# Patient Record
Sex: MALE | Race: WHITE | NOT HISPANIC OR LATINO | Employment: FULL TIME | URBAN - METROPOLITAN AREA
[De-identification: names, ages, dates, MRNs, and addresses within clinical notes are randomized per-mention and may not be internally consistent; named-entity substitution may affect disease eponyms.]

---

## 2024-06-12 ENCOUNTER — APPOINTMENT (OUTPATIENT)
Dept: MRI IMAGING | Facility: HOSPITAL | Age: 59
End: 2024-06-12
Payer: MEDICAID

## 2024-06-12 ENCOUNTER — APPOINTMENT (OUTPATIENT)
Dept: CT IMAGING | Facility: HOSPITAL | Age: 59
End: 2024-06-12
Payer: MEDICAID

## 2024-06-12 ENCOUNTER — APPOINTMENT (OUTPATIENT)
Dept: GENERAL RADIOLOGY | Facility: HOSPITAL | Age: 59
End: 2024-06-12
Payer: MEDICAID

## 2024-06-12 ENCOUNTER — HOSPITAL ENCOUNTER (INPATIENT)
Facility: HOSPITAL | Age: 59
LOS: 24 days | Discharge: NURSING, REHAB OR LONG-TERM CARE | End: 2024-07-06
Attending: INTERNAL MEDICINE | Admitting: INTERNAL MEDICINE
Payer: MEDICAID

## 2024-06-12 ENCOUNTER — HOSPITAL ENCOUNTER (EMERGENCY)
Facility: HOSPITAL | Age: 59
Discharge: OTHER INSTITUTION | End: 2024-06-12
Attending: EMERGENCY MEDICINE
Payer: MEDICAID

## 2024-06-12 VITALS
WEIGHT: 150 LBS | DIASTOLIC BLOOD PRESSURE: 62 MMHG | HEART RATE: 90 BPM | OXYGEN SATURATION: 98 % | SYSTOLIC BLOOD PRESSURE: 133 MMHG | HEIGHT: 68 IN | RESPIRATION RATE: 18 BRPM | TEMPERATURE: 98.3 F | BODY MASS INDEX: 22.73 KG/M2

## 2024-06-12 DIAGNOSIS — Z89.512 S/P BKA (BELOW KNEE AMPUTATION), LEFT: ICD-10-CM

## 2024-06-12 DIAGNOSIS — R79.89 ELEVATED LFTS: ICD-10-CM

## 2024-06-12 DIAGNOSIS — T84.59XA INFECTION OF PROSTHETIC TOTAL HIP JOINT, INITIAL ENCOUNTER: Primary | ICD-10-CM

## 2024-06-12 DIAGNOSIS — S88.119A BELOW KNEE AMPUTATION: ICD-10-CM

## 2024-06-12 DIAGNOSIS — E11.65 POORLY CONTROLLED DIABETES MELLITUS: ICD-10-CM

## 2024-06-12 DIAGNOSIS — Z74.09 IMPAIRED FUNCTIONAL MOBILITY, BALANCE, GAIT, AND ENDURANCE: ICD-10-CM

## 2024-06-12 DIAGNOSIS — E11.621 DIABETIC ULCER OF LEFT MIDFOOT ASSOCIATED WITH TYPE 2 DIABETES MELLITUS, WITH NECROSIS OF MUSCLE: ICD-10-CM

## 2024-06-12 DIAGNOSIS — L97.423 DIABETIC ULCER OF LEFT MIDFOOT ASSOCIATED WITH TYPE 2 DIABETES MELLITUS, WITH NECROSIS OF MUSCLE: ICD-10-CM

## 2024-06-12 DIAGNOSIS — Z96.649 INFECTION OF PROSTHETIC TOTAL HIP JOINT, INITIAL ENCOUNTER: Primary | ICD-10-CM

## 2024-06-12 DIAGNOSIS — I96 GANGRENE OF LEFT FOOT: Primary | ICD-10-CM

## 2024-06-12 PROBLEM — L97.523: Status: ACTIVE | Noted: 2024-06-12

## 2024-06-12 LAB
ACETONE BLD QL: NEGATIVE
ALBUMIN SERPL-MCNC: 2 G/DL (ref 3.5–5.2)
ALBUMIN SERPL-MCNC: 2.7 G/DL (ref 3.5–5.2)
ALBUMIN/GLOB SERPL: 0.7 G/DL
ALBUMIN/GLOB SERPL: 0.9 G/DL
ALP SERPL-CCNC: 145 U/L (ref 39–117)
ALP SERPL-CCNC: 171 U/L (ref 39–117)
ALT SERPL W P-5'-P-CCNC: 37 U/L (ref 1–41)
ALT SERPL W P-5'-P-CCNC: 45 U/L (ref 1–41)
ANION GAP SERPL CALCULATED.3IONS-SCNC: 11 MMOL/L (ref 5–15)
ANION GAP SERPL CALCULATED.3IONS-SCNC: 20.1 MMOL/L (ref 5–15)
ANISOCYTOSIS BLD QL: ABNORMAL
AST SERPL-CCNC: 46 U/L (ref 1–40)
AST SERPL-CCNC: 65 U/L (ref 1–40)
BASOPHILS # BLD AUTO: 0.01 10*3/MM3 (ref 0–0.2)
BASOPHILS # BLD AUTO: 0.08 10*3/MM3 (ref 0–0.2)
BASOPHILS NFR BLD AUTO: 0 % (ref 0–1.5)
BASOPHILS NFR BLD AUTO: 0.4 % (ref 0–1.5)
BILIRUB SERPL-MCNC: 3.6 MG/DL (ref 0–1.2)
BILIRUB SERPL-MCNC: 4.7 MG/DL (ref 0–1.2)
BILIRUB UR QL STRIP: ABNORMAL
BUN SERPL-MCNC: 63 MG/DL (ref 8–23)
BUN SERPL-MCNC: 67 MG/DL (ref 8–23)
BUN/CREAT SERPL: 54.9 (ref 7–25)
BUN/CREAT SERPL: 63 (ref 7–25)
CALCIUM SPEC-SCNC: 7.7 MG/DL (ref 8.6–10.5)
CALCIUM SPEC-SCNC: 8.9 MG/DL (ref 8.6–10.5)
CHLORIDE SERPL-SCNC: 85 MMOL/L (ref 98–107)
CHLORIDE SERPL-SCNC: 92 MMOL/L (ref 98–107)
CLARITY UR: CLEAR
CLUMPED PLATELETS: PRESENT
CO2 SERPL-SCNC: 21.9 MMOL/L (ref 22–29)
CO2 SERPL-SCNC: 27 MMOL/L (ref 22–29)
COLOR UR: YELLOW
CREAT SERPL-MCNC: 1 MG/DL (ref 0.76–1.27)
CREAT SERPL-MCNC: 1.22 MG/DL (ref 0.76–1.27)
CRP SERPL-MCNC: 24.89 MG/DL (ref 0–0.5)
D-LACTATE SERPL-SCNC: 3.6 MMOL/L (ref 0.5–2)
D-LACTATE SERPL-SCNC: 3.9 MMOL/L (ref 0.5–2)
D-LACTATE SERPL-SCNC: 4.1 MMOL/L (ref 0.5–2)
D-LACTATE SERPL-SCNC: 6.3 MMOL/L (ref 0.5–2)
D-LACTATE SERPL-SCNC: 8.8 MMOL/L (ref 0.5–2)
DEPRECATED RDW RBC AUTO: 44.3 FL (ref 37–54)
DEPRECATED RDW RBC AUTO: 45.2 FL (ref 37–54)
EGFRCR SERPLBLD CKD-EPI 2021: 67.9 ML/MIN/1.73
EGFRCR SERPLBLD CKD-EPI 2021: 86.2 ML/MIN/1.73
EOSINOPHIL # BLD AUTO: 0 10*3/MM3 (ref 0–0.4)
EOSINOPHIL # BLD AUTO: 0.01 10*3/MM3 (ref 0–0.4)
EOSINOPHIL NFR BLD AUTO: 0 % (ref 0.3–6.2)
EOSINOPHIL NFR BLD AUTO: 0.1 % (ref 0.3–6.2)
ERYTHROCYTE [DISTWIDTH] IN BLOOD BY AUTOMATED COUNT: 12.9 % (ref 12.3–15.4)
ERYTHROCYTE [DISTWIDTH] IN BLOOD BY AUTOMATED COUNT: 13.2 % (ref 12.3–15.4)
GLOBULIN UR ELPH-MCNC: 2.9 GM/DL
GLOBULIN UR ELPH-MCNC: 3 GM/DL
GLUCOSE BLDC GLUCOMTR-MCNC: 342 MG/DL (ref 70–130)
GLUCOSE BLDC GLUCOMTR-MCNC: 363 MG/DL (ref 70–130)
GLUCOSE BLDC GLUCOMTR-MCNC: 443 MG/DL (ref 70–130)
GLUCOSE BLDC GLUCOMTR-MCNC: 539 MG/DL (ref 70–130)
GLUCOSE SERPL-MCNC: 348 MG/DL (ref 65–99)
GLUCOSE SERPL-MCNC: 509 MG/DL (ref 65–99)
GLUCOSE UR STRIP-MCNC: ABNORMAL MG/DL
HCT VFR BLD AUTO: 31.5 % (ref 37.5–51)
HCT VFR BLD AUTO: 35.5 % (ref 37.5–51)
HGB BLD-MCNC: 11 G/DL (ref 13–17.7)
HGB BLD-MCNC: 12.3 G/DL (ref 13–17.7)
HGB UR QL STRIP.AUTO: NEGATIVE
IMM GRANULOCYTES # BLD AUTO: 0.34 10*3/MM3 (ref 0–0.05)
IMM GRANULOCYTES # BLD AUTO: 0.42 10*3/MM3 (ref 0–0.05)
IMM GRANULOCYTES NFR BLD AUTO: 1.6 % (ref 0–0.5)
IMM GRANULOCYTES NFR BLD AUTO: 1.8 % (ref 0–0.5)
KETONES UR QL STRIP: ABNORMAL
LEUKOCYTE ESTERASE UR QL STRIP.AUTO: NEGATIVE
LYMPHOCYTES # BLD AUTO: 0.67 10*3/MM3 (ref 0.7–3.1)
LYMPHOCYTES # BLD AUTO: 0.83 10*3/MM3 (ref 0.7–3.1)
LYMPHOCYTES # BLD MANUAL: 1.07 10*3/MM3 (ref 0.7–3.1)
LYMPHOCYTES NFR BLD AUTO: 2.5 % (ref 19.6–45.3)
LYMPHOCYTES NFR BLD AUTO: 4.4 % (ref 19.6–45.3)
LYMPHOCYTES NFR BLD MANUAL: 6 % (ref 5–12)
MCH RBC QN AUTO: 32 PG (ref 26.6–33)
MCH RBC QN AUTO: 32.3 PG (ref 26.6–33)
MCHC RBC AUTO-ENTMCNC: 34.6 G/DL (ref 31.5–35.7)
MCHC RBC AUTO-ENTMCNC: 34.9 G/DL (ref 31.5–35.7)
MCV RBC AUTO: 91.6 FL (ref 79–97)
MCV RBC AUTO: 93.2 FL (ref 79–97)
METAMYELOCYTES NFR BLD MANUAL: 1 % (ref 0–0)
MONOCYTES # BLD AUTO: 1.01 10*3/MM3 (ref 0.1–0.9)
MONOCYTES # BLD AUTO: 1.47 10*3/MM3 (ref 0.1–0.9)
MONOCYTES # BLD: 1.61 10*3/MM3 (ref 0.1–0.9)
MONOCYTES NFR BLD AUTO: 5.4 % (ref 5–12)
MONOCYTES NFR BLD AUTO: 5.5 % (ref 5–12)
NEUTROPHILS # BLD AUTO: 23.83 10*3/MM3 (ref 1.7–7)
NEUTROPHILS NFR BLD AUTO: 16.57 10*3/MM3 (ref 1.7–7)
NEUTROPHILS NFR BLD AUTO: 24.21 10*3/MM3 (ref 1.7–7)
NEUTROPHILS NFR BLD AUTO: 87.9 % (ref 42.7–76)
NEUTROPHILS NFR BLD AUTO: 90.4 % (ref 42.7–76)
NEUTROPHILS NFR BLD MANUAL: 70 % (ref 42.7–76)
NEUTS BAND NFR BLD MANUAL: 19 % (ref 0–5)
NEUTS VAC BLD QL SMEAR: NORMAL
NITRITE UR QL STRIP: NEGATIVE
NRBC BLD AUTO-RTO: 0 /100 WBC (ref 0–0.2)
PH UR STRIP.AUTO: 5.5 [PH] (ref 4.5–8)
PLATELET # BLD AUTO: 142 10*3/MM3 (ref 140–450)
PLATELET # BLD AUTO: 191 10*3/MM3 (ref 140–450)
PMV BLD AUTO: 10.2 FL (ref 6–12)
PMV BLD AUTO: 10.3 FL (ref 6–12)
POTASSIUM SERPL-SCNC: 3.6 MMOL/L (ref 3.5–5.2)
POTASSIUM SERPL-SCNC: 3.7 MMOL/L (ref 3.5–5.2)
PROT SERPL-MCNC: 5 G/DL (ref 6–8.5)
PROT SERPL-MCNC: 5.6 G/DL (ref 6–8.5)
PROT UR QL STRIP: NEGATIVE
RBC # BLD AUTO: 3.44 10*6/MM3 (ref 4.14–5.8)
RBC # BLD AUTO: 3.81 10*6/MM3 (ref 4.14–5.8)
RBC MORPH BLD: NORMAL
SMALL PLATELETS BLD QL SMEAR: ADEQUATE
SODIUM SERPL-SCNC: 127 MMOL/L (ref 136–145)
SODIUM SERPL-SCNC: 130 MMOL/L (ref 136–145)
SP GR UR STRIP: 1.01 (ref 1–1.03)
UROBILINOGEN UR QL STRIP: ABNORMAL
VARIANT LYMPHS NFR BLD MANUAL: 4 % (ref 19.6–45.3)
WBC MORPH BLD: NORMAL
WBC NRBC COR # BLD AUTO: 18.84 10*3/MM3 (ref 3.4–10.8)
WBC NRBC COR # BLD AUTO: 26.78 10*3/MM3 (ref 3.4–10.8)

## 2024-06-12 PROCEDURE — 82948 REAGENT STRIP/BLOOD GLUCOSE: CPT

## 2024-06-12 PROCEDURE — 73630 X-RAY EXAM OF FOOT: CPT

## 2024-06-12 PROCEDURE — 25510000001 IOPAMIDOL PER 1 ML: Performed by: EMERGENCY MEDICINE

## 2024-06-12 PROCEDURE — 80053 COMPREHEN METABOLIC PANEL: CPT | Performed by: EMERGENCY MEDICINE

## 2024-06-12 PROCEDURE — 82009 KETONE BODYS QUAL: CPT | Performed by: EMERGENCY MEDICINE

## 2024-06-12 PROCEDURE — 96367 TX/PROPH/DG ADDL SEQ IV INF: CPT

## 2024-06-12 PROCEDURE — 75635 CT ANGIO ABDOMINAL ARTERIES: CPT

## 2024-06-12 PROCEDURE — 87154 CUL TYP ID BLD PTHGN 6+ TRGT: CPT | Performed by: EMERGENCY MEDICINE

## 2024-06-12 PROCEDURE — 99285 EMERGENCY DEPT VISIT HI MDM: CPT

## 2024-06-12 PROCEDURE — 83605 ASSAY OF LACTIC ACID: CPT | Performed by: PHYSICIAN ASSISTANT

## 2024-06-12 PROCEDURE — 63710000001 INSULIN REGULAR HUMAN PER 5 UNITS: Performed by: EMERGENCY MEDICINE

## 2024-06-12 PROCEDURE — 25010000002 CLINDAMYCIN 900 MG/50ML SOLUTION: Performed by: INTERNAL MEDICINE

## 2024-06-12 PROCEDURE — 80053 COMPREHEN METABOLIC PANEL: CPT | Performed by: INTERNAL MEDICINE

## 2024-06-12 PROCEDURE — 87205 SMEAR GRAM STAIN: CPT | Performed by: EMERGENCY MEDICINE

## 2024-06-12 PROCEDURE — 96368 THER/DIAG CONCURRENT INF: CPT

## 2024-06-12 PROCEDURE — 25810000003 SODIUM CHLORIDE 0.9 % SOLUTION: Performed by: EMERGENCY MEDICINE

## 2024-06-12 PROCEDURE — 87070 CULTURE OTHR SPECIMN AEROBIC: CPT | Performed by: EMERGENCY MEDICINE

## 2024-06-12 PROCEDURE — 96361 HYDRATE IV INFUSION ADD-ON: CPT

## 2024-06-12 PROCEDURE — 87147 CULTURE TYPE IMMUNOLOGIC: CPT | Performed by: EMERGENCY MEDICINE

## 2024-06-12 PROCEDURE — 86140 C-REACTIVE PROTEIN: CPT | Performed by: EMERGENCY MEDICINE

## 2024-06-12 PROCEDURE — 87186 SC STD MICRODIL/AGAR DIL: CPT | Performed by: EMERGENCY MEDICINE

## 2024-06-12 PROCEDURE — 25810000003 LACTATED RINGERS PER 1000 ML: Performed by: INTERNAL MEDICINE

## 2024-06-12 PROCEDURE — 25010000002 PIPERACILLIN SOD-TAZOBACTAM PER 1 G: Performed by: EMERGENCY MEDICINE

## 2024-06-12 PROCEDURE — 25810000003 SODIUM CHLORIDE 0.9 % SOLUTION 250 ML FLEX CONT: Performed by: EMERGENCY MEDICINE

## 2024-06-12 PROCEDURE — 83605 ASSAY OF LACTIC ACID: CPT | Performed by: EMERGENCY MEDICINE

## 2024-06-12 PROCEDURE — 63710000001 INSULIN REGULAR HUMAN PER 5 UNITS: Performed by: INTERNAL MEDICINE

## 2024-06-12 PROCEDURE — 85007 BL SMEAR W/DIFF WBC COUNT: CPT | Performed by: EMERGENCY MEDICINE

## 2024-06-12 PROCEDURE — 85025 COMPLETE CBC W/AUTO DIFF WBC: CPT | Performed by: EMERGENCY MEDICINE

## 2024-06-12 PROCEDURE — 25010000002 CLINDAMYCIN 600 MG/50ML SOLUTION: Performed by: EMERGENCY MEDICINE

## 2024-06-12 PROCEDURE — 36415 COLL VENOUS BLD VENIPUNCTURE: CPT

## 2024-06-12 PROCEDURE — 85007 BL SMEAR W/DIFF WBC COUNT: CPT | Performed by: INTERNAL MEDICINE

## 2024-06-12 PROCEDURE — 81003 URINALYSIS AUTO W/O SCOPE: CPT | Performed by: EMERGENCY MEDICINE

## 2024-06-12 PROCEDURE — 87040 BLOOD CULTURE FOR BACTERIA: CPT | Performed by: EMERGENCY MEDICINE

## 2024-06-12 PROCEDURE — 85025 COMPLETE CBC W/AUTO DIFF WBC: CPT | Performed by: INTERNAL MEDICINE

## 2024-06-12 PROCEDURE — 96365 THER/PROPH/DIAG IV INF INIT: CPT

## 2024-06-12 PROCEDURE — 99223 1ST HOSP IP/OBS HIGH 75: CPT | Performed by: INTERNAL MEDICINE

## 2024-06-12 PROCEDURE — 87040 BLOOD CULTURE FOR BACTERIA: CPT | Performed by: INTERNAL MEDICINE

## 2024-06-12 PROCEDURE — 96366 THER/PROPH/DIAG IV INF ADDON: CPT

## 2024-06-12 PROCEDURE — 25010000002 VANCOMYCIN HCL 1.25 G RECONSTITUTED SOLUTION 1 EACH VIAL: Performed by: EMERGENCY MEDICINE

## 2024-06-12 PROCEDURE — 83605 ASSAY OF LACTIC ACID: CPT | Performed by: INTERNAL MEDICINE

## 2024-06-12 RX ORDER — NITROGLYCERIN 0.4 MG/1
0.4 TABLET SUBLINGUAL
Status: DISCONTINUED | OUTPATIENT
Start: 2024-06-12 | End: 2024-07-06 | Stop reason: HOSPADM

## 2024-06-12 RX ORDER — BISACODYL 5 MG/1
5 TABLET, DELAYED RELEASE ORAL DAILY PRN
Status: DISCONTINUED | OUTPATIENT
Start: 2024-06-12 | End: 2024-07-06 | Stop reason: HOSPADM

## 2024-06-12 RX ORDER — HYDROCODONE BITARTRATE AND ACETAMINOPHEN 5; 325 MG/1; MG/1
1 TABLET ORAL EVERY 4 HOURS PRN
Status: DISPENSED | OUTPATIENT
Start: 2024-06-12 | End: 2024-06-17

## 2024-06-12 RX ORDER — L.ACID,PARA/B.BIFIDUM/S.THERM 8B CELL
1 CAPSULE ORAL DAILY
Status: DISCONTINUED | OUTPATIENT
Start: 2024-06-12 | End: 2024-07-06 | Stop reason: HOSPADM

## 2024-06-12 RX ORDER — NICOTINE POLACRILEX 4 MG
15 LOZENGE BUCCAL
Status: DISCONTINUED | OUTPATIENT
Start: 2024-06-12 | End: 2024-06-15

## 2024-06-12 RX ORDER — SODIUM CHLORIDE 0.9 % (FLUSH) 0.9 %
10 SYRINGE (ML) INJECTION AS NEEDED
Status: DISCONTINUED | OUTPATIENT
Start: 2024-06-12 | End: 2024-07-06 | Stop reason: HOSPADM

## 2024-06-12 RX ORDER — POLYETHYLENE GLYCOL 3350 17 G/17G
17 POWDER, FOR SOLUTION ORAL DAILY PRN
Status: DISCONTINUED | OUTPATIENT
Start: 2024-06-12 | End: 2024-07-06 | Stop reason: HOSPADM

## 2024-06-12 RX ORDER — DEXTROSE MONOHYDRATE 25 G/50ML
25 INJECTION, SOLUTION INTRAVENOUS
Status: DISCONTINUED | OUTPATIENT
Start: 2024-06-12 | End: 2024-06-15

## 2024-06-12 RX ORDER — CLINDAMYCIN PHOSPHATE 900 MG/50ML
900 INJECTION, SOLUTION INTRAVENOUS EVERY 8 HOURS
Qty: 750 ML | Refills: 0 | Status: DISCONTINUED | OUTPATIENT
Start: 2024-06-12 | End: 2024-06-17

## 2024-06-12 RX ORDER — CLINDAMYCIN PHOSPHATE 600 MG/50ML
600 INJECTION, SOLUTION INTRAVENOUS ONCE
Status: COMPLETED | OUTPATIENT
Start: 2024-06-12 | End: 2024-06-12

## 2024-06-12 RX ORDER — AMOXICILLIN 250 MG
2 CAPSULE ORAL 2 TIMES DAILY
Status: DISCONTINUED | OUTPATIENT
Start: 2024-06-12 | End: 2024-07-06 | Stop reason: HOSPADM

## 2024-06-12 RX ORDER — SODIUM CHLORIDE, SODIUM LACTATE, POTASSIUM CHLORIDE, CALCIUM CHLORIDE 600; 310; 30; 20 MG/100ML; MG/100ML; MG/100ML; MG/100ML
100 INJECTION, SOLUTION INTRAVENOUS CONTINUOUS
Status: DISCONTINUED | OUTPATIENT
Start: 2024-06-12 | End: 2024-06-14

## 2024-06-12 RX ORDER — IBUPROFEN 600 MG/1
1 TABLET ORAL
Status: DISCONTINUED | OUTPATIENT
Start: 2024-06-12 | End: 2024-06-15

## 2024-06-12 RX ORDER — SODIUM CHLORIDE 9 MG/ML
40 INJECTION, SOLUTION INTRAVENOUS AS NEEDED
Status: DISCONTINUED | OUTPATIENT
Start: 2024-06-12 | End: 2024-07-06 | Stop reason: HOSPADM

## 2024-06-12 RX ORDER — SODIUM CHLORIDE 9 MG/ML
250 INJECTION, SOLUTION INTRAVENOUS CONTINUOUS
Status: DISCONTINUED | OUTPATIENT
Start: 2024-06-12 | End: 2024-06-12 | Stop reason: HOSPADM

## 2024-06-12 RX ORDER — HYDROMORPHONE HYDROCHLORIDE 1 MG/ML
0.5 INJECTION, SOLUTION INTRAMUSCULAR; INTRAVENOUS; SUBCUTANEOUS EVERY 4 HOURS PRN
Status: DISPENSED | OUTPATIENT
Start: 2024-06-12 | End: 2024-06-17

## 2024-06-12 RX ORDER — BISACODYL 10 MG
10 SUPPOSITORY, RECTAL RECTAL DAILY PRN
Status: DISCONTINUED | OUTPATIENT
Start: 2024-06-12 | End: 2024-07-06 | Stop reason: HOSPADM

## 2024-06-12 RX ORDER — SODIUM CHLORIDE 0.9 % (FLUSH) 0.9 %
10 SYRINGE (ML) INJECTION EVERY 12 HOURS SCHEDULED
Status: DISCONTINUED | OUTPATIENT
Start: 2024-06-12 | End: 2024-07-06 | Stop reason: HOSPADM

## 2024-06-12 RX ADMIN — VANCOMYCIN HYDROCHLORIDE 1250 MG: 1.25 INJECTION, POWDER, LYOPHILIZED, FOR SOLUTION INTRAVENOUS at 08:18

## 2024-06-12 RX ADMIN — INSULIN HUMAN 10 UNITS: 100 INJECTION, SOLUTION PARENTERAL at 08:14

## 2024-06-12 RX ADMIN — Medication 1 CAPSULE: at 18:28

## 2024-06-12 RX ADMIN — Medication 10 ML: at 20:04

## 2024-06-12 RX ADMIN — INSULIN HUMAN 6 UNITS: 100 INJECTION, SOLUTION PARENTERAL at 18:28

## 2024-06-12 RX ADMIN — SODIUM CHLORIDE, POTASSIUM CHLORIDE, SODIUM LACTATE AND CALCIUM CHLORIDE 100 ML/HR: 600; 310; 30; 20 INJECTION, SOLUTION INTRAVENOUS at 20:03

## 2024-06-12 RX ADMIN — SODIUM CHLORIDE 250 ML/HR: 9 INJECTION, SOLUTION INTRAVENOUS at 13:35

## 2024-06-12 RX ADMIN — PIPERACILLIN SODIUM AND TAZOBACTAM SODIUM 3.38 G: 3; .375 INJECTION, POWDER, LYOPHILIZED, FOR SOLUTION INTRAVENOUS at 08:19

## 2024-06-12 RX ADMIN — SODIUM CHLORIDE 1000 ML: 9 INJECTION, SOLUTION INTRAVENOUS at 08:09

## 2024-06-12 RX ADMIN — IOPAMIDOL 100 ML: 755 INJECTION, SOLUTION INTRAVENOUS at 09:45

## 2024-06-12 RX ADMIN — SODIUM CHLORIDE 2040 ML: 9 INJECTION, SOLUTION INTRAVENOUS at 09:49

## 2024-06-12 RX ADMIN — CLINDAMYCIN IN 5 PERCENT DEXTROSE 900 MG: 18 INJECTION, SOLUTION INTRAVENOUS at 22:18

## 2024-06-12 RX ADMIN — CLINDAMYCIN PHOSPHATE 600 MG: 600 INJECTION, SOLUTION INTRAVENOUS at 13:36

## 2024-06-12 NOTE — H&P
[facility] Medicine Services  HISTORY AND PHYSICAL    Patient Name: Easton Alvarez  : 1965  MRN: [id number]  Primary Care Physician: Provider, No Known  Date of admission: 2024      Subjective   Subjective     Chief Complaint:  Left foot gangrene     HPI:  Easton Alvarez is a 58 y.o. male who doesn't usually see doctors; history of DM (takes no meds), diabetic neuropathy, and R hip fracture/ORIF in 2024 ([facility]) and R foot TMA in 2019 (, done for infection).  He [place] the [place] in [place].      Two weeks ago he fell outside his house and injured his left foot.  He did not think it was seriously injured and it seemed stable to him over the past two weeks, and he has not had fever/chills.  About two days ago he lost his appetite.  This morning the foot looked worrisome enough that he went to the ED at [facility].  A CTA aortic runoff was done and showed three-vessel perfusion.  Foot appeared necrotic and CT showed gas tracking up from wound.  He was arranged for emergent transfer to Psychiatric; Dr Em has agreed to consult.     He has diminished sensation in the feet but is feeling some pain.    ROS  - two weeks of post prandial diarrhea and decreased intake in consequence  - frequent hiccups x weeks   - history of HTN, on no meds    Denies tobacco, alcohol, heart disease, allergies.  Does not have a PCP currently.          Personal History     History reviewed. No pertinent past medical history.        History reviewed. No pertinent surgical history.  R hip repair 2024    Family History: family history is not on file.     Social History:  reports that he has never smoked. He has been exposed to tobacco smoke. He has never used smokeless tobacco. He reports that he does not currently use alcohol. He reports current drug use. Drug: Marijuana.  Social History     Social History Narrative    Not on file       Medications:  Available home medication information  reviewed.  Pt takes no home meds        No Known Allergies    Objective   Objective     Vital Signs:   Temp:  [98 °F (36.7 °C)-98.8 °F (37.1 °C)] 98 °F (36.7 °C)  Heart Rate:  [] 95  Resp:  [18-22] 22  BP: (116-151)/(61-74) 144/69       Physical Exam   Gen:  thin man with mild bitemp wasting.  NAD and conversant.    Neuro: alert and oriented, clear speech, follows commands, grossly nonfocal  HEENT:  NC/AT  Neck:  Supple, no LAD  Heart RRR no murmur,   Lungs sl tachypneic, no wheeze, on RA.   Abd:  Soft, nontender, no rebound or guarding, pos BS  Extrem:  RLE:  healing incision R hip.  TMA R foot well healed  LLE:  partially dressed w Kerlix..  Necrotic area and bulla medially; diffuse swelling and erythema of entire foot, swelling/redness and discolored areas of distal leg without tenderness.        Result Review:  I have personally reviewed the results from the time of this admission to 6/12/2024 18:33 EDT and agree with these findings:  [x]  Laboratory list / accordion  [x]  Microbiology  [x]  Radiology  []  EKG/Telemetry   []  Cardiology/Vascular   []  Pathology  [x]  Old records  []  Other:  Most notable findings include: WBC 26, lactate 3.5, CTA with aortic runoff notes gas gangrene       LAB RESULTS:      Lab 06/12/24  1451 06/12/24  1149 06/12/24  0807 06/12/24  0802   WBC  --   --   --  26.78*   HEMOGLOBIN  --   --   --  12.3*   HEMATOCRIT  --   --   --  35.5*   PLATELETS  --   --   --  191   NEUTROS ABS  --   --   --  24.21*  23.83*   IMMATURE GRANS (ABS)  --   --   --  0.42*   LYMPHS ABS  --   --   --  0.67*   MONOS ABS  --   --   --  1.47*   EOS ABS  --   --   --  0.00   MCV  --   --   --  93.2   CRP  --   --   --  24.89*   LACTATE 3.9* 6.3* 8.8*  --          Lab 06/12/24  0802   SODIUM 127*   POTASSIUM 3.6   CHLORIDE 85*   CO2 21.9*   ANION GAP 20.1*   BUN 67*   CREATININE 1.22   EGFR 67.9   GLUCOSE 509*   CALCIUM 8.9         Lab 06/12/24  0802   TOTAL PROTEIN 5.6*   ALBUMIN 2.7*   GLOBULIN 2.9    ALT (SGPT) 45*   AST (SGOT) 65*   BILIRUBIN 4.7*   ALK PHOS 171*                     UA          6/12/2024    09:51   Urinalysis   Specific [place], UA 1.010    Ketones, UA Trace    Blood, UA Negative    Leukocytes, UA Negative    Nitrite, UA Negative        Microbiology Results (last 10 days)       Procedure Component Value - Date/Time    Wound Culture - Swab, Foot, Left [[id number]] Collected: 06/12/24 0803    Lab Status: Preliminary result Specimen: Swab from Foot, Left Updated: 06/12/24 1412     Gram Stain Rare (1+) WBCs seen      No organisms seen            CT Angio Abdominal Aorta Bilateral Iliofem Runoff    Result Date: 6/12/2024  CT ANGIO ABDOMINAL AORTA BILAT ILIOFEM RUNOFF Date of Exam: 6/12/2024 9:30 AM EDT Indication: Gangrenous left foot. Comparison: 6/12/2024 radiographs Technique: CTA of the abdomen, pelvis and both lower extremities was performed before and after the uneventful intravenous administration of iodinated contrast. Reconstructed coronal and sagittal images were also obtained. In addition, a 3-D volume rendered image was created for interpretation. Automated exposure control and iterative reconstruction methods were used. Findings: Abdomen/pelvis: There are scattered atherosclerotic calcifications of the aorta and branch vessels. No evidence of significant nonatheromatous plaque. The celiac, SMA, and BUBBA are patent. The bilateral renal arteries are patent. No significant stenosis appreciated. The bilateral common iliac as well as the external and internal iliac arteries are patent without evidence of significant stenosis. There is no suspicious renal lesion. There is mild hepatic contour nodularity Cholelithiasis. No evidence of cholecystitis. No significant biliary ductal dilation. The spleen is borderline enlarged. The pancreas and bilateral adrenal glands are within normal limits. There is no hydronephrosis or nephrolithiasis. No suspicious renal lesion. No evidence of bowel  obstruction. Moderate colonic stool burden. There are prominent left inguinal lymph lymph nodes. For reference, there is a left inguinal lymph node measuring 1.7 cm in short axis on axial image 236. There is small volume abdominal pelvic ascites. Grossly unremarkable appearance of of the urinary bladder, partially obscured. There are surgical changes of right hip arthroplasty. There is a soft tissue gas fluid collection surrounding the anterior aspect of the hip arthroplasty as seen on axial image 217. This is poorly defined on this exam. No evidence of fracture or suspicious osseous lesion. Bilateral lower extremity runoff: There are scattered atherosclerotic calcifications of the bilateral common femoral arteries. The bilateral deep femoral arteries are patent. The bilateral superficial femoral arteries are patent. There is focal narrowing of the left distal superficial femoral artery secondary to near circumferential atherosclerotic calcification. Stenosis measures approximately 50% as seen on axial image 373. There is calcified and noncalcified atheromatous plaque regarding the right superficial femoral artery at this  level without significant stenosis. There our bilateral popliteal artery calcifications. There is approximately 50% stenosis on the right at the level of the knee joint as seen on axial image 408. No evidence of occlusion. Patent bilateral three-vessel runoff. There is asymmetric early venous enhancement within the left lower extremity, likely secondary to inflammatory changes. There is diffuse subcutaneous edema and extensive emphysema about the left foot with extension along the left lateral lower leg. No definite evidence of well-defined or drainable fluid collection. No evidence of acute fracture. Severe left first MTP joint arthritis. Sequela of right forefoot amputation at the metatarsal shafts. No suspicious osseous lesion. Calcaneal enthesopathy.     Impression: Impression: Scattered  predominantly calcified atheromatous plaque extending from the abdominal aorta distally to the bilateral popliteal arteries. No evidence of occlusion. Approximately 50% stenosis of the distal left superficial femoral artery, as well as approximately 50% stenosis of the right popliteal artery at the level of the knee joint. Otherwise no evidence of significant arterial stenosis elsewhere. Patent bilateral three-vessel runoff. Inflammatory changes about the left foot including extensive subcutaneous edema and emphysema likely representing necrotizing infection. There is asymmetric early venous enhancement within the left lower extremity, presumably secondary to inflammatory response. No evidence of well-defined or drainable fluid collection within the left lower extremity to suggest abscess. Surgical changes of right hip arthroplasty. Soft tissue gas fluid collection surrounding the anterior aspect of the hip arthroplasty, which is poorly defined on this exam. If this has not been a recent surgical procedure, findings are concerning for periprosthetic infection/abscess. Additional ancillary findings as above. Electronically Signed: Wilfrido Cagle MD  6/12/2024 12:52 PM EDT  Workstation ID: [id number]    XR Foot 3+ View Left    Result Date: 6/12/2024  XR FOOT 3+ VW LEFT Date of Exam: 6/12/2024 8:35 AM EDT Indication: Evaluate for osteomyelitis Comparison: None available. Findings: Neuropathic appearance of the forefoot with deformities of the third through fifth proximal phalanges and great toe distal phalanx. There is suggestion of dorsal subluxation/dislocations at the third through fifth MTP joints. Osteopenia. Moderate first MTP joint arthritis. Calcaneal enthesopathy. Normal midfoot and hindfoot joint alignment. There is diffuse soft tissue edema about the foot with subcutaneous emphysema along the medial and dorsal forefoot. No definite evidence of cortical irregularity to  suggest osteomyelitis.     Impression:  Impression: Diffuse soft tissue edema about the foot with subcutaneous emphysema along the medial and dorsal forefoot, concerning for cellulitis. No radiographic evidence of osteomyelitis. Consider MRI if there is continued concern. Chronic appearing deformities of the forefoot as detailed above, likely sequela of neuropathic arthropathy. Electronically Signed: Wilfrido Cagle MD  6/12/2024 8:58 AM EDT  Workstation ID: [id number]         Assessment & Plan   Assessment & Plan       Diabetic ulcer of left foot with necrosis of muscle    58 yr old transferred from OSH ED with uncntrolled DM and severe left foot infection    Sepsis (WBC, lactate, tachypnea, source)    Left foot cellulitis/gangrene   - lactate 8.8 in OSH ED; CT scan of leg shows gas gangrene  - MRI left foot  - Dr Em aware; plans for amputation in AM   - abx:  vanc Zosyn clindamycin  - blood cultures sent  - ID consult in AM      Gap acidosis  - likely from lactate.  Serum acetone negative  - hydrate   - follow lytes     R hip fx repaired 2/2024  - note gas near hardware per CT scan :  r/o hardware-associated infection vs nl postop finding    DM, uncontrolled  - SSI with POC glucose q3h     Renal insuff, mild  - creat 1.2 . Give fluids overnight.     HTN history;  not currently on meds.  Monitor.       VTE Prophylaxis:  No VTE prophylaxis order currently exists.          CODE STATUS:    There are no questions and answers to display.       Expected Discharge   Expected discharge date/ time has not been documented.     Shraddha Scott MD  06/12/24

## 2024-06-12 NOTE — LETTER
EMS Transport Request  For use at [facility], [place], [place], [place], and [place] only   Patient Name: Easton Alvarez : 1965   Weight:81.9 kg (180 lb 8.9 oz) Pick-up Location: [facility] BLS/ALS: blos   Insurance: [facility] [place]AN OF [place] Auth End Date:    Pre-Cert #: D/C Summary complete:    Destination: [place]   Contact Precautions:    Equipment (O2, Fluids, etc.):    Arrive By Date/Time:  Stretcher/WC: stretcher   CM Requesting: Anat Zarate RN Ext: [phone]   Notes/Medical Necessity: amputation; hip infection; unable to sit for time needed for transport     ______________________________________________________________________    *Only 2 patient bags OR 1 carry-on size bag are permitted.  Wheelchairs and walkers CANNOT transported with the patient. Acknowledge: yes

## 2024-06-12 NOTE — PROGRESS NOTES
"Pharmacy Consult-Vancomycin Dosing  Easton Alvarez is a  58 y.o. male receiving vancomycin therapy.     Indication: sepsis  Consulting Provider: hospitalist  ID Consult: Yes    Goal AUC: 400 - 600 mg/L*hr    Current Antimicrobial Therapy  Anti-Infectives (From admission, onward)      Ordered     Dose/Rate Route Frequency Start Stop    06/12/24 1746  piperacillin-tazobactam (ZOSYN) 4.5 g IVPB in 100 mL NS MBP (CD)        Ordering Provider: Shraddha Scott MD    4.5 g  over 4 Hours Intravenous Every 8 Hours 06/13/24 0100 06/18/24 0059    06/12/24 1746  clindamycin (CLEOCIN) 900 mg in dextrose 5% 50 mL IVPB (premix)        Ordering Provider: Shraddha Scott MD    900 mg  100 mL/hr over 30 Minutes Intravenous Every 8 Hours 06/12/24 2200 06/17/24 2159    06/12/24 1746  piperacillin-tazobactam (ZOSYN) 4.5 g IVPB in 100 mL NS MBP (CD)        Ordering Provider: Shraddha Scott MD    4.5 g  over 30 Minutes Intravenous Once 06/12/24 1900      06/12/24 1747  Pharmacy to dose vancomycin        Ordering Provider: Shraddha Scott MD     Does not apply Continuous PRN 06/12/24 1747 06/17/24 1746            Allergies  Allergies as of 06/12/2024    (No Known Allergies)       Labs    Results from last 7 days   Lab Units 06/12/24  0802   BUN mg/dL 67*   CREATININE mg/dL 1.22       Results from last 7 days   Lab Units 06/12/24  0802   WBC 10*3/mm3 26.78*       Evaluation of Dosing     Last Dose Received in the ED/Outside Facility: Yes  Is Patient on Dialysis or Renal Replacement: No    Ht - 172.7 cm (68\")  Wt - 68 kg (150 lb)    Estimated Creatinine Clearance: 61.9 mL/min (by C-G formula based on SCr of 1.22 mg/dL).    No intake/output data recorded.    Microbiology and Radiology  Microbiology Results (last 10 days)       Procedure Component Value - Date/Time    Wound Culture - Swab, Foot, Left [[id number]] Collected: 06/12/24 0803    Lab Status: Preliminary result Specimen: Swab from Foot, Left Updated: 06/12/24 1412     Gram Stain Rare " (1+) WBCs seen      No organisms seen            Reported Vancomycin Levels                         Insight[employer] AUC Calculation:    Current AUC:mg/L*hr    Predicted Steady State AUC on Current Dose: mg/L*hr  _________________________________    Predicted Steady State AUC on New Dose:   461 mg/L*hr    Assessment/Plan:   Pharmacy consulted to dose vancomycin for SSTI, goal -600 mg/L*hr.  Patient loaded with vancomycin 1250 mg ( ~ 18 mg/kg)  Wound swab-no organisms seen, BC x 4 in process  Patient remains afebrile, serum creatinine is 1.22, BUN is 67, and WBC is 26.78.  Left foot x-ray: no evidence of osteomyelitis  Based on evaluation, initiate a maintenance regimen of vancomycin 1250 mg ( ~ 18 mg/kg) every 24 hours.  A vancomycin trough will be assessed on 6/14 @ 0600 (prior to the 3rd dose).  Will continue to follow and adjust vancomycin dose as needed based on renal function, cultures, and patient clinical status.    Thank you,    Merly Contreras [facility] ED  6/12/2024  18:20 EDT

## 2024-06-12 NOTE — ED NOTES
Called to follow up on bed assignment with [facility]. Waiting on imaging results and a vascular surgeon to view before releasing bed assignment.

## 2024-06-12 NOTE — ED NOTES
Called [facility] ([phone]) transfer center. Spoke with Carolyn; waiting on return call from hospitalist.

## 2024-06-12 NOTE — SIGNIFICANT NOTE
Called for transfer as [facility] hospitalist on call- reviewed labs, imaging, vitals and d/w ED provider. Patient stable with vitals stable throughout all morning in ED despite labs, lactate responding to IVF.  D/w Dr Em here and will accept in transfer w understanding of likely requirement for BKA. D/w ED provider making this clear to patient before transfer as a more complex limb salvage attempt may require transfer to higher level of care as current evaluation suggests need for prompt amputation    Requested IVF, clindamycin given while awaiting transfer. We have active bed. MRI foot stat on admission, NPO @ 12, put on Dr Em's list early. Will also communicate w my crosscover partner

## 2024-06-12 NOTE — ED NOTES
"PT POOR HISTORIAN. STATES HE DOES NOT HAVE ANY PAST MEDICAL HX, DOES NOT TAKE MEDICATIONS DAILY. WHEN ASKED WHY HE DOES NOT TAKE MEDS FOR HIS DM UPON ELEVATED BG LEVEL, PT STATES \"I DONT TRUST IT\". PT ABLE TO REPORT \"RIGHT HIP\" SURGERY IN FEB BUT DOES NOT KNOW WHERE HE HAD IT, OR WHAT THE PROCEDURE WAS. BROTHER AT BEDSIDE REPORTS THE SAME AS ABOVE.   "

## 2024-06-12 NOTE — ED PROVIDER NOTES
"Subjective   History of Present Illness    Chief complaint: Fall and ulcer left foot    Location: Home    Quality/Severity: Right hip injury.    Timing/Onset/Duration: Patient states that the ulcer started sometime ago.  He is not sure exactly which day fill he think it may have been last Tuesday    Modifying Factors: Nothing makes it better    Associated Symptoms: No headache.  No fever chills or cough.  No sore throat earache or nasal congestion.  No chest pain or shortness of breath.  No abdominal pain.  No diarrhea or burning when he urinates.  No nausea or vomiting.    Narrative: This 58-year-old fell when getting groceries out of his car on Monday.  Complains of right leg pain.  Patient states that there is an unknown liquid oozing from his left foot.  He states that it is because he scraped it trying to get up.  The patient is noncompliant diabetic.  He states he has not been taking his metformin because he \"does not trust it\"    PCP:    Review of Systems   Constitutional:  Negative for chills and fever.   HENT:  Positive for ear pain. Negative for congestion and sore throat.    Respiratory:  Negative for shortness of breath.    Cardiovascular:  Negative for chest pain.   Gastrointestinal:  Negative for abdominal pain, nausea and vomiting.   Genitourinary:  Negative for dysuria.   Musculoskeletal:  Negative for back pain.        Right hip pain, ulcer left foot   Skin:  Positive for wound (Left leg).   Neurological:  Negative for headaches.         No past medical history on file.    Not on File    No past surgical history on file.    No family history on file.    Social History     Socioeconomic History    Marital status: Single           Objective   Physical Exam  Vitals (Blood pressure is 150/70, temperature is 98.8 °F, pulse 170, respirations 18, room air pulse ox 95%.) and nursing note reviewed.   Constitutional:       Comments: Disheveled, patient looks like he does not feel well   HENT:      Head: " Normocephalic and atraumatic.      Right Ear: Tympanic membrane normal.      Left Ear: Tympanic membrane normal.      Nose: Nose normal.      Mouth/Throat:      Mouth: Mucous membranes are dry.   Neck:      Comments: There is no tenderness, deformity, or bony step-offs upon palpation of cervical, thoracic, lumbar sacrococcygeal spine.  Cardiovascular:      Rate and Rhythm: Normal rate and regular rhythm.      Pulses: Normal pulses.      Heart sounds: Normal heart sounds. No murmur heard.     No friction rub. No gallop.   Pulmonary:      Effort: Pulmonary effort is normal.      Breath sounds: Normal breath sounds.   Abdominal:      General: Abdomen is flat. Bowel sounds are normal. There is no distension.      Palpations: Abdomen is soft. There is no mass.      Tenderness: There is no abdominal tenderness. There is no right CVA tenderness, left CVA tenderness, guarding or rebound.      Hernia: No hernia is present.   Musculoskeletal:      Cervical back: Normal range of motion and neck supple.      Comments: The left foot is gangrenous.    There is tenderness upon palpation of the right hip.  There is a 2+ dorsalis pedis pulse.  Patient has had his toes amputated on the right leg.  Extremities are otherwise without tenderness or deformity and neurovascular intact.   Skin:     General: Skin is warm and dry.   Neurological:      General: No focal deficit present.      Mental Status: He is alert and oriented to person, place, and time.      Cranial Nerves: No cranial nerve deficit.      Sensory: No sensory deficit.      Motor: Weakness (Generalized) present.         Procedures           ED Course  ED Course as of 06/12/24 0907   Wed Jun 12, 2024   0852 The acetone is negative. [RC]   0852 The white blood cell count is 26, hemoglobin 12, hematocrit 35, neutrophil percent 90%, absolute neutrophil count 24.  CBC is otherwise unremarkable. [RC]   0857 Lactic acid is 8.8 and elevated. [RC]   0901 The glucose is 509, BUN 67,  creatinine 1.22, sodium 127, chloride 85, CO2 21, total protein 5.6, albumin 2.7, ALT is 45, AST is 65, alk phos is 171, GFR 67, CMP is otherwise unremarkable [RC]      ED Course User Index  [RC] Perico Fierro MD      09:07 EDT, 06/12/24:  The patient was reassessed.  He has no new complaints.  His vital signs are stable.    09:07 EDT, 06/12/24:  The patient wishes to be transferred to [facility].  He has been informed that he has infection of the left foot and poorly controlled diabetes with sepsis.  He has been informed needs to be admitted for antibiotics and consultation with podiatry and potentially vascular surgery.  The hospitalist at [facility] has been paged out.                             09:37 EDT, 06/12/24:  I spoke with Dr. Trinity Echavarria at [facility], she is the hospitalist on-call.  She is excepted the patient.  She would like to consult vascular surgery, Dr. Rhoades.  He has been paged out.    12:34 EDT, 06/12/24:  The blood pressure is 134/66, temperature 98 °F, pulse 84, respirations 18, room air pulse ox 98%.  The patient is resting comfortably.  We are awaiting bed assignment in [place].  The CT angiogram of the left lower extremity is pending.  The repeat lactic has been drawn and we are awaiting the results.    13:25 EDT, 06/12/24:  I spoke with Dr. Echavarria at [facility].  She has consulted with the vascular surgeon, Dr. Rhoades, and vascular surgery feels that the patient will need a left BKA.  This was discussed with the patient.  He understands the treatment options, including rare chance that limb may be salvageable at  but not likely per vascular surgery and patient agrees to be transferred to [facility].          Medical Decision Making      Final diagnoses:   Gangrene of left foot   Poorly controlled diabetes mellitus       ED Disposition  ED Disposition       None            No follow-up provider specified.       Medication  List      No changes were made to your prescriptions during this visit.            Perico Fierro MD  06/12/24 8626

## 2024-06-12 NOTE — LETTER
EMS Transport Request  For use at [facility], [place], [place], [place], and [place] only   Patient Name: Easton Alvarez : 1965   Weight:67.6 kg (149 lb) Pick-up Location: [facility] BLS/ALS: bls   Insurance: [facility] [place]AN OF [place] Auth End Date:    Pre-Cert #: D/C Summary complete:    Destination:[facility]   Contact Precautions:    Equipment (O2, Fluids, etc.):    Arrive By Date/Time:  Stretcher/WC: stretcher   CM Requesting: Anat Zarate RN Ext: [phone]   Notes/Medical Necessity: hip infection and amputation, cannot stand or pivot, cannot transfer to wheelchair     ______________________________________________________________________    *Only 2 patient bags OR 1 carry-on size bag are permitted.  Wheelchairs and walkers CANNOT transported with the patient. Acknowledge: yes

## 2024-06-13 ENCOUNTER — APPOINTMENT (OUTPATIENT)
Dept: CARDIOLOGY | Facility: HOSPITAL | Age: 59
End: 2024-06-13
Payer: MEDICAID

## 2024-06-13 ENCOUNTER — ANESTHESIA EVENT CONVERTED (OUTPATIENT)
Dept: ANESTHESIOLOGY | Facility: HOSPITAL | Age: 59
End: 2024-06-13
Payer: MEDICAID

## 2024-06-13 ENCOUNTER — ANESTHESIA EVENT (OUTPATIENT)
Dept: PERIOP | Facility: HOSPITAL | Age: 59
End: 2024-06-13
Payer: MEDICAID

## 2024-06-13 ENCOUNTER — ANESTHESIA (OUTPATIENT)
Dept: PERIOP | Facility: HOSPITAL | Age: 59
End: 2024-06-13
Payer: MEDICAID

## 2024-06-13 ENCOUNTER — APPOINTMENT (OUTPATIENT)
Dept: MRI IMAGING | Facility: HOSPITAL | Age: 59
End: 2024-06-13
Payer: MEDICAID

## 2024-06-13 PROBLEM — E43 SEVERE MALNUTRITION: Status: ACTIVE | Noted: 2024-06-13

## 2024-06-13 LAB
ANION GAP SERPL CALCULATED.3IONS-SCNC: 9 MMOL/L (ref 5–15)
ANION GAP SERPL CALCULATED.3IONS-SCNC: 9 MMOL/L (ref 5–15)
BACTERIA BLD CULT: ABNORMAL
BOTTLE TYPE: ABNORMAL
BUN SERPL-MCNC: 56 MG/DL (ref 8–23)
BUN SERPL-MCNC: 64 MG/DL (ref 8–23)
BUN/CREAT SERPL: 70.3 (ref 7–25)
BUN/CREAT SERPL: 83.6 (ref 7–25)
CALCIUM SPEC-SCNC: 7.3 MG/DL (ref 8.6–10.5)
CALCIUM SPEC-SCNC: 7.5 MG/DL (ref 8.6–10.5)
CHLORIDE SERPL-SCNC: 96 MMOL/L (ref 98–107)
CHLORIDE SERPL-SCNC: 97 MMOL/L (ref 98–107)
CK SERPL-CCNC: 107 U/L (ref 20–200)
CO2 SERPL-SCNC: 23 MMOL/L (ref 22–29)
CO2 SERPL-SCNC: 26 MMOL/L (ref 22–29)
CREAT SERPL-MCNC: 0.67 MG/DL (ref 0.76–1.27)
CREAT SERPL-MCNC: 0.91 MG/DL (ref 0.76–1.27)
DEPRECATED RDW RBC AUTO: 44.3 FL (ref 37–54)
EGFRCR SERPLBLD CKD-EPI 2021: 106.9 ML/MIN/1.73
EGFRCR SERPLBLD CKD-EPI 2021: 96.5 ML/MIN/1.73
ERYTHROCYTE [DISTWIDTH] IN BLOOD BY AUTOMATED COUNT: 13.1 % (ref 12.3–15.4)
GLUCOSE BLDC GLUCOMTR-MCNC: 238 MG/DL (ref 70–130)
GLUCOSE BLDC GLUCOMTR-MCNC: 263 MG/DL (ref 70–130)
GLUCOSE BLDC GLUCOMTR-MCNC: 275 MG/DL (ref 70–130)
GLUCOSE BLDC GLUCOMTR-MCNC: 296 MG/DL (ref 70–130)
GLUCOSE BLDC GLUCOMTR-MCNC: 300 MG/DL (ref 70–130)
GLUCOSE BLDC GLUCOMTR-MCNC: 348 MG/DL (ref 70–130)
GLUCOSE BLDC GLUCOMTR-MCNC: 374 MG/DL (ref 70–130)
GLUCOSE BLDC GLUCOMTR-MCNC: 511 MG/DL (ref 70–130)
GLUCOSE SERPL-MCNC: 260 MG/DL (ref 65–99)
GLUCOSE SERPL-MCNC: 268 MG/DL (ref 65–99)
HBA1C MFR BLD: 9.5 % (ref 4.8–5.6)
HCT VFR BLD AUTO: 30.5 % (ref 37.5–51)
HGB BLD-MCNC: 10.5 G/DL (ref 13–17.7)
MAGNESIUM SERPL-MCNC: 2.4 MG/DL (ref 1.6–2.4)
MCH RBC QN AUTO: 31.8 PG (ref 26.6–33)
MCHC RBC AUTO-ENTMCNC: 34.4 G/DL (ref 31.5–35.7)
MCV RBC AUTO: 92.4 FL (ref 79–97)
PHOSPHATE SERPL-MCNC: 3.5 MG/DL (ref 2.5–4.5)
PLATELET # BLD AUTO: 122 10*3/MM3 (ref 140–450)
PMV BLD AUTO: 10.4 FL (ref 6–12)
POTASSIUM SERPL-SCNC: 3.3 MMOL/L (ref 3.5–5.2)
POTASSIUM SERPL-SCNC: 3.8 MMOL/L (ref 3.5–5.2)
POTASSIUM SERPL-SCNC: 4.3 MMOL/L (ref 3.5–5.2)
QT INTERVAL: 416 MS
QTC INTERVAL: 486 MS
RBC # BLD AUTO: 3.3 10*6/MM3 (ref 4.14–5.8)
SODIUM SERPL-SCNC: 129 MMOL/L (ref 136–145)
SODIUM SERPL-SCNC: 131 MMOL/L (ref 136–145)
WBC NRBC COR # BLD AUTO: 18.48 10*3/MM3 (ref 3.4–10.8)

## 2024-06-13 PROCEDURE — 25010000002 PHENYLEPHRINE 10 MG/ML SOLUTION 1 ML VIAL

## 2024-06-13 PROCEDURE — 25010000002 HYDROMORPHONE PER 4 MG: Performed by: INTERNAL MEDICINE

## 2024-06-13 PROCEDURE — 25010000002 PHENYLEPHRINE 10 MG/ML SOLUTION

## 2024-06-13 PROCEDURE — 25810000003 SODIUM CHLORIDE 0.9 % SOLUTION 250 ML FLEX CONT

## 2024-06-13 PROCEDURE — 25010000002 PIPERACILLIN SOD-TAZOBACTAM PER 1 G: Performed by: INTERNAL MEDICINE

## 2024-06-13 PROCEDURE — 80048 BASIC METABOLIC PNL TOTAL CA: CPT | Performed by: ORTHOPAEDIC SURGERY

## 2024-06-13 PROCEDURE — 25810000003 LACTATED RINGERS PER 1000 ML: Performed by: INTERNAL MEDICINE

## 2024-06-13 PROCEDURE — 87070 CULTURE OTHR SPECIMN AEROBIC: CPT | Performed by: FAMILY MEDICINE

## 2024-06-13 PROCEDURE — 25810000003 SODIUM CHLORIDE 0.9 % SOLUTION: Performed by: PHYSICIAN ASSISTANT

## 2024-06-13 PROCEDURE — 25010000002 ROPIVACAINE HCL-NACL 0.2-0.9 % SOLUTION: Performed by: NURSE ANESTHETIST, CERTIFIED REGISTERED

## 2024-06-13 PROCEDURE — 99232 SBSQ HOSP IP/OBS MODERATE 35: CPT | Performed by: FAMILY MEDICINE

## 2024-06-13 PROCEDURE — 63710000001 INSULIN GLARGINE PER 5 UNITS: Performed by: FAMILY MEDICINE

## 2024-06-13 PROCEDURE — 93010 ELECTROCARDIOGRAM REPORT: CPT | Performed by: INTERNAL MEDICINE

## 2024-06-13 PROCEDURE — 25010000002 CLINDAMYCIN 900 MG/50ML SOLUTION: Performed by: ORTHOPAEDIC SURGERY

## 2024-06-13 PROCEDURE — 84132 ASSAY OF SERUM POTASSIUM: CPT | Performed by: FAMILY MEDICINE

## 2024-06-13 PROCEDURE — 82948 REAGENT STRIP/BLOOD GLUCOSE: CPT

## 2024-06-13 PROCEDURE — 93005 ELECTROCARDIOGRAM TRACING: CPT | Performed by: ANESTHESIOLOGY

## 2024-06-13 PROCEDURE — 63710000001 INSULIN REGULAR HUMAN PER 5 UNITS: Performed by: INTERNAL MEDICINE

## 2024-06-13 PROCEDURE — 87205 SMEAR GRAM STAIN: CPT | Performed by: FAMILY MEDICINE

## 2024-06-13 PROCEDURE — 73718 MRI LOWER EXTREMITY W/O DYE: CPT

## 2024-06-13 PROCEDURE — 63710000001 INSULIN REGULAR HUMAN PER 5 UNITS: Performed by: FAMILY MEDICINE

## 2024-06-13 PROCEDURE — 87116 MYCOBACTERIA CULTURE: CPT | Performed by: ORTHOPAEDIC SURGERY

## 2024-06-13 PROCEDURE — 80048 BASIC METABOLIC PNL TOTAL CA: CPT | Performed by: INTERNAL MEDICINE

## 2024-06-13 PROCEDURE — 25810000003 LACTATED RINGERS PER 1000 ML: Performed by: ORTHOPAEDIC SURGERY

## 2024-06-13 PROCEDURE — 25010000002 VANCOMYCIN HCL 1.25 G RECONSTITUTED SOLUTION 1 EACH VIAL

## 2024-06-13 PROCEDURE — 25010000002 SUGAMMADEX 200 MG/2ML SOLUTION

## 2024-06-13 PROCEDURE — 25010000002 ESMOLOL 100 MG/10ML SOLUTION

## 2024-06-13 PROCEDURE — 83735 ASSAY OF MAGNESIUM: CPT | Performed by: ORTHOPAEDIC SURGERY

## 2024-06-13 PROCEDURE — 87102 FUNGUS ISOLATION CULTURE: CPT | Performed by: ORTHOPAEDIC SURGERY

## 2024-06-13 PROCEDURE — 84100 ASSAY OF PHOSPHORUS: CPT | Performed by: ORTHOPAEDIC SURGERY

## 2024-06-13 PROCEDURE — 88307 TISSUE EXAM BY PATHOLOGIST: CPT | Performed by: ORTHOPAEDIC SURGERY

## 2024-06-13 PROCEDURE — 25010000002 DAPTOMYCIN PER 1 MG: Performed by: ORTHOPAEDIC SURGERY

## 2024-06-13 PROCEDURE — 99233 SBSQ HOSP IP/OBS HIGH 50: CPT | Performed by: FAMILY MEDICINE

## 2024-06-13 PROCEDURE — 25010000002 PIPERACILLIN SOD-TAZOBACTAM PER 1 G: Performed by: ORTHOPAEDIC SURGERY

## 2024-06-13 PROCEDURE — 25010000002 FENTANYL CITRATE (PF) 50 MCG/ML SOLUTION: Performed by: NURSE ANESTHETIST, CERTIFIED REGISTERED

## 2024-06-13 PROCEDURE — 87206 SMEAR FLUORESCENT/ACID STAI: CPT | Performed by: ORTHOPAEDIC SURGERY

## 2024-06-13 PROCEDURE — 83036 HEMOGLOBIN GLYCOSYLATED A1C: CPT | Performed by: FAMILY MEDICINE

## 2024-06-13 PROCEDURE — 25010000002 DEXAMETHASONE PER 1 MG

## 2024-06-13 PROCEDURE — 2W1MX6Z COMPRESSION OF LEFT LOWER EXTREMITY USING PRESSURE DRESSING: ICD-10-PCS | Performed by: ORTHOPAEDIC SURGERY

## 2024-06-13 PROCEDURE — 25010000002 BUPIVACAINE (PF) 0.25 % SOLUTION: Performed by: NURSE ANESTHETIST, CERTIFIED REGISTERED

## 2024-06-13 PROCEDURE — 25010000002 CLINDAMYCIN 900 MG/50ML SOLUTION: Performed by: INTERNAL MEDICINE

## 2024-06-13 PROCEDURE — 87147 CULTURE TYPE IMMUNOLOGIC: CPT | Performed by: FAMILY MEDICINE

## 2024-06-13 PROCEDURE — 25010000002 ONDANSETRON PER 1 MG

## 2024-06-13 PROCEDURE — 82550 ASSAY OF CK (CPK): CPT | Performed by: INTERNAL MEDICINE

## 2024-06-13 PROCEDURE — 85027 COMPLETE CBC AUTOMATED: CPT | Performed by: INTERNAL MEDICINE

## 2024-06-13 PROCEDURE — 87075 CULTR BACTERIA EXCEPT BLOOD: CPT | Performed by: ORTHOPAEDIC SURGERY

## 2024-06-13 PROCEDURE — 0Y6J0Z2 DETACHMENT AT LEFT LOWER LEG, MID, OPEN APPROACH: ICD-10-PCS | Performed by: ORTHOPAEDIC SURGERY

## 2024-06-13 PROCEDURE — 63710000001 INSULIN REGULAR HUMAN PER 5 UNITS: Performed by: ORTHOPAEDIC SURGERY

## 2024-06-13 PROCEDURE — 25010000002 DEXAMETHASONE SODIUM PHOSPHATE 10 MG/ML SOLUTION: Performed by: NURSE ANESTHETIST, CERTIFIED REGISTERED

## 2024-06-13 PROCEDURE — 25810000003 LACTATED RINGERS PER 1000 ML: Performed by: ANESTHESIOLOGY

## 2024-06-13 PROCEDURE — 25010000002 PROPOFOL 10 MG/ML EMULSION

## 2024-06-13 RX ORDER — DROPERIDOL 2.5 MG/ML
0.62 INJECTION, SOLUTION INTRAMUSCULAR; INTRAVENOUS
Status: DISCONTINUED | OUTPATIENT
Start: 2024-06-13 | End: 2024-06-13 | Stop reason: HOSPADM

## 2024-06-13 RX ORDER — SODIUM CHLORIDE 0.9 % (FLUSH) 0.9 %
3-10 SYRINGE (ML) INJECTION AS NEEDED
Status: DISCONTINUED | OUTPATIENT
Start: 2024-06-13 | End: 2024-06-13 | Stop reason: HOSPADM

## 2024-06-13 RX ORDER — DEXAMETHASONE SODIUM PHOSPHATE 10 MG/ML
INJECTION, SOLUTION INTRAMUSCULAR; INTRAVENOUS
Status: COMPLETED | OUTPATIENT
Start: 2024-06-13 | End: 2024-06-13

## 2024-06-13 RX ORDER — MIDAZOLAM HYDROCHLORIDE 1 MG/ML
1 INJECTION INTRAMUSCULAR; INTRAVENOUS
Status: DISCONTINUED | OUTPATIENT
Start: 2024-06-13 | End: 2024-06-13 | Stop reason: HOSPADM

## 2024-06-13 RX ORDER — EPHEDRINE SULFATE 50 MG/ML
INJECTION INTRAVENOUS AS NEEDED
Status: DISCONTINUED | OUTPATIENT
Start: 2024-06-13 | End: 2024-06-13 | Stop reason: SURG

## 2024-06-13 RX ORDER — HYDROMORPHONE HYDROCHLORIDE 1 MG/ML
0.5 INJECTION, SOLUTION INTRAMUSCULAR; INTRAVENOUS; SUBCUTANEOUS
Status: DISCONTINUED | OUTPATIENT
Start: 2024-06-13 | End: 2024-06-13 | Stop reason: HOSPADM

## 2024-06-13 RX ORDER — BUPIVACAINE HYDROCHLORIDE 2.5 MG/ML
INJECTION, SOLUTION EPIDURAL; INFILTRATION; INTRACAUDAL
Status: COMPLETED | OUTPATIENT
Start: 2024-06-13 | End: 2024-06-13

## 2024-06-13 RX ORDER — PROPOFOL 10 MG/ML
VIAL (ML) INTRAVENOUS AS NEEDED
Status: DISCONTINUED | OUTPATIENT
Start: 2024-06-13 | End: 2024-06-13 | Stop reason: SURG

## 2024-06-13 RX ORDER — OXYCODONE HYDROCHLORIDE 5 MG/1
5 TABLET ORAL EVERY 4 HOURS PRN
Qty: 42 TABLET | Refills: 0 | Status: SHIPPED | OUTPATIENT
Start: 2024-06-13 | End: 2024-07-04 | Stop reason: HOSPADM

## 2024-06-13 RX ORDER — ROPIVACAINE HYDROCHLORIDE 2 MG/ML
INJECTION, SOLUTION EPIDURAL; INFILTRATION; PERINEURAL CONTINUOUS
Status: DISCONTINUED | OUTPATIENT
Start: 2024-06-13 | End: 2024-07-06 | Stop reason: HOSPADM

## 2024-06-13 RX ORDER — PHENYLEPHRINE HYDROCHLORIDE 10 MG/ML
INJECTION INTRAVENOUS AS NEEDED
Status: DISCONTINUED | OUTPATIENT
Start: 2024-06-13 | End: 2024-06-13 | Stop reason: SURG

## 2024-06-13 RX ORDER — HYDRALAZINE HYDROCHLORIDE 20 MG/ML
5 INJECTION INTRAMUSCULAR; INTRAVENOUS
Status: DISCONTINUED | OUTPATIENT
Start: 2024-06-13 | End: 2024-06-13 | Stop reason: HOSPADM

## 2024-06-13 RX ORDER — FENTANYL CITRATE 50 UG/ML
50 INJECTION, SOLUTION INTRAMUSCULAR; INTRAVENOUS
Status: DISCONTINUED | OUTPATIENT
Start: 2024-06-13 | End: 2024-06-13 | Stop reason: HOSPADM

## 2024-06-13 RX ORDER — IPRATROPIUM BROMIDE AND ALBUTEROL SULFATE 2.5; .5 MG/3ML; MG/3ML
3 SOLUTION RESPIRATORY (INHALATION) ONCE AS NEEDED
Status: DISCONTINUED | OUTPATIENT
Start: 2024-06-13 | End: 2024-06-13 | Stop reason: HOSPADM

## 2024-06-13 RX ORDER — SODIUM CHLORIDE 9 MG/ML
40 INJECTION, SOLUTION INTRAVENOUS AS NEEDED
Status: DISCONTINUED | OUTPATIENT
Start: 2024-06-13 | End: 2024-06-13 | Stop reason: HOSPADM

## 2024-06-13 RX ORDER — FAMOTIDINE 10 MG/ML
20 INJECTION, SOLUTION INTRAVENOUS ONCE
Status: CANCELLED | OUTPATIENT
Start: 2024-06-13 | End: 2024-06-13

## 2024-06-13 RX ORDER — OXYCODONE HYDROCHLORIDE 5 MG/1
5 TABLET ORAL EVERY 4 HOURS PRN
Status: DISCONTINUED | OUTPATIENT
Start: 2024-06-13 | End: 2024-06-20

## 2024-06-13 RX ORDER — FENTANYL CITRATE 50 UG/ML
INJECTION, SOLUTION INTRAMUSCULAR; INTRAVENOUS
Status: COMPLETED | OUTPATIENT
Start: 2024-06-13 | End: 2024-06-13

## 2024-06-13 RX ORDER — MORPHINE SULFATE 2 MG/ML
2 INJECTION, SOLUTION INTRAMUSCULAR; INTRAVENOUS EVERY 4 HOURS PRN
Status: DISPENSED | OUTPATIENT
Start: 2024-06-13 | End: 2024-06-18

## 2024-06-13 RX ORDER — DROPERIDOL 2.5 MG/ML
0.62 INJECTION, SOLUTION INTRAMUSCULAR; INTRAVENOUS ONCE AS NEEDED
Status: DISCONTINUED | OUTPATIENT
Start: 2024-06-13 | End: 2024-06-13 | Stop reason: HOSPADM

## 2024-06-13 RX ORDER — LIDOCAINE HYDROCHLORIDE 10 MG/ML
INJECTION, SOLUTION EPIDURAL; INFILTRATION; INTRACAUDAL; PERINEURAL AS NEEDED
Status: DISCONTINUED | OUTPATIENT
Start: 2024-06-13 | End: 2024-06-13 | Stop reason: SURG

## 2024-06-13 RX ORDER — SODIUM CHLORIDE 0.9 % (FLUSH) 0.9 %
3 SYRINGE (ML) INJECTION EVERY 12 HOURS SCHEDULED
Status: DISCONTINUED | OUTPATIENT
Start: 2024-06-13 | End: 2024-06-13 | Stop reason: HOSPADM

## 2024-06-13 RX ORDER — ESMOLOL HYDROCHLORIDE 10 MG/ML
INJECTION INTRAVENOUS AS NEEDED
Status: DISCONTINUED | OUTPATIENT
Start: 2024-06-13 | End: 2024-06-13 | Stop reason: SURG

## 2024-06-13 RX ORDER — SODIUM CHLORIDE 0.9 % (FLUSH) 0.9 %
10 SYRINGE (ML) INJECTION EVERY 12 HOURS SCHEDULED
Status: DISCONTINUED | OUTPATIENT
Start: 2024-06-13 | End: 2024-06-13 | Stop reason: HOSPADM

## 2024-06-13 RX ORDER — OXYCODONE HYDROCHLORIDE 5 MG/1
5 TABLET ORAL EVERY 4 HOURS PRN
Status: DISCONTINUED | OUTPATIENT
Start: 2024-06-13 | End: 2024-06-13

## 2024-06-13 RX ORDER — SODIUM CHLORIDE 0.9 % (FLUSH) 0.9 %
10 SYRINGE (ML) INJECTION AS NEEDED
Status: DISCONTINUED | OUTPATIENT
Start: 2024-06-13 | End: 2024-06-13 | Stop reason: HOSPADM

## 2024-06-13 RX ORDER — FAMOTIDINE 20 MG/1
20 TABLET, FILM COATED ORAL ONCE
Status: COMPLETED | OUTPATIENT
Start: 2024-06-13 | End: 2024-06-13

## 2024-06-13 RX ORDER — LIDOCAINE HYDROCHLORIDE 10 MG/ML
0.5 INJECTION, SOLUTION EPIDURAL; INFILTRATION; INTRACAUDAL; PERINEURAL ONCE AS NEEDED
Status: DISCONTINUED | OUTPATIENT
Start: 2024-06-13 | End: 2024-06-13 | Stop reason: HOSPADM

## 2024-06-13 RX ORDER — HYDROCODONE BITARTRATE AND ACETAMINOPHEN 5; 325 MG/1; MG/1
1 TABLET ORAL ONCE AS NEEDED
Status: DISCONTINUED | OUTPATIENT
Start: 2024-06-13 | End: 2024-06-13 | Stop reason: HOSPADM

## 2024-06-13 RX ORDER — POTASSIUM CHLORIDE 20 MEQ/1
40 TABLET, EXTENDED RELEASE ORAL EVERY 4 HOURS
Qty: 4 TABLET | Refills: 0 | Status: COMPLETED | OUTPATIENT
Start: 2024-06-13 | End: 2024-06-13

## 2024-06-13 RX ORDER — ONDANSETRON 2 MG/ML
4 INJECTION INTRAMUSCULAR; INTRAVENOUS ONCE AS NEEDED
Status: DISCONTINUED | OUTPATIENT
Start: 2024-06-13 | End: 2024-06-13 | Stop reason: HOSPADM

## 2024-06-13 RX ORDER — SODIUM CHLORIDE, SODIUM LACTATE, POTASSIUM CHLORIDE, CALCIUM CHLORIDE 600; 310; 30; 20 MG/100ML; MG/100ML; MG/100ML; MG/100ML
9 INJECTION, SOLUTION INTRAVENOUS CONTINUOUS
Status: DISCONTINUED | OUTPATIENT
Start: 2024-06-13 | End: 2024-07-06 | Stop reason: HOSPADM

## 2024-06-13 RX ORDER — DEXAMETHASONE SODIUM PHOSPHATE 4 MG/ML
INJECTION, SOLUTION INTRA-ARTICULAR; INTRALESIONAL; INTRAMUSCULAR; INTRAVENOUS; SOFT TISSUE AS NEEDED
Status: DISCONTINUED | OUTPATIENT
Start: 2024-06-13 | End: 2024-06-13 | Stop reason: SURG

## 2024-06-13 RX ORDER — DEXMEDETOMIDINE HYDROCHLORIDE 100 UG/ML
INJECTION, SOLUTION INTRAVENOUS AS NEEDED
Status: DISCONTINUED | OUTPATIENT
Start: 2024-06-13 | End: 2024-06-13 | Stop reason: SURG

## 2024-06-13 RX ORDER — LABETALOL HYDROCHLORIDE 5 MG/ML
5 INJECTION, SOLUTION INTRAVENOUS
Status: DISCONTINUED | OUTPATIENT
Start: 2024-06-13 | End: 2024-06-13 | Stop reason: HOSPADM

## 2024-06-13 RX ORDER — ROCURONIUM BROMIDE 10 MG/ML
INJECTION, SOLUTION INTRAVENOUS AS NEEDED
Status: DISCONTINUED | OUTPATIENT
Start: 2024-06-13 | End: 2024-06-13 | Stop reason: SURG

## 2024-06-13 RX ORDER — ONDANSETRON 2 MG/ML
INJECTION INTRAMUSCULAR; INTRAVENOUS AS NEEDED
Status: DISCONTINUED | OUTPATIENT
Start: 2024-06-13 | End: 2024-06-13 | Stop reason: SURG

## 2024-06-13 RX ADMIN — ROCURONIUM BROMIDE 40 MG: 10 INJECTION INTRAVENOUS at 09:02

## 2024-06-13 RX ADMIN — POTASSIUM CHLORIDE 40 MEQ: 1500 TABLET, EXTENDED RELEASE ORAL at 16:27

## 2024-06-13 RX ADMIN — INSULIN HUMAN 5 UNITS: 100 INJECTION, SOLUTION PARENTERAL at 00:10

## 2024-06-13 RX ADMIN — DEXMEDETOMIDINE HYDROCHLORIDE 12 MCG: 100 INJECTION, SOLUTION INTRAVENOUS at 09:01

## 2024-06-13 RX ADMIN — Medication 10 ML: at 08:09

## 2024-06-13 RX ADMIN — PIPERACILLIN SODIUM, TAZOBACTAM SODIUM 4.5 G: 4; .5 INJECTION, POWDER, LYOPHILIZED, FOR SOLUTION INTRAVENOUS at 09:07

## 2024-06-13 RX ADMIN — VANCOMYCIN HYDROCHLORIDE 1250 MG: 1.25 INJECTION, POWDER, LYOPHILIZED, FOR SOLUTION INTRAVENOUS at 08:11

## 2024-06-13 RX ADMIN — PROPOFOL 200 MG: 10 INJECTION, EMULSION INTRAVENOUS at 09:02

## 2024-06-13 RX ADMIN — POTASSIUM CHLORIDE 40 MEQ: 1500 TABLET, EXTENDED RELEASE ORAL at 12:52

## 2024-06-13 RX ADMIN — INSULIN HUMAN 3 UNITS: 100 INJECTION, SOLUTION PARENTERAL at 12:53

## 2024-06-13 RX ADMIN — HYDROMORPHONE HYDROCHLORIDE 0.5 MG: 1 INJECTION, SOLUTION INTRAMUSCULAR; INTRAVENOUS; SUBCUTANEOUS at 01:40

## 2024-06-13 RX ADMIN — FAMOTIDINE 20 MG: 20 TABLET, FILM COATED ORAL at 08:08

## 2024-06-13 RX ADMIN — INSULIN HUMAN 5 UNITS: 100 INJECTION, SOLUTION PARENTERAL at 20:42

## 2024-06-13 RX ADMIN — SODIUM CHLORIDE, POTASSIUM CHLORIDE, SODIUM LACTATE AND CALCIUM CHLORIDE 9 ML/HR: 600; 310; 30; 20 INJECTION, SOLUTION INTRAVENOUS at 08:08

## 2024-06-13 RX ADMIN — INSULIN HUMAN 4 UNITS: 100 INJECTION, SOLUTION PARENTERAL at 06:15

## 2024-06-13 RX ADMIN — ESMOLOL HYDROCHLORIDE 10 MG: 10 INJECTION, SOLUTION INTRAVENOUS at 09:02

## 2024-06-13 RX ADMIN — Medication 1000 MG: at 11:24

## 2024-06-13 RX ADMIN — SUGAMMADEX 200 MG: 100 INJECTION, SOLUTION INTRAVENOUS at 10:04

## 2024-06-13 RX ADMIN — DAPTOMYCIN 400 MG: 500 INJECTION, POWDER, LYOPHILIZED, FOR SOLUTION INTRAVENOUS at 17:59

## 2024-06-13 RX ADMIN — Medication 10 ML: at 08:08

## 2024-06-13 RX ADMIN — DEXAMETHASONE SODIUM PHOSPHATE 2 MG: 10 INJECTION, SOLUTION INTRAMUSCULAR; INTRAVENOUS at 08:27

## 2024-06-13 RX ADMIN — ONDANSETRON 4 MG: 2 INJECTION INTRAMUSCULAR; INTRAVENOUS at 09:43

## 2024-06-13 RX ADMIN — PHENYLEPHRINE HYDROCHLORIDE 20 MCG/MIN: 10 INJECTION INTRAVENOUS at 09:10

## 2024-06-13 RX ADMIN — Medication 10 ML: at 20:14

## 2024-06-13 RX ADMIN — SODIUM CHLORIDE, POTASSIUM CHLORIDE, SODIUM LACTATE AND CALCIUM CHLORIDE 100 ML/HR: 600; 310; 30; 20 INJECTION, SOLUTION INTRAVENOUS at 06:20

## 2024-06-13 RX ADMIN — PIPERACILLIN AND TAZOBACTAM 4.5 G: 4; .5 INJECTION, POWDER, FOR SOLUTION INTRAVENOUS at 01:42

## 2024-06-13 RX ADMIN — FENTANYL CITRATE 100 MCG: 50 INJECTION, SOLUTION INTRAMUSCULAR; INTRAVENOUS at 08:23

## 2024-06-13 RX ADMIN — BUPIVACAINE HYDROCHLORIDE 30 ML: 2.5 INJECTION, SOLUTION EPIDURAL; INFILTRATION; INTRACAUDAL at 08:38

## 2024-06-13 RX ADMIN — CLINDAMYCIN IN 5 PERCENT DEXTROSE 900 MG: 18 INJECTION, SOLUTION INTRAVENOUS at 14:07

## 2024-06-13 RX ADMIN — SODIUM CHLORIDE, POTASSIUM CHLORIDE, SODIUM LACTATE AND CALCIUM CHLORIDE 100 ML/HR: 600; 310; 30; 20 INJECTION, SOLUTION INTRAVENOUS at 20:14

## 2024-06-13 RX ADMIN — LIDOCAINE HYDROCHLORIDE 50 MG: 10 INJECTION, SOLUTION EPIDURAL; INFILTRATION; INTRACAUDAL; PERINEURAL at 09:02

## 2024-06-13 RX ADMIN — SODIUM CHLORIDE 500 ML: 9 INJECTION, SOLUTION INTRAVENOUS at 00:22

## 2024-06-13 RX ADMIN — EPHEDRINE SULFATE 5 MG: 50 INJECTION INTRAVENOUS at 09:08

## 2024-06-13 RX ADMIN — INSULIN GLARGINE 10 UNITS: 100 INJECTION, SOLUTION SUBCUTANEOUS at 20:42

## 2024-06-13 RX ADMIN — PHENYLEPHRINE HYDROCHLORIDE 100 MCG: 10 INJECTION INTRAVENOUS at 09:08

## 2024-06-13 RX ADMIN — CLINDAMYCIN IN 5 PERCENT DEXTROSE 900 MG: 18 INJECTION, SOLUTION INTRAVENOUS at 22:57

## 2024-06-13 RX ADMIN — PIPERACILLIN AND TAZOBACTAM 4.5 G: 4; .5 INJECTION, POWDER, FOR SOLUTION INTRAVENOUS at 16:27

## 2024-06-13 RX ADMIN — DEXAMETHASONE SODIUM PHOSPHATE 4 MG: 4 INJECTION INTRA-ARTICULAR; INTRALESIONAL; INTRAMUSCULAR; INTRAVENOUS; SOFT TISSUE at 09:08

## 2024-06-13 RX ADMIN — BUPIVACAINE HYDROCHLORIDE 30 ML: 2.5 INJECTION, SOLUTION EPIDURAL; INFILTRATION; INTRACAUDAL; PERINEURAL at 08:27

## 2024-06-13 RX ADMIN — CLINDAMYCIN IN 5 PERCENT DEXTROSE 900 MG: 18 INJECTION, SOLUTION INTRAVENOUS at 06:16

## 2024-06-13 RX ADMIN — INSULIN HUMAN 4 UNITS: 100 INJECTION, SOLUTION PARENTERAL at 17:44

## 2024-06-13 NOTE — PROGRESS NOTES
[facility] Medicine Services  PROGRESS NOTE    Patient Name: Easton Alvarez  : 1965  MRN: [id number]    Date of Admission: 2024  Primary Care Physician: Provider, No Known    Subjective   Subjective     CC:  Left foot gangrene    HPI:  Patient is status post surgery.  Denies nausea vomiting fever chills      Objective   Objective     Vital Signs:   Temp:  [98 °F (36.7 °C)-98.3 °F (36.8 °C)] 98.3 °F (36.8 °C)  Heart Rate:  [] 83  Resp:  [18-22] 20  BP: (116-152)/(61-74) 140/65     Physical Exam:  Constitutional: No acute distress, awake, alert  HENT: NCAT, mucous membranes moist  Respiratory: Clear to auscultation bilaterally, respiratory effort normal   Cardiovascular: RRR, no murmurs, rubs, or gallops  Gastrointestinal: Positive bowel sounds, soft, nontender, nondistended  Musculoskeletal: Left below the knee amputation.  Stump covered in Ace wrap.  Patient is missing digits on the right foot.  Psychiatric: Appropriate affect, cooperative  Neurologic: Oriented x 3, speech clear  Skin: No rashes    Results Reviewed:  LAB RESULTS:      Lab 24  0403 24  2225 24  1839 24  1451 24  1149 24  0807 24  0802   WBC 18.48*  --  18.84*  --   --   --  26.78*   HEMOGLOBIN 10.5*  --  11.0*  --   --   --  12.3*   HEMATOCRIT 30.5*  --  31.5*  --   --   --  35.5*   PLATELETS 122*  --  142  --   --   --  191   NEUTROS ABS  --   --  16.57*  --   --   --  24.21*  23.83*   IMMATURE GRANS (ABS)  --   --  0.34*  --   --   --  0.42*   LYMPHS ABS  --   --  0.83  --   --   --  0.67*   MONOS ABS  --   --  1.01*  --   --   --  1.47*   EOS ABS  --   --  0.01  --   --   --  0.00   MCV 92.4  --  91.6  --   --   --  93.2   CRP  --   --   --   --   --   --  24.89*   LACTATE  --  3.6* 4.1* 3.9* 6.3* 8.8*  --          Lab 24  0403 24  1839 24  0802   SODIUM 131* 130* 127*   POTASSIUM 3.3* 3.7 3.6   CHLORIDE 96* 92* 85*   CO2 26.0 27.0 21.9*    ANION GAP 9.0 11.0 20.1*   BUN 64* 63* 67*   CREATININE 0.91 1.00 1.22   EGFR 96.5 86.2 67.9   GLUCOSE 268* 348* 509*   CALCIUM 7.3* 7.7* 8.9         Lab 06/12/24  1839 06/12/24  0802   TOTAL PROTEIN 5.0* 5.6*   ALBUMIN 2.0* 2.7*   GLOBULIN 3.0 2.9   ALT (SGPT) 37 45*   AST (SGOT) 46* 65*   BILIRUBIN 3.6* 4.7*   ALK PHOS 145* 171*                     Brief Urine Lab Results  (Last result in the past 365 days)        Color   Clarity   Blood   Leuk Est   Nitrite   Protein   CREAT   Urine HCG        06/12/24 0951 Yellow   Clear   Negative   Negative   Negative   Negative                   Cultures:  Blood Culture   Date Value Ref Range Status   06/12/2024 Abnormal Stain (C)  Preliminary       Microbiology Results Abnormal       None            CT Angio Abdominal Aorta Bilateral Iliofem Runoff    Result Date: 6/12/2024  CT ANGIO ABDOMINAL AORTA BILAT ILIOFEM RUNOFF Date of Exam: 6/12/2024 9:30 AM EDT Indication: Gangrenous left foot. Comparison: 6/12/2024 radiographs Technique: CTA of the abdomen, pelvis and both lower extremities was performed before and after the uneventful intravenous administration of iodinated contrast. Reconstructed coronal and sagittal images were also obtained. In addition, a 3-D volume rendered image was created for interpretation. Automated exposure control and iterative reconstruction methods were used. Findings: Abdomen/pelvis: There are scattered atherosclerotic calcifications of the aorta and branch vessels. No evidence of significant nonatheromatous plaque. The celiac, SMA, and BUBBA are patent. The bilateral renal arteries are patent. No significant stenosis appreciated. The bilateral common iliac as well as the external and internal iliac arteries are patent without evidence of significant stenosis. There is no suspicious renal lesion. There is mild hepatic contour nodularity Cholelithiasis. No evidence of cholecystitis. No significant biliary ductal dilation. The spleen is borderline  enlarged. The pancreas and bilateral adrenal glands are within normal limits. There is no hydronephrosis or nephrolithiasis. No suspicious renal lesion. No evidence of bowel obstruction. Moderate colonic stool burden. There are prominent left inguinal lymph lymph nodes. For reference, there is a left inguinal lymph node measuring 1.7 cm in short axis on axial image 236. There is small volume abdominal pelvic ascites. Grossly unremarkable appearance of of the urinary bladder, partially obscured. There are surgical changes of right hip arthroplasty. There is a soft tissue gas fluid collection surrounding the anterior aspect of the hip arthroplasty as seen on axial image 217. This is poorly defined on this exam. No evidence of fracture or suspicious osseous lesion. Bilateral lower extremity runoff: There are scattered atherosclerotic calcifications of the bilateral common femoral arteries. The bilateral deep femoral arteries are patent. The bilateral superficial femoral arteries are patent. There is focal narrowing of the left distal superficial femoral artery secondary to near circumferential atherosclerotic calcification. Stenosis measures approximately 50% as seen on axial image 373. There is calcified and noncalcified atheromatous plaque regarding the right superficial femoral artery at this  level without significant stenosis. There our bilateral popliteal artery calcifications. There is approximately 50% stenosis on the right at the level of the knee joint as seen on axial image 408. No evidence of occlusion. Patent bilateral three-vessel runoff. There is asymmetric early venous enhancement within the left lower extremity, likely secondary to inflammatory changes. There is diffuse subcutaneous edema and extensive emphysema about the left foot with extension along the left lateral lower leg. No definite evidence of well-defined or drainable fluid collection. No evidence of acute fracture. Severe left first MTP  joint arthritis. Sequela of right forefoot amputation at the metatarsal shafts. No suspicious osseous lesion. Calcaneal enthesopathy.     Impression: Impression: Scattered predominantly calcified atheromatous plaque extending from the abdominal aorta distally to the bilateral popliteal arteries. No evidence of occlusion. Approximately 50% stenosis of the distal left superficial femoral artery, as well as approximately 50% stenosis of the right popliteal artery at the level of the knee joint. Otherwise no evidence of significant arterial stenosis elsewhere. Patent bilateral three-vessel runoff. Inflammatory changes about the left foot including extensive subcutaneous edema and emphysema likely representing necrotizing infection. There is asymmetric early venous enhancement within the left lower extremity, presumably secondary to inflammatory response. No evidence of well-defined or drainable fluid collection within the left lower extremity to suggest abscess. Surgical changes of right hip arthroplasty. Soft tissue gas fluid collection surrounding the anterior aspect of the hip arthroplasty, which is poorly defined on this exam. If this has not been a recent surgical procedure, findings are concerning for periprosthetic infection/abscess. Additional ancillary findings as above. Electronically Signed: Wilfrido Cagle MD  6/12/2024 12:52 PM EDT  Workstation ID: [id number]    XR Foot 3+ View Left    Result Date: 6/12/2024  XR FOOT 3+ VW LEFT Date of Exam: 6/12/2024 8:35 AM EDT Indication: Evaluate for osteomyelitis Comparison: None available. Findings: Neuropathic appearance of the forefoot with deformities of the third through fifth proximal phalanges and great toe distal phalanx. There is suggestion of dorsal subluxation/dislocations at the third through fifth MTP joints. Osteopenia. Moderate first MTP joint arthritis. Calcaneal enthesopathy. Normal midfoot and hindfoot joint alignment. There is diffuse soft tissue edema  about the foot with subcutaneous emphysema along the medial and dorsal forefoot. No definite evidence of cortical irregularity to  suggest osteomyelitis.     Impression: Impression: Diffuse soft tissue edema about the foot with subcutaneous emphysema along the medial and dorsal forefoot, concerning for cellulitis. No radiographic evidence of osteomyelitis. Consider MRI if there is continued concern. Chronic appearing deformities of the forefoot as detailed above, likely sequela of neuropathic arthropathy. Electronically Signed: Wilfrido Cagle MD  6/12/2024 8:58 AM EDT  Workstation ID: [id number]         Current medications:  Scheduled Meds:clindamycin, 900 mg, Intravenous, Q8H  ethyl alcohol, 2 Swab, Nasal, Once  famotidine, 20 mg, Oral, Once  [Transfer Hold] insulin regular, 2-7 Units, Subcutaneous, Q6H  [Transfer Hold] lactobacillus acidophilus, 1 capsule, Oral, Daily  piperacillin-tazobactam, 4.5 g, Intravenous, Once  piperacillin-tazobactam, 4.5 g, Intravenous, Q8H  [Transfer Hold] senna-docusate sodium, 2 tablet, Oral, BID  [Transfer Hold] sodium chloride, 10 mL, Intravenous, Q12H  sodium chloride, 10 mL, Intravenous, Q12H  vancomycin, 20 mg/kg, Intravenous, Q24H      Continuous Infusions:lactated ringers, 100 mL/hr, Last Rate: Stopped (06/13/24 0735)  lactated ringers, 9 mL/hr  Pharmacy to dose vancomycin,       PRN Meds:.  [Transfer Hold] senna-docusate sodium **AND** [Transfer Hold] polyethylene glycol **AND** [Transfer Hold] bisacodyl **AND** [Transfer Hold] bisacodyl    [Transfer Hold] dextrose    [Transfer Hold] dextrose    [Transfer Hold] glucagon (human recombinant)    [Transfer Hold] HYDROcodone-acetaminophen    [Transfer Hold] HYDROmorphone    lidocaine PF 1%    midazolam    [Transfer Hold] nitroglycerin    Pharmacy to dose vancomycin    [Transfer Hold] sodium chloride    sodium chloride    [Transfer Hold] sodium chloride    sodium chloride    Assessment & Plan   Assessment & Plan     Active Hospital  Problems    Diagnosis  POA    **Diabetic ulcer of left foot with necrosis of muscle [E11.621, L97.523]  Yes      Resolved Hospital Problems   No resolved problems to display.        Brief Hospital Course to date:  58 yr old transferred from OSH ED with uncntrolled DM and severe left foot infection     Sepsis (WBC, lactate, tachypnea, source)    Left foot cellulitis/gangrene   - lactate 8.8 in OSH ED; CT scan of leg shows gas gangrene  - MRI left foot  -Status post left below the knee amputation 06/13  - abx:  vanc Zosyn clindamycin  - blood cultures sent  - ID consult appreciated      Gap acidosis POA  - likely from lactate.  Serum acetone negative  - resolved      R hip fx repaired 2/2024  - note gas near hardware per CT scan :  r/o hardware-associated infection vs nl postop finding     DM, uncontrolled  - Pt reports is unaware of his diabetes medications and is not on any medications currently   -last records from 2019 at , He was discharged on Lantus 12 U with 5 U with meals   -HgA1c: 9.5  -Will start with Lantus 10 U QHS      LENNY POA  -resolved with IVF      HTN history;  not currently on meds.  Monitor.     Expected Discharge Location and Transportation: rehab   Expected Discharge 06/17/2024  Expected discharge date/ time has not been documented.     VTE Prophylaxis:  No VTE prophylaxis order currently exists.         AM-PAC 6 Clicks Score (PT): 17 (06/12/24 2005)    CODE STATUS:   Code Status and Medical Interventions:   Ordered at: 06/12/24 7846     Level Of Support Discussed With:    Patient     Code Status (Patient has no pulse and is not breathing):    CPR (Attempt to Resuscitate)     Medical Interventions (Patient has pulse or is breathing):    Full Support       Armida Malloy MD  06/13/24

## 2024-06-13 NOTE — CONSULTS
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 male admitted to 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  He had a left foot injury 2 weeks ago, noted worsening swelling, skin discoloration and constitutional symptoms.  He was transferred from [facility] yesterday after CTA demonstrated gas within the foot and distal leg.     No Known Allergies     Home Medications:  No medications prior to admission.       Current Medications:  Scheduled Meds:clindamycin, 900 mg, Intravenous, Q8H  [Transfer Hold] insulin regular, 2-7 Units, Subcutaneous, Q6H  [Transfer Hold] lactobacillus acidophilus, 1 capsule, Oral, Daily  piperacillin-tazobactam, 4.5 g, Intravenous, Once  piperacillin-tazobactam, 4.5 g, Intravenous, Q8H  [Transfer Hold] senna-docusate sodium, 2 tablet, Oral, BID  [Transfer Hold] sodium chloride, 10 mL, Intravenous, Q12H  vancomycin, 20 mg/kg, Intravenous, Q24H      Continuous Infusions:lactated ringers, 100 mL/hr, Last Rate: Stopped (06/13/24 0735)  Pharmacy to dose vancomycin,       PRN Meds:.  [Transfer Hold] senna-docusate sodium **AND** [Transfer Hold] polyethylene glycol **AND** [Transfer Hold] bisacodyl **AND** [Transfer Hold] bisacodyl    [Transfer Hold] dextrose    [Transfer Hold] dextrose    [Transfer Hold] glucagon (human recombinant)    [Transfer Hold] HYDROcodone-acetaminophen    [Transfer Hold] HYDROmorphone    [Transfer Hold] nitroglycerin    Pharmacy to dose vancomycin    [Transfer Hold] sodium chloride    [Transfer Hold]  sodium chloride    History reviewed. No pertinent past medical history.   History reviewed. No pertinent surgical history.     Social History     Occupational History    Not on file   Tobacco Use    Smoking status: Never     Passive exposure: Past    Smokeless tobacco: Never   Vaping Use    Vaping status: Never Used   Substance and Sexual Activity    Alcohol use: Not Currently    Drug use: Yes     Types: Marijuana    Sexual activity: Defer      Social History     Social History Narrative    Not on file      History reviewed. No pertinent family history.      Review of Systems:   HEENT: Patient denies any headaches, vision changes, change in hearing, or tinnitus, Patient denies any rhinorrhea,epistaxis, sinus pain, mouth or dental problems, sore throat or hoarseness, or dysphagia  Pulmonary: Patient denies any cough, congestion, SOA, or wheezing  Cardiovascular: Patient denies any chest pain, dyspnea, palpitations, weakness, intolerance of exercise, varicosities, swelling of extremities, known murmur  Gastrointestinal:  Patient denies nausea, vomiting, diarrhea, constipation, loss  of appetite, change in appetite, dysphagia, gas, heartburn, melena, change in bowel habits, use of laxatives or other drugs to alter the function of the gastrointestinal tract.  Genital/Urinary: Patient denies dysuria, change in color of urine, change in frequency of urination, pain with urgency, incontinence, retention, or nocturia.  Musculoskeletal: Patient denies increased warmth; redness; or swelling of joints; limitation of function; deformity; crepitation: pain in a joint or an extremity, the neck, or the back, especially with movement.  Neurological: Patient denies dizziness, tremor, ataxia, difficulty in speaking, change in speech, paresthesia, loss of sensation, seizures, syncope, changes in memory.  Endocrine system: Patient denies tremors, palpitations, intolerance of heat or cold, polyuria, polydipsia, polyphagia, diaphoresis,  "exophthalmos, or goiter.  Psychological: Patient denies thoughts/plans or harming self or other; depression,  insomnia, night terrors, rosa, memory loss, disorientation.  Skin: Patient denies any bruising, rashes, discoloration, pruritus, wounds, ulcers, decubiti, changes in the hair or nails  Hematopoietic: Patient denies history of spontaneous or excessive bleeding, epistaxis, hematuria, melena, fatigue, enlarged or tender lymph nodes, pallor, history of anemia.    Physical Exam: 58 y.o. male  General Appearance:    Alert, cooperative, in no acute distress                   Vitals:    06/12/24 1721 06/12/24 1955 06/13/24 0334   BP: 144/69 152/63 140/65   BP Location: Left arm Left arm Left arm   Patient Position: Lying Lying Lying   Pulse: 95 90 83   Resp: 22 20 20   Temp: 98 °F (36.7 °C) 98.2 °F (36.8 °C) 98.3 °F (36.8 °C)   TempSrc: Oral Oral Oral   SpO2: 97% 99% 97%   Weight: 68 kg (150 lb)     Height: 172.7 cm (68\")          Head:    Normocephalic, without obvious abnormality, atraumatic   Eyes:            Lids and lashes normal, conjunctivae and sclerae normal, no icterus, no pallor, corneas clear, PERRLA   Ears:    Ears appear intact with no abnormalities noted   Throat:   No oral lesions, no thrush, oral mucosa moist   Back:     No C-T-L spine tenderness   Lungs:     Clear to auscultation,respirations regular, even and                unlabored   Chest Wall:    No abnormalities observed   Abdomen:     Normal bowel sounds, no masses, no organomegaly, soft      non-tender, non-distended, no guarding, no rebound              tenderness   Extremities: Left lower extremity: Dry eschar on the dorsum of the foot, medial and lateral ankle, erythema extending to approximately the malleoli.  No blistering or palpable crepitus in the leg.  Toes are warm well-perfused.  Pulses difficult to palpate secondary to eschar.   Pulses:   Pulses palpable and equal bilaterally   Skin:   No bleeding, bruising or rash   Lymph " nodes:   No palpable adenopathy   Neurologic:  Cranial nerves 2 - 12 grossly intact, sensation intact, DTR     present and equal bilaterally           Diagnostic Tests:    Admission on 06/12/2024   Component Date Value Ref Range Status    Lactate 06/12/2024 4.1 (C)  0.5 - 2.0 mmol/L Final    Falsely depressed results may occur on samples drawn from patients receiving N-Acetylcysteine (NAC) or Metamizole.    Glucose 06/12/2024 348 (H)  65 - 99 mg/dL Final    BUN 06/12/2024 63 (H)  8 - 23 mg/dL Final    Creatinine 06/12/2024 1.00  0.76 - 1.27 mg/dL Final    Sodium 06/12/2024 130 (L)  136 - 145 mmol/L Final    Potassium 06/12/2024 3.7  3.5 - 5.2 mmol/L Final    Chloride 06/12/2024 92 (L)  98 - 107 mmol/L Final    CO2 06/12/2024 27.0  22.0 - 29.0 mmol/L Final    Calcium 06/12/2024 7.7 (L)  8.6 - 10.5 mg/dL Final    Total Protein 06/12/2024 5.0 (L)  6.0 - 8.5 g/dL Final    Albumin 06/12/2024 2.0 (L)  3.5 - 5.2 g/dL Final    ALT (SGPT) 06/12/2024 37  1 - 41 U/L Final    AST (SGOT) 06/12/2024 46 (H)  1 - 40 U/L Final    Alkaline Phosphatase 06/12/2024 145 (H)  39 - 117 U/L Final    Total Bilirubin 06/12/2024 3.6 (H)  0.0 - 1.2 mg/dL Final    Globulin 06/12/2024 3.0  gm/dL Final    Calculated Result    A/G Ratio 06/12/2024 0.7  g/dL Final    BUN/Creatinine Ratio 06/12/2024 63.0 (H)  7.0 - 25.0 Final    Anion Gap 06/12/2024 11.0  5.0 - 15.0 mmol/L Final    eGFR 06/12/2024 86.2  >60.0 mL/min/1.73 Final    WBC 06/12/2024 18.84 (H)  3.40 - 10.80 10*3/mm3 Final    RBC 06/12/2024 3.44 (L)  4.14 - 5.80 10*6/mm3 Final    Hemoglobin 06/12/2024 11.0 (L)  13.0 - 17.7 g/dL Final    Hematocrit 06/12/2024 31.5 (L)  37.5 - 51.0 % Final    MCV 06/12/2024 91.6  79.0 - 97.0 fL Final    MCH 06/12/2024 32.0  26.6 - 33.0 pg Final    MCHC 06/12/2024 34.9  31.5 - 35.7 g/dL Final    RDW 06/12/2024 13.2  12.3 - 15.4 % Final    RDW-SD 06/12/2024 45.2  37.0 - 54.0 fl Final    MPV 06/12/2024 10.2  6.0 - 12.0 fL Final    Platelets 06/12/2024 142  140 -  450 10*3/mm3 Final    Neutrophil % 06/12/2024 87.9 (H)  42.7 - 76.0 % Final    Lymphocyte % 06/12/2024 4.4 (L)  19.6 - 45.3 % Final    Monocyte % 06/12/2024 5.4  5.0 - 12.0 % Final    Eosinophil % 06/12/2024 0.1 (L)  0.3 - 6.2 % Final    Basophil % 06/12/2024 0.4  0.0 - 1.5 % Final    Immature Grans % 06/12/2024 1.8 (H)  0.0 - 0.5 % Final    Neutrophils, Absolute 06/12/2024 16.57 (H)  1.70 - 7.00 10*3/mm3 Final    Lymphocytes, Absolute 06/12/2024 0.83  0.70 - 3.10 10*3/mm3 Final    Monocytes, Absolute 06/12/2024 1.01 (H)  0.10 - 0.90 10*3/mm3 Final    Eosinophils, Absolute 06/12/2024 0.01  0.00 - 0.40 10*3/mm3 Final    Basophils, Absolute 06/12/2024 0.08  0.00 - 0.20 10*3/mm3 Final    Immature Grans, Absolute 06/12/2024 0.34 (H)  0.00 - 0.05 10*3/mm3 Final    nRBC 06/12/2024 0.0  0.0 - 0.2 /100 WBC Final    RBC Morphology 06/12/2024 Normal  Normal Final    Vacuolated Neutrophils 06/12/2024 Slight/1+  None Seen Final    Platelet Estimate 06/12/2024 Adequate  Normal Final    Glucose 06/12/2024 342 (H)  70 - 130 mg/dL Final    Lactate 06/12/2024 3.6 (C)  0.5 - 2.0 mmol/L Final    Falsely depressed results may occur on samples drawn from patients receiving N-Acetylcysteine (NAC) or Metamizole.  Confirmed/called    Glucose 06/13/2024 268 (H)  65 - 99 mg/dL Final    BUN 06/13/2024 64 (H)  8 - 23 mg/dL Final    Creatinine 06/13/2024 0.91  0.76 - 1.27 mg/dL Final    Sodium 06/13/2024 131 (L)  136 - 145 mmol/L Final    Potassium 06/13/2024 3.3 (L)  3.5 - 5.2 mmol/L Final    Chloride 06/13/2024 96 (L)  98 - 107 mmol/L Final    CO2 06/13/2024 26.0  22.0 - 29.0 mmol/L Final    Calcium 06/13/2024 7.3 (L)  8.6 - 10.5 mg/dL Final    BUN/Creatinine Ratio 06/13/2024 70.3 (H)  7.0 - 25.0 Final    Anion Gap 06/13/2024 9.0  5.0 - 15.0 mmol/L Final    eGFR 06/13/2024 96.5  >60.0 mL/min/1.73 Final    WBC 06/13/2024 18.48 (H)  3.40 - 10.80 10*3/mm3 Final    RBC 06/13/2024 3.30 (L)  4.14 - 5.80 10*6/mm3 Final    Hemoglobin 06/13/2024 10.5  (L)  13.0 - 17.7 g/dL Final    Hematocrit 06/13/2024 30.5 (L)  37.5 - 51.0 % Final    MCV 06/13/2024 92.4  79.0 - 97.0 fL Final    MCH 06/13/2024 31.8  26.6 - 33.0 pg Final    MCHC 06/13/2024 34.4  31.5 - 35.7 g/dL Final    RDW 06/13/2024 13.1  12.3 - 15.4 % Final    RDW-SD 06/13/2024 44.3  37.0 - 54.0 fl Final    MPV 06/13/2024 10.4  6.0 - 12.0 fL Final    Platelets 06/13/2024 122 (L)  140 - 450 10*3/mm3 Final    Glucose 06/13/2024 348 (H)  70 - 130 mg/dL Final    Glucose 06/13/2024 296 (H)  70 - 130 mg/dL Final    Glucose 06/13/2024 263 (H)  70 - 130 mg/dL Final   Admission on 06/12/2024, Discharged on 06/12/2024   Component Date Value Ref Range Status    Glucose 06/12/2024 509 (C)  65 - 99 mg/dL Final    BUN 06/12/2024 67 (H)  8 - 23 mg/dL Final    Creatinine 06/12/2024 1.22  0.76 - 1.27 mg/dL Final    Sodium 06/12/2024 127 (L)  136 - 145 mmol/L Final    Potassium 06/12/2024 3.6  3.5 - 5.2 mmol/L Final    Chloride 06/12/2024 85 (L)  98 - 107 mmol/L Final    CO2 06/12/2024 21.9 (L)  22.0 - 29.0 mmol/L Final    Calcium 06/12/2024 8.9  8.6 - 10.5 mg/dL Final    Total Protein 06/12/2024 5.6 (L)  6.0 - 8.5 g/dL Final    Albumin 06/12/2024 2.7 (L)  3.5 - 5.2 g/dL Final    ALT (SGPT) 06/12/2024 45 (H)  1 - 41 U/L Final    AST (SGOT) 06/12/2024 65 (H)  1 - 40 U/L Final    Alkaline Phosphatase 06/12/2024 171 (H)  39 - 117 U/L Final    Total Bilirubin 06/12/2024 4.7 (H)  0.0 - 1.2 mg/dL Final    Globulin 06/12/2024 2.9  gm/dL Final    A/G Ratio 06/12/2024 0.9  g/dL Final    BUN/Creatinine Ratio 06/12/2024 54.9 (H)  7.0 - 25.0 Final    Anion Gap 06/12/2024 20.1 (H)  5.0 - 15.0 mmol/L Final    eGFR 06/12/2024 67.9  >60.0 mL/min/1.73 Final    WBC 06/12/2024 26.78 (H)  3.40 - 10.80 10*3/mm3 Final    RBC 06/12/2024 3.81 (L)  4.14 - 5.80 10*6/mm3 Final    Hemoglobin 06/12/2024 12.3 (L)  13.0 - 17.7 g/dL Final    Hematocrit 06/12/2024 35.5 (L)  37.5 - 51.0 % Final    MCV 06/12/2024 93.2  79.0 - 97.0 fL Final    MCH 06/12/2024  32.3  26.6 - 33.0 pg Final    MCHC 06/12/2024 34.6  31.5 - 35.7 g/dL Final    RDW 06/12/2024 12.9  12.3 - 15.4 % Final    RDW-SD 06/12/2024 44.3  37.0 - 54.0 fl Final    MPV 06/12/2024 10.3  6.0 - 12.0 fL Final    Platelets 06/12/2024 191  140 - 450 10*3/mm3 Final    Neutrophil % 06/12/2024 90.4 (H)  42.7 - 76.0 % Final    Lymphocyte % 06/12/2024 2.5 (L)  19.6 - 45.3 % Final    Monocyte % 06/12/2024 5.5  5.0 - 12.0 % Final    Eosinophil % 06/12/2024 0.0 (L)  0.3 - 6.2 % Final    Basophil % 06/12/2024 0.0  0.0 - 1.5 % Final    Immature Grans % 06/12/2024 1.6 (H)  0.0 - 0.5 % Final    Neutrophils, Absolute 06/12/2024 24.21 (H)  1.70 - 7.00 10*3/mm3 Final    Lymphocytes, Absolute 06/12/2024 0.67 (L)  0.70 - 3.10 10*3/mm3 Final    Monocytes, Absolute 06/12/2024 1.47 (H)  0.10 - 0.90 10*3/mm3 Final    Eosinophils, Absolute 06/12/2024 0.00  0.00 - 0.40 10*3/mm3 Final    Basophils, Absolute 06/12/2024 0.01  0.00 - 0.20 10*3/mm3 Final    Immature Grans, Absolute 06/12/2024 0.42 (H)  0.00 - 0.05 10*3/mm3 Final    Color, UA 06/12/2024 Yellow  Yellow, Straw Final    Appearance, UA 06/12/2024 Clear  Clear Final    pH, UA 06/12/2024 5.5  4.5 - 8.0 Final    Specific Gravity, UA 06/12/2024 1.010  1.003 - 1.030 Final    Glucose, UA 06/12/2024 500 mg/dL (2+) (A)  Negative Final    Ketones, UA 06/12/2024 Trace (A)  Negative Final    Bilirubin, UA 06/12/2024 Small (1+) (A)  Negative Final    Blood, UA 06/12/2024 Negative  Negative Final    Protein, UA 06/12/2024 Negative  Negative Final    Leuk Esterase, UA 06/12/2024 Negative  Negative Final    Nitrite, UA 06/12/2024 Negative  Negative Final    Urobilinogen, UA 06/12/2024 2.0 E.U./dL (A)  0.2 - 1.0 E.U./dL Final    Gram Stain 06/12/2024 Rare (1+) WBCs seen   Preliminary    Gram Stain 06/12/2024 No organisms seen   Preliminary    C-Reactive Protein 06/12/2024 24.89 (H)  0.00 - 0.50 mg/dL Final    Acetone 06/12/2024 Negative  Negative Final    Glucose 06/12/2024 539 (C)  70 - 130 mg/dL  Final    Blood Culture 06/12/2024 Abnormal Stain (C)   Preliminary    Gram Stain 06/12/2024 Anaerobic Bottle Gram positive cocci in chains (C)   Preliminary    Gram Stain 06/12/2024 Aerobic Bottle Gram positive cocci in chains (C)   Preliminary    Lactate 06/12/2024 8.8 (C)  0.5 - 2.0 mmol/L Final    Lactate 06/12/2024 6.3 (C)  0.5 - 2.0 mmol/L Final    Glucose 06/12/2024 443 (C)  70 - 130 mg/dL Final    Glucose 06/12/2024 363 (H)  70 - 130 mg/dL Final    Lactate 06/12/2024 3.9 (C)  0.5 - 2.0 mmol/L Final    Neutrophil % 06/12/2024 70.0  42.7 - 76.0 % Final    Lymphocyte % 06/12/2024 4.0 (L)  19.6 - 45.3 % Final    Monocyte % 06/12/2024 6.0  5.0 - 12.0 % Final    Bands %  06/12/2024 19.0 (H)  0.0 - 5.0 % Final    Metamyelocyte % 06/12/2024 1.0 (H)  0.0 - 0.0 % Final    Neutrophils Absolute 06/12/2024 23.83 (H)  1.70 - 7.00 10*3/mm3 Final    Lymphocytes Absolute 06/12/2024 1.07  0.70 - 3.10 10*3/mm3 Final    Monocytes Absolute 06/12/2024 1.61 (H)  0.10 - 0.90 10*3/mm3 Final    Anisocytosis 06/12/2024 Slight/1+  None Seen Final    WBC Morphology 06/12/2024 Normal  Normal Final    Clumped Platelets 06/12/2024 Present  None Seen Final    Glucose 06/12/2024 511 (C)  70 - 130 mg/dL Final       CT Angio Abdominal Aorta Bilateral Iliofem Runoff    Result Date: 6/12/2024  Narrative: CT ANGIO ABDOMINAL AORTA BILAT ILIOFEM RUNOFF Date of Exam: 6/12/2024 9:30 AM EDT Indication: Gangrenous left foot. Comparison: 6/12/2024 radiographs Technique: CTA of the abdomen, pelvis and both lower extremities was performed before and after the uneventful intravenous administration of iodinated contrast. Reconstructed coronal and sagittal images were also obtained. In addition, a 3-D volume rendered image was created for interpretation. Automated exposure control and iterative reconstruction methods were used. Findings: Abdomen/pelvis: There are scattered atherosclerotic calcifications of the aorta and branch vessels. No evidence of  significant nonatheromatous plaque. The celiac, SMA, and BUBBA are patent. The bilateral renal arteries are patent. No significant stenosis appreciated. The bilateral common iliac as well as the external and internal iliac arteries are patent without evidence of significant stenosis. There is no suspicious renal lesion. There is mild hepatic contour nodularity Cholelithiasis. No evidence of cholecystitis. No significant biliary ductal dilation. The spleen is borderline enlarged. The pancreas and bilateral adrenal glands are within normal limits. There is no hydronephrosis or nephrolithiasis. No suspicious renal lesion. No evidence of bowel obstruction. Moderate colonic stool burden. There are prominent left inguinal lymph lymph nodes. For reference, there is a left inguinal lymph node measuring 1.7 cm in short axis on axial image 236. There is small volume abdominal pelvic ascites. Grossly unremarkable appearance of of the urinary bladder, partially obscured. There are surgical changes of right hip arthroplasty. There is a soft tissue gas fluid collection surrounding the anterior aspect of the hip arthroplasty as seen on axial image 217. This is poorly defined on this exam. No evidence of fracture or suspicious osseous lesion. Bilateral lower extremity runoff: There are scattered atherosclerotic calcifications of the bilateral common femoral arteries. The bilateral deep femoral arteries are patent. The bilateral superficial femoral arteries are patent. There is focal narrowing of the left distal superficial femoral artery secondary to near circumferential atherosclerotic calcification. Stenosis measures approximately 50% as seen on axial image 373. There is calcified and noncalcified atheromatous plaque regarding the right superficial femoral artery at this  level without significant stenosis. There our bilateral popliteal artery calcifications. There is approximately 50% stenosis on the right at the level of the knee  joint as seen on axial image 408. No evidence of occlusion. Patent bilateral three-vessel runoff. There is asymmetric early venous enhancement within the left lower extremity, likely secondary to inflammatory changes. There is diffuse subcutaneous edema and extensive emphysema about the left foot with extension along the left lateral lower leg. No definite evidence of well-defined or drainable fluid collection. No evidence of acute fracture. Severe left first MTP joint arthritis. Sequela of right forefoot amputation at the metatarsal shafts. No suspicious osseous lesion. Calcaneal enthesopathy.     Impression: Impression: Scattered predominantly calcified atheromatous plaque extending from the abdominal aorta distally to the bilateral popliteal arteries. No evidence of occlusion. Approximately 50% stenosis of the distal left superficial femoral artery, as well as approximately 50% stenosis of the right popliteal artery at the level of the knee joint. Otherwise no evidence of significant arterial stenosis elsewhere. Patent bilateral three-vessel runoff. Inflammatory changes about the left foot including extensive subcutaneous edema and emphysema likely representing necrotizing infection. There is asymmetric early venous enhancement within the left lower extremity, presumably secondary to inflammatory response. No evidence of well-defined or drainable fluid collection within the left lower extremity to suggest abscess. Surgical changes of right hip arthroplasty. Soft tissue gas fluid collection surrounding the anterior aspect of the hip arthroplasty, which is poorly defined on this exam. If this has not been a recent surgical procedure, findings are concerning for periprosthetic infection/abscess. Additional ancillary findings as above. Electronically Signed: Wilfrido Cagle MD  6/12/2024 12:52 PM EDT  Workstation ID: [id number]    XR Foot 3+ View Left    Result Date: 6/12/2024  Narrative: XR FOOT 3+ VW LEFT Date of Exam:  6/12/2024 8:35 AM EDT Indication: Evaluate for osteomyelitis Comparison: None available. Findings: Neuropathic appearance of the forefoot with deformities of the third through fifth proximal phalanges and great toe distal phalanx. There is suggestion of dorsal subluxation/dislocations at the third through fifth MTP joints. Osteopenia. Moderate first MTP joint arthritis. Calcaneal enthesopathy. Normal midfoot and hindfoot joint alignment. There is diffuse soft tissue edema about the foot with subcutaneous emphysema along the medial and dorsal forefoot. No definite evidence of cortical irregularity to  suggest osteomyelitis.     Impression: Impression: Diffuse soft tissue edema about the foot with subcutaneous emphysema along the medial and dorsal forefoot, concerning for cellulitis. No radiographic evidence of osteomyelitis. Consider MRI if there is continued concern. Chronic appearing deformities of the forefoot as detailed above, likely sequela of neuropathic arthropathy. 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    He has gas on his CTA.  MRI imaging appears to demonstrate fluid along the posterior tibia tracking proximal of the field-of-view.  He has positive blood cultures.    Plan: I had a discussion with him regarding further management.  We discussed limb salvage versus below-knee amputation, single versus two-stage.  I think his prognosis for foot salvage given the extent of soft tissue necrosis is very poor.  Given the gas tracking proximally and what appears to be fluid on my interpretation of the MRI proximally posteriorly, I think staged amputation would be safer than a single-stage.  As such, after discussion of risk, benefits, alternatives, he wishes to proceed with  left below-knee amputation, wound vacuum-assisted closure.  Risks of surgery were discussed which included but were not limited to pain, bleeding, infection, damage to adjacent structures, need for further surgery, wound healing complications, loss of limb and death.  The patient expressed verbal consent and written consent was obtained for the above procedure.          Date: 6/13/2024    Brijesh Em Jr, MD

## 2024-06-13 NOTE — ANESTHESIA PREPROCEDURE EVALUATION
Anesthesia Evaluation     Patient summary reviewed and Nursing notes reviewed                Airway   Mallampati: II  TM distance: >3 FB  Neck ROM: full  No difficulty expected  Dental - normal exam     Pulmonary - negative pulmonary ROS and normal exam   Cardiovascular - normal exam    (+) hypertension      Neuro/Psych- negative ROS  GI/Hepatic/Renal/Endo    (+) diabetes mellitus poorly controlled    Musculoskeletal (-) negative ROS    Abdominal  - normal exam    Bowel sounds: normal.   Substance History   (+) drug use (THC)     OB/GYN negative ob/gyn ROS         Other        ROS/Med Hx Other: HCT 30  PLT 122K                    Anesthesia Plan    ASA 3     general with block     intravenous induction     Anesthetic plan, risks, benefits, and alternatives have been provided, discussed and informed consent has been obtained with: patient.    Plan discussed with CRNA.        CODE STATUS:    Level Of Support Discussed With: Patient  Code Status (Patient has no pulse and is not breathing): CPR (Attempt to Resuscitate)  Medical Interventions (Patient has pulse or is breathing): Full Support

## 2024-06-13 NOTE — ANESTHESIA PROCEDURE NOTES
Peripheral Block      Patient reassessed immediately prior to procedure    Patient location during procedure: pre-op  Start time: 6/13/2024 8:28 AM  Stop time: 6/13/2024 8:38 AM  Reason for block: at surgeon's request and post-op pain management  Performed by  CRNA/CAA: Walter Ramirez, CRNA  Assisted by: Sheba Leon RN  Preanesthetic Checklist  Completed: patient identified, IV checked, site marked, risks and benefits discussed, surgical consent, monitors and equipment checked, pre-op evaluation and timeout performed  Prep:  Pt Position: right lateral decubitus  Sterile barriers:cap, gloves, mask and washed/disinfected hands  Prep: ChloraPrep  Patient monitoring: blood pressure monitoring, continuous pulse oximetry and EKG  Procedure    Sedation: yes  Performed under: local infiltration  Guidance:ultrasound guided    ULTRASOUND INTERPRETATION.  Using ultrasound guidance a 20 G gauge needle was placed in close proximity to the nerve, at which point, under ultrasound guidance anesthetic was injected in the area of the nerve and spread of the anesthesia was seen on ultrasound in close proximity thereto.  There were no abnormalities seen on ultrasound; a digital image was taken; and the patient tolerated the procedure with no complications. Images:still images obtained, printed/placed on chart    Laterality:left  Block Type:popliteal  Injection Technique:catheter  Needle Type:echogenic and Tuohy  Needle Gauge:18 G  Resistance on Injection: none  Catheter Size:20 G  Cath Depth at skin: 9 cm    Medications Used: bupivacaine PF (MARCAINE) 0.25 % injection - Injection   30 mL - 6/13/2024 8:38:00 AM      Medications  Preservative Free Saline:5ml    Post Assessment  Injection Assessment: negative aspiration for heme, no paresthesia on injection and incremental injection  Patient Tolerance:comfortable throughout block  Complications:no  Additional Notes  CATHETER                               A high-frequency linear  "transducer, with sterile cover, was placed in the popliteal fossa to identify the popliteal artery and vein, Tibial nerve (TN) and Common Peroneal nerve (CP). The transducer was then moved in a cephalad fashion to observe the TN and CP nerve bifurcation to form the Sciatic Nerve. The insertion site was prepped and draped in sterile fashion. Skin and cutaneous tissue was infiltrated with 2-5 ml of 1% Lidocaine. Using ultrasound-guidance, an 18-gauge Contiplex Ultra 360 Touhy needle was advanced in plane from lateral to medial. Preservative-free normal saline was utilized for hydro-dissection of tissue, advancement of Touhy needle, and to confirm final needle placement posterior to the nerves. Local anesthetic injection spread, in incremental 3-5 ml injections, to surround both nerve structures. Aspiration every 5 ml to prevent intravascular injection. Injection was completed with negative aspiration of blood and negative intravascular injection. Injection pressures were normal with minimal resistance. A 20-gauge Contiplex Echo catheter was placed through the needle and advance out the tip of the Touhy 1-3 cm. The Touhy needle was then removed, and final catheter position verified (Below/above or Anterior/Posterior) the nerve structures. The catheter was secured in the usual fashion with skin glue, benzoin, steri-strips, CHG tegaderm and Label noting \"Nerve Block Catheter\". Jerk tape applied at yellow connector and catheter connection.    Performed by: Walter Ramirez, HIRAL            "

## 2024-06-13 NOTE — NURSING NOTE
WOC consulted for left foot wound.    Taken to OR today for left lower leg BKA.    WOC will sign off.    Prateek Ledesma RN, BSN, CCRN, CWOCN  Wound, Ostomy and Continence (WOC) Department  [facility]

## 2024-06-13 NOTE — OP NOTE
[facility]     OPERATIVE REPORT      PATIENT NAME:  Easton Alvarez                            YOB: 1965       PREOP DIAGNOSIS:   Left  Left lower extremity osteomyelitis    POSTOP DIAGNOSIS:   Same.    PROCEDURE:   Left  Left     72838: Below-knee amputation  11437: Wound vacuum assisted closure    SURGEON:     Brijesh Em MD    OPERATIVE TEAM:   Circulator: Helene Mcclure RN  Physician Assistant: Candy Burnett PA-C  Scrub Person: Adrian Brown    ANESTHETIST:  Anesthesiologist: Weston Doty MD  CRNA: Dante Savage CRNA    ANESTHESIA:   General with Block    Candy Burnett PA-C was responsible for performing the following activities: Retraction, Suction, Irrigation, Closing, and Placing Dressing and their skilled assistance was necessary for the success of this case.     ESTIMATED BLOOD LOSS:   < 30 ml    TOURNIQUET TIME:   < 30 mL    FINDINGS:     Turbid fluid proximally.  VAC with appropriate seal.    IMPLANTS:     None.    SPECIMENS:     Specimens       ID Source Type Tests Collected By Collected At Frozen?    1 Leg, Left Surgical Site ANAEROBIC CULTURE  FUNGAL CULTURE  CULTURE, CSF  AFB CULTURE  FUNGUS SMEAR   Brijesh Em Jr., MD 6/13/24 1031     Description: LEFT LEG AMPUTATION SITE    A Leg, Left Tissue TISSUE PATHOLOGY EXAM   Brijesh Em Jr., MD 6/13/24 1028 No    Description: LEFT LEG, BELOW KNEE AMPUTATION            COMPLICATIONS:    None.    DISPOSITION:    Stable to recovery.     INDICATIONS:    58 y.o. malewith Left  Left foot / leg infection with gas gangrene.  I had a discussion with the patient regarding further management.  Given history of infection we discussed limb salvage as well as single stage versus two stage below knee amputation and they preferred two stage amputation.  After discussion of risks, benefits, alternatives, they were amenable to Left  Left below-knee amputation, wound vacuum assisted closure.  Risks of surgery were  discussed which included but were not limited to pain, bleeding, infection, damage to adjacent structures, need for further surgery, wound healing complications, loss of limb and death.  The patient expressed verbal consent and written consent was obtained for the above procedure.    NARRATIVE:    Patient was identified in preoperative holding.  Operative site was marked in indelible ink.  History, physical, consent were reviewed and updated.  Patient was surrendered to the anesthesia team and transported to the operative suite where anesthesia was induced.  Hip bump was placed on the ipsilateral side and nonsterile thigh tourniquet was placed.  Nonsterile dressing was placed over the foot and Ioban was placed over this.  Operative extremity was prepped and draped in the usual sterile fashion, prepping over the Ioban.  The operative team donned sterile gowns and gloves and a timeout was called.  All in attendance agreed regarding the patient's identity, proposed operative procedure, and operative site.    I elevated the operative extremity and the tourniquet was inflated to 300 mmHg.    I marked a transverse incision 12 cm distal to the tibial tubercle, marked medial and lateral incisions for a posterior flap.  I made a full-thickness skin incision along the entire length of the incision.  Utilizing Bovie electrocautery, I incised to the anterior compartment, identified the anterior vascular bundle which was ligated with free and stick tie.  This was transected distal to the tie.  I placed an [employer] retractor deep to the tibia, transected the tibia using a sagittal saw 12 cm distal to the tibial tubercle.  I transected the fibula approximately 1 cm proximal to the tibial cut.  Using an amputation blade, I freed the foot, sent this for gross pathology.    I noted turbid fluid proximal laterally, swab specimens of which I sent for culture.    I identified the posterior vascular structures and in a similar manner,  "ligated and transected them.  Identified the tibial nerve, freed it posterior to the tibia, injected anesthetic into the nerve, cut it proximal to the tibial cut, inserted it, retracted proximal to the tibial cut.  I then took down the tourniquet, achieved hemostasis, removed muscle fibers from the deep posterior compartment, leaving the gastroc and soleus for the flap.  I performed a chamfer cut at the distal aspect of the tibia.   I sharply incised redundant distal Achilles and gastrocnemius tendon, as well as redundant skin.      I applied a wound vacuum assisted closure device with white foam over the vascular structures as well as black foam in \"shanon sandal\" fashion incorporating vessel loops and staples.    Sterile dressing applied.  Anesthesia was concluded and the patient was transported to the PACU in stable condition.  Counts were correct x2.  There were no apparent complications.  I was present and scrubbed for the entire case.    Brijesh Em Jr, MD  6/13/2024   "

## 2024-06-13 NOTE — PLAN OF CARE
Goal Outcome Evaluation:  Plan of Care Reviewed With: patient        Progress: no change  Outcome Evaluation: MRI of L foot completed. PRN pain meds effective for left foot pain. NSR on tele, remains on room air. VSS. NPO since midnight. Most recent lactate 3.6, 500ml NS bolus given per provider.

## 2024-06-13 NOTE — ANESTHESIA PROCEDURE NOTES
Peripheral Block      Patient reassessed immediately prior to procedure    Start time: 6/13/2024 8:23 AM  Stop time: 6/13/2024 8:27 AM  Reason for block: at surgeon's request and post-op pain management  Performed by  CRNA/CAA: Walter Ramirez, CRNA  Assisted by: Sheba Leon RN  Preanesthetic Checklist  Completed: patient identified, IV checked, site marked, risks and benefits discussed, surgical consent, monitors and equipment checked, pre-op evaluation and timeout performed  Prep:  Pt Position: supine  Sterile barriers:cap, gloves, mask, sterile barriers and washed/disinfected hands  Prep: ChloraPrep  Patient monitoring: blood pressure monitoring, continuous pulse oximetry and EKG  Procedure  Performed under: spinal  Guidance:ultrasound guided    ULTRASOUND INTERPRETATION.  Using ultrasound guidance a 20 G gauge needle was placed in close proximity to the nerve, at which point, under ultrasound guidance anesthetic was injected in the area of the nerve and spread of the anesthesia was seen on ultrasound in close proximity thereto.  There were no abnormalities seen on ultrasound; a digital image was taken; and the patient tolerated the procedure with no complications. Images:still images obtained, printed/placed on chart    Laterality:left  Block Type:adductor canal block  Injection Technique:single-shot  Needle Type:echogenic and short-bevel  Needle Gauge:20 G  Resistance on Injection: none  Catheter size: 20g.    Medications Used: fentaNYL citrate (PF) (SUBLIMAZE) injection - Intravenous   100 mcg - 6/13/2024 8:23:00 AM  bupivacaine PF (MARCAINE) 0.25 % injection - Injection   30 mL - 6/13/2024 8:27:00 AM  dexamethasone sodium phosphate injection - Injection   2 mg - 6/13/2024 8:27:00 AM      Medications  Preservative Free Saline:5ml    Post Assessment  Injection Assessment: negative aspiration for heme, incremental injection and no paresthesia on injection  Patient Tolerance:comfortable throughout  "block  Complications:no  Additional Notes  SINGLE shot   A high-frequency linear transducer, with sterile cover, was placed on the anterior mid-thigh (between the anterior superior iliac spine and patella). The transducer was then moved medially to identify the Sartorius muscle (Yaakov), Vastus Medialis muscle (VMM), Superficial Femoral Artery (SFA) and Vein. The transducer was then moved cephalad or caudad to position the SFA in the middle of the Yaakov. The insertion site was prepped and draped in sterile fashion. Skin and cutaneous tissue was infiltrated with 2-5 ml of 1% Lidocaine. Using ultrasound-guidance, a 20-gauge B-Mata 4\" Ultraplex 360 non-stimulating echogenic needle was advanced in plane from lateral to medial. Preservative-free normal saline was utilized for hydro-dissection of tissue, advancement of needle, and to confirm needle placement below the fascial plane of the Yaakov where the Nerve to the VMM is located. Local anesthetic (LA) 5 ml deposited here. The needle continues its path lateral to the SFA at the level of the Saphenous Nerve. The remainder of the LA was deposited at the 10-11 o'clock position of the SFA. This injection created a space between the Yaakov and the SFA. Aspiration every 5 ml to prevent intravascular injection. Injection was completed with negative aspiration of blood and negative intravascular injection. Injection pressures were normal with minimal resistance.   Performed by: Walter Ramirez, CRNA            "

## 2024-06-13 NOTE — CONSULTS
Vascular Surgery Consult Note    Chief complaint left foot gangrene    Reason for consultation: Evaluate for peripheral vascular disease  Consult requested by: Dr. Brijesh Em    HPI: This is a 58-year-old male who is a very poor historian who has a history of a right transmetatarsal amputation.  Patient is a diabetic but appears to not take any medication and takes poor care of himself.  He presents with left foot gangrene which has been going on for several weeks.  He denies any major changes over the last 24 to 48 hours.  He was seen at another [facility] facility and CTA was performed which showed significant amount of gas in the left foot subcutaneous tissues with extension more proximally.  He was also noted to have mild to moderate popliteal disease as well.  Vascular surgery service was asked to evaluate the patient.  Currently is not complaining of any pain.  He denies prior history of vascular interventions or vascular disease.    Review of Systems  General ROS: no chills, admits to fatigue, fever or malaise  Psychological ROS: no anxiety and depression  Ophthalmic ROS: no blurry vision, decreased vision or loss of vision  ENT ROS: no headaches or vertigo  Allergy and Immunology ROS: no nasal congestion  Hematological and Lymphatic ROS: no bleeding problems, fatigue, jaundice, swollen lymph nodes or weight loss  Endocrine ROS: no temperature intolerance or unexpected weight changes  Respiratory ROS: no cough, shortness of breath, or wheezing  Cardiovascular ROS: no chest pain or dyspnea on exertion  Gastrointestinal ROS: no abdominal pain, change in bowel habits, or black or bloody stools  Genito-Urinary ROS: no dysuria, trouble voiding, or hematuria  Musculoskeletal ROS: left foot wound with drainage  Neurological ROS: no TIA or stroke symptoms  Dermatological ROS: no  rash    History  Past Medical History:   Diagnosis Date    Diabetes mellitus     Diabetic foot infection 2018    left (partial amputation)  "    Past Surgical History:   Procedure Laterality Date    AMPUTATION FOOT / TOE Right     partial foot    TOTAL HIP ARTHROPLASTY Left 2024    From fall \"whole hip was shattered\"     History reviewed. No pertinent family history.  Social History     Tobacco Use    Smoking status: Former     Current packs/day: 0.00     Types: Cigarettes     Quit date: 1994     Years since quittin.0     Passive exposure: Past    Smokeless tobacco: Never   Vaping Use    Vaping status: Never Used   Substance Use Topics    Alcohol use: Not Currently    Drug use: Yes     Types: Marijuana     No medications prior to admission.     Allergies:  Patient has no known allergies.    Vital Signs  Temp:  [98 °F (36.7 °C)-98.5 °F (36.9 °C)] 98.5 °F (36.9 °C)  Heart Rate:  [] 83  Resp:  [16-22] 16  BP: (116-152)/(61-74) 145/70    Physical Exam:      General Appearance:    Alert, cooperative, in no acute distress, somewhat withdrawn   Head:    Normocephalic, without obvious abnormality, atraumatic   Eyes:               Lids and lashes normal, conjunctivae and sclerae normal, no icterus, no pallor, corneas clear, PERRL   Ears:    Ears appear intact with no abnormalities noted   Throat:   No oral lesions, no thrush,oral mucosa moist   Neck:   No adenopathy, supple, trachea midline,no carotid bruit, no JVD       Lungs:     Clear to auscultation,respirations regular, even and               unlabored    Heart:    Regular rhythm and normal rate, normal S1 and S2, no         murmur, no gallop, no rub, no click   Abdomen:     Normal bowel sounds, no masses, no organomegaly, soft     non-tender, non-distended, no guarding, no rebound                tenderness   Extremities:   RLE s/p TMA well-healed, LLE extensive gangrenous changes with drainage, erythema.    Pulses:   Palpable bilateral DP, popliteal and femoral pulses noted   Skin:   No bleeding, bruising or rash   Lymph nodes:   No palpable adenopathy   Neurologic:   Cranial nerves 2 - " 12 grossly intact, sensation intact     Results Review:    I reviewed the patient's new clinical results.    Assessment and Plan: This is a 58-year-old male with a diabetic devastating left foot infection.  On physical exam and on CT scan he has extensive amount of tissue necrosis.  This foot is nonsalvageable.  I agree with Dr. Em that the best management for this is a staged below the knee amputation.  From vascular perspective he has mild to moderate left popliteal occlusive disease but he has a bounding dorsalis pedis and popliteal pulses.  I do not think he needs correction of these hemodynamically insignificant lesions at this time as this will not have any effect on his healing.  He is obviously at a high risk for wound complications, more proximal limb loss, and mortality given the extent of his infection.  At this point in time vascular surgery does not need to perform any revascularization and I will sign off.    I discussed the patients findings and my recommendations with patient.       Carlos Lundberg MD  06/13/24  08:04 EDT

## 2024-06-13 NOTE — ANESTHESIA POSTPROCEDURE EVALUATION
Patient: Easton Alvarez    Procedure Summary       Date: 06/13/24 Room / Location:  [facility]    Anesthesia Start: 0855 Anesthesia Stop: 1024    Procedures:       AMPUTATION BELOW KNEE AND WOUND VAC ASSISTED CLOSURE (Left: Leg Lower)      PLACEMENT OF WOUND VAC (Left: Foot) Diagnosis:       Diabetic ulcer of left midfoot associated with type 2 diabetes mellitus, with necrosis of muscle      (Diabetic ulcer of left midfoot associated with type 2 diabetes mellitus, with necrosis of muscle [E11.621, L97.423])    Surgeons: Brijesh Em Jr., MD Provider: Weston Doty MD    Anesthesia Type: general with block ASA Status: 3            Anesthesia Type: general with block    Vitals  Vitals Value Taken Time   /57 06/13/24 1024   Temp 97 °F (36.1 °C) 06/13/24 1024   Pulse 77 06/13/24 1024   Resp 16 06/13/24 1024   SpO2 97 % 06/13/24 1024           Post Anesthesia Care and Evaluation    Patient location during evaluation: PACU  Patient participation: complete - patient participated  Level of consciousness: sleepy but conscious  Pain score: 0  Pain management: adequate    Airway patency: patent  Anesthetic complications: No anesthetic complications  PONV Status: none  Cardiovascular status: hemodynamically stable and acceptable  Respiratory status: nonlabored ventilation, acceptable and nasal cannula  Hydration status: acceptable    Comments: Pt arrived to PACU with no issues during transport. Pt placed directly to monitors. Vital signs are within parameters. Pt maintaining ventilation spontaneously. No changes to dental. Report given to PACU and all question and concerns were addressed as well as the plan of care.

## 2024-06-13 NOTE — CONSULTS
"Easton Alvarez  1965  [id number]    Date of Consult: 6/13/2024    Admit Date:  6/12/2024      Requesting Provider: Perico Fierro MD  Evaluating Physician: Cleve Bills MD    Chief Complaint: left foot infection    Reason for Consultation: left foot infection    History of present illness:     Patient is a 58 y.o.  Yr old male with history of diabetes with prior right TMA 2019 at , history strep septicemia 2016 ; he reports right total hip arthroplasty 2024 after fall. He reports a fall several weeks prior to his June admission with injury to the left foot, wound present with deterioration several days preceding admission with severe discoloration/redness and swelling.  CT scan showed concern for subcutaneous emphysema and necrotizing infection.  Transferred to [facility] with evaluation by Dr. Em and Dr. Lundberg and arrangements made for urgent surgery. Subsequently, blood cultures called with gram-positive cocci     He has left foot pain that is constant, sharp at times, blunted by neuropathy, worse with palpation, better with pain meds and 5 out of 10 in severity.    Aside from above, he denies any other specific blunt force or penetrating trauma.  No animal insect arthropod bite.  No fresh/brackish/salt water exposure.  Denies prior history MRSA VRE C. Difficile or ESBL/K PC/CRE organisms    No headache photophobia or neck stiffness.  No shortness of breath cough or hemoptysis.  No nausea vomiting diarrhea or abdominal pain.  No dysuria hematuria or pyuria.  No other new skin rash    Past Medical History:   Diagnosis Date    Diabetes mellitus     Diabetic foot infection 2018    left (partial amputation)       Past Surgical History:   Procedure Laterality Date    AMPUTATION FOOT / TOE Right     partial foot    TOTAL HIP ARTHROPLASTY Left 02/2024    From fall \"whole hip was shattered\"       Pediatric History   Patient Parents    Not on file     Other Topics Concern    Not " on file   Social History Narrative    Not on file       family history is not on file. High blood pressure mom/dad    No Known Allergies    Medication:  Current Facility-Administered Medications   Medication Dose Route Frequency Provider Last Rate Last Admin    [Transfer Hold] sennosides-docusate (PERICOLACE) 8.6-50 MG per tablet 2 tablet  2 tablet Oral BID Shraddha Scott MD        And    [Transfer Hold] polyethylene glycol (MIRALAX) packet 17 g  17 g Oral Daily PRN Shraddha Scott MD        And    [Transfer Hold] bisacodyl (DULCOLAX) EC tablet 5 mg  5 mg Oral Daily PRN Shraddha Scott MD        And    [Transfer Hold] bisacodyl (DULCOLAX) suppository 10 mg  10 mg Rectal Daily PRN Shraddha Scott MD        Calcium Replacement - Follow Nurse / BPA Driven Protocol   Does not apply PRN Armida Malloy MD        clindamycin (CLEOCIN) 900 mg in dextrose 5% 50 mL IVPB (premix)  900 mg Intravenous Q8H Shraddha Scott  mL/hr at 06/13/24 0616 900 mg at 06/13/24 0616    DAPTOmycin (CUBICIN) 400 mg in sodium chloride 0.9 % 50 mL IVPB  6 mg/kg Intravenous Q24H Cleve Bills MD        [Transfer Hold] dextrose (D50W) (25 g/50 mL) IV injection 25 g  25 g Intravenous Q15 Min PRN Shraddha Scott MD        [Transfer Hold] dextrose (GLUTOSE) oral gel 15 g  15 g Oral Q15 Min PRN Shraddha Scott MD        [Transfer Hold] glucagon (GLUCAGEN) injection 1 mg  1 mg Intramuscular Q15 Min PRN Shraddha Scott MD        [Transfer Hold] HYDROcodone-acetaminophen (NORCO) 5-325 MG per tablet 1 tablet  1 tablet Oral Q4H PRN Shraddha Scott MD        [Transfer Hold] HYDROmorphone (DILAUDID) injection 0.5 mg  0.5 mg Intravenous Q4H PRN Shraddha Scott MD   0.5 mg at 06/13/24 0140    [Transfer Hold] insulin regular (humuLIN R,novoLIN R) injection 2-7 Units  2-7 Units Subcutaneous Q6H Shraddha Scott MD   4 Units at 06/13/24 0615    lactated ringers infusion  100 mL/hr Intravenous Continuous Shraddha Scott MD   Held at 06/13/24 0743    lactated  ringers infusion  9 mL/hr Intravenous Continuous Weston Doty MD 9 mL/hr at 06/13/24 0808 9 mL/hr at 06/13/24 0808    [Transfer Hold] lactobacillus acidophilus (RISAQUAD) capsule 1 capsule  1 capsule Oral Daily Shraddha Scott MD   1 capsule at 06/12/24 1828    lidocaine PF 1% (XYLOCAINE) injection 0.5 mL  0.5 mL Injection Once PRN Weston Doty MD        Magnesium Standard Dose Replacement - Follow Nurse / BPA Driven Protocol   Does not apply PRN Armida Malloy MD        midazolam (VERSED) injection 1 mg  1 mg Intravenous Q10 Min PRN Weston Doty MD        [Transfer Hold] nitroglycerin (NITROSTAT) SL tablet 0.4 mg  0.4 mg Sublingual Q5 Min PRN Shraddha Scott MD        Pharmacy to dose vancomycin   Does not apply Continuous PRN Shraddha Scott MD        Phosphorus Replacement - Follow Nurse / BPA Driven Protocol   Does not apply PRN Armida Malloy MD        piperacillin-tazobactam (ZOSYN) 4.5 g IVPB in 100 mL NS MBP (CD)  4.5 g Intravenous Once Shraddha Scott MD   Stopped at 06/12/24 1942    piperacillin-tazobactam (ZOSYN) 4.5 g IVPB in 100 mL NS MBP (CD)  4.5 g Intravenous Q8H Shraddha Scott MD   4.5 g at 06/13/24 0142    Potassium Replacement - Follow Nurse / BPA Driven Protocol   Does not apply PRN Armida Malloy MD        [Transfer Hold] sodium chloride 0.9 % flush 10 mL  10 mL Intravenous Q12H Shraddha Scott MD   10 mL at 06/12/24 2004    [Transfer Hold] sodium chloride 0.9 % flush 10 mL  10 mL Intravenous PRN Shraddha Scott MD        sodium chloride 0.9 % flush 10 mL  10 mL Intravenous Q12H Weston Doty MD        sodium chloride 0.9 % flush 10 mL  10 mL Intravenous PRN Weston Doty MD   10 mL at 06/13/24 0809    [Transfer Hold] sodium chloride 0.9 % infusion 40 mL  40 mL Intravenous PRN Shraddha Scott MD        sodium chloride 0.9 % infusion 40 mL  40 mL Intravenous PRN Weston Doty MD           Antibiotics:  Anti-Infectives (From admission, onward)      Ordered      Dose/Rate Route Frequency Start Stop    06/13/24 0812  DAPTOmycin (CUBICIN) 400 mg in sodium chloride 0.9 % 50 mL IVPB        Ordering Provider: Cleve Bills MD    6 mg/kg × 68 kg  100 mL/hr over 30 Minutes Intravenous Every 24 Hours 06/13/24 0814 06/23/24 0813    06/12/24 1746  piperacillin-tazobactam (ZOSYN) 4.5 g IVPB in 100 mL NS MBP (CD)        Ordering Provider: Shraddha Scott MD    4.5 g  over 4 Hours Intravenous Every 8 Hours 06/13/24 0100 06/18/24 0059    06/12/24 1746  clindamycin (CLEOCIN) 900 mg in dextrose 5% 50 mL IVPB (premix)        Ordering Provider: Shraddha Scott MD    900 mg  100 mL/hr over 30 Minutes Intravenous Every 8 Hours 06/12/24 2200 06/17/24 2159    06/12/24 1746  piperacillin-tazobactam (ZOSYN) 4.5 g IVPB in 100 mL NS MBP (CD)        Ordering Provider: Shraddha Scott MD    4.5 g  over 30 Minutes Intravenous Once 06/12/24 1900      06/12/24 1747  Pharmacy to dose vancomycin        Ordering Provider: Shraddha Scott MD     Does not apply Continuous PRN 06/12/24 1747 06/17/24 1746              Review of Systems    Constitutional-- No Fever, chills or sweats.  Appetite diminished with fatigue.  Heent-- No new vision, hearing or throat complaints.  No epistaxis or oral sores.  Denies odynophagia or dysphagia.  No flashers, floaters or eye pain. No odynophagia or dysphagia. No headache, photophobia or neck stiffness.  CV-- No chest pain, palpitation or syncope  Resp-- No SOB/cough/Hemoptysis  GI- No nausea, vomiting, or diarrhea.  No hematochezia, melena, or hematemesis. Denies jaundice or chronic liver disease.  -- No dysuria, hematuria, or flank pain.  Denies hesitancy, urgency or flank pain.  Lymph- no swollen lymph nodes in neck/axilla or groin.   Heme- No active bruising or bleeding; no Hx of DVT or PE.  MS-- aside from above, no swelling or pain in the bones or joints of arms/legs.  No new back pain.  Neuro-- No acute focal weakness or numbness in the arms or legs.  No  "seizures.    Full 12 point review of systems reviewed and negative otherwise for acute complaints, except for above    Physical Exam:   Vital Signs   /70 (BP Location: Right arm, Patient Position: Sitting)   Pulse 83   Temp 98.5 °F (36.9 °C) (Temporal)   Resp 16   Ht 172.7 cm (68\")   Wt 68 kg (150 lb)   SpO2 99%   BMI 22.81 kg/m²     GENERAL: Awake and alert, in no acute distress.   HEENT: Normocephalic, atraumatic.  PERRL. EOMI. No conjunctival injection. No icterus. Oropharynx clear without evidence of thrush or exudate. No evidence of periodontal disease.    NECK: Supple without nuchal rigidity. No mass.  LYMPH: No cervical, axillary or inguinal lymphadenopathy.  HEART: RRR; No murmur, rubs, gallops.   LUNGS: Clear to auscultation bilaterally without wheezing, rales, rhonchi. Normal respiratory effort. Nonlabored. No dullness.  ABDOMEN: Soft, nontender, nondistended. Positive bowel sounds. No rebound or guarding. NO mass or HSM.  EXT:  see below  : Genitalia generally unremarkable.  Without Cr catheter.  MSK: FROM without joint effusions noted arms/legs.    SKIN: Warm and dry without cutaneous eruptions on Inspection/palpation.    NEURO: Oriented to PPT. He moves all 4 extremities      Left dorsal foot with gangrene/eschar at dorsal foot with vague erythema/edema extending to the ankle, no discrete fluctuance.  No crepitus    Laboratory Data    Results from last 7 days   Lab Units 06/13/24  0403 06/12/24  1839 06/12/24  0802   WBC 10*3/mm3 18.48* 18.84* 26.78*   HEMOGLOBIN g/dL 10.5* 11.0* 12.3*   HEMATOCRIT % 30.5* 31.5* 35.5*   PLATELETS 10*3/mm3 122* 142 191     Results from last 7 days   Lab Units 06/13/24  0403   SODIUM mmol/L 131*   POTASSIUM mmol/L 3.3*   CHLORIDE mmol/L 96*   CO2 mmol/L 26.0   BUN mg/dL 64*   CREATININE mg/dL 0.91   GLUCOSE mg/dL 268*   CALCIUM mg/dL 7.3*     Results from last 7 days   Lab Units 06/12/24  1839   ALK PHOS U/L 145*   BILIRUBIN mg/dL 3.6*   ALT (SGPT) U/L " 37   AST (SGOT) U/L 46*         Results from last 7 days   Lab Units 06/12/24  0802   CRP mg/dL 24.89*       Estimated Creatinine Clearance: 83 mL/min (by C-G formula based on SCr of 0.91 mg/dL).      Microbiology:      Radiology:  Imaging Results (Last 72 Hours)       Procedure Component Value Units Date/Time    MRI Foot Left Without Contrast [[id number]] Resulted: 06/13/24 0108     Updated: 06/13/24 0125              Impression:     --acute sepsis as per admission notes, severe left foot infection with gangrene/cellulitis and concern for necrotizing soft tissue infection by imaging, urgent surgical arrangements made June 13.  Empiric broad-spectrum antimicrobials ongoing.  He knows risk for persistent/recurrent or nonhealing wounds, persistence is progressive or recurrent infection and risk for further amputation/functional-limb loss and chronic pain.  Associated with bacteremia as below.    --Acute/severe left foot infection as above, urgent surgical arrangements being made    --Acute bacteremia, gram-positive organisms with PCR preliminarily with strep species, strep septicemia.  Likely source from foot.  High risk for further serious 130 and other serious sequela including dire consequences and metastatic foci of involvement/endovascular sequela etc.    --Right hip fracture with repair February 2024.  Denies any new pain at the hip; CT scan had raise concern for gas/fluid collection at the anterior aspect of the hip arthroplasty.  No redness or other suppurative change at surface.  Need further clarification from orthopedics as to significance of these radiographic findings and any further diagnostic/interventional workup at their discretion    --diabetes.  Described as uncontrolled by medicine team at admission.  Glucose control per medicine team      PLAN: Thank you for asking us to see Easton Alvarez, I recommend the following:    --IV daptomycin/Zosyn, clindamycin    --Check/review labs cultures and  scans    --Partial history per nursing staff    --Discussed with microbiology    --Urgent surgical arrangements being made    --Highly complex at of issues with high risk for further serious morbidity and other serious sequela    --If no MDR pathogens, anticipate taper IV antibiotics in the next 24-48 hours       Cleve Bills MD  6/13/2024

## 2024-06-13 NOTE — PROGRESS NOTES
Malnutrition Severity Assessment    Patient Name:  Easton Alvarez  YOB: 1965  MRN: [id number]  Admit Date:  6/12/2024    Patient meets criteria for : Severe Malnutrition    Malnutrition Severity Assessment  Malnutrition Type: Acute Disease or Injury - Related Malnutrition  Malnutrition Type (Last 8 Hours)       Malnutrition Severity Assessment       Row Name 06/13/24 1445       Malnutrition Severity Assessment    Malnutrition Type Acute Disease or Injury - Related Malnutrition      Row Name 06/13/24 1445       Insufficient Energy Intake     Insufficient Energy Intake Findings Moderate    Insufficient Energy Intake  <75% of est. energy requirement for >7d)      Row Name 06/13/24 1445       Muscle Loss    Loss of Muscle Mass Findings Severe    Buddhist Region Moderate - slight depression    Clavicle Bone Region Severe - protruding prominent bone    Acromion Bone Region Severe - squared shoulders, bones, and acromion process protrusion prominent    Dorsal Hand Region Moderate - slight depression    Patellar Region Moderate - patella more prominent, less muscle definition around patella    Anterior Thigh Region Moderate - mild depression on inner thigh    Posterior Calf Region Moderate - some roundness, slight firmness      Row Name 06/13/24 1445       Fat Loss    Subcutaneous Fat Loss Findings Moderate    Orbital Region  Moderate -  somewhat hollowness, slightly dark circles    Upper Arm Region Severe - mostly skin, very little space between folds, fingers touch    Thoracic & Lumbar Region Moderate - ribs visible with mild depressions, iliac crest somewhat prominent      Row Name 06/13/24 1445       Criteria Met (Must meet criteria for severity in at least 2 of these categories: M Wasting, Fat Loss, Fluid, Secondary Signs, Wt. Status, Intake)    Patient meets criteria for  Severe Malnutrition                    Electronically signed by:  Ibis Villagran, MS,RD,LD  06/13/24 14:49 EDT

## 2024-06-13 NOTE — CASE MANAGEMENT/SOCIAL WORK
Discharge Planning Assessment  [facility]     Patient Name: Easton Alvarez  MRN: [id number]  Today's Date: 6/13/2024    Admit Date: 6/12/2024    Plan: Home   Discharge Needs Assessment       Row Name 06/13/24 0904       Living Environment    People in Home alone    Current Living Arrangements apartment    Potentially Unsafe Housing Conditions none    Primary Care Provided by self    Provides Primary Care For no one    Family Caregiver if Needed child(amy), adult    Family Caregiver Names Elisa Akhtar (daughter) [phone]    Able to Return to Prior Arrangements yes       Resource/Environmental Concerns    Resource/Environmental Concerns none    Transportation Concerns none       Transition Planning    Patient/Family Anticipates Transition to home    Patient/Family Anticipated Services at Transition none    Transportation Anticipated family or friend will provide       Discharge Needs Assessment    Readmission Within the Last 30 Days no previous admission in last 30 days    Equipment Currently Used at Home walker, rolling    Concerns to be Addressed denies needs/concerns at this time    Anticipated Changes Related to Illness none    Equipment Needed After Discharge none                   Discharge Plan       Row Name 06/13/24 0905       Plan    Plan Home    Patient/Family in Agreement with Plan yes    Plan Comments Spoke with Elisa hurst by phone. Patient lives alone in [facility]. Contact is Elisa Akhtar (daughter) [phone]. Is independent with ADL's. No problems with medications and is Medicaid pending. Uses a rolling walker. No advanced directives. Plan is home pending PT/OT recs. CM will continue to follow.    Final Discharge Disposition Code 01 - home or self-care                  Continued Care and Services - Admitted Since 6/12/2024    No active coordination exists for this encounter.          Demographic Summary       Row Name 06/13/24 0904       General Information    Admission Type inpatient     Arrived From emergency department    Referral Source admission list    Reason for Consult discharge planning    Preferred Language English       Contact Information    Permission Granted to Share Info With     Contact Information Obtained for                    Functional Status       Row Name 06/13/24 0904       Functional Status    Usual Activity Tolerance moderate    Current Activity Tolerance moderate       Functional Status, IADL    Medications independent    Meal Preparation independent    Housekeeping independent    Laundry independent       Mental Status    General Appearance WDL WDL       Mental Status Summary    Recent Changes in Mental Status/Cognitive Functioning no changes       Employment/    Employment Status employed full-time                   Psychosocial    No documentation.                  Abuse/Neglect    No documentation.                  Legal    No documentation.                  Substance Abuse    No documentation.                  Patient Forms    No documentation.                     Nicolas Fallon RN

## 2024-06-13 NOTE — PROGRESS NOTES
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    PSH:  He  has a past surgical history that includes Total hip arthroplasty (Left, 02/2024) and Amputation foot / toe (Right).    Applicable Nutrition History:   6/13-s/p L BKA    Anthropometrics     Height: Height: 172.7 cm (68\")  Last Filed Weight: Weight: 68 kg (150 lb) (06/12/24 1721)  Method: Weight Method: Stated  BMI: BMI (Calculated): 22.8    UBW:  150-160lbs per pt report  Weight change:  unable to assess, only stated wt available    Nutrition Focused Physical Exam    Date: 6/13    Patient meets criteria for malnutrition diagnosis, see MSA note.     Subjective   Reported/Observed/Food/Nutrition Related History:     Pt reports decreased appetite for a few weeks (<1 month) 2/2 poor appetite and suspects eating ~50% of usual intake. Pt     Current Nutrition Prescription   PO: NPO Diet NPO Type: Strict NPO  Oral Nutrition Supplement:   Intake: N/A    Assessment & Plan   Nutrition Diagnosis   Date: 6/13             Updated:    Problem Malnutrition severe acute   Etiology Clinical status-uncontrolled DM, sepsis   Signs/Symptoms Severe muscle wasting and moderate subcutaneous fat loss, po intake <75% EEN x >/=7 days   Status: New    Date:   6/13  Updated:     Problem Increased nutrient needs   Etiology Skin integrity   Signs/Symptoms S/p L BKA   Status: New    Goal / Objectives:   Nutrition to support treatment and Establish PO, Advance diet as medically " feasible/appropriate      Nutrition Intervention      Follow treatment progress, Care plan reviewed, Await begin PO, Supplement provided    Pt meets criteria for acute malnutrition in the context of acute illness, see MSA for full assessment.   Encourage po intake  RD to provide Boost GC once advanced to appropriate diet    Monitoring/Evaluation:   Per protocol, PO intake, Supplement intake, Weight, Skin status    Ibis Villagran, MS,RD,LD  Time Spent:20

## 2024-06-13 NOTE — ANESTHESIA PROCEDURE NOTES
Airway  Urgency: elective    Date/Time: 6/13/2024 9:05 AM  Airway not difficult    General Information and Staff    Patient location during procedure: OR  CRNA/CAA: Dante Savage CRNA    Indications and Patient Condition  Indications for airway management: airway protection    Preoxygenated: yes  MILS not maintained throughout  Mask difficulty assessment: 1 - vent by mask    Final Airway Details  Final airway type: endotracheal airway      Successful airway: ETT  Cuffed: yes   Successful intubation technique: direct laryngoscopy  Endotracheal tube insertion site: oral  Blade: Eulalio  Blade size: 3  ETT size (mm): 7.5  Cormack-Lehane Classification: grade I - full view of glottis  Placement verified by: chest auscultation and capnometry   Cuff volume (mL): 8  Measured from: lips  ETT/EBT  to lips (cm): 21  Number of attempts at approach: 1  Assessment: lips, teeth, and gum same as pre-op and atraumatic intubation    Additional Comments  Negative epigastric sounds, Breath sound equal bilaterally with symmetric chest rise and fall

## 2024-06-14 LAB
ALBUMIN SERPL-MCNC: 2 G/DL (ref 3.5–5.2)
ALP SERPL-CCNC: 173 U/L (ref 39–117)
ALT SERPL W P-5'-P-CCNC: 30 U/L (ref 1–41)
ANION GAP SERPL CALCULATED.3IONS-SCNC: 11 MMOL/L (ref 5–15)
AST SERPL-CCNC: 39 U/L (ref 1–40)
BILIRUB CONJ SERPL-MCNC: 1.5 MG/DL (ref 0–0.3)
BILIRUB INDIRECT SERPL-MCNC: 0.6 MG/DL
BILIRUB SERPL-MCNC: 2.1 MG/DL (ref 0–1.2)
BILIRUB UR QL STRIP: NEGATIVE
BUN SERPL-MCNC: 60 MG/DL (ref 8–23)
BUN/CREAT SERPL: 69.8 (ref 7–25)
CALCIUM SPEC-SCNC: 7.6 MG/DL (ref 8.6–10.5)
CHLORIDE SERPL-SCNC: 94 MMOL/L (ref 98–107)
CLARITY UR: CLEAR
CO2 SERPL-SCNC: 24 MMOL/L (ref 22–29)
COLOR UR: YELLOW
CREAT SERPL-MCNC: 0.86 MG/DL (ref 0.76–1.27)
CYTO UR: NORMAL
EGFRCR SERPLBLD CKD-EPI 2021: 99.1 ML/MIN/1.73
GIE STN SPEC: NORMAL
GLUCOSE BLDC GLUCOMTR-MCNC: 226 MG/DL (ref 70–130)
GLUCOSE BLDC GLUCOMTR-MCNC: 328 MG/DL (ref 70–130)
GLUCOSE BLDC GLUCOMTR-MCNC: 338 MG/DL (ref 70–130)
GLUCOSE BLDC GLUCOMTR-MCNC: 371 MG/DL (ref 70–130)
GLUCOSE SERPL-MCNC: 312 MG/DL (ref 65–99)
GLUCOSE UR STRIP-MCNC: ABNORMAL MG/DL
HAV IGM SERPL QL IA: NORMAL
HBV CORE IGM SERPL QL IA: NORMAL
HBV SURFACE AG SERPL QL IA: NORMAL
HCT VFR BLD AUTO: 30 % (ref 37.5–51)
HCT VFR BLD AUTO: 31.4 % (ref 37.5–51)
HCT VFR BLD AUTO: 31.8 % (ref 37.5–51)
HCV AB SER QL: NORMAL
HEMOCCULT STL QL: POSITIVE
HGB BLD-MCNC: 10.3 G/DL (ref 13–17.7)
HGB BLD-MCNC: 10.8 G/DL (ref 13–17.7)
HGB BLD-MCNC: 11.1 G/DL (ref 13–17.7)
HGB UR QL STRIP.AUTO: NEGATIVE
INR PPP: 1.63 (ref 0.89–1.12)
KETONES UR QL STRIP: NEGATIVE
LAB AP CASE REPORT: NORMAL
LAB AP CLINICAL INFORMATION: NORMAL
LEUKOCYTE ESTERASE UR QL STRIP.AUTO: NEGATIVE
MAGNESIUM SERPL-MCNC: 2.6 MG/DL (ref 1.6–2.4)
NITRITE UR QL STRIP: NEGATIVE
PATH REPORT.FINAL DX SPEC: NORMAL
PATH REPORT.GROSS SPEC: NORMAL
PH UR STRIP.AUTO: 5.5 [PH] (ref 5–8)
PHOSPHATE SERPL-MCNC: 3.4 MG/DL (ref 2.5–4.5)
POTASSIUM SERPL-SCNC: 4.5 MMOL/L (ref 3.5–5.2)
PROT SERPL-MCNC: 5.4 G/DL (ref 6–8.5)
PROT UR QL STRIP: NEGATIVE
PROTHROMBIN TIME: 19.5 SECONDS (ref 12.2–14.5)
SODIUM SERPL-SCNC: 129 MMOL/L (ref 136–145)
SP GR UR STRIP: 1.02 (ref 1–1.03)
UROBILINOGEN UR QL STRIP: ABNORMAL

## 2024-06-14 PROCEDURE — 85018 HEMOGLOBIN: CPT | Performed by: FAMILY MEDICINE

## 2024-06-14 PROCEDURE — 85018 HEMOGLOBIN: CPT | Performed by: PHYSICIAN ASSISTANT

## 2024-06-14 PROCEDURE — 83735 ASSAY OF MAGNESIUM: CPT | Performed by: ORTHOPAEDIC SURGERY

## 2024-06-14 PROCEDURE — 25810000003 LACTATED RINGERS PER 1000 ML: Performed by: ORTHOPAEDIC SURGERY

## 2024-06-14 PROCEDURE — 63710000001 INSULIN GLARGINE PER 5 UNITS: Performed by: FAMILY MEDICINE

## 2024-06-14 PROCEDURE — 80074 ACUTE HEPATITIS PANEL: CPT | Performed by: PHYSICIAN ASSISTANT

## 2024-06-14 PROCEDURE — 25010000002 DAPTOMYCIN PER 1 MG: Performed by: ORTHOPAEDIC SURGERY

## 2024-06-14 PROCEDURE — 80048 BASIC METABOLIC PNL TOTAL CA: CPT | Performed by: ORTHOPAEDIC SURGERY

## 2024-06-14 PROCEDURE — 99222 1ST HOSP IP/OBS MODERATE 55: CPT | Performed by: PHYSICIAN ASSISTANT

## 2024-06-14 PROCEDURE — 63710000001 INSULIN REGULAR HUMAN PER 5 UNITS: Performed by: FAMILY MEDICINE

## 2024-06-14 PROCEDURE — 85014 HEMATOCRIT: CPT | Performed by: PHYSICIAN ASSISTANT

## 2024-06-14 PROCEDURE — 84100 ASSAY OF PHOSPHORUS: CPT | Performed by: ORTHOPAEDIC SURGERY

## 2024-06-14 PROCEDURE — 82948 REAGENT STRIP/BLOOD GLUCOSE: CPT

## 2024-06-14 PROCEDURE — 97162 PT EVAL MOD COMPLEX 30 MIN: CPT

## 2024-06-14 PROCEDURE — 25010000002 CLINDAMYCIN 900 MG/50ML SOLUTION: Performed by: ORTHOPAEDIC SURGERY

## 2024-06-14 PROCEDURE — 99232 SBSQ HOSP IP/OBS MODERATE 35: CPT | Performed by: FAMILY MEDICINE

## 2024-06-14 PROCEDURE — 25010000002 PIPERACILLIN SOD-TAZOBACTAM PER 1 G: Performed by: ORTHOPAEDIC SURGERY

## 2024-06-14 PROCEDURE — 63710000001 INSULIN LISPRO (HUMAN) PER 5 UNITS: Performed by: FAMILY MEDICINE

## 2024-06-14 PROCEDURE — 97605 NEG PRS WND THER DME<=50SQCM: CPT

## 2024-06-14 PROCEDURE — 85610 PROTHROMBIN TIME: CPT | Performed by: PHYSICIAN ASSISTANT

## 2024-06-14 PROCEDURE — 25010000002 HYDROMORPHONE PER 4 MG: Performed by: ORTHOPAEDIC SURGERY

## 2024-06-14 PROCEDURE — 82272 OCCULT BLD FECES 1-3 TESTS: CPT | Performed by: PHYSICIAN ASSISTANT

## 2024-06-14 PROCEDURE — 85014 HEMATOCRIT: CPT | Performed by: FAMILY MEDICINE

## 2024-06-14 PROCEDURE — 80076 HEPATIC FUNCTION PANEL: CPT | Performed by: PHYSICIAN ASSISTANT

## 2024-06-14 PROCEDURE — 81003 URINALYSIS AUTO W/O SCOPE: CPT | Performed by: ORTHOPAEDIC SURGERY

## 2024-06-14 RX ORDER — PANTOPRAZOLE SODIUM 40 MG/10ML
40 INJECTION, POWDER, LYOPHILIZED, FOR SOLUTION INTRAVENOUS
Status: DISCONTINUED | OUTPATIENT
Start: 2024-06-14 | End: 2024-07-01

## 2024-06-14 RX ORDER — INSULIN LISPRO 100 [IU]/ML
5 INJECTION, SOLUTION INTRAVENOUS; SUBCUTANEOUS
Status: DISCONTINUED | OUTPATIENT
Start: 2024-06-14 | End: 2024-06-24

## 2024-06-14 RX ADMIN — INSULIN LISPRO 5 UNITS: 100 INJECTION, SOLUTION INTRAVENOUS; SUBCUTANEOUS at 12:40

## 2024-06-14 RX ADMIN — DAPTOMYCIN 400 MG: 500 INJECTION, POWDER, LYOPHILIZED, FOR SOLUTION INTRAVENOUS at 18:38

## 2024-06-14 RX ADMIN — INSULIN HUMAN 6 UNITS: 100 INJECTION, SOLUTION PARENTERAL at 08:12

## 2024-06-14 RX ADMIN — PIPERACILLIN AND TAZOBACTAM 4.5 G: 4; .5 INJECTION, POWDER, FOR SOLUTION INTRAVENOUS at 00:30

## 2024-06-14 RX ADMIN — PANTOPRAZOLE SODIUM 40 MG: 40 INJECTION, POWDER, FOR SOLUTION INTRAVENOUS at 17:18

## 2024-06-14 RX ADMIN — INSULIN LISPRO 5 UNITS: 100 INJECTION, SOLUTION INTRAVENOUS; SUBCUTANEOUS at 17:18

## 2024-06-14 RX ADMIN — OXYCODONE HYDROCHLORIDE 5 MG: 5 TABLET ORAL at 15:11

## 2024-06-14 RX ADMIN — Medication 10 ML: at 08:12

## 2024-06-14 RX ADMIN — OXYCODONE HYDROCHLORIDE 5 MG: 5 TABLET ORAL at 08:23

## 2024-06-14 RX ADMIN — HYDROMORPHONE HYDROCHLORIDE 0.5 MG: 1 INJECTION, SOLUTION INTRAMUSCULAR; INTRAVENOUS; SUBCUTANEOUS at 18:17

## 2024-06-14 RX ADMIN — PIPERACILLIN AND TAZOBACTAM 4.5 G: 4; .5 INJECTION, POWDER, FOR SOLUTION INTRAVENOUS at 08:12

## 2024-06-14 RX ADMIN — CLINDAMYCIN IN 5 PERCENT DEXTROSE 900 MG: 18 INJECTION, SOLUTION INTRAVENOUS at 15:05

## 2024-06-14 RX ADMIN — CLINDAMYCIN IN 5 PERCENT DEXTROSE 900 MG: 18 INJECTION, SOLUTION INTRAVENOUS at 05:39

## 2024-06-14 RX ADMIN — HYDROCODONE BITARTRATE AND ACETAMINOPHEN 1 TABLET: 5; 325 TABLET ORAL at 17:18

## 2024-06-14 RX ADMIN — INSULIN GLARGINE 15 UNITS: 100 INJECTION, SOLUTION SUBCUTANEOUS at 20:48

## 2024-06-14 RX ADMIN — PIPERACILLIN AND TAZOBACTAM 4.5 G: 4; .5 INJECTION, POWDER, FOR SOLUTION INTRAVENOUS at 17:18

## 2024-06-14 RX ADMIN — Medication 1 CAPSULE: at 08:12

## 2024-06-14 RX ADMIN — PANTOPRAZOLE SODIUM 40 MG: 40 INJECTION, POWDER, FOR SOLUTION INTRAVENOUS at 08:12

## 2024-06-14 RX ADMIN — CLINDAMYCIN IN 5 PERCENT DEXTROSE 900 MG: 18 INJECTION, SOLUTION INTRAVENOUS at 21:41

## 2024-06-14 RX ADMIN — SENNOSIDES AND DOCUSATE SODIUM 2 TABLET: 8.6; 5 TABLET ORAL at 20:48

## 2024-06-14 RX ADMIN — SODIUM CHLORIDE, POTASSIUM CHLORIDE, SODIUM LACTATE AND CALCIUM CHLORIDE 100 ML/HR: 600; 310; 30; 20 INJECTION, SOLUTION INTRAVENOUS at 05:39

## 2024-06-14 NOTE — SIGNIFICANT NOTE
Significant Note    Nurse notified us that patient's stool appeared much darker than usual. Occult stool ordered and is positive. Stat H&H ordered. Start IV Protonix 40mg BID. GI consult for the am.

## 2024-06-14 NOTE — PLAN OF CARE
Patient remains free from falls/injuries overnight. Fecal occult positive, PA on call notified, h/h stable this AM. Wound vac in place, nerve block in place. A/ox4, VSS. No complaints of pain, surgical site dressing c/d/i  Problem: Adult Inpatient Plan of Care  Goal: Readiness for Transition of Care  6/14/2024 0523 by Gardenia Campbell RN  Outcome: Ongoing, Progressing  6/14/2024 0522 by Gardenia Campbell RN  Outcome: Ongoing, Not Progressing     Problem: Fall Injury Risk  Goal: Absence of Fall and Fall-Related Injury  Outcome: Ongoing, Progressing  Intervention: Identify and Manage Contributors  Recent Flowsheet Documentation  Taken 6/14/2024 0400 by Gardenia Campbell RN  Medication Review/Management: medications reviewed  Taken 6/14/2024 0200 by Gardenia Campbell RN  Medication Review/Management: medications reviewed  Taken 6/14/2024 0000 by Gardenia Campbell RN  Medication Review/Management: medications reviewed  Taken 6/13/2024 2200 by Gardenia Campbell RN  Medication Review/Management: medications reviewed  Taken 6/13/2024 2000 by Gardenia Campbell RN  Medication Review/Management: medications reviewed  Intervention: Promote Injury-Free Environment  Recent Flowsheet Documentation  Taken 6/14/2024 0400 by Gardenia Campbell RN  Safety Promotion/Fall Prevention:   activity supervised   assistive device/personal items within reach   toileting scheduled   safety round/check completed   nonskid shoes/slippers when out of bed   fall prevention program maintained   clutter free environment maintained  Taken 6/14/2024 0200 by Gardenia Campbell RN  Safety Promotion/Fall Prevention:   activity supervised   assistive device/personal items within reach   toileting scheduled   safety round/check completed   nonskid shoes/slippers when out of bed   fall prevention program maintained   clutter free environment maintained  Taken 6/14/2024 0000 by Gardenia Campbell RN  Safety Promotion/Fall Prevention:   activity supervised    assistive device/personal items within reach   safety round/check completed   toileting scheduled   nonskid shoes/slippers when out of bed   fall prevention program maintained   clutter free environment maintained  Taken 6/13/2024 2200 by Gardenia Campbell, RN  Safety Promotion/Fall Prevention:   activity supervised   assistive device/personal items within reach   toileting scheduled   safety round/check completed   nonskid shoes/slippers when out of bed   fall prevention program maintained   clutter free environment maintained  Taken 6/13/2024 2000 by Gardenia Campbell, RN  Safety Promotion/Fall Prevention:   activity supervised   assistive device/personal items within reach   toileting scheduled   safety round/check completed   nonskid shoes/slippers when out of bed   fall prevention program maintained   clutter free environment maintained   Goal Outcome Evaluation:

## 2024-06-14 NOTE — CASE MANAGEMENT/SOCIAL WORK
Continued Stay Note  [facility]     Patient Name: Easton Alvarez  MRN: [id number]  Today's Date: 6/14/2024    Admit Date: 6/12/2024    Plan: Home   Discharge Plan       Row Name 06/14/24 0850       Plan    Plan Home    Patient/Family in Agreement with Plan yes    Plan Comments Spoke to patient at bedside. Patient is refusing inpatient rehab at this time. Plan is home with Brother. CM will continue to follow.    Final Discharge Disposition Code 01 - home or self-care                   Discharge Codes    No documentation.                       Nicolas Fallon RN

## 2024-06-14 NOTE — CONSULTS
[facility]    Referring Provider: Perico Fierro MD    PCP: Provider, No Known    Reason for Consultation: Dark stool, positive fecal occult blood test     Chief complaint: Left foot ulcer     History of present illness:    Easton Alvarez is a 58 y.o. male who is admitted with left foot diabetic ulcer with extensive left foot gangrene.    He underwent a left below the knee amputation for this yesterday with Dr. Brijesh Em.    He has a wound vacuum assisted closure device present.    He had a dark bowel movement last night and a fecal occult blood test was positive.     H&H is stable.       He denies any history of previous GI bleeding.   He denies abdominal pain, nausea, vomiting nor change in bowel habits.         He denies any NSAID use.      Labs show elevated LFTs, mild thrombocytopenia.    CTA abdomen shows mild hepatic contour nodularity.    There is borderline splenomegaly.   There is small volume ascites.       Allergies:  Patient has no known allergies.    Scheduled Meds:  clindamycin, 900 mg, Intravenous, Q8H  DAPTOmycin, 6 mg/kg, Intravenous, Q24H  insulin glargine, 10 Units, Subcutaneous, Nightly  insulin regular, 2-7 Units, Subcutaneous, 4x Daily AC & at Bedtime  lactobacillus acidophilus, 1 capsule, Oral, Daily  pantoprazole, 40 mg, Intravenous, BID AC  piperacillin-tazobactam, 4.5 g, Intravenous, Q8H  senna-docusate sodium, 2 tablet, Oral, BID  sodium chloride, 10 mL, Intravenous, Q12H         Infusions:  lactated ringers, 100 mL/hr, Last Rate: 100 mL/hr (06/14/24 0539)  lactated ringers, 9 mL/hr, Last Rate: Stopped (06/13/24 1022)  ropivacaine,         PRN Meds:    senna-docusate sodium **AND** polyethylene glycol **AND** bisacodyl **AND** bisacodyl    Calcium Replacement - Follow Nurse / BPA Driven Protocol    dextrose    dextrose    glucagon (human recombinant)    HYDROcodone-acetaminophen    HYDROmorphone    Magnesium Standard Dose Replacement - Follow Nurse / BPA  "Driven Protocol    Morphine    nitroglycerin    oxyCODONE    Phosphorus Replacement - Follow Nurse / BPA Driven Protocol    Potassium Replacement - Follow Nurse / BPA Driven Protocol    sodium chloride    sodium chloride    Home Meds:  No medications prior to admission.       ROS: Review of Systems   Constitutional:  Positive for fatigue.   Respiratory: Negative.     Cardiovascular: Negative.    Gastrointestinal:  Negative for abdominal pain, nausea and vomiting.   Endocrine: Negative.    Genitourinary: Negative.    Musculoskeletal: Negative.         Left extremity pain    Allergic/Immunologic: Negative.    Hematological: Negative.    Psychiatric/Behavioral: Negative.         PAST MED HX:  Past Medical History:   Diagnosis Date    Diabetes mellitus     Diabetic foot infection 2018    left (partial amputation)       PAST SURG HX:  Past Surgical History:   Procedure Laterality Date    AMPUTATION FOOT / TOE Right     partial foot    BELOW KNEE AMPUTATION Left 2024    Procedure: AMPUTATION BELOW KNEE AND WOUND VAC ASSISTED CLOSURE LEFT;  Surgeon: Brijesh Em Jr., MD;  Location:  [facility];  Service: Orthopedics;  Laterality: Left;    PLACEMENT OF WOUND VAC Left 2024    Procedure: PLACEMENT OF WOUND VAC LEFT;  Surgeon: Brijesh Em Jr., MD;  Location:  [facility];  Service: Orthopedics;  Laterality: Left;    TOTAL HIP ARTHROPLASTY Left 2024    From fall \"whole hip was shattered\"       FAM HX:  History reviewed. No pertinent family history.    SOC HX:  Social History     Socioeconomic History    Marital status: Single   Tobacco Use    Smoking status: Former     Current packs/day: 0.00     Types: Cigarettes     Quit date: 1994     Years since quittin.0     Passive exposure: Past    Smokeless tobacco: Never   Vaping Use    Vaping status: Never Used   Substance and Sexual Activity    Alcohol use: Not Currently    Drug use: Yes     Types: Marijuana    Sexual activity: Defer     PHYSICAL EXAM  BP " "149/85 (BP Location: Left arm, Patient Position: Lying)   Pulse 81   Temp 98.1 °F (36.7 °C) (Oral)   Resp 20   Ht 172.7 cm (68\")   Wt 68 kg (150 lb)   SpO2 98%   BMI 22.81 kg/m²   Wt Readings from Last 3 Encounters:   06/12/24 68 kg (150 lb)   06/12/24 68 kg (150 lb)   ,body mass index is 22.81 kg/m².  Physical Exam  Constitutional:       Appearance: He is ill-appearing.   HENT:      Head: Normocephalic and atraumatic.      Mouth/Throat:      Mouth: Mucous membranes are moist.   Eyes:      General: No scleral icterus.  Cardiovascular:      Rate and Rhythm: Normal rate and regular rhythm.   Pulmonary:      Effort: Pulmonary effort is normal. No respiratory distress.   Abdominal:      General: Bowel sounds are normal. There is no distension.      Palpations: Abdomen is soft.      Tenderness: There is no abdominal tenderness. There is no guarding.   Musculoskeletal:      Comments: Left below the knee amputation, wound vacuum assisted closure device present    Skin:     General: Skin is warm and dry.   Neurological:      Mental Status: He is alert and oriented to person, place, and time.   Psychiatric:         Behavior: Behavior normal.       Results Review:   I reviewed the patient's new clinical results.    Lab Results   Component Value Date    WBC 18.48 (H) 06/13/2024    HGB 10.8 (L) 06/14/2024    HGB 11.1 (L) 06/14/2024    HGB 10.5 (L) 06/13/2024    HCT 31.4 (L) 06/14/2024    MCV 92.4 06/13/2024     (L) 06/13/2024     No results found for: \"INR\"    Lab Results   Component Value Date    GLUCOSE 312 (H) 06/14/2024    BUN 60 (H) 06/14/2024    CREATININE 0.86 06/14/2024    BCR 69.8 (H) 06/14/2024     (L) 06/14/2024    K 4.5 06/14/2024    CO2 24.0 06/14/2024    CALCIUM 7.6 (L) 06/14/2024    ALBUMIN 2.0 (L) 06/12/2024    ALKPHOS 145 (H) 06/12/2024    BILITOT 3.6 (H) 06/12/2024    ALT 37 06/12/2024    AST 46 (H) 06/12/2024     ASSESSMENTS/PLANS    Possible gastrointestinal bleeding with melena.   "   Acute blood loss anemia, stable  Left foot wet gangrene s/p left BKA, POD # 1  Elevated LFTs, thrombocytopenia   Uncontrolled type II DM     Patient with uncontrolled type II diabetes mellitus presenting with left foot wet gangrene and underwent a left below the knee amputation yesterday, POD # 1.   He had a dark bowel movement last night with positive fecal occult.  H&H is stable from yesterday morning.   He is noted to have elevated LFTs on arrival with concerns of possible early liver cirrhosis on CT, nodular hepatic contour, borderline splenomegaly.       >> H&H is reassuring.  There are plans for repeat OR debridement tomorrow.   Recommend medical management with IV BID PPI at this time.  If ongoing melena or worsening anemia will proceed with EGD   >> Serial H&H   >> Will obtain an INR as well as viral hepatitis panel.     >> Obtain liver ultrasound with elastography when fasting, possibly Monday.      I discussed the patient's findings and my recommendations with patient    BERNARD Low  06/14/24  11:06 EDT

## 2024-06-14 NOTE — THERAPY EVALUATION
"Acute Care - Wound/Debridement Initial Evaluation  [facility]     Patient Name: Easton Alvarez  : 1965  MRN: [id number]  Today's Date: 2024                Admit Date: 2024    Visit Dx:    ICD-10-CM ICD-9-CM   1. Diabetic ulcer of left midfoot associated with type 2 diabetes mellitus, with necrosis of muscle  E11.621 250.80    L97.423 707.14     728.89       Patient Active Problem List   Diagnosis    Diabetic ulcer of left foot with necrosis of muscle    Severe malnutrition        Past Medical History:   Diagnosis Date    Diabetes mellitus     Diabetic foot infection 2018    left (partial amputation)        Past Surgical History:   Procedure Laterality Date    AMPUTATION FOOT / TOE Right     partial foot    TOTAL HIP ARTHROPLASTY Left 2024    From fall \"whole hip was shattered\"           Wound 24 0921 Left lower leg Incision (Active)   Dressing Appearance dry;intact 24 08   Base dressing in place, unable to visualize 24 08   Drainage Amount none 24 1845   Dressing Care dressing applied 24 1010   Wound Output (mL) 50 24 0800       Wound 24 1045 Left medial coccyx Pressure Injury (Active)   Pressure Injury Stage 1 24 1045   Dressing Appearance dry;intact 24 2200   Base dressing in place, unable to visualize 24 0600   Dressing Care dressing applied;hydrocolloid;low-adherent 24 1045       NPWT (Negative Pressure Wound Therapy) 24 0800 L BKA (Active)   Therapy Setting continuous therapy 24 08   Pressure Setting 125 mmHg 24 0800         WOUND DEBRIDEMENT                     PT Assessment (Last 12 Hours)       PT Evaluation and Treatment       Row Name 24 08          Physical Therapy Time and Intention    Subjective Information complains of;weakness;fatigue  -MF     Document Type evaluation;therapy note (daily note);wound care  -MF     Mode of Treatment individual therapy;physical therapy  -       Row " Name 06/14/24 0800          General Information    Patient Profile Reviewed yes  -MF     Patient Observations cooperative;alert;agree to therapy  -     Pertinent History of Current Functional Problem L BKA in OR with wound vac placement  -     Risks Reviewed patient:;increased discomfort  -     Benefits Reviewed patient:;improve skin integrity  -     Barriers to Rehab medically complex;previous functional deficit;physical barrier  -       Row Name 06/14/24 0800          Pain    Pretreatment Pain Rating 0/10 - no pain  -     Posttreatment Pain Rating 0/10 - no pain  -       Row Name 06/14/24 0800          Cognition    Affect/Mental Status (Cognition) WNL  -       Row Name 06/14/24 0800          Health Promotion    Additional Documentation NPWT (Negative Pressure Wound Therapy) (LDA)  -       Row Name 06/14/24 0800          Wound 06/13/24 0921 Left lower leg Incision    Wound - Properties Group Placement Date: 06/13/24  -ALPHONSO Placement Time: 0921 -ALPHONSO Side: Left  -ALPHONSO Orientation: lower  -ALPHONSO Location: leg  -ALPHONSO Primary Wound Type: Incision  -ALPHONSO    Dressing Appearance dry;intact  -     Base dressing in place, unable to visualize  -     Wound Output (mL) 50  -MF     Retired Wound - Properties Group Placement Date: 06/13/24  -ALPHONSO Placement Time: 0921 -ALPHONSO Side: Left  -ALPHONSO Orientation: lower  -ALPHONSO Location: leg  -ALPHONSO Primary Wound Type: Incision  -ALPHONSO    Retired Wound - Properties Group Date first assessed: 06/13/24  -ALPHONSO Time first assessed: 0921 -ALPHONSO Side: Left  -ALPHONSO Location: leg  -ALPHONSO Primary Wound Type: Incision  -ALPHONSO      Row Name             Wound 06/13/24 1045 Left medial coccyx Pressure Injury    Wound - Properties Group Placement Date: 06/13/24  -ML Placement Time: 1045  -ML Side: Left  -ML Orientation: medial  -ML Location: coccyx  -ML Primary Wound Type: Pressure inj  -ML    Retired Wound - Properties Group Placement Date: 06/13/24  -ML Placement Time: 1045  -ML Side: Left  -ML Orientation: medial   -ML Location: coccyx  -ML Primary Wound Type: Pressure inj  -ML    Retired Wound - Properties Group Date first assessed: 06/13/24  -ML Time first assessed: 1045  -ML Side: Left  -ML Location: coccyx  -ML Primary Wound Type: Pressure inj  -ML      Row Name 06/14/24 0800          NPWT (Negative Pressure Wound Therapy) 06/14/24 0800 L BKA    NPWT (Negative Pressure Wound Therapy) - Properties Group Placement Date: 06/14/24  - Placement Time: 0800  -MF Location: L BKA  -MF    Therapy Setting continuous therapy  -     Pressure Setting 125 mmHg  -MF     Retired NPWT (Negative Pressure Wound Therapy) - Properties Group Placement Date: 06/14/24  - Placement Time: 0800  -MF Location: L BKA  -MF    Retired NPWT (Negative Pressure Wound Therapy) - Properties Group Placement Date: 06/14/24  - Placement Time: 0800  - Location: L BKA  -MF      Row Name 06/14/24 0800          Coping    Observed Emotional State calm;cooperative  -     Verbalized Emotional State acceptance  -     Trust Relationship/Rapport care explained  -       Row Name 06/14/24 0800          Plan of Care Review    Plan of Care Reviewed With patient  -     Outcome Evaluation Wound vac dressing intact with no issues noted. PT will follow and manage vac as needed to help limit issues prior to return to surgery. PT Will f/u with vac change daily until pt returns to OR.  -       Row Name 06/14/24 0800          Positioning and Restraints    Pre-Treatment Position in bed  -     Post Treatment Position bed  -     In Bed supine;call light within reach  -       Row Name 06/14/24 0800          Therapy Assessment/Plan (PT)    Patient/Family Therapy Goals Statement (PT) wound healing  -     PT Diagnosis (PT) L BKA with wound vac application  -     Rehab Potential (PT) fair, will monitor progress closely  -     Criteria for Skilled Interventions Met (PT) yes;meets criteria;skilled treatment is necessary  -       Row Name 06/14/24 0800           PT Evaluation Complexity    History, PT Evaluation Complexity 3 or more personal factors and/or comorbidities  -     Examination of Body Systems (PT Eval Complexity) total of 4 or more elements  -     Clinical Presentation (PT Evaluation Complexity) evolving  -     Clinical Decision Making (PT Evaluation Complexity) moderate complexity  -     Overall Complexity (PT Evaluation Complexity) moderate complexity  -       Row Name 06/14/24 0800          Physical Therapy Goals    Wound Care Goal Selection (PT) wound care, PT goal 1;wound care, PT goal 2  -       Row Name 06/14/24 0800          Wound Care Goal 1 (PT)    Wound Care Goal 1 (PT) Pt to have no issues / complications with wound vac  -     Time Frame (Wound Care Goal 1, PT) 5 days  -       Row Name 06/14/24 0800          Wound Care Goal 2 (PT)    Wound Care Goal 2 (PT) Pt to have no further needs for wound vac and transitioned to basic dressing changes.  -     Time Frame (Wound Care Goal 2, PT) 10 days  -               User Key  (r) = Recorded By, (t) = Taken By, (c) = Cosigned By      Initials Name Provider Type    MF Cleve Flores, PT Physical Therapist    Helene Hanson, RN Registered Nurse    Daina Alfonso RN Registered Nurse                      Recommendation and Plan  Planned Therapy Interventions (PT): wound care  Plan of Care Reviewed With: patient           Outcome Evaluation: Wound vac dressing intact with no issues noted. PT will follow and manage vac as needed to help limit issues prior to return to surgery. PT Will f/u with vac change daily until pt returns to OR.  Plan of Care Reviewed With: patient            Time Calculation   PT Charges       Row Name 06/14/24 0800             Time Calculation    Start Time 0800  -      PT Goal Re-Cert Due Date 06/24/24  -         Untimed Charges    PT Eval/Re-eval Minutes 35  -      Wound Care 61487 Neg Press (DME) wound TO 50 sqcm  -      55537-Uqt Pressure  wound to 50 sqcm 10  -MF         Total Minutes    Untimed Charges Total Minutes 45  -MF       Total Minutes 45  -MF                User Key  (r) = Recorded By, (t) = Taken By, (c) = Cosigned By      Initials Name Provider Type    Cleve Fraser, PT Physical Therapist                            PT G-Codes  AM-PAC 6 Clicks Score (PT): 17       Cleve Flores, PT  6/14/2024

## 2024-06-14 NOTE — PROGRESS NOTES
"Easton Alvarez       LOS: 2 days   Patient Care Team:  Provider, No Known as PCP - General    Chief Complaint:  left foot / leg necrotizing soft tissue infection.    Subjective     Interval History:     Pain tolerable overnight    Review of Systems:      Gen- No fevers, chills  CV- No chest pain, palpitations  Resp- No cough, dyspnea  GI- No N/V/D, abd pain    Objective     Vital Signs  Vital Signs (last 24 hours)         06/13 0700  06/14 0659 06/14 0700  06/14 0839   Most Recent      Temp (°F) 97 -  98.7      98.1     98.1 (36.7) 06/14 0724    Heart Rate 75 -  84       81 06/14 0340    Resp 13 -  20      20     20 06/14 0724    BP 97/62 -  145/70      149/85     149/85 06/14 0724    SpO2 (%) 96 -  99       98 06/14 0340    Flow (L/min) 2 -  4       2 06/13 1100              Physical Exam:     Alert, oriented.  No acute distress.  Nonlabored respirations.  Regular rate and rhythm.  Abdomen nondistended.  Left lower extremity: Operative dressing intact, wound VAC with minimal serosanguineous output.     Results Review:     I reviewed the patient's new clinical results.    Medication Review:   Hospital Medications (active)         Dose Frequency Start End    bisacodyl (DULCOLAX) EC tablet 5 mg 5 mg Daily PRN 6/12/2024 --    Admin Instructions: Use if no bowel movement after 12 hours.  Swallow whole. Do not crush, split, or chew tablet.    Route: Oral    Linked Group 1: Placed in \"And\" Linked Group        bisacodyl (DULCOLAX) suppository 10 mg 10 mg Daily PRN 6/12/2024 --    Admin Instructions: Use if no bowel movement after 12 hours.  Hold for diarrhea    Route: Rectal    Linked Group 1: Placed in \"And\" Linked Group        Calcium Replacement - Follow Nurse / BPA Driven Protocol  As Needed 6/13/2024 --    Admin Instructions: Open Order & Select \"[facility] Electrolyte Replacement Protocol Algorithm\" to View Details    Route: Does not apply    clindamycin (CLEOCIN) 900 mg in dextrose 5% 50 mL IVPB (premix) 900 mg Every 8 " Hours 6/12/2024 6/17/2024    Admin Instructions: Do Not refrigerate.    Route: Intravenous    DAPTOmycin (CUBICIN) 400 mg in sodium chloride 0.9 % 50 mL IVPB 6 mg/kg × 68 kg Every 24 Hours 6/13/2024 6/23/2024    Admin Instructions: Caution: Look alike/sound alike drug alert.  Refrigerate. Do not shake.    Route: Intravenous    dextrose (D50W) (25 g/50 mL) IV injection 25 g 25 g Every 15 Minutes PRN 6/12/2024 --    Admin Instructions: Blood sugar less than 70; patient has IV access - Unresponsive, NPO or Unable To Safely Swallow    Route: Intravenous    dextrose (GLUTOSE) oral gel 15 g 15 g Every 15 Minutes PRN 6/12/2024 --    Admin Instructions: BS<70, Patient Alert, Is not NPO, Can safely swallow.    Route: Oral    glucagon (GLUCAGEN) injection 1 mg 1 mg Every 15 Minutes PRN 6/12/2024 --    Admin Instructions: Blood Glucose Less Than 70 - Patient Without IV Access - Unresponsive, NPO or Unable To Safely Swallow  Reconstitute powder for injection by adding 1 mL of -supplied sterile diluent or sterile water for injection to a vial containing 1 mg of the drug, to provide solutions containing 1 mg/mL. Shake vial gently to dissolve.    Route: Intramuscular    HYDROcodone-acetaminophen (NORCO) 5-325 MG per tablet 1 tablet 1 tablet Every 4 Hours PRN 6/12/2024 6/17/2024    Admin Instructions: Based on patient request - if ordered for moderate or severe pain, provider allows for administration of a medication prescribed for a lower pain scale.  [ISRAEL]    Do not exceed 4 grams of acetaminophen in a 24 hr period. Max dose of 2gm for AST/ALT greater than 120 units/L        If given for pain, use the following pain scale:   Mild Pain = Pain Score of 1-3, CPOT 1-2  Moderate Pain = Pain Score of 4-6, CPOT 3-4  Severe Pain = Pain Score of 7-10, CPOT 5-8    Route: Oral    HYDROmorphone (DILAUDID) injection 0.5 mg 0.5 mg Every 4 Hours PRN 6/12/2024 6/17/2024    Admin Instructions: Based on patient request - if ordered for  "moderate or severe pain, provider allows for administration of a medication prescribed for a lower pain scale.  If given for pain, use the following pain scale:  Mild Pain = Pain Score of 1-3, CPOT 1-2  Moderate Pain = Pain Score of 4-6, CPOT 3-4  Severe Pain = Pain Score of 7-10, CPOT 5-8    Route: Intravenous    insulin glargine (LANTUS, SEMGLEE) injection 10 Units 10 Units Nightly 6/13/2024 --    Admin Instructions: Do not hold basal insulin without an order. Consider requesting a dose edit, if needed.      Route: Subcutaneous    insulin regular (humuLIN R,novoLIN R) injection 2-7 Units 2-7 Units 4 Times Daily Before Meals & Nightly 6/13/2024 --    Admin Instructions: Correction Insulin - Low Dose - Total Insulin Dose Less Than 40 units/day (Lean, Elderly or Renal Patients)    Blood Glucose 150-199 mg/dL - 2 units  Blood Glucose 200-249 mg/dL - 3 units  Blood Glucose 250-299 mg/dL - 4 units  Blood Glucose 300-349 mg/dL - 5 units  Blood Glucose 350-400 mg/dL - 6 units  Blood Glucose Greater Than 400 mg/dL - 7 units & Call Provider   Caution: Look alike/sound alike drug alert    Route: Subcutaneous    lactated ringers infusion 100 mL/hr Continuous 6/12/2024 --    Route: Intravenous    lactated ringers infusion 9 mL/hr Continuous 6/13/2024 --    Admin Instructions: May switch to NS IV at KVO if renal / if indicated    Route: Intravenous    lactobacillus acidophilus (RISAQUAD) capsule 1 capsule 1 capsule Daily 6/12/2024 --    Route: Oral    Magnesium Standard Dose Replacement - Follow Nurse / BPA Driven Protocol  As Needed 6/13/2024 --    Admin Instructions: Open Order & Select \"[facility] Electrolyte Replacement Protocol Algorithm\" to View Details    Route: Does not apply    morphine injection 2 mg 2 mg Every 4 Hours PRN 6/13/2024 6/18/2024    Admin Instructions: Based on patient request - if ordered for moderate or severe pain, provider allows for administration of a medication prescribed for a lower pain scale.  If given " "for pain, use the following pain scale:  Mild Pain = Pain Score of 1-3, CPOT 1-2  Moderate Pain = Pain Score of 4-6, CPOT 3-4  Severe Pain = Pain Score of 7-10, CPOT 5-8    Route: Intravenous    nitroglycerin (NITROSTAT) SL tablet 0.4 mg 0.4 mg Every 5 Minutes PRN 6/12/2024 --    Admin Instructions: If Pain Unrelieved After 3 Doses Notify MD  May administer up to 3 doses per episode.    Route: Sublingual    oxyCODONE (ROXICODONE) immediate release tablet 5 mg 5 mg Every 4 Hours PRN 6/13/2024 6/20/2024    Admin Instructions: Based on patient request - if ordered for moderate or severe pain, provider allows for administration of a medication prescribed for a lower pain scale.    If given for pain, use the following pain scale:  Mild Pain = Pain Score of 1-3, CPOT 1-2  Moderate Pain = Pain Score of 4-6, CPOT 3-4  Severe Pain = Pain Score of 7-10, CPOT 5-8    Route: Oral    pantoprazole (PROTONIX) injection 40 mg 40 mg 2 Times Daily Before Meals 6/14/2024 --    Admin Instructions: Dilute with 10 mL of 0.9% NaCl and give IV push over 2 minutes.    Route: Intravenous    Phosphorus Replacement - Follow Nurse / BPA Driven Protocol  As Needed 6/13/2024 --    Admin Instructions: Open Order & Select \"[facility]HS Electrolyte Replacement Protocol Algorithm\" to View Details    Route: Does not apply    piperacillin-tazobactam (ZOSYN) 4.5 g IVPB in 100 mL NS MBP (CD) 4.5 g Every 8 Hours 6/13/2024 6/18/2024    Route: Intravenous    polyethylene glycol (MIRALAX) packet 17 g 17 g Daily PRN 6/12/2024 --    Admin Instructions: Use if no bowel movement after 12 hours. Mix in 6-8 ounces of water.  Use 4-8 ounces of water, tea, or juice for each 17 gram dose.    Route: Oral    Linked Group 1: Placed in \"And\" Linked Group        Potassium Replacement - Follow Nurse / BPA Driven Protocol  As Needed 6/13/2024 --    Admin Instructions: Open Order & Select \"[facility]HS Electrolyte Replacement Protocol Algorithm\" to View Details    Route: Does not apply    " "ropivacaine (NAROPIN) 0.2 % infusion (INFUSYSTEM)  Continuous 6/13/2024 --    Admin Instructions:  When Pt. In-House Infusion complete do not order refill until discussing with APS,  If pain greater than 7 out of 10, please call [phone] or [phone].  Thank you    Route: Peripheral Nerve    sennosides-docusate (PERICOLACE) 8.6-50 MG per tablet 2 tablet 2 tablet 2 Times Daily 6/12/2024 --    Admin Instructions: HOLD MEDICATION IF PATIENT HAS HAD BOWEL MOVEMENT. Start bowel management regimen if patient has not had a bowel movement after 12 hours.    Route: Oral    Linked Group 1: Placed in \"And\" Linked Group        sodium chloride 0.9 % flush 10 mL 10 mL Every 12 Hours Scheduled 6/12/2024 --    Route: Intravenous    sodium chloride 0.9 % flush 10 mL 10 mL As Needed 6/12/2024 --    Route: Intravenous    sodium chloride 0.9 % infusion 40 mL 40 mL As Needed 6/12/2024 --    Admin Instructions: Following administration of an IV intermittent medication, flush line with 40mL NS at 100mL/hr.    Route: Intravenous              Assessment & Plan     58-year-old male with left foot wet gangrene, necrotizing gas-forming soft tissue infection status post left below-knee amputation, wound vacuum-assisted closure June 13, 2024.      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  Follow cultures.  N.p.o. at midnight.  Tentative plan to return to the OR for repeat debridement, irrigation, delayed wound closure versus wound vacuum-assisted closure change.    Brijesh Em Jr, MD  06/14/24  08:39 EDT           "

## 2024-06-14 NOTE — CONSULTS
"Diabetes Education  Assessment/Teaching    Patient Name:  Easton Alvarez  YOB: 1965  MRN: [id number]  Admit Date:  6/12/2024      Assessment Date:  6/14/2024  Flowsheet Row Most Recent Value   General Information     Referral From: Other (comment)  [teach blood glucose monitoring]   Height 172.7 cm (68\")   Height Method Stated   Weight 68 kg (150 lb)   Weight Method Stated   Are you currently involved in an activity/exercise program?  No   Patient expressed need new meter   Is patient pregnant? n/a   Pregnancy Assessment    Diabetes History    What type of diabetes do you have? Type 2   Length of Diabetes Diagnosis 6 - 10 years   Current DM knowledge fair   Have you had diabetes education/teaching in the past? yes   Do you test your blood sugar at home? no   Have you had low blood sugar? (<70mg/dl) yes   How often do you have low blood sugar? rare   Have you had high blood sugar? (>140mg/dl) yes   How often do you have high blood sugar? unknown   What makes it difficult for you to take care of your diabetes or yourself? recent mobility challenges r/t hip fx have made it \"difficult to fix meals\"   Do you have any diabetes complications? amputations   Education Preferences    What areas of diabetes would you like to learn about? avoiding high blood sugar   Nutrition Information    Do you eat mostly at home or out of the house? at home   What is the biggest challenge you have with your diet? Food preperation   Do any of the following issues affect your eating habits? meal preparation   What type of support do you currently use to help you with your health issues? family, daughter and brother   Assessment Topics    Healthy Eating - Assessment Needs education   Being Active - Assessment Needs education   Taking Medication - Assessment Needs education   Problem Solving - Assessment Needs education   Reducing Risk - Assessment Needs education   Healthy Coping - Assessment Needs education   Monitoring - " "Assessment Needs education   DM Goals    Contact Plan Other (comment)  [follow up visit]            Flowsheet Row Most Recent Value   DM Education Needs    Meter Needs meter   Meter Type One Touch   Medication --  [no meds currently, has been on insulin in the past]   Problem Solving Hypoglycemia, Hyperglycemia, Signs, Symptoms, Treatment   Reducing Risks A1C testing, Lipids, Blood pressure   Healthy Eating Reviewed meal plan   Healthy Coping Appropriate   Motivation Moderate   Teaching Method Explanation, Handouts, Discussion   Patient Response Verbalized understanding, Needs reinforcement              Other Comments:  Total time spent reviewing chart, preparing education/materials, providing education at bedside, and coordinating care approx 45 minutes.    Consult for diabetes education received to teach blood glucose monitoring. Chart reviewed. Pt was seen at bedside today. Permission given for visit.     Discussed and taught Mr. Alvarez about type 2 diabetes self-management, risk factors, and importance of blood glucose control to reduce complications. Target blood glucose readings and A1c goals per ADA were reviewed. Reviewed with patient current A1c 9.5 and discussed its significance. Signs, symptoms, and treatment of hyperglycemia and hypoglycemia were discussed.     Pt states he has had diabetes a little less than a decade. Was previously on insulin, feels comfortable giving injections. Was SMBG three times a day, but has not done so for a few years. Believes he may be d/c home on insulin and is agreeable to doing so if needed. Indicates some recent challenges due to hip surgery in February and difficulty preparing meals at home, has been eating \"once maybe twice\" per day as a result. We discussed his support system- states typically does not like asking for help but realizes now he needs to. Identifies support in his daughter and brother. 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 We " "discussed challenges of managing diabetes specific to shiftwork and potential solutions.     Reviewed how elevated blood glucose levels can lead to infection, delayed healing, among other potential long-term complications. Stressed that managing blood glucose closely is imperative during this post-operative period, as well as long-term to reduce potential for additional complications. Described how diabetes damages blood vessels in body.     Provided patient with copy of [facility]'s \"Blood Glucose Goals\" as well as [place] [facility] \"Diabetes Basics\" booklet. Encouraged him to review these written materials. Provided outpatient contact info    Thank you for this consult.     Electronically signed by:  Leeanna Landa RN  06/14/24 12:36 EDT  "

## 2024-06-14 NOTE — PROGRESS NOTES
[facility]    Acute pain service Inpatient Progress Note    Patient Name: Easton Alvarez  :  1965  MRN:  [id number]        Acute Pain  Service Inpatient Progress Note:    Analgesia:Excellent  Pain Score:0/10  LOC: alert and awake  Side Effects:None  Catheter Site:clean, dry and dressing intact  Cath type: peripheral nerve cath(InfuSystem)  Infusion rate: Ext/Pop: Basal: 1ml/hr, PIB: 5ml q 2 h, PCA: 5 ml q 30 min  Catheter Plan:Catheter to remain Insitu and Continue catheter infusion rate unchanged

## 2024-06-14 NOTE — PROGRESS NOTES
"Easton Alvarez  1965  [id number]          Chief Complaint: left foot infection    Reason for Consultation: left foot infection    History of present illness:     Patient is a 58 y.o.  Yr old male with history of diabetes with prior right TMA 2019 at , history strep septicemia 2016 ; he reports right total hip arthroplasty 2024 after fall. He reports a fall several weeks prior to his June admission with injury to the left foot, wound present with deterioration several days preceding admission with severe discoloration/redness and swelling.  CT scan showed concern for subcutaneous emphysema and necrotizing infection.  Transferred to [facility] with evaluation by Dr. Em and Dr. Lundberg and arrangements made for urgent surgery. 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       No headache photophobia or neck stiffness.  No shortness of breath cough or hemoptysis.  No nausea vomiting diarrhea or abdominal pain.  No dysuria hematuria or pyuria.  No other new skin rash    Past Medical History:   Diagnosis Date    Diabetes mellitus     Diabetic foot infection 2018    left (partial amputation)       Past Surgical History:   Procedure Laterality Date    AMPUTATION FOOT / TOE Right     partial foot    TOTAL HIP ARTHROPLASTY Left 02/2024    From fall \"whole hip was shattered\"       Pediatric History   Patient Parents    Not on file     Other Topics Concern    Not on file   Social History Narrative    Not on file       family history is not on file. High blood pressure mom/dad    No Known Allergies    Medication:  Current Facility-Administered Medications   Medication Dose Route Frequency Provider Last Rate Last Admin    sennosides-docusate (PERICOLACE) 8.6-50 MG per tablet 2 tablet  2 " tablet Oral BID Brijesh Em Jr., MD        And    polyethylene glycol (MIRALAX) packet 17 g  17 g Oral Daily PRN Brijesh Em Jr., MD        And    bisacodyl (DULCOLAX) EC tablet 5 mg  5 mg Oral Daily PRN Brijesh Em Jr., MD        And    bisacodyl (DULCOLAX) suppository 10 mg  10 mg Rectal Daily PRN Brijesh Em Jr., MD        Calcium Replacement - Follow Nurse / BPA Driven Protocol   Does not apply PRN Brijesh Em Jr., MD        clindamycin (CLEOCIN) 900 mg in dextrose 5% 50 mL IVPB (premix)  900 mg Intravenous Q8H Brijesh Em Jr.,  mL/hr at 06/14/24 0539 900 mg at 06/14/24 0539    DAPTOmycin (CUBICIN) 400 mg in sodium chloride 0.9 % 50 mL IVPB  6 mg/kg Intravenous Q24H Brijesh Em Jr.,  mL/hr at 06/13/24 1759 400 mg at 06/13/24 1759    dextrose (D50W) (25 g/50 mL) IV injection 25 g  25 g Intravenous Q15 Min PRN Brijesh Em Jr., MD        dextrose (GLUTOSE) oral gel 15 g  15 g Oral Q15 Min PRN Briejsh Em Jr., MD        glucagon (GLUCAGEN) injection 1 mg  1 mg Intramuscular Q15 Min PRN Brijesh Em Jr., MD        HYDROcodone-acetaminophen (NORCO) 5-325 MG per tablet 1 tablet  1 tablet Oral Q4H PRN Brijesh Em Jr., MD        HYDROmorphone (DILAUDID) injection 0.5 mg  0.5 mg Intravenous Q4H PRN Brijesh Em Jr., MD   0.5 mg at 06/13/24 0140    insulin glargine (LANTUS, SEMGLEE) injection 10 Units  10 Units Subcutaneous Nightly Armida Malloy MD   10 Units at 06/13/24 2042    insulin regular (humuLIN R,novoLIN R) injection 2-7 Units  2-7 Units Subcutaneous 4x Daily AC & at Bedtime Armida Malloy MD   6 Units at 06/14/24 0812    lactated ringers infusion  100 mL/hr Intravenous Continuous Brijesh Em Jr.,  mL/hr at 06/14/24 0539 100 mL/hr at 06/14/24 0539    lactated ringers infusion  9 mL/hr Intravenous Continuous Brijesh Em Jr., MD   Stopped at 06/13/24 1022    lactobacillus acidophilus (RISAQUAD) capsule 1  capsule  1 capsule Oral Daily Brijesh Em Jr., MD   1 capsule at 06/14/24 0812    Magnesium Standard Dose Replacement - Follow Nurse / BPA Driven Protocol   Does not apply PRN Brijesh Em Jr., MD        morphine injection 2 mg  2 mg Intravenous Q4H PRN Brijesh Em Jr., MD        nitroglycerin (NITROSTAT) SL tablet 0.4 mg  0.4 mg Sublingual Q5 Min PRN Brijesh Em Jr., MD        oxyCODONE (ROXICODONE) immediate release tablet 5 mg  5 mg Oral Q4H PRN Armida Malloy MD   5 mg at 06/14/24 0823    pantoprazole (PROTONIX) injection 40 mg  40 mg Intravenous BID AC Jamia Jones PA-C   40 mg at 06/14/24 0812    Phosphorus Replacement - Follow Nurse / BPA Driven Protocol   Does not apply PRN Brijesh Em Jr., MD        piperacillin-tazobactam (ZOSYN) 4.5 g IVPB in 100 mL NS MBP (CD)  4.5 g Intravenous Q8H Brijesh Em Jr., MD   4.5 g at 06/14/24 0812    Potassium Replacement - Follow Nurse / BPA Driven Protocol   Does not apply PRN Brijesh Em Jr., MD        ropivacaine (NAROPIN) 0.2 % infusion (INFUSYSTEM)   Peripheral Nerve Continuous Brijesh Em Jr., MD   1,000 mg at 06/13/24 1124    sodium chloride 0.9 % flush 10 mL  10 mL Intravenous Q12H Brijesh Em Jr., MD   10 mL at 06/14/24 0812    sodium chloride 0.9 % flush 10 mL  10 mL Intravenous PRN Brijesh Em Jr., MD        sodium chloride 0.9 % infusion 40 mL  40 mL Intravenous PRN Brijesh Em Jr., MD           Antibiotics:  Anti-Infectives (From admission, onward)      Ordered     Dose/Rate Route Frequency Start Stop    06/13/24 0812  DAPTOmycin (CUBICIN) 400 mg in sodium chloride 0.9 % 50 mL IVPB        Ordering Provider: Brijesh Em Jr., MD    6 mg/kg × 68 kg  100 mL/hr over 30 Minutes Intravenous Every 24 Hours 06/13/24 1800 06/23/24 1759    06/12/24 1746  piperacillin-tazobactam (ZOSYN) 4.5 g IVPB in 100 mL NS MBP (CD)        Ordering Provider: Brijesh Em Jr., MD    4.5 g  over 4  "Hours Intravenous Every 8 Hours 06/13/24 0100 06/18/24 0059    06/12/24 1746  clindamycin (CLEOCIN) 900 mg in dextrose 5% 50 mL IVPB (premix)        Ordering Provider: Brijesh Em Jr., MD    900 mg  100 mL/hr over 30 Minutes Intravenous Every 8 Hours 06/12/24 2200 06/17/24 2159    06/12/24 1746  piperacillin-tazobactam (ZOSYN) 4.5 g IVPB in 100 mL NS MBP (CD)        Ordering Provider: Shraddha Scott MD    4.5 g  over 30 Minutes Intravenous Once 06/12/24 1900 06/13/24 0907              Review of Systems    6/14/24     Constitutional-- No Fever, chills or sweats.  Appetite diminished with fatigue.  Heent-- No new vision, hearing or throat complaints.  No epistaxis or oral sores.  Denies odynophagia or dysphagia.  No flashers, floaters or eye pain. No odynophagia or dysphagia. No headache, photophobia or neck stiffness.  CV-- No chest pain, palpitation or syncope  Resp-- No SOB/cough/Hemoptysis  GI- No nausea, vomiting, or diarrhea.  No hematochezia, melena, or hematemesis. Denies jaundice or chronic liver disease.  -- No dysuria, hematuria, or flank pain.  Denies hesitancy, urgency or flank pain.  Lymph- no swollen lymph nodes in neck/axilla or groin.   Heme- No active bruising or bleeding; no Hx of DVT or PE.  MS-- aside from above, no swelling or pain in the bones or joints of arms/legs.  No new back pain.  Neuro-- No acute focal weakness or numbness in the arms or legs.  No seizures.    Full 12 point review of systems reviewed and negative otherwise for acute complaints, except for above    Physical Exam:   Vital Signs   /85 (BP Location: Left arm, Patient Position: Lying)   Pulse 81   Temp 98.1 °F (36.7 °C) (Oral)   Resp 20   Ht 172.7 cm (68\")   Wt 68 kg (150 lb)   SpO2 98%   BMI 22.81 kg/m²     GENERAL: Awake and alert, in no acute distress.   HEENT: Normocephalic, atraumatic.   No conjunctival injection. No icterus. Oropharynx clear without evidence of thrush or exudate. No evidence of " periodontal disease.    NECK: Supple without nuchal rigidity. No mass.  LYMPH: No cervical, axillary or inguinal lymphadenopathy.  HEART: RRR; No murmur, rubs, gallops.   LUNGS: Clear to auscultation bilaterally without wheezing, rales, rhonchi. Normal respiratory effort. Nonlabored. No dullness.  ABDOMEN: Soft, nontender, nondistended. Positive bowel sounds. No rebound or guarding. NO mass or HSM.  EXT:  see below  : Genitalia generally unremarkable.     MSK: FROM without joint effusions noted arms/legs.    SKIN: Warm and dry without cutaneous eruptions on Inspection/palpation.      Left   BKA stump covered;  no new visible redness or purulence; no discrete fluctuance.  No crepitus    Laboratory Data    Results from last 7 days   Lab Units 06/14/24  0457 06/13/24  0403 06/12/24  1839 06/12/24  0802   WBC 10*3/mm3  --  18.48* 18.84* 26.78*   HEMOGLOBIN g/dL 11.1* 10.5* 11.0* 12.3*   HEMATOCRIT % 31.8* 30.5* 31.5* 35.5*   PLATELETS 10*3/mm3  --  122* 142 191     Results from last 7 days   Lab Units 06/14/24  0356   SODIUM mmol/L 129*   POTASSIUM mmol/L 4.5   CHLORIDE mmol/L 94*   CO2 mmol/L 24.0   BUN mg/dL 60*   CREATININE mg/dL 0.86   GLUCOSE mg/dL 312*   CALCIUM mg/dL 7.6*     Results from last 7 days   Lab Units 06/12/24  1839   ALK PHOS U/L 145*   BILIRUBIN mg/dL 3.6*   ALT (SGPT) U/L 37   AST (SGOT) U/L 46*         Results from last 7 days   Lab Units 06/12/24  0802   CRP mg/dL 24.89*       Estimated Creatinine Clearance: 87.9 mL/min (by C-G formula based on SCr of 0.86 mg/dL).      Microbiology:      Radiology:  Imaging Results (Last 72 Hours)       Procedure Component Value Units Date/Time    MRI Foot Left Without Contrast [[id number]] Collected: 06/13/24 0758     Updated: 06/13/24 0823    Narrative:        MRI FOOT LEFT WO CONTRAST    Date of Exam: 6/13/2024 1:00 AM EDT    Indication: gangrene. Soft tissue edema. Soft tissue gas.     Comparison: Foot x-ray 6/12/2024    Technique:  Routine  multiplanar/multisequence sequence images of the left foot were obtained without contrast administration.      Findings:  The bone marrow signal intensity is normal without findings of osteomyelitis at this time. There is midfoot degenerative change. There is hindfoot degenerative change. There is heterogeneous signal intensity in the distal tibia and fibula of uncertain   etiology is not characteristic osteomyelitis. Could be seen with osteonecrosis. There is diffuse soft tissue edema. There is skin thickening. There is soft tissue abscess at the dorsal aspect of the forefoot medially. This measures 3.2 x 1.5 x 3.5 cm.   There is soft tissue gas within this collection. There appears to be a defect in the skin surface. There is multiloculated fluid and gas seen along the lateral aspect of the foot dorsally. One collection along the fifth metatarsal measures approximately   2.5 x 1.3 x 4.2 cm. An area along the lateral aspect of the hindfoot/ankle the level lateral malleolus measures 4.3 x 6 x 1.7 cm. There is fluid distending the extensor tendons likely due to infectious tenosynovitis. There are small areas of fluid signal   intensity within the plantar musculature of the foot likely due to myositis. There is fluid within the flexor tendon sheaths and peroneal tendon sheaths that is suspicious for infectious tenosynovitis. There is a moderate tibiotalar joint effusion. Soft   tissue gas is seen extending proximally into the lower leg as there is fluid collection that is seen tracking posteriorly into the lower leg not completely included in the field-of-view. This is seen on the large field-of-view sagittal STIR image and   measures at least 13 cm cranial caudal, reference image #8. There is thickening of plantar fascia.      Impression:      Impression:  No findings of osteomyelitis at this time.  Extensive soft tissue infection with abscesses along the foot to the ankle and into the lower leg. Soft tissue gas.  Gas-forming organism is possible. There are also intramuscular smaller abscesses in the foot.  Arthritis.  Infectious tenosynovitis.        Electronically Signed: Riri Costa MD    6/13/2024 8:19 AM EDT    Workstation ID: [id number]              Impression:     --acute sepsis as per admission notes, severe left foot infection with gangrene/cellulitis and concern for necrotizing soft tissue infection by imaging, urgent surgical arrangements made June 13.  Empiric broad-spectrum antimicrobials ongoing.  He knows risk for persistent/recurrent or nonhealing wounds, persistet / progressive or recurrent infection and risk for further amputation/functional-limb loss and chronic pain.  Associated with bacteremia as below.    --Acute/severe left foot infection as above, urgent surgical arrangements  made 6/13    --Acute bacteremia, gram-positive organisms with preliminary identification with group G strep;  Likely source from foot.  High risk for further serious morbidity and other serious sequela including dire consequences and metastatic foci of involvement/endovascular sequela etc. No new metastatic focus of involvement.    --Right hip fracture with repair February 2024.  Denies any new pain at the hip; CT scan had raise concern for gas/fluid collection at the anterior aspect of the hip arthroplasty.  No redness or other suppurative change at surface.  Need further clarification from orthopedics as to significance of these radiographic findings and any further diagnostic/interventional workup at their discretion    --diabetes.  Described as uncontrolled by medicine team at admission.  Glucose control per medicine team      PLAN:      --IV daptomycin/Zosyn, clindamycin; anticipate taper antibiotics depending on culture data/postop clinical course and surgical findings    **wound culture mixed with staph aureus, Klebsiella and strep    --Check/review labs cultures and scans    --Partial history per nursing  staff    --Discussed with microbiology    --Highly complex at of issues with high risk for further serious morbidity and other serious sequela    --If no MDR pathogens, anticipate taper IV antibiotics in the next 24-48 hours    Copied text in this note has been reviewed and is accurate as of 06/14/24.        Cleve Bills MD  6/14/2024

## 2024-06-14 NOTE — PROGRESS NOTES
[facility] Medicine Services  PROGRESS NOTE    Patient Name: Easton Alvarez  : 1965  MRN: [id number]    Date of Admission: 2024  Primary Care Physician: Provider, No Known    Subjective   Subjective     CC:  Left foot gangrene    HPI:  Patient is status post surgery.  Denies nausea vomiting fever chills.  Reports having pain in left leg however pain medication is helping.      Objective   Objective     Vital Signs:   Temp:  [97 °F (36.1 °C)-98.7 °F (37.1 °C)] 98.1 °F (36.7 °C)  Heart Rate:  [75-84] 81  Resp:  [13-20] 20  BP: ()/(57-85) 149/85  Flow (L/min):  [2-4] 2     Physical Exam:  Constitutional: No acute distress, awake, alert  HENT: NCAT, mucous membranes moist  Respiratory: Clear to auscultation bilaterally, respiratory effort normal   Cardiovascular: RRR, no murmurs, rubs, or gallops  Gastrointestinal: Positive bowel sounds, soft, nontender, nondistended  Musculoskeletal: Left below the knee amputation.  Stump covered in Ace wrap.  Patient is missing digits on the right foot.  Psychiatric: Appropriate affect, cooperative  Neurologic: Oriented x 3, speech clear  Skin: No rashes    Results Reviewed:  LAB RESULTS:      Lab 24  0457 24  0403 24  2225 24  1839 24  1451 24  1149 24  0807 24  0802   WBC  --  18.48*  --  18.84*  --   --   --  26.78*   HEMOGLOBIN 11.1* 10.5*  --  11.0*  --   --   --  12.3*   HEMATOCRIT 31.8* 30.5*  --  31.5*  --   --   --  35.5*   PLATELETS  --  122*  --  142  --   --   --  191   NEUTROS ABS  --   --   --  16.57*  --   --   --  24.21*  23.83*   IMMATURE GRANS (ABS)  --   --   --  0.34*  --   --   --  0.42*   LYMPHS ABS  --   --   --  0.83  --   --   --  0.67*   MONOS ABS  --   --   --  1.01*  --   --   --  1.47*   EOS ABS  --   --   --  0.01  --   --   --  0.00   MCV  --  92.4  --  91.6  --   --   --  93.2   CRP  --   --   --   --   --   --   --  24.89*   LACTATE  --   --  3.6* 4.1* 3.9*  6.3* 8.8*  --          Lab 06/14/24  0356 06/13/24 2032 06/13/24  1341 06/13/24  0403 06/12/24 1839 06/12/24  0802   SODIUM 129*  --  129* 131* 130* 127*   POTASSIUM 4.5 4.3 3.8 3.3* 3.7 3.6   CHLORIDE 94*  --  97* 96* 92* 85*   CO2 24.0  --  23.0 26.0 27.0 21.9*   ANION GAP 11.0  --  9.0 9.0 11.0 20.1*   BUN 60*  --  56* 64* 63* 67*   CREATININE 0.86  --  0.67* 0.91 1.00 1.22   EGFR 99.1  --  106.9 96.5 86.2 67.9   GLUCOSE 312*  --  260* 268* 348* 509*   CALCIUM 7.6*  --  7.5* 7.3* 7.7* 8.9   MAGNESIUM 2.6*  --  2.4  --   --   --    PHOSPHORUS 3.4  --  3.5  --   --   --    HEMOGLOBIN A1C  --   --   --  9.50*  --   --          Lab 06/12/24 1839 06/12/24  0802   TOTAL PROTEIN 5.0* 5.6*   ALBUMIN 2.0* 2.7*   GLOBULIN 3.0 2.9   ALT (SGPT) 37 45*   AST (SGOT) 46* 65*   BILIRUBIN 3.6* 4.7*   ALK PHOS 145* 171*                     Brief Urine Lab Results  (Last result in the past 365 days)        Color   Clarity   Blood   Leuk Est   Nitrite   Protein   CREAT   Urine HCG        06/12/24 0951 Yellow   Clear   Negative   Negative   Negative   Negative                   Cultures:  Blood Culture   Date Value Ref Range Status   06/12/2024 Abnormal Stain (C)  Preliminary       Microbiology Results Abnormal       Procedure Component Value - Date/Time    Blood Culture - Blood, Arm, Left [[id number]]  (Normal) Collected: 06/12/24 1832    Lab Status: Preliminary result Specimen: Blood from Arm, Left Updated: 06/13/24 1900     Blood Culture No growth at 24 hours    Blood Culture - Blood, Hand, Right [[id number]]  (Normal) Collected: 06/12/24 1837    Lab Status: Preliminary result Specimen: Blood from Hand, Right Updated: 06/13/24 1900     Blood Culture No growth at 24 hours            Peripheral Block    Result Date: 6/13/2024  Walter Ramirez, HIRAL     6/13/2024  8:42 AM Peripheral Block Patient reassessed immediately prior to procedure Patient location during procedure: pre-op Start time: 6/13/2024 8:28 AM Stop time: 6/13/2024  8:38 AM Reason for block: at surgeon's request and post-op pain management Performed by CRNA/CAA: Walter Ramirez, CRNA Assisted by: Sheba Leon RN Preanesthetic Checklist Completed: patient identified, IV checked, site marked, risks and benefits discussed, surgical consent, monitors and equipment checked, pre-op evaluation and timeout performed Prep: Pt Position: right lateral decubitus Sterile barriers:cap, gloves, mask and washed/disinfected hands Prep: ChloraPrep Patient monitoring: blood pressure monitoring, continuous pulse oximetry and EKG Procedure Sedation: yes Performed under: local infiltration Guidance:ultrasound guided ULTRASOUND INTERPRETATION.  Using ultrasound guidance a 20 G gauge needle was placed in close proximity to the nerve, at which point, under ultrasound guidance anesthetic was injected in the area of the nerve and spread of the anesthesia was seen on ultrasound in close proximity thereto.  There were no abnormalities seen on ultrasound; a digital image was taken; and the patient tolerated the procedure with no complications. Images:still images obtained, printed/placed on chart Laterality:left Block Type:popliteal Injection Technique:catheter Needle Type:echogenic and Tuohy Needle Gauge:18 G Resistance on Injection: none Catheter Size:20 G Cath Depth at skin: 9 cm Medications Used: bupivacaine PF (MARCAINE) 0.25 % injection - Injection  30 mL - 6/13/2024 8:38:00 AM Medications Preservative Free Saline:5ml Post Assessment Injection Assessment: negative aspiration for heme, no paresthesia on injection and incremental injection Patient Tolerance:comfortable throughout block Complications:no Additional Notes CATHETER                             A high-frequency linear transducer, with sterile cover, was placed in the popliteal fossa to identify the popliteal artery and vein, Tibial nerve (TN) and Common Peroneal nerve (CP). The transducer was then moved in a cephalad fashion to observe  "the TN and CP nerve bifurcation to form the Sciatic Nerve. The insertion site was prepped and draped in sterile fashion. Skin and cutaneous tissue was infiltrated with 2-5 ml of 1% Lidocaine. Using ultrasound-guidance, an 18-gauge Contiplex Ultra 360 Touhy needle was advanced in plane from lateral to medial. Preservative-free normal saline was utilized for hydro-dissection of tissue, advancement of Touhy needle, and to confirm final needle placement posterior to the nerves. Local anesthetic injection spread, in incremental 3-5 ml injections, to surround both nerve structures. Aspiration every 5 ml to prevent intravascular injection. Injection was completed with negative aspiration of blood and negative intravascular injection. Injection pressures were normal with minimal resistance. A 20-gauge Contiplex Echo catheter was placed through the needle and advance out the tip of the Touhy 1-3 cm. The Touhy needle was then removed, and final catheter position verified (Below/above or Anterior/Posterior) the nerve structures. The catheter was secured in the usual fashion with skin glue, benzoin, steri-strips, CHG tegaderm and Label noting \"Nerve Block Catheter\". Jerk tape applied at yellow connector and catheter connection. Performed by: Walter Ramirez CRNA     Peripheral Block    Result Date: 6/13/2024  Walter Ramirez CRNA     6/13/2024  8:42 AM Peripheral Block Patient reassessed immediately prior to procedure Start time: 6/13/2024 8:23 AM Stop time: 6/13/2024 8:27 AM Reason for block: at surgeon's request and post-op pain management Performed by CRNA/CAA: Walter Ramirez CRNA Assisted by: Sheba Leon RN Preanesthetic Checklist Completed: patient identified, IV checked, site marked, risks and benefits discussed, surgical consent, monitors and equipment checked, pre-op evaluation and timeout performed Prep: Pt Position: supine Sterile barriers:cap, gloves, mask, sterile barriers and washed/disinfected hands " "Prep: ChloraPrep Patient monitoring: blood pressure monitoring, continuous pulse oximetry and EKG Procedure Performed under: spinal Guidance:ultrasound guided ULTRASOUND INTERPRETATION.  Using ultrasound guidance a 20 G gauge needle was placed in close proximity to the nerve, at which point, under ultrasound guidance anesthetic was injected in the area of the nerve and spread of the anesthesia was seen on ultrasound in close proximity thereto.  There were no abnormalities seen on ultrasound; a digital image was taken; and the patient tolerated the procedure with no complications. Images:still images obtained, printed/placed on chart Laterality:left Block Type:adductor canal block Injection Technique:single-shot Needle Type:echogenic and short-bevel Needle Gauge:20 G Resistance on Injection: none Catheter size: 20g. Medications Used: fentaNYL citrate (PF) (SUBLIMAZE) injection - Intravenous  100 mcg - 6/13/2024 8:23:00 AM bupivacaine PF (MARCAINE) 0.25 % injection - Injection  30 mL - 6/13/2024 8:27:00 AM dexamethasone sodium phosphate injection - Injection  2 mg - 6/13/2024 8:27:00 AM Medications Preservative Free Saline:5ml Post Assessment Injection Assessment: negative aspiration for heme, incremental injection and no paresthesia on injection Patient Tolerance:comfortable throughout block Complications:no Additional Notes SINGLE shot A high-frequency linear transducer, with sterile cover, was placed on the anterior mid-thigh (between the anterior superior iliac spine and patella). The transducer was then moved medially to identify the Sartorius muscle (Yaakov), Vastus Medialis muscle (VMM), Superficial Femoral Artery (SFA) and Vein. The transducer was then moved cephalad or caudad to position the SFA in the middle of the Yaakov. The insertion site was prepped and draped in sterile fashion. Skin and cutaneous tissue was infiltrated with 2-5 ml of 1% Lidocaine. Using ultrasound-guidance, a 20-gauge B-Mata 4\" Ultraplex " 360 non-stimulating echogenic needle was advanced in plane from lateral to medial. Preservative-free normal saline was utilized for hydro-dissection of tissue, advancement of needle, and to confirm needle placement below the fascial plane of the Yaakov where the Nerve to the VMM is located. Local anesthetic (LA) 5 ml deposited here. The needle continues its path lateral to the SFA at the level of the Saphenous Nerve. The remainder of the LA was deposited at the 10-11 o'clock position of the SFA. This injection created a space between the Yaakov and the SFA. Aspiration every 5 ml to prevent intravascular injection. Injection was completed with negative aspiration of blood and negative intravascular injection. Injection pressures were normal with minimal resistance. Performed by: Walter Ramirez CRNA     MRI Foot Left Without Contrast    Result Date: 6/13/2024  MRI FOOT LEFT WO CONTRAST Date of Exam: 6/13/2024 1:00 AM EDT Indication: gangrene. Soft tissue edema. Soft tissue gas.  Comparison: Foot x-ray 6/12/2024 Technique:  Routine multiplanar/multisequence sequence images of the left foot were obtained without contrast administration. Findings: The bone marrow signal intensity is normal without findings of osteomyelitis at this time. There is midfoot degenerative change. There is hindfoot degenerative change. There is heterogeneous signal intensity in the distal tibia and fibula of uncertain etiology is not characteristic osteomyelitis. Could be seen with osteonecrosis. There is diffuse soft tissue edema. There is skin thickening. There is soft tissue abscess at the dorsal aspect of the forefoot medially. This measures 3.2 x 1.5 x 3.5 cm. There is soft tissue gas within this collection. There appears to be a defect in the skin surface. There is multiloculated fluid and gas seen along the lateral aspect of the foot dorsally. One collection along the fifth metatarsal measures approximately 2.5 x 1.3 x 4.2 cm. An area along  the lateral aspect of the hindfoot/ankle the level lateral malleolus measures 4.3 x 6 x 1.7 cm. There is fluid distending the extensor tendons likely due to infectious tenosynovitis. There are small areas of fluid signal  intensity within the plantar musculature of the foot likely due to myositis. There is fluid within the flexor tendon sheaths and peroneal tendon sheaths that is suspicious for infectious tenosynovitis. There is a moderate tibiotalar joint effusion. Soft  tissue gas is seen extending proximally into the lower leg as there is fluid collection that is seen tracking posteriorly into the lower leg not completely included in the field-of-view. This is seen on the large field-of-view sagittal STIR image and measures at least 13 cm cranial caudal, reference image #8. There is thickening of plantar fascia.     Impression: Impression: No findings of osteomyelitis at this time. Extensive soft tissue infection with abscesses along the foot to the ankle and into the lower leg. Soft tissue gas. Gas-forming organism is possible. There are also intramuscular smaller abscesses in the foot. Arthritis. Infectious tenosynovitis. Electronically Signed: Riri Costa MD  6/13/2024 8:19 AM EDT  Workstation ID: [id number]    CT Angio Abdominal Aorta Bilateral Iliofem Runoff    Result Date: 6/12/2024  CT ANGIO ABDOMINAL AORTA BILAT ILIOFEM RUNOFF Date of Exam: 6/12/2024 9:30 AM EDT Indication: Gangrenous left foot. Comparison: 6/12/2024 radiographs Technique: CTA of the abdomen, pelvis and both lower extremities was performed before and after the uneventful intravenous administration of iodinated contrast. Reconstructed coronal and sagittal images were also obtained. In addition, a 3-D volume rendered image was created for interpretation. Automated exposure control and iterative reconstruction methods were used. Findings: Abdomen/pelvis: There are scattered atherosclerotic calcifications of the aorta and branch vessels. No  evidence of significant nonatheromatous plaque. The celiac, SMA, and BUBBA are patent. The bilateral renal arteries are patent. No significant stenosis appreciated. The bilateral common iliac as well as the external and internal iliac arteries are patent without evidence of significant stenosis. There is no suspicious renal lesion. There is mild hepatic contour nodularity Cholelithiasis. No evidence of cholecystitis. No significant biliary ductal dilation. The spleen is borderline enlarged. The pancreas and bilateral adrenal glands are within normal limits. There is no hydronephrosis or nephrolithiasis. No suspicious renal lesion. No evidence of bowel obstruction. Moderate colonic stool burden. There are prominent left inguinal lymph lymph nodes. For reference, there is a left inguinal lymph node measuring 1.7 cm in short axis on axial image 236. There is small volume abdominal pelvic ascites. Grossly unremarkable appearance of of the urinary bladder, partially obscured. There are surgical changes of right hip arthroplasty. There is a soft tissue gas fluid collection surrounding the anterior aspect of the hip arthroplasty as seen on axial image 217. This is poorly defined on this exam. No evidence of fracture or suspicious osseous lesion. Bilateral lower extremity runoff: There are scattered atherosclerotic calcifications of the bilateral common femoral arteries. The bilateral deep femoral arteries are patent. The bilateral superficial femoral arteries are patent. There is focal narrowing of the left distal superficial femoral artery secondary to near circumferential atherosclerotic calcification. Stenosis measures approximately 50% as seen on axial image 373. There is calcified and noncalcified atheromatous plaque regarding the right superficial femoral artery at this  level without significant stenosis. There our bilateral popliteal artery calcifications. There is approximately 50% stenosis on the right at the level  of the knee joint as seen on axial image 408. No evidence of occlusion. Patent bilateral three-vessel runoff. There is asymmetric early venous enhancement within the left lower extremity, likely secondary to inflammatory changes. There is diffuse subcutaneous edema and extensive emphysema about the left foot with extension along the left lateral lower leg. No definite evidence of well-defined or drainable fluid collection. No evidence of acute fracture. Severe left first MTP joint arthritis. Sequela of right forefoot amputation at the metatarsal shafts. No suspicious osseous lesion. Calcaneal enthesopathy.     Impression: Impression: Scattered predominantly calcified atheromatous plaque extending from the abdominal aorta distally to the bilateral popliteal arteries. No evidence of occlusion. Approximately 50% stenosis of the distal left superficial femoral artery, as well as approximately 50% stenosis of the right popliteal artery at the level of the knee joint. Otherwise no evidence of significant arterial stenosis elsewhere. Patent bilateral three-vessel runoff. Inflammatory changes about the left foot including extensive subcutaneous edema and emphysema likely representing necrotizing infection. There is asymmetric early venous enhancement within the left lower extremity, presumably secondary to inflammatory response. No evidence of well-defined or drainable fluid collection within the left lower extremity to suggest abscess. Surgical changes of right hip arthroplasty. Soft tissue gas fluid collection surrounding the anterior aspect of the hip arthroplasty, which is poorly defined on this exam. If this has not been a recent surgical procedure, findings are concerning for periprosthetic infection/abscess. Additional ancillary findings as above. Electronically Signed: Wilfrido Cagle MD  6/12/2024 12:52 PM EDT  Workstation ID: [id number]    XR Foot 3+ View Left    Result Date: 6/12/2024  XR FOOT 3+ VW LEFT Date of Exam:  6/12/2024 8:35 AM EDT Indication: Evaluate for osteomyelitis Comparison: None available. Findings: Neuropathic appearance of the forefoot with deformities of the third through fifth proximal phalanges and great toe distal phalanx. There is suggestion of dorsal subluxation/dislocations at the third through fifth MTP joints. Osteopenia. Moderate first MTP joint arthritis. Calcaneal enthesopathy. Normal midfoot and hindfoot joint alignment. There is diffuse soft tissue edema about the foot with subcutaneous emphysema along the medial and dorsal forefoot. No definite evidence of cortical irregularity to  suggest osteomyelitis.     Impression: Impression: Diffuse soft tissue edema about the foot with subcutaneous emphysema along the medial and dorsal forefoot, concerning for cellulitis. No radiographic evidence of osteomyelitis. Consider MRI if there is continued concern. Chronic appearing deformities of the forefoot as detailed above, likely sequela of neuropathic arthropathy. Electronically Signed: Wilfrido Cagle MD  6/12/2024 8:58 AM EDT  Workstation ID: [id number]         Current medications:  Scheduled Meds:clindamycin, 900 mg, Intravenous, Q8H  DAPTOmycin, 6 mg/kg, Intravenous, Q24H  insulin glargine, 10 Units, Subcutaneous, Nightly  insulin regular, 2-7 Units, Subcutaneous, 4x Daily AC & at Bedtime  lactobacillus acidophilus, 1 capsule, Oral, Daily  pantoprazole, 40 mg, Intravenous, BID AC  piperacillin-tazobactam, 4.5 g, Intravenous, Q8H  senna-docusate sodium, 2 tablet, Oral, BID  sodium chloride, 10 mL, Intravenous, Q12H      Continuous Infusions:lactated ringers, 100 mL/hr, Last Rate: 100 mL/hr (06/14/24 0539)  lactated ringers, 9 mL/hr, Last Rate: Stopped (06/13/24 1022)  ropivacaine,       PRN Meds:.  senna-docusate sodium **AND** polyethylene glycol **AND** bisacodyl **AND** bisacodyl    Calcium Replacement - Follow Nurse / BPA Driven Protocol    dextrose    dextrose    glucagon (human recombinant)     HYDROcodone-acetaminophen    HYDROmorphone    Magnesium Standard Dose Replacement - Follow Nurse / BPA Driven Protocol    Morphine    nitroglycerin    oxyCODONE    Phosphorus Replacement - Follow Nurse / BPA Driven Protocol    Potassium Replacement - Follow Nurse / BPA Driven Protocol    sodium chloride    sodium chloride    Assessment & Plan   Assessment & Plan     Active Hospital Problems    Diagnosis  POA    **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        Brief Hospital Course to date:  58 yr old transferred from OSH ED with uncntrolled DM and severe left foot infection     Sepsis (WBC, lactate, tachypnea, source)    Left foot cellulitis/gangrene   - lactate 8.8 in OSH ED; CT scan of leg shows gas gangrene  - MRI left foot  -Status post left below the knee amputation 06/13  - abx:  vanc Zosyn clindamycin  - blood cultures sent  - ID consult appreciated      Gap acidosis POA  - likely from lactate.  Serum acetone negative  - resolved     Concern for GIB  -fecal occult positive after nursing notified nocturnal for dark stool   -GI consult ordered     R hip fx repaired 2/2024  - note gas near hardware per CT scan :  r/o hardware-associated infection vs nl postop finding     DM, uncontrolled  - Pt reports is unaware of his diabetes medications and is not on any medications currently   -last records from 2019 at , He was discharged on Lantus 12 U with 5 U with meals   -HgA1c: 9.5  -Will increase Lantus to 15 units and start with mealtime lispro 5 units     LENNY POA  -resolved with IVF      HTN history;  not currently on meds.  Monitor.       Expected Discharge Location and Transportation: rehab   Expected Discharge 06/17/2024  Expected discharge date/ time has not been documented.     VTE Prophylaxis:  No VTE prophylaxis order currently exists.  Pharmacological DVT held due to  procedure for tomorrow.    AM-PAC 6 Clicks Score (PT): 17 (06/12/24 2005)    CODE STATUS:   Code Status and Medical Interventions:   Ordered at: 06/12/24 5554     Level Of Support Discussed With:    Patient     Code Status (Patient has no pulse and is not breathing):    CPR (Attempt to Resuscitate)     Medical Interventions (Patient has pulse or is breathing):    Full Support       Armida Malloy MD  06/14/24

## 2024-06-14 NOTE — PLAN OF CARE
Goal Outcome Evaluation:  Plan of Care Reviewed With: patient           Outcome Evaluation: Wound vac dressing intact with no issues noted. PT will follow and manage vac as needed to help limit issues prior to return to surgery. PT Will f/u with vac change daily until pt returns to OR.

## 2024-06-15 ENCOUNTER — ANESTHESIA EVENT (OUTPATIENT)
Dept: PERIOP | Facility: HOSPITAL | Age: 59
End: 2024-06-15
Payer: MEDICAID

## 2024-06-15 ENCOUNTER — ANESTHESIA (OUTPATIENT)
Dept: TELEMETRY | Facility: HOSPITAL | Age: 59
End: 2024-06-15

## 2024-06-15 ENCOUNTER — ANESTHESIA EVENT (OUTPATIENT)
Dept: TELEMETRY | Facility: HOSPITAL | Age: 59
End: 2024-06-15

## 2024-06-15 ENCOUNTER — ANESTHESIA (OUTPATIENT)
Dept: PERIOP | Facility: HOSPITAL | Age: 59
End: 2024-06-15
Payer: MEDICAID

## 2024-06-15 LAB
ANION GAP SERPL CALCULATED.3IONS-SCNC: 10 MMOL/L (ref 5–15)
BACTERIA SPEC AEROBE CULT: ABNORMAL
BUN SERPL-MCNC: 43 MG/DL (ref 8–23)
BUN/CREAT SERPL: 50.6 (ref 7–25)
CALCIUM SPEC-SCNC: 7.4 MG/DL (ref 8.6–10.5)
CHLORIDE SERPL-SCNC: 94 MMOL/L (ref 98–107)
CK SERPL-CCNC: 93 U/L (ref 20–200)
CO2 SERPL-SCNC: 25 MMOL/L (ref 22–29)
CREAT SERPL-MCNC: 0.85 MG/DL (ref 0.76–1.27)
DEPRECATED RDW RBC AUTO: 46 FL (ref 37–54)
EGFRCR SERPLBLD CKD-EPI 2021: 99.5 ML/MIN/1.73
ERYTHROCYTE [DISTWIDTH] IN BLOOD BY AUTOMATED COUNT: 13.2 % (ref 12.3–15.4)
GLUCOSE BLDC GLUCOMTR-MCNC: 207 MG/DL (ref 70–130)
GLUCOSE BLDC GLUCOMTR-MCNC: 220 MG/DL (ref 70–130)
GLUCOSE BLDC GLUCOMTR-MCNC: 229 MG/DL (ref 70–130)
GLUCOSE BLDC GLUCOMTR-MCNC: 237 MG/DL (ref 70–130)
GLUCOSE SERPL-MCNC: 231 MG/DL (ref 65–99)
GRAM STN SPEC: ABNORMAL
HCT VFR BLD AUTO: 32.1 % (ref 37.5–51)
HGB BLD-MCNC: 11.2 G/DL (ref 13–17.7)
ISOLATED FROM: ABNORMAL
ISOLATED FROM: ABNORMAL
MAGNESIUM SERPL-MCNC: 2.3 MG/DL (ref 1.6–2.4)
MCH RBC QN AUTO: 32.7 PG (ref 26.6–33)
MCHC RBC AUTO-ENTMCNC: 34.9 G/DL (ref 31.5–35.7)
MCV RBC AUTO: 93.6 FL (ref 79–97)
PHOSPHATE SERPL-MCNC: 2.7 MG/DL (ref 2.5–4.5)
PLATELET # BLD AUTO: 168 10*3/MM3 (ref 140–450)
PMV BLD AUTO: 10.3 FL (ref 6–12)
POTASSIUM SERPL-SCNC: 4.1 MMOL/L (ref 3.5–5.2)
RBC # BLD AUTO: 3.43 10*6/MM3 (ref 4.14–5.8)
SODIUM SERPL-SCNC: 129 MMOL/L (ref 136–145)
WBC NRBC COR # BLD AUTO: 22.75 10*3/MM3 (ref 3.4–10.8)

## 2024-06-15 PROCEDURE — 87070 CULTURE OTHR SPECIMN AEROBIC: CPT | Performed by: ORTHOPAEDIC SURGERY

## 2024-06-15 PROCEDURE — 25010000002 CLINDAMYCIN 900 MG/50ML SOLUTION: Performed by: ORTHOPAEDIC SURGERY

## 2024-06-15 PROCEDURE — 25810000003 LACTATED RINGERS PER 1000 ML: Performed by: ANESTHESIOLOGY

## 2024-06-15 PROCEDURE — 63710000001 INSULIN LISPRO (HUMAN) PER 5 UNITS: Performed by: ORTHOPAEDIC SURGERY

## 2024-06-15 PROCEDURE — 83735 ASSAY OF MAGNESIUM: CPT | Performed by: ORTHOPAEDIC SURGERY

## 2024-06-15 PROCEDURE — 63710000001 INSULIN GLARGINE PER 5 UNITS: Performed by: ORTHOPAEDIC SURGERY

## 2024-06-15 PROCEDURE — 87206 SMEAR FLUORESCENT/ACID STAI: CPT | Performed by: ORTHOPAEDIC SURGERY

## 2024-06-15 PROCEDURE — 25010000002 PIPERACILLIN SOD-TAZOBACTAM PER 1 G: Performed by: ORTHOPAEDIC SURGERY

## 2024-06-15 PROCEDURE — 87102 FUNGUS ISOLATION CULTURE: CPT | Performed by: ORTHOPAEDIC SURGERY

## 2024-06-15 PROCEDURE — 25810000003 LACTATED RINGERS PER 1000 ML: Performed by: ORTHOPAEDIC SURGERY

## 2024-06-15 PROCEDURE — 82550 ASSAY OF CK (CPK): CPT

## 2024-06-15 PROCEDURE — 87040 BLOOD CULTURE FOR BACTERIA: CPT | Performed by: INTERNAL MEDICINE

## 2024-06-15 PROCEDURE — 87015 SPECIMEN INFECT AGNT CONCNTJ: CPT | Performed by: ORTHOPAEDIC SURGERY

## 2024-06-15 PROCEDURE — 84100 ASSAY OF PHOSPHORUS: CPT | Performed by: ORTHOPAEDIC SURGERY

## 2024-06-15 PROCEDURE — 87116 MYCOBACTERIA CULTURE: CPT | Performed by: ORTHOPAEDIC SURGERY

## 2024-06-15 PROCEDURE — 82948 REAGENT STRIP/BLOOD GLUCOSE: CPT

## 2024-06-15 PROCEDURE — 87075 CULTR BACTERIA EXCEPT BLOOD: CPT | Performed by: ORTHOPAEDIC SURGERY

## 2024-06-15 PROCEDURE — 25010000002 HYDROMORPHONE PER 4 MG: Performed by: ORTHOPAEDIC SURGERY

## 2024-06-15 PROCEDURE — 99232 SBSQ HOSP IP/OBS MODERATE 35: CPT | Performed by: NURSE PRACTITIONER

## 2024-06-15 PROCEDURE — 97605 NEG PRS WND THER DME<=50SQCM: CPT

## 2024-06-15 PROCEDURE — 0JQP0ZZ REPAIR LEFT LOWER LEG SUBCUTANEOUS TISSUE AND FASCIA, OPEN APPROACH: ICD-10-PCS | Performed by: ORTHOPAEDIC SURGERY

## 2024-06-15 PROCEDURE — 25810000003 SODIUM CHLORIDE 0.9 % SOLUTION: Performed by: ORTHOPAEDIC SURGERY

## 2024-06-15 PROCEDURE — 3E0102A INTRODUCTION OF ANTI-INFECTIVE ENVELOPE INTO SUBCUTANEOUS TISSUE, OPEN APPROACH: ICD-10-PCS | Performed by: ORTHOPAEDIC SURGERY

## 2024-06-15 PROCEDURE — 99232 SBSQ HOSP IP/OBS MODERATE 35: CPT | Performed by: PHYSICIAN ASSISTANT

## 2024-06-15 PROCEDURE — 80048 BASIC METABOLIC PNL TOTAL CA: CPT | Performed by: ORTHOPAEDIC SURGERY

## 2024-06-15 PROCEDURE — 87205 SMEAR GRAM STAIN: CPT | Performed by: ORTHOPAEDIC SURGERY

## 2024-06-15 PROCEDURE — 85027 COMPLETE CBC AUTOMATED: CPT | Performed by: FAMILY MEDICINE

## 2024-06-15 PROCEDURE — 25010000002 VANCOMYCIN 1 G RECONSTITUTED SOLUTION: Performed by: ORTHOPAEDIC SURGERY

## 2024-06-15 PROCEDURE — 25010000002 PROPOFOL 10 MG/ML EMULSION

## 2024-06-15 PROCEDURE — 25010000002 MIDAZOLAM PER 1 MG

## 2024-06-15 PROCEDURE — 25010000002 FENTANYL CITRATE (PF) 100 MCG/2ML SOLUTION

## 2024-06-15 DEVICE — DEV CONTRL TISS STRATAFIX SYMM PDS PLUS VIL CT-1 60CM: Type: IMPLANTABLE DEVICE | Site: LEG | Status: FUNCTIONAL

## 2024-06-15 RX ORDER — HYDROMORPHONE HYDROCHLORIDE 1 MG/ML
0.5 INJECTION, SOLUTION INTRAMUSCULAR; INTRAVENOUS; SUBCUTANEOUS
Status: DISCONTINUED | OUTPATIENT
Start: 2024-06-15 | End: 2024-06-15 | Stop reason: HOSPADM

## 2024-06-15 RX ORDER — SODIUM CHLORIDE 0.9 % (FLUSH) 0.9 %
10 SYRINGE (ML) INJECTION AS NEEDED
Status: DISCONTINUED | OUTPATIENT
Start: 2024-06-15 | End: 2024-06-15 | Stop reason: HOSPADM

## 2024-06-15 RX ORDER — NALOXONE HCL 0.4 MG/ML
0.4 VIAL (ML) INJECTION AS NEEDED
Status: DISCONTINUED | OUTPATIENT
Start: 2024-06-15 | End: 2024-06-15 | Stop reason: HOSPADM

## 2024-06-15 RX ORDER — UREA 10 %
5 LOTION (ML) TOPICAL NIGHTLY
Status: DISCONTINUED | OUTPATIENT
Start: 2024-06-15 | End: 2024-07-06 | Stop reason: HOSPADM

## 2024-06-15 RX ORDER — MIDAZOLAM HYDROCHLORIDE 1 MG/ML
1 INJECTION INTRAMUSCULAR; INTRAVENOUS
Status: DISCONTINUED | OUTPATIENT
Start: 2024-06-15 | End: 2024-06-15 | Stop reason: HOSPADM

## 2024-06-15 RX ORDER — SODIUM CHLORIDE 9 MG/ML
INJECTION, SOLUTION INTRAVENOUS CONTINUOUS PRN
Status: COMPLETED | OUTPATIENT
Start: 2024-06-15 | End: 2024-06-15

## 2024-06-15 RX ORDER — ONDANSETRON 2 MG/ML
4 INJECTION INTRAMUSCULAR; INTRAVENOUS ONCE AS NEEDED
Status: DISCONTINUED | OUTPATIENT
Start: 2024-06-15 | End: 2024-06-15 | Stop reason: HOSPADM

## 2024-06-15 RX ORDER — MEPERIDINE HYDROCHLORIDE 25 MG/ML
12.5 INJECTION INTRAMUSCULAR; INTRAVENOUS; SUBCUTANEOUS
Status: DISCONTINUED | OUTPATIENT
Start: 2024-06-15 | End: 2024-06-15 | Stop reason: HOSPADM

## 2024-06-15 RX ORDER — SODIUM CHLORIDE 9 MG/ML
40 INJECTION, SOLUTION INTRAVENOUS AS NEEDED
Status: DISCONTINUED | OUTPATIENT
Start: 2024-06-15 | End: 2024-06-15 | Stop reason: HOSPADM

## 2024-06-15 RX ORDER — INSULIN LISPRO 100 [IU]/ML
2-9 INJECTION, SOLUTION INTRAVENOUS; SUBCUTANEOUS
Status: DISCONTINUED | OUTPATIENT
Start: 2024-06-15 | End: 2024-07-06 | Stop reason: HOSPADM

## 2024-06-15 RX ORDER — FAMOTIDINE 20 MG/1
20 TABLET, FILM COATED ORAL ONCE
Status: CANCELLED | OUTPATIENT
Start: 2024-06-15 | End: 2024-06-15

## 2024-06-15 RX ORDER — SODIUM CHLORIDE 0.9 % (FLUSH) 0.9 %
10 SYRINGE (ML) INJECTION EVERY 12 HOURS SCHEDULED
Status: CANCELLED | OUTPATIENT
Start: 2024-06-15

## 2024-06-15 RX ORDER — FENTANYL CITRATE 50 UG/ML
INJECTION, SOLUTION INTRAMUSCULAR; INTRAVENOUS AS NEEDED
Status: DISCONTINUED | OUTPATIENT
Start: 2024-06-15 | End: 2024-06-15

## 2024-06-15 RX ORDER — PROMETHAZINE HYDROCHLORIDE 25 MG/1
25 SUPPOSITORY RECTAL ONCE AS NEEDED
Status: DISCONTINUED | OUTPATIENT
Start: 2024-06-15 | End: 2024-06-15 | Stop reason: HOSPADM

## 2024-06-15 RX ORDER — DROPERIDOL 2.5 MG/ML
0.62 INJECTION, SOLUTION INTRAMUSCULAR; INTRAVENOUS
Status: DISCONTINUED | OUTPATIENT
Start: 2024-06-15 | End: 2024-06-15 | Stop reason: HOSPADM

## 2024-06-15 RX ORDER — MIDAZOLAM HYDROCHLORIDE 1 MG/ML
INJECTION INTRAMUSCULAR; INTRAVENOUS AS NEEDED
Status: DISCONTINUED | OUTPATIENT
Start: 2024-06-15 | End: 2024-06-15 | Stop reason: SURG

## 2024-06-15 RX ORDER — VANCOMYCIN HYDROCHLORIDE 1 G/20ML
INJECTION, POWDER, LYOPHILIZED, FOR SOLUTION INTRAVENOUS AS NEEDED
Status: DISCONTINUED | OUTPATIENT
Start: 2024-06-15 | End: 2024-06-15 | Stop reason: HOSPADM

## 2024-06-15 RX ORDER — IBUPROFEN 600 MG/1
1 TABLET ORAL
Status: DISCONTINUED | OUTPATIENT
Start: 2024-06-15 | End: 2024-07-06 | Stop reason: HOSPADM

## 2024-06-15 RX ORDER — FAMOTIDINE 10 MG/ML
20 INJECTION, SOLUTION INTRAVENOUS ONCE
Status: CANCELLED | OUTPATIENT
Start: 2024-06-15 | End: 2024-06-15

## 2024-06-15 RX ORDER — FENTANYL CITRATE 50 UG/ML
50 INJECTION, SOLUTION INTRAMUSCULAR; INTRAVENOUS
Status: DISCONTINUED | OUTPATIENT
Start: 2024-06-15 | End: 2024-06-15 | Stop reason: HOSPADM

## 2024-06-15 RX ORDER — SODIUM CHLORIDE 0.9 % (FLUSH) 0.9 %
3-10 SYRINGE (ML) INJECTION AS NEEDED
Status: DISCONTINUED | OUTPATIENT
Start: 2024-06-15 | End: 2024-06-15 | Stop reason: HOSPADM

## 2024-06-15 RX ORDER — IPRATROPIUM BROMIDE AND ALBUTEROL SULFATE 2.5; .5 MG/3ML; MG/3ML
3 SOLUTION RESPIRATORY (INHALATION) ONCE AS NEEDED
Status: DISCONTINUED | OUTPATIENT
Start: 2024-06-15 | End: 2024-06-15 | Stop reason: HOSPADM

## 2024-06-15 RX ORDER — DEXTROSE MONOHYDRATE 25 G/50ML
25 INJECTION, SOLUTION INTRAVENOUS
Status: DISCONTINUED | OUTPATIENT
Start: 2024-06-15 | End: 2024-07-06 | Stop reason: HOSPADM

## 2024-06-15 RX ORDER — HYDRALAZINE HYDROCHLORIDE 20 MG/ML
5 INJECTION INTRAMUSCULAR; INTRAVENOUS
Status: DISCONTINUED | OUTPATIENT
Start: 2024-06-15 | End: 2024-06-15 | Stop reason: HOSPADM

## 2024-06-15 RX ORDER — LABETALOL HYDROCHLORIDE 5 MG/ML
5 INJECTION, SOLUTION INTRAVENOUS
Status: DISCONTINUED | OUTPATIENT
Start: 2024-06-15 | End: 2024-06-15 | Stop reason: HOSPADM

## 2024-06-15 RX ORDER — PROPOFOL 10 MG/ML
VIAL (ML) INTRAVENOUS CONTINUOUS PRN
Status: DISCONTINUED | OUTPATIENT
Start: 2024-06-15 | End: 2024-06-15 | Stop reason: SURG

## 2024-06-15 RX ORDER — NALOXONE HYDROCHLORIDE 4 MG/.1ML
SPRAY NASAL
Qty: 2 EACH | Refills: 0 | Status: SHIPPED | OUTPATIENT
Start: 2024-06-15

## 2024-06-15 RX ORDER — DROPERIDOL 2.5 MG/ML
0.62 INJECTION, SOLUTION INTRAMUSCULAR; INTRAVENOUS ONCE AS NEEDED
Status: DISCONTINUED | OUTPATIENT
Start: 2024-06-15 | End: 2024-06-15 | Stop reason: HOSPADM

## 2024-06-15 RX ORDER — OXYCODONE HYDROCHLORIDE 5 MG/1
5 TABLET ORAL EVERY 4 HOURS PRN
Qty: 42 TABLET | Refills: 0 | Status: SHIPPED | OUTPATIENT
Start: 2024-06-15

## 2024-06-15 RX ORDER — SODIUM CHLORIDE, SODIUM LACTATE, POTASSIUM CHLORIDE, CALCIUM CHLORIDE 600; 310; 30; 20 MG/100ML; MG/100ML; MG/100ML; MG/100ML
9 INJECTION, SOLUTION INTRAVENOUS CONTINUOUS
Status: DISCONTINUED | OUTPATIENT
Start: 2024-06-15 | End: 2024-06-22

## 2024-06-15 RX ORDER — SODIUM CHLORIDE 0.9 % (FLUSH) 0.9 %
3 SYRINGE (ML) INJECTION EVERY 12 HOURS SCHEDULED
Status: DISCONTINUED | OUTPATIENT
Start: 2024-06-15 | End: 2024-06-15 | Stop reason: HOSPADM

## 2024-06-15 RX ORDER — NICOTINE POLACRILEX 4 MG
15 LOZENGE BUCCAL
Status: DISCONTINUED | OUTPATIENT
Start: 2024-06-15 | End: 2024-07-06 | Stop reason: HOSPADM

## 2024-06-15 RX ORDER — LIDOCAINE HYDROCHLORIDE 10 MG/ML
0.5 INJECTION, SOLUTION EPIDURAL; INFILTRATION; INTRACAUDAL; PERINEURAL ONCE AS NEEDED
Status: DISCONTINUED | OUTPATIENT
Start: 2024-06-15 | End: 2024-06-15 | Stop reason: HOSPADM

## 2024-06-15 RX ORDER — HYDROCODONE BITARTRATE AND ACETAMINOPHEN 5; 325 MG/1; MG/1
1 TABLET ORAL ONCE AS NEEDED
Status: DISCONTINUED | OUTPATIENT
Start: 2024-06-15 | End: 2024-06-15 | Stop reason: HOSPADM

## 2024-06-15 RX ORDER — PROMETHAZINE HYDROCHLORIDE 25 MG/1
25 TABLET ORAL ONCE AS NEEDED
Status: DISCONTINUED | OUTPATIENT
Start: 2024-06-15 | End: 2024-06-15 | Stop reason: HOSPADM

## 2024-06-15 RX ORDER — LIDOCAINE HYDROCHLORIDE 10 MG/ML
INJECTION, SOLUTION EPIDURAL; INFILTRATION; INTRACAUDAL; PERINEURAL AS NEEDED
Status: DISCONTINUED | OUTPATIENT
Start: 2024-06-15 | End: 2024-06-15 | Stop reason: SURG

## 2024-06-15 RX ADMIN — INSULIN LISPRO 5 UNITS: 100 INJECTION, SOLUTION INTRAVENOUS; SUBCUTANEOUS at 17:45

## 2024-06-15 RX ADMIN — LIDOCAINE HYDROCHLORIDE 50 MG: 10 INJECTION, SOLUTION EPIDURAL; INFILTRATION; INTRACAUDAL; PERINEURAL at 13:50

## 2024-06-15 RX ADMIN — Medication 1 CAPSULE: at 08:37

## 2024-06-15 RX ADMIN — PIPERACILLIN AND TAZOBACTAM 4.5 G: 4; .5 INJECTION, POWDER, FOR SOLUTION INTRAVENOUS at 23:07

## 2024-06-15 RX ADMIN — FENTANYL CITRATE 50 MCG: 50 INJECTION, SOLUTION INTRAMUSCULAR; INTRAVENOUS at 13:47

## 2024-06-15 RX ADMIN — PROPOFOL 75 MCG/KG/MIN: 10 INJECTION, EMULSION INTRAVENOUS at 13:51

## 2024-06-15 RX ADMIN — FENTANYL CITRATE 50 MCG: 50 INJECTION, SOLUTION INTRAMUSCULAR; INTRAVENOUS at 14:16

## 2024-06-15 RX ADMIN — MIDAZOLAM HYDROCHLORIDE 2 MG: 1 INJECTION, SOLUTION INTRAMUSCULAR; INTRAVENOUS at 13:47

## 2024-06-15 RX ADMIN — SENNOSIDES AND DOCUSATE SODIUM 2 TABLET: 8.6; 5 TABLET ORAL at 21:57

## 2024-06-15 RX ADMIN — Medication 10 ML: at 08:38

## 2024-06-15 RX ADMIN — INSULIN LISPRO 4 UNITS: 100 INJECTION, SOLUTION INTRAVENOUS; SUBCUTANEOUS at 17:45

## 2024-06-15 RX ADMIN — PANTOPRAZOLE SODIUM 40 MG: 40 INJECTION, POWDER, FOR SOLUTION INTRAVENOUS at 08:37

## 2024-06-15 RX ADMIN — SODIUM CHLORIDE, POTASSIUM CHLORIDE, SODIUM LACTATE AND CALCIUM CHLORIDE 9 ML/HR: 600; 310; 30; 20 INJECTION, SOLUTION INTRAVENOUS at 13:07

## 2024-06-15 RX ADMIN — CLINDAMYCIN IN 5 PERCENT DEXTROSE 900 MG: 18 INJECTION, SOLUTION INTRAVENOUS at 06:08

## 2024-06-15 RX ADMIN — PIPERACILLIN AND TAZOBACTAM 4.5 G: 4; .5 INJECTION, POWDER, FOR SOLUTION INTRAVENOUS at 01:23

## 2024-06-15 RX ADMIN — HYDROCODONE BITARTRATE AND ACETAMINOPHEN 1 TABLET: 5; 325 TABLET ORAL at 01:22

## 2024-06-15 RX ADMIN — HYDROCODONE BITARTRATE AND ACETAMINOPHEN 1 TABLET: 5; 325 TABLET ORAL at 22:05

## 2024-06-15 RX ADMIN — HYDROMORPHONE HYDROCHLORIDE 0.5 MG: 1 INJECTION, SOLUTION INTRAMUSCULAR; INTRAVENOUS; SUBCUTANEOUS at 11:35

## 2024-06-15 RX ADMIN — CLINDAMYCIN IN 5 PERCENT DEXTROSE 900 MG: 18 INJECTION, SOLUTION INTRAVENOUS at 13:45

## 2024-06-15 RX ADMIN — INSULIN GLARGINE 20 UNITS: 100 INJECTION, SOLUTION SUBCUTANEOUS at 21:57

## 2024-06-15 RX ADMIN — INSULIN LISPRO 4 UNITS: 100 INJECTION, SOLUTION INTRAVENOUS; SUBCUTANEOUS at 21:57

## 2024-06-15 RX ADMIN — PIPERACILLIN AND TAZOBACTAM 4.5 G: 4; .5 INJECTION, POWDER, FOR SOLUTION INTRAVENOUS at 08:37

## 2024-06-15 RX ADMIN — SODIUM CHLORIDE, POTASSIUM CHLORIDE, SODIUM LACTATE AND CALCIUM CHLORIDE 9 ML/HR: 600; 310; 30; 20 INJECTION, SOLUTION INTRAVENOUS at 13:08

## 2024-06-15 RX ADMIN — CLINDAMYCIN IN 5 PERCENT DEXTROSE 900 MG: 18 INJECTION, SOLUTION INTRAVENOUS at 23:08

## 2024-06-15 RX ADMIN — Medication 5 MG: at 21:57

## 2024-06-15 NOTE — PROGRESS NOTES
[facility] Medicine Services  PROGRESS NOTE    Patient Name: Easton Alvarez  : 1965  MRN: [id number]    Date of Admission: 2024  Primary Care Physician: Provider, No Known    Subjective   Subjective     CC:  Follow up left foot gangrene     HPI:  Patient was seen resting in bed no apparent distress.  No acute events overnight per nursing.  He is aware of plan for repeat I&D in the OR with Dr. Em today.  Pain is currently well-controlled.  Patient notes that he is having difficulty sleeping at night.  Discussed plan to trial melatonin tonight.  No new complaints this time.    Objective   Objective     Vital Signs:   Temp:  [97.6 °F (36.4 °C)-98.2 °F (36.8 °C)] 98.2 °F (36.8 °C)  Heart Rate:  [80-89] 89  Resp:  [16-22] 20  BP: (135-165)/(71-87) 155/80     Physical Exam:  Constitutional: No acute distress, awake, alert  HENT: NCAT, mucous membranes moist  Respiratory: Clear to auscultation bilaterally, respiratory effort normal   Cardiovascular: RRR, no murmurs, cap refill brisk   Gastrointestinal: Positive bowel sounds, soft, nontender, nondistended  Musculoskeletal: Left BKA stump with Ace wrap in place, chronic RLE transmetatarsal amputation site CDI  Psychiatric: Appropriate affect, cooperative  Neurologic: Oriented x 3, moves all extremities, speech clear  Skin: warm, dry, no visible rash     Results Reviewed:  LAB RESULTS:      Lab 06/15/24  0443 24  2021 24  1218 24  1051 24  0457 24  0403 24  2225 24  1839 24  1451 24  1149 24  0807 24  0802   WBC 22.75*  --   --   --   --  18.48*  --  18.84*  --   --   --  26.78*   HEMOGLOBIN 11.2* 10.3*  --  10.8* 11.1* 10.5*  --  11.0*  --   --   --  12.3*   HEMATOCRIT 32.1* 30.0*  --  31.4* 31.8* 30.5*  --  31.5*  --   --   --  35.5*   PLATELETS 168  --   --   --   --  122*  --  142  --   --   --  191   NEUTROS ABS  --   --   --   --   --   --   --  16.57*  --    --   --  24.21*  23.83*   IMMATURE GRANS (ABS)  --   --   --   --   --   --   --  0.34*  --   --   --  0.42*   LYMPHS ABS  --   --   --   --   --   --   --  0.83  --   --   --  0.67*   MONOS ABS  --   --   --   --   --   --   --  1.01*  --   --   --  1.47*   EOS ABS  --   --   --   --   --   --   --  0.01  --   --   --  0.00   MCV 93.6  --   --   --   --  92.4  --  91.6  --   --   --  93.2   CRP  --   --   --   --   --   --   --   --   --   --   --  24.89*   LACTATE  --   --   --   --   --   --  3.6* 4.1* 3.9* 6.3* 8.8*  --    PROTIME  --   --  19.5*  --   --   --   --   --   --   --   --   --          Lab 06/15/24  0443 06/14/24  0356 06/13/24 2032 06/13/24  1341 06/13/24  0403 06/12/24  1839   SODIUM 129* 129*  --  129* 131* 130*   POTASSIUM 4.1 4.5 4.3 3.8 3.3* 3.7   CHLORIDE 94* 94*  --  97* 96* 92*   CO2 25.0 24.0  --  23.0 26.0 27.0   ANION GAP 10.0 11.0  --  9.0 9.0 11.0   BUN 43* 60*  --  56* 64* 63*   CREATININE 0.85 0.86  --  0.67* 0.91 1.00   EGFR 99.5 99.1  --  106.9 96.5 86.2   GLUCOSE 231* 312*  --  260* 268* 348*   CALCIUM 7.4* 7.6*  --  7.5* 7.3* 7.7*   MAGNESIUM 2.3 2.6*  --  2.4  --   --    PHOSPHORUS 2.7 3.4  --  3.5  --   --    HEMOGLOBIN A1C  --   --   --   --  9.50*  --          Lab 06/14/24  0356 06/12/24  1839 06/12/24  0802   TOTAL PROTEIN 5.4* 5.0* 5.6*   ALBUMIN 2.0* 2.0* 2.7*   GLOBULIN  --  3.0 2.9   ALT (SGPT) 30 37 45*   AST (SGOT) 39 46* 65*   BILIRUBIN 2.1* 3.6* 4.7*   INDIRECT BILIRUBIN 0.6  --   --    BILIRUBIN DIRECT 1.5*  --   --    ALK PHOS 173* 145* 171*         Lab 06/14/24  1218   PROTIME 19.5*   INR 1.63*                 Brief Urine Lab Results  (Last result in the past 365 days)        Color   Clarity   Blood   Leuk Est   Nitrite   Protein   CREAT   Urine HCG        06/14/24 1109 Yellow   Clear   Negative   Negative   Negative   Negative                   Microbiology Results Abnormal       Procedure Component Value - Date/Time    Blood Culture - Blood, Arm, Left  [[id number]]  (Normal) Collected: 06/12/24 1832    Lab Status: Preliminary result Specimen: Blood from Arm, Left Updated: 06/14/24 1901     Blood Culture No growth at 2 days    Blood Culture - Blood, Hand, Right [[id number]]  (Normal) Collected: 06/12/24 1837    Lab Status: Preliminary result Specimen: Blood from Hand, Right Updated: 06/14/24 1901     Blood Culture No growth at 2 days    Fungus Smear - Surgical Site, Leg, Left [[id number]] Collected: 06/13/24 1031    Lab Status: Final result Specimen: Surgical Site from Leg, Left Updated: 06/14/24 1406     Fungal Stain No fungal elements seen    AFB Culture - Surgical Site, Leg, Left [[id number]] Collected: 06/13/24 1031    Lab Status: Preliminary result Specimen: Surgical Site from Leg, Left Updated: 06/14/24 1111     AFB Stain No acid fast bacilli seen on concentrated smear            No radiology results from the last 24 hrs        Current medications:  Scheduled Meds:clindamycin, 900 mg, Intravenous, Q8H  insulin glargine, 15 Units, Subcutaneous, Nightly  Insulin Lispro, 5 Units, Subcutaneous, TID With Meals  lactobacillus acidophilus, 1 capsule, Oral, Daily  pantoprazole, 40 mg, Intravenous, BID AC  piperacillin-tazobactam, 4.5 g, Intravenous, Q8H  senna-docusate sodium, 2 tablet, Oral, BID  sodium chloride, 10 mL, Intravenous, Q12H      Continuous Infusions:lactated ringers, 9 mL/hr, Last Rate: Stopped (06/13/24 1022)  ropivacaine,       PRN Meds:.  senna-docusate sodium **AND** polyethylene glycol **AND** bisacodyl **AND** bisacodyl    Calcium Replacement - Follow Nurse / BPA Driven Protocol    dextrose    dextrose    glucagon (human recombinant)    HYDROcodone-acetaminophen    HYDROmorphone    Magnesium Standard Dose Replacement - Follow Nurse / BPA Driven Protocol    Morphine    nitroglycerin    oxyCODONE    Phosphorus Replacement - Follow Nurse / BPA Driven Protocol    Potassium Replacement - Follow Nurse / BPA Driven Protocol    sodium chloride    sodium  chloride    Assessment & Plan   Assessment & Plan     Active Hospital Problems    Diagnosis  POA    **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        Brief Hospital Course to date:  Easton Alvarez is a 58 y.o. male with past medical history significant for T2DM, diabetic neuropathy, right hip fracture/ORIF ([facility] 2/2024), and right transmetatarsal amputation 2019 at  who presented to the ED on 6/12/2024 due to left foot wound.  Orthopedic surgeon Dr. Em was consulted and performed a left below-knee amputation on 6/13/2024.  Infectious disease was consulted and is managing antimicrobial therapy.    This patient's problems and plans were partially entered by my partner and updated as appropriate by me 06/15/24.    Sepsis (WBC, lactate, tachypnea, source)    Acute GB strep bacteremia  Left foot cellulitis/gangrene   - lactate 8.8 in OSH ED; CT scan of leg shows gas gangrene  - MRI left foot revealed no findings of osteomyelitis with extensive soft tissue infection with abscesses along the foot to the ankle and into the lower leg with soft tissue gas.    -S/p left below the knee amputation 06/13  -ID following, continue Zosyn, clindamycin  - blood cultures and wound cultures growing Streptococcus agalactiae  -AM labs      Gap acidosis POA  - likely from lactate.  Serum acetone negative  - resolved      Concern for GIB  -fecal occult positive after nursing notified nocturnal for dark stool   -GI consulted, recommended medical management for possible melanoma with twice daily PPI x 1 month  -Recommended to have outpatient GI follow-up     R hip fx repaired 2/2024  - note gas near hardware per CT scan :  r/o hardware-associated infection vs nl postop finding     DM, uncontrolled  - Pt reports is unaware of his diabetes medications and is not on any medications currently   -last  records from 2019 at , He was discharged on Lantus 12 U with 5 U with meals   -HgA1c: 9.5  -BG remains uncontrolled   -increase Lantus to 20 units daily  -Continue SSI with mealtime lispro 5 units     LENNY POA  -resolved with IVF      HTN history;  not currently on meds.  Monitor.      Expected Discharge Location and Transportation: rehab   Expected Discharge 06/17/2024  Expected discharge date/ time has not been documented.      VTE Prophylaxis:  No VTE prophylaxis order currently exists.         AM-PAC 6 Clicks Score (PT): 17 (06/15/24 0500)    CODE STATUS:   Code Status and Medical Interventions:   Ordered at: 06/12/24 9835     Level Of Support Discussed With:    Patient     Code Status (Patient has no pulse and is not breathing):    CPR (Attempt to Resuscitate)     Medical Interventions (Patient has pulse or is breathing):    Full Support       Souleymane Marshall, GUILLAUME  06/15/24

## 2024-06-15 NOTE — PROGRESS NOTES
"Easton Alvarez       LOS: 3 days   Patient Care Team:  Provider, No Known as PCP - General    Chief Complaint:  left foot / leg necrotizing soft tissue infection.    Subjective     Interval History:     Pain tolerable overnight    Review of Systems:      Gen- No fevers, chills  CV- No chest pain, palpitations  Resp- No cough, dyspnea  GI- No N/V/D, abd pain    Objective     Vital Signs  Vital Signs (last 24 hours)         06/13 0700  06/14 0659 06/14 0700  06/14 0839   Most Recent      Temp (°F) 97 -  98.7      98.1     98.1 (36.7) 06/14 0724    Heart Rate 75 -  84       81 06/14 0340    Resp 13 -  20      20     20 06/14 0724    BP 97/62 -  145/70      149/85     149/85 06/14 0724    SpO2 (%) 96 -  99       98 06/14 0340    Flow (L/min) 2 -  4       2 06/13 1100              Physical Exam:     Alert, oriented.  No acute distress.  Nonlabored respirations.  Regular rate and rhythm.  Abdomen nondistended.  Left lower extremity: Operative dressing intact, wound VAC with minimal serosanguineous output.     Results Review:     I reviewed the patient's new clinical results.    Medication Review:   Hospital Medications (active)         Dose Frequency Start End    bisacodyl (DULCOLAX) EC tablet 5 mg 5 mg Daily PRN 6/12/2024 --    Admin Instructions: Use if no bowel movement after 12 hours.  Swallow whole. Do not crush, split, or chew tablet.    Route: Oral    Linked Group 1: Placed in \"And\" Linked Group        bisacodyl (DULCOLAX) suppository 10 mg 10 mg Daily PRN 6/12/2024 --    Admin Instructions: Use if no bowel movement after 12 hours.  Hold for diarrhea    Route: Rectal    Linked Group 1: Placed in \"And\" Linked Group        Calcium Replacement - Follow Nurse / BPA Driven Protocol  As Needed 6/13/2024 --    Admin Instructions: Open Order & Select \"[facility]HS Electrolyte Replacement Protocol Algorithm\" to View Details    Route: Does not apply    clindamycin (CLEOCIN) 900 mg in dextrose 5% 50 mL IVPB (premix) 900 mg Every 8 " Hours 6/12/2024 6/17/2024    Admin Instructions: Do Not refrigerate.    Route: Intravenous    DAPTOmycin (CUBICIN) 400 mg in sodium chloride 0.9 % 50 mL IVPB 6 mg/kg × 68 kg Every 24 Hours 6/13/2024 6/23/2024    Admin Instructions: Caution: Look alike/sound alike drug alert.  Refrigerate. Do not shake.    Route: Intravenous    dextrose (D50W) (25 g/50 mL) IV injection 25 g 25 g Every 15 Minutes PRN 6/12/2024 --    Admin Instructions: Blood sugar less than 70; patient has IV access - Unresponsive, NPO or Unable To Safely Swallow    Route: Intravenous    dextrose (GLUTOSE) oral gel 15 g 15 g Every 15 Minutes PRN 6/12/2024 --    Admin Instructions: BS<70, Patient Alert, Is not NPO, Can safely swallow.    Route: Oral    glucagon (GLUCAGEN) injection 1 mg 1 mg Every 15 Minutes PRN 6/12/2024 --    Admin Instructions: Blood Glucose Less Than 70 - Patient Without IV Access - Unresponsive, NPO or Unable To Safely Swallow  Reconstitute powder for injection by adding 1 mL of -supplied sterile diluent or sterile water for injection to a vial containing 1 mg of the drug, to provide solutions containing 1 mg/mL. Shake vial gently to dissolve.    Route: Intramuscular    HYDROcodone-acetaminophen (NORCO) 5-325 MG per tablet 1 tablet 1 tablet Every 4 Hours PRN 6/12/2024 6/17/2024    Admin Instructions: Based on patient request - if ordered for moderate or severe pain, provider allows for administration of a medication prescribed for a lower pain scale.  [ISRAEL]    Do not exceed 4 grams of acetaminophen in a 24 hr period. Max dose of 2gm for AST/ALT greater than 120 units/L        If given for pain, use the following pain scale:   Mild Pain = Pain Score of 1-3, CPOT 1-2  Moderate Pain = Pain Score of 4-6, CPOT 3-4  Severe Pain = Pain Score of 7-10, CPOT 5-8    Route: Oral    HYDROmorphone (DILAUDID) injection 0.5 mg 0.5 mg Every 4 Hours PRN 6/12/2024 6/17/2024    Admin Instructions: Based on patient request - if ordered for  "moderate or severe pain, provider allows for administration of a medication prescribed for a lower pain scale.  If given for pain, use the following pain scale:  Mild Pain = Pain Score of 1-3, CPOT 1-2  Moderate Pain = Pain Score of 4-6, CPOT 3-4  Severe Pain = Pain Score of 7-10, CPOT 5-8    Route: Intravenous    insulin glargine (LANTUS, SEMGLEE) injection 10 Units 10 Units Nightly 6/13/2024 --    Admin Instructions: Do not hold basal insulin without an order. Consider requesting a dose edit, if needed.      Route: Subcutaneous    insulin regular (humuLIN R,novoLIN R) injection 2-7 Units 2-7 Units 4 Times Daily Before Meals & Nightly 6/13/2024 --    Admin Instructions: Correction Insulin - Low Dose - Total Insulin Dose Less Than 40 units/day (Lean, Elderly or Renal Patients)    Blood Glucose 150-199 mg/dL - 2 units  Blood Glucose 200-249 mg/dL - 3 units  Blood Glucose 250-299 mg/dL - 4 units  Blood Glucose 300-349 mg/dL - 5 units  Blood Glucose 350-400 mg/dL - 6 units  Blood Glucose Greater Than 400 mg/dL - 7 units & Call Provider   Caution: Look alike/sound alike drug alert    Route: Subcutaneous    lactated ringers infusion 100 mL/hr Continuous 6/12/2024 --    Route: Intravenous    lactated ringers infusion 9 mL/hr Continuous 6/13/2024 --    Admin Instructions: May switch to NS IV at KVO if renal / if indicated    Route: Intravenous    lactobacillus acidophilus (RISAQUAD) capsule 1 capsule 1 capsule Daily 6/12/2024 --    Route: Oral    Magnesium Standard Dose Replacement - Follow Nurse / BPA Driven Protocol  As Needed 6/13/2024 --    Admin Instructions: Open Order & Select \"BHS Electrolyte Replacement Protocol Algorithm\" to View Details    Route: Does not apply    morphine injection 2 mg 2 mg Every 4 Hours PRN 6/13/2024 6/18/2024    Admin Instructions: Based on patient request - if ordered for moderate or severe pain, provider allows for administration of a medication prescribed for a lower pain scale.  If given " "for pain, use the following pain scale:  Mild Pain = Pain Score of 1-3, CPOT 1-2  Moderate Pain = Pain Score of 4-6, CPOT 3-4  Severe Pain = Pain Score of 7-10, CPOT 5-8    Route: Intravenous    nitroglycerin (NITROSTAT) SL tablet 0.4 mg 0.4 mg Every 5 Minutes PRN 6/12/2024 --    Admin Instructions: If Pain Unrelieved After 3 Doses Notify MD  May administer up to 3 doses per episode.    Route: Sublingual    oxyCODONE (ROXICODONE) immediate release tablet 5 mg 5 mg Every 4 Hours PRN 6/13/2024 6/20/2024    Admin Instructions: Based on patient request - if ordered for moderate or severe pain, provider allows for administration of a medication prescribed for a lower pain scale.    If given for pain, use the following pain scale:  Mild Pain = Pain Score of 1-3, CPOT 1-2  Moderate Pain = Pain Score of 4-6, CPOT 3-4  Severe Pain = Pain Score of 7-10, CPOT 5-8    Route: Oral    pantoprazole (PROTONIX) injection 40 mg 40 mg 2 Times Daily Before Meals 6/14/2024 --    Admin Instructions: Dilute with 10 mL of 0.9% NaCl and give IV push over 2 minutes.    Route: Intravenous    Phosphorus Replacement - Follow Nurse / BPA Driven Protocol  As Needed 6/13/2024 --    Admin Instructions: Open Order & Select \"BHS Electrolyte Replacement Protocol Algorithm\" to View Details    Route: Does not apply    piperacillin-tazobactam (ZOSYN) 4.5 g IVPB in 100 mL NS MBP (CD) 4.5 g Every 8 Hours 6/13/2024 6/18/2024    Route: Intravenous    polyethylene glycol (MIRALAX) packet 17 g 17 g Daily PRN 6/12/2024 --    Admin Instructions: Use if no bowel movement after 12 hours. Mix in 6-8 ounces of water.  Use 4-8 ounces of water, tea, or juice for each 17 gram dose.    Route: Oral    Linked Group 1: Placed in \"And\" Linked Group        Potassium Replacement - Follow Nurse / BPA Driven Protocol  As Needed 6/13/2024 --    Admin Instructions: Open Order & Select \"BHS Electrolyte Replacement Protocol Algorithm\" to View Details    Route: Does not apply    " "ropivacaine (NAROPIN) 0.2 % infusion (INFUSYSTEM)  Continuous 6/13/2024 --    Admin Instructions:  When Pt. In-House Infusion complete do not order refill until discussing with APS,  If pain greater than 7 out of 10, please call [phone] or [phone].  Thank you    Route: Peripheral Nerve    sennosides-docusate (PERICOLACE) 8.6-50 MG per tablet 2 tablet 2 tablet 2 Times Daily 6/12/2024 --    Admin Instructions: HOLD MEDICATION IF PATIENT HAS HAD BOWEL MOVEMENT. Start bowel management regimen if patient has not had a bowel movement after 12 hours.    Route: Oral    Linked Group 1: Placed in \"And\" Linked Group        sodium chloride 0.9 % flush 10 mL 10 mL Every 12 Hours Scheduled 6/12/2024 --    Route: Intravenous    sodium chloride 0.9 % flush 10 mL 10 mL As Needed 6/12/2024 --    Route: Intravenous    sodium chloride 0.9 % infusion 40 mL 40 mL As Needed 6/12/2024 --    Admin Instructions: Following administration of an IV intermittent medication, flush line with 40mL NS at 100mL/hr.    Route: Intravenous              Assessment & Plan     58-year-old male with left foot wet gangrene, necrotizing gas-forming soft tissue infection status post left below-knee amputation, wound vacuum-assisted closure June 13, 2024.      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  Risks of surgery were discussed which included but were not limited to pain, bleeding, infection, damage to adjacent structures, need for further surgery, wound healing complications, loss of limb and death.  The patient expressed verbal consent and written consent was obtained for the above procedure.    Brijesh Em Jr, MD  06/15/24  08:56 EDT           "

## 2024-06-15 NOTE — ANESTHESIA PREPROCEDURE EVALUATION
Anesthesia Evaluation     Patient summary reviewed and Nursing notes reviewed   no history of anesthetic complications:   NPO Solid Status: > 8 hours  NPO Liquid Status: > 8 hours           Airway   Mallampati: II  TM distance: >3 FB  Neck ROM: full  No difficulty expected  Dental      Pulmonary - negative pulmonary ROS and normal exam   Cardiovascular - negative cardio ROS and normal exam        Neuro/Psych- negative ROS  GI/Hepatic/Renal/Endo    (+) diabetes mellitus    Musculoskeletal     Abdominal    Substance History      OB/GYN          Other                    Anesthesia Plan    ASA 3     general     intravenous induction     Anesthetic plan, risks, benefits, and alternatives have been provided, discussed and informed consent has been obtained with: patient.    Plan discussed with CRNA.    CODE STATUS:    Level Of Support Discussed With: Patient  Code Status (Patient has no pulse and is not breathing): CPR (Attempt to Resuscitate)  Medical Interventions (Patient has pulse or is breathing): Full Support

## 2024-06-15 NOTE — ANESTHESIA POSTPROCEDURE EVALUATION
Patient: Easton Alvarez    Procedure Summary       Date: 06/15/24 Room / Location:  [facility]  /  [facility]    Anesthesia Start: 1345 Anesthesia Stop: 1454    Procedure: IRRIGATION AND DEBRIDEMENT OF BELOW KNEE AMPUTATION STUMP, (Left) Diagnosis:     Surgeons: Brijesh Em Jr., MD Provider: Irineo Cuevas MD    Anesthesia Type: general ASA Status: 3            Anesthesia Type: general    Vitals  Vitals Value Taken Time   /72 06/15/24 1457   Temp 98 °F (36.7 °C) 06/15/24 1457   Pulse 87 06/15/24 1457   Resp 12 06/15/24 1457   SpO2 97 % 06/15/24 1457   Vitals shown include unfiled device data.        Post Anesthesia Care and Evaluation    Patient location during evaluation: PACU  Patient participation: complete - patient participated  Level of consciousness: awake and alert  Pain score: 0  Pain management: adequate    Airway patency: patent  Anesthetic complications: No anesthetic complications  PONV Status: none  Cardiovascular status: hemodynamically stable and acceptable  Respiratory status: nonlabored ventilation, acceptable and nasal cannula  Hydration status: acceptable

## 2024-06-15 NOTE — PROGRESS NOTES
Easton Alvarez  1965  [id number]          Chief Complaint: left foot infection    Reason for Consultation: left foot infection    History of present illness:     Patient is a 58 y.o.  Yr old male with history of diabetes with prior right TMA 2019 at , history strep septicemia 2016 ; he reports right total hip arthroplasty 2024 after fall. He reports a fall several weeks prior to his June admission with injury to the left foot, wound present with deterioration several days preceding admission with severe discoloration/redness and swelling.  CT scan showed concern for subcutaneous emphysema and necrotizing infection.  Transferred to [facility] with evaluation by Dr. Em and Dr. Lundberg and arrangements made for urgent surgery. Subsequently, blood cultures called with gram-positive cocci     6/13 left BKA, Dr Em    6/15/24 wbc up some to 22k; blood cultures repeated; He has left stump  pain that is constant, sharp at times, blunted by neuropathy, worse with palpation, better with pain meds and 5 out of 10 in severity.       No headache photophobia or neck stiffness.  No shortness of breath cough or hemoptysis.  No nausea vomiting diarrhea or abdominal pain.  No dysuria hematuria or pyuria.  No other new skin rash    Past Medical History:   Diagnosis Date    Diabetes mellitus     Diabetic foot infection 2018    left (partial amputation)       Past Surgical History:   Procedure Laterality Date    AMPUTATION FOOT / TOE Right     partial foot    BELOW KNEE AMPUTATION Left 6/13/2024    Procedure: AMPUTATION BELOW KNEE AND WOUND VAC ASSISTED CLOSURE LEFT;  Surgeon: Brijesh Em Jr., MD;  Location: [facility];  Service: Orthopedics;  Laterality: Left;    PLACEMENT OF WOUND VAC Left 6/13/2024    Procedure: PLACEMENT OF WOUND VAC LEFT;  Surgeon: Brijesh Em Jr., MD;  Location: [facility];  Service: Orthopedics;  Laterality: Left;    TOTAL HIP ARTHROPLASTY Left 02/2024    From fall  "\"whole hip was shattered\"       Pediatric History   Patient Parents    Not on file     Other Topics Concern    Not on file   Social History Narrative    Not on file       family history is not on file. High blood pressure mom/dad    No Known Allergies    Medication:  Current Facility-Administered Medications   Medication Dose Route Frequency Provider Last Rate Last Admin    sennosides-docusate (PERICOLACE) 8.6-50 MG per tablet 2 tablet  2 tablet Oral BID Brijesh Em Jr., MD   2 tablet at 06/14/24 2048    And    polyethylene glycol (MIRALAX) packet 17 g  17 g Oral Daily PRN Brijesh Em Jr., MD        And    bisacodyl (DULCOLAX) EC tablet 5 mg  5 mg Oral Daily PRN Briejsh Em Jr., MD        And    bisacodyl (DULCOLAX) suppository 10 mg  10 mg Rectal Daily PRN Brijesh Em Jr., MD        Calcium Replacement - Follow Nurse / BPA Driven Protocol   Does not apply PRN Brijesh Em Jr., MD        clindamycin (CLEOCIN) 900 mg in dextrose 5% 50 mL IVPB (premix)  900 mg Intravenous Q8H Brijesh Em Jr.,  mL/hr at 06/15/24 0608 900 mg at 06/15/24 0608    DAPTOmycin (CUBICIN) 400 mg in sodium chloride 0.9 % 50 mL IVPB  6 mg/kg Intravenous Q24H Brijesh Em Jr.,  mL/hr at 06/14/24 1838 400 mg at 06/14/24 1838    dextrose (D50W) (25 g/50 mL) IV injection 25 g  25 g Intravenous Q15 Min PRN Brijesh Em Jr., MD        dextrose (GLUTOSE) oral gel 15 g  15 g Oral Q15 Min PRN Brijesh Em Jr., MD        glucagon (GLUCAGEN) injection 1 mg  1 mg Intramuscular Q15 Min PRN Brijesh Em Jr., MD        HYDROcodone-acetaminophen (NORCO) 5-325 MG per tablet 1 tablet  1 tablet Oral Q4H PRN Brijesh Em Jr., MD   1 tablet at 06/15/24 0122    HYDROmorphone (DILAUDID) injection 0.5 mg  0.5 mg Intravenous Q4H PRN Brijesh Em Jr., MD   0.5 mg at 06/14/24 1817    insulin glargine (LANTUS, SEMGLEE) injection 15 Units  15 Units Subcutaneous Nightly Armida Malloy MD   15 " Units at 06/14/24 2048    Insulin Lispro (humaLOG) injection 5 Units  5 Units Subcutaneous TID With Meals Armida Malloy MD   5 Units at 06/14/24 1718    lactated ringers infusion  9 mL/hr Intravenous Continuous Brijesh Em Jr., MD   Stopped at 06/13/24 1022    lactobacillus acidophilus (RISAQUAD) capsule 1 capsule  1 capsule Oral Daily Brijesh Em Jr., MD   1 capsule at 06/14/24 0812    Magnesium Standard Dose Replacement - Follow Nurse / BPA Driven Protocol   Does not apply PRN Brijesh Em Jr., MD        morphine injection 2 mg  2 mg Intravenous Q4H PRN Brijesh Em Jr., MD        nitroglycerin (NITROSTAT) SL tablet 0.4 mg  0.4 mg Sublingual Q5 Min PRN Brijesh Em Jr., MD        oxyCODONE (ROXICODONE) immediate release tablet 5 mg  5 mg Oral Q4H PRN Armida Malloy MD   5 mg at 06/14/24 1511    pantoprazole (PROTONIX) injection 40 mg  40 mg Intravenous BID AC Jamia Jones PA-C   40 mg at 06/14/24 1718    Phosphorus Replacement - Follow Nurse / BPA Driven Protocol   Does not apply PRN Brijesh Em Jr., MD        piperacillin-tazobactam (ZOSYN) 4.5 g IVPB in 100 mL NS MBP (CD)  4.5 g Intravenous Q8H Brijesh Em Jr., MD   4.5 g at 06/15/24 0123    Potassium Replacement - Follow Nurse / BPA Driven Protocol   Does not apply PRN Brijesh Em Jr., MD        ropivacaine (NAROPIN) 0.2 % infusion (INFUSYSTEM)   Peripheral Nerve Continuous Brijesh Em Jr., MD   1,000 mg at 06/13/24 1124    sodium chloride 0.9 % flush 10 mL  10 mL Intravenous Q12H Brijesh Em Jr., MD   10 mL at 06/14/24 0812    sodium chloride 0.9 % flush 10 mL  10 mL Intravenous PRN Brijesh Em Jr., MD        sodium chloride 0.9 % infusion 40 mL  40 mL Intravenous PRN Brijesh Em Jr., MD           Antibiotics:  Anti-Infectives (From admission, onward)      Ordered     Dose/Rate Route Frequency Start Stop    06/13/24 0812  DAPTOmycin (CUBICIN) 400 mg in sodium chloride 0.9 %  50 mL IVPB        Ordering Provider: Brijesh Em Jr., MD    6 mg/kg × 68 kg  100 mL/hr over 30 Minutes Intravenous Every 24 Hours 06/13/24 1800 06/23/24 1759    06/12/24 1746  piperacillin-tazobactam (ZOSYN) 4.5 g IVPB in 100 mL NS MBP (CD)        Ordering Provider: Brijesh Em Jr., MD    4.5 g  over 4 Hours Intravenous Every 8 Hours 06/13/24 0100 06/18/24 0059    06/12/24 1746  clindamycin (CLEOCIN) 900 mg in dextrose 5% 50 mL IVPB (premix)        Ordering Provider: Brijesh Em Jr., MD    900 mg  100 mL/hr over 30 Minutes Intravenous Every 8 Hours 06/12/24 2200 06/17/24 2159    06/12/24 1746  piperacillin-tazobactam (ZOSYN) 4.5 g IVPB in 100 mL NS MBP (CD)        Ordering Provider: Shraddha Scott MD    4.5 g  over 30 Minutes Intravenous Once 06/12/24 1900 06/13/24 0907              Review of Systems    6/15/24     Constitutional-- No Fever, chills or sweats.  Appetite diminished with fatigue.  Heent-- No new vision, hearing or throat complaints.  No epistaxis or oral sores.  Denies odynophagia or dysphagia.  No flashers, floaters or eye pain. No odynophagia or dysphagia. No headache, photophobia or neck stiffness.  CV-- No chest pain, palpitation or syncope  Resp-- No SOB/cough/Hemoptysis  GI- No nausea, vomiting, or diarrhea.  No hematochezia, melena, or hematemesis. Denies jaundice or chronic liver disease.  -- No dysuria, hematuria, or flank pain.  Denies hesitancy, urgency or flank pain.  Lymph- no swollen lymph nodes in neck/axilla or groin.   Heme- No active bruising or bleeding; no Hx of DVT or PE.  MS-- aside from above, no swelling or pain in the bones or joints of arms/legs.  No new back pain.  Neuro-- No acute focal weakness or numbness in the arms or legs.  No seizures.    Full 12 point review of systems reviewed and negative otherwise for acute complaints, except for above    Physical Exam:   Vital Signs   /87 (BP Location: Left arm, Patient Position: Lying)   Pulse 89    "Temp 98.2 °F (36.8 °C) (Oral)   Resp 22   Ht 172.7 cm (68\")   Wt 68 kg (150 lb)   SpO2 94%   BMI 22.81 kg/m²     GENERAL: Awake and alert, in no acute distress.   HEENT: Normocephalic, atraumatic.   No conjunctival injection. No icterus. Oropharynx clear without evidence of thrush or exudate. No evidence of periodontal disease.    NECK: Supple without nuchal rigidity. No mass.  LYMPH: No cervical, axillary or inguinal lymphadenopathy.  HEART: RRR; No murmur, rubs, gallops.   LUNGS: Clear to auscultation bilaterally without wheezing, rales, rhonchi. Normal respiratory effort. Nonlabored. No dullness.  ABDOMEN: Soft, nontender, nondistended. Positive bowel sounds. No rebound or guarding. NO mass or HSM.  EXT:  see below.     MSK: FROM without joint effusions noted arms/legs.    SKIN: Warm and dry without cutaneous eruptions on Inspection/palpation.      Left   BKA stump covered;  no new visible redness or purulence; no discrete fluctuance.  No crepitus    Laboratory Data    Results from last 7 days   Lab Units 06/15/24  0443 06/14/24  2021 06/14/24  1051 06/14/24  0457 06/13/24  0403 06/12/24  1839   WBC 10*3/mm3 22.75*  --   --   --  18.48* 18.84*   HEMOGLOBIN g/dL 11.2* 10.3* 10.8*   < > 10.5* 11.0*   HEMATOCRIT % 32.1* 30.0* 31.4*   < > 30.5* 31.5*   PLATELETS 10*3/mm3 168  --   --   --  122* 142    < > = values in this interval not displayed.     Results from last 7 days   Lab Units 06/15/24  0443   SODIUM mmol/L 129*   POTASSIUM mmol/L 4.1   CHLORIDE mmol/L 94*   CO2 mmol/L 25.0   BUN mg/dL 43*   CREATININE mg/dL 0.85   GLUCOSE mg/dL 231*   CALCIUM mg/dL 7.4*     Results from last 7 days   Lab Units 06/14/24  0356   ALK PHOS U/L 173*   BILIRUBIN mg/dL 2.1*   BILIRUBIN DIRECT mg/dL 1.5*   ALT (SGPT) U/L 30   AST (SGOT) U/L 39         Results from last 7 days   Lab Units 06/12/24  0802   CRP mg/dL 24.89*       Estimated Creatinine Clearance: 88.9 mL/min (by C-G formula based on SCr of 0.85 " mg/dL).      Microbiology:      Radiology:  Imaging Results (Last 72 Hours)       Procedure Component Value Units Date/Time    MRI Foot Left Without Contrast [[id number]] Collected: 06/13/24 0758     Updated: 06/13/24 0823    Narrative:        MRI FOOT LEFT WO CONTRAST    Date of Exam: 6/13/2024 1:00 AM EDT    Indication: gangrene. Soft tissue edema. Soft tissue gas.     Comparison: Foot x-ray 6/12/2024    Technique:  Routine multiplanar/multisequence sequence images of the left foot were obtained without contrast administration.      Findings:  The bone marrow signal intensity is normal without findings of osteomyelitis at this time. There is midfoot degenerative change. There is hindfoot degenerative change. There is heterogeneous signal intensity in the distal tibia and fibula of uncertain   etiology is not characteristic osteomyelitis. Could be seen with osteonecrosis. There is diffuse soft tissue edema. There is skin thickening. There is soft tissue abscess at the dorsal aspect of the forefoot medially. This measures 3.2 x 1.5 x 3.5 cm.   There is soft tissue gas within this collection. There appears to be a defect in the skin surface. There is multiloculated fluid and gas seen along the lateral aspect of the foot dorsally. One collection along the fifth metatarsal measures approximately   2.5 x 1.3 x 4.2 cm. An area along the lateral aspect of the hindfoot/ankle the level lateral malleolus measures 4.3 x 6 x 1.7 cm. There is fluid distending the extensor tendons likely due to infectious tenosynovitis. There are small areas of fluid signal   intensity within the plantar musculature of the foot likely due to myositis. There is fluid within the flexor tendon sheaths and peroneal tendon sheaths that is suspicious for infectious tenosynovitis. There is a moderate tibiotalar joint effusion. Soft   tissue gas is seen extending proximally into the lower leg as there is fluid collection that is seen tracking posteriorly  into the lower leg not completely included in the field-of-view. This is seen on the large field-of-view sagittal STIR image and   measures at least 13 cm cranial caudal, reference image #8. There is thickening of plantar fascia.      Impression:      Impression:  No findings of osteomyelitis at this time.  Extensive soft tissue infection with abscesses along the foot to the ankle and into the lower leg. Soft tissue gas. Gas-forming organism is possible. There are also intramuscular smaller abscesses in the foot.  Arthritis.  Infectious tenosynovitis.        Electronically Signed: Riri Costa MD    6/13/2024 8:19 AM EDT    Workstation ID: [id number]              Impression:     --acute sepsis as per admission notes, severe left foot infection with gangrene/cellulitis and concern for necrotizing soft tissue infection by imaging, urgent surgical arrangements made June 13.  Empiric broad-spectrum antimicrobials ongoing.  He knows risk for persistent/recurrent or nonhealing wounds, persistet / progressive or recurrent infection and risk for further amputation/functional-limb loss and chronic pain.  Associated with bacteremia as below.    --Acute/severe left foot infection as above, urgent surgical arrangements  made 6/13    --Acute gb strep bacteremia,   source from foot.  High risk for further serious morbidity and other serious sequela including dire consequences and metastatic foci of involvement/endovascular sequela etc. No new metastatic focus of involvement.    --Right hip fracture with repair February 2024.  Denies any new pain at the hip; CT scan had raise concern for gas/fluid collection at the anterior aspect of the hip arthroplasty.  No redness or other suppurative change at surface.  Need further clarification from orthopedics as to significance of these radiographic findings and any further diagnostic/interventional workup at their discretion    --diabetes.  Described as uncontrolled by medicine team at  admission.  Glucose control per medicine team      PLAN:      --IV  Zosyn, clindamycin; anticipate taper antibiotics depending on culture data/postop clinical course and surgical findings    **wound culture mixed with  MSSA Klebsiella and strep    --Check/review labs cultures and scans    --Partial history per nursing staff    --Discussed with microbiology    --Highly complex at of issues with high risk for further serious morbidity and other serious sequela    --If no MDR pathogens, anticipate taper IV antibiotics further in the next 24-48 hours    Copied text in this note has been reviewed and is accurate as of 06/15/24.        Cleve Bills MD  6/15/2024

## 2024-06-15 NOTE — PROGRESS NOTES
"GI Daily Progress Note  Subjective:    Chief Complaint:  Follow up dark stool, positive fecal occult     Denies any recurrence in melena overnight.   Denies nausea, vomiting nor abdominal pain.  Awaiting repeat debridement of his right lower below the knee amputation stump.        Objective:    /80 (BP Location: Left arm, Patient Position: Lying)   Pulse 89   Temp 98.2 °F (36.8 °C) (Oral)   Resp 20   Ht 172.7 cm (68\")   Wt 68 kg (150 lb)   SpO2 94%   BMI 22.81 kg/m²     Physical Exam  Constitutional:       Comments: Awake, alert.  Chronically ill appearing    Abdominal:      General: Bowel sounds are normal. There is no distension.      Palpations: Abdomen is soft.      Tenderness: There is no abdominal tenderness.   Neurological:      Mental Status: He is oriented to person, place, and time.       Lab  Lab Results   Component Value Date    WBC 22.75 (H) 06/15/2024    HGB 11.2 (L) 06/15/2024    HGB 10.3 (L) 06/14/2024    HGB 10.8 (L) 06/14/2024    MCV 93.6 06/15/2024     06/15/2024    INR 1.63 (H) 06/14/2024     Lab Results   Component Value Date    GLUCOSE 231 (H) 06/15/2024    BUN 43 (H) 06/15/2024    CREATININE 0.85 06/15/2024    BCR 50.6 (H) 06/15/2024     (L) 06/15/2024    K 4.1 06/15/2024    CO2 25.0 06/15/2024    CALCIUM 7.4 (L) 06/15/2024    ALBUMIN 2.0 (L) 06/14/2024    ALKPHOS 173 (H) 06/14/2024    BILITOT 2.1 (H) 06/14/2024    BILIDIR 1.5 (H) 06/14/2024    ALT 30 06/14/2024    AST 39 06/14/2024     Assessment:    Possible gastrointestinal bleeding with melena.   H&H stable.     Acute blood loss anemia, stable  Left foot wet gangrene s/p left BKA, POD # 2  Elevated LFTs, thrombocytopenia.  Suspect advanced liver fibrosis/early cirrhosis.    Denies any alcohol use.       Uncontrolled type II DM     Plan:    H&H stable at 11.2 and 32.  No recurrence in dark stool.    Viral hepatitis panel was negative.         >> Recommend medical management of possible melena with BID PPI for 1 " month.    >> Will need outpatient follow up with [facility] regarding elevated LFTs/thrombocytopenia, concerns of early cirrhosis.   Can pursue liver ultrasound with elastography after recovery of acute illness.       >> Optimize glycemic control.       Will sign off.  Please call for questions or concerns.      BERNARD Low  06/15/24  09:28 EDT

## 2024-06-15 NOTE — OP NOTE
[facility]     OPERATIVE REPORT      PATIENT NAME:  Easton Alvarez                            YOB: 1965       PREOP DIAGNOSIS:   Left below knee amputation    POSTOP DIAGNOSIS:   Same.    PROCEDURE:   Left     02185: Secondary closure below-knee amputation    SURGEON:     Brijesh Em MD    OPERATIVE TEAM:   Circulator: Tawny Ocampo RN  Scrub Person: Marsha Pereira    ANESTHETIST:  Anesthesiologist: Irineo Cuevas MD  CRNA: Bia Brasher CRNA    ANESTHESIA:   Choice    ESTIMATED BLOOD LOSS:   < 30 ml    TOURNIQUET TIME:   Not utilized.    FINDINGS:     Remaining tissue appeared healthy.  Tension-free wound closure.  No overt signs or symptoms of infection in the residual limb.    IMPLANTS:     None.    SPECIMENS:   Swab specimens sent for culture.    COMPLICATIONS:    None.    DISPOSITION:    Stable to recovery.     INDICATIONS:    58 y.o. male status post Left above amputation with wound vacuum assisted closure.  I had a discussion with the patient regarding further management.  After discussion of risks, benefits, alternatives, they were amenable to Left secondary closure of below-knee amputation versus wound vacuum assisted closure change.  Risks of surgery were discussed which included but were not limited to pain, bleeding, infection, damage to adjacent structures, need for further surgery, wound healing complications, loss of limb and death.  The patient expressed verbal consent and written consent was obtained for the above procedure.    NARRATIVE:    Patient was identified in preoperative holding.  Operative site was marked in indelible ink.  History, physical, consent were reviewed and updated.  Patient was surrendered to the anesthesia team and transported to the operative suite where anesthesia was induced.  Hip bump was placed on the ipsilateral side and nonsterile thigh tourniquet was placed.  Nonsterile dressing was placed over the foot and Ioban was placed  over this.  Operative extremity was prepped and draped in the usual sterile fashion, prepping over the Ioban.  The operative team donned sterile gowns and gloves and a timeout was called.  All in attendance agreed regarding the patient's identity, proposed operative procedure, and operative site.    Tourniquet was not utilized during this case.    I inspected the wound, noted healthy tissue throughout, no evidence of tissue necrosis or infection.    I took swab specimens from the wound which I sent for culture.    I copiously irrigated the wound with Irrisept and saline.    I placed vancomycin powder into the wound.  I placed a deep drain exiting superior laterally which was sewn in place.  I then closed in anatomic layers with the monofilament suture, skin was closed with staples.    Sterile dressing applied.  Anesthesia was concluded and the patient was transported to the PACU in stable condition.  Counts were correct x2.  There were no apparent complications.  I was present and scrubbed for the entire case.    Brijesh Em Jr, MD  6/15/2024

## 2024-06-16 LAB
ALBUMIN SERPL-MCNC: 1.8 G/DL (ref 3.5–5.2)
ALBUMIN/GLOB SERPL: 0.5 G/DL
ALP SERPL-CCNC: 232 U/L (ref 39–117)
ALT SERPL W P-5'-P-CCNC: 33 U/L (ref 1–41)
ANION GAP SERPL CALCULATED.3IONS-SCNC: 6 MMOL/L (ref 5–15)
AST SERPL-CCNC: 49 U/L (ref 1–40)
BASOPHILS # BLD AUTO: 0.13 10*3/MM3 (ref 0–0.2)
BASOPHILS NFR BLD AUTO: 0.5 % (ref 0–1.5)
BILIRUB SERPL-MCNC: 1.9 MG/DL (ref 0–1.2)
BUN SERPL-MCNC: 29 MG/DL (ref 8–23)
BUN/CREAT SERPL: 40.3 (ref 7–25)
CALCIUM SPEC-SCNC: 7.7 MG/DL (ref 8.6–10.5)
CHLORIDE SERPL-SCNC: 101 MMOL/L (ref 98–107)
CO2 SERPL-SCNC: 27 MMOL/L (ref 22–29)
CREAT SERPL-MCNC: 0.72 MG/DL (ref 0.76–1.27)
DEPRECATED RDW RBC AUTO: 47.1 FL (ref 37–54)
EGFRCR SERPLBLD CKD-EPI 2021: 104.6 ML/MIN/1.73
EOSINOPHIL # BLD AUTO: 0.1 10*3/MM3 (ref 0–0.4)
EOSINOPHIL NFR BLD AUTO: 0.4 % (ref 0.3–6.2)
ERYTHROCYTE [DISTWIDTH] IN BLOOD BY AUTOMATED COUNT: 13.8 % (ref 12.3–15.4)
GLOBULIN UR ELPH-MCNC: 4 GM/DL
GLUCOSE BLDC GLUCOMTR-MCNC: 144 MG/DL (ref 70–130)
GLUCOSE BLDC GLUCOMTR-MCNC: 152 MG/DL (ref 70–130)
GLUCOSE BLDC GLUCOMTR-MCNC: 191 MG/DL (ref 70–130)
GLUCOSE BLDC GLUCOMTR-MCNC: 229 MG/DL (ref 70–130)
GLUCOSE SERPL-MCNC: 175 MG/DL (ref 65–99)
HCT VFR BLD AUTO: 33.5 % (ref 37.5–51)
HGB BLD-MCNC: 11.5 G/DL (ref 13–17.7)
IMM GRANULOCYTES # BLD AUTO: 0.77 10*3/MM3 (ref 0–0.05)
IMM GRANULOCYTES NFR BLD AUTO: 3 % (ref 0–0.5)
LYMPHOCYTES # BLD AUTO: 1.29 10*3/MM3 (ref 0.7–3.1)
LYMPHOCYTES NFR BLD AUTO: 5.1 % (ref 19.6–45.3)
MCH RBC QN AUTO: 31.9 PG (ref 26.6–33)
MCHC RBC AUTO-ENTMCNC: 34.3 G/DL (ref 31.5–35.7)
MCV RBC AUTO: 92.8 FL (ref 79–97)
MONOCYTES # BLD AUTO: 1.07 10*3/MM3 (ref 0.1–0.9)
MONOCYTES NFR BLD AUTO: 4.2 % (ref 5–12)
NEUTROPHILS NFR BLD AUTO: 22.05 10*3/MM3 (ref 1.7–7)
NEUTROPHILS NFR BLD AUTO: 86.8 % (ref 42.7–76)
NRBC BLD AUTO-RTO: 0 /100 WBC (ref 0–0.2)
PLATELET # BLD AUTO: 174 10*3/MM3 (ref 140–450)
PMV BLD AUTO: 9.8 FL (ref 6–12)
POTASSIUM SERPL-SCNC: 4.3 MMOL/L (ref 3.5–5.2)
PROT SERPL-MCNC: 5.8 G/DL (ref 6–8.5)
RBC # BLD AUTO: 3.61 10*6/MM3 (ref 4.14–5.8)
SODIUM SERPL-SCNC: 134 MMOL/L (ref 136–145)
WBC NRBC COR # BLD AUTO: 25.41 10*3/MM3 (ref 3.4–10.8)

## 2024-06-16 PROCEDURE — 82948 REAGENT STRIP/BLOOD GLUCOSE: CPT

## 2024-06-16 PROCEDURE — 63710000001 INSULIN GLARGINE PER 5 UNITS: Performed by: ORTHOPAEDIC SURGERY

## 2024-06-16 PROCEDURE — 25010000002 PIPERACILLIN SOD-TAZOBACTAM PER 1 G: Performed by: INTERNAL MEDICINE

## 2024-06-16 PROCEDURE — 80053 COMPREHEN METABOLIC PANEL: CPT | Performed by: ORTHOPAEDIC SURGERY

## 2024-06-16 PROCEDURE — 85025 COMPLETE CBC W/AUTO DIFF WBC: CPT | Performed by: ORTHOPAEDIC SURGERY

## 2024-06-16 PROCEDURE — 63710000001 INSULIN LISPRO (HUMAN) PER 5 UNITS: Performed by: ORTHOPAEDIC SURGERY

## 2024-06-16 PROCEDURE — 25010000002 CLINDAMYCIN 900 MG/50ML SOLUTION: Performed by: ORTHOPAEDIC SURGERY

## 2024-06-16 PROCEDURE — 99232 SBSQ HOSP IP/OBS MODERATE 35: CPT | Performed by: NURSE PRACTITIONER

## 2024-06-16 RX ADMIN — Medication 10 ML: at 21:25

## 2024-06-16 RX ADMIN — INSULIN GLARGINE 20 UNITS: 100 INJECTION, SOLUTION SUBCUTANEOUS at 21:24

## 2024-06-16 RX ADMIN — PIPERACILLIN AND TAZOBACTAM 4.5 G: 4; .5 INJECTION, POWDER, FOR SOLUTION INTRAVENOUS at 09:04

## 2024-06-16 RX ADMIN — PANTOPRAZOLE SODIUM 40 MG: 40 INJECTION, POWDER, FOR SOLUTION INTRAVENOUS at 09:04

## 2024-06-16 RX ADMIN — SENNOSIDES AND DOCUSATE SODIUM 2 TABLET: 8.6; 5 TABLET ORAL at 09:01

## 2024-06-16 RX ADMIN — PANTOPRAZOLE SODIUM 40 MG: 40 INJECTION, POWDER, FOR SOLUTION INTRAVENOUS at 16:58

## 2024-06-16 RX ADMIN — Medication 5 MG: at 21:24

## 2024-06-16 RX ADMIN — SENNOSIDES AND DOCUSATE SODIUM 2 TABLET: 8.6; 5 TABLET ORAL at 21:24

## 2024-06-16 RX ADMIN — INSULIN LISPRO 5 UNITS: 100 INJECTION, SOLUTION INTRAVENOUS; SUBCUTANEOUS at 18:11

## 2024-06-16 RX ADMIN — HYDROCODONE BITARTRATE AND ACETAMINOPHEN 1 TABLET: 5; 325 TABLET ORAL at 13:44

## 2024-06-16 RX ADMIN — PIPERACILLIN AND TAZOBACTAM 4.5 G: 4; .5 INJECTION, POWDER, FOR SOLUTION INTRAVENOUS at 23:33

## 2024-06-16 RX ADMIN — HYDROCODONE BITARTRATE AND ACETAMINOPHEN 1 TABLET: 5; 325 TABLET ORAL at 09:20

## 2024-06-16 RX ADMIN — INSULIN LISPRO 5 UNITS: 100 INJECTION, SOLUTION INTRAVENOUS; SUBCUTANEOUS at 09:05

## 2024-06-16 RX ADMIN — INSULIN LISPRO 4 UNITS: 100 INJECTION, SOLUTION INTRAVENOUS; SUBCUTANEOUS at 21:24

## 2024-06-16 RX ADMIN — HYDROCODONE BITARTRATE AND ACETAMINOPHEN 1 TABLET: 5; 325 TABLET ORAL at 18:11

## 2024-06-16 RX ADMIN — CLINDAMYCIN IN 5 PERCENT DEXTROSE 900 MG: 18 INJECTION, SOLUTION INTRAVENOUS at 13:44

## 2024-06-16 RX ADMIN — CLINDAMYCIN IN 5 PERCENT DEXTROSE 900 MG: 18 INJECTION, SOLUTION INTRAVENOUS at 21:24

## 2024-06-16 RX ADMIN — PIPERACILLIN AND TAZOBACTAM 4.5 G: 4; .5 INJECTION, POWDER, FOR SOLUTION INTRAVENOUS at 16:00

## 2024-06-16 RX ADMIN — INSULIN LISPRO 2 UNITS: 100 INJECTION, SOLUTION INTRAVENOUS; SUBCUTANEOUS at 09:05

## 2024-06-16 RX ADMIN — CLINDAMYCIN IN 5 PERCENT DEXTROSE 900 MG: 18 INJECTION, SOLUTION INTRAVENOUS at 06:00

## 2024-06-16 RX ADMIN — INSULIN LISPRO 5 UNITS: 100 INJECTION, SOLUTION INTRAVENOUS; SUBCUTANEOUS at 13:44

## 2024-06-16 RX ADMIN — Medication 10 ML: at 09:04

## 2024-06-16 RX ADMIN — INSULIN LISPRO 2 UNITS: 100 INJECTION, SOLUTION INTRAVENOUS; SUBCUTANEOUS at 16:58

## 2024-06-16 RX ADMIN — Medication 1 CAPSULE: at 09:04

## 2024-06-16 NOTE — PROGRESS NOTES
"Easton Alvarez       LOS: 4 days   Patient Care Team:  Provider, No Known as PCP - General    Chief Complaint:  left foot / leg necrotizing soft tissue infection.    Subjective     Interval History:     Pain tolerable overnight    Review of Systems:      Gen- No fevers, chills  CV- No chest pain, palpitations  Resp- No cough, dyspnea  GI- No N/V/D, abd pain    Objective     Vital Signs  Vital Signs (last 24 hours)         06/13 0700  06/14 0659 06/14 0700  06/14 0839   Most Recent      Temp (°F) 97 -  98.7      98.1     98.1 (36.7) 06/14 0724    Heart Rate 75 -  84       81 06/14 0340    Resp 13 -  20      20     20 06/14 0724    BP 97/62 -  145/70      149/85     149/85 06/14 0724    SpO2 (%) 96 -  99       98 06/14 0340    Flow (L/min) 2 -  4       2 06/13 1100              Physical Exam:     Alert, oriented.  No acute distress.  Nonlabored respirations.  Regular rate and rhythm.  Abdomen nondistended.  Left lower extremity: Operative dressing intact, drain with minimal serosanguineous output.     Results Review:     I reviewed the patient's new clinical results.    Medication Review:   Hospital Medications (active)         Dose Frequency Start End    bisacodyl (DULCOLAX) EC tablet 5 mg 5 mg Daily PRN 6/12/2024 --    Admin Instructions: Use if no bowel movement after 12 hours.  Swallow whole. Do not crush, split, or chew tablet.    Route: Oral    Linked Group 1: Placed in \"And\" Linked Group        bisacodyl (DULCOLAX) suppository 10 mg 10 mg Daily PRN 6/12/2024 --    Admin Instructions: Use if no bowel movement after 12 hours.  Hold for diarrhea    Route: Rectal    Linked Group 1: Placed in \"And\" Linked Group        Calcium Replacement - Follow Nurse / BPA Driven Protocol  As Needed 6/13/2024 --    Admin Instructions: Open Order & Select \"BHS Electrolyte Replacement Protocol Algorithm\" to View Details    Route: Does not apply    clindamycin (CLEOCIN) 900 mg in dextrose 5% 50 mL IVPB (premix) 900 mg Every 8 Hours " 6/12/2024 6/17/2024    Admin Instructions: Do Not refrigerate.    Route: Intravenous    DAPTOmycin (CUBICIN) 400 mg in sodium chloride 0.9 % 50 mL IVPB 6 mg/kg × 68 kg Every 24 Hours 6/13/2024 6/23/2024    Admin Instructions: Caution: Look alike/sound alike drug alert.  Refrigerate. Do not shake.    Route: Intravenous    dextrose (D50W) (25 g/50 mL) IV injection 25 g 25 g Every 15 Minutes PRN 6/12/2024 --    Admin Instructions: Blood sugar less than 70; patient has IV access - Unresponsive, NPO or Unable To Safely Swallow    Route: Intravenous    dextrose (GLUTOSE) oral gel 15 g 15 g Every 15 Minutes PRN 6/12/2024 --    Admin Instructions: BS<70, Patient Alert, Is not NPO, Can safely swallow.    Route: Oral    glucagon (GLUCAGEN) injection 1 mg 1 mg Every 15 Minutes PRN 6/12/2024 --    Admin Instructions: Blood Glucose Less Than 70 - Patient Without IV Access - Unresponsive, NPO or Unable To Safely Swallow  Reconstitute powder for injection by adding 1 mL of -supplied sterile diluent or sterile water for injection to a vial containing 1 mg of the drug, to provide solutions containing 1 mg/mL. Shake vial gently to dissolve.    Route: Intramuscular    HYDROcodone-acetaminophen (NORCO) 5-325 MG per tablet 1 tablet 1 tablet Every 4 Hours PRN 6/12/2024 6/17/2024    Admin Instructions: Based on patient request - if ordered for moderate or severe pain, provider allows for administration of a medication prescribed for a lower pain scale.  [ISRAEL]    Do not exceed 4 grams of acetaminophen in a 24 hr period. Max dose of 2gm for AST/ALT greater than 120 units/L        If given for pain, use the following pain scale:   Mild Pain = Pain Score of 1-3, CPOT 1-2  Moderate Pain = Pain Score of 4-6, CPOT 3-4  Severe Pain = Pain Score of 7-10, CPOT 5-8    Route: Oral    HYDROmorphone (DILAUDID) injection 0.5 mg 0.5 mg Every 4 Hours PRN 6/12/2024 6/17/2024    Admin Instructions: Based on patient request - if ordered for  "moderate or severe pain, provider allows for administration of a medication prescribed for a lower pain scale.  If given for pain, use the following pain scale:  Mild Pain = Pain Score of 1-3, CPOT 1-2  Moderate Pain = Pain Score of 4-6, CPOT 3-4  Severe Pain = Pain Score of 7-10, CPOT 5-8    Route: Intravenous    insulin glargine (LANTUS, SEMGLEE) injection 10 Units 10 Units Nightly 6/13/2024 --    Admin Instructions: Do not hold basal insulin without an order. Consider requesting a dose edit, if needed.      Route: Subcutaneous    insulin regular (humuLIN R,novoLIN R) injection 2-7 Units 2-7 Units 4 Times Daily Before Meals & Nightly 6/13/2024 --    Admin Instructions: Correction Insulin - Low Dose - Total Insulin Dose Less Than 40 units/day (Lean, Elderly or Renal Patients)    Blood Glucose 150-199 mg/dL - 2 units  Blood Glucose 200-249 mg/dL - 3 units  Blood Glucose 250-299 mg/dL - 4 units  Blood Glucose 300-349 mg/dL - 5 units  Blood Glucose 350-400 mg/dL - 6 units  Blood Glucose Greater Than 400 mg/dL - 7 units & Call Provider   Caution: Look alike/sound alike drug alert    Route: Subcutaneous    lactated ringers infusion 100 mL/hr Continuous 6/12/2024 --    Route: Intravenous    lactated ringers infusion 9 mL/hr Continuous 6/13/2024 --    Admin Instructions: May switch to NS IV at KVO if renal / if indicated    Route: Intravenous    lactobacillus acidophilus (RISAQUAD) capsule 1 capsule 1 capsule Daily 6/12/2024 --    Route: Oral    Magnesium Standard Dose Replacement - Follow Nurse / BPA Driven Protocol  As Needed 6/13/2024 --    Admin Instructions: Open Order & Select \"BHS Electrolyte Replacement Protocol Algorithm\" to View Details    Route: Does not apply    morphine injection 2 mg 2 mg Every 4 Hours PRN 6/13/2024 6/18/2024    Admin Instructions: Based on patient request - if ordered for moderate or severe pain, provider allows for administration of a medication prescribed for a lower pain scale.  If given " "for pain, use the following pain scale:  Mild Pain = Pain Score of 1-3, CPOT 1-2  Moderate Pain = Pain Score of 4-6, CPOT 3-4  Severe Pain = Pain Score of 7-10, CPOT 5-8    Route: Intravenous    nitroglycerin (NITROSTAT) SL tablet 0.4 mg 0.4 mg Every 5 Minutes PRN 6/12/2024 --    Admin Instructions: If Pain Unrelieved After 3 Doses Notify MD  May administer up to 3 doses per episode.    Route: Sublingual    oxyCODONE (ROXICODONE) immediate release tablet 5 mg 5 mg Every 4 Hours PRN 6/13/2024 6/20/2024    Admin Instructions: Based on patient request - if ordered for moderate or severe pain, provider allows for administration of a medication prescribed for a lower pain scale.    If given for pain, use the following pain scale:  Mild Pain = Pain Score of 1-3, CPOT 1-2  Moderate Pain = Pain Score of 4-6, CPOT 3-4  Severe Pain = Pain Score of 7-10, CPOT 5-8    Route: Oral    pantoprazole (PROTONIX) injection 40 mg 40 mg 2 Times Daily Before Meals 6/14/2024 --    Admin Instructions: Dilute with 10 mL of 0.9% NaCl and give IV push over 2 minutes.    Route: Intravenous    Phosphorus Replacement - Follow Nurse / BPA Driven Protocol  As Needed 6/13/2024 --    Admin Instructions: Open Order & Select \"BHS Electrolyte Replacement Protocol Algorithm\" to View Details    Route: Does not apply    piperacillin-tazobactam (ZOSYN) 4.5 g IVPB in 100 mL NS MBP (CD) 4.5 g Every 8 Hours 6/13/2024 6/18/2024    Route: Intravenous    polyethylene glycol (MIRALAX) packet 17 g 17 g Daily PRN 6/12/2024 --    Admin Instructions: Use if no bowel movement after 12 hours. Mix in 6-8 ounces of water.  Use 4-8 ounces of water, tea, or juice for each 17 gram dose.    Route: Oral    Linked Group 1: Placed in \"And\" Linked Group        Potassium Replacement - Follow Nurse / BPA Driven Protocol  As Needed 6/13/2024 --    Admin Instructions: Open Order & Select \"BHS Electrolyte Replacement Protocol Algorithm\" to View Details    Route: Does not apply    " "ropivacaine (NAROPIN) 0.2 % infusion (INFUSYSTEM)  Continuous 6/13/2024 --    Admin Instructions:  When Pt. In-House Infusion complete do not order refill until discussing with APS,  If pain greater than 7 out of 10, please call [phone] or [phone].  Thank you    Route: Peripheral Nerve    sennosides-docusate (PERICOLACE) 8.6-50 MG per tablet 2 tablet 2 tablet 2 Times Daily 6/12/2024 --    Admin Instructions: HOLD MEDICATION IF PATIENT HAS HAD BOWEL MOVEMENT. Start bowel management regimen if patient has not had a bowel movement after 12 hours.    Route: Oral    Linked Group 1: Placed in \"And\" Linked Group        sodium chloride 0.9 % flush 10 mL 10 mL Every 12 Hours Scheduled 6/12/2024 --    Route: Intravenous    sodium chloride 0.9 % flush 10 mL 10 mL As Needed 6/12/2024 --    Route: Intravenous    sodium chloride 0.9 % infusion 40 mL 40 mL As Needed 6/12/2024 --    Admin Instructions: Following administration of an IV intermittent medication, flush line with 40mL NS at 100mL/hr.    Route: Intravenous              Assessment & Plan     58-year-old male with left foot wet gangrene, necrotizing gas-forming soft tissue infection status post left below-knee amputation, wound vacuum-assisted closure June 13, 2024, delayed wound closure 6/15/24.      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  Follow cultures.  PT/OT.  Anticipate drain removal tomorrow.    To begin postoperative day three:    Daily dressing change:    Xeroform  4x4  Kerlix  Ace     Follow up in two weeks for wound check and xrays, [facility] location.    [address] [place] [address]  [address] [place] [address]    Please arrange follow up prior to discharge.    Call [phone] to schedule.     Brijesh Em Jr, MD  06/16/24  07:40 EDT           "

## 2024-06-16 NOTE — PROGRESS NOTES
[facility] Medicine Services  PROGRESS NOTE    Patient Name: Easton Alvarez  : 1965  MRN: [id number]    Date of Admission: 2024  Primary Care Physician: Provider, No Known    Subjective   Subjective     CC:  F/u left foot gangrene    HPI:  Patient resting in bed. Says his pain is controlled. Has nerve block in place. Says he has not had a BM for a couple days, so he has not seen any more melena.       Objective   Objective     Vital Signs:   Temp:  [97.1 °F (36.2 °C)-98.4 °F (36.9 °C)] 98 °F (36.7 °C)  Heart Rate:  [] 100  Resp:  [12-18] 18  BP: (135-172)/() 153/77  Flow (L/min):  [4] 4     Physical Exam:  Constitutional: No acute distress, awake, alert  HENT: NCAT, mucous membranes moist  Respiratory: Clear to auscultation bilaterally, respiratory effort jenni, room air   Cardiovascular: RRR, no murmurs, rubs, or gallops  Gastrointestinal: Positive bowel sounds, soft, nontender, distended  Musculoskeletal: 1+ right ankle edema, L BKA with JPEG drain with scant sanguinous drainage, nerve block  Psychiatric: Flat affect, cooperative  Neurologic: Oriented x 3, moves all extremities, Cranial Nerves grossly intact to confrontation, speech clear  Skin: No rashes      Results Reviewed:  LAB RESULTS:      Lab 24  0400 06/15/24  0443 24  2021 24  1218 24  1051 24  0457 24  0403 24  2225 24  1839 24  1451 24  1149 24  0807 24  0802   WBC 25.41* 22.75*  --   --   --   --  18.48*  --  18.84*  --   --   --  26.78*   HEMOGLOBIN 11.5* 11.2* 10.3*  --  10.8* 11.1* 10.5*  --  11.0*  --   --   --  12.3*   HEMATOCRIT 33.5* 32.1* 30.0*  --  31.4* 31.8* 30.5*  --  31.5*  --   --   --  35.5*   PLATELETS 174 168  --   --   --   --  122*  --  142  --   --   --  191   NEUTROS ABS 22.05*  --   --   --   --   --   --   --  16.57*  --   --   --  24.21*  23.83*   IMMATURE GRANS (ABS) 0.77*  --   --   --   --   --   --    --  0.34*  --   --   --  0.42*   LYMPHS ABS 1.29  --   --   --   --   --   --   --  0.83  --   --   --  0.67*   MONOS ABS 1.07*  --   --   --   --   --   --   --  1.01*  --   --   --  1.47*   EOS ABS 0.10  --   --   --   --   --   --   --  0.01  --   --   --  0.00   MCV 92.8 93.6  --   --   --   --  92.4  --  91.6  --   --   --  93.2   CRP  --   --   --   --   --   --   --   --   --   --   --   --  24.89*   LACTATE  --   --   --   --   --   --   --  3.6* 4.1* 3.9* 6.3* 8.8*  --    PROTIME  --   --   --  19.5*  --   --   --   --   --   --   --   --   --          Lab 06/16/24  0400 06/15/24  0443 06/14/24  0356 06/13/24  2032 06/13/24  1341 06/13/24  0403   SODIUM 134* 129* 129*  --  129* 131*   POTASSIUM 4.3 4.1 4.5 4.3 3.8 3.3*   CHLORIDE 101 94* 94*  --  97* 96*   CO2 27.0 25.0 24.0  --  23.0 26.0   ANION GAP 6.0 10.0 11.0  --  9.0 9.0   BUN 29* 43* 60*  --  56* 64*   CREATININE 0.72* 0.85 0.86  --  0.67* 0.91   EGFR 104.6 99.5 99.1  --  106.9 96.5   GLUCOSE 175* 231* 312*  --  260* 268*   CALCIUM 7.7* 7.4* 7.6*  --  7.5* 7.3*   MAGNESIUM  --  2.3 2.6*  --  2.4  --    PHOSPHORUS  --  2.7 3.4  --  3.5  --    HEMOGLOBIN A1C  --   --   --   --   --  9.50*         Lab 06/16/24  0400 06/14/24  0356 06/12/24  1839 06/12/24  0802   TOTAL PROTEIN 5.8* 5.4* 5.0* 5.6*   ALBUMIN 1.8* 2.0* 2.0* 2.7*   GLOBULIN 4.0  --  3.0 2.9   ALT (SGPT) 33 30 37 45*   AST (SGOT) 49* 39 46* 65*   BILIRUBIN 1.9* 2.1* 3.6* 4.7*   INDIRECT BILIRUBIN  --  0.6  --   --    BILIRUBIN DIRECT  --  1.5*  --   --    ALK PHOS 232* 173* 145* 171*         Lab 06/14/24  1218   PROTIME 19.5*   INR 1.63*                 Brief Urine Lab Results  (Last result in the past 365 days)        Color   Clarity   Blood   Leuk Est   Nitrite   Protein   CREAT   Urine HCG        06/14/24 1109 Yellow   Clear   Negative   Negative   Negative   Negative                   Microbiology Results Abnormal       Procedure Component Value - Date/Time    AFB Culture - Wound, Leg,  Left [[id number]] Collected: 06/15/24 1501    Lab Status: Preliminary result Specimen: Wound from Leg, Left Updated: 06/16/24 1216     AFB Stain No acid fast bacilli seen on concentrated smear    Blood Culture - Blood, Hand, Left [[id number]]  (Normal) Collected: 06/15/24 1104    Lab Status: Preliminary result Specimen: Blood from Hand, Left Updated: 06/16/24 1200     Blood Culture No growth at 24 hours    Blood Culture - Blood, Hand, Right [[id number]]  (Normal) Collected: 06/15/24 1104    Lab Status: Preliminary result Specimen: Blood from Hand, Right Updated: 06/16/24 1200     Blood Culture No growth at 24 hours    Wound Culture - Wound, Leg, Left [[id number]] Collected: 06/15/24 1501    Lab Status: Preliminary result Specimen: Wound from Leg, Left Updated: 06/16/24 0858     Gram Stain Rare (1+) WBCs seen      No organisms seen    Anaerobic Culture - Surgical Site, Leg, Left [[id number]]  (Normal) Collected: 06/13/24 1031    Lab Status: Preliminary result Specimen: Surgical Site from Leg, Left Updated: 06/16/24 0704     Anaerobic Culture No anaerobes isolated at 3 days    Blood Culture - Blood, Arm, Left [[id number]]  (Normal) Collected: 06/12/24 1832    Lab Status: Preliminary result Specimen: Blood from Arm, Left Updated: 06/15/24 1900     Blood Culture No growth at 3 days    Blood Culture - Blood, Hand, Right [[id number]]  (Normal) Collected: 06/12/24 1837    Lab Status: Preliminary result Specimen: Blood from Hand, Right Updated: 06/15/24 1900     Blood Culture No growth at 3 days    Fungus Smear - Surgical Site, Leg, Left [[id number]] Collected: 06/13/24 1031    Lab Status: Final result Specimen: Surgical Site from Leg, Left Updated: 06/14/24 1406     Fungal Stain No fungal elements seen    AFB Culture - Surgical Site, Leg, Left [[id number]] Collected: 06/13/24 1031    Lab Status: Preliminary result Specimen: Surgical Site from Leg, Left Updated: 06/14/24 1111     AFB Stain No acid fast bacilli seen on concentrated  smear            No radiology results from the last 24 hrs        Current medications:  Scheduled Meds:clindamycin, 900 mg, Intravenous, Q8H  insulin glargine, 20 Units, Subcutaneous, Nightly  insulin lispro, 2-9 Units, Subcutaneous, 4x Daily AC & at Bedtime  Insulin Lispro, 5 Units, Subcutaneous, TID With Meals  lactobacillus acidophilus, 1 capsule, Oral, Daily  melatonin, 5 mg, Oral, Nightly  pantoprazole, 40 mg, Intravenous, BID AC  piperacillin-tazobactam, 4.5 g, Intravenous, Q8H  senna-docusate sodium, 2 tablet, Oral, BID  sodium chloride, 10 mL, Intravenous, Q12H      Continuous Infusions:lactated ringers, 9 mL/hr, Last Rate: 9 mL/hr (06/15/24 1307)  lactated ringers, 9 mL/hr, Last Rate: 9 mL/hr (06/15/24 1345)  ropivacaine,       PRN Meds:.  senna-docusate sodium **AND** polyethylene glycol **AND** bisacodyl **AND** bisacodyl    Calcium Replacement - Follow Nurse / BPA Driven Protocol    dextrose    dextrose    glucagon (human recombinant)    HYDROcodone-acetaminophen    HYDROmorphone    Magnesium Standard Dose Replacement - Follow Nurse / BPA Driven Protocol    Morphine    nitroglycerin    oxyCODONE    Phosphorus Replacement - Follow Nurse / BPA Driven Protocol    Potassium Replacement - Follow Nurse / BPA Driven Protocol    sodium chloride    sodium chloride    Assessment & Plan   Assessment & Plan     Active Hospital Problems    Diagnosis  POA    **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        Brief Hospital Course to date:  Easton Alvarez is a 58 y.o. male  with past medical history significant for T2DM, diabetic neuropathy, right hip fracture/ORIF ([facility] 2/2024), and right transmetatarsal amputation 2019 at  who presented to the ED on 6/12/2024 due to left foot wound.  Orthopedic surgeon Dr. Em was consulted and performed a left below-knee amputation on  6/13/2024.  Infectious disease was consulted and is managing antimicrobial therapy.     This patient's problems and plans were partially entered by my partner and updated as appropriate by me 06/16/24.    Sepsis (WBC, lactate, tachypnea, source)    Acute GB strep bacteremia  Left foot cellulitis/gangrene   - lactate 8.8 in OSH ED; CT scan of leg shows gas gangrene  - MRI left foot revealed no findings of osteomyelitis, did show extensive soft tissue infection with abscesses along the foot to the ankle and into the lower leg with soft tissue gas.    -S/p left BKA on 6/13/24  with further debridement on 6/15/24 by Dr. Em, JP and nerve block in place  -blood cultures and wound cultures growing Streptococcus agalactiae  -ID, Dr. Flores, following, continue Zosyn, clindamycin for now with plan to taper IV abx in next 24-48 hours pending repeat cultures  - WBCs 22.75 >> 25.41, likely reactive, continue to monitor  - Add Boost glucose control to increase protein intake for wound healing, nutrition following  - PT wound care following  - PT and OT to evaluate  -AM CBC w/ diff, BMP     Gap acidosis POA, resolved   - AG 20.1, likely from lactate, serum acetone negative     Concern for GIB  -melena and fecal occult positive overnight 6/14  -GI consulted, recommended medical management for possible melena with twice daily PPI x 1 month  -Recommended to have outpatient GI follow-up for elevated LFTs, thrombocytopenia and concern for early cirrhosis, plan to purse liver US after recovery from acute illness     R hip fx repaired 2/2024  - noted gas near hardware per CT scan, r/o hardware-associated infection vs nl postop finding     DM, uncontrolled  - Pt reports is unaware of his diabetes medications and is not on any medications currently   -last records from 2019 at , he was discharged on Lantus 12 U with 5 U with meals   -HgA1c 9.5  -BG remains uncontrolled  -Lantus increased to 20 units nightly, mealtime insulin  added 5 units TID with improvement in BGs  -Continue current basal, prandial, and SS insulins, titrate     Expected Discharge Location and Transportation: Likely will need rehab, pt hoping to go home with family  Expected Discharge   Expected Discharge Date: 6/18/2024; Expected Discharge Time:      VTE Prophylaxis:  No VTE prophylaxis order currently exists.         AM-PAC 6 Clicks Score (PT): 17 (06/16/24 0905)    CODE STATUS:   Code Status and Medical Interventions:   Ordered at: 06/12/24 1833     Level Of Support Discussed With:    Patient     Code Status (Patient has no pulse and is not breathing):    CPR (Attempt to Resuscitate)     Medical Interventions (Patient has pulse or is breathing):    Full Support       Jessica Poole, GUILLAUME  06/16/24

## 2024-06-16 NOTE — PROGRESS NOTES
"Easton Alvarez  1965  [id number]          Chief Complaint: left foot infection    Reason for Consultation: left foot infection    History of present illness:     Patient is a 58 y.o.  Yr old male with history of diabetes with prior right TMA 2019 at , history strep septicemia 2016 ; he reports right total hip arthroplasty 2024 after fall. He reports a fall several weeks prior to his June admission with injury to the left foot, wound present with deterioration several days preceding admission with severe discoloration/redness and swelling.  CT scan showed concern for subcutaneous emphysema and necrotizing infection.  Transferred to [facility] with evaluation by Dr. Em and Dr. Lundberg and arrangements made for urgent surgery. Subsequently, blood cultures called with gram-positive cocci     6/13 left LE amp, Dr Em    6/15  \"PROCEDURE:            Left      50563: Secondary closure above-knee amputation\"      6/16/24 wbc up some to 25k postop from revision; blood cultures 6/15 negative so far; He has left stump  pain that is constant, sharp at times, blunted by neuropathy, worse with palpation, better with pain meds and 4-5 out of 10 in severity. No distress per nursing; no new focal symptoms otherwise       No headache photophobia or neck stiffness.  No shortness of breath cough or hemoptysis.  No nausea vomiting diarrhea or abdominal pain.  No dysuria hematuria or pyuria.  No other new skin rash    Past Medical History:   Diagnosis Date    Diabetes mellitus     Diabetic foot infection 2018    left (partial amputation)       Past Surgical History:   Procedure Laterality Date    AMPUTATION FOOT / TOE Right     partial foot    BELOW KNEE AMPUTATION Left 06/13/2024    Procedure: AMPUTATION BELOW KNEE AND WOUND VAC ASSISTED CLOSURE LEFT;  Surgeon: Brijesh Em Jr., MD;  Location: [facility];  Service: Orthopedics;  Laterality: Left;    PLACEMENT OF WOUND VAC Left 06/13/2024    " "Procedure: PLACEMENT OF WOUND VAC LEFT;  Surgeon: Brijesh Em Jr., MD;  Location: [facility];  Service: Orthopedics;  Laterality: Left;    TOTAL HIP ARTHROPLASTY Right 02/2024    From fall \"whole hip was shattered\"       Pediatric History   Patient Parents    Not on file     Other Topics Concern    Not on file   Social History Narrative    Not on file       family history is not on file. High blood pressure mom/dad    No Known Allergies    Medication:  Current Facility-Administered Medications   Medication Dose Route Frequency Provider Last Rate Last Admin    sennosides-docusate (PERICOLACE) 8.6-50 MG per tablet 2 tablet  2 tablet Oral BID Brijesh Em Jr., MD   2 tablet at 06/15/24 2157    And    polyethylene glycol (MIRALAX) packet 17 g  17 g Oral Daily PRN Brijesh Em Jr., MD        And    bisacodyl (DULCOLAX) EC tablet 5 mg  5 mg Oral Daily PRN Brijesh Em Jr., MD        And    bisacodyl (DULCOLAX) suppository 10 mg  10 mg Rectal Daily PRN Brijesh Em Jr., MD        Calcium Replacement - Follow Nurse / BPA Driven Protocol   Does not apply PRN Brijesh Em Jr., MD        clindamycin (CLEOCIN) 900 mg in dextrose 5% 50 mL IVPB (premix)  900 mg Intravenous Q8H Brijesh Em Jr.,  mL/hr at 06/15/24 2308 900 mg at 06/15/24 2308    dextrose (D50W) (25 g/50 mL) IV injection 25 g  25 g Intravenous Q15 Min PRN Brijesh Em Jr., MD        dextrose (GLUTOSE) oral gel 15 g  15 g Oral Q15 Min PRN Brijesh Em Jr., MD        glucagon (GLUCAGEN) injection 1 mg  1 mg Intramuscular Q15 Min PRN Brijesh Em Jr., MD        HYDROcodone-acetaminophen (NORCO) 5-325 MG per tablet 1 tablet  1 tablet Oral Q4H PRN Brijesh Em Jr., MD   1 tablet at 06/15/24 2205    HYDROmorphone (DILAUDID) injection 0.5 mg  0.5 mg Intravenous Q4H PRN rBijesh Em Jr., MD   0.5 mg at 06/15/24 1135    insulin glargine (LANTUS, SEMGLEE) injection 20 Units  20 Units Subcutaneous Nightly " Brijesh Em Jr., MD   20 Units at 06/15/24 2157    Insulin Lispro (humaLOG) injection 2-9 Units  2-9 Units Subcutaneous 4x Daily AC & at Bedtime Brijesh Em Jr., MD   4 Units at 06/15/24 2157    Insulin Lispro (humaLOG) injection 5 Units  5 Units Subcutaneous TID With Meals Brijesh Em Jr., MD   5 Units at 06/15/24 1745    lactated ringers infusion  9 mL/hr Intravenous Continuous Brijesh Em Jr., MD 9 mL/hr at 06/15/24 1307 9 mL/hr at 06/15/24 1307    lactated ringers infusion  9 mL/hr Intravenous Continuous Brijesh Em Jr., MD 9 mL/hr at 06/15/24 1345 Restarted at 06/15/24 1446    lactobacillus acidophilus (RISAQUAD) capsule 1 capsule  1 capsule Oral Daily Brijesh Em Jr., MD   1 capsule at 06/15/24 0837    Magnesium Standard Dose Replacement - Follow Nurse / BPA Driven Protocol   Does not apply PRN Brijesh Em Jr., MD        melatonin tablet 5 mg  5 mg Oral Nightly Brijesh Em Jr., MD   5 mg at 06/15/24 2157    morphine injection 2 mg  2 mg Intravenous Q4H PRN Brijesh Em Jr., MD        nitroglycerin (NITROSTAT) SL tablet 0.4 mg  0.4 mg Sublingual Q5 Min PRN Brijesh Em Jr., MD        oxyCODONE (ROXICODONE) immediate release tablet 5 mg  5 mg Oral Q4H PRN Brijesh Em Jr., MD   5 mg at 06/14/24 1511    pantoprazole (PROTONIX) injection 40 mg  40 mg Intravenous BID AC Brijesh Em Jr., MD   40 mg at 06/15/24 0837    Phosphorus Replacement - Follow Nurse / BPA Driven Protocol   Does not apply PRN Brijesh Em Jr., MD        piperacillin-tazobactam (ZOSYN) 4.5 g IVPB in 100 mL NS MBP (CD)  4.5 g Intravenous Q8H Brijesh Em Jr., MD   4.5 g at 06/15/24 2307    Potassium Replacement - Follow Nurse / BPA Driven Protocol   Does not apply PRN Brijesh Em Jr., MD        ropivacaine (NAROPIN) 0.2 % infusion (INFUSYSTEM)   Peripheral Nerve Continuous Brijesh Em Jr., MD   1,000 mg at 06/13/24 1124    sodium chloride 0.9 % flush 10 mL   10 mL Intravenous Q12H Brijesh Em Jr., MD   10 mL at 06/15/24 0838    sodium chloride 0.9 % flush 10 mL  10 mL Intravenous PRN Brijesh Em Jr., MD        sodium chloride 0.9 % infusion 40 mL  40 mL Intravenous PRN Brijesh Em Jr., MD           Antibiotics:  Anti-Infectives (From admission, onward)      Ordered     Dose/Rate Route Frequency Start Stop    06/15/24 2245  piperacillin-tazobactam (ZOSYN) 4.5 g IVPB in 100 mL NS MBP (CD)        Ordering Provider: Brijesh Em Jr., MD    4.5 g  over 4 Hours Intravenous Every 8 Hours 06/15/24 2345 06/17/24 2329    06/12/24 1746  clindamycin (CLEOCIN) 900 mg in dextrose 5% 50 mL IVPB (premix)        Ordering Provider: Brijesh Em Jr., MD    900 mg  100 mL/hr over 30 Minutes Intravenous Every 8 Hours 06/12/24 2200 06/17/24 2159    06/12/24 1746  piperacillin-tazobactam (ZOSYN) 4.5 g IVPB in 100 mL NS MBP (CD)        Ordering Provider: Shraddha Scott MD    4.5 g  over 30 Minutes Intravenous Once 06/12/24 1900 06/13/24 0907              Review of Systems    6/16/24     Constitutional-- No Fever, chills or sweats.  Appetite diminished with fatigue.  Heent-- No new vision, hearing or throat complaints.  No epistaxis or oral sores.  Denies odynophagia or dysphagia.  No flashers, floaters or eye pain. No odynophagia or dysphagia. No headache, photophobia or neck stiffness.  CV-- No chest pain, palpitation or syncope  Resp-- No SOB/cough/Hemoptysis  GI- No nausea, vomiting, or diarrhea.  No hematochezia, melena, or hematemesis. Denies jaundice or chronic liver disease.  -- No dysuria, hematuria, or flank pain.  Denies hesitancy, urgency or flank pain.  Lymph- no swollen lymph nodes in neck/axilla or groin.   Heme- No active bruising or bleeding; no Hx of DVT or PE.  MS-- aside from above, no swelling or pain in the bones or joints of arms/legs.  No new back pain.  Neuro-- No acute focal weakness or numbness in the arms or legs.  No seizures.    Full  "12 point review of systems reviewed and negative otherwise for acute complaints, except for above    Physical Exam:   Vital Signs   /100 (BP Location: Right arm, Patient Position: Lying)   Pulse 100   Temp 98.1 °F (36.7 °C) (Oral)   Resp 18   Ht 172.7 cm (67.99\")   Wt 68 kg (149 lb 14.6 oz)   SpO2 94%   BMI 22.80 kg/m²     GENERAL: Awake and alert, in no acute distress.   HEENT: Normocephalic, atraumatic.   No conjunctival injection. No icterus. Oropharynx clear without evidence of thrush or exudate. No evidence of periodontal disease.    NECK: Supple without nuchal rigidity. No mass.  LYMPH: No cervical, axillary or inguinal lymphadenopathy.  HEART: RRR; No murmur, rubs, gallops.   LUNGS: Clear to auscultation bilaterally without wheezing, rales, rhonchi. Normal respiratory effort. Nonlabored. No dullness.  ABDOMEN: Soft, nontender, nondistended. Positive bowel sounds. No rebound or guarding. NO mass or HSM.  EXT:  see below.     MSK: FROM without joint effusions noted arms/legs.    SKIN: Warm and dry without cutaneous eruptions on Inspection/palpation.      Left   BKA stump covered;  no new visible redness or purulence; no discrete fluctuance.  No crepitus    Laboratory Data    Results from last 7 days   Lab Units 06/16/24  0400 06/15/24  0443 06/14/24 2021 06/14/24  0457 06/13/24  0403   WBC 10*3/mm3 25.41* 22.75*  --   --  18.48*   HEMOGLOBIN g/dL 11.5* 11.2* 10.3*   < > 10.5*   HEMATOCRIT % 33.5* 32.1* 30.0*   < > 30.5*   PLATELETS 10*3/mm3 174 168  --   --  122*    < > = values in this interval not displayed.     Results from last 7 days   Lab Units 06/15/24  0443   SODIUM mmol/L 129*   POTASSIUM mmol/L 4.1   CHLORIDE mmol/L 94*   CO2 mmol/L 25.0   BUN mg/dL 43*   CREATININE mg/dL 0.85   GLUCOSE mg/dL 231*   CALCIUM mg/dL 7.4*     Results from last 7 days   Lab Units 06/14/24  0356   ALK PHOS U/L 173*   BILIRUBIN mg/dL 2.1*   BILIRUBIN DIRECT mg/dL 1.5*   ALT (SGPT) U/L 30   AST (SGOT) U/L 39 "         Results from last 7 days   Lab Units 06/12/24  0802   CRP mg/dL 24.89*       Estimated Creatinine Clearance: 88.9 mL/min (by C-G formula based on SCr of 0.85 mg/dL).      Microbiology:      Radiology:  Imaging Results (Last 72 Hours)       Procedure Component Value Units Date/Time    MRI Foot Left Without Contrast [[id number]] Collected: 06/13/24 0758     Updated: 06/13/24 0823    Narrative:        MRI FOOT LEFT WO CONTRAST    Date of Exam: 6/13/2024 1:00 AM EDT    Indication: gangrene. Soft tissue edema. Soft tissue gas.     Comparison: Foot x-ray 6/12/2024    Technique:  Routine multiplanar/multisequence sequence images of the left foot were obtained without contrast administration.      Findings:  The bone marrow signal intensity is normal without findings of osteomyelitis at this time. There is midfoot degenerative change. There is hindfoot degenerative change. There is heterogeneous signal intensity in the distal tibia and fibula of uncertain   etiology is not characteristic osteomyelitis. Could be seen with osteonecrosis. There is diffuse soft tissue edema. There is skin thickening. There is soft tissue abscess at the dorsal aspect of the forefoot medially. This measures 3.2 x 1.5 x 3.5 cm.   There is soft tissue gas within this collection. There appears to be a defect in the skin surface. There is multiloculated fluid and gas seen along the lateral aspect of the foot dorsally. One collection along the fifth metatarsal measures approximately   2.5 x 1.3 x 4.2 cm. An area along the lateral aspect of the hindfoot/ankle the level lateral malleolus measures 4.3 x 6 x 1.7 cm. There is fluid distending the extensor tendons likely due to infectious tenosynovitis. There are small areas of fluid signal   intensity within the plantar musculature of the foot likely due to myositis. There is fluid within the flexor tendon sheaths and peroneal tendon sheaths that is suspicious for infectious tenosynovitis. There is  a moderate tibiotalar joint effusion. Soft   tissue gas is seen extending proximally into the lower leg as there is fluid collection that is seen tracking posteriorly into the lower leg not completely included in the field-of-view. This is seen on the large field-of-view sagittal STIR image and   measures at least 13 cm cranial caudal, reference image #8. There is thickening of plantar fascia.      Impression:      Impression:  No findings of osteomyelitis at this time.  Extensive soft tissue infection with abscesses along the foot to the ankle and into the lower leg. Soft tissue gas. Gas-forming organism is possible. There are also intramuscular smaller abscesses in the foot.  Arthritis.  Infectious tenosynovitis.        Electronically Signed: Riri Costa MD    6/13/2024 8:19 AM EDT    Workstation ID: [id number]              Impression:     --acute sepsis as per admission notes, severe left foot infection with gangrene/cellulitis and concern for necrotizing soft tissue infection by imaging, urgent surgical arrangements made June 13; repeat surg 6/15  Empiric broad-spectrum antimicrobials ongoing.  He knows risk for persistent/recurrent or nonhealing wounds, persistet / progressive or recurrent infection and risk for further amputation/functional-limb loss and chronic pain.  Associated with bacteremia as below.    -- Postop with leukocytosis, some increase on June 16 to 25K.  Aside from left stump symptoms he denies any other new complaints.  Chest exam nonfocal with no cough and no distress.  No abdominal pain or diarrhea and abdominal exam benign.  No urinary tract complaints.  No rash.  IV sites with no new suppurative change.  No new stigmata of endovascular focus.  Monitor for new foci; possible stress response postop    --Acute/severe left foot infection as above, urgent surgical arrangements  made 6/13    --Acute gb strep bacteremia,   source from foot.  High risk for further serious morbidity and other serious  sequela including dire consequences and metastatic foci of involvement/endovascular sequela etc. No new metastatic focus of involvement.    --Right hip fracture with repair February 2024.  Denies any new pain at the hip; CT scan had raise concern for gas/fluid collection at the anterior aspect of the hip arthroplasty.  No redness or other suppurative change at surface.  Need further clarification from orthopedics as to significance of these radiographic findings and any further diagnostic/interventional workup at their discretion    --diabetes.  Described as uncontrolled by medicine team at admission.  Glucose control per medicine team      PLAN:      --IV  Zosyn, clindamycin; anticipate taper antibiotics depending on culture data/postop clinical course and surgical findings    **wound culture mixed with  MSSA Klebsiella and strep    --Check/review labs cultures and scans    --Partial history per nursing staff    --Discussed with microbiology    --Highly complex at of issues with high risk for further serious morbidity and other serious sequela    --If no MDR pathogens, anticipate taper IV antibiotics further in the next 24-48 hours    Copied text in this note has been reviewed and is accurate as of 06/16/24.        Cleve Bills MD  6/16/2024

## 2024-06-17 ENCOUNTER — APPOINTMENT (OUTPATIENT)
Dept: CARDIOLOGY | Facility: HOSPITAL | Age: 59
End: 2024-06-17
Payer: MEDICAID

## 2024-06-17 ENCOUNTER — APPOINTMENT (OUTPATIENT)
Dept: GENERAL RADIOLOGY | Facility: HOSPITAL | Age: 59
End: 2024-06-17
Payer: MEDICAID

## 2024-06-17 LAB
ANION GAP SERPL CALCULATED.3IONS-SCNC: 1 MMOL/L (ref 5–15)
BACTERIA SPEC AEROBE CULT: NORMAL
BACTERIA SPEC AEROBE CULT: NORMAL
BASOPHILS # BLD AUTO: 0.06 10*3/MM3 (ref 0–0.2)
BASOPHILS NFR BLD AUTO: 0.3 % (ref 0–1.5)
BUN SERPL-MCNC: 21 MG/DL (ref 8–23)
BUN/CREAT SERPL: 41.2 (ref 7–25)
CALCIUM SPEC-SCNC: 7.7 MG/DL (ref 8.6–10.5)
CHLORIDE SERPL-SCNC: 102 MMOL/L (ref 98–107)
CO2 SERPL-SCNC: 33 MMOL/L (ref 22–29)
CREAT SERPL-MCNC: 0.51 MG/DL (ref 0.76–1.27)
DEPRECATED RDW RBC AUTO: 47.9 FL (ref 37–54)
EGFRCR SERPLBLD CKD-EPI 2021: 116.1 ML/MIN/1.73
EOSINOPHIL # BLD AUTO: 0.22 10*3/MM3 (ref 0–0.4)
EOSINOPHIL NFR BLD AUTO: 1.2 % (ref 0.3–6.2)
ERYTHROCYTE [DISTWIDTH] IN BLOOD BY AUTOMATED COUNT: 13.7 % (ref 12.3–15.4)
GLUCOSE BLDC GLUCOMTR-MCNC: 127 MG/DL (ref 70–130)
GLUCOSE BLDC GLUCOMTR-MCNC: 138 MG/DL (ref 70–130)
GLUCOSE BLDC GLUCOMTR-MCNC: 147 MG/DL (ref 70–130)
GLUCOSE BLDC GLUCOMTR-MCNC: 168 MG/DL (ref 70–130)
GLUCOSE SERPL-MCNC: 120 MG/DL (ref 65–99)
HCT VFR BLD AUTO: 34.7 % (ref 37.5–51)
HGB BLD-MCNC: 11.7 G/DL (ref 13–17.7)
IMM GRANULOCYTES # BLD AUTO: 0.69 10*3/MM3 (ref 0–0.05)
IMM GRANULOCYTES NFR BLD AUTO: 3.7 % (ref 0–0.5)
LYMPHOCYTES # BLD AUTO: 1.41 10*3/MM3 (ref 0.7–3.1)
LYMPHOCYTES NFR BLD AUTO: 7.6 % (ref 19.6–45.3)
MCH RBC QN AUTO: 32 PG (ref 26.6–33)
MCHC RBC AUTO-ENTMCNC: 33.7 G/DL (ref 31.5–35.7)
MCV RBC AUTO: 94.8 FL (ref 79–97)
MONOCYTES # BLD AUTO: 1.09 10*3/MM3 (ref 0.1–0.9)
MONOCYTES NFR BLD AUTO: 5.9 % (ref 5–12)
NEUTROPHILS NFR BLD AUTO: 15 10*3/MM3 (ref 1.7–7)
NEUTROPHILS NFR BLD AUTO: 81.3 % (ref 42.7–76)
NRBC BLD AUTO-RTO: 0 /100 WBC (ref 0–0.2)
PLATELET # BLD AUTO: 179 10*3/MM3 (ref 140–450)
PMV BLD AUTO: 9.8 FL (ref 6–12)
POTASSIUM SERPL-SCNC: 4.1 MMOL/L (ref 3.5–5.2)
RBC # BLD AUTO: 3.66 10*6/MM3 (ref 4.14–5.8)
SODIUM SERPL-SCNC: 136 MMOL/L (ref 136–145)
WBC NRBC COR # BLD AUTO: 18.47 10*3/MM3 (ref 3.4–10.8)

## 2024-06-17 PROCEDURE — 82948 REAGENT STRIP/BLOOD GLUCOSE: CPT

## 2024-06-17 PROCEDURE — 73502 X-RAY EXAM HIP UNI 2-3 VIEWS: CPT

## 2024-06-17 PROCEDURE — 85025 COMPLETE CBC W/AUTO DIFF WBC: CPT | Performed by: NURSE PRACTITIONER

## 2024-06-17 PROCEDURE — 63710000001 INSULIN LISPRO (HUMAN) PER 5 UNITS: Performed by: ORTHOPAEDIC SURGERY

## 2024-06-17 PROCEDURE — 80048 BASIC METABOLIC PNL TOTAL CA: CPT | Performed by: NURSE PRACTITIONER

## 2024-06-17 PROCEDURE — 25010000002 CLINDAMYCIN 900 MG/50ML SOLUTION: Performed by: ORTHOPAEDIC SURGERY

## 2024-06-17 PROCEDURE — 63710000001 INSULIN GLARGINE PER 5 UNITS: Performed by: ORTHOPAEDIC SURGERY

## 2024-06-17 PROCEDURE — 25010000002 CEFTRIAXONE PER 250 MG: Performed by: INTERNAL MEDICINE

## 2024-06-17 PROCEDURE — 99232 SBSQ HOSP IP/OBS MODERATE 35: CPT | Performed by: NURSE PRACTITIONER

## 2024-06-17 RX ORDER — NIFEDIPINE 10 MG/1
20 CAPSULE ORAL ONCE
Status: DISCONTINUED | OUTPATIENT
Start: 2024-06-17 | End: 2024-06-17

## 2024-06-17 RX ORDER — LISINOPRIL 5 MG/1
5 TABLET ORAL
Status: DISCONTINUED | OUTPATIENT
Start: 2024-06-17 | End: 2024-06-18

## 2024-06-17 RX ADMIN — PANTOPRAZOLE SODIUM 40 MG: 40 INJECTION, POWDER, FOR SOLUTION INTRAVENOUS at 08:42

## 2024-06-17 RX ADMIN — Medication 5 MG: at 21:15

## 2024-06-17 RX ADMIN — Medication 1 CAPSULE: at 08:41

## 2024-06-17 RX ADMIN — HYDROCODONE BITARTRATE AND ACETAMINOPHEN 1 TABLET: 5; 325 TABLET ORAL at 17:40

## 2024-06-17 RX ADMIN — Medication 10 ML: at 08:43

## 2024-06-17 RX ADMIN — HYDROCODONE BITARTRATE AND ACETAMINOPHEN 1 TABLET: 5; 325 TABLET ORAL at 00:44

## 2024-06-17 RX ADMIN — SODIUM CHLORIDE 2000 MG: 900 INJECTION INTRAVENOUS at 09:14

## 2024-06-17 RX ADMIN — OXYCODONE HYDROCHLORIDE 5 MG: 5 TABLET ORAL at 21:15

## 2024-06-17 RX ADMIN — INSULIN LISPRO 2 UNITS: 100 INJECTION, SOLUTION INTRAVENOUS; SUBCUTANEOUS at 21:15

## 2024-06-17 RX ADMIN — INSULIN LISPRO 5 UNITS: 100 INJECTION, SOLUTION INTRAVENOUS; SUBCUTANEOUS at 17:40

## 2024-06-17 RX ADMIN — LISINOPRIL 5 MG: 5 TABLET ORAL at 11:36

## 2024-06-17 RX ADMIN — CLINDAMYCIN IN 5 PERCENT DEXTROSE 900 MG: 18 INJECTION, SOLUTION INTRAVENOUS at 06:11

## 2024-06-17 RX ADMIN — Medication 10 ML: at 21:15

## 2024-06-17 RX ADMIN — INSULIN GLARGINE 20 UNITS: 100 INJECTION, SOLUTION SUBCUTANEOUS at 21:16

## 2024-06-17 RX ADMIN — HYDROCODONE BITARTRATE AND ACETAMINOPHEN 1 TABLET: 5; 325 TABLET ORAL at 08:41

## 2024-06-17 RX ADMIN — INSULIN LISPRO 5 UNITS: 100 INJECTION, SOLUTION INTRAVENOUS; SUBCUTANEOUS at 14:04

## 2024-06-17 RX ADMIN — INSULIN LISPRO 5 UNITS: 100 INJECTION, SOLUTION INTRAVENOUS; SUBCUTANEOUS at 08:42

## 2024-06-17 RX ADMIN — PANTOPRAZOLE SODIUM 40 MG: 40 INJECTION, POWDER, FOR SOLUTION INTRAVENOUS at 17:40

## 2024-06-17 NOTE — PROGRESS NOTES
[facility]    Acute pain service Inpatient Progress Note    Patient Name: Easton Alvarez  :  1965  MRN:  [id number]        Acute Pain  Service Inpatient Progress Note:    Analgesia:Excellent  Pain Score:0/10  LOC: alert and awake  Side Effects:None  Catheter Site:clean, dry and dressing intact  Cath type: peripheral nerve cath(InfuSystem)  Infusion rate: Ext/Pop: Basal: 1ml/hr, PIB: 5ml q 2 h, PCA: 5 ml q 30 min  Catheter Plan:Catheter to remain Insitu and Continue catheter infusion rate unchanged

## 2024-06-17 NOTE — PROGRESS NOTES
[facility] Medicine Services  PROGRESS NOTE    Patient Name: Easton Alvarez  : 1965  MRN: [id number]    Date of Admission: 2024  Primary Care Physician: Provider, No Known    Subjective   Subjective     CC:  F/u left foot gangrene    HPI:   Resting in bed.  Says he had a BM this morning and did not get a report of any more dark, tarry stool.  Says his pain is mostly controlled.  Says he has not been on medications in the past for blood pressure.      Objective   Objective     Vital Signs:   Temp:  [97.9 °F (36.6 °C)-98.6 °F (37 °C)] 97.9 °F (36.6 °C)  Heart Rate:  [] 89  Resp:  [16-18] 16  BP: (153-168)/(77-92) 168/90     Physical Exam:   Constitutional: No acute distress, awake, alert  HENT: NCAT, mucous membranes moist  Respiratory: Clear to auscultation bilaterally, respiratory effort jenni, room air   Cardiovascular: RRR, no murmurs, rubs, or gallops  Gastrointestinal: Positive bowel sounds, soft, nontender, distended  Musculoskeletal: 1+ right ankle edema, L BKA with JPEG drain with scant sanguinous drainage, nerve block  Psychiatric: Flat affect, cooperative  Neurologic: Oriented x 3, moves all extremities, Cranial Nerves grossly intact to confrontation, speech clear  Skin: No rashes      Results Reviewed:  LAB RESULTS:      Lab 24  0506 24  0400 06/15/24  0443 24  2021 24  1218 24  1051 24  0457 24  0403 24  2225 24  1839 24  1451 24  1149 24  0807 24  0802   WBC 18.47* 25.41* 22.75*  --   --   --   --  18.48*  --  18.84*  --   --   --  26.78*   HEMOGLOBIN 11.7* 11.5* 11.2* 10.3*  --  10.8*   < > 10.5*  --  11.0*  --   --   --  12.3*   HEMATOCRIT 34.7* 33.5* 32.1* 30.0*  --  31.4*   < > 30.5*  --  31.5*  --   --   --  35.5*   PLATELETS 179 174 168  --   --   --   --  122*  --  142  --   --   --  191   NEUTROS ABS 15.00* 22.05*  --   --   --   --   --   --   --  16.57*  --   --   --   24.21*  23.83*   IMMATURE GRANS (ABS) 0.69* 0.77*  --   --   --   --   --   --   --  0.34*  --   --   --  0.42*   LYMPHS ABS 1.41 1.29  --   --   --   --   --   --   --  0.83  --   --   --  0.67*   MONOS ABS 1.09* 1.07*  --   --   --   --   --   --   --  1.01*  --   --   --  1.47*   EOS ABS 0.22 0.10  --   --   --   --   --   --   --  0.01  --   --   --  0.00   MCV 94.8 92.8 93.6  --   --   --   --  92.4  --  91.6  --   --   --  93.2   CRP  --   --   --   --   --   --   --   --   --   --   --   --   --  24.89*   LACTATE  --   --   --   --   --   --   --   --  3.6* 4.1* 3.9* 6.3* 8.8*  --    PROTIME  --   --   --   --  19.5*  --   --   --   --   --   --   --   --   --     < > = values in this interval not displayed.         Lab 06/17/24  0506 06/16/24  0400 06/15/24  0443 06/14/24  0356 06/13/24  2032 06/13/24  1341 06/13/24  0403   SODIUM 136 134* 129* 129*  --  129* 131*   POTASSIUM 4.1 4.3 4.1 4.5 4.3 3.8 3.3*   CHLORIDE 102 101 94* 94*  --  97* 96*   CO2 33.0* 27.0 25.0 24.0  --  23.0 26.0   ANION GAP 1.0* 6.0 10.0 11.0  --  9.0 9.0   BUN 21 29* 43* 60*  --  56* 64*   CREATININE 0.51* 0.72* 0.85 0.86  --  0.67* 0.91   EGFR 116.1 104.6 99.5 99.1  --  106.9 96.5   GLUCOSE 120* 175* 231* 312*  --  260* 268*   CALCIUM 7.7* 7.7* 7.4* 7.6*  --  7.5* 7.3*   MAGNESIUM  --   --  2.3 2.6*  --  2.4  --    PHOSPHORUS  --   --  2.7 3.4  --  3.5  --    HEMOGLOBIN A1C  --   --   --   --   --   --  9.50*         Lab 06/16/24  0400 06/14/24  0356 06/12/24  1839 06/12/24  0802   TOTAL PROTEIN 5.8* 5.4* 5.0* 5.6*   ALBUMIN 1.8* 2.0* 2.0* 2.7*   GLOBULIN 4.0  --  3.0 2.9   ALT (SGPT) 33 30 37 45*   AST (SGOT) 49* 39 46* 65*   BILIRUBIN 1.9* 2.1* 3.6* 4.7*   INDIRECT BILIRUBIN  --  0.6  --   --    BILIRUBIN DIRECT  --  1.5*  --   --    ALK PHOS 232* 173* 145* 171*         Lab 06/14/24  1218   PROTIME 19.5*   INR 1.63*                 Brief Urine Lab Results  (Last result in the past 365 days)        Color   Clarity   Blood   Leuk  Est   Nitrite   Protein   CREAT   Urine HCG        06/14/24 1109 Yellow   Clear   Negative   Negative   Negative   Negative                   Microbiology Results Abnormal       Procedure Component Value - Date/Time    Wound Culture - Wound, Leg, Left [[id number]] Collected: 06/15/24 1501    Lab Status: Preliminary result Specimen: Wound from Leg, Left Updated: 06/17/24 0726     Wound Culture No growth     Gram Stain Rare (1+) WBCs seen      No organisms seen    Blood Culture - Blood, Hand, Right [[id number]]  (Normal) Collected: 06/12/24 1837    Lab Status: Preliminary result Specimen: Blood from Hand, Right Updated: 06/16/24 1900     Blood Culture No growth at 4 days    Blood Culture - Blood, Arm, Left [[id number]]  (Normal) Collected: 06/12/24 1832    Lab Status: Preliminary result Specimen: Blood from Arm, Left Updated: 06/16/24 1900     Blood Culture No growth at 4 days    AFB Culture - Wound, Leg, Left [[id number]] Collected: 06/15/24 1501    Lab Status: Preliminary result Specimen: Wound from Leg, Left Updated: 06/16/24 1216     AFB Stain No acid fast bacilli seen on concentrated smear    Blood Culture - Blood, Hand, Left [[id number]]  (Normal) Collected: 06/15/24 1104    Lab Status: Preliminary result Specimen: Blood from Hand, Left Updated: 06/16/24 1200     Blood Culture No growth at 24 hours    Blood Culture - Blood, Hand, Right [[id number]]  (Normal) Collected: 06/15/24 1104    Lab Status: Preliminary result Specimen: Blood from Hand, Right Updated: 06/16/24 1200     Blood Culture No growth at 24 hours    Anaerobic Culture - Surgical Site, Leg, Left [[id number]]  (Normal) Collected: 06/13/24 1031    Lab Status: Preliminary result Specimen: Surgical Site from Leg, Left Updated: 06/16/24 0704     Anaerobic Culture No anaerobes isolated at 3 days    Fungus Smear - Surgical Site, Leg, Left [[id number]] Collected: 06/13/24 1031    Lab Status: Final result Specimen: Surgical Site from Leg, Left Updated: 06/14/24 1406      Fungal Stain No fungal elements seen    AFB Culture - Surgical Site, Leg, Left [[id number]] Collected: 06/13/24 1031    Lab Status: Preliminary result Specimen: Surgical Site from Leg, Left Updated: 06/14/24 1111     AFB Stain No acid fast bacilli seen on concentrated smear            No radiology results from the last 24 hrs        Current medications:  Scheduled Meds:cefTRIAXone, 2,000 mg, Intravenous, Q24H  insulin glargine, 20 Units, Subcutaneous, Nightly  insulin lispro, 2-9 Units, Subcutaneous, 4x Daily AC & at Bedtime  Insulin Lispro, 5 Units, Subcutaneous, TID With Meals  lactobacillus acidophilus, 1 capsule, Oral, Daily  melatonin, 5 mg, Oral, Nightly  pantoprazole, 40 mg, Intravenous, BID AC  senna-docusate sodium, 2 tablet, Oral, BID  sodium chloride, 10 mL, Intravenous, Q12H      Continuous Infusions:lactated ringers, 9 mL/hr, Last Rate: 9 mL/hr (06/15/24 1307)  lactated ringers, 9 mL/hr, Last Rate: 9 mL/hr (06/15/24 1345)  ropivacaine,       PRN Meds:.  senna-docusate sodium **AND** polyethylene glycol **AND** bisacodyl **AND** bisacodyl    Calcium Replacement - Follow Nurse / BPA Driven Protocol    dextrose    dextrose    glucagon (human recombinant)    HYDROcodone-acetaminophen    HYDROmorphone    Magnesium Standard Dose Replacement - Follow Nurse / BPA Driven Protocol    Morphine    nitroglycerin    oxyCODONE    Phosphorus Replacement - Follow Nurse / BPA Driven Protocol    Potassium Replacement - Follow Nurse / BPA Driven Protocol    sodium chloride    sodium chloride    Assessment & Plan   Assessment & Plan     Active Hospital Problems    Diagnosis  POA    **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        Brief Hospital Course to date:  Easton Alvarez is a 58 y.o. male  with past medical history significant for T2DM, diabetic neuropathy, right hip fracture/ORIF  ([facility] 2/2024), and right transmetatarsal amputation 2019 at  who presented to the ED on 6/12/2024 due to left foot wound. Lactate at OSH was 8.8 and CT scan of left leg showed gas gangrene. Orthopedic surgeon Dr. Em was consulted and performed a left below-knee amputation on 6/13/2024 with further debridement and irrigation on 6/15.  Infectious disease is following and managing antimicrobial therapy.     This patient's problems and plans were partially entered by my partner and updated as appropriate by me 06/17/24.    Sepsis (WBC, lactate, tachypnea, source)    Acute GB strep bacteremia  Left foot cellulitis/gangrene   - MRI left foot revealed no findings of osteomyelitis, did show extensive soft tissue infection with abscesses along the foot to the ankle and into the lower leg with soft tissue gas.    - S/p left BKA on 6/13/24  with further debridement on 6/15/24 by Dr. Em, drain removed 6/17  - blood cultures indicate Strep B bacteremia  - wound cultures growing Strep B and Kebsiella, repeat cultures pending  - ID, Dr. Flores, following, continue Zosyn, clindamycin for now with plan to taper IV abx in next 24-48 hours pending repeat cultures  - WBCs trending down, 25.41>>18.47 today  - Add Boost glucose control to increase protein intake for wound healing, nutrition following  - Daily dressing change to surgical site: Xeroform, 4X4, Kerlix, ACE  - Follow up with  Orthopedics in 2 weeks for wound check, imaging at [facility], [address], [address]  - PT wound care following  - PT and OT to evaluate  -AM CBC w/ diff, BMP     Gap acidosis POA, resolved   - AG 20.1, likely from lactate, serum acetone negative     Concern for GIB  -melena and fecal occult positive overnight 6/14  -GI consulted, recommended medical management for possible melena with twice daily PPI x 1 month  -GI referral at discharge for elevated LFTs, thrombocytopenia and concern for early cirrhosis, plan to  purse liver US after recovery from acute illness     R hip fx repaired 2/2024  - noted gas near hardware per CT scan, r/o hardware-associated infection vs nl postop finding, will ask ortho to review     Elevated BP  - BP has been running high this admission, possibly secondary to pain  - start low-dose ACE given DM2, lisinopril 5 mg daily, titrate    DM, uncontrolled  - Pt reports he is unaware of his diabetes medications and is not on any medications currently   -last records from 2019 at , he was discharged on Lantus 12 U with 5 U with meals   -HgA1c 9.5  -Lantus increased to 20 units nightly, mealtime insulin added 5 units TID with improvement in BGs  -Continue current basal, prandial, and SS insulins, titrate  -BGs improving    Addendum 17:45: Dr. Rene with orthopedic surgery reviewed imaging of right hip and thinks patient may have a glenys-prosthetic hip infection. He recommends discussing with Dr. Sethi or Dr. Gonzalez. Will f/u with ortho in the am.      Expected Discharge Location and Transportation: Likely will need rehab, pt hoping to go home with family  Expected Discharge   Expected Discharge Date: 6/18/2024; Expected Discharge Time:      VTE Prophylaxis:  No VTE prophylaxis order currently exists.         AM-PAC 6 Clicks Score (PT): 17 (06/16/24 0934)    CODE STATUS:   Code Status and Medical Interventions:   Ordered at: 06/12/24 7903     Level Of Support Discussed With:    Patient     Code Status (Patient has no pulse and is not breathing):    CPR (Attempt to Resuscitate)     Medical Interventions (Patient has pulse or is breathing):    Full Support       Jessica Poole, GUILLAUME  06/17/24

## 2024-06-17 NOTE — PROGRESS NOTES
"Easton Alvarez  1965  [id number]          Chief Complaint: left foot infection    Reason for Consultation: left foot infection    History of present illness:     Patient is a 58 y.o.  Yr old male with history of diabetes with prior right TMA 2019 at , history strep septicemia 2016 ; he reports right total hip arthroplasty 2024 after fall. He reports a fall several weeks prior to his June admission with injury to the left foot, wound present with deterioration several days preceding admission with severe discoloration/redness and swelling.  CT scan showed concern for subcutaneous emphysema and necrotizing infection.  Transferred to [facility] with evaluation by Dr. Em and Dr. Lundberg and arrangements made for urgent surgery. Subsequently, blood cultures called with gram-positive cocci     6/13 left LE amp, Dr Em    6/15  \"PROCEDURE:            Left      74273: Secondary closure above-knee amputation\"      6/17/24 wbc  down some postop from revision; blood cultures 6/15 negative so far; He has left stump  pain that is constant, sharp at times, blunted by neuropathy, worse with palpation, better with pain meds and 4-5 out of 10 in severity. No distress per nursing; no new focal symptoms otherwise       No headache photophobia or neck stiffness.  No shortness of breath cough or hemoptysis.  No nausea vomiting diarrhea or abdominal pain.  No dysuria hematuria or pyuria.  No other new skin rash    Past Medical History:   Diagnosis Date    Diabetes mellitus     Diabetic foot infection 2018    left (partial amputation)       Past Surgical History:   Procedure Laterality Date    AMPUTATION FOOT / TOE Right     partial foot    BELOW KNEE AMPUTATION Left 06/13/2024    Procedure: AMPUTATION BELOW KNEE AND WOUND VAC ASSISTED CLOSURE LEFT;  Surgeon: Brijesh Em Jr., MD;  Location: [facility];  Service: Orthopedics;  Laterality: Left;    PLACEMENT OF WOUND VAC Left 06/13/2024    " "Procedure: PLACEMENT OF WOUND VAC LEFT;  Surgeon: Brijesh Em Jr., MD;  Location: [facility];  Service: Orthopedics;  Laterality: Left;    TOTAL HIP ARTHROPLASTY Right 02/2024    From fall \"whole hip was shattered\"       Pediatric History   Patient Parents    Not on file     Other Topics Concern    Not on file   Social History Narrative    Not on file       family history is not on file. High blood pressure mom/dad    No Known Allergies    Medication:  Current Facility-Administered Medications   Medication Dose Route Frequency Provider Last Rate Last Admin    sennosides-docusate (PERICOLACE) 8.6-50 MG per tablet 2 tablet  2 tablet Oral BID Brijesh Em Jr., MD   2 tablet at 06/16/24 2124    And    polyethylene glycol (MIRALAX) packet 17 g  17 g Oral Daily PRN Brijesh Em Jr., MD        And    bisacodyl (DULCOLAX) EC tablet 5 mg  5 mg Oral Daily PRN Brijesh Em Jr., MD        And    bisacodyl (DULCOLAX) suppository 10 mg  10 mg Rectal Daily PRN Brijesh Em Jr., MD        Calcium Replacement - Follow Nurse / BPA Driven Protocol   Does not apply PRN Brijesh Em Jr., MD        clindamycin (CLEOCIN) 900 mg in dextrose 5% 50 mL IVPB (premix)  900 mg Intravenous Q8H Brijesh Em Jr.,  mL/hr at 06/16/24 2124 900 mg at 06/16/24 2124    dextrose (D50W) (25 g/50 mL) IV injection 25 g  25 g Intravenous Q15 Min PRN Brijesh Em Jr., MD        dextrose (GLUTOSE) oral gel 15 g  15 g Oral Q15 Min PRN Brijesh Em Jr., MD        glucagon (GLUCAGEN) injection 1 mg  1 mg Intramuscular Q15 Min PRN Brijesh Em Jr., MD        HYDROcodone-acetaminophen (NORCO) 5-325 MG per tablet 1 tablet  1 tablet Oral Q4H PRN Brijesh Em Jr., MD   1 tablet at 06/17/24 0044    HYDROmorphone (DILAUDID) injection 0.5 mg  0.5 mg Intravenous Q4H PRN Brijesh Em Jr., MD   0.5 mg at 06/15/24 1135    insulin glargine (LANTUS, SEMGLEE) injection 20 Units  20 Units Subcutaneous Nightly " Brijesh Em Jr., MD   20 Units at 06/16/24 2124    Insulin Lispro (humaLOG) injection 2-9 Units  2-9 Units Subcutaneous 4x Daily AC & at Bedtime Brijesh mE Jr., MD   4 Units at 06/16/24 2124    Insulin Lispro (humaLOG) injection 5 Units  5 Units Subcutaneous TID With Meals Brijesh Em Jr., MD   5 Units at 06/16/24 1811    lactated ringers infusion  9 mL/hr Intravenous Continuous Brijesh Em Jr., MD 9 mL/hr at 06/15/24 1307 9 mL/hr at 06/15/24 1307    lactated ringers infusion  9 mL/hr Intravenous Continuous Brijesh Em Jr., MD 9 mL/hr at 06/15/24 1345 Restarted at 06/15/24 1446    lactobacillus acidophilus (RISAQUAD) capsule 1 capsule  1 capsule Oral Daily Brijesh Em Jr., MD   1 capsule at 06/16/24 0904    Magnesium Standard Dose Replacement - Follow Nurse / BPA Driven Protocol   Does not apply PRN Brijesh Em Jr., MD        melatonin tablet 5 mg  5 mg Oral Nightly Brijesh Em Jr., MD   5 mg at 06/16/24 2124    morphine injection 2 mg  2 mg Intravenous Q4H PRN Brijesh Em Jr., MD        nitroglycerin (NITROSTAT) SL tablet 0.4 mg  0.4 mg Sublingual Q5 Min PRN Brijesh Em Jr., MD        oxyCODONE (ROXICODONE) immediate release tablet 5 mg  5 mg Oral Q4H PRN Brijesh Em Jr., MD   5 mg at 06/14/24 1511    pantoprazole (PROTONIX) injection 40 mg  40 mg Intravenous BID AC Brijesh Em Jr., MD   40 mg at 06/16/24 1658    Phosphorus Replacement - Follow Nurse / BPA Driven Protocol   Does not apply PRN Brijesh Em Jr., MD        piperacillin-tazobactam (ZOSYN) 4.5 g IVPB in 100 mL NS MBP (CD)  4.5 g Intravenous Q8H Cleve Bills MD   4.5 g at 06/16/24 2333    Potassium Replacement - Follow Nurse / BPA Driven Protocol   Does not apply PRN Brijesh Em Jr., MD        ropivacaine (NAROPIN) 0.2 % infusion (INFUSYSTEM)   Peripheral Nerve Continuous Brijesh Em Jr., MD   1,000 mg at 06/13/24 1124    sodium chloride 0.9 % flush 10 mL  10  mL Intravenous Q12H Brijesh Em Jr., MD   10 mL at 06/16/24 2125    sodium chloride 0.9 % flush 10 mL  10 mL Intravenous PRN Brijesh Em Jr., MD        sodium chloride 0.9 % infusion 40 mL  40 mL Intravenous PRN Brijesh Em Jr., MD           Antibiotics:  Anti-Infectives (From admission, onward)      Ordered     Dose/Rate Route Frequency Start Stop    06/15/24 2245  piperacillin-tazobactam (ZOSYN) 4.5 g IVPB in 100 mL NS MBP (CD)        Ordering Provider: Cleve Bills MD    4.5 g  over 4 Hours Intravenous Every 8 Hours 06/15/24 2345 06/18/24 1544    06/12/24 1746  clindamycin (CLEOCIN) 900 mg in dextrose 5% 50 mL IVPB (premix)        Ordering Provider: Brijesh Em Jr., MD    900 mg  100 mL/hr over 30 Minutes Intravenous Every 8 Hours 06/12/24 2200 06/17/24 2159    06/12/24 1746  piperacillin-tazobactam (ZOSYN) 4.5 g IVPB in 100 mL NS MBP (CD)        Ordering Provider: Shraddha Scott MD    4.5 g  over 30 Minutes Intravenous Once 06/12/24 1900 06/13/24 0907              Review of Systems    6/17/24     Constitutional-- No Fever, chills or sweats.  Appetite diminished with fatigue.  Heent-- No new vision, hearing or throat complaints.  No epistaxis or oral sores.  Denies odynophagia or dysphagia.  No flashers, floaters or eye pain. No odynophagia or dysphagia. No headache, photophobia or neck stiffness.  CV-- No chest pain, palpitation or syncope  Resp-- No SOB/cough/Hemoptysis  GI- No nausea, vomiting, or diarrhea.  No hematochezia, melena, or hematemesis. Denies jaundice or chronic liver disease.  -- No dysuria, hematuria, or flank pain.  Denies hesitancy, urgency or flank pain.  Lymph- no swollen lymph nodes in neck/axilla or groin.   Heme- No active bruising or bleeding; no Hx of DVT or PE.  MS-- aside from above, no swelling or pain in the bones or joints of arms/legs.  No new back pain.  Neuro-- No acute focal weakness or numbness in the arms or legs.  No seizures.    Full 12  "point review of systems reviewed and negative otherwise for acute complaints, except for above    Physical Exam:   Vital Signs   /82 (BP Location: Right arm, Patient Position: Lying)   Pulse 88   Temp 98.2 °F (36.8 °C) (Oral)   Resp 16   Ht 172.7 cm (67.99\")   Wt 68 kg (149 lb 14.6 oz)   SpO2 95%   BMI 22.80 kg/m²     GENERAL: Awake and alert, in no acute distress.   HEENT: Normocephalic, atraumatic.   No conjunctival injection. No icterus. Oropharynx clear without evidence of thrush or exudate. No evidence of periodontal disease.    NECK: Supple without nuchal rigidity. No mass.  LYMPH: No cervical, axillary or inguinal lymphadenopathy.  HEART: RRR; No murmur, rubs, gallops.   LUNGS: Clear to auscultation bilaterally without wheezing, rales, rhonchi. Normal respiratory effort. Nonlabored. No dullness.  ABDOMEN: Soft, nontender, nondistended. Positive bowel sounds. No rebound or guarding. NO mass or HSM.  EXT:  see below.     MSK: FROM without joint effusions noted arms/legs.    SKIN: Warm and dry without cutaneous eruptions on Inspection/palpation.      Left   BKA stump covered;  no new visible redness or purulence; no discrete fluctuance.  No crepitus    Laboratory Data    Results from last 7 days   Lab Units 06/17/24  0506 06/16/24  0400 06/15/24  0443   WBC 10*3/mm3 18.47* 25.41* 22.75*   HEMOGLOBIN g/dL 11.7* 11.5* 11.2*   HEMATOCRIT % 34.7* 33.5* 32.1*   PLATELETS 10*3/mm3 179 174 168     Results from last 7 days   Lab Units 06/16/24  0400   SODIUM mmol/L 134*   POTASSIUM mmol/L 4.3   CHLORIDE mmol/L 101   CO2 mmol/L 27.0   BUN mg/dL 29*   CREATININE mg/dL 0.72*   GLUCOSE mg/dL 175*   CALCIUM mg/dL 7.7*     Results from last 7 days   Lab Units 06/16/24  0400 06/14/24  0356   ALK PHOS U/L 232* 173*   BILIRUBIN mg/dL 1.9* 2.1*   BILIRUBIN DIRECT mg/dL  --  1.5*   ALT (SGPT) U/L 33 30   AST (SGOT) U/L 49* 39         Results from last 7 days   Lab Units 06/12/24  0802   CRP mg/dL 24.89* "       Estimated Creatinine Clearance: 104.9 mL/min (A) (by C-G formula based on SCr of 0.72 mg/dL (L)).      Microbiology:      Radiology:  Imaging Results (Last 72 Hours)       ** No results found for the last 72 hours. **              Impression:     --acute sepsis as per admission notes, severe left foot infection with gangrene/cellulitis and concern for necrotizing soft tissue infection by imaging, urgent surgical arrangements made June 13; repeat surg 6/15  ;  He knows risk for persistent/recurrent or nonhealing wounds, persistet / progressive or recurrent infection and risk for further amputation/functional-limb loss and chronic pain.  Associated with bacteremia as below.    -- Postop with leukocytosis, some increase on June 16 to 25K, down some 6/17.  Aside from left stump symptoms he denies any other new complaints.  Chest exam nonfocal with no cough and no distress.  No abdominal pain or diarrhea and abdominal exam benign.  No urinary tract complaints.  No rash.  IV sites with no new suppurative change.  No new stigmata of endovascular focus.  Monitor for new foci; possible stress response postop    --Acute/severe left foot infection as above, urgent surgical arrangements  made 6/13    --Acute gb strep bacteremia,   source from foot.  High risk for further serious morbidity and other serious sequela including dire consequences and metastatic foci of involvement/endovascular sequela etc. No new metastatic focus of involvement. TTE 6/17    --Right hip fracture with repair February 2024.  Denies any new pain at the hip; CT scan had raise concern for gas/fluid collection at the anterior aspect of the hip arthroplasty.  No redness or other suppurative change at surface.  Need further clarification from orthopedics as to significance of these radiographic findings and any further diagnostic/interventional workup at their discretion    --diabetes.  Described as uncontrolled by medicine team at admission.  Glucose  control per medicine team      PLAN:      --IV  rocephin    **wound culture mixed with  MSSA Klebsiella and strep    --Check/review labs cultures and scans    --TTE pending    --Partial history per nursing staff    --Discussed with microbiology    --Highly complex at of issues with high risk for further serious morbidity and other serious sequela        Copied text in this note has been reviewed and is accurate as of 06/17/24.        Cleve Bills MD  6/17/2024

## 2024-06-17 NOTE — CASE MANAGEMENT/SOCIAL WORK
Continued Stay Note  [facility]     Patient Name: Easton Alvarez  MRN: [id number]  Today's Date: 6/17/2024    Admit Date: 6/12/2024    Plan: Home with HH services   Discharge Plan       Row Name 06/17/24 1535       Plan    Plan Home with  services    Patient/Family in Agreement with Plan yes    Plan Comments Spoke with patient at bedside. Patient is agreeable to home health services for PT, OT, and SN. Patient discharge plan is home to Brother house and private transport. CM will follow.    Final Discharge Disposition Code 06 - home with home health care                   Discharge Codes    No documentation.                 Expected Discharge Date and Time       Expected Discharge Date Expected Discharge Time    Jun 18, 2024               Risa Luz RN

## 2024-06-17 NOTE — PROGRESS NOTES
"Easton Alvarez       LOS: 5 days   Patient Care Team:  Provider, No Known as PCP - General    Chief Complaint:  left foot / leg necrotizing soft tissue infection.    Subjective     Interval History:     Pain tolerable overnight    Review of Systems:      Gen- No fevers, chills  CV- No chest pain, palpitations  Resp- No cough, dyspnea  GI- No N/V/D, abd pain    Objective     Vital Signs  Vital Signs (last 24 hours)         06/13 0700  06/14 0659 06/14 0700  06/14 0839   Most Recent      Temp (°F) 97 -  98.7      98.1     98.1 (36.7) 06/14 0724    Heart Rate 75 -  84       81 06/14 0340    Resp 13 -  20      20     20 06/14 0724    BP 97/62 -  145/70      149/85     149/85 06/14 0724    SpO2 (%) 96 -  99       98 06/14 0340    Flow (L/min) 2 -  4       2 06/13 1100              Physical Exam:     Alert, oriented.  No acute distress.  Nonlabored respirations.  Regular rate and rhythm.  Abdomen nondistended.  Left lower extremity: Operative dressing intact, drain with minimal serosanguineous output.  Incision inspected, skin edges well-approximated, staples in place.  Incision appears clean, no erythema, drainage, warmth, purulence.     Results Review:     I reviewed the patient's new clinical results.    Medication Review:   Hospital Medications (active)         Dose Frequency Start End    bisacodyl (DULCOLAX) EC tablet 5 mg 5 mg Daily PRN 6/12/2024 --    Admin Instructions: Use if no bowel movement after 12 hours.  Swallow whole. Do not crush, split, or chew tablet.    Route: Oral    Linked Group 1: Placed in \"And\" Linked Group        bisacodyl (DULCOLAX) suppository 10 mg 10 mg Daily PRN 6/12/2024 --    Admin Instructions: Use if no bowel movement after 12 hours.  Hold for diarrhea    Route: Rectal    Linked Group 1: Placed in \"And\" Linked Group        Calcium Replacement - Follow Nurse / BPA Driven Protocol  As Needed 6/13/2024 --    Admin Instructions: Open Order & Select \"BHS Electrolyte Replacement Protocol " "Algorithm\" to View Details    Route: Does not apply    clindamycin (CLEOCIN) 900 mg in dextrose 5% 50 mL IVPB (premix) 900 mg Every 8 Hours 6/12/2024 6/17/2024    Admin Instructions: Do Not refrigerate.    Route: Intravenous    DAPTOmycin (CUBICIN) 400 mg in sodium chloride 0.9 % 50 mL IVPB 6 mg/kg × 68 kg Every 24 Hours 6/13/2024 6/23/2024    Admin Instructions: Caution: Look alike/sound alike drug alert.  Refrigerate. Do not shake.    Route: Intravenous    dextrose (D50W) (25 g/50 mL) IV injection 25 g 25 g Every 15 Minutes PRN 6/12/2024 --    Admin Instructions: Blood sugar less than 70; patient has IV access - Unresponsive, NPO or Unable To Safely Swallow    Route: Intravenous    dextrose (GLUTOSE) oral gel 15 g 15 g Every 15 Minutes PRN 6/12/2024 --    Admin Instructions: BS<70, Patient Alert, Is not NPO, Can safely swallow.    Route: Oral    glucagon (GLUCAGEN) injection 1 mg 1 mg Every 15 Minutes PRN 6/12/2024 --    Admin Instructions: Blood Glucose Less Than 70 - Patient Without IV Access - Unresponsive, NPO or Unable To Safely Swallow  Reconstitute powder for injection by adding 1 mL of -supplied sterile diluent or sterile water for injection to a vial containing 1 mg of the drug, to provide solutions containing 1 mg/mL. Shake vial gently to dissolve.    Route: Intramuscular    HYDROcodone-acetaminophen (NORCO) 5-325 MG per tablet 1 tablet 1 tablet Every 4 Hours PRN 6/12/2024 6/17/2024    Admin Instructions: Based on patient request - if ordered for moderate or severe pain, provider allows for administration of a medication prescribed for a lower pain scale.  [ISRAEL]    Do not exceed 4 grams of acetaminophen in a 24 hr period. Max dose of 2gm for AST/ALT greater than 120 units/L        If given for pain, use the following pain scale:   Mild Pain = Pain Score of 1-3, CPOT 1-2  Moderate Pain = Pain Score of 4-6, CPOT 3-4  Severe Pain = Pain Score of 7-10, CPOT 5-8    Route: Oral    HYDROmorphone " "(DILAUDID) injection 0.5 mg 0.5 mg Every 4 Hours PRN 6/12/2024 6/17/2024    Admin Instructions: Based on patient request - if ordered for moderate or severe pain, provider allows for administration of a medication prescribed for a lower pain scale.  If given for pain, use the following pain scale:  Mild Pain = Pain Score of 1-3, CPOT 1-2  Moderate Pain = Pain Score of 4-6, CPOT 3-4  Severe Pain = Pain Score of 7-10, CPOT 5-8    Route: Intravenous    insulin glargine (LANTUS, SEMGLEE) injection 10 Units 10 Units Nightly 6/13/2024 --    Admin Instructions: Do not hold basal insulin without an order. Consider requesting a dose edit, if needed.      Route: Subcutaneous    insulin regular (humuLIN R,novoLIN R) injection 2-7 Units 2-7 Units 4 Times Daily Before Meals & Nightly 6/13/2024 --    Admin Instructions: Correction Insulin - Low Dose - Total Insulin Dose Less Than 40 units/day (Lean, Elderly or Renal Patients)    Blood Glucose 150-199 mg/dL - 2 units  Blood Glucose 200-249 mg/dL - 3 units  Blood Glucose 250-299 mg/dL - 4 units  Blood Glucose 300-349 mg/dL - 5 units  Blood Glucose 350-400 mg/dL - 6 units  Blood Glucose Greater Than 400 mg/dL - 7 units & Call Provider   Caution: Look alike/sound alike drug alert    Route: Subcutaneous    lactated ringers infusion 100 mL/hr Continuous 6/12/2024 --    Route: Intravenous    lactated ringers infusion 9 mL/hr Continuous 6/13/2024 --    Admin Instructions: May switch to NS IV at [facility] if renal / if indicated    Route: Intravenous    lactobacillus acidophilus (RISAQUAD) capsule 1 capsule 1 capsule Daily 6/12/2024 --    Route: Oral    Magnesium Standard Dose Replacement - Follow Nurse / BPA Driven Protocol  As Needed 6/13/2024 --    Admin Instructions: Open Order & Select \"BHS Electrolyte Replacement Protocol Algorithm\" to View Details    Route: Does not apply    morphine injection 2 mg 2 mg Every 4 Hours PRN 6/13/2024 6/18/2024    Admin Instructions: Based on patient request " "- if ordered for moderate or severe pain, provider allows for administration of a medication prescribed for a lower pain scale.  If given for pain, use the following pain scale:  Mild Pain = Pain Score of 1-3, CPOT 1-2  Moderate Pain = Pain Score of 4-6, CPOT 3-4  Severe Pain = Pain Score of 7-10, CPOT 5-8    Route: Intravenous    nitroglycerin (NITROSTAT) SL tablet 0.4 mg 0.4 mg Every 5 Minutes PRN 6/12/2024 --    Admin Instructions: If Pain Unrelieved After 3 Doses Notify MD  May administer up to 3 doses per episode.    Route: Sublingual    oxyCODONE (ROXICODONE) immediate release tablet 5 mg 5 mg Every 4 Hours PRN 6/13/2024 6/20/2024    Admin Instructions: Based on patient request - if ordered for moderate or severe pain, provider allows for administration of a medication prescribed for a lower pain scale.    If given for pain, use the following pain scale:  Mild Pain = Pain Score of 1-3, CPOT 1-2  Moderate Pain = Pain Score of 4-6, CPOT 3-4  Severe Pain = Pain Score of 7-10, CPOT 5-8    Route: Oral    pantoprazole (PROTONIX) injection 40 mg 40 mg 2 Times Daily Before Meals 6/14/2024 --    Admin Instructions: Dilute with 10 mL of 0.9% NaCl and give IV push over 2 minutes.    Route: Intravenous    Phosphorus Replacement - Follow Nurse / BPA Driven Protocol  As Needed 6/13/2024 --    Admin Instructions: Open Order & Select \"BHS Electrolyte Replacement Protocol Algorithm\" to View Details    Route: Does not apply    piperacillin-tazobactam (ZOSYN) 4.5 g IVPB in 100 mL NS MBP (CD) 4.5 g Every 8 Hours 6/13/2024 6/18/2024    Route: Intravenous    polyethylene glycol (MIRALAX) packet 17 g 17 g Daily PRN 6/12/2024 --    Admin Instructions: Use if no bowel movement after 12 hours. Mix in 6-8 ounces of water.  Use 4-8 ounces of water, tea, or juice for each 17 gram dose.    Route: Oral    Linked Group 1: Placed in \"And\" Linked Group        Potassium Replacement - Follow Nurse / BPA Driven Protocol  As Needed 6/13/2024 --    " "Admin Instructions: Open Order & Select \"BHS Electrolyte Replacement Protocol Algorithm\" to View Details    Route: Does not apply    ropivacaine (NAROPIN) 0.2 % infusion (INFUSYSTEM)  Continuous 6/13/2024 --    Admin Instructions:  When Pt. In-House Infusion complete do not order refill until discussing with APS,  If pain greater than 7 out of 10, please call [phone] or [phone].  Thank you    Route: Peripheral Nerve    sennosides-docusate (PERICOLACE) 8.6-50 MG per tablet 2 tablet 2 tablet 2 Times Daily 6/12/2024 --    Admin Instructions: HOLD MEDICATION IF PATIENT HAS HAD BOWEL MOVEMENT. Start bowel management regimen if patient has not had a bowel movement after 12 hours.    Route: Oral    Linked Group 1: Placed in \"And\" Linked Group        sodium chloride 0.9 % flush 10 mL 10 mL Every 12 Hours Scheduled 6/12/2024 --    Route: Intravenous    sodium chloride 0.9 % flush 10 mL 10 mL As Needed 6/12/2024 --    Route: Intravenous    sodium chloride 0.9 % infusion 40 mL 40 mL As Needed 6/12/2024 --    Admin Instructions: Following administration of an IV intermittent medication, flush line with 40mL NS at 100mL/hr.    Route: Intravenous              Assessment & Plan     58-year-old male with left foot wet gangrene, necrotizing gas-forming soft tissue infection status post left below-knee amputation, wound vacuum-assisted closure June 13, 2024, delayed wound closure 6/15/24.      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  Follow cultures.  PT/OT.    Dressing changed, surgical incision inspected and drain removed on rounds 6/17/2024.    To begin postoperative day three:  Daily dressing change:    Xeroform  4x4  Kerlix  Ace     Follow up in two weeks for wound check and xrays, [facility] location.    [facility], [facility] at [facility]  [address] [facility] [address] [place] [id number]  Please schedule at " [phone]    Please arrange follow up prior to discharge.    Call [phone] to schedule.     BERNARD Peñaloza  06/17/24  08:07 EDT

## 2024-06-18 ENCOUNTER — APPOINTMENT (OUTPATIENT)
Dept: MRI IMAGING | Facility: HOSPITAL | Age: 59
End: 2024-06-18
Payer: MEDICAID

## 2024-06-18 ENCOUNTER — APPOINTMENT (OUTPATIENT)
Dept: CARDIOLOGY | Facility: HOSPITAL | Age: 59
End: 2024-06-18
Payer: MEDICAID

## 2024-06-18 ENCOUNTER — APPOINTMENT (OUTPATIENT)
Dept: GENERAL RADIOLOGY | Facility: HOSPITAL | Age: 59
End: 2024-06-18
Payer: MEDICAID

## 2024-06-18 LAB
ANION GAP SERPL CALCULATED.3IONS-SCNC: 8 MMOL/L (ref 5–15)
APPEARANCE FLD: ABNORMAL
ASCENDING AORTA: 3.4 CM
BACTERIA SPEC ANAEROBE CULT: NORMAL
BASOPHILS # BLD AUTO: 0.03 10*3/MM3 (ref 0–0.2)
BASOPHILS NFR BLD AUTO: 0.2 % (ref 0–1.5)
BH CV ECHO MEAS - AO MAX PG: 9.1 MMHG
BH CV ECHO MEAS - AO MEAN PG: 4.8 MMHG
BH CV ECHO MEAS - AO ROOT DIAM: 3 CM
BH CV ECHO MEAS - AO V2 MAX: 151 CM/SEC
BH CV ECHO MEAS - AO V2 VTI: 24.1 CM
BH CV ECHO MEAS - AVA(I,D): 2.33 CM2
BH CV ECHO MEAS - EDV(CUBED): 110.6 ML
BH CV ECHO MEAS - EDV(MOD-SP2): 81.3 ML
BH CV ECHO MEAS - EDV(MOD-SP4): 66.3 ML
BH CV ECHO MEAS - EF(MOD-BP): 58.3 %
BH CV ECHO MEAS - EF(MOD-SP2): 58.5 %
BH CV ECHO MEAS - EF(MOD-SP4): 57.3 %
BH CV ECHO MEAS - ESV(CUBED): 35.9 ML
BH CV ECHO MEAS - ESV(MOD-SP2): 33.7 ML
BH CV ECHO MEAS - ESV(MOD-SP4): 28.3 ML
BH CV ECHO MEAS - FS: 31.3 %
BH CV ECHO MEAS - IVS/LVPW: 1 CM
BH CV ECHO MEAS - IVSD: 1.1 CM
BH CV ECHO MEAS - LA DIMENSION: 4.2 CM
BH CV ECHO MEAS - LAT PEAK E' VEL: 7.2 CM/SEC
BH CV ECHO MEAS - LV MASS(C)D: 194 GRAMS
BH CV ECHO MEAS - LV MAX PG: 4.7 MMHG
BH CV ECHO MEAS - LV MEAN PG: 2.5 MMHG
BH CV ECHO MEAS - LV V1 MAX: 108 CM/SEC
BH CV ECHO MEAS - LV V1 VTI: 17.9 CM
BH CV ECHO MEAS - LVIDD: 4.8 CM
BH CV ECHO MEAS - LVIDS: 3.3 CM
BH CV ECHO MEAS - LVOT AREA: 3.1 CM2
BH CV ECHO MEAS - LVOT DIAM: 2 CM
BH CV ECHO MEAS - LVPWD: 1.1 CM
BH CV ECHO MEAS - MED PEAK E' VEL: 7.8 CM/SEC
BH CV ECHO MEAS - MV A MAX VEL: 116 CM/SEC
BH CV ECHO MEAS - MV DEC SLOPE: 1050 CM/SEC2
BH CV ECHO MEAS - MV DEC TIME: 0.15 SEC
BH CV ECHO MEAS - MV E MAX VEL: 95.6 CM/SEC
BH CV ECHO MEAS - MV E/A: 0.82
BH CV ECHO MEAS - MV MAX PG: 4.8 MMHG
BH CV ECHO MEAS - MV MEAN PG: 3 MMHG
BH CV ECHO MEAS - MV P1/2T: 30.1 MSEC
BH CV ECHO MEAS - MV V2 VTI: 21.3 CM
BH CV ECHO MEAS - MVA(P1/2T): 7.3 CM2
BH CV ECHO MEAS - MVA(VTI): 2.6 CM2
BH CV ECHO MEAS - PA ACC TIME: 0.11 SEC
BH CV ECHO MEAS - PA V2 MAX: 117 CM/SEC
BH CV ECHO MEAS - RAP SYSTOLE: 3 MMHG
BH CV ECHO MEAS - RVSP: 26 MMHG
BH CV ECHO MEAS - SV(LVOT): 56.2 ML
BH CV ECHO MEAS - SV(MOD-SP2): 47.6 ML
BH CV ECHO MEAS - SV(MOD-SP4): 38 ML
BH CV ECHO MEAS - TAPSE (>1.6): 1.94 CM
BH CV ECHO MEAS - TR MAX PG: 23 MMHG
BH CV ECHO MEAS - TR MAX VEL: 239 CM/SEC
BH CV ECHO MEASUREMENTS AVERAGE E/E' RATIO: 12.75
BH CV VAS BP LEFT ARM: NORMAL MMHG
BH CV XLRA - RV BASE: 2.2 CM
BH CV XLRA - RV LENGTH: 6.2 CM
BH CV XLRA - RV MID: 1.5 CM
BH CV XLRA - TDI S': 15.4 CM/SEC
BUN SERPL-MCNC: 16 MG/DL (ref 8–23)
BUN/CREAT SERPL: 29.6 (ref 7–25)
CALCIUM SPEC-SCNC: 7.5 MG/DL (ref 8.6–10.5)
CHLORIDE SERPL-SCNC: 99 MMOL/L (ref 98–107)
CO2 SERPL-SCNC: 27 MMOL/L (ref 22–29)
COLOR FLD: ABNORMAL
CREAT SERPL-MCNC: 0.54 MG/DL (ref 0.76–1.27)
CRYSTALS FLD MICRO: NORMAL
DEPRECATED RDW RBC AUTO: 49.2 FL (ref 37–54)
EGFRCR SERPLBLD CKD-EPI 2021: 114.1 ML/MIN/1.73
EOSINOPHIL # BLD AUTO: 0.16 10*3/MM3 (ref 0–0.4)
EOSINOPHIL NFR BLD AUTO: 1 % (ref 0.3–6.2)
ERYTHROCYTE [DISTWIDTH] IN BLOOD BY AUTOMATED COUNT: 13.9 % (ref 12.3–15.4)
GLUCOSE BLDC GLUCOMTR-MCNC: 112 MG/DL (ref 70–130)
GLUCOSE BLDC GLUCOMTR-MCNC: 129 MG/DL (ref 70–130)
GLUCOSE BLDC GLUCOMTR-MCNC: 135 MG/DL (ref 70–130)
GLUCOSE BLDC GLUCOMTR-MCNC: 210 MG/DL (ref 70–130)
GLUCOSE SERPL-MCNC: 112 MG/DL (ref 65–99)
HCT VFR BLD AUTO: 33.4 % (ref 37.5–51)
HGB BLD-MCNC: 11.5 G/DL (ref 13–17.7)
IMM GRANULOCYTES # BLD AUTO: 0.3 10*3/MM3 (ref 0–0.05)
IMM GRANULOCYTES NFR BLD AUTO: 1.8 % (ref 0–0.5)
LEFT ATRIUM VOLUME INDEX: 13.3 ML/M2
LYMPHOCYTES # BLD AUTO: 1.54 10*3/MM3 (ref 0.7–3.1)
LYMPHOCYTES NFR BLD AUTO: 9.3 % (ref 19.6–45.3)
LYMPHOCYTES NFR FLD MANUAL: 5 %
MCH RBC QN AUTO: 33 PG (ref 26.6–33)
MCHC RBC AUTO-ENTMCNC: 34.4 G/DL (ref 31.5–35.7)
MCV RBC AUTO: 95.7 FL (ref 79–97)
MONOCYTES # BLD AUTO: 1.08 10*3/MM3 (ref 0.1–0.9)
MONOCYTES NFR BLD AUTO: 6.5 % (ref 5–12)
NEUTROPHILS NFR BLD AUTO: 13.49 10*3/MM3 (ref 1.7–7)
NEUTROPHILS NFR BLD AUTO: 81.2 % (ref 42.7–76)
NEUTROPHILS NFR FLD MANUAL: 95 %
NRBC BLD AUTO-RTO: 0 /100 WBC (ref 0–0.2)
PLATELET # BLD AUTO: 231 10*3/MM3 (ref 140–450)
PMV BLD AUTO: 10 FL (ref 6–12)
POTASSIUM SERPL-SCNC: 4.4 MMOL/L (ref 3.5–5.2)
RBC # BLD AUTO: 3.49 10*6/MM3 (ref 4.14–5.8)
RBC # FLD AUTO: ABNORMAL /MM3
SODIUM SERPL-SCNC: 134 MMOL/L (ref 136–145)
WBC # FLD AUTO: ABNORMAL /MM3
WBC NRBC COR # BLD AUTO: 16.6 10*3/MM3 (ref 3.4–10.8)

## 2024-06-18 PROCEDURE — 87147 CULTURE TYPE IMMUNOLOGIC: CPT | Performed by: ORTHOPAEDIC SURGERY

## 2024-06-18 PROCEDURE — 99232 SBSQ HOSP IP/OBS MODERATE 35: CPT | Performed by: NURSE PRACTITIONER

## 2024-06-18 PROCEDURE — 25010000002 CEFTRIAXONE PER 250 MG: Performed by: INTERNAL MEDICINE

## 2024-06-18 PROCEDURE — 63710000001 INSULIN LISPRO (HUMAN) PER 5 UNITS: Performed by: ORTHOPAEDIC SURGERY

## 2024-06-18 PROCEDURE — 87206 SMEAR FLUORESCENT/ACID STAI: CPT | Performed by: ORTHOPAEDIC SURGERY

## 2024-06-18 PROCEDURE — 93306 TTE W/DOPPLER COMPLETE: CPT

## 2024-06-18 PROCEDURE — 87205 SMEAR GRAM STAIN: CPT | Performed by: ORTHOPAEDIC SURGERY

## 2024-06-18 PROCEDURE — 82948 REAGENT STRIP/BLOOD GLUCOSE: CPT

## 2024-06-18 PROCEDURE — 73720 MRI LWR EXTREMITY W/O&W/DYE: CPT

## 2024-06-18 PROCEDURE — 77002 NEEDLE LOCALIZATION BY XRAY: CPT

## 2024-06-18 PROCEDURE — 25010000002 MORPHINE PER 10 MG: Performed by: INTERNAL MEDICINE

## 2024-06-18 PROCEDURE — 87186 SC STD MICRODIL/AGAR DIL: CPT | Performed by: ORTHOPAEDIC SURGERY

## 2024-06-18 PROCEDURE — 25010000002 MORPHINE PER 10 MG: Performed by: ORTHOPAEDIC SURGERY

## 2024-06-18 PROCEDURE — 0 GADOBENATE DIMEGLUMINE 529 MG/ML SOLUTION: Performed by: INTERNAL MEDICINE

## 2024-06-18 PROCEDURE — 80048 BASIC METABOLIC PNL TOTAL CA: CPT | Performed by: NURSE PRACTITIONER

## 2024-06-18 PROCEDURE — A9577 INJ MULTIHANCE: HCPCS | Performed by: INTERNAL MEDICINE

## 2024-06-18 PROCEDURE — 63710000001 INSULIN GLARGINE PER 5 UNITS: Performed by: ORTHOPAEDIC SURGERY

## 2024-06-18 PROCEDURE — 93306 TTE W/DOPPLER COMPLETE: CPT | Performed by: INTERNAL MEDICINE

## 2024-06-18 PROCEDURE — 85025 COMPLETE CBC W/AUTO DIFF WBC: CPT | Performed by: NURSE PRACTITIONER

## 2024-06-18 PROCEDURE — 87070 CULTURE OTHR SPECIMN AEROBIC: CPT | Performed by: ORTHOPAEDIC SURGERY

## 2024-06-18 PROCEDURE — 87102 FUNGUS ISOLATION CULTURE: CPT | Performed by: ORTHOPAEDIC SURGERY

## 2024-06-18 PROCEDURE — 89060 EXAM SYNOVIAL FLUID CRYSTALS: CPT | Performed by: ORTHOPAEDIC SURGERY

## 2024-06-18 PROCEDURE — 89051 BODY FLUID CELL COUNT: CPT | Performed by: ORTHOPAEDIC SURGERY

## 2024-06-18 PROCEDURE — 87116 MYCOBACTERIA CULTURE: CPT | Performed by: ORTHOPAEDIC SURGERY

## 2024-06-18 RX ORDER — LIDOCAINE HYDROCHLORIDE 10 MG/ML
10 INJECTION, SOLUTION EPIDURAL; INFILTRATION; INTRACAUDAL; PERINEURAL ONCE
Status: COMPLETED | OUTPATIENT
Start: 2024-06-18 | End: 2024-06-18

## 2024-06-18 RX ORDER — LISINOPRIL 10 MG/1
10 TABLET ORAL
Status: DISCONTINUED | OUTPATIENT
Start: 2024-06-19 | End: 2024-07-06 | Stop reason: HOSPADM

## 2024-06-18 RX ORDER — MORPHINE SULFATE 2 MG/ML
2 INJECTION, SOLUTION INTRAMUSCULAR; INTRAVENOUS EVERY 4 HOURS PRN
Status: DISCONTINUED | OUTPATIENT
Start: 2024-06-18 | End: 2024-06-20

## 2024-06-18 RX ADMIN — MORPHINE SULFATE 2 MG: 2 INJECTION, SOLUTION INTRAMUSCULAR; INTRAVENOUS at 11:26

## 2024-06-18 RX ADMIN — SODIUM CHLORIDE 2000 MG: 900 INJECTION INTRAVENOUS at 08:29

## 2024-06-18 RX ADMIN — MORPHINE SULFATE 2 MG: 2 INJECTION, SOLUTION INTRAMUSCULAR; INTRAVENOUS at 21:21

## 2024-06-18 RX ADMIN — PANTOPRAZOLE SODIUM 40 MG: 40 INJECTION, POWDER, FOR SOLUTION INTRAVENOUS at 08:29

## 2024-06-18 RX ADMIN — OXYCODONE HYDROCHLORIDE 5 MG: 5 TABLET ORAL at 01:48

## 2024-06-18 RX ADMIN — INSULIN GLARGINE 20 UNITS: 100 INJECTION, SOLUTION SUBCUTANEOUS at 21:21

## 2024-06-18 RX ADMIN — Medication 1 CAPSULE: at 08:29

## 2024-06-18 RX ADMIN — INSULIN LISPRO 5 UNITS: 100 INJECTION, SOLUTION INTRAVENOUS; SUBCUTANEOUS at 17:11

## 2024-06-18 RX ADMIN — Medication 5 MG: at 21:20

## 2024-06-18 RX ADMIN — LIDOCAINE HYDROCHLORIDE 10 ML: 10 INJECTION, SOLUTION EPIDURAL; INFILTRATION; INTRACAUDAL; PERINEURAL at 11:10

## 2024-06-18 RX ADMIN — OXYCODONE HYDROCHLORIDE 5 MG: 5 TABLET ORAL at 14:56

## 2024-06-18 RX ADMIN — INSULIN LISPRO 5 UNITS: 100 INJECTION, SOLUTION INTRAVENOUS; SUBCUTANEOUS at 14:56

## 2024-06-18 RX ADMIN — PANTOPRAZOLE SODIUM 40 MG: 40 INJECTION, POWDER, FOR SOLUTION INTRAVENOUS at 17:11

## 2024-06-18 RX ADMIN — OXYCODONE HYDROCHLORIDE 5 MG: 5 TABLET ORAL at 08:29

## 2024-06-18 RX ADMIN — Medication 10 ML: at 08:30

## 2024-06-18 RX ADMIN — INSULIN LISPRO 5 UNITS: 100 INJECTION, SOLUTION INTRAVENOUS; SUBCUTANEOUS at 08:29

## 2024-06-18 RX ADMIN — OXYCODONE HYDROCHLORIDE 5 MG: 5 TABLET ORAL at 19:05

## 2024-06-18 RX ADMIN — INSULIN LISPRO 4 UNITS: 100 INJECTION, SOLUTION INTRAVENOUS; SUBCUTANEOUS at 21:21

## 2024-06-18 RX ADMIN — GADOBENATE DIMEGLUMINE 15 ML: 529 INJECTION, SOLUTION INTRAVENOUS at 22:22

## 2024-06-18 RX ADMIN — LISINOPRIL 5 MG: 5 TABLET ORAL at 08:29

## 2024-06-18 RX ADMIN — Medication 10 ML: at 21:22

## 2024-06-18 RX ADMIN — OXYCODONE HYDROCHLORIDE 5 MG: 5 TABLET ORAL at 22:53

## 2024-06-18 NOTE — PROGRESS NOTES
[facility] Medicine Services  PROGRESS NOTE    Patient Name: Easton Alvarez  : 1965  MRN: [id number]    Date of Admission: 2024  Primary Care Physician: Provider, No Known    Subjective   Subjective     CC:  F/u left foot gangrene    HPI:   Patient resting in bed, sleeping, awakes to voice. Says he is not having much pain. Nerve block pulled today. Patient says he had right hip aspirated earlier.     Objective   Objective     Vital Signs:   Temp:  [97.8 °F (36.6 °C)-98.6 °F (37 °C)] 98.6 °F (37 °C)  Heart Rate:  [] 104  Resp:  [16] 16  BP: (131-179)/(75-90) 147/83     Physical Exam:   Constitutional: No acute distress, awake, alert  HENT: NCAT, mucous membranes moist  Respiratory: Clear to auscultation bilaterally, respiratory effort jenni, room air   Cardiovascular: RRR, no murmurs, rubs, or gallops  Gastrointestinal: Positive bowel sounds, soft, nontender, distended  Musculoskeletal: 1+ right ankle edema, L BKA  Psychiatric: Flat affect, cooperative  Neurologic: Oriented x 3, moves all extremities, Cranial Nerves grossly intact to confrontation, speech clear  Skin: No rashes      Results Reviewed:  LAB RESULTS:      Lab 24  0507 24  0506 24  0400 06/15/24  0443 24  2021 24  1218 24  0457 24  0403 24  2225 24  1839 24  1451 24  1149 24  0807 24  0802   WBC 16.60* 18.47* 25.41* 22.75*  --   --   --  18.48*  --  18.84*  --   --   --  26.78*   HEMOGLOBIN 11.5* 11.7* 11.5* 11.2* 10.3*  --    < > 10.5*  --  11.0*  --   --   --  12.3*   HEMATOCRIT 33.4* 34.7* 33.5* 32.1* 30.0*  --    < > 30.5*  --  31.5*  --   --   --  35.5*   PLATELETS 231 179 174 168  --   --   --  122*  --  142  --   --   --  191   NEUTROS ABS 13.49* 15.00* 22.05*  --   --   --   --   --   --  16.57*  --   --   --  24.21*  23.83*   IMMATURE GRANS (ABS) 0.30* 0.69* 0.77*  --   --   --   --   --   --  0.34*  --   --   --  0.42*    LYMPHS ABS 1.54 1.41 1.29  --   --   --   --   --   --  0.83  --   --   --  0.67*   MONOS ABS 1.08* 1.09* 1.07*  --   --   --   --   --   --  1.01*  --   --   --  1.47*   EOS ABS 0.16 0.22 0.10  --   --   --   --   --   --  0.01  --   --   --  0.00   MCV 95.7 94.8 92.8 93.6  --   --   --  92.4  --  91.6  --   --   --  93.2   CRP  --   --   --   --   --   --   --   --   --   --   --   --   --  24.89*   LACTATE  --   --   --   --   --   --   --   --  3.6* 4.1* 3.9* 6.3* 8.8*  --    PROTIME  --   --   --   --   --  19.5*  --   --   --   --   --   --   --   --     < > = values in this interval not displayed.         Lab 06/18/24  0507 06/17/24  0506 06/16/24  0400 06/15/24  0443 06/14/24  0356 06/13/24  2032 06/13/24  1341 06/13/24  0403   SODIUM 134* 136 134* 129* 129*  --  129* 131*   POTASSIUM 4.4 4.1 4.3 4.1 4.5   < > 3.8 3.3*   CHLORIDE 99 102 101 94* 94*  --  97* 96*   CO2 27.0 33.0* 27.0 25.0 24.0  --  23.0 26.0   ANION GAP 8.0 1.0* 6.0 10.0 11.0  --  9.0 9.0   BUN 16 21 29* 43* 60*  --  56* 64*   CREATININE 0.54* 0.51* 0.72* 0.85 0.86  --  0.67* 0.91   EGFR 114.1 116.1 104.6 99.5 99.1  --  106.9 96.5   GLUCOSE 112* 120* 175* 231* 312*  --  260* 268*   CALCIUM 7.5* 7.7* 7.7* 7.4* 7.6*  --  7.5* 7.3*   MAGNESIUM  --   --   --  2.3 2.6*  --  2.4  --    PHOSPHORUS  --   --   --  2.7 3.4  --  3.5  --    HEMOGLOBIN A1C  --   --   --   --   --   --   --  9.50*    < > = values in this interval not displayed.         Lab 06/16/24  0400 06/14/24  0356 06/12/24  1839 06/12/24  0802   TOTAL PROTEIN 5.8* 5.4* 5.0* 5.6*   ALBUMIN 1.8* 2.0* 2.0* 2.7*   GLOBULIN 4.0  --  3.0 2.9   ALT (SGPT) 33 30 37 45*   AST (SGOT) 49* 39 46* 65*   BILIRUBIN 1.9* 2.1* 3.6* 4.7*   INDIRECT BILIRUBIN  --  0.6  --   --    BILIRUBIN DIRECT  --  1.5*  --   --    ALK PHOS 232* 173* 145* 171*         Lab 06/14/24  1218   PROTIME 19.5*   INR 1.63*                 Brief Urine Lab Results  (Last result in the past 365 days)        Color   Clarity    Blood   Leuk Est   Nitrite   Protein   CREAT   Urine HCG        06/14/24 1109 Yellow   Clear   Negative   Negative   Negative   Negative                   Microbiology Results Abnormal       Procedure Component Value - Date/Time    Body Fluid Culture - Body Fluid, Hip, Right [[id number]] Collected: 06/18/24 1052    Lab Status: Preliminary result Specimen: Body Fluid from Hip, Right Updated: 06/18/24 1241     Gram Stain Many (4+) WBCs seen      No organisms seen    Fungus Culture - Surgical Site, Leg, Left [[id number]] Collected: 06/13/24 1031    Lab Status: Preliminary result Specimen: Surgical Site from Leg, Left Updated: 06/18/24 1200     Fungus Culture No fungus isolated at less than 1 week    AFB Culture - Surgical Site, Leg, Left [[id number]] Collected: 06/13/24 1031    Lab Status: Preliminary result Specimen: Surgical Site from Leg, Left Updated: 06/18/24 1200     AFB Culture No AFB isolated at less than 1 week     AFB Stain No acid fast bacilli seen on concentrated smear    Blood Culture - Blood, Hand, Left [[id number]]  (Normal) Collected: 06/15/24 1104    Lab Status: Preliminary result Specimen: Blood from Hand, Left Updated: 06/18/24 1200     Blood Culture No growth at 3 days    Blood Culture - Blood, Hand, Right [[id number]]  (Normal) Collected: 06/15/24 1104    Lab Status: Preliminary result Specimen: Blood from Hand, Right Updated: 06/18/24 1200     Blood Culture No growth at 3 days    Anaerobic Culture - Surgical Site, Leg, Left [[id number]]  (Normal) Collected: 06/13/24 1031    Lab Status: Final result Specimen: Surgical Site from Leg, Left Updated: 06/18/24 0751     Anaerobic Culture No anaerobes isolated at 5 days    Wound Culture - Wound, Leg, Left [[id number]] Collected: 06/15/24 1501    Lab Status: Preliminary result Specimen: Wound from Leg, Left Updated: 06/18/24 0719     Wound Culture No growth at 2 days     Gram Stain Rare (1+) WBCs seen      No organisms seen    Blood Culture - Blood, Arm, Left  [[id number]]  (Normal) Collected: 06/12/24 1832    Lab Status: Final result Specimen: Blood from Arm, Left Updated: 06/17/24 1900     Blood Culture No growth at 5 days    Blood Culture - Blood, Hand, Right [[id number]]  (Normal) Collected: 06/12/24 1837    Lab Status: Final result Specimen: Blood from Hand, Right Updated: 06/17/24 1900     Blood Culture No growth at 5 days    AFB Culture - Wound, Leg, Left [[id number]] Collected: 06/15/24 1501    Lab Status: Preliminary result Specimen: Wound from Leg, Left Updated: 06/16/24 1216     AFB Stain No acid fast bacilli seen on concentrated smear    Fungus Smear - Surgical Site, Leg, Left [[id number]] Collected: 06/13/24 1031    Lab Status: Final result Specimen: Surgical Site from Leg, Left Updated: 06/14/24 1406     Fungal Stain No fungal elements seen            XR Hip With or Without Pelvis 2 - 3 View Right    Result Date: 6/17/2024  XR HIP W OR WO PELVIS 2-3 VIEW RIGHT Date of Exam: 6/17/2024 5:51 PM EDT Indication: pain, concern for prosthetic joint infection Comparison: None available. Findings: There is no radiographic evidence of acute fracture or dislocation. Patient is post right total hip arthroplasty. No definite evidence of periprosthetic lucency. There is evidence of soft tissue swelling adjacent to the right hip as well as small volume subcutaneous air.     Impression: Impression: No radiographic evidence of acute fracture or dislocation. Post right total hip arthroplasty with evidence of periprosthetic soft tissue swelling and small volume subcutaneous air. Electronically Signed: Sage Burch MD  6/17/2024 6:29 PM EDT  Workstation ID: [id number]         Current medications:  Scheduled Meds:cefTRIAXone, 2,000 mg, Intravenous, Q24H  insulin glargine, 20 Units, Subcutaneous, Nightly  insulin lispro, 2-9 Units, Subcutaneous, 4x Daily AC & at Bedtime  Insulin Lispro, 5 Units, Subcutaneous, TID With Meals  lactobacillus acidophilus, 1 capsule, Oral, Daily  [START  ON 6/19/2024] lisinopril, 10 mg, Oral, Q24H  melatonin, 5 mg, Oral, Nightly  pantoprazole, 40 mg, Intravenous, BID AC  senna-docusate sodium, 2 tablet, Oral, BID  sodium chloride, 10 mL, Intravenous, Q12H      Continuous Infusions:lactated ringers, 9 mL/hr, Last Rate: 9 mL/hr (06/15/24 1307)  lactated ringers, 9 mL/hr, Last Rate: 9 mL/hr (06/15/24 1345)  ropivacaine, , Last Rate: Stopped (06/17/24 2230)      PRN Meds:.  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        Brief Hospital Course to date:  Easton Alvarez is a 58 y.o. male  with past medical history significant for T2DM, diabetic neuropathy, right hip fracture/ORIF ([facility] 2/2024), and right transmetatarsal amputation 2019 at  who presented to the ED on 6/12/2024 due to left foot wound. Lactate at OSH was 8.8 and CT scan of left leg showed gas gangrene. Orthopedic surgeon Dr. Em was consulted and performed a left below-knee amputation on 6/13/2024 with further debridement and irrigation on 6/15.  Infectious disease is following and managing antimicrobial therapy.     This patient's problems and plans were partially entered by my partner and updated as appropriate by me 06/18/24.    Sepsis (WBC, lactate, tachypnea, source)    Acute GB strep bacteremia  Left foot  cellulitis/gangrene   - MRI left foot revealed no findings of osteomyelitis, did show extensive soft tissue infection with abscesses along the foot to the ankle and into the lower leg with soft tissue gas.    - S/p left BKA on 6/13/24  with further debridement on 6/15/24 by Dr. Em, drain removed 6/17  - Daily dressing change to surgical site: Xeroform, 4X4, Kerlix, ACE  - Follow up with  Orthopedics in 2 weeks for wound check, imaging at [facility], [address]  - PT wound care following  - PT and OT to evaluate  - Add Boost glucose control to increase protein intake for wound healing, nutrition following  - blood cultures indicate Strep B bacteremia  - wound cultures growing Strep B and Kebsiella, repeat cultures pending  - ID, Dr. Flores, following, continue Zosyn, clindamycin for now with plan to taper IV abx in next 24-48 hours pending repeat cultures  - WBCs trending down, 25.41>>16.60 today  - Echo pending to evaluate for any signs of vegetation, valvular disease  - AM CBC w/ diff, BMP      R hip fx repaired 2/2024  ?Glenys-prosthetic hip infection  - noted gas near hardware per CT scan, reviewed by Dr. Rene and Dr. Em and there is concern for glenys-prosthetic hip infection, appreciate their assistance and recommendations  - IR consulted for right hip aspiration, fluid cultures pending  - MRI right hip pending    Gap acidosis POA, resolved   - AG 20.1, likely from lactate, serum acetone negative     Concern for GIB  -melena and fecal occult positive overnight 6/14  -GI consulted, recommended medical management for possible melena with twice daily PPI x 1 month  -GI referral at discharge for elevated LFTs, thrombocytopenia and concern for early cirrhosis, plan to purse liver US after recovery from acute illness    Elevated BP  - no h/o taking meds for BP per patient report  - BP has been running high this admission, possibly secondary to pain  - start low-dose ACE given  DM2, lisinopril 10 mg daily, titrate    DM, uncontrolled  - Pt reports he is unaware of his diabetes medications and is not on any medications currently   -last records from 2019 at , he was discharged on Lantus 12 U with 5 U with meals   -HgA1c 9.5  -Lantus increased to 20 units nightly, mealtime insulin added 5 units TID with improvement in BGs  -Continue current basal, prandial, and SS insulins, titrate  -BGs improving       Expected Discharge Location and Transportation: Likely will need rehab, pt hoping to go home with family  Expected Discharge   Expected Discharge Date: 6/21/2024; Expected Discharge Time:      VTE Prophylaxis:  Mechanical VTE prophylaxis orders are present.         AM-PAC 6 Clicks Score (PT): 17 (06/18/24 7239)    CODE STATUS:   Code Status and Medical Interventions:   Ordered at: 06/12/24 0256     Level Of Support Discussed With:    Patient     Code Status (Patient has no pulse and is not breathing):    CPR (Attempt to Resuscitate)     Medical Interventions (Patient has pulse or is breathing):    Full Support       Jessica Poole, GUILLAUME  06/18/24

## 2024-06-18 NOTE — PROGRESS NOTES
"Easton Alvarez       LOS: 6 days   Patient Care Team:  Provider, No Known as PCP - General    Chief Complaint:  left foot / leg necrotizing soft tissue infection.    Subjective     Interval History:     Pain tolerable overnight    Review of Systems:      Gen- No fevers, chills  CV- No chest pain, palpitations  Resp- No cough, dyspnea  GI- No N/V/D, abd pain    Objective     Vital Signs  Vital Signs (last 24 hours)         06/13 0700  06/14 0659 06/14 0700  06/14 0839   Most Recent      Temp (°F) 97 -  98.7      98.1     98.1 (36.7) 06/14 0724    Heart Rate 75 -  84       81 06/14 0340    Resp 13 -  20      20     20 06/14 0724    BP 97/62 -  145/70      149/85     149/85 06/14 0724    SpO2 (%) 96 -  99       98 06/14 0340    Flow (L/min) 2 -  4       2 06/13 1100              Physical Exam:     Alert, oriented.  No acute distress.  Nonlabored respirations.  Regular rate and rhythm.  Abdomen nondistended.  Left lower extremity: Operative dressing intact, drain with minimal serosanguineous output.  Incision inspected, skin edges well-approximated, staples in place.  Incision appears clean, no erythema, drainage, warmth, purulence.  Right lower extremity: No erythema or drainage.  Has some thigh fullness.     Results Review:     I reviewed the patient's new clinical results.    Medication Review:   Hospital Medications (active)         Dose Frequency Start End    bisacodyl (DULCOLAX) EC tablet 5 mg 5 mg Daily PRN 6/12/2024 --    Admin Instructions: Use if no bowel movement after 12 hours.  Swallow whole. Do not crush, split, or chew tablet.    Route: Oral    Linked Group 1: Placed in \"And\" Linked Group        bisacodyl (DULCOLAX) suppository 10 mg 10 mg Daily PRN 6/12/2024 --    Admin Instructions: Use if no bowel movement after 12 hours.  Hold for diarrhea    Route: Rectal    Linked Group 1: Placed in \"And\" Linked Group        Calcium Replacement - Follow Nurse / BPA Driven Protocol  As Needed 6/13/2024 --    Admin " "Instructions: Open Order & Select \"BHS Electrolyte Replacement Protocol Algorithm\" to View Details    Route: Does not apply    clindamycin (CLEOCIN) 900 mg in dextrose 5% 50 mL IVPB (premix) 900 mg Every 8 Hours 6/12/2024 6/17/2024    Admin Instructions: Do Not refrigerate.    Route: Intravenous    DAPTOmycin (CUBICIN) 400 mg in sodium chloride 0.9 % 50 mL IVPB 6 mg/kg × 68 kg Every 24 Hours 6/13/2024 6/23/2024    Admin Instructions: Caution: Look alike/sound alike drug alert.  Refrigerate. Do not shake.    Route: Intravenous    dextrose (D50W) (25 g/50 mL) IV injection 25 g 25 g Every 15 Minutes PRN 6/12/2024 --    Admin Instructions: Blood sugar less than 70; patient has IV access - Unresponsive, NPO or Unable To Safely Swallow    Route: Intravenous    dextrose (GLUTOSE) oral gel 15 g 15 g Every 15 Minutes PRN 6/12/2024 --    Admin Instructions: BS<70, Patient Alert, Is not NPO, Can safely swallow.    Route: Oral    glucagon (GLUCAGEN) injection 1 mg 1 mg Every 15 Minutes PRN 6/12/2024 --    Admin Instructions: Blood Glucose Less Than 70 - Patient Without IV Access - Unresponsive, NPO or Unable To Safely Swallow  Reconstitute powder for injection by adding 1 mL of -supplied sterile diluent or sterile water for injection to a vial containing 1 mg of the drug, to provide solutions containing 1 mg/mL. Shake vial gently to dissolve.    Route: Intramuscular    HYDROcodone-acetaminophen (NORCO) 5-325 MG per tablet 1 tablet 1 tablet Every 4 Hours PRN 6/12/2024 6/17/2024    Admin Instructions: Based on patient request - if ordered for moderate or severe pain, provider allows for administration of a medication prescribed for a lower pain scale.  [ISRAEL]    Do not exceed 4 grams of acetaminophen in a 24 hr period. Max dose of 2gm for AST/ALT greater than 120 units/L        If given for pain, use the following pain scale:   Mild Pain = Pain Score of 1-3, CPOT 1-2  Moderate Pain = Pain Score of 4-6, CPOT 3-4  Severe " "Pain = Pain Score of 7-10, CPOT 5-8    Route: Oral    HYDROmorphone (DILAUDID) injection 0.5 mg 0.5 mg Every 4 Hours PRN 6/12/2024 6/17/2024    Admin Instructions: Based on patient request - if ordered for moderate or severe pain, provider allows for administration of a medication prescribed for a lower pain scale.  If given for pain, use the following pain scale:  Mild Pain = Pain Score of 1-3, CPOT 1-2  Moderate Pain = Pain Score of 4-6, CPOT 3-4  Severe Pain = Pain Score of 7-10, CPOT 5-8    Route: Intravenous    insulin glargine (LANTUS, SEMGLEE) injection 10 Units 10 Units Nightly 6/13/2024 --    Admin Instructions: Do not hold basal insulin without an order. Consider requesting a dose edit, if needed.      Route: Subcutaneous    insulin regular (humuLIN R,novoLIN R) injection 2-7 Units 2-7 Units 4 Times Daily Before Meals & Nightly 6/13/2024 --    Admin Instructions: Correction Insulin - Low Dose - Total Insulin Dose Less Than 40 units/day (Lean, Elderly or Renal Patients)    Blood Glucose 150-199 mg/dL - 2 units  Blood Glucose 200-249 mg/dL - 3 units  Blood Glucose 250-299 mg/dL - 4 units  Blood Glucose 300-349 mg/dL - 5 units  Blood Glucose 350-400 mg/dL - 6 units  Blood Glucose Greater Than 400 mg/dL - 7 units & Call Provider   Caution: Look alike/sound alike drug alert    Route: Subcutaneous    lactated ringers infusion 100 mL/hr Continuous 6/12/2024 --    Route: Intravenous    lactated ringers infusion 9 mL/hr Continuous 6/13/2024 --    Admin Instructions: May switch to NS IV at KVO if renal / if indicated    Route: Intravenous    lactobacillus acidophilus (RISAQUAD) capsule 1 capsule 1 capsule Daily 6/12/2024 --    Route: Oral    Magnesium Standard Dose Replacement - Follow Nurse / BPA Driven Protocol  As Needed 6/13/2024 --    Admin Instructions: Open Order & Select \"BHS Electrolyte Replacement Protocol Algorithm\" to View Details    Route: Does not apply    morphine injection 2 mg 2 mg Every 4 Hours PRN " "6/13/2024 6/18/2024    Admin Instructions: Based on patient request - if ordered for moderate or severe pain, provider allows for administration of a medication prescribed for a lower pain scale.  If given for pain, use the following pain scale:  Mild Pain = Pain Score of 1-3, CPOT 1-2  Moderate Pain = Pain Score of 4-6, CPOT 3-4  Severe Pain = Pain Score of 7-10, CPOT 5-8    Route: Intravenous    nitroglycerin (NITROSTAT) SL tablet 0.4 mg 0.4 mg Every 5 Minutes PRN 6/12/2024 --    Admin Instructions: If Pain Unrelieved After 3 Doses Notify MD  May administer up to 3 doses per episode.    Route: Sublingual    oxyCODONE (ROXICODONE) immediate release tablet 5 mg 5 mg Every 4 Hours PRN 6/13/2024 6/20/2024    Admin Instructions: Based on patient request - if ordered for moderate or severe pain, provider allows for administration of a medication prescribed for a lower pain scale.    If given for pain, use the following pain scale:  Mild Pain = Pain Score of 1-3, CPOT 1-2  Moderate Pain = Pain Score of 4-6, CPOT 3-4  Severe Pain = Pain Score of 7-10, CPOT 5-8    Route: Oral    pantoprazole (PROTONIX) injection 40 mg 40 mg 2 Times Daily Before Meals 6/14/2024 --    Admin Instructions: Dilute with 10 mL of 0.9% NaCl and give IV push over 2 minutes.    Route: Intravenous    Phosphorus Replacement - Follow Nurse / BPA Driven Protocol  As Needed 6/13/2024 --    Admin Instructions: Open Order & Select \"BHS Electrolyte Replacement Protocol Algorithm\" to View Details    Route: Does not apply    piperacillin-tazobactam (ZOSYN) 4.5 g IVPB in 100 mL NS MBP (CD) 4.5 g Every 8 Hours 6/13/2024 6/18/2024    Route: Intravenous    polyethylene glycol (MIRALAX) packet 17 g 17 g Daily PRN 6/12/2024 --    Admin Instructions: Use if no bowel movement after 12 hours. Mix in 6-8 ounces of water.  Use 4-8 ounces of water, tea, or juice for each 17 gram dose.    Route: Oral    Linked Group 1: Placed in \"And\" Linked Group        Potassium " "Replacement - Follow Nurse / BPA Driven Protocol  As Needed 6/13/2024 --    Admin Instructions: Open Order & Select \"BHS Electrolyte Replacement Protocol Algorithm\" to View Details    Route: Does not apply    ropivacaine (NAROPIN) 0.2 % infusion (INFUSYSTEM)  Continuous 6/13/2024 --    Admin Instructions:  When Pt. In-House Infusion complete do not order refill until discussing with APS,  If pain greater than 7 out of 10, please call [phone] or [phone].  Thank you    Route: Peripheral Nerve    sennosides-docusate (PERICOLACE) 8.6-50 MG per tablet 2 tablet 2 tablet 2 Times Daily 6/12/2024 --    Admin Instructions: HOLD MEDICATION IF PATIENT HAS HAD BOWEL MOVEMENT. Start bowel management regimen if patient has not had a bowel movement after 12 hours.    Route: Oral    Linked Group 1: Placed in \"And\" Linked Group        sodium chloride 0.9 % flush 10 mL 10 mL Every 12 Hours Scheduled 6/12/2024 --    Route: Intravenous    sodium chloride 0.9 % flush 10 mL 10 mL As Needed 6/12/2024 --    Route: Intravenous    sodium chloride 0.9 % infusion 40 mL 40 mL As Needed 6/12/2024 --    Admin Instructions: Following administration of an IV intermittent medication, flush line with 40mL NS at 100mL/hr.    Route: Intravenous              Assessment & Plan     58-year-old male with left foot wet gangrene, necrotizing gas-forming soft tissue infection status post left below-knee amputation, wound vacuum-assisted closure June 13, 2024, delayed wound closure 6/15/24.  Concern for right prosthetic hip infection.      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  I have asked for him to be evaluated by orthopedic recon pending aspirate results.    NWBLLE.  Follow cultures.  PT/OT.    Dressing changed, surgical incision inspected and drain removed on rounds 6/17/2024.    To begin postoperative day " three:  Daily dressing change:    Xeroform  4x4  Kerlix  Ace     Follow up in two weeks for wound check and xrays, [facility] location.    [facility], [facility] at [place] [address]  Please schedule at [phone]    Please arrange follow up prior to discharge.    Call [phone] to schedule.     Brijesh Em Jr, MD  06/18/24  07:15 EDT

## 2024-06-18 NOTE — PROGRESS NOTES
"Easton Alvarez  1965  [id number]          Chief Complaint: left foot infection    Reason for Consultation: left foot infection    History of present illness:     Patient is a 58 y.o.  Yr old male with history of diabetes with prior right TMA 2019 at , history strep septicemia 2016 ; he reports right total hip arthroplasty 2024 after fall. He reports a fall several weeks prior to his June admission with injury to the left foot, wound present with deterioration several days preceding admission with severe discoloration/redness and swelling.  CT scan showed concern for subcutaneous emphysema and necrotizing infection.  Transferred to [facility] with evaluation by Dr. Em and Dr. Lundberg and arrangements made for urgent surgery. Subsequently, blood cultures called with gram-positive cocci     6/13 left LE amp, Dr Em    6/15  \"PROCEDURE:            Left      84789: Secondary closure above-knee amputation\"      6/18/24 orthopedics with plans for right hip aspiration; wbc  down some postop from revision; blood cultures 6/15 negative so far; He has left stump  pain that is constant, sharp at times, blunted by neuropathy, worse with palpation, better with pain meds and 4-5 out of 10 in severity. No distress per nursing; no new focal symptoms otherwise; he has right hip pain with range of motion and weightbearing increased and out of 10 at present with movement, nonradiating, better with pain meds.       No headache photophobia or neck stiffness.  No shortness of breath cough or hemoptysis.  No nausea vomiting diarrhea or abdominal pain.  No dysuria hematuria or pyuria.  No other new skin rash    Past Medical History:   Diagnosis Date    Diabetes mellitus     Diabetic foot infection 2018    left (partial amputation)       Past Surgical History:   Procedure Laterality Date    AMPUTATION FOOT / TOE Right     partial foot    BELOW KNEE AMPUTATION Left 06/13/2024    Procedure: AMPUTATION " "BELOW KNEE AND WOUND VAC ASSISTED CLOSURE LEFT;  Surgeon: Brijesh Em Jr., MD;  Location:  [facility];  Service: Orthopedics;  Laterality: Left;    INCISION AND DRAINAGE LEG Left 6/15/2024    Procedure: SECONDARY CLOSURE LEFT BELOW KNEE AMPUTATION;  Surgeon: Brijesh Em Jr., MD;  Location:  [facility];  Service: Orthopedics;  Laterality: Left;    PLACEMENT OF WOUND VAC Left 06/13/2024    Procedure: PLACEMENT OF WOUND VAC LEFT;  Surgeon: Brijesh Em Jr., MD;  Location:  [facility];  Service: Orthopedics;  Laterality: Left;    TOTAL HIP ARTHROPLASTY Right 02/2024    From fall \"whole hip was shattered\"       Pediatric History   Patient Parents    Not on file     Other Topics Concern    Not on file   Social History Narrative    Not on file       family history is not on file. High blood pressure mom/dad    No Known Allergies    Medication:  Current Facility-Administered Medications   Medication Dose Route Frequency Provider Last Rate Last Admin    sennosides-docusate (PERICOLACE) 8.6-50 MG per tablet 2 tablet  2 tablet Oral BID Brijesh Em Jr., MD   2 tablet at 06/16/24 2124    And    polyethylene glycol (MIRALAX) packet 17 g  17 g Oral Daily PRN Brijesh Em Jr., MD        And    bisacodyl (DULCOLAX) EC tablet 5 mg  5 mg Oral Daily PRN Brijesh Em Jr., MD        And    bisacodyl (DULCOLAX) suppository 10 mg  10 mg Rectal Daily PRN Brijesh Em Jr., MD        Calcium Replacement - Follow Nurse / BPA Driven Protocol   Does not apply PRN Brijesh Em Jr., MD        cefTRIAXone (ROCEPHIN) 2,000 mg in sodium chloride 0.9 % 100 mL MBP  2,000 mg Intravenous Q24H Cleve Bills  mL/hr at 06/17/24 0914 2,000 mg at 06/17/24 0914    dextrose (D50W) (25 g/50 mL) IV injection 25 g  25 g Intravenous Q15 Min PRN Brijesh Em Jr., MD        dextrose (GLUTOSE) oral gel 15 g  15 g Oral Q15 Min PRN Brijesh Em Jr., MD        glucagon (GLUCAGEN) injection 1 mg  1 mg " Intramuscular Q15 Min PRN Brijesh Em Jr., MD        insulin glargine (LANTUS, SEMGLEE) injection 20 Units  20 Units Subcutaneous Nightly Brijesh Em Jr., MD   20 Units at 06/17/24 2116    Insulin Lispro (humaLOG) injection 2-9 Units  2-9 Units Subcutaneous 4x Daily AC & at Bedtime Brijesh Em Jr., MD   2 Units at 06/17/24 2115    Insulin Lispro (humaLOG) injection 5 Units  5 Units Subcutaneous TID With Meals Brijesh Em Jr., MD   5 Units at 06/17/24 1740    lactated ringers infusion  9 mL/hr Intravenous Continuous Brijesh Em Jr., MD 9 mL/hr at 06/15/24 1307 9 mL/hr at 06/15/24 1307    lactated ringers infusion  9 mL/hr Intravenous Continuous Brijesh Em Jr., MD 9 mL/hr at 06/15/24 1345 Restarted at 06/15/24 1446    lactobacillus acidophilus (RISAQUAD) capsule 1 capsule  1 capsule Oral Daily Brijesh Em Jr., MD   1 capsule at 06/17/24 0841    lisinopril (PRINIVIL,ZESTRIL) tablet 5 mg  5 mg Oral Q24H Jessica Poole APRN   5 mg at 06/17/24 1136    Magnesium Standard Dose Replacement - Follow Nurse / BPA Driven Protocol   Does not apply PRN Brijesh Em Jr., MD        melatonin tablet 5 mg  5 mg Oral Nightly Briejsh Em Jr., MD   5 mg at 06/17/24 2115    morphine injection 2 mg  2 mg Intravenous Q4H PRN Brijesh Em Jr., MD        nitroglycerin (NITROSTAT) SL tablet 0.4 mg  0.4 mg Sublingual Q5 Min PRN Brijesh Em Jr., MD        oxyCODONE (ROXICODONE) immediate release tablet 5 mg  5 mg Oral Q4H PRN Brijesh Em Jr., MD   5 mg at 06/18/24 0148    pantoprazole (PROTONIX) injection 40 mg  40 mg Intravenous BID AC Brijesh Em Jr., MD   40 mg at 06/17/24 1740    Phosphorus Replacement - Follow Nurse / BPA Driven Protocol   Does not apply PRN Brijesh Em Jr., MD        Potassium Replacement - Follow Nurse / BPA Driven Protocol   Does not apply PRN Brijesh Em Jr., MD        ropivacaine (NAROPIN) 0.2 % infusion (INFUSYSTEM)    Peripheral Nerve Continuous Brijesh Em Jr., MD   Stopped at 06/17/24 2230    sodium chloride 0.9 % flush 10 mL  10 mL Intravenous Q12H Brijesh Em Jr., MD   10 mL at 06/17/24 2115    sodium chloride 0.9 % flush 10 mL  10 mL Intravenous PRN Brijesh Em Jr., MD        sodium chloride 0.9 % infusion 40 mL  40 mL Intravenous PRN Brijesh Em Jr., MD           Antibiotics:  Anti-Infectives (From admission, onward)      Ordered     Dose/Rate Route Frequency Start Stop    06/17/24 0829  cefTRIAXone (ROCEPHIN) 2,000 mg in sodium chloride 0.9 % 100 mL MBP        Ordering Provider: Cleve Bills MD    2,000 mg  200 mL/hr over 30 Minutes Intravenous Every 24 Hours 06/17/24 0900 07/01/24 0859    06/12/24 1746  piperacillin-tazobactam (ZOSYN) 4.5 g IVPB in 100 mL NS MBP (CD)        Ordering Provider: Shraddha Scott MD    4.5 g  over 30 Minutes Intravenous Once 06/12/24 1900 06/13/24 0907              Review of Systems    6/17/24     Constitutional-- No Fever, chills or sweats.  Appetite diminished with fatigue.  Heent-- No new vision, hearing or throat complaints.  No epistaxis or oral sores.  Denies odynophagia or dysphagia.  No flashers, floaters or eye pain. No odynophagia or dysphagia. No headache, photophobia or neck stiffness.  CV-- No chest pain, palpitation or syncope  Resp-- No SOB/cough/Hemoptysis  GI- No nausea, vomiting, or diarrhea.  No hematochezia, melena, or hematemesis. Denies jaundice or chronic liver disease.  -- No dysuria, hematuria, or flank pain.  Denies hesitancy, urgency or flank pain.  Lymph- no swollen lymph nodes in neck/axilla or groin.   Heme- No active bruising or bleeding; no Hx of DVT or PE.  MS-- aside from above, no swelling or pain in the bones or joints of arms/legs.  No new back pain.  Neuro-- No acute focal weakness or numbness in the arms or legs.  No seizures.    Full 12 point review of systems reviewed and negative otherwise for acute complaints, except  "for above    Physical Exam:   Vital Signs   /90 (BP Location: Right arm, Patient Position: Lying)   Pulse 99   Temp 97.8 °F (36.6 °C) (Axillary)   Resp 16   Ht 172.7 cm (67.99\")   Wt 68 kg (149 lb 14.6 oz)   SpO2 95%   BMI 22.80 kg/m²     GENERAL: Awake and alert, in no acute distress.   HEENT: Normocephalic, atraumatic.   No conjunctival injection. No icterus. Oropharynx clear without evidence of thrush or exudate. No evidence of periodontal disease.    NECK: Supple without nuchal rigidity. No mass.  LYMPH: No cervical, axillary or inguinal lymphadenopathy.  HEART: RRR; No murmur, rubs, gallops.   LUNGS: Clear to auscultation bilaterally without wheezing, rales, rhonchi. Normal respiratory effort. Nonlabored. No dullness.  ABDOMEN: Soft, nontender, nondistended. Positive bowel sounds. No rebound or guarding. NO mass or HSM.  EXT:  see below.     MSK: pain with right hip range of motion, incision healed no ballotable fluid.  SKIN: Warm and dry without cutaneous eruptions on Inspection/palpation.      Left   BKA stump covered;  no new visible redness or purulence; no discrete fluctuance.  No crepitus    Laboratory Data    Results from last 7 days   Lab Units 06/18/24  0507 06/17/24  0506 06/16/24  0400   WBC 10*3/mm3 16.60* 18.47* 25.41*   HEMOGLOBIN g/dL 11.5* 11.7* 11.5*   HEMATOCRIT % 33.4* 34.7* 33.5*   PLATELETS 10*3/mm3 231 179 174     Results from last 7 days   Lab Units 06/18/24  0507   SODIUM mmol/L 134*   POTASSIUM mmol/L 4.4   CHLORIDE mmol/L 99   CO2 mmol/L 27.0   BUN mg/dL 16   CREATININE mg/dL 0.54*   GLUCOSE mg/dL 112*   CALCIUM mg/dL 7.5*     Results from last 7 days   Lab Units 06/16/24  0400 06/14/24  0356   ALK PHOS U/L 232* 173*   BILIRUBIN mg/dL 1.9* 2.1*   BILIRUBIN DIRECT mg/dL  --  1.5*   ALT (SGPT) U/L 33 30   AST (SGOT) U/L 49* 39         Results from last 7 days   Lab Units 06/12/24  0802   CRP mg/dL 24.89*       Estimated Creatinine Clearance: 139.9 mL/min (A) (by C-G formula " based on SCr of 0.54 mg/dL (L)).      Microbiology:      Radiology:  Imaging Results (Last 72 Hours)       Procedure Component Value Units Date/Time    XR Hip With or Without Pelvis 2 - 3 View Right [[id number]] Collected: 06/17/24 1828     Updated: 06/17/24 1832    Narrative:      XR HIP W OR WO PELVIS 2-3 VIEW RIGHT    Date of Exam: 6/17/2024 5:51 PM EDT    Indication: pain, concern for prosthetic joint infection    Comparison: None available.    Findings:  There is no radiographic evidence of acute fracture or dislocation. Patient is post right total hip arthroplasty. No definite evidence of periprosthetic lucency. There is evidence of soft tissue swelling adjacent to the right hip as well as small volume   subcutaneous air.      Impression:      Impression:  No radiographic evidence of acute fracture or dislocation. Post right total hip arthroplasty with evidence of periprosthetic soft tissue swelling and small volume subcutaneous air.      Electronically Signed: Sage Burch MD    6/17/2024 6:29 PM EDT    Workstation ID: [id number]              Impression:     --acute sepsis as per admission notes, severe left foot infection with gangrene/cellulitis and concern for necrotizing soft tissue infection by imaging, urgent surgical arrangements made June 13; repeat surg 6/15  ;  He knows risk for persistent/recurrent or nonhealing wounds, persistet / progressive or recurrent infection and risk for further amputation/functional-limb loss and chronic pain.  Associated with bacteremia as below.    -- Postop with leukocytosis, some increase on June 16 to 25K, down some 6/17.  Aside from left stump symptoms he denies any other new complaints.  Chest exam nonfocal with no cough and no distress.  No abdominal pain or diarrhea and abdominal exam benign.  No urinary tract complaints.  No rash.  IV sites with no new suppurative change.  No new stigmata of endovascular focus.  Monitor for new foci; possible stress response  postop    --Acute/severe left foot infection as above, urgent surgical arrangements  made 6/13    --Acute gb strep bacteremia,   source from foot.  High risk for further serious morbidity and other serious sequela including dire consequences and metastatic foci of involvement/endovascular sequela etc. No new metastatic focus of involvement. TTE 6/17    --Right hip fracture with repair February 2024.  Pain evolved since admission, orthopedics with plans for aspiration June 18 CT scan had raise concern for gas/fluid collection at the anterior aspect of the hip arthroplasty.   Need further clarification from orthopedics as to significance of these radiographic findings and any further diagnostic/interventional workup at their discretion; certainly at risk for hematogenous seeding to the right hip and prosthetic joint/periprosthetic infection.    --diabetes.  Described as uncontrolled by medicine team at admission.  Glucose control per medicine team      PLAN:      --IV  rocephin    **wound culture mixed with  MSSA Klebsiella and strep    --orthopedics with plans for aspiration at the right hip with further surgical intervention at their discretion, risk for hematogenous seeding and risk for prosthetic/periprosthetic infection    --Check/review labs cultures and scans    --TTE pending    --d/w Dr Scott    --Partial history per nursing staff    --Discussed with microbiology    --Highly complex at of issues with high risk for further serious morbidity and other serious sequela        Copied text in this note has been reviewed and is accurate as of 06/18/24.        Cleve Bills MD  6/18/2024

## 2024-06-19 ENCOUNTER — ANESTHESIA EVENT (OUTPATIENT)
Dept: PERIOP | Facility: HOSPITAL | Age: 59
End: 2024-06-19
Payer: MEDICAID

## 2024-06-19 PROBLEM — Z96.649 INFECTION OF PROSTHETIC TOTAL HIP JOINT: Status: ACTIVE | Noted: 2024-06-12

## 2024-06-19 PROBLEM — T84.59XA INFECTION OF PROSTHETIC TOTAL HIP JOINT: Status: ACTIVE | Noted: 2024-06-12

## 2024-06-19 LAB
ANION GAP SERPL CALCULATED.3IONS-SCNC: 7 MMOL/L (ref 5–15)
BACTERIA SPEC AEROBE CULT: NORMAL
BASOPHILS # BLD AUTO: 0.03 10*3/MM3 (ref 0–0.2)
BASOPHILS NFR BLD AUTO: 0.2 % (ref 0–1.5)
BUN SERPL-MCNC: 16 MG/DL (ref 8–23)
BUN/CREAT SERPL: 32.7 (ref 7–25)
CALCIUM SPEC-SCNC: 7.4 MG/DL (ref 8.6–10.5)
CHLORIDE SERPL-SCNC: 98 MMOL/L (ref 98–107)
CO2 SERPL-SCNC: 25 MMOL/L (ref 22–29)
CREAT SERPL-MCNC: 0.49 MG/DL (ref 0.76–1.27)
DEPRECATED RDW RBC AUTO: 49.7 FL (ref 37–54)
EGFRCR SERPLBLD CKD-EPI 2021: 117.5 ML/MIN/1.73
EOSINOPHIL # BLD AUTO: 0.12 10*3/MM3 (ref 0–0.4)
EOSINOPHIL NFR BLD AUTO: 0.9 % (ref 0.3–6.2)
ERYTHROCYTE [DISTWIDTH] IN BLOOD BY AUTOMATED COUNT: 13.9 % (ref 12.3–15.4)
GLUCOSE BLDC GLUCOMTR-MCNC: 165 MG/DL (ref 70–130)
GLUCOSE BLDC GLUCOMTR-MCNC: 172 MG/DL (ref 70–130)
GLUCOSE BLDC GLUCOMTR-MCNC: 212 MG/DL (ref 70–130)
GLUCOSE BLDC GLUCOMTR-MCNC: 270 MG/DL (ref 70–130)
GLUCOSE SERPL-MCNC: 211 MG/DL (ref 65–99)
GRAM STN SPEC: NORMAL
GRAM STN SPEC: NORMAL
HCT VFR BLD AUTO: 32.4 % (ref 37.5–51)
HGB BLD-MCNC: 10.9 G/DL (ref 13–17.7)
IMM GRANULOCYTES # BLD AUTO: 0.33 10*3/MM3 (ref 0–0.05)
IMM GRANULOCYTES NFR BLD AUTO: 2.5 % (ref 0–0.5)
LYMPHOCYTES # BLD AUTO: 1.24 10*3/MM3 (ref 0.7–3.1)
LYMPHOCYTES NFR BLD AUTO: 9.3 % (ref 19.6–45.3)
MCH RBC QN AUTO: 32.7 PG (ref 26.6–33)
MCHC RBC AUTO-ENTMCNC: 33.6 G/DL (ref 31.5–35.7)
MCV RBC AUTO: 97.3 FL (ref 79–97)
MONOCYTES # BLD AUTO: 0.96 10*3/MM3 (ref 0.1–0.9)
MONOCYTES NFR BLD AUTO: 7.2 % (ref 5–12)
NEUTROPHILS NFR BLD AUTO: 10.61 10*3/MM3 (ref 1.7–7)
NEUTROPHILS NFR BLD AUTO: 79.9 % (ref 42.7–76)
NRBC BLD AUTO-RTO: 0 /100 WBC (ref 0–0.2)
PLATELET # BLD AUTO: 184 10*3/MM3 (ref 140–450)
PMV BLD AUTO: 9.6 FL (ref 6–12)
POTASSIUM SERPL-SCNC: 4.4 MMOL/L (ref 3.5–5.2)
RBC # BLD AUTO: 3.33 10*6/MM3 (ref 4.14–5.8)
SODIUM SERPL-SCNC: 130 MMOL/L (ref 136–145)
WBC NRBC COR # BLD AUTO: 13.29 10*3/MM3 (ref 3.4–10.8)

## 2024-06-19 PROCEDURE — 82948 REAGENT STRIP/BLOOD GLUCOSE: CPT

## 2024-06-19 PROCEDURE — 63710000001 INSULIN GLARGINE PER 5 UNITS: Performed by: ORTHOPAEDIC SURGERY

## 2024-06-19 PROCEDURE — 97166 OT EVAL MOD COMPLEX 45 MIN: CPT

## 2024-06-19 PROCEDURE — 63710000001 INSULIN LISPRO (HUMAN) PER 5 UNITS: Performed by: ORTHOPAEDIC SURGERY

## 2024-06-19 PROCEDURE — 25010000002 CEFTRIAXONE PER 250 MG: Performed by: INTERNAL MEDICINE

## 2024-06-19 PROCEDURE — 25010000002 MORPHINE PER 10 MG: Performed by: INTERNAL MEDICINE

## 2024-06-19 PROCEDURE — 80048 BASIC METABOLIC PNL TOTAL CA: CPT | Performed by: NURSE PRACTITIONER

## 2024-06-19 PROCEDURE — 0S993ZX DRAINAGE OF RIGHT HIP JOINT, PERCUTANEOUS APPROACH, DIAGNOSTIC: ICD-10-PCS | Performed by: STUDENT IN AN ORGANIZED HEALTH CARE EDUCATION/TRAINING PROGRAM

## 2024-06-19 PROCEDURE — 97530 THERAPEUTIC ACTIVITIES: CPT

## 2024-06-19 PROCEDURE — 85025 COMPLETE CBC W/AUTO DIFF WBC: CPT | Performed by: NURSE PRACTITIONER

## 2024-06-19 PROCEDURE — 99232 SBSQ HOSP IP/OBS MODERATE 35: CPT | Performed by: INTERNAL MEDICINE

## 2024-06-19 PROCEDURE — 97163 PT EVAL HIGH COMPLEX 45 MIN: CPT

## 2024-06-19 RX ADMIN — INSULIN LISPRO 5 UNITS: 100 INJECTION, SOLUTION INTRAVENOUS; SUBCUTANEOUS at 12:06

## 2024-06-19 RX ADMIN — Medication 1 CAPSULE: at 09:01

## 2024-06-19 RX ADMIN — OXYCODONE HYDROCHLORIDE 5 MG: 5 TABLET ORAL at 06:33

## 2024-06-19 RX ADMIN — Medication 5 MG: at 21:35

## 2024-06-19 RX ADMIN — INSULIN LISPRO 5 UNITS: 100 INJECTION, SOLUTION INTRAVENOUS; SUBCUTANEOUS at 17:10

## 2024-06-19 RX ADMIN — OXYCODONE HYDROCHLORIDE 5 MG: 5 TABLET ORAL at 12:05

## 2024-06-19 RX ADMIN — LISINOPRIL 10 MG: 10 TABLET ORAL at 09:01

## 2024-06-19 RX ADMIN — OXYCODONE HYDROCHLORIDE 5 MG: 5 TABLET ORAL at 21:41

## 2024-06-19 RX ADMIN — INSULIN LISPRO 5 UNITS: 100 INJECTION, SOLUTION INTRAVENOUS; SUBCUTANEOUS at 09:00

## 2024-06-19 RX ADMIN — INSULIN LISPRO 2 UNITS: 100 INJECTION, SOLUTION INTRAVENOUS; SUBCUTANEOUS at 12:05

## 2024-06-19 RX ADMIN — SODIUM CHLORIDE 2000 MG: 900 INJECTION INTRAVENOUS at 09:01

## 2024-06-19 RX ADMIN — MORPHINE SULFATE 2 MG: 2 INJECTION, SOLUTION INTRAMUSCULAR; INTRAVENOUS at 23:11

## 2024-06-19 RX ADMIN — PANTOPRAZOLE SODIUM 40 MG: 40 INJECTION, POWDER, FOR SOLUTION INTRAVENOUS at 17:09

## 2024-06-19 RX ADMIN — INSULIN GLARGINE 20 UNITS: 100 INJECTION, SOLUTION SUBCUTANEOUS at 21:35

## 2024-06-19 RX ADMIN — MORPHINE SULFATE 2 MG: 2 INJECTION, SOLUTION INTRAMUSCULAR; INTRAVENOUS at 19:23

## 2024-06-19 RX ADMIN — INSULIN LISPRO 2 UNITS: 100 INJECTION, SOLUTION INTRAVENOUS; SUBCUTANEOUS at 21:35

## 2024-06-19 RX ADMIN — Medication 10 ML: at 23:11

## 2024-06-19 RX ADMIN — PANTOPRAZOLE SODIUM 40 MG: 40 INJECTION, POWDER, FOR SOLUTION INTRAVENOUS at 06:33

## 2024-06-19 RX ADMIN — INSULIN LISPRO 6 UNITS: 100 INJECTION, SOLUTION INTRAVENOUS; SUBCUTANEOUS at 17:09

## 2024-06-19 RX ADMIN — Medication 10 ML: at 12:06

## 2024-06-19 RX ADMIN — INSULIN LISPRO 4 UNITS: 100 INJECTION, SOLUTION INTRAVENOUS; SUBCUTANEOUS at 09:00

## 2024-06-19 RX ADMIN — OXYCODONE HYDROCHLORIDE 5 MG: 5 TABLET ORAL at 17:09

## 2024-06-19 NOTE — CASE MANAGEMENT/SOCIAL WORK
Continued Stay Note   [place]     Patient Name: Easton Alvarez  MRN: [id number]  Today's Date: 6/19/2024    Admit Date: 6/12/2024    Plan: Home with    Discharge Plan       Row Name 06/19/24 1457       Plan    Plan Home with     Patient/Family in Agreement with Plan yes    Plan Comments Spoke with Patient at bedside.Patient is agreeable for rehab. Patient does have Medicaid. CM will make a referral to [facility] Patient getting a Incision and drainage total hip. CM will follow for any discharge needs.    Final Discharge Disposition Code 06 - home with home health care                   Discharge Codes    No documentation.                 Expected Discharge Date and Time       Expected Discharge Date Expected Discharge Time    Jun 21, 2024               Risa Luz RN

## 2024-06-19 NOTE — PROGRESS NOTES
"Easton Alvarez  1965  [id number]          Chief Complaint: left foot infection    Reason for Consultation: left foot infection    History of present illness:     Patient is a 58 y.o.  Yr old male with history of diabetes with prior right TMA 2019 at , history strep septicemia 2016 ; he reports right total hip arthroplasty 2024 after fall. He reports a fall several weeks prior to his June admission with injury to the left foot, wound present with deterioration several days preceding admission with severe discoloration/redness and swelling.  CT scan showed concern for subcutaneous emphysema and necrotizing infection.  Transferred to [facility] with evaluation by Dr. Em and Dr. Lundberg and arrangements made for urgent surgery. Subsequently, blood cultures called with gram-positive cocci     6/13 left LE amp, Dr Em    6/15  \"PROCEDURE:            Left      [address]50: Secondary closure above-knee amputation\"      6/18/24  right hip aspiration; wbc  down some postop from revision; blood cultures 6/15 negative so far    6/19/24 Dr. Em helping facilitate right hip surgery with orthopedic partners/team; He has left stump  pain that is constant, sharp at times, blunted by neuropathy, worse with palpation, better with pain meds and 4-5 out of 10 in severity. No distress per nursing; no new focal symptoms otherwise; he has right hip pain with range of motion and weightbearing increased and out of 10 at present with movement, nonradiating, better with pain meds.       No headache photophobia or neck stiffness.  No shortness of breath cough or hemoptysis.  No nausea vomiting diarrhea or abdominal pain.  No dysuria hematuria or pyuria.  No other new skin rash    Past Medical History:   Diagnosis Date    Diabetes mellitus     Diabetic foot infection 2018    left (partial amputation)       Past Surgical History:   Procedure Laterality Date    AMPUTATION FOOT / TOE Right     partial foot    " "BELOW KNEE AMPUTATION Left 6/13/2024    Procedure: AMPUTATION BELOW KNEE AND WOUND VAC ASSISTED CLOSURE LEFT;  Surgeon: Brijesh Em Jr., MD;  Location:  [facility];  Service: Orthopedics;  Laterality: Left;    INCISION AND DRAINAGE LEG Left 6/15/2024    Procedure: SECONDARY CLOSURE LEFT BELOW KNEE AMPUTATION;  Surgeon: Brijesh Em Jr., MD;  Location:  [facility];  Service: Orthopedics;  Laterality: Left;    PLACEMENT OF WOUND VAC Left 6/13/2024    Procedure: PLACEMENT OF WOUND VAC LEFT;  Surgeon: Brijesh Em Jr., MD;  Location:  [facility];  Service: Orthopedics;  Laterality: Left;    TOTAL HIP ARTHROPLASTY Right 02/2024    From fall \"whole hip was shattered\"       Pediatric History   Patient Parents    Not on file     Other Topics Concern    Not on file   Social History Narrative    Not on file       family history is not on file. High blood pressure mom/dad    No Known Allergies    Medication:  Current Facility-Administered Medications   Medication Dose Route Frequency Provider Last Rate Last Admin    sennosides-docusate (PERICOLACE) 8.6-50 MG per tablet 2 tablet  2 tablet Oral BID Brijesh Em Jr., MD   2 tablet at 06/16/24 2124    And    polyethylene glycol (MIRALAX) packet 17 g  17 g Oral Daily PRN Brijesh Em Jr., MD        And    bisacodyl (DULCOLAX) EC tablet 5 mg  5 mg Oral Daily PRN Brijesh Em Jr., MD        And    bisacodyl (DULCOLAX) suppository 10 mg  10 mg Rectal Daily PRN Brijesh Em Jr., MD        Calcium Replacement - Follow Nurse / BPA Driven Protocol   Does not apply PRN Brijesh Em Jr., MD        cefTRIAXone (ROCEPHIN) 2,000 mg in sodium chloride 0.9 % 100 mL MBP  2,000 mg Intravenous Q24H Cleve Bills  mL/hr at 06/18/24 0829 2,000 mg at 06/18/24 0829    dextrose (D50W) (25 g/50 mL) IV injection 25 g  25 g Intravenous Q15 Min PRN Brijesh Em Jr., MD        dextrose (GLUTOSE) oral gel 15 g  15 g Oral Q15 Min PRN Brijesh Em Jr., MD "        glucagon (GLUCAGEN) injection 1 mg  1 mg Intramuscular Q15 Min PRN Brijesh Em Jr., MD        insulin glargine (LANTUS, SEMGLEE) injection 20 Units  20 Units Subcutaneous Nightly Brijesh Em Jr., MD   20 Units at 06/18/24 2121    Insulin Lispro (humaLOG) injection 2-9 Units  2-9 Units Subcutaneous 4x Daily AC & at Bedtime Brijesh Em Jr., MD   4 Units at 06/18/24 2121    Insulin Lispro (humaLOG) injection 5 Units  5 Units Subcutaneous TID With Meals Brijesh Em Jr., MD   5 Units at 06/18/24 1711    lactated ringers infusion  9 mL/hr Intravenous Continuous Brijesh Em Jr., MD 9 mL/hr at 06/15/24 1307 9 mL/hr at 06/15/24 1307    lactated ringers infusion  9 mL/hr Intravenous Continuous Brijesh Em Jr., MD 9 mL/hr at 06/15/24 1345 Restarted at 06/15/24 1446    lactobacillus acidophilus (RISAQUAD) capsule 1 capsule  1 capsule Oral Daily Brijesh Em Jr., MD   1 capsule at 06/18/24 0829    lisinopril (PRINIVIL,ZESTRIL) tablet 10 mg  10 mg Oral Q24H Jessica Poole APRN        Magnesium Standard Dose Replacement - Follow Nurse / BPA Driven Protocol   Does not apply PRN Brijesh Em Jr., MD        melatonin tablet 5 mg  5 mg Oral Nightly Brijesh Em Jr., MD   5 mg at 06/18/24 2120    morphine injection 2 mg  2 mg Intravenous Q4H PRN Trinity Echavarria MD   2 mg at 06/18/24 2121    nitroglycerin (NITROSTAT) SL tablet 0.4 mg  0.4 mg Sublingual Q5 Min PRN Brijesh Em Jr., MD        oxyCODONE (ROXICODONE) immediate release tablet 5 mg  5 mg Oral Q4H PRN Brijesh Em Jr., MD   5 mg at 06/19/24 0633    pantoprazole (PROTONIX) injection 40 mg  40 mg Intravenous BID AC Brijesh Em Jr., MD   40 mg at 06/19/24 0633    Phosphorus Replacement - Follow Nurse / BPA Driven Protocol   Does not apply PRN Brijesh Em Jr., MD        Potassium Replacement - Follow Nurse / BPA Driven Protocol   Does not apply PRN Brijesh Em Jr., MD        ropivacaine  (NAROPIN) 0.2 % infusion (INFUSYSTEM)   Peripheral Nerve Continuous Brijesh Em Jr., MD   Stopped at 06/17/24 2230    sodium chloride 0.9 % flush 10 mL  10 mL Intravenous Q12H Brijesh Em Jr., MD   10 mL at 06/18/24 2122    sodium chloride 0.9 % flush 10 mL  10 mL Intravenous PRN Brijesh Em Jr., MD        sodium chloride 0.9 % infusion 40 mL  40 mL Intravenous PRN Brijesh Em Jr., MD           Antibiotics:  Anti-Infectives (From admission, onward)      Ordered     Dose/Rate Route Frequency Start Stop    06/17/24 0829  cefTRIAXone (ROCEPHIN) 2,000 mg in sodium chloride 0.9 % 100 mL MBP        Ordering Provider: Cleve Bills MD    2,000 mg  200 mL/hr over 30 Minutes Intravenous Every 24 Hours 06/17/24 0900 07/01/24 0859    06/12/24 1746  piperacillin-tazobactam (ZOSYN) 4.5 g IVPB in 100 mL NS MBP (CD)        Ordering Provider: Shraddha Scott MD    4.5 g  over 30 Minutes Intravenous Once 06/12/24 1900 06/13/24 0907              Review of Systems    6/19/24     Constitutional-- No Fever, chills or sweats.  Appetite diminished with fatigue.  Heent-- No new vision, hearing or throat complaints.  No epistaxis or oral sores.  Denies odynophagia or dysphagia.  No flashers, floaters or eye pain. No odynophagia or dysphagia. No headache, photophobia or neck stiffness.  CV-- No chest pain, palpitation or syncope  Resp-- No SOB/cough/Hemoptysis  GI- No nausea, vomiting, or diarrhea.  No hematochezia, melena, or hematemesis. Denies jaundice or chronic liver disease.  -- No dysuria, hematuria, or flank pain.  Denies hesitancy, urgency or flank pain.  Lymph- no swollen lymph nodes in neck/axilla or groin.   Heme- No active bruising or bleeding; no Hx of DVT or PE.  MS-- aside from above, no swelling or pain in the bones or joints of arms/legs.  No new back pain.  Neuro-- No acute focal weakness or numbness in the arms or legs.  No seizures.    Full 12 point review of systems reviewed and negative  "otherwise for acute complaints, except for above    Physical Exam:   Vital Signs   /81 (BP Location: Left arm, Patient Position: Lying)   Pulse 97   Temp 97 °F (36.1 °C) (Axillary)   Resp 16   Ht 172.7 cm (67.99\")   Wt 67.6 kg (149 lb)   SpO2 95%   BMI 22.66 kg/m²     GENERAL: Awake and alert, in no acute distress.   HEENT: Normocephalic, atraumatic.   No conjunctival injection. No icterus. Oropharynx clear without evidence of thrush or exudate. No evidence of periodontal disease.    NECK: Supple without nuchal rigidity. No mass.  LYMPH: No cervical, axillary or inguinal lymphadenopathy.  HEART: RRR; No murmur, rubs, gallops.   LUNGS: Clear to auscultation bilaterally without wheezing, rales, rhonchi. Normal respiratory effort. Nonlabored. No dullness.  ABDOMEN: Soft, nontender, nondistended. Positive bowel sounds. No rebound or guarding. NO mass or HSM.  EXT:  see below.     MSK: pain with right hip range of motion, incision healed no ballotable fluid.  SKIN: Warm and dry without cutaneous eruptions on Inspection/palpation.      Left   BKA stump covered;  no new visible redness or purulence; no discrete fluctuance.  No crepitus    Laboratory Data    Results from last 7 days   Lab Units 06/19/24  0437 06/18/24  0507 06/17/24  0506   WBC 10*3/mm3 13.29* 16.60* 18.47*   HEMOGLOBIN g/dL 10.9* 11.5* 11.7*   HEMATOCRIT % 32.4* 33.4* 34.7*   PLATELETS 10*3/mm3 184 231 179     Results from last 7 days   Lab Units 06/19/24  0437   SODIUM mmol/L 130*   POTASSIUM mmol/L 4.4   CHLORIDE mmol/L 98   CO2 mmol/L 25.0   BUN mg/dL 16   CREATININE mg/dL 0.49*   GLUCOSE mg/dL 211*   CALCIUM mg/dL 7.4*     Results from last 7 days   Lab Units 06/16/24  0400 06/14/24  0356   ALK PHOS U/L 232* 173*   BILIRUBIN mg/dL 1.9* 2.1*   BILIRUBIN DIRECT mg/dL  --  1.5*   ALT (SGPT) U/L 33 30   AST (SGOT) U/L 49* 39                 Estimated Creatinine Clearance: 153.3 mL/min (A) (by C-G formula based on SCr of 0.49 mg/dL " (L)).      Microbiology:      Radiology:  Imaging Results (Last 72 Hours)       Procedure Component Value Units Date/Time    MRI Femur Right With & Without Contrast [[id number]] Collected: 06/19/24 0803     Updated: 06/19/24 0830    Narrative:        MRI FEMUR RIGHT W WO CONTRAST    Date of Exam: 6/18/2024 9:51 PM EDT    Indication: infection.     Comparison: CTA abdomen/pelvis with lower extremity runoff 6/12/2024, fluoroscopic-guided needle aspiration of right hip 6/18/2024    Technique:  Routine multiplanar/multisequence images of the right femur were obtained before and after the uneventful administration of 15 mL Multihance.        Findings:  There is metal susceptibility associated with the right total hip arthroplasty. This somewhat limits evaluation of the adjacent bony and soft tissue structures. Accounting for this there is evidence of a large periprosthetic rim-enhancing fluid   collection along the anterior aspect of the hardware/proximal femur. This is somewhat irregular and lobulated in shape, difficult to accurately measure, measuring approximately 7.8 x 7.8 cm in maximal transverse dimension, and 10.5 cm in craniocaudal   length (series 11 image 18, series 13 image 14). This appears in communication with the anterior aspect of the right hip joint, with fluid extending down to the hardware and proximal femoral shaft and insinuating between the proximal rectus femoris and   vastus lateralis muscles (series 11 image 12-23). A component of this fluid collection extends more medial, contacting/abutting the iliopsoas tendon (series 11 image 19). As seen on prior CT there is evidence of a couple small locules of gas within this   fluid collection. There is edema of the adjacent deep soft tissues, with prominent circumferential edema at the right hip and circumferentially throughout the imaged thigh.    Unremarkable appearance of the sciatic nerve. The hamstring tendon origin is normal. Accounting for metal  susceptibility artifact, grossly unremarkable marrow signal intensity of the right hip and femur.          Impression:      Impression:  Large, lobulated 7.8 x 7.8 x 10.5 cm rim-enhancing, gas-containing, periprosthetic fluid collection, compatible with periprosthetic infection/abscess and septic joint. Purulent fluid was collected during needle aspiration from 6/18/2024 and correlate   with fluid sample.        Electronically Signed: Sonny Rey MD    6/19/2024 8:27 AM EDT    Workstation ID: [id number]    FL Guided Aspiration Joint [[id number]] Collected: 06/18/24 1153     Updated: 06/19/24 0812    Narrative:      FL GUIDED ASPIRATION JOINT    Date of Exam: 6/18/2024 10:07 AM EDT    Indication: concern for prosthetic joint infection.       Comparison: None available.    Technique: Procedure, risks, complications, and side effects of joint aspiration were discussed and reviewed in detail with the patient including the possibility for bleeding, infection, needle damage to surrounding structures, and the potential for   nondiagnostic exam. The patient indicated understanding and consented to the procedure.    The right hip was marked, prepped, and draped in a sterile fashion. 1% lidocaine was used as a local anesthetic to the skin and subcutaneous tissues. The 3 cm 25-gauge needle was removed, and at this point, purulent fluid was seen at the injection site,   therefore per Dr. Em's request, a 20-gauge needle was advanced into the subcutaneous tissues lateral, and superficial to the joint space. Approximately 14 cc of purulent fluid was aspirated from the subcutaneous tissues surrounding the hip joint.   Fluid was labeled, and sent to pathology for further analysis. The needle was removed.    Fluoroscopic Time: 6 seconds    Number of images saved: 2    Findings:  The patient tolerated the procedure well, and no immediate complications occurred.      Impression:      Impression:  Successful aspiration of a fluid  collection from the subcutaneous tissues surrounding the right hip joint as above with no immediate complications.      Report dictated by: Lauren Leon PA-c      I have personally reviewed this case and agree with the findings above:    Electronically Signed: Ivan Reilly MD    6/19/2024 8:08 AM EDT    Workstation ID: [id number]    XR Hip With or Without Pelvis 2 - 3 View Right [[id number]] Collected: 06/17/24 1828     Updated: 06/17/24 1832    Narrative:      XR HIP W OR WO PELVIS 2-3 VIEW RIGHT    Date of Exam: 6/17/2024 5:51 PM EDT    Indication: pain, concern for prosthetic joint infection    Comparison: None available.    Findings:  There is no radiographic evidence of acute fracture or dislocation. Patient is post right total hip arthroplasty. No definite evidence of periprosthetic lucency. There is evidence of soft tissue swelling adjacent to the right hip as well as small volume   subcutaneous air.      Impression:      Impression:  No radiographic evidence of acute fracture or dislocation. Post right total hip arthroplasty with evidence of periprosthetic soft tissue swelling and small volume subcutaneous air.      Electronically Signed: Sage Burch MD    6/17/2024 6:29 PM EDT    Workstation ID: [id number]              Impression:     --acute sepsis as per admission notes, severe left foot infection with gangrene/cellulitis and concern for necrotizing soft tissue infection by imaging, urgent surgical arrangements made June 13; repeat surg 6/15  ;  He knows risk for persistent/recurrent or nonhealing wounds, persistet / progressive or recurrent infection and risk for further amputation/functional-limb loss and chronic pain.  Associated with bacteremia as below.    -- Postop with leukocytosis, some increase on June 16 to 25K, down some 6/17-6/18.  Aside from left stump symptoms he denies any other new complaints.  Chest exam nonfocal with no cough and no distress.  No abdominal pain or diarrhea and  abdominal exam benign.  No urinary tract complaints.  No rash.  IV sites with no new suppurative change.  No new stigmata of endovascular focus.  Monitor for new foci; possible stress response postop    --Acute/severe left foot infection as above, urgent surgical arrangements  made 6/13    --Acute gb strep bacteremia,   source from foot.  High risk for further serious morbidity and other serious sequela including dire consequences and metastatic foci of involvement/endovascular sequela etc. No new metastatic focus of involvement. TTE 6/17    --Right hip fracture with repair February 2024.  +pain ; orthopedics with  aspiration June 18, CT scan had raise concern for gas/fluid collection at the anterior aspect of the hip arthroplasty and aspiration was significant poly-predominance.   Need further clarification from orthopedics regarding operative options given concern for prosthetic joint/periprosthetic infection.    --diabetes.  Described as uncontrolled by medicine team at admission.  Glucose control per medicine team      PLAN:      --IV  rocephin    **wound culture mixed with  MSSA Klebsiella and strep    --orthopedics to define options/timing regarding surgery for concern of prosthetic/periprosthetic infection; I discussed directly with Dr. Em    --Check/review labs cultures and scans    --TTE described as technically adequate by cardiology; no description of vegetation by cardiology per report    --d/w Dr Scott    --Partial history per nursing staff    --Discussed with microbiology    --Highly complex at of issues with high risk for further serious morbidity and other serious sequela    Copied text in this note has been reviewed and is accurate as of 06/19/24.        Cleve Bills MD  6/19/2024

## 2024-06-19 NOTE — PLAN OF CARE
Goal Outcome Evaluation:  Plan of Care Reviewed With: patient        Progress: no change  Outcome Evaluation: PRN mophine and oxy given at beginning of shift, patient then able to sleep. VSS on room air. No complaint at this time.

## 2024-06-19 NOTE — THERAPY EVALUATION
"Patient Name: Easton Alvarez  : 1965    MRN: [id number]                              Today's Date: 2024       Admit Date: 2024    Visit Dx:     ICD-10-CM ICD-9-CM   1. S/P BKA (below knee amputation), left  Z89.512 V49.75   2. Diabetic ulcer of left midfoot associated with type 2 diabetes mellitus, with necrosis of muscle  E11.621 250.80    L97.423 707.14     728.89   3. Elevated LFTs  R79.89 790.6   4. Below knee amputation  S88.119A 897.0   5. Impaired functional mobility, balance, gait, and endurance  Z74.09 V49.89   6. Infection of prosthetic total hip joint, initial encounter  T84.59XA 996.66    Z96.649 V43.64     Patient Active Problem List   Diagnosis    Diabetic ulcer of left foot with necrosis of muscle    Severe malnutrition     Past Medical History:   Diagnosis Date    Diabetes mellitus     Diabetic foot infection 2018    left (partial amputation)     Past Surgical History:   Procedure Laterality Date    AMPUTATION FOOT / TOE Right     partial foot    BELOW KNEE AMPUTATION Left 2024    Procedure: AMPUTATION BELOW KNEE AND WOUND VAC ASSISTED CLOSURE LEFT;  Surgeon: Brijesh Em Jr., MD;  Location:  [facility];  Service: Orthopedics;  Laterality: Left;    INCISION AND DRAINAGE LEG Left 6/15/2024    Procedure: SECONDARY CLOSURE LEFT BELOW KNEE AMPUTATION;  Surgeon: Brijesh Em Jr., MD;  Location:  [facility];  Service: Orthopedics;  Laterality: Left;    PLACEMENT OF WOUND VAC Left 2024    Procedure: PLACEMENT OF WOUND VAC LEFT;  Surgeon: Brijesh Em Jr., MD;  Location:  [facility];  Service: Orthopedics;  Laterality: Left;    TOTAL HIP ARTHROPLASTY Right 2024    From fall \"whole hip was shattered\"      General Information       Row Name 24 1327          OT Time and Intention    Document Type evaluation  -GEORGINA     Mode of Treatment occupational therapy  -GEORGINA       Row Name 24 1327          General Information    Patient Profile Reviewed yes  -GEORGINA     Prior " Level of Function independent:;all household mobility;community mobility;gait;transfer;bed mobility;feeding;grooming;dressing;bathing;cooking;cleaning;driving;shopping;work  pt. worked in parking at [employer]  -GEORGINA     Existing Precautions/Restrictions fall;other (see comments)  L BKA 06/13/24, hx of R hip ORIF vs THR Feb 2024 (aspirated 6/17/24), R foot TMA 2019, plans for sugery 6/20l  -GEORGINA     Barriers to Rehab medically complex;physical barrier  -GEORGINA       Row Name 06/19/24 1327          Occupational Profile    Environmental Supports and Barriers (Occupational Profile) tub shower with shower chair and per pt. multiple grab bars in bathroom including one by toilet, pt. with rw wx had used when had hip surgery and R foot surgery  -GEORGINA       Row Name 06/19/24 1327          Living Environment    People in Home alone  -GEORGINA       Row Name 06/19/24 1327          Home Main Entrance    Number of Stairs, Main Entrance none  -GEORGINA       Row Name 06/19/24 1327          Stairs Within Home, Primary    Number of Stairs, Within Home, Primary none  -GEORGINA       Row Name 06/19/24 1327          Cognition    Orientation Status (Cognition) oriented x 4  -GEORGINA       Row Name 06/19/24 1327          Safety Issues, Functional Mobility    Safety Issues Affecting Function (Mobility) sequencing abilities;safety precaution awareness  -GEORGINA     Impairments Affecting Function (Mobility) pain;range of motion (ROM);sensation/sensory awareness;strength  -GEORGINA               User Key  (r) = Recorded By, (t) = Taken By, (c) = Cosigned By      Initials Name Provider Type    Ana Shepherd OT Occupational Therapist                     Mobility/ADL's       Row Name 06/19/24 1330          Bed Mobility    Bed Mobility scooting/bridging  -GEORGINA     Rolling Left [place] (Bed Mobility) contact guard;1 person assist;verbal cues  -GEORGINA     Rolling Right [place] (Bed Mobility) contact guard;1 person assist;verbal cues  -GEORGINA     Scooting/Bridging  [place] (Bed Mobility) standby assist  pt. was able to scoot self to edge of recliner and back fully into recliner  -GEORGINA     Assistive Device (Bed Mobility) bed rails  -GEORGINA     Comment, (Bed Mobility) pt. rolled with cues for sling to bed placed for lift to recliner due to pain and weakness in RLE  -GEORGINA       Row Name 06/19/24 1330          Transfers    Transfers bed-chair transfer  -GEORGINA     Comment, (Transfers) pt. declining attempt to stand due to RLE pain and edema  -GEORGINA       Row Name 06/19/24 1330          Bed-Chair Transfer    Bed-Chair [place] (Transfers) dependent (less than 25% patient effort);2 person assist  -GEORGINA     Assistive Device (Bed-Chair Transfers) lift device  -GEORGINA       Row Name 06/19/24 1330          Sit-Stand Transfer    Sit-Stand [place] (Transfers) not tested  -GEORGINA       Row Name 06/19/24 1330          Activities of Daily Living    BADL Assessment/Intervention lower body dressing  -       Row Name 06/19/24 1330          Lower Body Dressing Assessment/Training    [place] Level (Lower Body Dressing) doff;don;socks;dependent (less than 25% patient effort)  -GEORGINA     Position (Lower Body Dressing) supported sitting  -GEORGINA     Comment, (Lower Body Dressing) recliner level, pt. limited by pain and edema  -GEORGINA               User Key  (r) = Recorded By, (t) = Taken By, (c) = Cosigned By      Initials Name Provider Type    Ana Shepherd, OT Occupational Therapist                   Obj/Interventions       Row Name 06/19/24 1331          Sensory Assessment (Somatosensory)    Sensory Assessment (Somatosensory) right LE  -GEORGINA     Sensory Assessment per pt. he has slight numbness to R foot  -GEORGINA       Row Name 06/19/24 1331          Vision Assessment/Intervention    Visual Impairment/Limitations corrective lenses for reading  -GEORGINA       Row Name 06/19/24 1331          Range of Motion Comprehensive    General Range of Motion bilateral upper extremity ROM WFL  -       Row Name 06/19/24 1331           Strength Comprehensive (MMT)    General Manual Muscle Testing (MMT) Assessment upper extremity strength deficits identified  -GEORGINA     Comment, General Manual Muscle Testing (MMT) Assessment BUE grossly 4+ to 5/5  -GEORGINA       Row Name 06/19/24 1331          Balance    Static Sitting Balance supervision  -GEORGINA     Dynamic Sitting Balance standby assist  -GEORGINA     Position, Sitting Balance supported;unsupported;sitting in chair  -GEORGINA     Balance Interventions occupation based/functional task  -GEORGINA     Comment, Balance Pt. completed chair pushups with close SBA to block R knee if pt. started to slide from edge of recliner. No assist needed. Pt. attempted to reach RLE for dressing, but unable.  -GEORGINA               User Key  (r) = Recorded By, (t) = Taken By, (c) = Cosigned By      Initials Name Provider Type    Ana Shepherd, TRINA Occupational Therapist                   Goals/Plan       Row Name 06/19/24 1340          Bed Mobility Goal 1 (OT)    Activity/Assistive Device (Bed Mobility Goal 1, OT) sit to supine;supine to sit  -GEORGINA     Sharp Level/Cues Needed (Bed Mobility Goal 1, OT) standby assist  -GEORGINA     Time Frame (Bed Mobility Goal 1, OT) short term goal (STG);5 days  -GEORGINA     Progress/Outcomes (Bed Mobility Goal 1, OT) new goal  -GEORGINA       Row Name 06/19/24 1340          Transfer Goal 1 (OT)    Activity/Assistive Device (Transfer Goal 1, OT) sit-to-stand/stand-to-sit;bed-to-chair/chair-to-bed;commode, bedside without drop arms;walker, rolling  -GEORGINA     Sharp Level/Cues Needed (Transfer Goal 1, OT) contact guard required  -GEORGINA     Time Frame (Transfer Goal 1, OT) long term goal (LTG);10 days  -GEORGINA     Progress/Outcome (Transfer Goal 1, OT) new goal  -GEORGINA       Row Name 06/19/24 1340          Dressing Goal 1 (OT)    Activity/Device (Dressing Goal 1, OT) lower body dressing  -GEORGINA     Sharp/Cues Needed (Dressing Goal 1, OT) minimum assist (75% or more patient effort)  -GEORGINA     Time Frame (Dressing Goal 1, OT)  long term goal (LTG);10 days  -GEORGINA     Strategies/Barriers (Dressing Goal 1, OT) AE use prn  -GEORGINA     Progress/Outcome (Dressing Goal 1, OT) new goal  -GEORGINA       Row Name 06/19/24 7710          Toileting Goal 1 (OT)    Activity/Device (Toileting Goal 1, OT) adjust/manage clothing;perform perineal hygiene;commode, bedside without drop arms  -GEORGINA     [place] Level/Cues Needed (Toileting Goal 1, OT) minimum assist (75% or more patient effort)  -GEORGINA     Time Frame (Toileting Goal 1, OT) long term goal (LTG);10 days  -GEORGINA     Progress/Outcome (Toileting Goal 1, OT) new goal  -GEORGINA       Row Name 06/19/24 1340          Strength Goal 1 (OT)    Strength Goal 1 (OT) Pt. will complete 10-15 reps each BUE tband strengthening TE to support BADL and transfer independence.  -GEORGINA     Time Frame (Strength Goal 1, OT) long term goal (LTG);10 days  -GEORGINA     Progress/Outcome (Strength Goal 1, OT) new goal  -GEORGINA       Row Name 06/19/24 7425          Therapy Assessment/Plan (OT)    Planned Therapy Interventions (OT) activity tolerance training;adaptive equipment training;BADL retraining;functional balance retraining;patient/caregiver education/training;occupation/activity based interventions;ROM/therapeutic exercise;strengthening exercise;transfer/mobility retraining  -GEORGINA               User Key  (r) = Recorded By, (t) = Taken By, (c) = Cosigned By      Initials Name Provider Type    GEORGINA Ana Carvajal, OT Occupational Therapist                   Clinical Impression       Row Name 06/19/24 2609          Pain Assessment    Pretreatment Pain Rating 6/10  -GEORGINA     Posttreatment Pain Rating 6/10  -GEORGINA     Pain Location - Side/Orientation Right  -GEORGINA     Pain Location lower  -GEORGINA     Pain Location - extremity  -GEORGINA     Pain Intervention(s) Repositioned;Nursing Notified  -       Row Name 06/19/24 6781          Plan of Care Review    Plan of Care Reviewed With patient  -GEORGINA     Progress no change  -GEORGINA     Outcome Evaluation Pt. present with RLE pain,  edema and weakness along with new L BKA impaciting PLOF work, BADLs, IADLs and related transfers.  Pt.  also with prior R foot TMA and R hip fx with surgery. Pt. is much below baseline with inability to complete LBD/LBD and sit to stand movement. Pt. is appropriate for skilled IP OT services for education and to address deficit areas promoting return to PLOF. Recommend IRF at discharge.  -       Row Name 06/19/24 9000          Therapy Assessment/Plan (OT)    Patient/Family Therapy Goal Statement (OT) begin recovery process to become independent again and be able to go to rehab  -     Rehab Potential (OT) good, to achieve stated therapy goals  -GEORGINA     Criteria for Skilled Therapeutic Interventions Met (OT) yes;meets criteria;skilled treatment is necessary  -GEORGINA     Therapy Frequency (OT) daily  -GEORGINA     Predicted Duration of Therapy Intervention (OT) 10 days  -       Row Name 06/19/24 5160          Therapy Plan Review/Discharge Plan (OT)    Equipment Needs Upon Discharge (OT) --  TBD at rehab, likely will need tub bench and w/c  -GEORGINA     Anticipated Discharge Disposition (OT) inpatient rehabilitation facility  -       Row Name 06/19/24 1339          Vital Signs    Pre Systolic BP Rehab 158  -GEORGINA     Pre Treatment Diastolic BP 81  -GEORGINA     Post Systolic BP Rehab 146  -GEORGINA     Post Treatment Diastolic BP 84  -GEORGINA     Pretreatment Heart Rate (beats/min) 106  -GEORGINA     Posttreatment Heart Rate (beats/min) 111  -GEORGINA     Pre SpO2 (%) 94  -GEORGINA     O2 Delivery Pre Treatment room air  -GEORGINA     O2 Delivery Intra Treatment room air  -GEORGINA     Post SpO2 (%) 95  -GEORGINA     O2 Delivery Post Treatment room air  -GEORGINA     Pre Patient Position Supine  -GEORGINA     Intra Patient Position Sitting  -GEORGINA     Post Patient Position Sitting  -GEORGINA       Row Name 06/19/24 3486          Positioning and Restraints    Pre-Treatment Position in bed  -GEROGINA     Post Treatment Position chair  -GEORGINA     In Chair notified nsg;reclined;call light within reach;encouraged to  call for assist;exit alarm on;waffle cushion;on mechanical lift sling;RLE elevated  -GEORGINA               User Key  (r) = Recorded By, (t) = Taken By, (c) = Cosigned By      Initials Name Provider Type    Ana Shepherd, OT Occupational Therapist                   Outcome Measures       Row Name 06/19/24 1342          How much help from another is currently needed...    Putting on and taking off regular lower body clothing? 2  -GEORGINA     Bathing (including washing, rinsing, and drying) 2  -GEORGINA     Toileting (which includes using toilet bed pan or urinal) 2  -GEORGINA     Putting on and taking off regular upper body clothing 3  -GEORGINA     Taking care of personal grooming (such as brushing teeth) 3  -GEORGINA     Eating meals 4  -GEORGINA     AM-PAC 6 Clicks Score (OT) 16  -GEORGINA       Row Name 06/19/24 1202          How much help from another person do you currently need...    Turning from your back to your side while in flat bed without using bedrails? 4  -SD     Moving from lying on back to sitting on the side of a flat bed without bedrails? 4  -SD     Moving to and from a bed to a chair (including a wheelchair)? 2  -SD     Standing up from a chair using your arms (e.g., wheelchair, bedside chair)? 2  -SD     Climbing 3-5 steps with a railing? 1  -SD     To walk in hospital room? 1  -SD     AM-PAC 6 Clicks Score (PT) 14  -SD     Highest Level of Mobility Goal 4 --> Transfer to chair/commode  -SD       Row Name 06/19/24 1342 06/19/24 1202       Functional Assessment    Outcome Measure Options AM-PAC 6 Clicks Daily Activity (OT)  -GEORGINA AM-PAC 6 Clicks Basic Mobility (PT)  -SD              User Key  (r) = Recorded By, (t) = Taken By, (c) = Cosigned By      Initials Name Provider Type    Ana Shepherd, OT Occupational Therapist    Charity Christie PT Physical Therapist                    Occupational Therapy Education       Title: PT OT SLP Therapies (In Progress)       Topic: Occupational Therapy (In Progress)       Point: ADL  training (Done)       Description:   Instruct learner(s) on proper safety adaptation and remediation techniques during self care or transfers.   Instruct in proper use of assistive devices.                  Learning Progress Summary             Patient Acceptance, E,D, SHALOM,LIDIA,NR by GEORGINA at 6/19/2024 1343    Comment: reason for consult, noted deficits, chair push ups                         Point: Home exercise program (Done)       Description:   Instruct learner(s) on appropriate technique for monitoring, assisting and/or progressing therapeutic exercises/activities.                  Learning Progress Summary             Patient Acceptance, E,D, SHALOM,LIDIA,NR by GEORGINA at 6/19/2024 1343    Comment: reason for consult, noted deficits, chair push ups                         Point: Precautions (Not Started)       Description:   Instruct learner(s) on prescribed precautions during self-care and functional transfers.                  Learner Progress:  Not documented in this visit.              Point: Body mechanics (Not Started)       Description:   Instruct learner(s) on proper positioning and spine alignment during self-care, functional mobility activities and/or exercises.                  Learner Progress:  Not documented in this visit.                              User Key       Initials Effective Dates Name Provider Type Discipline     07/11/23 -  Ana Carvajal, OT Occupational Therapist OT                  OT Recommendation and Plan  Planned Therapy Interventions (OT): activity tolerance training, adaptive equipment training, BADL retraining, functional balance retraining, patient/caregiver education/training, occupation/activity based interventions, ROM/therapeutic exercise, strengthening exercise, transfer/mobility retraining  Therapy Frequency (OT): daily  Plan of Care Review  Plan of Care Reviewed With: patient  Progress: no change  Outcome Evaluation: Pt. present with RLE pain, edema and weakness along with new L  BKA impaciting PLOF work, BADLs, IADLs and related transfers.  Pt.  also with prior R foot TMA and R hip fx with surgery. Pt. is much below baseline with inability to complete LBD/LBD and sit to stand movement. Pt. is appropriate for skilled IP OT services for education and to address deficit areas promoting return to PLOF. Recommend IRF at discharge.     Time Calculation:         Time Calculation- OT       Row Name 06/19/24 1343             Time Calculation- OT    OT Start Time 1032  -GEORGINA      OT Received On 06/19/24  -GEORGINA      OT Goal Re-Cert Due Date 06/29/24  -GEORGINA         Untimed Charges    OT Eval/Re-eval Minutes 52  -GEORGINA         Total Minutes    Untimed Charges Total Minutes 52  -GEORGINA       Total Minutes 52  -GEORGINA                User Key  (r) = Recorded By, (t) = Taken By, (c) = Cosigned By      Initials Name Provider Type    Ana Shepherd OT Occupational Therapist                  Therapy Charges for Today       Code Description Service Date Service Provider Modifiers Qty    [id number] HC OT EVAL MOD COMPLEXITY 4 6/19/2024 Ana Carvajal OT GO 1                 Ana Carvajal OT  6/19/2024

## 2024-06-19 NOTE — PLAN OF CARE
Goal Outcome Evaluation:  Plan of Care Reviewed With: patient        Progress: no change  Outcome Evaluation: Pt. present with RLE pain, edema and weakness along with new L BKA impaciting PLOF work, BADLs, IADLs and related transfers.  Pt.  also with prior R foot TMA and R hip fx with surgery. Pt. is much below baseline with inability to complete LBD/LBD and sit to stand movement. Pt. is appropriate for skilled IP OT services for education and to address deficit areas promoting return to PLOF. Recommend IRF at discharge.      Anticipated Discharge Disposition (OT): inpatient rehabilitation facility

## 2024-06-19 NOTE — THERAPY RE-EVALUATION
"Patient Name: Easton Alvarez  : 1965    MRN: [id number]                              Today's Date: 2024       Admit Date: 2024    Visit Dx:     ICD-10-CM ICD-9-CM   1. S/P BKA (below knee amputation), left  Z89.512 V49.75   2. Diabetic ulcer of left midfoot associated with type 2 diabetes mellitus, with necrosis of muscle  E11.621 250.80    L97.423 707.14     728.89   3. Elevated LFTs  R79.89 790.6   4. Below knee amputation  S88.119A 897.0   5. Impaired functional mobility, balance, gait, and endurance  Z74.09 V49.89     Patient Active Problem List   Diagnosis    Diabetic ulcer of left foot with necrosis of muscle    Severe malnutrition     Past Medical History:   Diagnosis Date    Diabetes mellitus     Diabetic foot infection 2018    left (partial amputation)     Past Surgical History:   Procedure Laterality Date    AMPUTATION FOOT / TOE Right     partial foot    BELOW KNEE AMPUTATION Left 2024    Procedure: AMPUTATION BELOW KNEE AND WOUND VAC ASSISTED CLOSURE LEFT;  Surgeon: Brijesh Em Jr., MD;  Location:  [facility];  Service: Orthopedics;  Laterality: Left;    INCISION AND DRAINAGE LEG Left 6/15/2024    Procedure: SECONDARY CLOSURE LEFT BELOW KNEE AMPUTATION;  Surgeon: Brijesh Em Jr., MD;  Location:  [facility];  Service: Orthopedics;  Laterality: Left;    PLACEMENT OF WOUND VAC Left 2024    Procedure: PLACEMENT OF WOUND VAC LEFT;  Surgeon: Brijesh Em Jr., MD;  Location:  [facility];  Service: Orthopedics;  Laterality: Left;    TOTAL HIP ARTHROPLASTY Right 2024    From fall \"whole hip was shattered\"      General Information       Row Name 24 1025          Physical Therapy Time and Intention    Document Type re-evaluation  -SD     Mode of Treatment physical therapy  -SD       Row Name 24 1025          General Information    Patient Profile Reviewed yes  -SD     Prior Level of Function independent:;all household mobility;community " mobility;gait;transfer;bed mobility;ADL's;work  Pt [place] [facility] [employer], Independent with mobility tasks PTA  -SD     Existing Precautions/Restrictions fall;other (see comments)  L BKA 06/13/24, hx of R hip ORIF Feb 2024 (aspirated 6/17/24), R foot TMA 2019.  -SD     Barriers to Rehab medically complex;physical barrier  -SD       Row Name 06/19/24 1025          Living Environment    People in Home alone  -SD       Row Name 06/19/24 1025          Home Main Entrance    Number of Stairs, Main Entrance none  -SD       Row Name 06/19/24 1025          Stairs Within Home, Primary    Number of Stairs, Within Home, Primary none  -SD       Row Name 06/19/24 1025          Cognition    Orientation Status (Cognition) oriented x 4  -SD       Row Name 06/19/24 1025          Safety Issues, Functional Mobility    Safety Issues Affecting Function (Mobility) sequencing abilities;safety precaution awareness  -SD     Impairments Affecting Function (Mobility) pain;range of motion (ROM);sensation/sensory awareness;strength  -SD     Comment, Safety Issues/Impairments (Mobility) RLE pain and edema  -SD               User Key  (r) = Recorded By, (t) = Taken By, (c) = Cosigned By      Initials Name Provider Type    Charity Christie, LINDA Physical Therapist                   Mobility       Row Name 06/19/24 1153          Bed Mobility    Bed Mobility rolling left;rolling right  -SD     Rolling Left [place] (Bed Mobility) contact guard;1 person assist;verbal cues  -SD     Rolling Right [place] (Bed Mobility) contact guard;1 person assist;verbal cues  -SD     Assistive Device (Bed Mobility) bed rails  -SD     Comment, (Bed Mobility) pt rolled side to side to place lift sling  -SD       Row Name 06/19/24 1153          Transfers    Comment, (Transfers) [facility] lift to recliner. Pt kindly declined to attempt STS t/f due to RLE pain.  -SD       Row Name 06/19/24 1153          Bed-Chair Transfer    Bed-Chair [place]  (Transfers) dependent (less than 25% patient effort)  -SD     Assistive Device (Bed-Chair Transfers) lift device  -SD       [facility] Name 06/19/24 1153          Sit-Stand Transfer    Sit-Stand [place] (Transfers) not tested  -[facility] Name 06/19/24 1153          Gait/Stairs (Locomotion)    [place] Level (Gait) not tested  -SD               User Key  (r) = Recorded By, (t) = Taken By, (c) = Cosigned By      Initials Name Provider Type    Charity Christie PT Physical Therapist                   Obj/Interventions       Row Name 06/19/24 1154          Range of Motion Comprehensive    General Range of Motion lower extremity range of motion deficits identified  -SD     Comment, General Range of Motion RLE limited by edema, LLE WFL for hip/knee  -SD       [facility] Name 06/19/24 1154          Strength Comprehensive (MMT)    General Manual Muscle Testing (MMT) Assessment lower extremity strength deficits identified  -[facility] Name 06/19/24 1154          Motor Skills    Therapeutic Exercise knee  -SD       [facility] Name 06/19/24 1154          Knee (Therapeutic Exercise)    Knee (Therapeutic Exercise) isometric exercises;strengthening exercise  -SD     Knee Isometrics (Therapeutic Exercise) left;quad sets;5 repetitions;supine  -SD     Knee Strengthening (Therapeutic Exercise) bilateral;heel slides;SLR (straight leg raise);supine;5 repetitions  -SD       [facility] Name 06/19/24 1154          Balance    Balance Assessment sitting static balance;sitting dynamic balance  -SD     Static Sitting Balance supervision  -SD     Dynamic Sitting Balance standby assist  -SD     Position, Sitting Balance unsupported;sitting in chair  -SD     Comment, Balance Pt able to perform chair pushups with SBA  -SD               User Key  (r) = Recorded By, (t) = Taken By, (c) = Cosigned By      Initials Name Provider Type    Charity Christie PT Physical Therapist                   Goals/Plan       Row Name 06/19/24 1200          Bed  Mobility Goal 1 (PT)    Activity/Assistive Device (Bed Mobility Goal 1, PT) sit to supine;supine to sit;rolling to left;rolling to right  -SD     [place] Level/Cues Needed (Bed Mobility Goal 1, PT) modified independence  -SD     Time Frame (Bed Mobility Goal 1, PT) 3 days;short term goal (STG)  -SD       Row Name 06/19/24 1200          Transfer Goal 1 (PT)    Activity/Assistive Device (Transfer Goal 1, PT) wheelchair transfer;sliding board;sit-to-stand/stand-to-sit;bed-to-chair/chair-to-bed  -SD     [place] Level/Cues Needed (Transfer Goal 1, PT) minimum assist (75% or more patient effort)  -SD     Time Frame (Transfer Goal 1, PT) long term goal (LTG);2 weeks  -SD     Strategies/Barriers (Transfers Goal 1, PT) STS and bed <> chair t/f's with RW and Arnaldo and/or slideboard t/f from EOB <> WC with Arnaldo  -SD       Row Name 06/19/24 1200          Gait Training Goal 1 (PT)    Activity/Assistive Device (Gait Training Goal 1, PT) gait (walking locomotion);walker, rolling  -SD     [place] Level (Gait Training Goal 1, PT) minimum assist (75% or more patient effort)  -SD     Distance (Gait Training Goal 1, PT) 10ft  -SD     Time Frame (Gait Training Goal 1, PT) long term goal (LTG);2 weeks  -SD       Row Name 06/19/24 1200          Patient Education Goal (PT)    Activity (Patient Education Goal, PT) HEP  -SD     [place]/Cues/Accuracy (Memory Goal 2, PT) demonstrates adequately;verbalizes understanding  -SD     Time Frame (Patient Education Goal, PT) long term goal (LTG);2 weeks  -SD       Row Name 06/19/24 1200          Therapy Assessment/Plan (PT)    Planned Therapy Interventions (PT) balance training;bed mobility training;gait training;home exercise program;patient/family education;wheelchair management/propulsion training;neuromuscular re-education;transfer training;strengthening;ROM (range of motion)  -SD               User Key  (r) = Recorded By, (t) = Taken By, (c) = Cosigned By      Initials Name  Provider Type    SD Charity Dela Cruz, PT Physical Therapist                   Clinical Impression       Row Name 06/19/24 1156          Pain    Pretreatment Pain Rating 6/10  -SD     Posttreatment Pain Rating 6/10  -SD     Pain Location - Side/Orientation Right  -SD     Pain Location - hip  -SD     Pain Intervention(s) Repositioned;Environmental changes;Nursing Notified  -SD       Row Name 06/19/24 1156          Plan of Care Review    Plan of Care Reviewed With patient  -SD     Outcome Evaluation Pt presents with multiple comorbidities (R hip fx/ORIF, R foot TMA, DM) with recent L BKA. Pt dem CGA for bed mobility and t/f to recliner with [facility] lift. Pt is significantly below baseline level of function for mobility and will benefit from IPPT services to address limitations. PT rec d/c IRF.  -SD       Row Name 06/19/24 1156          Therapy Assessment/Plan (PT)    Patient/Family Therapy Goals Statement (PT) increase independence with mobility, go to rehab  -SD     Rehab Potential (PT) good, to achieve stated therapy goals  -SD     Criteria for Skilled Interventions Met (PT) yes;meets criteria;skilled treatment is necessary  -SD     Therapy Frequency (PT) daily  -SD     Predicted Duration of Therapy Intervention (PT) 2wks  -SD       Row Name 06/19/24 1156          Vital Signs    Pre Systolic BP Rehab 158  -SD     Pre Treatment Diastolic BP 81  -SD     Post Systolic BP Rehab 146  -SD     Post Treatment Diastolic BP 84  -SD     Pretreatment Heart Rate (beats/min) 106  -SD     Posttreatment Heart Rate (beats/min) 109  -SD     Pre SpO2 (%) 95  -SD     O2 Delivery Pre Treatment room air  -SD     Post SpO2 (%) 95  -SD     O2 Delivery Post Treatment room air  -SD     Pre Patient Position Supine  -SD     Post Patient Position Sitting  -SD       Row Name 06/19/24 1156          Positioning and Restraints    Pre-Treatment Position in bed  -SD     Post Treatment Position chair  -SD     In Chair notified nsg;reclined;call light  within reach;encouraged to call for assist;exit alarm on;waffle cushion;on mechanical lift sling;R heel elevated  -SD               User Key  (r) = Recorded By, (t) = Taken By, (c) = Cosigned By      Initials Name Provider Type    Charity Christie PT Physical Therapist                   Outcome Measures       Row Name 06/19/24 1202          How much help from another person do you currently need...    Turning from your back to your side while in flat bed without using bedrails? 4  -SD     Moving from lying on back to sitting on the side of a flat bed without bedrails? 4  -SD     Moving to and from a bed to a chair (including a wheelchair)? 2  -SD     Standing up from a chair using your arms (e.g., wheelchair, bedside chair)? 2  -SD     Climbing 3-5 steps with a railing? 1  -SD     To walk in hospital room? 1  -SD     AM-PAC 6 Clicks Score (PT) 14  -SD     Highest Level of Mobility Goal 4 --> Transfer to chair/commode  -SD       Row Name 06/19/24 1202          Functional Assessment    Outcome Measure Options AM-PAC 6 Clicks Basic Mobility (PT)  -SD               User Key  (r) = Recorded By, (t) = Taken By, (c) = Cosigned By      Initials Name Provider Type    Charity Christie PT Physical Therapist                                 Physical Therapy Education       Title: PT OT SLP Therapies (Done)       Topic: Physical Therapy (Done)       Point: Mobility training (Done)       Learning Progress Summary             Patient Acceptance, E,D, VU,NR by SD at 6/19/2024 1203    Comment: PT ed for expected outcomes, risks, and benefits of IPPT services and progression of activity. Pt given visual demo of unilateral gait with RW. Discussion regarding dispo planning and IRF.                         Point: Home exercise program (Done)       Learning Progress Summary             Patient Acceptance, E,D, VU,NR by SD at 6/19/2024 1203    Comment: PT ed for expected outcomes, risks, and benefits of IPPT services and  progression of activity. Pt given visual demo of unilateral gait with RW. Discussion regarding dispo planning and IRF.                         Point: Body mechanics (Done)       Learning Progress Summary             Patient Acceptance, E,D, VU,NR by SD at 6/19/2024 1203    Comment: PT ed for expected outcomes, risks, and benefits of IPPT services and progression of activity. Pt given visual demo of unilateral gait with RW. Discussion regarding dispo planning and IRF.                         Point: Precautions (Done)       Learning Progress Summary             Patient Acceptance, E,D, VU,NR by SD at 6/19/2024 1203    Comment: PT ed for expected outcomes, risks, and benefits of IPPT services and progression of activity. Pt given visual demo of unilateral gait with RW. Discussion regarding dispo planning and IRF.                                         User Key       Initials Effective Dates Name Provider Type Discipline    SD 03/13/23 -  Charity Dela Cruz, PT Physical Therapist PT                  PT Recommendation and Plan  Planned Therapy Interventions (PT): balance training, bed mobility training, gait training, home exercise program, patient/family education, wheelchair management/propulsion training, neuromuscular re-education, transfer training, strengthening, ROM (range of motion)  Plan of Care Reviewed With: patient  Outcome Evaluation: Pt presents with multiple comorbidities (R hip fx/ORIF, R foot TMA, DM) with recent L BKA. Pt dem CGA for bed mobility and t/f to recliner with mech lift. Pt is significantly below baseline level of function for mobility and will benefit from IPPT services to address limitations. PT rec d/c IRF.     Time Calculation:   PT Evaluation Complexity  History, PT Evaluation Complexity: 3 or more personal factors and/or comorbidities  Examination of Body Systems (PT Eval Complexity): total of 4 or more elements  Clinical Presentation (PT Evaluation Complexity): unstable  Clinical  Decision Making (PT Evaluation Complexity): high complexity  Overall Complexity (PT Evaluation Complexity): high complexity     PT Charges       Row Name 06/19/24 1205             Time Calculation    Start Time 1025  expanded chart review [id number]  -SD      PT Non-Billable Time (min) 52 min  -SD      PT Received On 06/19/24  -SD      PT Goal Re-Cert Due Date 06/29/24  -SD         Time Calculation- PT    Total Timed Code Minutes- PT 15 minute(s)  -SD         Timed Charges    56722 - PT Therapeutic Activity Minutes 15  -SD         Total Minutes    Timed Charges Total Minutes 15  -SD       Total Minutes 15  -SD                User Key  (r) = Recorded By, (t) = Taken By, (c) = Cosigned By      Initials Name Provider Type    SD Charity Dela Cruz, PT Physical Therapist                  Therapy Charges for Today       Code Description Service Date Service Provider Modifiers Qty    07435206868 HC PT THERAPEUTIC ACT EA 15 MIN 6/19/2024 Charity Dela Cruz, PT GP 1    64381122469 HC PT EVAL HIGH COMPLEXITY 4 6/19/2024 Charity Dela Cruz, PT GP 1            PT G-Codes  Outcome Measure Options: AM-PAC 6 Clicks Basic Mobility (PT)  AM-PAC 6 Clicks Score (PT): 14  PT Discharge Summary  Anticipated Discharge Disposition (PT): inpatient rehabilitation facility    Charity Dela Cruz PT  6/19/2024

## 2024-06-19 NOTE — PLAN OF CARE
Goal Outcome Evaluation:   Pt up in chair this shift, RA, NSR/ST, cont. POC.

## 2024-06-19 NOTE — PROGRESS NOTES
[facility] Medicine Services  PROGRESS NOTE    Patient Name: Easton Alvarez  : 1965  MRN: [id number]    Date of Admission: 2024  Primary Care Physician: Provider, No Known    Subjective   Subjective     CC:  F/u left foot gangrene    HPI:  Pain is controlled.  Says he is eating pretty well.     Objective   Objective     Vital Signs:   Temp:  [97 °F (36.1 °C)-99.4 °F (37.4 °C)] 97 °F (36.1 °C)  Heart Rate:  [] 97  Resp:  [16] 16  BP: (129-158)/(69-84) 158/81     Physical Exam:   Constitutional: No acute distress, awake, alert, brother visiting   HENT: NCAT, mucous membranes moist  Respiratory: Clear to auscultation bilaterally, respiratory effort jenni, room air   Cardiovascular: RRR, no murmurs, rubs, or gallops  Gastrointestinal: Positive bowel sounds, soft, nontender, distended  Musculoskeletal: L BKA.  Tr Rle edema   Psychiatric:calm, conversant   Neurologic: Oriented x 3, moves all extremities, Cranial Nerves grossly intact to confrontation, speech clear  Skin: No rashes      Results Reviewed:  LAB RESULTS:      Lab 24  0437 24  0507 24  0506 24  0400 06/15/24  0443 24  2021 24  1218 24  0403 24  2225 24  1839 24  1451 24  1149 24  0807 24  0802   WBC 13.29* 16.60* 18.47* 25.41* 22.75*  --   --    < >  --  18.84*  --   --   --  26.78*   HEMOGLOBIN 10.9* 11.5* 11.7* 11.5* 11.2*   < >  --    < >  --  11.0*  --   --   --  12.3*   HEMATOCRIT 32.4* 33.4* 34.7* 33.5* 32.1*   < >  --    < >  --  31.5*  --   --   --  35.5*   PLATELETS 184 231 179 174 168  --   --    < >  --  142  --   --   --  191   NEUTROS ABS 10.61* 13.49* 15.00* 22.05*  --   --   --   --   --  16.57*  --   --   --  24.21*  23.83*   IMMATURE GRANS (ABS) 0.33* 0.30* 0.69* 0.77*  --   --   --   --   --  0.34*  --   --   --  0.42*   LYMPHS ABS 1.24 1.54 1.41 1.29  --   --   --   --   --  0.83  --   --   --  0.67*   MONOS ABS 0.96*  1.08* 1.09* 1.07*  --   --   --   --   --  1.01*  --   --   --  1.47*   EOS ABS 0.12 0.16 0.22 0.10  --   --   --   --   --  0.01  --   --   --  0.00   MCV 97.3* 95.7 94.8 92.8 93.6  --   --    < >  --  91.6  --   --   --  93.2   CRP  --   --   --   --   --   --   --   --   --   --   --   --   --  24.89*   LACTATE  --   --   --   --   --   --   --   --  3.6* 4.1* 3.9* 6.3* 8.8*  --    PROTIME  --   --   --   --   --   --  19.5*  --   --   --   --   --   --   --     < > = values in this interval not displayed.         Lab 06/19/24  0437 06/18/24  0507 06/17/24  0506 06/16/24  0400 06/15/24  0443 06/14/24  0356 06/13/24  2032 06/13/24  1341 06/13/24  0403   SODIUM 130* 134* 136 134* 129* 129*  --  129* 131*   POTASSIUM 4.4 4.4 4.1 4.3 4.1 4.5   < > 3.8 3.3*   CHLORIDE 98 99 102 101 94* 94*  --  97* 96*   CO2 25.0 27.0 33.0* 27.0 25.0 24.0  --  23.0 26.0   ANION GAP 7.0 8.0 1.0* 6.0 10.0 11.0  --  9.0 9.0   BUN 16 16 21 29* 43* 60*  --  56* 64*   CREATININE 0.49* 0.54* 0.51* 0.72* 0.85 0.86  --  0.67* 0.91   EGFR 117.5 114.1 116.1 104.6 99.5 99.1  --  106.9 96.5   GLUCOSE 211* 112* 120* 175* 231* 312*  --  260* 268*   CALCIUM 7.4* 7.5* 7.7* 7.7* 7.4* 7.6*  --  7.5* 7.3*   MAGNESIUM  --   --   --   --  2.3 2.6*  --  2.4  --    PHOSPHORUS  --   --   --   --  2.7 3.4  --  3.5  --    HEMOGLOBIN A1C  --   --   --   --   --   --   --   --  9.50*    < > = values in this interval not displayed.         Lab 06/16/24  0400 06/14/24  0356 06/12/24  1839 06/12/24  0802   TOTAL PROTEIN 5.8* 5.4* 5.0* 5.6*   ALBUMIN 1.8* 2.0* 2.0* 2.7*   GLOBULIN 4.0  --  3.0 2.9   ALT (SGPT) 33 30 37 45*   AST (SGOT) 49* 39 46* 65*   BILIRUBIN 1.9* 2.1* 3.6* 4.7*   INDIRECT BILIRUBIN  --  0.6  --   --    BILIRUBIN DIRECT  --  1.5*  --   --    ALK PHOS 232* 173* 145* 171*         Lab 06/14/24  1218   PROTIME 19.5*   INR 1.63*                 Brief Urine Lab Results  (Last result in the past 365 days)        Color   Clarity   Blood   Leuk Est    Nitrite   Protein   CREAT   Urine HCG        06/14/24 1109 Yellow   Clear   Negative   Negative   Negative   Negative                   Microbiology Results Abnormal       Procedure Component Value - Date/Time    Body Fluid Culture - Body Fluid, Hip, Right [[id number]] Collected: 06/18/24 1052    Lab Status: Preliminary result Specimen: Body Fluid from Hip, Right Updated: 06/19/24 0741     Body Fluid Culture Growth present, too young to evaluate     Gram Stain Many (4+) WBCs seen      No organisms seen    Wound Culture - Wound, Leg, Left [[id number]] Collected: 06/15/24 1501    Lab Status: Final result Specimen: Wound from Leg, Left Updated: 06/19/24 0711     Wound Culture No growth at 3 days     Gram Stain Rare (1+) WBCs seen      No organisms seen    Anaerobic Culture 10 Day Incubation - Wound, Leg, Left [[id number]]  (Normal) Collected: 06/15/24 1501    Lab Status: Preliminary result Specimen: Wound from Leg, Left Updated: 06/19/24 0711     Anaerobic Culture No anaerobes isolated at 3 days    Fungus Culture - Surgical Site, Leg, Left [[id number]] Collected: 06/13/24 1031    Lab Status: Preliminary result Specimen: Surgical Site from Leg, Left Updated: 06/18/24 1200     Fungus Culture No fungus isolated at less than 1 week    AFB Culture - Surgical Site, Leg, Left [[id number]] Collected: 06/13/24 1031    Lab Status: Preliminary result Specimen: Surgical Site from Leg, Left Updated: 06/18/24 1200     AFB Culture No AFB isolated at less than 1 week     AFB Stain No acid fast bacilli seen on concentrated smear    Blood Culture - Blood, Hand, Left [[id number]]  (Normal) Collected: 06/15/24 1104    Lab Status: Preliminary result Specimen: Blood from Hand, Left Updated: 06/18/24 1200     Blood Culture No growth at 3 days    Blood Culture - Blood, Hand, Right [[id number]]  (Normal) Collected: 06/15/24 1104    Lab Status: Preliminary result Specimen: Blood from Hand, Right Updated: 06/18/24 1200     Blood Culture No growth at 3  days    Anaerobic Culture - Surgical Site, Leg, Left [[id number]]  (Normal) Collected: 06/13/24 1031    Lab Status: Final result Specimen: Surgical Site from Leg, Left Updated: 06/18/24 0751     Anaerobic Culture No anaerobes isolated at 5 days    Blood Culture - Blood, Arm, Left [[id number]]  (Normal) Collected: 06/12/24 1832    Lab Status: Final result Specimen: Blood from Arm, Left Updated: 06/17/24 1900     Blood Culture No growth at 5 days    Blood Culture - Blood, Hand, Right [[id number]]  (Normal) Collected: 06/12/24 1837    Lab Status: Final result Specimen: Blood from Hand, Right Updated: 06/17/24 1900     Blood Culture No growth at 5 days    AFB Culture - Wound, Leg, Left [[id number]] Collected: 06/15/24 1501    Lab Status: Preliminary result Specimen: Wound from Leg, Left Updated: 06/16/24 1216     AFB Stain No acid fast bacilli seen on concentrated smear    Fungus Smear - Surgical Site, Leg, Left [[id number]] Collected: 06/13/24 1031    Lab Status: Final result Specimen: Surgical Site from Leg, Left Updated: 06/14/24 1406     Fungal Stain No fungal elements seen            Adult Transthoracic Echo Complete w/ Color, Spectral and Contrast if Necessary Per Protocol    Result Date: 6/18/2024    Left ventricular systolic function is normal. Left ventricular ejection fraction appears to be 56 - 60%.   Left ventricular wall thickness is consistent with borderline concentric hypertrophy.   Left ventricular diastolic function is consistent with (grade Ia w/high LAP) impaired relaxation.     XR Hip With or Without Pelvis 2 - 3 View Right    Result Date: 6/17/2024  XR HIP W OR WO PELVIS 2-3 VIEW RIGHT Date of Exam: 6/17/2024 5:51 PM EDT Indication: pain, concern for prosthetic joint infection Comparison: None available. Findings: There is no radiographic evidence of acute fracture or dislocation. Patient is post right total hip arthroplasty. No definite evidence of periprosthetic lucency. There is evidence of soft  tissue swelling adjacent to the right hip as well as small volume subcutaneous air.     Impression: Impression: No radiographic evidence of acute fracture or dislocation. Post right total hip arthroplasty with evidence of periprosthetic soft tissue swelling and small volume subcutaneous air. Electronically Signed: Sage Burch MD  6/17/2024 6:29 PM EDT  Workstation ID: [id number]     Results for orders placed during the hospital encounter of 06/12/24    Adult Transthoracic Echo Complete w/ Color, Spectral and Contrast if Necessary Per Protocol    Interpretation Summary    Left ventricular systolic function is normal. Left ventricular ejection fraction appears to be 56 - 60%.    Left ventricular wall thickness is consistent with borderline concentric hypertrophy.    Left ventricular diastolic function is consistent with (grade Ia w/high LAP) impaired relaxation.      Current medications:  Scheduled Meds:cefTRIAXone, 2,000 mg, Intravenous, Q24H  insulin glargine, 20 Units, Subcutaneous, Nightly  insulin lispro, 2-9 Units, Subcutaneous, 4x Daily AC & at Bedtime  Insulin Lispro, 5 Units, Subcutaneous, TID With Meals  lactobacillus acidophilus, 1 capsule, Oral, Daily  lisinopril, 10 mg, Oral, Q24H  melatonin, 5 mg, Oral, Nightly  pantoprazole, 40 mg, Intravenous, BID AC  senna-docusate sodium, 2 tablet, Oral, BID  sodium chloride, 10 mL, Intravenous, Q12H      Continuous Infusions:lactated ringers, 9 mL/hr, Last Rate: 9 mL/hr (06/15/24 1307)  lactated ringers, 9 mL/hr, Last Rate: 9 mL/hr (06/15/24 1345)  ropivacaine, , Last Rate: Stopped (06/17/24 2230)      PRN Meds:.  senna-docusate sodium **AND** polyethylene glycol **AND** bisacodyl **AND** bisacodyl    Calcium Replacement - Follow Nurse / BPA Driven Protocol    dextrose    dextrose    glucagon (human recombinant)    Magnesium Standard Dose Replacement - Follow Nurse / BPA Driven Protocol    Morphine    nitroglycerin    oxyCODONE    Phosphorus Replacement - Follow  Nurse / BPA Driven Protocol    Potassium Replacement - Follow Nurse / BPA Driven Protocol    sodium chloride    sodium chloride    Assessment & Plan   Assessment & Plan     Active Hospital Problems    Diagnosis  POA    **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        Brief Hospital Course to date:  Easton Alvarez is a 58 y.o. male  with past medical history significant for T2DM, diabetic neuropathy, right hip fracture/ORIF ([facility] 2/2024), and right transmetatarsal amputation 2019 at  who presented to OSH  ED on 6/12/2024 due to left foot severe cellulitis/necrosis after recent injury. Lactate at OSH was 8.8 and CT scan of left leg showed gas gangrene. Orthopedic surgeon Dr. Em was consulted and performed a left below-knee amputation on 6/13/2024 with further debridement and irrigation on 6/15.  Infectious disease is following and managing antimicrobial therapy.     This patient's problems and plans were partially entered by my partner and updated as appropriate by me 06/19/24.    Sepsis (WBC, lactate, tachypnea, source)    Bacteremia:  group B strep  Left foot cellulitis/gangrene   - MRI left foot showed extensive soft tissue infection with abscesses along the foot to the ankle and into the lower leg with soft tissue gas.    - S/p left BKA on 6/13/24    - further debridement in OR on 6/15/24 by Dr. Em, drain removed 6/17  - Daily dressing change to surgical site: Xeroform, 4X4, Kerlix, ACE  - Follow up with  Orthopedics in 2 weeks for wound check, imaging at [facility], [address], [address]  - PT wound care following  - Add Boost glucose control to increase protein intake for wound healing, nutrition following  - wound cultures growing Strep B and Kebsiella, repeat cultures pending  - ID, Dr. Flores, following, continue Zosyn, clindamycin for  "now   - TTE:  EF 55, diast dysfxn, \"aortic valve poorly visualized\".  Defer to ID re whether DIONICIO needed.  Unsure IE  will change length of therapy now given his hardware infection.      Cat-prosthetic hip infection, right   - noted gas near hardware per OSH CT scan  - IR performed fluid aspiration from the hip.  This was purulent.  Cx with early growth   - dr. Rene and Dr. Em following   - MRI right hip pending      Gap acidosis POA, resolved   - AG 20.1, likely from lactate, serum acetone negative     Concern for GIB  -melena and fecal occult positive overnight 6/14  -GI consulted, recommended medical management for possible melena with twice daily PPI x 1 month  -GI referral at discharge for elevated LFTs, thrombocytopenia and concern for early cirrhosis, plan to purse liver US after recovery from acute illness    Elevated BP  - no h/o taking meds for BP per patient report  - BP has been running high this admission, possibly secondary to pain  - start low-dose ACE given DM2, lisinopril 10 mg daily, titrate    DM, uncontrolled  - Pt reports he is unaware of his diabetes medications and is not on any medications currently   -last records from 2019 at , he was discharged on Lantus 12 U with 5 U with meals   -HgA1c 9.5  -Lantus increased to 20 units nightly, mealtime insulin added 5 units TID with improvement in BGs  -Continue current basal, prandial, and SS insulins, titrate       Expected Discharge Location and Transportation: Likely will need rehab, pt hoping to go home with family  Expected Discharge   Expected Discharge Date: 6/21/2024; Expected Discharge Time:      VTE Prophylaxis:  Mechanical VTE prophylaxis orders are present.         AM-PAC 6 Clicks Score (PT): 16 (06/18/24 2055)    CODE STATUS:   Code Status and Medical Interventions:   Ordered at: 06/12/24 2450     Level Of Support Discussed With:    Patient     Code Status (Patient has no pulse and is not breathing):    CPR (Attempt to " Resuscitate)     Medical Interventions (Patient has pulse or is breathing):    Full Support       Shraddha Scott MD  06/19/24

## 2024-06-19 NOTE — PLAN OF CARE
Goal Outcome Evaluation:  Plan of Care Reviewed With: patient           Outcome Evaluation: Pt presents with multiple comorbidities (R hip fx/ORIF, R foot TMA, DM) with recent L BKA. Pt dem CGA for bed mobility and t/f to recliner with mech lift. Pt is significantly below baseline level of function for mobility and will benefit from IPPT services to address limitations. PT rec d/c IRF.      Anticipated Discharge Disposition (PT): inpatient rehabilitation facility

## 2024-06-19 NOTE — CONSULTS
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 male admitted to [facility] with Diabetic ulcer of left foot with necrosis of muscle [E11.621, L97.523].     The patient was admitted for necrotizing fasciitis of his left lower extremity and foot.  He underwent a below the knee amputation with Dr. Manav valencia several days ago for this.  Unfortunately he started to have pain in his right hip and groin.  He recently underwent a right total hip arthroplasty for displaced femoral neck fracture at the [facility].  He had recovered well from that he said initially he was doing fine prior to this acute onset of pain following his infection in his left foot and ankle.  Patient is uncontrolled diabetic denies smoking.  Denies any blood thinners      Patient denies any history of: DVT/PE, MRSA, COPD, CHF, CAD, , Dementia or A-Fib.   The patient has history of :Diabetes mellitus  The patient is on anticoagulants:     Past medical history, past surgical history, social history, family history, ALLERGIES, current medications have been reviewed by me.    Past Medical History:   Diagnosis Date    Diabetes mellitus     Diabetic foot infection 2018    left (partial amputation)     Past Surgical History:   Procedure Laterality Date    AMPUTATION FOOT / TOE Right     partial foot    BELOW KNEE AMPUTATION Left 6/13/2024    Procedure: AMPUTATION BELOW KNEE AND WOUND VAC ASSISTED CLOSURE LEFT;  Surgeon: Brijesh Em Jr., MD;  Location: [facility];  Service: Orthopedics;  Laterality: Left;    INCISION AND DRAINAGE LEG Left 6/15/2024    Procedure:  "SECONDARY CLOSURE LEFT BELOW KNEE AMPUTATION;  Surgeon: Brijesh Em Jr., MD;  Location:  [facility];  Service: Orthopedics;  Laterality: Left;    PLACEMENT OF WOUND VAC Left 2024    Procedure: PLACEMENT OF WOUND VAC LEFT;  Surgeon: Brijesh Em Jr., MD;  Location:  [facility];  Service: Orthopedics;  Laterality: Left;    TOTAL HIP ARTHROPLASTY Right 2024    From fall \"whole hip was shattered\"     Social History     Occupational History    Not on file   Tobacco Use    Smoking status: Former     Current packs/day: 0.00     Types: Cigarettes     Quit date: 1994     Years since quittin.0     Passive exposure: Past    Smokeless tobacco: Never   Vaping Use    Vaping status: Never Used   Substance and Sexual Activity    Alcohol use: Not Currently    Drug use: Yes     Types: Marijuana    Sexual activity: Defer      Social History     Social History Narrative    Not on file     History reviewed. No pertinent family history.     No Known Allergies    Home Medications:  No medications prior to admission.       Current Medications:  Scheduled Meds:cefTRIAXone, 2,000 mg, Intravenous, Q24H  insulin glargine, 20 Units, Subcutaneous, Nightly  insulin lispro, 2-9 Units, Subcutaneous, 4x Daily AC & at Bedtime  Insulin Lispro, 5 Units, Subcutaneous, TID With Meals  lactobacillus acidophilus, 1 capsule, Oral, Daily  lisinopril, 10 mg, Oral, Q24H  melatonin, 5 mg, Oral, Nightly  pantoprazole, 40 mg, Intravenous, BID AC  senna-docusate sodium, 2 tablet, Oral, BID  sodium chloride, 10 mL, Intravenous, Q12H      Continuous Infusions:lactated ringers, 9 mL/hr, Last Rate: 9 mL/hr (06/15/24 1307)  lactated ringers, 9 mL/hr, Last Rate: 9 mL/hr (06/15/24 1345)  ropivacaine, , Last Rate: Stopped (24 2230)      PRN Meds:.  senna-docusate sodium **AND** polyethylene glycol **AND** bisacodyl **AND** bisacodyl    Calcium Replacement - Follow Nurse / BPA Driven Protocol    dextrose    dextrose    glucagon (human " recombinant)    Magnesium Standard Dose Replacement - Follow Nurse / BPA Driven Protocol    Morphine    nitroglycerin    oxyCODONE    Phosphorus Replacement - Follow Nurse / BPA Driven Protocol    Potassium Replacement - Follow Nurse / BPA Driven Protocol    sodium chloride    sodium chloride    Review of Systems:   A 12 point system review was reviewed with the patient and from the chart  and is negative except as in history of present illness.      Physical Exam: 58 y.o. male                    Vitals:    06/18/24 2324 06/19/24 0503 06/19/24 0728 06/19/24 1124   BP: 134/75 147/71 158/81 123/73   BP Location: Right arm Right arm Left arm Right arm   Patient Position: Lying Lying Lying Sitting   Pulse: 103 97     Resp: 16 16 16 18   Temp: 98.2 °F (36.8 °C) 98.1 °F (36.7 °C) 97 °F (36.1 °C) 98.3 °F (36.8 °C)   TempSrc: Oral Oral Axillary Oral   SpO2: 91% 95%     Weight:       Height:            Gait: Could not be tested , patient is nonambulatory.    Mental/HEENT/cardio/skin: The patient's general appearance was well-nourished, well-hydrated, no acute distress.  Orientation was alert and oriented ×3.  The patient's mood was normal.   Pulmonary exam shows normal late exchange, no labored breathing, or shortness of breath.    The skin exam showed normal temperature and color in the area of examination.    Extremities: Well-healed surgical incision over the right anterior aspect of the hip pain with range of motion of the hip no active drainage positive erythema    Pulses: Pulses palpable and equal bilaterally    Diagnostic Tests:    Results from last 7 days   Lab Units 06/19/24  0437 06/18/24  0507 06/17/24  0506   WBC 10*3/mm3 13.29* 16.60* 18.47*   HEMOGLOBIN g/dL 10.9* 11.5* 11.7*   HEMATOCRIT % 32.4* 33.4* 34.7*   PLATELETS 10*3/mm3 184 231 179     Results from last 7 days   Lab Units 06/19/24  0437 06/18/24  0507 06/17/24  0506   SODIUM mmol/L 130* 134* 136   POTASSIUM mmol/L 4.4 4.4 4.1   CHLORIDE mmol/L 98 99  "102   CO2 mmol/L 25.0 27.0 33.0*   BUN mg/dL 16 16 21   CREATININE mg/dL 0.49* 0.54* 0.51*   GLUCOSE mg/dL 211* 112* 120*   CALCIUM mg/dL 7.4* 7.5* 7.7*     Results from last 7 days   Lab Units 06/14/24  1218   INR  1.63*     No results found for: \"URICACID\"  Lab Results   Component Value Date    CRYSTAL None Seen 06/18/2024     Microbiology Results (last 10 days)       Procedure Component Value - Date/Time    Body Fluid Culture - Body Fluid, Hip, Right [[id number]] Collected: 06/18/24 1052    Lab Status: Preliminary result Specimen: Body Fluid from Hip, Right Updated: 06/19/24 0741     Body Fluid Culture Growth present, too young to evaluate     Gram Stain Many (4+) WBCs seen      No organisms seen    AFB Culture - Aspirate, Hip, Right [[id number]] Collected: 06/18/24 1052    Lab Status: Preliminary result Specimen: Aspirate from Hip, Right Updated: 06/19/24 1102     AFB Stain No acid fast bacilli seen on concentrated smear    Wound Culture - Wound, Leg, Left [[id number]] Collected: 06/15/24 1501    Lab Status: Final result Specimen: Wound from Leg, Left Updated: 06/19/24 0711     Wound Culture No growth at 3 days     Gram Stain Rare (1+) WBCs seen      No organisms seen    AFB Culture - Wound, Leg, Left [[id number]] Collected: 06/15/24 1501    Lab Status: Preliminary result Specimen: Wound from Leg, Left Updated: 06/16/24 1216     AFB Stain No acid fast bacilli seen on concentrated smear    Anaerobic Culture 10 Day Incubation - Wound, Leg, Left [[id number]]  (Normal) Collected: 06/15/24 1501    Lab Status: Preliminary result Specimen: Wound from Leg, Left Updated: 06/19/24 0711     Anaerobic Culture No anaerobes isolated at 3 days    Blood Culture - Blood, Hand, Left [[id number]]  (Normal) Collected: 06/15/24 1104    Lab Status: Preliminary result Specimen: Blood from Hand, Left Updated: 06/19/24 1201     Blood Culture No growth at 4 days    Blood Culture - Blood, Hand, Right [[id number]]  (Normal) Collected: 06/15/24 " 1104    Lab Status: Preliminary result Specimen: Blood from Hand, Right Updated: 06/19/24 1201     Blood Culture No growth at 4 days    Anaerobic Culture - Surgical Site, Leg, Left [[id number]]  (Normal) Collected: 06/13/24 1031    Lab Status: Final result Specimen: Surgical Site from Leg, Left Updated: 06/18/24 0751     Anaerobic Culture No anaerobes isolated at 5 days    Fungus Culture - Surgical Site, Leg, Left [[id number]] Collected: 06/13/24 1031    Lab Status: Preliminary result Specimen: Surgical Site from Leg, Left Updated: 06/18/24 1200     Fungus Culture No fungus isolated at less than 1 week    AFB Culture - Surgical Site, Leg, Left [[id number]] Collected: 06/13/24 1031    Lab Status: Preliminary result Specimen: Surgical Site from Leg, Left Updated: 06/18/24 1200     AFB Culture No AFB isolated at less than 1 week     AFB Stain No acid fast bacilli seen on concentrated smear    Fungus Smear - Surgical Site, Leg, Left [[id number]] Collected: 06/13/24 1031    Lab Status: Final result Specimen: Surgical Site from Leg, Left Updated: 06/14/24 1406     Fungal Stain No fungal elements seen    Wound Culture - Surgical Site, Leg, Left [[id number]]  (Abnormal) Collected: 06/13/24 1031    Lab Status: Final result Specimen: Surgical Site from Leg, Left Updated: 06/15/24 1050     Wound Culture Moderate growth (3+) Streptococcus agalactiae (Group B)     Comment:   This organism is considered to be universally susceptible to penicillin.  No further antibiotic testing will be performed. If Clindamycin or Erythromycin is the drug of choice, notify the laboratory within 7 days to request susceptibility testing.        Gram Stain Moderate (3+) Red blood cells      Few (2+) WBCs seen      Few (2+) Gram positive cocci, intracellular and extracellular, in pairs and chains    Blood Culture - Blood, Hand, Right [[id number]]  (Normal) Collected: 06/12/24 1837    Lab Status: Final result Specimen: Blood from Hand, Right Updated:  06/17/24 1900     Blood Culture No growth at 5 days    Blood Culture - Blood, Arm, Left [[id number]]  (Normal) Collected: 06/12/24 1832    Lab Status: Final result Specimen: Blood from Arm, Left Updated: 06/17/24 1900     Blood Culture No growth at 5 days    Blood Culture - Blood, Arm, Left [[id number]]  (Abnormal) Collected: 06/12/24 0807    Lab Status: Final result Specimen: Blood from Arm, Left Updated: 06/15/24 0726     Blood Culture Streptococcus agalactiae (Group B)     Isolated from Aerobic and Anaerobic Bottles     Gram Stain Aerobic Bottle Gram positive cocci in chains      Anaerobic Bottle Gram positive cocci in chains    Narrative:      Refer to previous blood culture collected on 6/12 for ha's      Blood Culture - Blood, Arm, Left [[id number]]  (Abnormal)  (Susceptibility) Collected: 06/12/24 0807    Lab Status: Final result Specimen: Blood from Arm, Left Updated: 06/15/24 0726     Blood Culture Streptococcus agalactiae (Group B)     Comment: MICs to follow        Isolated from Aerobic and Anaerobic Bottles     Gram Stain Anaerobic Bottle Gram positive cocci in chains      Aerobic Bottle Gram positive cocci in chains    Susceptibility        Streptococcus agalactiae (Group B)      HA      Ceftriaxone Susceptible      Penicillin G Susceptible      Vancomycin Susceptible                           Blood Culture ID, PCR - Blood, Arm, Left [[id number]]  (Abnormal) Collected: 06/12/24 0807    Lab Status: Final result Specimen: Blood from Arm, Left Updated: 06/13/24 0814     BCID, PCR Streptococcus agalactiae (Group B). Identification by BCID2 PCR.     BOTTLE TYPE Aerobic Bottle    Wound Culture - Swab, Foot, Left [[id number]]  (Abnormal)  (Susceptibility) Collected: 06/12/24 0803    Lab Status: Final result Specimen: Swab from Foot, Left Updated: 06/15/24 0802     Wound Culture Heavy growth (4+) Klebsiella oxytoca      Moderate growth (3+) Streptococcus agalactiae (Group B)     Comment:   This organism is  considered to be universally susceptible to penicillin.  No further antibiotic testing will be performed. If Clindamycin or Erythromycin is the drug of choice, notify the laboratory within 7 days to request susceptibility testing.         Heavy growth (4+) Staphylococcus aureus     Gram Stain Rare (1+) WBCs seen      No organisms seen    Susceptibility        Klebsiella oxytoca      GLADYS      Amoxicillin + Clavulanate Susceptible      Ampicillin Resistant      Ampicillin + Sulbactam Intermediate      Cefepime Susceptible      Ceftazidime Susceptible      Ceftriaxone Susceptible      Gentamicin Susceptible      Levofloxacin Susceptible      Piperacillin + Tazobactam Susceptible      Tetracycline Susceptible      Trimethoprim + Sulfamethoxazole Susceptible                       Susceptibility        Staphylococcus aureus      GLADYS      Clindamycin Susceptible      Erythromycin Susceptible      Oxacillin Susceptible      Rifampin Susceptible      Tetracycline Susceptible      Trimethoprim + Sulfamethoxazole Susceptible      Vancomycin Susceptible                       Susceptibility Comments       Klebsiella oxytoca    Cefazolin sensitivity will not be reported for Enterobacteriaceae in non-urine isolates. If cefazolin is preferred, please call the microbiology lab to request an E-test.  With the exception of urinary-sourced infections, aminoglycosides should not be used as monotherapy.                     MRI Femur Right With & Without Contrast    Result Date: 6/19/2024  Impression: Large, lobulated 7.8 x 7.8 x 10.5 cm rim-enhancing, gas-containing, periprosthetic fluid collection, compatible with periprosthetic infection/abscess and septic joint. Purulent fluid was collected during needle aspiration from 6/18/2024 and correlate with fluid sample. Electronically Signed: Sonny Rey MD  6/19/2024 8:27 AM EDT  Workstation ID: [id number]    FL Guided Aspiration Joint    Result Date: 6/19/2024  Impression: Successful  aspiration of a fluid collection from the subcutaneous tissues surrounding the right hip joint as above with no immediate complications. Report dictated by: Lauren Leon PA-c  I have personally reviewed this case and agree with the findings above: Electronically Signed: Ivan Reilly MD  6/19/2024 8:08 AM EDT  Workstation ID: [id number]    XR Hip With or Without Pelvis 2 - 3 View Right    Result Date: 6/17/2024  Impression: No radiographic evidence of acute fracture or dislocation. Post right total hip arthroplasty with evidence of periprosthetic soft tissue swelling and small volume subcutaneous air. Electronically Signed: Sage Burch MD  6/17/2024 6:29 PM EDT  Workstation ID: [id number]    MRI Foot Left Without Contrast    Result Date: 6/13/2024  Impression: No findings of osteomyelitis at this time. Extensive soft tissue infection with abscesses along the foot to the ankle and into the lower leg. Soft tissue gas. Gas-forming organism is possible. There are also intramuscular smaller abscesses in the foot. Arthritis. Infectious tenosynovitis. Electronically Signed: Riri Costa MD  6/13/2024 8:19 AM EDT  Workstation ID: [id number]    CT Angio Abdominal Aorta Bilateral Iliofem Runoff    Result Date: 6/12/2024  Impression: Scattered predominantly calcified atheromatous plaque extending from the abdominal aorta distally to the bilateral popliteal arteries. No evidence of occlusion. Approximately 50% stenosis of the distal left superficial femoral artery, as well as approximately 50% stenosis of the right popliteal artery at the level of the knee joint. Otherwise no evidence of significant arterial stenosis elsewhere. Patent bilateral three-vessel runoff. Inflammatory changes about the left foot including extensive subcutaneous edema and emphysema likely representing necrotizing infection. There is asymmetric early venous enhancement within the left lower extremity, presumably secondary to inflammatory response. No  evidence of well-defined or drainable fluid collection within the left lower extremity to suggest abscess. Surgical changes of right hip arthroplasty. Soft tissue gas fluid collection surrounding the anterior aspect of the hip arthroplasty, which is poorly defined on this exam. If this has not been a recent surgical procedure, findings are concerning for periprosthetic infection/abscess. Additional ancillary findings as above. Electronically Signed: Wilfrido aCgle MD  6/12/2024 12:52 PM EDT  Workstation ID: [id number]    XR Foot 3+ View Left    Result Date: 6/12/2024  Impression: Diffuse soft tissue edema about the foot with subcutaneous emphysema along the medial and dorsal forefoot, concerning for cellulitis. No radiographic evidence of osteomyelitis. Consider MRI if there is continued concern. Chronic appearing deformities of the forefoot as detailed above, likely sequela of neuropathic arthropathy. 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   The patient is indicated for a irrigation debridement and head exchange of the right total hip.  This is a acute onset of infection secondary to his infection in his left foot which likely made the patient septic.    The likely,  Risks and benefits of the procedure including but not limited to infection, DVT, pulmonary embolism,  leg length discrepancy, recurrent dislocation, possibility of injury to nerves or vessels, possibility of periprosthetic fractures have been discussed in detail.  Despite the risks involved, The patient and patient's family  would like to proceed.     The patient is being scheduled for a irrigation debridement and head exchange right total hip anterior  approach at [facility] tentatively for 6/19 2024.     Patient will be made NPO.  After midnight  Obtain informed consent.   Continue antibiotics per infectious disease  OR tomorrow    Date: 6/19/2024    Lex Sethi MD    CC: Provider, No Known; MD Sonia Catalan, MD Shraddha

## 2024-06-20 ENCOUNTER — ANESTHESIA (OUTPATIENT)
Dept: PERIOP | Facility: HOSPITAL | Age: 59
End: 2024-06-20
Payer: MEDICAID

## 2024-06-20 ENCOUNTER — APPOINTMENT (OUTPATIENT)
Dept: GENERAL RADIOLOGY | Facility: HOSPITAL | Age: 59
End: 2024-06-20
Payer: MEDICAID

## 2024-06-20 LAB
BACTERIA SPEC AEROBE CULT: NORMAL
BACTERIA SPEC AEROBE CULT: NORMAL
GLUCOSE BLDC GLUCOMTR-MCNC: 107 MG/DL (ref 70–130)
GLUCOSE BLDC GLUCOMTR-MCNC: 122 MG/DL (ref 70–130)
GLUCOSE BLDC GLUCOMTR-MCNC: 138 MG/DL (ref 70–130)
GLUCOSE BLDC GLUCOMTR-MCNC: 98 MG/DL (ref 70–130)

## 2024-06-20 PROCEDURE — 25010000002 PHENYLEPHRINE 10 MG/ML SOLUTION 1 ML VIAL: Performed by: ANESTHESIOLOGY

## 2024-06-20 PROCEDURE — 25010000002 FENTANYL CITRATE (PF) 100 MCG/2ML SOLUTION: Performed by: ANESTHESIOLOGY

## 2024-06-20 PROCEDURE — 25810000003 LACTATED RINGERS PER 1000 ML: Performed by: ANESTHESIOLOGY

## 2024-06-20 PROCEDURE — 25010000002 CEFAZOLIN PER 500 MG: Performed by: ORTHOPAEDIC SURGERY

## 2024-06-20 PROCEDURE — C1776 JOINT DEVICE (IMPLANTABLE): HCPCS | Performed by: ORTHOPAEDIC SURGERY

## 2024-06-20 PROCEDURE — 87205 SMEAR GRAM STAIN: CPT | Performed by: ORTHOPAEDIC SURGERY

## 2024-06-20 PROCEDURE — 25010000002 FENTANYL CITRATE (PF) 50 MCG/ML SOLUTION

## 2024-06-20 PROCEDURE — 25010000002 VANCOMYCIN 1 G RECONSTITUTED SOLUTION: Performed by: ORTHOPAEDIC SURGERY

## 2024-06-20 PROCEDURE — 25010000002 CEFTRIAXONE PER 250 MG: Performed by: INTERNAL MEDICINE

## 2024-06-20 PROCEDURE — 76000 FLUOROSCOPY <1 HR PHYS/QHP: CPT

## 2024-06-20 PROCEDURE — 0SP909Z REMOVAL OF LINER FROM RIGHT HIP JOINT, OPEN APPROACH: ICD-10-PCS | Performed by: ORTHOPAEDIC SURGERY

## 2024-06-20 PROCEDURE — 25010000002 ONDANSETRON PER 1 MG: Performed by: ANESTHESIOLOGY

## 2024-06-20 PROCEDURE — 87102 FUNGUS ISOLATION CULTURE: CPT | Performed by: ORTHOPAEDIC SURGERY

## 2024-06-20 PROCEDURE — 63710000001 INSULIN GLARGINE PER 5 UNITS: Performed by: ORTHOPAEDIC SURGERY

## 2024-06-20 PROCEDURE — 25010000002 DEXAMETHASONE PER 1 MG: Performed by: ANESTHESIOLOGY

## 2024-06-20 PROCEDURE — 0SUA09Z SUPPLEMENT RIGHT HIP JOINT, ACETABULAR SURFACE WITH LINER, OPEN APPROACH: ICD-10-PCS | Performed by: ORTHOPAEDIC SURGERY

## 2024-06-20 PROCEDURE — 82948 REAGENT STRIP/BLOOD GLUCOSE: CPT

## 2024-06-20 PROCEDURE — 87176 TISSUE HOMOGENIZATION CULTR: CPT | Performed by: ORTHOPAEDIC SURGERY

## 2024-06-20 PROCEDURE — 87116 MYCOBACTERIA CULTURE: CPT | Performed by: ORTHOPAEDIC SURGERY

## 2024-06-20 PROCEDURE — 25010000002 PHENYLEPHRINE 10 MG/ML SOLUTION: Performed by: ANESTHESIOLOGY

## 2024-06-20 PROCEDURE — 0JDL0ZZ EXTRACTION OF RIGHT UPPER LEG SUBCUTANEOUS TISSUE AND FASCIA, OPEN APPROACH: ICD-10-PCS | Performed by: ORTHOPAEDIC SURGERY

## 2024-06-20 PROCEDURE — 0S990ZZ DRAINAGE OF RIGHT HIP JOINT, OPEN APPROACH: ICD-10-PCS | Performed by: ORTHOPAEDIC SURGERY

## 2024-06-20 PROCEDURE — 0SRR03A REPLACEMENT OF RIGHT HIP JOINT, FEMORAL SURFACE WITH CERAMIC SYNTHETIC SUBSTITUTE, UNCEMENTED, OPEN APPROACH: ICD-10-PCS | Performed by: ORTHOPAEDIC SURGERY

## 2024-06-20 PROCEDURE — 25010000002 SUGAMMADEX 200 MG/2ML SOLUTION: Performed by: ANESTHESIOLOGY

## 2024-06-20 PROCEDURE — 87206 SMEAR FLUORESCENT/ACID STAI: CPT | Performed by: ORTHOPAEDIC SURGERY

## 2024-06-20 PROCEDURE — 87015 SPECIMEN INFECT AGNT CONCNTJ: CPT | Performed by: ORTHOPAEDIC SURGERY

## 2024-06-20 PROCEDURE — 25010000002 HYDROMORPHONE 1 MG/ML SOLUTION

## 2024-06-20 PROCEDURE — 25010000002 MORPHINE PER 10 MG: Performed by: INTERNAL MEDICINE

## 2024-06-20 PROCEDURE — 87075 CULTR BACTERIA EXCEPT BLOOD: CPT | Performed by: ORTHOPAEDIC SURGERY

## 2024-06-20 PROCEDURE — 87070 CULTURE OTHR SPECIMN AEROBIC: CPT | Performed by: ORTHOPAEDIC SURGERY

## 2024-06-20 PROCEDURE — 25810000003 LACTATED RINGERS PER 1000 ML: Performed by: ORTHOPAEDIC SURGERY

## 2024-06-20 PROCEDURE — 0SPR0JZ REMOVAL OF SYNTHETIC SUBSTITUTE FROM RIGHT HIP JOINT, FEMORAL SURFACE, OPEN APPROACH: ICD-10-PCS | Performed by: ORTHOPAEDIC SURGERY

## 2024-06-20 PROCEDURE — 25010000002 PROPOFOL 10 MG/ML EMULSION: Performed by: ANESTHESIOLOGY

## 2024-06-20 PROCEDURE — 99232 SBSQ HOSP IP/OBS MODERATE 35: CPT | Performed by: INTERNAL MEDICINE

## 2024-06-20 DEVICE — BEAR HIP VIVACI/TE 2MOBIL HXPE 28X44MM: Type: IMPLANTABLE DEVICE | Site: HIP | Status: FUNCTIONAL

## 2024-06-20 DEVICE — BIOLOX® OPTION, HEAD, S, Ø 28/-3.0, TAPER 12/14
Type: IMPLANTABLE DEVICE | Site: HIP | Status: FUNCTIONAL
Brand: BIOLOX® OPTION

## 2024-06-20 RX ORDER — ASPIRIN 81 MG/1
81 TABLET ORAL EVERY 12 HOURS SCHEDULED
Status: DISCONTINUED | OUTPATIENT
Start: 2024-06-21 | End: 2024-07-06 | Stop reason: HOSPADM

## 2024-06-20 RX ORDER — SODIUM HYPOCHLORITE 1.25 MG/ML
SOLUTION TOPICAL AS NEEDED
Status: DISCONTINUED | OUTPATIENT
Start: 2024-06-20 | End: 2024-06-20 | Stop reason: HOSPADM

## 2024-06-20 RX ORDER — TRANEXAMIC ACID 10 MG/ML
1000 INJECTION, SOLUTION INTRAVENOUS ONCE
Status: COMPLETED | OUTPATIENT
Start: 2024-06-20 | End: 2024-06-20

## 2024-06-20 RX ORDER — MAGNESIUM HYDROXIDE 1200 MG/15ML
LIQUID ORAL AS NEEDED
Status: DISCONTINUED | OUTPATIENT
Start: 2024-06-20 | End: 2024-06-20 | Stop reason: HOSPADM

## 2024-06-20 RX ORDER — SODIUM CHLORIDE 0.9 % (FLUSH) 0.9 %
10 SYRINGE (ML) INJECTION EVERY 12 HOURS SCHEDULED
Status: DISCONTINUED | OUTPATIENT
Start: 2024-06-20 | End: 2024-06-20 | Stop reason: HOSPADM

## 2024-06-20 RX ORDER — HYDROMORPHONE HYDROCHLORIDE 1 MG/ML
0.5 INJECTION, SOLUTION INTRAMUSCULAR; INTRAVENOUS; SUBCUTANEOUS
Status: DISCONTINUED | OUTPATIENT
Start: 2024-06-20 | End: 2024-06-20 | Stop reason: HOSPADM

## 2024-06-20 RX ORDER — SODIUM CHLORIDE 9 MG/ML
40 INJECTION, SOLUTION INTRAVENOUS AS NEEDED
Status: DISCONTINUED | OUTPATIENT
Start: 2024-06-20 | End: 2024-06-20 | Stop reason: HOSPADM

## 2024-06-20 RX ORDER — ROCURONIUM BROMIDE 10 MG/ML
INJECTION, SOLUTION INTRAVENOUS AS NEEDED
Status: DISCONTINUED | OUTPATIENT
Start: 2024-06-20 | End: 2024-06-20 | Stop reason: SURG

## 2024-06-20 RX ORDER — SODIUM CHLORIDE 0.9 % (FLUSH) 0.9 %
10 SYRINGE (ML) INJECTION AS NEEDED
Status: DISCONTINUED | OUTPATIENT
Start: 2024-06-20 | End: 2024-06-20 | Stop reason: HOSPADM

## 2024-06-20 RX ORDER — PHENYLEPHRINE HYDROCHLORIDE 10 MG/ML
INJECTION INTRAVENOUS AS NEEDED
Status: DISCONTINUED | OUTPATIENT
Start: 2024-06-20 | End: 2024-06-20 | Stop reason: SURG

## 2024-06-20 RX ORDER — FENTANYL CITRATE 50 UG/ML
INJECTION, SOLUTION INTRAMUSCULAR; INTRAVENOUS AS NEEDED
Status: DISCONTINUED | OUTPATIENT
Start: 2024-06-20 | End: 2024-06-20 | Stop reason: SURG

## 2024-06-20 RX ORDER — SODIUM CHLORIDE, SODIUM LACTATE, POTASSIUM CHLORIDE, CALCIUM CHLORIDE 600; 310; 30; 20 MG/100ML; MG/100ML; MG/100ML; MG/100ML
9 INJECTION, SOLUTION INTRAVENOUS CONTINUOUS
Status: DISCONTINUED | OUTPATIENT
Start: 2024-06-20 | End: 2024-06-22

## 2024-06-20 RX ORDER — TRAMADOL HYDROCHLORIDE 50 MG/1
50 TABLET ORAL EVERY 8 HOURS PRN
Status: DISPENSED | OUTPATIENT
Start: 2024-06-20 | End: 2024-06-27

## 2024-06-20 RX ORDER — FENTANYL CITRATE 50 UG/ML
50 INJECTION, SOLUTION INTRAMUSCULAR; INTRAVENOUS
Status: DISCONTINUED | OUTPATIENT
Start: 2024-06-20 | End: 2024-06-20 | Stop reason: HOSPADM

## 2024-06-20 RX ORDER — FAMOTIDINE 20 MG/1
20 TABLET, FILM COATED ORAL ONCE
Status: DISCONTINUED | OUTPATIENT
Start: 2024-06-20 | End: 2024-06-20 | Stop reason: HOSPADM

## 2024-06-20 RX ORDER — LIDOCAINE HYDROCHLORIDE 10 MG/ML
INJECTION, SOLUTION EPIDURAL; INFILTRATION; INTRACAUDAL; PERINEURAL AS NEEDED
Status: DISCONTINUED | OUTPATIENT
Start: 2024-06-20 | End: 2024-06-20 | Stop reason: SURG

## 2024-06-20 RX ORDER — VANCOMYCIN HYDROCHLORIDE 1 G/20ML
INJECTION, POWDER, LYOPHILIZED, FOR SOLUTION INTRAVENOUS AS NEEDED
Status: DISCONTINUED | OUTPATIENT
Start: 2024-06-20 | End: 2024-06-20 | Stop reason: HOSPADM

## 2024-06-20 RX ORDER — ACETAMINOPHEN 500 MG
1000 TABLET ORAL EVERY 8 HOURS
Status: DISCONTINUED | OUTPATIENT
Start: 2024-06-20 | End: 2024-07-06 | Stop reason: HOSPADM

## 2024-06-20 RX ORDER — NALOXONE HCL 0.4 MG/ML
0.1 VIAL (ML) INJECTION
Status: DISCONTINUED | OUTPATIENT
Start: 2024-06-20 | End: 2024-06-22

## 2024-06-20 RX ORDER — FENTANYL CITRATE 50 UG/ML
INJECTION, SOLUTION INTRAMUSCULAR; INTRAVENOUS
Status: COMPLETED
Start: 2024-06-20 | End: 2024-06-20

## 2024-06-20 RX ORDER — OXYCODONE HYDROCHLORIDE 10 MG/1
10 TABLET ORAL EVERY 4 HOURS PRN
Status: DISCONTINUED | OUTPATIENT
Start: 2024-06-20 | End: 2024-06-22

## 2024-06-20 RX ORDER — DROPERIDOL 2.5 MG/ML
0.62 INJECTION, SOLUTION INTRAMUSCULAR; INTRAVENOUS ONCE AS NEEDED
Status: DISCONTINUED | OUTPATIENT
Start: 2024-06-20 | End: 2024-06-20 | Stop reason: HOSPADM

## 2024-06-20 RX ORDER — SODIUM CHLORIDE, SODIUM LACTATE, POTASSIUM CHLORIDE, CALCIUM CHLORIDE 600; 310; 30; 20 MG/100ML; MG/100ML; MG/100ML; MG/100ML
100 INJECTION, SOLUTION INTRAVENOUS CONTINUOUS
Status: DISCONTINUED | OUTPATIENT
Start: 2024-06-20 | End: 2024-07-06 | Stop reason: HOSPADM

## 2024-06-20 RX ORDER — FAMOTIDINE 10 MG/ML
20 INJECTION, SOLUTION INTRAVENOUS ONCE
Status: DISCONTINUED | OUTPATIENT
Start: 2024-06-20 | End: 2024-06-20 | Stop reason: HOSPADM

## 2024-06-20 RX ORDER — MIDAZOLAM HYDROCHLORIDE 1 MG/ML
1 INJECTION INTRAMUSCULAR; INTRAVENOUS
Status: DISCONTINUED | OUTPATIENT
Start: 2024-06-20 | End: 2024-06-20 | Stop reason: HOSPADM

## 2024-06-20 RX ORDER — HYDROMORPHONE HYDROCHLORIDE 1 MG/ML
0.5 INJECTION, SOLUTION INTRAMUSCULAR; INTRAVENOUS; SUBCUTANEOUS
Status: DISCONTINUED | OUTPATIENT
Start: 2024-06-20 | End: 2024-06-22

## 2024-06-20 RX ORDER — DEXAMETHASONE SODIUM PHOSPHATE 4 MG/ML
INJECTION, SOLUTION INTRA-ARTICULAR; INTRALESIONAL; INTRAMUSCULAR; INTRAVENOUS; SOFT TISSUE AS NEEDED
Status: DISCONTINUED | OUTPATIENT
Start: 2024-06-20 | End: 2024-06-20 | Stop reason: SURG

## 2024-06-20 RX ORDER — SODIUM CHLORIDE, SODIUM LACTATE, POTASSIUM CHLORIDE, CALCIUM CHLORIDE 600; 310; 30; 20 MG/100ML; MG/100ML; MG/100ML; MG/100ML
INJECTION, SOLUTION INTRAVENOUS CONTINUOUS PRN
Status: DISCONTINUED | OUTPATIENT
Start: 2024-06-20 | End: 2024-06-20 | Stop reason: SURG

## 2024-06-20 RX ORDER — ACETAMINOPHEN 160 MG
TABLET,DISINTEGRATING ORAL AS NEEDED
Status: DISCONTINUED | OUTPATIENT
Start: 2024-06-20 | End: 2024-06-20 | Stop reason: HOSPADM

## 2024-06-20 RX ORDER — LIDOCAINE HYDROCHLORIDE 10 MG/ML
0.5 INJECTION, SOLUTION EPIDURAL; INFILTRATION; INTRACAUDAL; PERINEURAL ONCE AS NEEDED
Status: DISCONTINUED | OUTPATIENT
Start: 2024-06-20 | End: 2024-06-20 | Stop reason: HOSPADM

## 2024-06-20 RX ORDER — ONDANSETRON 2 MG/ML
INJECTION INTRAMUSCULAR; INTRAVENOUS AS NEEDED
Status: DISCONTINUED | OUTPATIENT
Start: 2024-06-20 | End: 2024-06-20 | Stop reason: SURG

## 2024-06-20 RX ORDER — PROPOFOL 10 MG/ML
VIAL (ML) INTRAVENOUS AS NEEDED
Status: DISCONTINUED | OUTPATIENT
Start: 2024-06-20 | End: 2024-06-20 | Stop reason: SURG

## 2024-06-20 RX ORDER — OXYCODONE HYDROCHLORIDE 5 MG/1
5 TABLET ORAL EVERY 4 HOURS PRN
Status: DISPENSED | OUTPATIENT
Start: 2024-06-20 | End: 2024-06-30

## 2024-06-20 RX ADMIN — PHENYLEPHRINE HYDROCHLORIDE 100 MCG: 10 INJECTION, SOLUTION INTRAVENOUS at 16:04

## 2024-06-20 RX ADMIN — HYDROMORPHONE HYDROCHLORIDE 0.5 MG: 1 INJECTION, SOLUTION INTRAMUSCULAR; INTRAVENOUS; SUBCUTANEOUS at 19:10

## 2024-06-20 RX ADMIN — ONDANSETRON 4 MG: 2 INJECTION INTRAMUSCULAR; INTRAVENOUS at 17:16

## 2024-06-20 RX ADMIN — PHENYLEPHRINE HYDROCHLORIDE 200 MCG: 10 INJECTION, SOLUTION INTRAVENOUS at 17:19

## 2024-06-20 RX ADMIN — MORPHINE SULFATE 2 MG: 2 INJECTION, SOLUTION INTRAMUSCULAR; INTRAVENOUS at 03:23

## 2024-06-20 RX ADMIN — INSULIN GLARGINE 20 UNITS: 100 INJECTION, SOLUTION SUBCUTANEOUS at 21:22

## 2024-06-20 RX ADMIN — FENTANYL CITRATE 50 MCG: 50 INJECTION, SOLUTION INTRAMUSCULAR; INTRAVENOUS at 18:51

## 2024-06-20 RX ADMIN — SENNOSIDES AND DOCUSATE SODIUM 2 TABLET: 8.6; 5 TABLET ORAL at 21:22

## 2024-06-20 RX ADMIN — PROPOFOL 150 MG: 10 INJECTION, EMULSION INTRAVENOUS at 15:51

## 2024-06-20 RX ADMIN — PANTOPRAZOLE SODIUM 40 MG: 40 INJECTION, POWDER, FOR SOLUTION INTRAVENOUS at 06:44

## 2024-06-20 RX ADMIN — DEXAMETHASONE SODIUM PHOSPHATE 4 MG: 4 INJECTION INTRA-ARTICULAR; INTRALESIONAL; INTRAMUSCULAR; INTRAVENOUS; SOFT TISSUE at 16:12

## 2024-06-20 RX ADMIN — ACETAMINOPHEN 1000 MG: 500 TABLET, FILM COATED ORAL at 21:22

## 2024-06-20 RX ADMIN — PHENYLEPHRINE HYDROCHLORIDE 100 MCG: 10 INJECTION, SOLUTION INTRAVENOUS at 16:12

## 2024-06-20 RX ADMIN — ROCURONIUM BROMIDE 30 MG: 10 SOLUTION INTRAVENOUS at 16:39

## 2024-06-20 RX ADMIN — SODIUM CHLORIDE 2000 MG: 900 INJECTION INTRAVENOUS at 08:17

## 2024-06-20 RX ADMIN — OXYCODONE HYDROCHLORIDE 5 MG: 5 TABLET ORAL at 21:22

## 2024-06-20 RX ADMIN — PHENYLEPHRINE HYDROCHLORIDE 200 MCG: 10 INJECTION, SOLUTION INTRAVENOUS at 16:43

## 2024-06-20 RX ADMIN — SODIUM CHLORIDE, POTASSIUM CHLORIDE, SODIUM LACTATE AND CALCIUM CHLORIDE: 600; 310; 30; 20 INJECTION, SOLUTION INTRAVENOUS at 15:47

## 2024-06-20 RX ADMIN — OXYCODONE HYDROCHLORIDE 5 MG: 5 TABLET ORAL at 05:23

## 2024-06-20 RX ADMIN — TRANEXAMIC ACID 1000 MG: 10 INJECTION, SOLUTION INTRAVENOUS at 16:01

## 2024-06-20 RX ADMIN — SUGAMMADEX 200 MG: 100 INJECTION, SOLUTION INTRAVENOUS at 17:23

## 2024-06-20 RX ADMIN — LIDOCAINE HYDROCHLORIDE 50 MG: 10 INJECTION, SOLUTION EPIDURAL; INFILTRATION; INTRACAUDAL; PERINEURAL at 15:51

## 2024-06-20 RX ADMIN — ROCURONIUM BROMIDE 50 MG: 10 SOLUTION INTRAVENOUS at 15:51

## 2024-06-20 RX ADMIN — SODIUM CHLORIDE, POTASSIUM CHLORIDE, SODIUM LACTATE AND CALCIUM CHLORIDE 9 ML/HR: 600; 310; 30; 20 INJECTION, SOLUTION INTRAVENOUS at 15:44

## 2024-06-20 RX ADMIN — TRANEXAMIC ACID 1000 MG: 10 INJECTION, SOLUTION INTRAVENOUS at 17:08

## 2024-06-20 RX ADMIN — SODIUM CHLORIDE 2000 MG: 900 INJECTION INTRAVENOUS at 15:57

## 2024-06-20 RX ADMIN — PHENYLEPHRINE HYDROCHLORIDE 25 MCG/MIN: 10 INJECTION INTRAVENOUS at 16:12

## 2024-06-20 RX ADMIN — SODIUM CHLORIDE, POTASSIUM CHLORIDE, SODIUM LACTATE AND CALCIUM CHLORIDE 100 ML/HR: 600; 310; 30; 20 INJECTION, SOLUTION INTRAVENOUS at 21:22

## 2024-06-20 RX ADMIN — Medication 10 ML: at 08:17

## 2024-06-20 RX ADMIN — FENTANYL CITRATE 100 MCG: 50 INJECTION, SOLUTION INTRAMUSCULAR; INTRAVENOUS at 16:34

## 2024-06-20 RX ADMIN — PHENYLEPHRINE HYDROCHLORIDE 100 MCG: 10 INJECTION, SOLUTION INTRAVENOUS at 16:10

## 2024-06-20 NOTE — ANESTHESIA PROCEDURE NOTES
Airway  Urgency: elective    Date/Time: 6/20/2024 3:54 PM  Airway not difficult    General Information and Staff    Patient location during procedure: OR  Anesthesiologist: Irineo Cuevas MD  CRNA/CAA: Dante Savage CRNA  SRNA: Paradise Boyd SRNA  Indications and Patient Condition  Indications for airway management: airway protection    Preoxygenated: yes  MILS not maintained throughout  Mask difficulty assessment: 1 - vent by mask    Final Airway Details  Final airway type: endotracheal airway      Successful airway: ETT  Cuffed: yes   Successful intubation technique: direct laryngoscopy  Facilitating devices/methods: intubating stylet  Endotracheal tube insertion site: oral  Blade: Eulalio  Blade size: 3  ETT size (mm): 7.5  Cormack-Lehane Classification: grade I - full view of glottis  Placement verified by: chest auscultation and capnometry   Cuff volume (mL): 8  Measured from: lips  ETT/EBT  to lips (cm): 23  Number of attempts at approach: 1  Assessment: lips, teeth, and gum same as pre-op and atraumatic intubation    Additional Comments  Negative epigastric sounds, Breath sound equal bilaterally with symmetric chest rise and fall

## 2024-06-20 NOTE — PLAN OF CARE
Goal Outcome Evaluation:         Patient went for Surgery today around 1:30pm - did not return to 6B  is going to 3G/H post operatively, I will call Nursing report over 3G/H.

## 2024-06-20 NOTE — ANESTHESIA POSTPROCEDURE EVALUATION
Patient: Easton Alvarez    Procedure Summary       Date: 06/20/24 Room / [facility]:  [facility]    Anesthesia Start: 1547 Anesthesia Stop: 1809    Procedure: HIP ANTERIOR INCISION AND DRAINAGE WITH HANA TABLE, POLY SWAP, WITH LEFT LEG PIN TRACTION - RIGHT (Right: Hip) Diagnosis:       Infection of prosthetic total hip joint, initial encounter      (Infection of prosthetic total hip joint, initial encounter [T84.59XA, Z96.649])    Surgeons: Lex Sethi MD Provider: Irineo Cuevas MD    Anesthesia Type: general ASA Status: 3            Anesthesia Type: general    Vitals  Vitals Value Taken Time   BP     Temp     Pulse 93 06/20/24 1808   Resp     SpO2 92 % 06/20/24 1808   Vitals shown include unfiled device data.        Post Anesthesia Care and Evaluation    Patient location during evaluation: PACU  Patient participation: complete - patient participated  Level of consciousness: awake and alert  Pain management: adequate    Airway patency: patent  Anesthetic complications: No anesthetic complications  PONV Status: none  Cardiovascular status: hemodynamically stable and acceptable  Respiratory status: nonlabored ventilation, acceptable and nasal cannula  Hydration status: acceptable

## 2024-06-20 NOTE — ANESTHESIA PREPROCEDURE EVALUATION
Anesthesia Evaluation     Patient summary reviewed and Nursing notes reviewed   no history of anesthetic complications:   NPO Solid Status: > 8 hours  NPO Liquid Status: > 8 hours           Airway   Mallampati: II  TM distance: >3 FB  Neck ROM: full  No difficulty expected  Dental      Pulmonary - normal exam   Cardiovascular - normal exam        Neuro/Psych  GI/Hepatic/Renal/Endo    (+) diabetes mellitus    Musculoskeletal     Abdominal    Substance History      OB/GYN          Other                    Anesthesia Plan    ASA 3     general     intravenous induction     Anesthetic plan, risks, benefits, and alternatives have been provided, discussed and informed consent has been obtained with: patient.    Plan discussed with CRNA.    CODE STATUS:    Level Of Support Discussed With: Patient  Code Status (Patient has no pulse and is not breathing): CPR (Attempt to Resuscitate)  Medical Interventions (Patient has pulse or is breathing): Full Support

## 2024-06-20 NOTE — PLAN OF CARE
Goal Outcome Evaluation:  Plan of Care Reviewed With: patient        Progress: no change  Outcome Evaluation: Patient c/o pain throughout night, PRN meds effective per patient. VSS on room air.

## 2024-06-20 NOTE — OP NOTE
HIP ANTERIOR INCISION AND DRAINAGE WITH HANA TABLE  Procedure Report    Patient Name:  aEston Alvarez  YOB: 1965    Date of Surgery:  6/20/2024     Indications:    58 y.o. male who was admitted to [facility] the patient had history of right total hip replacement that had been done in [place] for femoral neck fracture in February which she had recovered well from.  He was admitted here in [place] for necrotizing fasciitis of his left lower extremity which required below-knee amputation by Dr. Em.  Unfortunately appears that the patient with this left lower extremity infection became septic and seeded his right total hip which was aspirated and she had deon pus with high white blood cell count and high poly percentage.  Because this is likely acute related to his recent septicemia we discussed irrigation debridement and liner head exchange as best option for this sick patient.    . Likely risk and benefits of the procedure including but not limited to infection, DVT, pulmonary embolism, leg length discrepancy, recurrent dislocation, possibility of injury to nerves and vessels, and periprosthetic fractures have been discussed with the patient in detail. Despite the risks involved, the patient elected to proceed and informed consent was obtained. The patient was seen in the preoperative holding area and the operative extremity was marked.    Pre-op Diagnosis:   Infection of prosthetic total hip joint, initial encounter [T84.59XA, Z96.649]       Post-Op Diagnosis Codes:     * Infection of prosthetic total hip joint, initial encounter [T84.59XA, Z96.649]    Procedure/CPT® Codes:      Procedure(s):  HIP ANTERIOR INCISION AND DRAINAGE WITH HANA TABLE, POLY SWAP- RIGHT    Staff:  Surgeon(s):  Lex Sethi MD    Anesthesia: General    Estimated Blood Loss:  400    Implants:    Implant Name Type Inv. Item Serial No.  Lot No. LRB No. Used Action   HD FEM/HIP  BIOLOX/DELTA OPTN CERAM 12/[id number] MIN3MM - [id number] Implant HD FEM/HIP BIOLOX/DELTA OPTN CERAM 12/[id number] MIN3MM  [employer] [id number] Right 1 Implanted   BEAR HIP VIVACI/TE 2MOBIL HXPE [id number] - [id number] Implant BEAR HIP VIVACI/TE 2MOBIL HXPE [id number]  [employer] [id number] Right 1 Implanted       Specimen:          Specimens       ID Source Type Tests Collected By Collected At Frozen?    1 Hip, Right Synovium FUNGAL CULTURE  TISSUE / BONE CULTURE  AFB CULTURE  ANAEROBIC CULTURE 10 DAY INCUBATION   Lex Sethi MD 6/20/24 1700     Description: Synovium 1    This specimen was not marked as sent.    2 Hip, Right Synovium FUNGAL CULTURE  TISSUE / BONE CULTURE  AFB CULTURE  ANAEROBIC CULTURE 10 DAY INCUBATION   Lex Sethi MD 6/20/24 1700     Description: Synovium 2    This specimen was not marked as sent.    3 Hip, Right Synovium FUNGAL CULTURE  TISSUE / BONE CULTURE  AFB CULTURE  ANAEROBIC CULTURE 10 DAY INCUBATION   Lex Sethi MD 6/20/24 1700     Description: Synovium 3    This specimen was not marked as sent.              Findings: Gross intraoperative purulence well-fixed acetabular and femoral components    Complications: None    Description of Procedure:   The patient was transferred to [facility] operating room and preoperative antibiotics given IV prior to skin incision as well as 1 g of Tranexamic Acid.  The patient needed to be done on anterior and [place] table as was his old incision and the best way to debride out the hip unfortunately had a below-knee amputation on the left which made him unable to fit into a boot for the [place] table.  Therefore we placed a traction pin across the distal femur and attached his leg to traction bow on the [place] table.  Patient received general anesthesia and was transferred to the [place] table. All bony prominences were padded adequately. The operative hip was then prepped and draped in the usual sterile fashion.  We did double drape the hip.   Multiple timeouts were done identifying the correct patient, surgical site, and planned procedure.    The old incision was used. Gross purulence was noted. The fascia was intact. It was opened and purulent fluid encountered. Multiple cultures were taken. The head was dislocated and the proximal femur was delivered using a trochanteric hook and placed just distal to the vastus tubercle and proximal to the gluteus crsita tendon. The leg was then externally rotated and gross traction released and hyperextension and adduction were done using the [place] table. Mechanical lift on the table was utilized to support the femur. The dual mobility head was removed.    We then began a systematic debridement of the deep tissues surrounding the femur. The leg was then brought back up and soft tissue surrounding the acetabulum and superficial were debrided.   We used a suction curetted device to help debride out all nonviable tissues around the hip.  We used a Hibiclens scrub with a sterile toothbrush in order to thoroughly scrub all metal implants that had exposure.  We then did a 3-minute soak with chlorhexidine scrub followed by 3 L of normal saline irrigation.  Following this we did a 3-minute Hibiclens soak followed by 3 L of saline irrigation.  We then did a 3-minute concentrated Betadine peroxide soak.  We stapled closed the skin edges and the outer layer of drapes were removed and the entire team changed toga's and fresh sterile instrumentation was brought in.  We then opened up the hip joint and the staples were removed irrigated with normal saline.  We dropped the leg placed a new head and dual mobility ball on the trunnion and the hip was reduced and taken through range of motion found to be stable.  Placed deep Hemovac drain and 1 g vancomycin powder  Soft tissue hemostasis was secured and second dose of IV TXA was used. Sponge, needle, and instrument counts were correct. The fascial layer was closed with a running #2  STRATAFIX followed by 2-0 PDS and nylon for the skin.  Incisional wound VAC was applied. It was found to have a good seal. The patient was then transferred to the recovery room in stable condition. The patient tolerated the procedure well and there were no medications. The patient had adequate distal pulses and good cap refill.    The patient will be mobilized by physical therapy and receive antibiotic's per ID. The patient was started on DVT prophylaxis per medicine team.  I discussed the satisfactory performance of the procedure with the patient's family and discussed the postoperative management.      Lex Sethi MD     Date: 6/20/2024  Time: 17:43 EDT          Assistant Participation:  Surgeon(s):  Lex Sethi MD    Assistant: Carlin Coppola PA-C assisted with proper preoperative positioning, preoperative templating, determingin availability of proper implants, prepping and draping of patient, manipulation placement of instruments, protection of ligaments and vital soft tissue structures, assistance in maintaining hemostasis and assistance with closure of the wound. Their skills and knowledge of the steps of operation and the desired outcome of each surgical step was crucial, allowing for efficient choreography of surgical procedure, and closure of the wound which lead to reduced surgical time, less blood loss, and less risk of complications for the patient.

## 2024-06-20 NOTE — CONSULTS
Diabetes Education    Patient Name:  Easton Alvarez  YOB: 1965  MRN: [id number]  Admit Date:  6/12/2024        Reviewed chart for diabetes education consult.  Noted history of diabetes. Noted was not taking any diabetes medication at home.  Noted A1c of 9.5%.  Noted possibly going home on insulin and MD requested insulin teaching.    Spoke to Mr. Alvarez and support person at bedside this afternoon.  He stated that he has been on insulin in the past, but he has not been on any medication lately.  He states that he used to have a glucometer, but he does not have one anymore.  He states that he thinks it was a One Touch.  He states that he had a family doctor, but she passed away, and he has not replaced his provider.  Explained that he needed to have a provider to follow up with.    Reviewed our What is Diabetes handout.  Reviewed basic diabetes physiology.  He states that he has not attended a diabetes education class before.  He states that he has type 2 diabetes.  We reviewed the types of diabetes and differences.  We talked about hemoglobin A1c.  We discussed his A1c of   9.5% and discussed American Diabetes Association recommendation of A1c less than 7% in order to prevent complications.  Reviewed complications of diabetes that we want to prevent.  Reviewed low blood sugar symptoms, and he states that he has had symptoms, but he did not really know what it was.  Reviewed how to treat low blood sugar less than 70 with rule of 15.  Reviewed symptoms of high blood sugar.  Reviewed importance of reporting high blood sugar to provider.  Reviewed recommended blood sugar ranges of  before meals and  2 hours after meals.  Strongly encouraged him to check his blood sugar at least a couple times a day before his breakfast and his morning insulin shot and 2 hours after supper before his lantus, bedtime insulin shot.  He will need a script for a monitoring kit, strips, and lancets before  discharge.    Reviewed insulin administration, safe storage, safe places to inject on the body, importance of rotating sites and not giving insulin into hard knot under the skin.  Reviewed technique for vial and syringe and for insulin pen.  He was given BD handouts for both vial and syringe and pen instructions.  Demonstrated both vial and pen to him and he was able to return demonstration to me.  Spoke to Rn briefly and reminded her provider would like him to practice giving his insulin injections while he is here in hospital.  RN was agreeable. Materials and our contact information were left at the bedside.  Thank you for this referral.     Electronically signed by:  Missy Lucero RN  06/20/24 13:30 EDT

## 2024-06-20 NOTE — PROGRESS NOTES
[facility] Medicine Services  PROGRESS NOTE    Patient Name: Easton Alvarez  : 1965  MRN: [id number]    Date of Admission: 2024  Primary Care Physician: Provider, No Known    Subjective   Subjective     CC:  F/u left foot gangrene    HPI:   With R hip surgery planned today, he says he feels the best he has felt since admission.      Objective   Objective     Vital Signs:   Temp:  [98.2 °F (36.8 °C)-98.4 °F (36.9 °C)] 98.2 °F (36.8 °C)  Heart Rate:  [] 94  Resp:  [18] 18  BP: (123-144)/(71-79) 132/71     Physical Exam:   Constitutional: No acute distress, awake, alert, brother visiting at bedside   HENT: NCAT, mucous membranes moist  Respiratory: onRA, not labored   Cardiovascular: RRR, no murmurs,  Gastrointestinal: Positive bowel sounds, soft, nontender, distended  Musculoskeletal: L BKA. .   Psychiatric:calm, conversant   Neurologic: Oriented x 3, moves all extremities, Cranial Nerves grossly intact to confrontation, speech clear  Skin: No rashes      Results Reviewed:  LAB RESULTS:      Lab 24  0437 24  0507 24  0506 24  0400 06/15/24  0443 24  1218   WBC 13.29* 16.60* 18.47* 25.41* 22.75*  --   --    HEMOGLOBIN 10.9* 11.5* 11.7* 11.5* 11.2*   < >  --    HEMATOCRIT 32.4* 33.4* 34.7* 33.5* 32.1*   < >  --    PLATELETS 184 231 179 174 168  --   --    NEUTROS ABS 10.61* 13.49* 15.00* 22.05*  --   --   --    IMMATURE GRANS (ABS) 0.33* 0.30* 0.69* 0.77*  --   --   --    LYMPHS ABS 1.24 1.54 1.41 1.29  --   --   --    MONOS ABS 0.96* 1.08* 1.09* 1.07*  --   --   --    EOS ABS 0.12 0.16 0.22 0.10  --   --   --    MCV 97.3* 95.7 94.8 92.8 93.6  --   --    PROTIME  --   --   --   --   --   --  19.5*    < > = values in this interval not displayed.         Lab 24  0437 24  0507 24  0506 24  0400 06/15/24  0443 24  0356 24  1341   SODIUM 130* 134* 136 134* 129* 129*  --  129*    POTASSIUM 4.4 4.4 4.1 4.3 4.1 4.5   < > 3.8   CHLORIDE 98 99 102 101 94* 94*  --  97*   CO2 25.0 27.0 33.0* 27.0 25.0 24.0  --  23.0   ANION GAP 7.0 8.0 1.0* 6.0 10.0 11.0  --  9.0   BUN 16 16 21 29* 43* 60*  --  56*   CREATININE 0.49* 0.54* 0.51* 0.72* 0.85 0.86  --  0.67*   EGFR 117.5 114.1 116.1 104.6 99.5 99.1  --  106.9   GLUCOSE 211* 112* 120* 175* 231* 312*  --  260*   CALCIUM 7.4* 7.5* 7.7* 7.7* 7.4* 7.6*  --  7.5*   MAGNESIUM  --   --   --   --  2.3 2.6*  --  2.4   PHOSPHORUS  --   --   --   --  2.7 3.4  --  3.5    < > = values in this interval not displayed.         Lab 06/16/24  0400 06/14/24  0356   TOTAL PROTEIN 5.8* 5.4*   ALBUMIN 1.8* 2.0*   GLOBULIN 4.0  --    ALT (SGPT) 33 30   AST (SGOT) 49* 39   BILIRUBIN 1.9* 2.1*   INDIRECT BILIRUBIN  --  0.6   BILIRUBIN DIRECT  --  1.5*   ALK PHOS 232* 173*         Lab 06/14/24  1218   PROTIME 19.5*   INR 1.63*                 Brief Urine Lab Results  (Last result in the past 365 days)        Color   Clarity   Blood   Leuk Est   Nitrite   Protein   CREAT   Urine HCG        06/14/24 1109 Yellow   Clear   Negative   Negative   Negative   Negative                   Microbiology Results Abnormal       Procedure Component Value - Date/Time    Blood Culture - Blood, Hand, Left [[id number]]  (Normal) Collected: 06/15/24 1104    Lab Status: Preliminary result Specimen: Blood from Hand, Left Updated: 06/19/24 1201     Blood Culture No growth at 4 days    Blood Culture - Blood, Hand, Right [[id number]]  (Normal) Collected: 06/15/24 1104    Lab Status: Preliminary result Specimen: Blood from Hand, Right Updated: 06/19/24 1201     Blood Culture No growth at 4 days    AFB Culture - Aspirate, Hip, Right [[id number]] Collected: 06/18/24 1052    Lab Status: Preliminary result Specimen: Aspirate from Hip, Right Updated: 06/19/24 1102     AFB Stain No acid fast bacilli seen on concentrated smear    Body Fluid Culture - Body Fluid, Hip, Right [[id number]] Collected: 06/18/24 1052     Lab Status: Preliminary result Specimen: Body Fluid from Hip, Right Updated: 06/19/24 0741     Body Fluid Culture Growth present, too young to evaluate     Gram Stain Many (4+) WBCs seen      No organisms seen    Wound Culture - Wound, Leg, Left [[id number]] Collected: 06/15/24 1501    Lab Status: Final result Specimen: Wound from Leg, Left Updated: 06/19/24 0711     Wound Culture No growth at 3 days     Gram Stain Rare (1+) WBCs seen      No organisms seen    Anaerobic Culture 10 Day Incubation - Wound, Leg, Left [[id number]]  (Normal) Collected: 06/15/24 1501    Lab Status: Preliminary result Specimen: Wound from Leg, Left Updated: 06/19/24 0711     Anaerobic Culture No anaerobes isolated at 3 days    Fungus Culture - Surgical Site, Leg, Left [[id number]] Collected: 06/13/24 1031    Lab Status: Preliminary result Specimen: Surgical Site from Leg, Left Updated: 06/18/24 1200     Fungus Culture No fungus isolated at less than 1 week    AFB Culture - Surgical Site, Leg, Left [[id number]] Collected: 06/13/24 1031    Lab Status: Preliminary result Specimen: Surgical Site from Leg, Left Updated: 06/18/24 1200     AFB Culture No AFB isolated at less than 1 week     AFB Stain No acid fast bacilli seen on concentrated smear    Anaerobic Culture - Surgical Site, Leg, Left [[id number]]  (Normal) Collected: 06/13/24 1031    Lab Status: Final result Specimen: Surgical Site from Leg, Left Updated: 06/18/24 0751     Anaerobic Culture No anaerobes isolated at 5 days    Blood Culture - Blood, Arm, Left [[id number]]  (Normal) Collected: 06/12/24 1832    Lab Status: Final result Specimen: Blood from Arm, Left Updated: 06/17/24 1900     Blood Culture No growth at 5 days    Blood Culture - Blood, Hand, Right [[id number]]  (Normal) Collected: 06/12/24 1837    Lab Status: Final result Specimen: Blood from Hand, Right Updated: 06/17/24 1900     Blood Culture No growth at 5 days    AFB Culture - Wound, Leg, Left [[id number]] Collected:  06/15/24 1501    Lab Status: Preliminary result Specimen: Wound from Leg, Left Updated: 06/16/24 1216     AFB Stain No acid fast bacilli seen on concentrated smear    Fungus Smear - Surgical Site, Leg, Left [[id number]] Collected: 06/13/24 1031    Lab Status: Final result Specimen: Surgical Site from Leg, Left Updated: 06/14/24 1406     Fungal Stain No fungal elements seen            MRI Femur Right With & Without Contrast    Result Date: 6/19/2024  MRI FEMUR RIGHT W WO CONTRAST Date of Exam: 6/18/2024 9:51 PM EDT Indication: infection.  Comparison: CTA abdomen/pelvis with lower extremity runoff 6/12/2024, fluoroscopic-guided needle aspiration of right hip 6/18/2024 Technique:  Routine multiplanar/multisequence images of the right femur were obtained before and after the uneventful administration of 15 mL Multihance.  Findings: There is metal susceptibility associated with the right total hip arthroplasty. This somewhat limits evaluation of the adjacent bony and soft tissue structures. Accounting for this there is evidence of a large periprosthetic rim-enhancing fluid collection along the anterior aspect of the hardware/proximal femur. This is somewhat irregular and lobulated in shape, difficult to accurately measure, measuring approximately 7.8 x 7.8 cm in maximal transverse dimension, and 10.5 cm in craniocaudal length (series 11 image 18, series 13 image 14). This appears in communication with the anterior aspect of the right hip joint, with fluid extending down to the hardware and proximal femoral shaft and insinuating between the proximal rectus femoris and vastus lateralis muscles (series 11 image 12-23). A component of this fluid collection extends more medial, contacting/abutting the iliopsoas tendon (series 11 image 19). As seen on prior CT there is evidence of a couple small locules of gas within this fluid collection. There is edema of the adjacent deep soft tissues, with prominent circumferential edema at  the right hip and circumferentially throughout the imaged thigh. Unremarkable appearance of the sciatic nerve. The hamstring tendon origin is normal. Accounting for metal susceptibility artifact, grossly unremarkable marrow signal intensity of the right hip and femur.     Impression: Impression: Large, lobulated 7.8 x 7.8 x 10.5 cm rim-enhancing, gas-containing, periprosthetic fluid collection, compatible with periprosthetic infection/abscess and septic joint. Purulent fluid was collected during needle aspiration from 6/18/2024 and correlate with fluid sample. Electronically Signed: Sonny Rey MD  6/19/2024 8:27 AM EDT  Workstation ID: [id number]    FL Guided Aspiration Joint    Result Date: 6/19/2024  FL GUIDED ASPIRATION JOINT Date of Exam: 6/18/2024 10:07 AM EDT Indication: concern for prosthetic joint infection.   Comparison: None available. Technique: Procedure, risks, complications, and side effects of joint aspiration were discussed and reviewed in detail with the patient including the possibility for bleeding, infection, needle damage to surrounding structures, and the potential for nondiagnostic exam. The patient indicated understanding and consented to the procedure. The right hip was marked, prepped, and draped in a sterile fashion. 1% lidocaine was used as a local anesthetic to the skin and subcutaneous tissues. The 3 cm 25-gauge needle was removed, and at this point, purulent fluid was seen at the injection site, therefore per Dr. Em's request, a 20-gauge needle was advanced into the subcutaneous tissues lateral, and superficial to the joint space. Approximately 14 cc of purulent fluid was aspirated from the subcutaneous tissues surrounding the hip joint. Fluid was labeled, and sent to pathology for further analysis. The needle was removed. Fluoroscopic Time: 6 seconds Number of images saved: 2 Findings: The patient tolerated the procedure well, and no immediate complications occurred.      Impression: Impression: Successful aspiration of a fluid collection from the subcutaneous tissues surrounding the right hip joint as above with no immediate complications. Report dictated by: Lauren Leon PA-c  I have personally reviewed this case and agree with the findings above: Electronically Signed: Ivan Reilly MD  6/19/2024 8:08 AM EDT  Workstation ID: [id number]    Adult Transthoracic Echo Complete w/ Color, Spectral and Contrast if Necessary Per Protocol    Result Date: 6/18/2024    Left ventricular systolic function is normal. Left ventricular ejection fraction appears to be 56 - 60%.   Left ventricular wall thickness is consistent with borderline concentric hypertrophy.   Left ventricular diastolic function is consistent with (grade Ia w/high LAP) impaired relaxation.      Results for orders placed during the hospital encounter of 06/12/24    Adult Transthoracic Echo Complete w/ Color, Spectral and Contrast if Necessary Per Protocol    Interpretation Summary    Left ventricular systolic function is normal. Left ventricular ejection fraction appears to be 56 - 60%.    Left ventricular wall thickness is consistent with borderline concentric hypertrophy.    Left ventricular diastolic function is consistent with (grade Ia w/high LAP) impaired relaxation.      Current medications:  Scheduled Meds:cefTRIAXone, 2,000 mg, Intravenous, Q24H  insulin glargine, 20 Units, Subcutaneous, Nightly  insulin lispro, 2-9 Units, Subcutaneous, 4x Daily AC & at Bedtime  Insulin Lispro, 5 Units, Subcutaneous, TID With Meals  lactobacillus acidophilus, 1 capsule, Oral, Daily  lisinopril, 10 mg, Oral, Q24H  melatonin, 5 mg, Oral, Nightly  pantoprazole, 40 mg, Intravenous, BID AC  senna-docusate sodium, 2 tablet, Oral, BID  sodium chloride, 10 mL, Intravenous, Q12H      Continuous Infusions:lactated ringers, 9 mL/hr, Last Rate: 9 mL/hr (06/15/24 1307)  lactated ringers, 9 mL/hr, Last Rate: 9 mL/hr (06/15/24  1345)  ropivacaine, , Last Rate: Stopped (06/17/24 2230)      PRN Meds:.  senna-docusate sodium **AND** polyethylene glycol **AND** bisacodyl **AND** bisacodyl    Calcium Replacement - Follow Nurse / BPA Driven Protocol    dextrose    dextrose    glucagon (human recombinant)    Magnesium Standard Dose Replacement - Follow Nurse / BPA Driven Protocol    Morphine    nitroglycerin    oxyCODONE    Phosphorus Replacement - Follow Nurse / BPA Driven Protocol    Potassium Replacement - Follow Nurse / BPA Driven Protocol    sodium chloride    sodium chloride    Assessment & Plan   Assessment & Plan     Active Hospital Problems    Diagnosis  POA    **Diabetic ulcer of left foot with necrosis of muscle [E11.621, L97.523]  Yes    Severe malnutrition [E43]  Yes    Infection of prosthetic total hip joint [T84.59XA, Z96.649]  Not Applicable      Resolved Hospital Problems   No resolved problems to display.        Brief Hospital Course to date:  Easton Alvarez is a 58 y.o. male  with past medical history significant for T2DM, diabetic neuropathy, right hip fracture/ORIF ([facility] 2/2024), and right transmetatarsal amputation 2019 at  who presented to OSH  ED on 6/12/2024 due to left foot severe cellulitis/necrosis after recent injury. Lactate at OSH was 8.8 and CT scan of left leg showed gas gangrene. Orthopedic surgeon Dr. Em was consulted and performed a left below-knee amputation on 6/13/2024 with further debridement and irrigation on 6/15.  Infectious disease is following and managing antimicrobial therapy.     This patient's problems and plans were partially entered by my partner and updated as appropriate by me 06/20/24.    Sepsis (WBC, lactate, tachypnea, source)    Bacteremia:  group B strep  Left foot cellulitis/gangrene   - MRI left foot showed extensive soft tissue infection with abscesses along the foot to the ankle and into the lower leg with soft tissue gas.    - S/p left BKA on 6/13/24    - further debridement  "in OR on 6/15/24 by Dr. Em, drain removed 6/17  - Daily dressing change to surgical site: Xeroform, 4X4, Kerlix, ACE  - Follow up with [facility] in 2 weeks for wound check, imaging at [facility], [address], [address]  - PT wound care following  - Add Boost glucose control to increase protein intake for wound healing, nutrition following  - wound cultures growing Strep B and Kebsiella, repeat cultures pending  - ID, Dr. Bills, following, continue Zosyn, clindamycin for now   - TTE:  EF 55, diast dysfxn, \"aortic valve poorly visualized\".  Defer to ID re whether DIONICIO needed.  Unsure IE  will change length of therapy now given his hardware infection.      Cat-prosthetic hip infection, right   - noted gas near hardware per OSH CT scan  - IR performed fluid aspiration from the hip.  This was purulent.  Cx with early growth   - Plan is surgery today for I and D, hardware explantation/replacement       Gap acidosis POA, resolved   - AG 20.1, likely from lactate, serum acetone negative     Concern for GIB  -melena and fecal occult positive overnight 6/14  -GI consulted, recommended medical management for possible melena with twice daily PPI x 1 month  -GI referral at discharge for elevated LFTs, thrombocytopenia and concern for early cirrhosis, plan to purse liver US after recovery from acute illness    Elevated BP  - no h/o taking meds for BP per patient report  - BP has been running high this admission, possibly secondary to pain  - start low-dose ACE given DM2, lisinopril 10 mg daily, titrate    DM, uncontrolled  - counseled patient in presence of brother to start learning diabetes care - will help healing and decrease infection risk.  Pt expressed understanding.   - PTA, pt was not taking meds or checking glucoses, though insulin had been prescribed in the past.  -HgA1c 9.5  -Lantus increased to 20 units nightly, mealtime insulin added 5 units TID with improvement in BGs  -Continue " current basal, prandial, and SS insulins, titrate  - will ask diab educ to follow along and  him; will ask nursing staff to let him check own glucoses and administer own insulin at times.        Expected Discharge Location and Transportation: rehab  Expected Discharge   Expected Discharge Date: 6/21/2024; Expected Discharge Time:      VTE Prophylaxis:  Mechanical VTE prophylaxis orders are present.         AM-PAC 6 Clicks Score (PT): 13 (06/19/24 2000)    CODE STATUS:   Code Status and Medical Interventions:   Ordered at: 06/12/24 2780     Level Of Support Discussed With:    Patient     Code Status (Patient has no pulse and is not breathing):    CPR (Attempt to Resuscitate)     Medical Interventions (Patient has pulse or is breathing):    Full Support       Shraddha Scott MD  06/20/24

## 2024-06-20 NOTE — PROGRESS NOTES
"Easton Alvarez  1965  [id number]          Chief Complaint: left foot infection    Reason for Consultation: left foot infection    History of present illness:     Patient is a 58 y.o.  Yr old male with history of diabetes with prior right TMA 2019 at , history strep septicemia 2016 ; he reports right total hip arthroplasty 2024 after fall. He reports a fall several weeks prior to his June admission with injury to the left foot, wound present with deterioration several days preceding admission with severe discoloration/redness and swelling.  CT scan showed concern for subcutaneous emphysema and necrotizing infection.  Transferred to [facility] with evaluation by Dr. Em and Dr. Lundberg and arrangements made for urgent surgery. Subsequently, blood cultures called with gram-positive cocci     6/13 left LE amp, Dr Em    6/15  \"PROCEDURE:            Left      15855: Secondary closure above-knee amputation\"      6/18/24  right hip aspiration; wbc  down some postop from revision; blood cultures 6/15 negative so far    6/19/24 Dr. Em helping facilitate right hip surgery with orthopedic partners/team    6/20/24 surgical arrangements being made. Sleepy at my visit; He has left stump  pain that is constant, sharp at times, blunted by neuropathy, worse with palpation, better with pain meds and 4  out of 10 in severity. No distress per nursing; no new focal symptoms otherwise; he has right hip pain with range of motion and weightbearing increased and out of 10 at present with movement, nonradiating, better with pain meds.     No headache photophobia or neck stiffness.  No shortness of breath cough or hemoptysis.  No nausea vomiting diarrhea or abdominal pain.  No dysuria hematuria or pyuria.  No other new skin rash    Past Medical History:   Diagnosis Date    Diabetes mellitus     Diabetic foot infection 2018    left (partial amputation)       Past Surgical History:   Procedure " "Laterality Date    AMPUTATION FOOT / TOE Right     partial foot    BELOW KNEE AMPUTATION Left 6/13/2024    Procedure: AMPUTATION BELOW KNEE AND WOUND VAC ASSISTED CLOSURE LEFT;  Surgeon: Brijesh Em Jr., MD;  Location:  [facility];  Service: Orthopedics;  Laterality: Left;    INCISION AND DRAINAGE LEG Left 6/15/2024    Procedure: SECONDARY CLOSURE LEFT BELOW KNEE AMPUTATION;  Surgeon: Brijesh Em Jr., MD;  Location:  [facility];  Service: Orthopedics;  Laterality: Left;    PLACEMENT OF WOUND VAC Left 6/13/2024    Procedure: PLACEMENT OF WOUND VAC LEFT;  Surgeon: Brijesh Em Jr., MD;  Location:  [facility];  Service: Orthopedics;  Laterality: Left;    TOTAL HIP ARTHROPLASTY Right 02/2024    From fall \"whole hip was shattered\"       Pediatric History   Patient Parents    Not on file     Other Topics Concern    Not on file   Social History Narrative    Not on file       family history is not on file. High blood pressure mom/dad    No Known Allergies    Medication:  Current Facility-Administered Medications   Medication Dose Route Frequency Provider Last Rate Last Admin    sennosides-docusate (PERICOLACE) 8.6-50 MG per tablet 2 tablet  2 tablet Oral BID Brijesh Em Jr., MD   2 tablet at 06/16/24 2124    And    polyethylene glycol (MIRALAX) packet 17 g  17 g Oral Daily PRN Brijesh Em Jr., MD        And    bisacodyl (DULCOLAX) EC tablet 5 mg  5 mg Oral Daily PRN Brijesh Em Jr., MD        And    bisacodyl (DULCOLAX) suppository 10 mg  10 mg Rectal Daily PRN Brijesh Em Jr., MD        Calcium Replacement - Follow Nurse / BPA Driven Protocol   Does not apply PRN Brijesh Em Jr., MD        cefTRIAXone (ROCEPHIN) 2,000 mg in sodium chloride 0.9 % 100 mL MBP  2,000 mg Intravenous Q24H Cleve Bills  mL/hr at 06/20/24 0817 2,000 mg at 06/20/24 0817    dextrose (D50W) (25 g/50 mL) IV injection 25 g  25 g Intravenous Q15 Min PRN Brijesh Em Jr., MD        dextrose " (GLUTOSE) oral gel 15 g  15 g Oral Q15 Min PRN Brijesh Em Jr., MD        glucagon (GLUCAGEN) injection 1 mg  1 mg Intramuscular Q15 Min PRN Brijesh Em Jr., MD        insulin glargine (LANTUS, SEMGLEE) injection 20 Units  20 Units Subcutaneous Nightly Brijesh Em Jr., MD   20 Units at 06/19/24 2135    Insulin Lispro (humaLOG) injection 2-9 Units  2-9 Units Subcutaneous 4x Daily AC & at Bedtime Brijesh Em Jr., MD   2 Units at 06/19/24 2135    Insulin Lispro (humaLOG) injection 5 Units  5 Units Subcutaneous TID With Meals Brijesh Em Jr., MD   5 Units at 06/19/24 1710    lactated ringers infusion  9 mL/hr Intravenous Continuous Brijesh Em Jr., MD 9 mL/hr at 06/15/24 1307 9 mL/hr at 06/15/24 1307    lactated ringers infusion  9 mL/hr Intravenous Continuous Brijesh Em Jr., MD 9 mL/hr at 06/15/24 1345 Restarted at 06/15/24 1446    lactobacillus acidophilus (RISAQUAD) capsule 1 capsule  1 capsule Oral Daily Brijesh Em Jr., MD   1 capsule at 06/19/24 0901    lisinopril (PRINIVIL,ZESTRIL) tablet 10 mg  10 mg Oral Q24H Jessica Poole APRN   10 mg at 06/19/24 0901    Magnesium Standard Dose Replacement - Follow Nurse / BPA Driven Protocol   Does not apply PRN Brijesh Em Jr., MD        melatonin tablet 5 mg  5 mg Oral Nightly Brijesh Em Jr., MD   5 mg at 06/19/24 2135    morphine injection 2 mg  2 mg Intravenous Q4H PRN Trinity Echavarria MD   2 mg at 06/20/24 0323    nitroglycerin (NITROSTAT) SL tablet 0.4 mg  0.4 mg Sublingual Q5 Min PRN Brijesh Em Jr., MD        oxyCODONE (ROXICODONE) immediate release tablet 5 mg  5 mg Oral Q4H PRN Brijesh Em Jr., MD   5 mg at 06/20/24 0523    pantoprazole (PROTONIX) injection 40 mg  40 mg Intravenous BID AC Brijesh Em Jr., MD   40 mg at 06/20/24 0644    Phosphorus Replacement - Follow Nurse / BPA Driven Protocol   Does not apply PRN Brijesh Em Jr., MD        Potassium Replacement - Follow  Nurse / BPA Driven Protocol   Does not apply PRN Brijesh Em Jr., MD        ropivacaine (NAROPIN) 0.2 % infusion (INFUSYSTEM)   Peripheral Nerve Continuous Brijesh Em Jr., MD   Stopped at 06/17/24 2230    sodium chloride 0.9 % flush 10 mL  10 mL Intravenous Q12H Brijesh Em Jr., MD   10 mL at 06/20/24 0817    sodium chloride 0.9 % flush 10 mL  10 mL Intravenous PRN Brijesh Em Jr., MD        sodium chloride 0.9 % infusion 40 mL  40 mL Intravenous PRN Brijesh Em Jr., MD           Antibiotics:  Anti-Infectives (From admission, onward)      Ordered     Dose/Rate Route Frequency Start Stop    06/17/24 0829  cefTRIAXone (ROCEPHIN) 2,000 mg in sodium chloride 0.9 % 100 mL MBP        Ordering Provider: Cleve Bills MD    2,000 mg  200 mL/hr over 30 Minutes Intravenous Every 24 Hours 06/17/24 0900 07/01/24 0859    06/12/24 1746  piperacillin-tazobactam (ZOSYN) 4.5 g IVPB in 100 mL NS MBP (CD)        Ordering Provider: Shraddha Scott MD    4.5 g  over 30 Minutes Intravenous Once 06/12/24 1900 06/13/24 0907              Review of Systems    6/20/24     Constitutional-- No Fever, chills or sweats.  Appetite diminished with fatigue.  Heent-- No new vision, hearing or throat complaints.  No epistaxis or oral sores.  Denies odynophagia or dysphagia.  No flashers, floaters or eye pain. No odynophagia or dysphagia. No headache, photophobia or neck stiffness.  CV-- No chest pain, palpitation or syncope  Resp-- No SOB/cough/Hemoptysis  GI- No nausea, vomiting, or diarrhea.  No hematochezia, melena, or hematemesis. Denies jaundice or chronic liver disease.  -- No dysuria, hematuria, or flank pain.  Denies hesitancy, urgency or flank pain.  Lymph- no swollen lymph nodes in neck/axilla or groin.   Heme- No active bruising or bleeding; no Hx of DVT or PE.  MS-- aside from above, no swelling or pain in the bones or joints of arms/legs.  No new back pain.  Neuro-- No acute focal weakness or numbness  "in the arms or legs.  No seizures.    Full 12 point review of systems reviewed and negative otherwise for acute complaints, except for above    Physical Exam:   Vital Signs   /71 (BP Location: Left arm, Patient Position: Lying)   Pulse 94   Temp 98.2 °F (36.8 °C) (Oral)   Resp 18   Ht 172.7 cm (67.99\")   Wt 67.6 kg (149 lb)   SpO2 92%   BMI 22.66 kg/m²     GENERAL: sleepy; in no acute distress.   HEENT: Normocephalic, atraumatic.   No conjunctival injection. No icterus. Oropharynx clear without evidence of thrush or exudate. No evidence of periodontal disease.    NECK: Supple without nuchal rigidity. No mass.   HEART: RRR; No murmur, rubs, gallops.   LUNGS: Clear to auscultation bilaterally without wheezing, rales, rhonchi. Normal respiratory effort. Nonlabored. No dullness.  ABDOMEN: Soft, nontender, nondistended. Positive bowel sounds. No rebound or guarding. NO mass or HSM.  EXT:  see below.     MSK: pain with right hip range of motion, incision healed no ballotable fluid.  SKIN: Warm and dry without cutaneous eruptions on Inspection/palpation.      Left   BKA stump covered;  no new visible redness or purulence; no discrete fluctuance.  No crepitus    Laboratory Data    Results from last 7 days   Lab Units 06/19/24  0437 06/18/24  0507 06/17/24  0506   WBC 10*3/mm3 13.29* 16.60* 18.47*   HEMOGLOBIN g/dL 10.9* 11.5* 11.7*   HEMATOCRIT % 32.4* 33.4* 34.7*   PLATELETS 10*3/mm3 184 231 179     Results from last 7 days   Lab Units 06/19/24  0437   SODIUM mmol/L 130*   POTASSIUM mmol/L 4.4   CHLORIDE mmol/L 98   CO2 mmol/L 25.0   BUN mg/dL 16   CREATININE mg/dL 0.49*   GLUCOSE mg/dL 211*   CALCIUM mg/dL 7.4*     Results from last 7 days   Lab Units 06/16/24  0400 06/14/24  0356   ALK PHOS U/L 232* 173*   BILIRUBIN mg/dL 1.9* 2.1*   BILIRUBIN DIRECT mg/dL  --  1.5*   ALT (SGPT) U/L 33 30   AST (SGOT) U/L 49* 39                 Estimated Creatinine Clearance: 153.3 mL/min (A) (by C-G formula based on SCr of " 0.49 mg/dL (L)).      Microbiology:      Radiology:  Imaging Results (Last 72 Hours)       Procedure Component Value Units Date/Time    MRI Femur Right With & Without Contrast [[id number]] Collected: 06/19/24 0803     Updated: 06/19/24 0830    Narrative:        MRI FEMUR RIGHT W WO CONTRAST    Date of Exam: 6/18/2024 9:51 PM EDT    Indication: infection.     Comparison: CTA abdomen/pelvis with lower extremity runoff 6/12/2024, fluoroscopic-guided needle aspiration of right hip 6/18/2024    Technique:  Routine multiplanar/multisequence images of the right femur were obtained before and after the uneventful administration of 15 mL Multihance.        Findings:  There is metal susceptibility associated with the right total hip arthroplasty. This somewhat limits evaluation of the adjacent bony and soft tissue structures. Accounting for this there is evidence of a large periprosthetic rim-enhancing fluid   collection along the anterior aspect of the hardware/proximal femur. This is somewhat irregular and lobulated in shape, difficult to accurately measure, measuring approximately 7.8 x 7.8 cm in maximal transverse dimension, and 10.5 cm in craniocaudal   length (series 11 image 18, series 13 image 14). This appears in communication with the anterior aspect of the right hip joint, with fluid extending down to the hardware and proximal femoral shaft and insinuating between the proximal rectus femoris and   vastus lateralis muscles (series 11 image 12-23). A component of this fluid collection extends more medial, contacting/abutting the iliopsoas tendon (series 11 image 19). As seen on prior CT there is evidence of a couple small locules of gas within this   fluid collection. There is edema of the adjacent deep soft tissues, with prominent circumferential edema at the right hip and circumferentially throughout the imaged thigh.    Unremarkable appearance of the sciatic nerve. The hamstring tendon origin is normal. Accounting  for metal susceptibility artifact, grossly unremarkable marrow signal intensity of the right hip and femur.          Impression:      Impression:  Large, lobulated 7.8 x 7.8 x 10.5 cm rim-enhancing, gas-containing, periprosthetic fluid collection, compatible with periprosthetic infection/abscess and septic joint. Purulent fluid was collected during needle aspiration from 6/18/2024 and correlate   with fluid sample.        Electronically Signed: Sonny Rey MD    6/19/2024 8:27 AM EDT    Workstation ID: [id number]    FL Guided Aspiration Joint [[id number]] Collected: 06/18/24 1153     Updated: 06/19/24 0812    Narrative:      FL GUIDED ASPIRATION JOINT    Date of Exam: 6/18/2024 10:07 AM EDT    Indication: concern for prosthetic joint infection.       Comparison: None available.    Technique: Procedure, risks, complications, and side effects of joint aspiration were discussed and reviewed in detail with the patient including the possibility for bleeding, infection, needle damage to surrounding structures, and the potential for   nondiagnostic exam. The patient indicated understanding and consented to the procedure.    The right hip was marked, prepped, and draped in a sterile fashion. 1% lidocaine was used as a local anesthetic to the skin and subcutaneous tissues. The 3 cm 25-gauge needle was removed, and at this point, purulent fluid was seen at the injection site,   therefore per Dr. Em's request, a 20-gauge needle was advanced into the subcutaneous tissues lateral, and superficial to the joint space. Approximately 14 cc of purulent fluid was aspirated from the subcutaneous tissues surrounding the hip joint.   Fluid was labeled, and sent to pathology for further analysis. The needle was removed.    Fluoroscopic Time: 6 seconds    Number of images saved: 2    Findings:  The patient tolerated the procedure well, and no immediate complications occurred.      Impression:      Impression:  Successful aspiration  of a fluid collection from the subcutaneous tissues surrounding the right hip joint as above with no immediate complications.      Report dictated by: Lauren Leon PA-c      I have personally reviewed this case and agree with the findings above:    Electronically Signed: Ivan Reilly MD    6/19/2024 8:08 AM EDT    Workstation ID: [id number]    XR Hip With or Without Pelvis 2 - 3 View Right [[id number]] Collected: 06/17/24 1828     Updated: 06/17/24 1832    Narrative:      XR HIP W OR WO PELVIS 2-3 VIEW RIGHT    Date of Exam: 6/17/2024 5:51 PM EDT    Indication: pain, concern for prosthetic joint infection    Comparison: None available.    Findings:  There is no radiographic evidence of acute fracture or dislocation. Patient is post right total hip arthroplasty. No definite evidence of periprosthetic lucency. There is evidence of soft tissue swelling adjacent to the right hip as well as small volume   subcutaneous air.      Impression:      Impression:  No radiographic evidence of acute fracture or dislocation. Post right total hip arthroplasty with evidence of periprosthetic soft tissue swelling and small volume subcutaneous air.      Electronically Signed: aSge Burch MD    6/17/2024 6:29 PM EDT    Workstation ID: [id number]              Impression:     --acute sepsis as per admission notes, severe left foot infection with gangrene/cellulitis and concern for necrotizing soft tissue infection by imaging, urgent surgical arrangements made June 13; repeat surg 6/15  ;  He knows risk for persistent/recurrent or nonhealing wounds, persistet / progressive or recurrent infection and risk for further amputation/functional-limb loss and chronic pain.  Associated with bacteremia as below.    -- Postop with leukocytosis, some increase on June 16 to 25K, down some 6/17-6/18.  Aside from left stump symptoms he denies any other new complaints.  Chest exam nonfocal with no cough and no distress.  No abdominal pain or diarrhea  and abdominal exam benign.  No urinary tract complaints.  No rash.  IV sites with no new suppurative change.  No new stigmata of endovascular focus.  Monitor for new foci; possible stress response postop    --Acute/severe left foot infection as above, urgent surgical arrangements  made 6/13    --Acute gb strep bacteremia,   source from foot.  High risk for further serious morbidity and other serious sequela including dire consequences and metastatic foci of involvement/endovascular sequela etc. No new metastatic focus of involvement. TTE 6/17    --Right hip fracture with repair February 2024.  +pain ; orthopedics with  aspiration June 18, CT scan had raise concern for gas/fluid collection at the anterior aspect of the hip arthroplasty and aspiration was significant poly-predominance.   Need further clarification from orthopedics regarding operative options given concern for prosthetic joint/periprosthetic infection.    --diabetes.  Described as uncontrolled by medicine team at admission.  Glucose control per medicine team      PLAN:      --IV  rocephin    **wound culture mixed with  MSSA Klebsiella and strep    --orthopedics  making surgical plans with concern for right hipprosthetic/periprosthetic infection     --Check/review labs cultures and scans    --TTE described as technically adequate by cardiology; no description of vegetation by cardiology per report    --Partial history per nursing staff    --Discussed with microbiology    --Highly complex at of issues with high risk for further serious morbidity and other serious sequela    Copied text in this note has been reviewed and is accurate as of 06/20/24.        Cleve Bills MD  6/20/2024

## 2024-06-20 NOTE — DISCHARGE INSTRUCTIONS
INCISION CARE incisional wound VAC hip and Knee:  You have a sterile incisional wound VAC dressing in place. Only exchange if it becomes saturated (fluid draining out the sides of dressing) and notify the office. The dressing is water resistant. During the first 7 days after surgery, it is ok to shower. DO NOT scrub on or around the dressing. Do not submerge the dressing.  After 7 days, the incisional wound VAC will turn off and lose its suction you can remove your dressing.  You will have been given a fresh dressing to cover the wound with until your follow-up at 2 weeks. you will have staples or sutures in place on your skin. It is OK to shower with the new dressing in place. DO NOT scrub on or around the incision. DO NOT submerge the incision in pools, baths, or hot tubs for 1 month after surgery. Do not touch or pick at the incision. If you have any drainage from the incision after 7 days, cover the incision with sterile gauze and paper tape and alert the office.   Staples will be removed between 10-14 days postoperation.  This may be done by either the home health nurse, rehabilitation nurse, or during your return visit to Dr. Sethi's office.  No creams or ointments to the incision for 1 month after surgery. After 1 month, it is recommended to massage the scar with vitamin E cream to help decrease scar formation.  Check incision every day and notify surgeon immediately if any of the following signs or symptoms are noted:  Increase in redness  Increase in swelling around the incision and of the entire extremity  Increase in pain  Drainage oozing from the incision  Pulling apart of the edges of the incision  Increase in overall body temperature (greater than 100.5 degrees)    Anticoagulants: You will be discharged on an anticoagulant. This is a prophylactic medication that helps prevent blood clots during your post-operative period. Most patients will be on *** Aspirin 81 mg Enteric coated every 12 hours orally  for 30 days. Some patients, due to increased risk factors, will need to be on a stronger anticoagulant. Dr. Sethi will discuss this need with you.   While taking the anticoagulant, you should avoid taking any additional aspirin, and limit ibuprofen (Advil or Motrin), Aleve (Naprosyn) or other non-steroidal anti-inflammatory medications.   Notify your surgeon immediately if any deon bleeding is noted in the urine, stool, vomit, or from the nose or the incision. Blood in the stool will often appear as black rather than red. Blood in urine may appear as pink. Blood in vomit may appear as brown/black like coffee grounds.  You will need to apply pressure for longer periods of time to any cuts or abrasions to stop bleeding  Avoid alcohol while taking anticoagulants  Sequential Compression Device: You maybe be discharged home with a compression device that helps promote blood flow and prevent clots in your legs. Wear these at all times for the first two weeks.   Mobilization: The best way to avoid a blood clot is to get up and walk. 10 times a day get up and walk for 5 minutes for the first two weeks. Walking for longer periods of time will increase pain and swelling, making therapy more difficult. If taking any long travel (car or plane) in the first 1-2 months, be sure to get up and walk at least every one hour.     Stool Softeners: You will be at greater risk of constipation after surgery because of being less mobile and taking the pain medications.   Take stool softeners as instructed by your surgeon while on pain medications. Use over the counter Colace 100 mg 1-2 capsules twice daily.   If stools become too loose or too frequent, decreases the dosage or stop the stool softener.  If constipation occurs despite use of stool softeners, you are to continue the stool softeners and add a laxative (Milk of Magnesia 1 ounce daily as needed).  Dulcolax oral tabs or suppository or a fleets enema can also be utilized for  constipation and can be obtained over the counter.   If above interventions are unsuccessful in inducing bowel movements, please contact your family physician's office/surgeon's office.  Drink plenty of fluids and eat fruits and vegetables during your recovery time    Pain Medications utilized after surgery are narcotics and the law requires that the following information be given to all patients that are prescribed narcotics:  CLASSIFICATION: Pain medications are called Opioids and are narcotics  LEGALITIES: It is illegal to share narcotics with others and to drive within 24 hours of taking narcotics  POTENTIAL SIDE EFFECTS: Potential side effects of opioids include: nausea, vomiting, itching, dizziness, drowsiness, dry mouth, constipation, and difficulty urinating.  POTENTIAL ADVERSE EFFECTS:   Opioid tolerance can develop with use of pain medications and this simply means that it requires more and more of the medication to control pain; however, this is seen more in patients that use Opioids for longer periods of time.  Opioid dependence can develop with use of Opioids and this simply means that to stop the medication can cause withdrawal symptoms; however, this is seen with patients that use Opioids for longer periods of time.  Opioid addiction can develop with use of Opioids and the incidence of this is very unlikely in patients who take the medications as ordered and stop the medications as instructed.  Opioid overdose can be dangerous, but is unlikely when the medication is taken as ordered and stopped when ordered. It is important not to mix opioids with alcohol or with any type of sedative, such as Benadryl, as this can lead to over sedation and respiratory difficulty.  DOSAGE:   Pain medications may be needed consistently for the first week to decrease pain and promote adequate pain relief and participation in physical therapy.  After the initial surgical pain begins to resolve, you may begin to decrease  the pain medication and only take it as needed. By the end of 6 weeks, you should be off of pain medications.  You can decrease your pain medication consumption by slowly spacing out the time in between the medication, and using 650mg Tylenol when the pain is not as severe. Do not exceed 3500mg of Tylenol in 24 hours.   Refills will not be given by the office during evening hours, on weekends, or after 6 weeks post-op.  To seek refills on pain medications during the initial 6 week post-operative period, you must call the office 48 hours in advance to request the refill. The office will then notify you when to  the prescription. DO NOT wait until you are out of the medication to request a refill.

## 2024-06-21 LAB
ANION GAP SERPL CALCULATED.3IONS-SCNC: 15 MMOL/L (ref 5–15)
BACTERIA FLD CULT: ABNORMAL
BUN SERPL-MCNC: 24 MG/DL (ref 8–23)
BUN/CREAT SERPL: 35.8 (ref 7–25)
CALCIUM SPEC-SCNC: 7.3 MG/DL (ref 8.6–10.5)
CHLORIDE SERPL-SCNC: 96 MMOL/L (ref 98–107)
CO2 SERPL-SCNC: 18 MMOL/L (ref 22–29)
CREAT SERPL-MCNC: 0.67 MG/DL (ref 0.76–1.27)
DEPRECATED RDW RBC AUTO: 50.6 FL (ref 37–54)
EGFRCR SERPLBLD CKD-EPI 2021: 106.9 ML/MIN/1.73
ERYTHROCYTE [DISTWIDTH] IN BLOOD BY AUTOMATED COUNT: 14 % (ref 12.3–15.4)
GLUCOSE BLDC GLUCOMTR-MCNC: 164 MG/DL (ref 70–130)
GLUCOSE BLDC GLUCOMTR-MCNC: 179 MG/DL (ref 70–130)
GLUCOSE BLDC GLUCOMTR-MCNC: 187 MG/DL (ref 70–130)
GLUCOSE BLDC GLUCOMTR-MCNC: 333 MG/DL (ref 70–130)
GLUCOSE SERPL-MCNC: 164 MG/DL (ref 65–99)
GRAM STN SPEC: ABNORMAL
GRAM STN SPEC: ABNORMAL
HCT VFR BLD AUTO: 32.3 % (ref 37.5–51)
HGB BLD-MCNC: 10.6 G/DL (ref 13–17.7)
MCH RBC QN AUTO: 32.4 PG (ref 26.6–33)
MCHC RBC AUTO-ENTMCNC: 32.8 G/DL (ref 31.5–35.7)
MCV RBC AUTO: 98.8 FL (ref 79–97)
PLATELET # BLD AUTO: 339 10*3/MM3 (ref 140–450)
PMV BLD AUTO: 9.5 FL (ref 6–12)
POTASSIUM SERPL-SCNC: 5.6 MMOL/L (ref 3.5–5.2)
RBC # BLD AUTO: 3.27 10*6/MM3 (ref 4.14–5.8)
SODIUM SERPL-SCNC: 129 MMOL/L (ref 136–145)
WBC NRBC COR # BLD AUTO: 25.58 10*3/MM3 (ref 3.4–10.8)

## 2024-06-21 PROCEDURE — 80048 BASIC METABOLIC PNL TOTAL CA: CPT | Performed by: ORTHOPAEDIC SURGERY

## 2024-06-21 PROCEDURE — 63710000001 INSULIN GLARGINE PER 5 UNITS: Performed by: ORTHOPAEDIC SURGERY

## 2024-06-21 PROCEDURE — 63710000001 INSULIN LISPRO (HUMAN) PER 5 UNITS: Performed by: ORTHOPAEDIC SURGERY

## 2024-06-21 PROCEDURE — 97164 PT RE-EVAL EST PLAN CARE: CPT

## 2024-06-21 PROCEDURE — 25010000002 CEFTRIAXONE PER 250 MG: Performed by: INTERNAL MEDICINE

## 2024-06-21 PROCEDURE — 97110 THERAPEUTIC EXERCISES: CPT

## 2024-06-21 PROCEDURE — 82948 REAGENT STRIP/BLOOD GLUCOSE: CPT

## 2024-06-21 PROCEDURE — 99232 SBSQ HOSP IP/OBS MODERATE 35: CPT | Performed by: INTERNAL MEDICINE

## 2024-06-21 PROCEDURE — 25010000002 CEFAZOLIN PER 500 MG: Performed by: ORTHOPAEDIC SURGERY

## 2024-06-21 PROCEDURE — 85027 COMPLETE CBC AUTOMATED: CPT | Performed by: ORTHOPAEDIC SURGERY

## 2024-06-21 RX ADMIN — SODIUM CHLORIDE 2 G: 900 INJECTION INTRAVENOUS at 00:34

## 2024-06-21 RX ADMIN — INSULIN LISPRO 2 UNITS: 100 INJECTION, SOLUTION INTRAVENOUS; SUBCUTANEOUS at 11:43

## 2024-06-21 RX ADMIN — INSULIN LISPRO 5 UNITS: 100 INJECTION, SOLUTION INTRAVENOUS; SUBCUTANEOUS at 17:41

## 2024-06-21 RX ADMIN — PANTOPRAZOLE SODIUM 40 MG: 40 INJECTION, POWDER, FOR SOLUTION INTRAVENOUS at 17:41

## 2024-06-21 RX ADMIN — INSULIN LISPRO 5 UNITS: 100 INJECTION, SOLUTION INTRAVENOUS; SUBCUTANEOUS at 08:32

## 2024-06-21 RX ADMIN — INSULIN LISPRO 2 UNITS: 100 INJECTION, SOLUTION INTRAVENOUS; SUBCUTANEOUS at 08:32

## 2024-06-21 RX ADMIN — INSULIN LISPRO 2 UNITS: 100 INJECTION, SOLUTION INTRAVENOUS; SUBCUTANEOUS at 17:41

## 2024-06-21 RX ADMIN — ASPIRIN 81 MG: 81 TABLET, COATED ORAL at 08:31

## 2024-06-21 RX ADMIN — ASPIRIN 81 MG: 81 TABLET, COATED ORAL at 21:09

## 2024-06-21 RX ADMIN — ACETAMINOPHEN 1000 MG: 500 TABLET, FILM COATED ORAL at 14:08

## 2024-06-21 RX ADMIN — CEFTRIAXONE 2000 MG: 2 INJECTION, POWDER, FOR SOLUTION INTRAMUSCULAR; INTRAVENOUS at 08:33

## 2024-06-21 RX ADMIN — INSULIN LISPRO 5 UNITS: 100 INJECTION, SOLUTION INTRAVENOUS; SUBCUTANEOUS at 11:44

## 2024-06-21 RX ADMIN — INSULIN GLARGINE 20 UNITS: 100 INJECTION, SOLUTION SUBCUTANEOUS at 21:09

## 2024-06-21 RX ADMIN — Medication 10 ML: at 21:09

## 2024-06-21 RX ADMIN — OXYCODONE HYDROCHLORIDE 5 MG: 5 TABLET ORAL at 19:34

## 2024-06-21 RX ADMIN — ACETAMINOPHEN 1000 MG: 500 TABLET, FILM COATED ORAL at 21:08

## 2024-06-21 RX ADMIN — LISINOPRIL 10 MG: 10 TABLET ORAL at 08:31

## 2024-06-21 RX ADMIN — Medication 1 CAPSULE: at 08:31

## 2024-06-21 RX ADMIN — Medication 5 MG: at 21:09

## 2024-06-21 RX ADMIN — OXYCODONE HYDROCHLORIDE 5 MG: 5 TABLET ORAL at 14:10

## 2024-06-21 RX ADMIN — PANTOPRAZOLE SODIUM 40 MG: 40 INJECTION, POWDER, FOR SOLUTION INTRAVENOUS at 08:31

## 2024-06-21 RX ADMIN — Medication 10 ML: at 08:34

## 2024-06-21 RX ADMIN — INSULIN LISPRO 5 UNITS: 100 INJECTION, SOLUTION INTRAVENOUS; SUBCUTANEOUS at 21:09

## 2024-06-21 NOTE — PLAN OF CARE
Goal Outcome Evaluation:  Plan of Care Reviewed With: patient        Progress: improving  Outcome Evaluation: Patient POD #1 R  I@D of hip. He was able to participate in more therapeutic activities today and stand with mechanical gait aid. His pain remains high, limiting walking. Continue to recommend IRF at discharge. He can also benefit from an OT consult.

## 2024-06-21 NOTE — PROGRESS NOTES
"          Clinical Nutrition Assessment     Patient Name: Easton Alvarez  YOB: 1965  MRN: [id number]  Date of Encounter: 06/21/24 19:33 EDT  Admission date: 6/12/2024  Reason for Visit: Follow-up protocol, Need for education    Assessment   Nutrition Assessment   Admission Diagnosis:  Diabetic ulcer of left foot with necrosis of muscle [E11.621, L97.523]    Problem List:    Diabetic ulcer of left foot with necrosis of muscle    Severe malnutrition    Infection of prosthetic total hip joint      PMH:   He  has a past medical history of Diabetes mellitus and Diabetic foot infection (2018).    PSH:  He  has a past surgical history that includes Total hip arthroplasty (Right, 02/2024); Amputation foot / toe (Right); incision and drainage leg (Left, 6/15/2024); Leg amputation, lower tibia/fibula (Left, 6/13/2024); PLACEMENT OF WOUND VAC (Left, 6/13/2024); and Incision and drainage hip (Right, 6/20/2024).    Applicable Nutrition History:   6/13-s/p L BKA    6-15-24: s/p 2nd closure L BKA    6-20-24: s/p I+ D R hip    Anthropometrics     Height: Height: 172.7 cm (67.99\")  Last Filed Weight: Weight: 67.6 kg (149 lb) (06/18/24 1506)  Method: Weight Method: Stated  BMI: n/a 2nd BKA    UBW:  150-160lbs per pt report  Weight change:  unable to assess, only stated wt available    Nutrition Focused Physical Exam    Date: 6/13    Patient meets criteria for malnutrition diagnosis, see MSA note.     Subjective   Reported/Observed/Food/Nutrition Related History:       6-21:Pt resting in bed, reports appetite goes up and down, is feeling bloated at present, is drinking boost, states he does know how to give himself insulin shots, he does not really follow diabetic diet at home    DM diet education provided    6-13:Pt reports decreased appetite for a few weeks (<1 month) 2/2 poor appetite and suspects eating ~50% of usual intake. Pt     Current Nutrition Prescription   PO: Diet: Regular/House; Fluid Consistency: Thin " (IDDSI 0)  Oral Nutrition Supplement: Boost GC 2x/day  Intake:  78% of 8 meals    Assessment & Plan   Nutrition Diagnosis   Date: 6/13             Updated:    Problem Malnutrition severe acute   Etiology Clinical status-uncontrolled DM, sepsis   Signs/Symptoms Severe muscle wasting and moderate subcutaneous fat loss, po intake <75% EEN x >/=7 days     Date:   6/13  Updated:  6-21   Problem Increased nutrient needs   Etiology Skin integrity   Signs/Symptoms S/p L BKA, s/p R hip I+D       Goal / Objectives:   Nutrition to support treatment and Continue positive trend      Nutrition Intervention      Follow treatment progress, Care plan reviewed, Advised available snacks, Interview for preferences, Menu adjusted, Education provided    Change to Diabetic Diet    Low K+ restriction added for now as K+ 5.6    DM education provided    Monitoring/Evaluation:   Per protocol, PO intake, Supplement intake, Weight, Skin status    Bia He, SHERMAN  Time Spent:45min

## 2024-06-21 NOTE — PROGRESS NOTES
[facility] Medicine Services  PROGRESS NOTE    Patient Name: Easton Alvarez  : 1965  MRN: [id number]    Date of Admission: 2024  Primary Care Physician: Provider, No Known    Subjective   Subjective     CC:  F/u left foot gangrene    HPI:   doing well, has worked with PT, says hip pain is better after surgery.       Objective   Objective     Vital Signs:   Temp:  [97 °F (36.1 °C)-98.4 °F (36.9 °C)] 97.7 °F (36.5 °C)  Heart Rate:  [] 104  Resp:  [14-21] 16  BP: (102-148)/(62-86) 127/70  Flow (L/min):  [2-4] 2     Physical Exam:   Constitutional: No acute distress, awake, alert, up in chair eating lunch   HENT: NCAT, mucous membranes moist  Respiratory: on RA, not labored   Cardiovascular: RRR, no murmurs,  Gastrointestinal: soft   Musculoskeletal: L BKA.  R hip not examined  .   Psychiatric:calm, conversant   Neurologic: Oriented x 3, moves all extremities, Cranial Nerves grossly intact to confrontation, speech clear  Skin: No rashes      Results Reviewed:  LAB RESULTS:      Lab 24  0437 24  0507 24  0506 24  0400 06/15/24  0443 24  1218   WBC 13.29* 16.60* 18.47* 25.41* 22.75*  --   --    HEMOGLOBIN 10.9* 11.5* 11.7* 11.5* 11.2*   < >  --    HEMATOCRIT 32.4* 33.4* 34.7* 33.5* 32.1*   < >  --    PLATELETS 184 231 179 174 168  --   --    NEUTROS ABS 10.61* 13.49* 15.00* 22.05*  --   --   --    IMMATURE GRANS (ABS) 0.33* 0.30* 0.69* 0.77*  --   --   --    LYMPHS ABS 1.24 1.54 1.41 1.29  --   --   --    MONOS ABS 0.96* 1.08* 1.09* 1.07*  --   --   --    EOS ABS 0.12 0.16 0.22 0.10  --   --   --    MCV 97.3* 95.7 94.8 92.8 93.6  --   --    PROTIME  --   --   --   --   --   --  19.5*    < > = values in this interval not displayed.         Lab 24  0721 24  0437 24  0507 24  0506 24  0400 06/15/24  0443   SODIUM 129* 130* 134* 136 134* 129*   POTASSIUM 5.6* 4.4 4.4 4.1 4.3 4.1   CHLORIDE 96* 98 99 102  101 94*   CO2 18.0* 25.0 27.0 33.0* 27.0 25.0   ANION GAP 15.0 7.0 8.0 1.0* 6.0 10.0   BUN 24* 16 16 21 29* 43*   CREATININE 0.67* 0.49* 0.54* 0.51* 0.72* 0.85   EGFR 106.9 117.5 114.1 116.1 104.6 99.5   GLUCOSE 164* 211* 112* 120* 175* 231*   CALCIUM 7.3* 7.4* 7.5* 7.7* 7.7* 7.4*   MAGNESIUM  --   --   --   --   --  2.3   PHOSPHORUS  --   --   --   --   --  2.7         Lab 06/16/24  0400   TOTAL PROTEIN 5.8*   ALBUMIN 1.8*   GLOBULIN 4.0   ALT (SGPT) 33   AST (SGOT) 49*   BILIRUBIN 1.9*   ALK PHOS 232*         Lab 06/14/24  1218   PROTIME 19.5*   INR 1.63*                 Brief Urine Lab Results  (Last result in the past 365 days)        Color   Clarity   Blood   Leuk Est   Nitrite   Protein   CREAT   Urine HCG        06/14/24 1109 Yellow   Clear   Negative   Negative   Negative   Negative                   Microbiology Results Abnormal       Procedure Component Value - Date/Time    Anaerobic Culture 10 Day Incubation - Wound, Leg, Left [[id number]]  (Normal) Collected: 06/15/24 1501    Lab Status: Preliminary result Specimen: Wound from Leg, Left Updated: 06/21/24 0636     Anaerobic Culture No anaerobes isolated at 5 days    Fungus Culture - Wound, Leg, Left [[id number]] Collected: 06/15/24 1501    Lab Status: Preliminary result Specimen: Wound from Leg, Left Updated: 06/20/24 2215     Fungus Culture No fungus isolated at less than 1 week    AFB Culture - Wound, Leg, Left [[id number]] Collected: 06/15/24 1501    Lab Status: Preliminary result Specimen: Wound from Leg, Left Updated: 06/20/24 2215     AFB Culture No AFB isolated at less than 1 week     AFB Stain No acid fast bacilli seen on concentrated smear    Tissue / Bone Culture - Synovium, Hip, Right [[id number]] Collected: 06/20/24 1700    Lab Status: Preliminary result Specimen: Synovium from Hip, Right Updated: 06/20/24 1955     Gram Stain Many (4+) Red blood cells      Moderate (3+) WBCs seen      No organisms seen    Tissue / Bone Culture - Synovium, Hip,  Right [[id number]] Collected: 06/20/24 1700    Lab Status: Preliminary result Specimen: Synovium from Hip, Right Updated: 06/20/24 1953     Gram Stain Moderate (3+) WBCs seen      No organisms seen     Comment: Modified report. Previous result was Rare (1+) Gram positive cocci in pairs on 6/20/2024 at 1949 EDT.         Many (4+) Red blood cells    Tissue / Bone Culture - Synovium, Hip, Right [[id number]] Collected: 06/20/24 1700    Lab Status: Preliminary result Specimen: Synovium from Hip, Right Updated: 06/20/24 1951     Gram Stain Many (4+) Red blood cells      Few (2+) WBCs seen      No organisms seen    Fungus Culture - Surgical Site, Leg, Left [[id number]] Collected: 06/13/24 1031    Lab Status: Preliminary result Specimen: Surgical Site from Leg, Left Updated: 06/20/24 1201     Fungus Culture No fungus isolated at 1 week    AFB Culture - Surgical Site, Leg, Left [[id number]] Collected: 06/13/24 1031    Lab Status: Preliminary result Specimen: Surgical Site from Leg, Left Updated: 06/20/24 1201     AFB Culture No AFB isolated at 1 week     AFB Stain No acid fast bacilli seen on concentrated smear    Blood Culture - Blood, Hand, Left [[id number]]  (Normal) Collected: 06/15/24 1104    Lab Status: Final result Specimen: Blood from Hand, Left Updated: 06/20/24 1201     Blood Culture No growth at 5 days    Blood Culture - Blood, Hand, Right [[id number]]  (Normal) Collected: 06/15/24 1104    Lab Status: Final result Specimen: Blood from Hand, Right Updated: 06/20/24 1201     Blood Culture No growth at 5 days    AFB Culture - Aspirate, Hip, Right [[id number]] Collected: 06/18/24 1052    Lab Status: Preliminary result Specimen: Aspirate from Hip, Right Updated: 06/19/24 1102     AFB Stain No acid fast bacilli seen on concentrated smear    Wound Culture - Wound, Leg, Left [[id number]] Collected: 06/15/24 1501    Lab Status: Final result Specimen: Wound from Leg, Left Updated: 06/19/24 0711     Wound Culture No growth at 3 days      Gram Stain Rare (1+) WBCs seen      No organisms seen    Anaerobic Culture - Surgical Site, Leg, Left [[id number]]  (Normal) Collected: 06/13/24 1031    Lab Status: Final result Specimen: Surgical Site from Leg, Left Updated: 06/18/24 0751     Anaerobic Culture No anaerobes isolated at 5 days    Blood Culture - Blood, Arm, Left [[id number]]  (Normal) Collected: 06/12/24 1832    Lab Status: Final result Specimen: Blood from Arm, Left Updated: 06/17/24 1900     Blood Culture No growth at 5 days    Blood Culture - Blood, Hand, Right [[id number]]  (Normal) Collected: 06/12/24 1837    Lab Status: Final result Specimen: Blood from Hand, Right Updated: 06/17/24 1900     Blood Culture No growth at 5 days    Fungus Smear - Surgical Site, Leg, Left [[id number]] Collected: 06/13/24 1031    Lab Status: Final result Specimen: Surgical Site from Leg, Left Updated: 06/14/24 1406     Fungal Stain No fungal elements seen            FL C Arm During Surgery    Result Date: 6/20/2024  This procedure was auto-finalized with no dictation required.     Results for orders placed during the hospital encounter of 06/12/24    Adult Transthoracic Echo Complete w/ Color, Spectral and Contrast if Necessary Per Protocol    Interpretation Summary    Left ventricular systolic function is normal. Left ventricular ejection fraction appears to be 56 - 60%.    Left ventricular wall thickness is consistent with borderline concentric hypertrophy.    Left ventricular diastolic function is consistent with (grade Ia w/high LAP) impaired relaxation.      Current medications:  Scheduled Meds:acetaminophen, 1,000 mg, Oral, Q8H  aspirin, 81 mg, Oral, Q12H  cefTRIAXone, 2,000 mg, Intravenous, Q24H  insulin glargine, 20 Units, Subcutaneous, Nightly  insulin lispro, 2-9 Units, Subcutaneous, 4x Daily AC & at Bedtime  Insulin Lispro, 5 Units, Subcutaneous, TID With Meals  lactobacillus acidophilus, 1 capsule, Oral, Daily  lisinopril, 10 mg, Oral, Q24H  melatonin, 5  mg, Oral, Nightly  pantoprazole, 40 mg, Intravenous, BID AC  senna-docusate sodium, 2 tablet, Oral, BID  sodium chloride, 10 mL, Intravenous, Q12H      Continuous Infusions:lactated ringers, 9 mL/hr, Last Rate: 9 mL/hr (06/15/24 1307)  lactated ringers, 9 mL/hr, Last Rate: 9 mL/hr (06/15/24 1345)  lactated ringers, 9 mL/hr, Last Rate: 9 mL/hr (06/20/24 1544)  lactated ringers, 100 mL/hr, Last Rate: 100 mL/hr (06/20/24 2122)  ropivacaine, , Last Rate: Stopped (06/17/24 2230)      PRN Meds:.  senna-docusate sodium **AND** polyethylene glycol **AND** bisacodyl **AND** bisacodyl    Calcium Replacement - Follow Nurse / BPA Driven Protocol    dextrose    dextrose    glucagon (human recombinant)    HYDROmorphone **AND** naloxone    Magnesium Standard Dose Replacement - Follow Nurse / BPA Driven Protocol    nitroglycerin    oxyCODONE    oxyCODONE    Phosphorus Replacement - Follow Nurse / BPA Driven Protocol    Potassium Replacement - Follow Nurse / BPA Driven Protocol    sodium chloride    sodium chloride    traMADol    Assessment & Plan   Assessment & Plan     Active Hospital Problems    Diagnosis  POA    **Diabetic ulcer of left foot with necrosis of muscle [E11.621, L97.523]  Yes    Severe malnutrition [E43]  Yes    Infection of prosthetic total hip joint [T84.59XA, Z96.649]  Not Applicable      Resolved Hospital Problems   No resolved problems to display.        Brief Hospital Course to date:  Easton Alvarez is a 58 y.o. male  with past medical history significant for T2DM, diabetic neuropathy, right hip fracture/ORIF ([facility] 2/2024), and right transmetatarsal amputation 2019 at  who presented to OSH  ED on 6/12/2024 due to left foot severe cellulitis/necrosis after recent injury. Lactate at OSH was 8.8 and CT scan of left leg showed gas gangrene. Orthopedic surgeon Dr. Em was consulted and performed a left below-knee amputation on 6/13/2024 with further debridement and irrigation on 6/15.  Infectious disease is  "following and managing antimicrobial therapy.     This patient's problems and plans were partially entered by my partner and updated as appropriate by me 06/21/24.    Sepsis (WBC, lactate, tachypnea, source)    Bacteremia:  group B strep  Left foot cellulitis/gangrene   - MRI left foot showed extensive soft tissue infection with abscesses along the foot to the ankle and into the lower leg with soft tissue gas.    - S/p left BKA on 6/13/24    - further debridement in OR on 6/15/24 by Dr. Em, drain removed 6/17  - Daily dressing change to surgical site: Xeroform, 4X4, Kerlix, ACE  - Follow up with  Orthopedics in 2 weeks for wound check, imaging at [facility], [address], [address]  - PT wound care following  - Add Boost glucose control to increase protein intake for wound healing, nutrition following  - wound cultures growing Strep B and Kebsiella, repeat cultures pending  - ID, Dr. Bills, following, continue Zosyn, clindamycin for now   - TTE:  EF 55, diast dysfxn, \"aortic valve poorly visualized\".  Defer to ID re whether DIONICIO needed.  Unsure IE  will change length of therapy now given his hardware infection.      Cat-prosthetic hip infection, right   I and D with head replacement 6/20, Dr Sethi   - noted gas near hardware per OSH CT scan  - IR performed fluid aspiration from the hip.  This was purulent.  Cx with early growth   - supportive care, mobilize, pain control       Gap acidosis POA, resolved   - AG 20.1, likely from lactate, serum acetone negative     Concern for GIB  -melena and fecal occult positive overnight 6/14  -GI consulted, recommended medical management for possible melena with twice daily PPI x 1 month  -GI referral at discharge for elevated LFTs, thrombocytopenia and concern for early cirrhosis, plan to purse liver US after recovery from acute illness    Elevated BP  - no h/o taking meds for BP per patient report  - BP has been running high this admission, possibly " secondary to pain  - start low-dose ACE given DM2, lisinopril 10 mg daily, titrate    DM, uncontrolled  - counseled patient in presence of brother to start learning diabetes care - will help healing and decrease infection risk.  Pt expressed understanding.   - PTA, pt was not taking meds or checking glucoses, though insulin had been prescribed in the past.  -HgA1c 9.5  -Lantus increased to 20 units nightly, mealtime insulin added 5 units TID with improvement in BGs  -Continue current basal, prandial, and SS insulins, titrate  - diab educ consulted to follow along and  him; will ask nursing staff to let him check own glucoses and administer own insulin at times.        Expected Discharge Location and Transportation: rehab  Expected Discharge   Expected Discharge Date: 6/21/2024; Expected Discharge Time:      VTE Prophylaxis:  Mechanical VTE prophylaxis orders are present.         AM-PAC 6 Clicks Score (PT): 12 (06/20/24 2122)    CODE STATUS:   Code Status and Medical Interventions:   Ordered at: 06/20/24 2026     Level Of Support Discussed With:    Patient     Code Status (Patient has no pulse and is not breathing):    CPR (Attempt to Resuscitate)     Medical Interventions (Patient has pulse or is breathing):    Full       Shraddha Scott MD  06/21/24

## 2024-06-21 NOTE — CONSULTS
Diabetes Education    Patient Name:  Easton Alvarez  YOB: 1965  MRN: [id number]  Admit Date:  6/12/2024    Returned to follow up with Mr. Alvarez this morning.  He was transferred to another floor.  Spoke  with him at the bedside.  He stated that he is feeling comfortable with the insulin instruction that we discussed yesterday and with giving his own injections in the future.  Spoke to his nurse and reminded her that provider had asked that he give his own injections of insulin while here in the hospital as much as possible for practice.  He states that he is likely going to be transferred to [facility] for a couple weeks.  Reminded him that he has our contact information and if he has any issues with caring for diabetes when he gets home, he should call us or his provider.  Thank you for this referral.     Electronically signed by:  Missy Lucero RN  06/21/24 11:33 EDT

## 2024-06-21 NOTE — CASE MANAGEMENT/SOCIAL WORK
Continued Stay Note  [facility]     Patient Name: Easton Alvarez  MRN: [id number]  Today's Date: 6/21/2024    Admit Date: 6/12/2024    Plan: Home with    Discharge Plan       Row Name 06/21/24 0933       Plan    Plan Comments PT/OT have been reconsulted. April with [facility] has been given the referral. He will need insurance precert if accepted.    Final Discharge Disposition Code 62 - inpatient rehab facility                   Discharge Codes    No documentation.                 Expected Discharge Date and Time       Expected Discharge Date Expected Discharge Time    Jun 21, 2024               Anat Zarate RN

## 2024-06-21 NOTE — PROGRESS NOTES
"  Orthopedic Progress Note      Patient: Easton Alvarez  YOB: 1965     Date of Admission: 6/12/2024  5:04 PM Medical Record Number: [id number]     Attending Physician: Shraddha Scott MD    Status Post:  Procedure(s):  HIP ANTERIOR INCISION AND DRAINAGE WITH HANA TABLE, POLY SWAP, WITH LEFT LEG PIN TRACTION - RIGHT Post Operative Day Number: 1    Subjective : No new orthopaedic complaints     Pain Relief: some relief with present medication.     Systemic Complaints: No Complaints  Vitals:    06/21/24 0056 06/21/24 0057 06/21/24 0323 06/21/24 0709   BP: 131/72  126/70 127/70   BP Location: Right arm  Right arm Right arm   Patient Position: Lying  Lying Lying   Pulse: 105 106  104   Resp: 16  16 16   Temp: 97.8 °F (36.6 °C)  97.8 °F (36.6 °C) 97.7 °F (36.5 °C)   TempSrc: Oral  Oral Oral   SpO2: (!) 87% 95%  95%   Weight:       Height:           Physical Exam: 58 y.o. male    General Appearance:       Alert, cooperative, in no acute distress                  Extremities:    Dressing Clean, Dry and Intact             No clinical sign of DVT        Able to do good movements of digits    Pulses:   Pulses palpable and equal bilaterally           Diagnostic Tests:     Results from last 7 days   Lab Units 06/19/24  0437 06/18/24  0507 06/17/24  0506   WBC 10*3/mm3 13.29* 16.60* 18.47*   HEMOGLOBIN g/dL 10.9* 11.5* 11.7*   HEMATOCRIT % 32.4* 33.4* 34.7*   PLATELETS 10*3/mm3 184 231 179     Results from last 7 days   Lab Units 06/19/24  0437 06/18/24  0507 06/17/24  0506   SODIUM mmol/L 130* 134* 136   POTASSIUM mmol/L 4.4 4.4 4.1   CHLORIDE mmol/L 98 99 102   CO2 mmol/L 25.0 27.0 33.0*   BUN mg/dL 16 16 21   CREATININE mg/dL 0.49* 0.54* 0.51*   GLUCOSE mg/dL 211* 112* 120*   CALCIUM mg/dL 7.4* 7.5* 7.7*     Results from last 7 days   Lab Units 06/14/24  1218   INR  1.63*     No results found for: \"URICACID\"  Lab Results   Component Value Date    CRYSTAL None Seen 06/18/2024     Microbiology Results (last 10 " days)       Procedure Component Value - Date/Time    Tissue / Bone Culture - Synovium, Hip, Right [[id number]] Collected: 06/20/24 1700    Lab Status: Preliminary result Specimen: Synovium from Hip, Right Updated: 06/20/24 1953     Gram Stain Moderate (3+) WBCs seen      No organisms seen     Comment: Modified report. Previous result was Rare (1+) Gram positive cocci in pairs on 6/20/2024 at 1949 EDT.         Many (4+) Red blood cells    Tissue / Bone Culture - Synovium, Hip, Right [[id number]] Collected: 06/20/24 1700    Lab Status: Preliminary result Specimen: Synovium from Hip, Right Updated: 06/20/24 1955     Gram Stain Many (4+) Red blood cells      Moderate (3+) WBCs seen      No organisms seen    Tissue / Bone Culture - Synovium, Hip, Right [[id number]] Collected: 06/20/24 1700    Lab Status: Preliminary result Specimen: Synovium from Hip, Right Updated: 06/20/24 1951     Gram Stain Many (4+) Red blood cells      Few (2+) WBCs seen      No organisms seen    Body Fluid Culture - Body Fluid, Hip, Right [[id number]]  (Abnormal) Collected: 06/18/24 1052    Lab Status: Preliminary result Specimen: Body Fluid from Hip, Right Updated: 06/20/24 0810     Body Fluid Culture Rare growth Streptococcus agalactiae (Group B)     Comment:   This organism is considered to be universally susceptible to penicillin.  No further antibiotic testing will be performed.        Gram Stain Many (4+) WBCs seen      No organisms seen    AFB Culture - Aspirate, Hip, Right [[id number]] Collected: 06/18/24 1052    Lab Status: Preliminary result Specimen: Aspirate from Hip, Right Updated: 06/19/24 1102     AFB Stain No acid fast bacilli seen on concentrated smear    Fungus Culture - Wound, Leg, Left [[id number]] Collected: 06/15/24 1501    Lab Status: Preliminary result Specimen: Wound from Leg, Left Updated: 06/20/24 2215     Fungus Culture No fungus isolated at less than 1 week    Wound Culture - Wound, Leg, Left [[id number]] Collected: 06/15/24  1501    Lab Status: Final result Specimen: Wound from Leg, Left Updated: 06/19/24 0711     Wound Culture No growth at 3 days     Gram Stain Rare (1+) WBCs seen      No organisms seen    AFB Culture - Wound, Leg, Left [[id number]] Collected: 06/15/24 1501    Lab Status: Preliminary result Specimen: Wound from Leg, Left Updated: 06/20/24 2215     AFB Culture No AFB isolated at less than 1 week     AFB Stain No acid fast bacilli seen on concentrated smear    Anaerobic Culture 10 Day Incubation - Wound, Leg, Left [[id number]]  (Normal) Collected: 06/15/24 1501    Lab Status: Preliminary result Specimen: Wound from Leg, Left Updated: 06/21/24 0636     Anaerobic Culture No anaerobes isolated at 5 days    Blood Culture - Blood, Hand, Left [[id number]]  (Normal) Collected: 06/15/24 1104    Lab Status: Final result Specimen: Blood from Hand, Left Updated: 06/20/24 1201     Blood Culture No growth at 5 days    Blood Culture - Blood, Hand, Right [[id number]]  (Normal) Collected: 06/15/24 1104    Lab Status: Final result Specimen: Blood from Hand, Right Updated: 06/20/24 1201     Blood Culture No growth at 5 days    Anaerobic Culture - Surgical Site, Leg, Left [[id number]]  (Normal) Collected: 06/13/24 1031    Lab Status: Final result Specimen: Surgical Site from Leg, Left Updated: 06/18/24 0751     Anaerobic Culture No anaerobes isolated at 5 days    Fungus Culture - Surgical Site, Leg, Left [[id number]] Collected: 06/13/24 1031    Lab Status: Preliminary result Specimen: Surgical Site from Leg, Left Updated: 06/20/24 1201     Fungus Culture No fungus isolated at 1 week    AFB Culture - Surgical Site, Leg, Left [[id number]] Collected: 06/13/24 1031    Lab Status: Preliminary result Specimen: Surgical Site from Leg, Left Updated: 06/20/24 1201     AFB Culture No AFB isolated at 1 week     AFB Stain No acid fast bacilli seen on concentrated smear    Fungus Smear - Surgical Site, Leg, Left [[id number]] Collected: 06/13/24 1031    Lab  Status: Final result Specimen: Surgical Site from Leg, Left Updated: 06/14/24 1406     Fungal Stain No fungal elements seen    Wound Culture - Surgical Site, Leg, Left [[id number]]  (Abnormal) Collected: 06/13/24 1031    Lab Status: Final result Specimen: Surgical Site from Leg, Left Updated: 06/15/24 1050     Wound Culture Moderate growth (3+) Streptococcus agalactiae (Group B)     Comment:   This organism is considered to be universally susceptible to penicillin.  No further antibiotic testing will be performed. If Clindamycin or Erythromycin is the drug of choice, notify the laboratory within 7 days to request susceptibility testing.        Gram Stain Moderate (3+) Red blood cells      Few (2+) WBCs seen      Few (2+) Gram positive cocci, intracellular and extracellular, in pairs and chains    Blood Culture - Blood, Hand, Right [[id number]]  (Normal) Collected: 06/12/24 1837    Lab Status: Final result Specimen: Blood from Hand, Right Updated: 06/17/24 1900     Blood Culture No growth at 5 days    Blood Culture - Blood, Arm, Left [[id number]]  (Normal) Collected: 06/12/24 1832    Lab Status: Final result Specimen: Blood from Arm, Left Updated: 06/17/24 1900     Blood Culture No growth at 5 days    Blood Culture - Blood, Arm, Left [[id number]]  (Abnormal) Collected: 06/12/24 0807    Lab Status: Final result Specimen: Blood from Arm, Left Updated: 06/15/24 0726     Blood Culture Streptococcus agalactiae (Group B)     Isolated from Aerobic and Anaerobic Bottles     Gram Stain Aerobic Bottle Gram positive cocci in chains      Anaerobic Bottle Gram positive cocci in chains    Narrative:      Refer to previous blood culture collected on 6/12 for ha's      Blood Culture - Blood, Arm, Left [[id number]]  (Abnormal)  (Susceptibility) Collected: 06/12/24 0807    Lab Status: Final result Specimen: Blood from Arm, Left Updated: 06/15/24 0726     Blood Culture Streptococcus agalactiae (Group B)     Comment: MICs to follow         Isolated from Aerobic and Anaerobic Bottles     Gram Stain Anaerobic Bottle Gram positive cocci in chains      Aerobic Bottle Gram positive cocci in chains    Susceptibility        Streptococcus agalactiae (Group B)      GLADYS      Ceftriaxone Susceptible      Penicillin G Susceptible      Vancomycin Susceptible                           Blood Culture ID, PCR - Blood, Arm, Left [[id number]]  (Abnormal) Collected: 06/12/24 0807    Lab Status: Final result Specimen: Blood from Arm, Left Updated: 06/13/24 0814     BCID, PCR Streptococcus agalactiae (Group B). Identification by BCID2 PCR.     BOTTLE TYPE Aerobic Bottle    Wound Culture - Swab, Foot, Left [[id number]]  (Abnormal)  (Susceptibility) Collected: 06/12/24 0803    Lab Status: Final result Specimen: Swab from Foot, Left Updated: 06/15/24 0802     Wound Culture Heavy growth (4+) Klebsiella oxytoca      Moderate growth (3+) Streptococcus agalactiae (Group B)     Comment:   This organism is considered to be universally susceptible to penicillin.  No further antibiotic testing will be performed. If Clindamycin or Erythromycin is the drug of choice, notify the laboratory within 7 days to request susceptibility testing.         Heavy growth (4+) Staphylococcus aureus     Gram Stain Rare (1+) WBCs seen      No organisms seen    Susceptibility        Klebsiella oxytoca      GLADYS      Amoxicillin + Clavulanate Susceptible      Ampicillin Resistant      Ampicillin + Sulbactam Intermediate      Cefepime Susceptible      Ceftazidime Susceptible      Ceftriaxone Susceptible      Gentamicin Susceptible      Levofloxacin Susceptible      Piperacillin + Tazobactam Susceptible      Tetracycline Susceptible      Trimethoprim + Sulfamethoxazole Susceptible                       Susceptibility        Staphylococcus aureus      GLADYS      Clindamycin Susceptible      Erythromycin Susceptible      Oxacillin Susceptible      Rifampin Susceptible      Tetracycline Susceptible       Trimethoprim + Sulfamethoxazole Susceptible      Vancomycin Susceptible                       Susceptibility Comments       Klebsiella oxytoca    Cefazolin sensitivity will not be reported for Enterobacteriaceae in non-urine isolates. If cefazolin is preferred, please call the microbiology lab to request an E-test.  With the exception of urinary-sourced infections, aminoglycosides should not be used as monotherapy.                     MRI Femur Right With & Without Contrast    Result Date: 6/19/2024  Impression: Large, lobulated 7.8 x 7.8 x 10.5 cm rim-enhancing, gas-containing, periprosthetic fluid collection, compatible with periprosthetic infection/abscess and septic joint. Purulent fluid was collected during needle aspiration from 6/18/2024 and correlate with fluid sample. Electronically Signed: Sonny Rey MD  6/19/2024 8:27 AM EDT  Workstation ID: [id number]    FL Guided Aspiration Joint    Result Date: 6/19/2024  Impression: Successful aspiration of a fluid collection from the subcutaneous tissues surrounding the right hip joint as above with no immediate complications. Report dictated by: Lauren Leon PA-c  I have personally reviewed this case and agree with the findings above: Electronically Signed: Ivan Reilly MD  6/19/2024 8:08 AM EDT  Workstation ID: [id number]    XR Hip With or Without Pelvis 2 - 3 View Right    Result Date: 6/17/2024  Impression: No radiographic evidence of acute fracture or dislocation. Post right total hip arthroplasty with evidence of periprosthetic soft tissue swelling and small volume subcutaneous air. Electronically Signed: Sage Burch MD  6/17/2024 6:29 PM EDT  Workstation ID: [id number]       Current Medications:  Scheduled Meds:acetaminophen, 1,000 mg, Oral, Q8H  aspirin, 81 mg, Oral, Q12H  ceFAZolin, 2 g, Intravenous, Q8H  cefTRIAXone, 2,000 mg, Intravenous, Q24H  insulin glargine, 20 Units, Subcutaneous, Nightly  insulin lispro, 2-9 Units, Subcutaneous, 4x Daily AC &  at Bedtime  Insulin Lispro, 5 Units, Subcutaneous, TID With Meals  lactobacillus acidophilus, 1 capsule, Oral, Daily  lisinopril, 10 mg, Oral, Q24H  melatonin, 5 mg, Oral, Nightly  pantoprazole, 40 mg, Intravenous, BID AC  senna-docusate sodium, 2 tablet, Oral, BID  sodium chloride, 10 mL, Intravenous, Q12H      Continuous Infusions:lactated ringers, 9 mL/hr, Last Rate: 9 mL/hr (06/15/24 1307)  lactated ringers, 9 mL/hr, Last Rate: 9 mL/hr (06/15/24 1345)  lactated ringers, 9 mL/hr, Last Rate: 9 mL/hr (06/20/24 1544)  lactated ringers, 100 mL/hr, Last Rate: 100 mL/hr (06/20/24 2122)  ropivacaine, , Last Rate: Stopped (06/17/24 2230)      PRN Meds:.  senna-docusate sodium **AND** polyethylene glycol **AND** bisacodyl **AND** bisacodyl    Calcium Replacement - Follow Nurse / BPA Driven Protocol    dextrose    dextrose    glucagon (human recombinant)    HYDROmorphone **AND** naloxone    Magnesium Standard Dose Replacement - Follow Nurse / BPA Driven Protocol    nitroglycerin    oxyCODONE    oxyCODONE    Phosphorus Replacement - Follow Nurse / BPA Driven Protocol    Potassium Replacement - Follow Nurse / BPA Driven Protocol    sodium chloride    sodium chloride    traMADol    Assessment: Status post  No admission procedures for hospital encounter.    Patient Active Problem List   Diagnosis    Diabetic ulcer of left foot with necrosis of muscle    Severe malnutrition    Infection of prosthetic total hip joint       PLAN:   Continues current post-op course  Anticoagulation: Aspirin started 81mg BIDx 6 weeks  Mobilize with PT as tolerated per protocol  Abx per ID  Ok to remove drain 48 hours postop  Incisional wound vac x1 week postop; okay to replace with surgical optifoam ag dressing after    Weight Bearing: WBAT  Discharge Plan:     Wilfrido Diaz PA-C    Date: 6/21/2024    Time: 07:46 EDT

## 2024-06-21 NOTE — NURSING NOTE
WOC consulted pressure injury to bilateral gluteals coccyx, POA    Patient has area of pink blanchable skin at the sacrococcygeal area.  Chronic skin discoloration noted.  Does not appear to be a deep tissue pressure injury.  There is an area of erythema/pink-colored skin at the right ischial tuberosity.  Skin is intact and blanchable.  No Allevyn dressing or preventative pressure injury dressing noted at time of assessment.          Recommend applying Allevyn dressing to sacrococcygeal and bilateral heels.    Keep patient turned and offloaded.    WOC will sign off.    Prateek Ledesma RN, BSN, CCRN, CWOCN  Wound, Ostomy and Continence (WOC) Department  [facility]

## 2024-06-21 NOTE — PROGRESS NOTES
"Easton Alvarez  1965  [id number]          Chief Complaint: left foot infection    Reason for Consultation: left foot infection    History of present illness:     Patient is a 58 y.o.  Yr old male with history of diabetes with prior right TMA 2019 at , history strep septicemia 2016 ; he reports right total hip arthroplasty 2024 after fall. He reports a fall several weeks prior to his June admission with injury to the left foot, wound present with deterioration several days preceding admission with severe discoloration/redness and swelling.  CT scan showed concern for subcutaneous emphysema and necrotizing infection.  Transferred to [facility] with evaluation by Dr. Em and Dr. Lundberg and arrangements made for urgent surgery. Subsequently, blood cultures called with gram-positive cocci     6/13 left LE amp, Dr Em    6/15  \"PROCEDURE:            Left      [id number]: Secondary closure above-knee amputation\"      6/18/24  right hip aspiration, culture with GB strep; wbc  down some postop from revision; blood cultures 6/15 negative so far    6/19/24 Dr. Em helping facilitate right hip surgery with orthopedic partners/team    6/20/24 surgery Dr Sethi  \"Procedure(s):  HIP ANTERIOR INCISION AND DRAINAGE WITH HANA TABLE, POLY SWAP- RIGHT\"    6/21/24 postop pain controlled at present; He has left stump  pain that is constant, sharp at times, blunted by neuropathy, worse with palpation, better with pain meds and 4  out of 10 in severity. No distress per nursing; no new focal symptoms otherwise; he has right hip pain with range of motion and weightbearing increased and out of 10 at present with movement, nonradiating, better with pain meds.     No headache photophobia or neck stiffness.  No shortness of breath cough or hemoptysis.  No nausea vomiting diarrhea or abdominal pain.  No dysuria hematuria or pyuria.  No other new skin rash    Past Medical History:   Diagnosis Date    Diabetes " "mellitus     Diabetic foot infection 2018    left (partial amputation)       Past Surgical History:   Procedure Laterality Date    AMPUTATION FOOT / TOE Right     partial foot    BELOW KNEE AMPUTATION Left 6/13/2024    Procedure: AMPUTATION BELOW KNEE AND WOUND VAC ASSISTED CLOSURE LEFT;  Surgeon: Brijesh Em Jr., MD;  Location:  [facility];  Service: Orthopedics;  Laterality: Left;    INCISION AND DRAINAGE LEG Left 6/15/2024    Procedure: SECONDARY CLOSURE LEFT BELOW KNEE AMPUTATION;  Surgeon: Brijesh Em Jr., MD;  Location:  [facility];  Service: Orthopedics;  Laterality: Left;    PLACEMENT OF WOUND VAC Left 6/13/2024    Procedure: PLACEMENT OF WOUND VAC LEFT;  Surgeon: Brijesh Em Jr., MD;  Location:  [facility];  Service: Orthopedics;  Laterality: Left;    TOTAL HIP ARTHROPLASTY Right 02/2024    From fall \"whole hip was shattered\"       Pediatric History   Patient Parents    Not on file     Other Topics Concern    Not on file   Social History Narrative    Not on file       family history is not on file. High blood pressure mom/dad    No Known Allergies    Medication:  Current Facility-Administered Medications   Medication Dose Route Frequency Provider Last Rate Last Admin    acetaminophen (TYLENOL) tablet 1,000 mg  1,000 mg Oral Q8H Lex Sethi MD   1,000 mg at 06/20/24 2122    aspirin EC tablet 81 mg  81 mg Oral Q12H Lex Sethi MD        sennosides-docusate (PERICOLACE) 8.6-50 MG per tablet 2 tablet  2 tablet Oral BID Lex Sethi MD   2 tablet at 06/20/24 2122    And    polyethylene glycol (MIRALAX) packet 17 g  17 g Oral Daily PRN Lex Sethi MD        And    bisacodyl (DULCOLAX) EC tablet 5 mg  5 mg Oral Daily PRN Lex Sethi MD        And    bisacodyl (DULCOLAX) suppository 10 mg  10 mg Rectal Daily PRN Lex Sethi MD        Calcium Replacement - Follow Nurse / BPA Driven Protocol   Does not apply PRN Lex Sethi MD        ceFAZolin 2000 mg IVPB in 100 mL NS " (MBP)  2 g Intravenous Q8H Lex Sethi MD   2 g at 06/21/24 0034    cefTRIAXone (ROCEPHIN) 2,000 mg in sodium chloride 0.9 % 100 mL MBP  2,000 mg Intravenous Q24H Cleve Bills MD        dextrose (D50W) (25 g/50 mL) IV injection 25 g  25 g Intravenous Q15 Min PRN Lex Sethi MD        dextrose (GLUTOSE) oral gel 15 g  15 g Oral Q15 Min PRN Lex Sethi MD        glucagon (GLUCAGEN) injection 1 mg  1 mg Intramuscular Q15 Min PRN Lex Sethi MD        HYDROmorphone (DILAUDID) injection 0.5 mg  0.5 mg Intravenous Q2H PRN Lex Sethi MD        And    naloxone (NARCAN) injection 0.1 mg  0.1 mg Intravenous Q5 Min PRN Lex Sethi MD        insulin glargine (LANTUS, SEMGLEE) injection 20 Units  20 Units Subcutaneous Nightly Lex Sethi MD   20 Units at 06/20/24 2122    Insulin Lispro (humaLOG) injection 2-9 Units  2-9 Units Subcutaneous 4x Daily AC & at Bedtime Lex Sethi MD   2 Units at 06/19/24 2135    Insulin Lispro (humaLOG) injection 5 Units  5 Units Subcutaneous TID With Meals Lex Sethi MD   5 Units at 06/19/24 1710    lactated ringers infusion  9 mL/hr Intravenous Continuous Lex Sethi MD 9 mL/hr at 06/15/24 1307 9 mL/hr at 06/15/24 1307    lactated ringers infusion  9 mL/hr Intravenous Continuous Lex Sethi MD 9 mL/hr at 06/15/24 1345 Restarted at 06/15/24 1446    lactated ringers infusion  9 mL/hr Intravenous Continuous Lex Sethi MD 9 mL/hr at 06/20/24 1544 9 mL/hr at 06/20/24 1544    lactated ringers infusion  100 mL/hr Intravenous Continuous Lex Sethi  mL/hr at 06/20/24 2122 100 mL/hr at 06/20/24 2122    lactobacillus acidophilus (RISAQUAD) capsule 1 capsule  1 capsule Oral Daily Lex Sethi MD   1 capsule at 06/19/24 0901    lisinopril (PRINIVIL,ZESTRIL) tablet 10 mg  10 mg Oral Q24H Lex Sethi MD   10 mg at 06/19/24 0901    Magnesium Standard Dose Replacement - Follow Nurse / BPA Driven Protocol   Does not apply PRN  Lex Sethi MD        melatonin tablet 5 mg  5 mg Oral Nightly Lex Sethi MD   5 mg at 06/19/24 2135    nitroglycerin (NITROSTAT) SL tablet 0.4 mg  0.4 mg Sublingual Q5 Min PRN Lex Sethi MD        oxyCODONE (ROXICODONE) immediate release tablet 10 mg  10 mg Oral Q4H PRN Lex Sethi MD        oxyCODONE (ROXICODONE) immediate release tablet 5 mg  5 mg Oral Q4H PRN Lex Sethi MD   5 mg at 06/20/24 2122    pantoprazole (PROTONIX) injection 40 mg  40 mg Intravenous BID AC Lex Sethi MD   40 mg at 06/20/24 0644    Phosphorus Replacement - Follow Nurse / BPA Driven Protocol   Does not apply PRN Lex Sethi MD        Potassium Replacement - Follow Nurse / BPA Driven Protocol   Does not apply PRN Lex Sethi MD        ropivacaine (NAROPIN) 0.2 % infusion (INFUSYSTEM)   Peripheral Nerve Continuous Lex Sethi MD   Stopped at 06/17/24 2230    sodium chloride 0.9 % flush 10 mL  10 mL Intravenous Q12H Lex Sethi MD   10 mL at 06/20/24 0817    sodium chloride 0.9 % flush 10 mL  10 mL Intravenous PRN Lex Sethi MD        sodium chloride 0.9 % infusion 40 mL  40 mL Intravenous PRN Lex Sethi MD        traMADol (ULTRAM) tablet 50 mg  50 mg Oral Q8H PRN Lex Sethi MD           Antibiotics:  Anti-Infectives (From admission, onward)      Ordered     Dose/Rate Route Frequency Start Stop    06/21/24 0756  cefTRIAXone (ROCEPHIN) 2,000 mg in sodium chloride 0.9 % 100 mL MBP        Ordering Provider: Cleve Bills MD    2,000 mg  200 mL/hr over 30 Minutes Intravenous Every 24 Hours 06/21/24 0900 07/29/24 0859    06/20/24 2025  ceFAZolin 2000 mg IVPB in 100 mL NS (MBP)        Ordering Provider: Lex Sethi MD    2 g  over 30 Minutes Intravenous Every 8 Hours 06/21/24 0000 06/21/24 1559    06/20/24 1457  ceFAZolin 2000 mg IVPB in 100 mL NS (MBP)        Ordering Provider: Lex Sethi MD    2,000 mg  over 30 Minutes Intravenous Once 06/20/24 1459 06/20/24  "1557    06/12/24 1746  piperacillin-tazobactam (ZOSYN) 4.5 g IVPB in 100 mL NS MBP (CD)        Ordering Provider: Shraddha Scott MD    4.5 g  over 30 Minutes Intravenous Once 06/12/24 1900 06/13/24 0907              Review of Systems    6/21/24     Constitutional-- No Fever, chills or sweats.  Appetite diminished with fatigue.  Heent-- No new vision, hearing or throat complaints.  No epistaxis or oral sores.  Denies odynophagia or dysphagia.  No flashers, floaters or eye pain. No odynophagia or dysphagia. No headache, photophobia or neck stiffness.  CV-- No chest pain, palpitation or syncope  Resp-- No SOB/cough/Hemoptysis  GI- No nausea, vomiting, or diarrhea.  No hematochezia, melena, or hematemesis. Denies jaundice or chronic liver disease.  -- No dysuria, hematuria, or flank pain.  Denies hesitancy, urgency or flank pain.  Lymph- no swollen lymph nodes in neck/axilla or groin.   Heme- No active bruising or bleeding; no Hx of DVT or PE.  MS-- aside from above, no swelling or pain in the bones or joints of arms/legs.  No new back pain.  Neuro-- No acute focal weakness or numbness in the arms or legs.  No seizures.    Full 12 point review of systems reviewed and negative otherwise for acute complaints, except for above    Physical Exam:   Vital Signs   /70 (BP Location: Right arm, Patient Position: Lying)   Pulse 104   Temp 97.7 °F (36.5 °C) (Oral)   Resp 16   Ht 172.7 cm (67.99\")   Wt 67.6 kg (149 lb)   SpO2 95%   BMI 22.66 kg/m²     GENERAL: awake; in no acute distress.   HEENT: Normocephalic, atraumatic.   No conjunctival injection. No icterus. Oropharynx clear without evidence of thrush or exudate. No evidence of periodontal disease.    NECK: Supple without nuchal rigidity. No mass.   HEART: RRR; No murmur, rubs, gallops.   LUNGS: Clear to auscultation bilaterally without wheezing, rales, rhonchi. Normal respiratory effort. Nonlabored. No dullness.  ABDOMEN: Soft, nontender, nondistended. " Positive bowel sounds. No rebound or guarding. NO mass or HSM.  EXT:  see below.     MSK: right hip surgical site cvered;  no new visible redness/purulence or fluctuance  SKIN: Warm and dry without cutaneous eruptions on Inspection/palpation.      Left   BKA stump covered;  no new visible redness or purulence; no discrete fluctuance.  No crepitus    Laboratory Data    Results from last 7 days   Lab Units 06/19/24  0437 06/18/24  0507 06/17/24  0506   WBC 10*3/mm3 13.29* 16.60* 18.47*   HEMOGLOBIN g/dL 10.9* 11.5* 11.7*   HEMATOCRIT % 32.4* 33.4* 34.7*   PLATELETS 10*3/mm3 184 231 179     Results from last 7 days   Lab Units 06/19/24  0437   SODIUM mmol/L 130*   POTASSIUM mmol/L 4.4   CHLORIDE mmol/L 98   CO2 mmol/L 25.0   BUN mg/dL 16   CREATININE mg/dL 0.49*   GLUCOSE mg/dL 211*   CALCIUM mg/dL 7.4*     Results from last 7 days   Lab Units 06/16/24  0400   ALK PHOS U/L 232*   BILIRUBIN mg/dL 1.9*   ALT (SGPT) U/L 33   AST (SGOT) U/L 49*                 Estimated Creatinine Clearance: 153.3 mL/min (A) (by C-G formula based on SCr of 0.49 mg/dL (L)).      Microbiology:      Radiology:  Imaging Results (Last 72 Hours)       Procedure Component Value Units Date/Time    FL C Arm During Surgery [[id number]] Resulted: 06/20/24 1733     Updated: 06/20/24 1733    Narrative:      This procedure was auto-finalized with no dictation required.    MRI Femur Right With & Without Contrast [[id number]] Collected: 06/19/24 0803     Updated: 06/19/24 0830    Narrative:        MRI FEMUR RIGHT W WO CONTRAST    Date of Exam: 6/18/2024 9:51 PM EDT    Indication: infection.     Comparison: CTA abdomen/pelvis with lower extremity runoff 6/12/2024, fluoroscopic-guided needle aspiration of right hip 6/18/2024    Technique:  Routine multiplanar/multisequence images of the right femur were obtained before and after the uneventful administration of 15 mL Multihance.        Findings:  There is metal susceptibility associated with the right total  hip arthroplasty. This somewhat limits evaluation of the adjacent bony and soft tissue structures. Accounting for this there is evidence of a large periprosthetic rim-enhancing fluid   collection along the anterior aspect of the hardware/proximal femur. This is somewhat irregular and lobulated in shape, difficult to accurately measure, measuring approximately 7.8 x 7.8 cm in maximal transverse dimension, and 10.5 cm in craniocaudal   length (series 11 image 18, series 13 image 14). This appears in communication with the anterior aspect of the right hip joint, with fluid extending down to the hardware and proximal femoral shaft and insinuating between the proximal rectus femoris and   vastus lateralis muscles (series 11 image 12-23). A component of this fluid collection extends more medial, contacting/abutting the iliopsoas tendon (series 11 image 19). As seen on prior CT there is evidence of a couple small locules of gas within this   fluid collection. There is edema of the adjacent deep soft tissues, with prominent circumferential edema at the right hip and circumferentially throughout the imaged thigh.    Unremarkable appearance of the sciatic nerve. The hamstring tendon origin is normal. Accounting for metal susceptibility artifact, grossly unremarkable marrow signal intensity of the right hip and femur.          Impression:      Impression:  Large, lobulated 7.8 x 7.8 x 10.5 cm rim-enhancing, gas-containing, periprosthetic fluid collection, compatible with periprosthetic infection/abscess and septic joint. Purulent fluid was collected during needle aspiration from 6/18/2024 and correlate   with fluid sample.        Electronically Signed: Sonny Rey MD    6/19/2024 8:27 AM EDT    Workstation ID: [id number]    FL Guided Aspiration Joint [[id number]] Collected: 06/18/24 1153     Updated: 06/19/24 0812    Narrative:      FL GUIDED ASPIRATION JOINT    Date of Exam: 6/18/2024 10:07 AM EDT    Indication: concern for  prosthetic joint infection.       Comparison: None available.    Technique: Procedure, risks, complications, and side effects of joint aspiration were discussed and reviewed in detail with the patient including the possibility for bleeding, infection, needle damage to surrounding structures, and the potential for   nondiagnostic exam. The patient indicated understanding and consented to the procedure.    The right hip was marked, prepped, and draped in a sterile fashion. 1% lidocaine was used as a local anesthetic to the skin and subcutaneous tissues. The 3 cm 25-gauge needle was removed, and at this point, purulent fluid was seen at the injection site,   therefore per Dr. Em's request, a 20-gauge needle was advanced into the subcutaneous tissues lateral, and superficial to the joint space. Approximately 14 cc of purulent fluid was aspirated from the subcutaneous tissues surrounding the hip joint.   Fluid was labeled, and sent to pathology for further analysis. The needle was removed.    Fluoroscopic Time: 6 seconds    Number of images saved: 2    Findings:  The patient tolerated the procedure well, and no immediate complications occurred.      Impression:      Impression:  Successful aspiration of a fluid collection from the subcutaneous tissues surrounding the right hip joint as above with no immediate complications.      Report dictated by: Lauren Leon PA-c      I have personally reviewed this case and agree with the findings above:    Electronically Signed: Ivan Reilly MD    6/19/2024 8:08 AM EDT    Workstation ID: [id number]              Impression:     --acute sepsis as per admission notes, severe left foot infection with gangrene/cellulitis and concern for necrotizing soft tissue infection by imaging, urgent surgical arrangements made June 13; repeat surg 6/15  ;  subsequent right hip surg 6/20; He knows risk for persistent/recurrent or nonhealing wounds, persistet / progressive or recurrent  infection and risk for further amputation/functional-limb loss and chronic pain.  Associated with GB strep bacteremia as below.    --Acute/severe left foot infection as above, urgent surgical arrangements  made 6/13    --Acute gb strep bacteremia,   source from foot.  High risk for further serious morbidity and other serious sequela including dire consequences and metastatic foci of involvement/endovascular sequela etc. No new metastatic focus of involvement. TTE 6/17    --Right hip fracture with repair February 2024.  +pain ; orthopedics with  aspiration June 18 and culture with GB strep likely hematogenous seeding, MRI imaging with concern for septic joint, surgery on June 20 as above.   Patient understands risk for persistent/recurrent or progressive infection and potential for further surgical intervention in the future that could include functional/limb loss and other serious sequela/morbidity; he knows antibiotics and surgery not a guarantee for care    --diabetes.  Described as uncontrolled by medicine team at admission.  Glucose control per medicine team      PLAN:      --IV  rocephin    **wound culture at left foot mixed with  MSSA Klebsiella and GB strep  **blood cultures with group B strep  ** right hip cultures with group B strep    --orthopedics  making surgical plans with concern for right hipprosthetic/periprosthetic infection     --Check/review labs cultures and scans    --TTE described as technically adequate by cardiology; no description of vegetation by cardiology per report    --Partial history per nursing staff    --d/w Dr Em    --Discussed with microbiology    --Highly complex at of issues with high risk for further serious morbidity and other serious sequela    Copied text in this note has been reviewed and is accurate as of 06/21/24.        Cleve Bills MD  6/21/2024

## 2024-06-22 LAB
ANION GAP SERPL CALCULATED.3IONS-SCNC: 8 MMOL/L (ref 5–15)
BUN SERPL-MCNC: 42 MG/DL (ref 8–23)
BUN/CREAT SERPL: 39.3 (ref 7–25)
CALCIUM SPEC-SCNC: 7.1 MG/DL (ref 8.6–10.5)
CHLORIDE SERPL-SCNC: 100 MMOL/L (ref 98–107)
CO2 SERPL-SCNC: 23 MMOL/L (ref 22–29)
CREAT SERPL-MCNC: 1.07 MG/DL (ref 0.76–1.27)
DEPRECATED RDW RBC AUTO: 50.2 FL (ref 37–54)
EGFRCR SERPLBLD CKD-EPI 2021: 79.4 ML/MIN/1.73
ERYTHROCYTE [DISTWIDTH] IN BLOOD BY AUTOMATED COUNT: 14.2 % (ref 12.3–15.4)
GLUCOSE BLDC GLUCOMTR-MCNC: 179 MG/DL (ref 70–130)
GLUCOSE BLDC GLUCOMTR-MCNC: 215 MG/DL (ref 70–130)
GLUCOSE BLDC GLUCOMTR-MCNC: 223 MG/DL (ref 70–130)
GLUCOSE BLDC GLUCOMTR-MCNC: 257 MG/DL (ref 70–130)
GLUCOSE SERPL-MCNC: 192 MG/DL (ref 65–99)
HCT VFR BLD AUTO: 27.9 % (ref 37.5–51)
HGB BLD-MCNC: 9.4 G/DL (ref 13–17.7)
MCH RBC QN AUTO: 32.9 PG (ref 26.6–33)
MCHC RBC AUTO-ENTMCNC: 33.7 G/DL (ref 31.5–35.7)
MCV RBC AUTO: 97.6 FL (ref 79–97)
PLATELET # BLD AUTO: 250 10*3/MM3 (ref 140–450)
PMV BLD AUTO: 9.5 FL (ref 6–12)
POTASSIUM SERPL-SCNC: 4.5 MMOL/L (ref 3.5–5.2)
RBC # BLD AUTO: 2.86 10*6/MM3 (ref 4.14–5.8)
SODIUM SERPL-SCNC: 131 MMOL/L (ref 136–145)
WBC NRBC COR # BLD AUTO: 21.14 10*3/MM3 (ref 3.4–10.8)

## 2024-06-22 PROCEDURE — 25010000002 CEFTRIAXONE PER 250 MG: Performed by: INTERNAL MEDICINE

## 2024-06-22 PROCEDURE — 63710000001 INSULIN LISPRO (HUMAN) PER 5 UNITS: Performed by: ORTHOPAEDIC SURGERY

## 2024-06-22 PROCEDURE — 97530 THERAPEUTIC ACTIVITIES: CPT

## 2024-06-22 PROCEDURE — 97168 OT RE-EVAL EST PLAN CARE: CPT

## 2024-06-22 PROCEDURE — 97110 THERAPEUTIC EXERCISES: CPT

## 2024-06-22 PROCEDURE — 80048 BASIC METABOLIC PNL TOTAL CA: CPT | Performed by: INTERNAL MEDICINE

## 2024-06-22 PROCEDURE — 82948 REAGENT STRIP/BLOOD GLUCOSE: CPT

## 2024-06-22 PROCEDURE — 85027 COMPLETE CBC AUTOMATED: CPT | Performed by: INTERNAL MEDICINE

## 2024-06-22 PROCEDURE — 63710000001 INSULIN GLARGINE PER 5 UNITS: Performed by: ORTHOPAEDIC SURGERY

## 2024-06-22 PROCEDURE — 99232 SBSQ HOSP IP/OBS MODERATE 35: CPT | Performed by: STUDENT IN AN ORGANIZED HEALTH CARE EDUCATION/TRAINING PROGRAM

## 2024-06-22 PROCEDURE — 97535 SELF CARE MNGMENT TRAINING: CPT

## 2024-06-22 RX ADMIN — INSULIN LISPRO 5 UNITS: 100 INJECTION, SOLUTION INTRAVENOUS; SUBCUTANEOUS at 17:02

## 2024-06-22 RX ADMIN — Medication 10 ML: at 08:42

## 2024-06-22 RX ADMIN — INSULIN LISPRO 5 UNITS: 100 INJECTION, SOLUTION INTRAVENOUS; SUBCUTANEOUS at 11:58

## 2024-06-22 RX ADMIN — INSULIN LISPRO 5 UNITS: 100 INJECTION, SOLUTION INTRAVENOUS; SUBCUTANEOUS at 08:41

## 2024-06-22 RX ADMIN — INSULIN LISPRO 6 UNITS: 100 INJECTION, SOLUTION INTRAVENOUS; SUBCUTANEOUS at 11:57

## 2024-06-22 RX ADMIN — ACETAMINOPHEN 1000 MG: 500 TABLET, FILM COATED ORAL at 22:15

## 2024-06-22 RX ADMIN — INSULIN LISPRO 2 UNITS: 100 INJECTION, SOLUTION INTRAVENOUS; SUBCUTANEOUS at 17:01

## 2024-06-22 RX ADMIN — PANTOPRAZOLE SODIUM 40 MG: 40 INJECTION, POWDER, FOR SOLUTION INTRAVENOUS at 08:41

## 2024-06-22 RX ADMIN — Medication 10 ML: at 22:17

## 2024-06-22 RX ADMIN — INSULIN LISPRO 4 UNITS: 100 INJECTION, SOLUTION INTRAVENOUS; SUBCUTANEOUS at 08:40

## 2024-06-22 RX ADMIN — ASPIRIN 81 MG: 81 TABLET, COATED ORAL at 22:14

## 2024-06-22 RX ADMIN — CEFTRIAXONE 2000 MG: 2 INJECTION, POWDER, FOR SOLUTION INTRAMUSCULAR; INTRAVENOUS at 08:42

## 2024-06-22 RX ADMIN — ASPIRIN 81 MG: 81 TABLET, COATED ORAL at 08:40

## 2024-06-22 RX ADMIN — INSULIN LISPRO 4 UNITS: 100 INJECTION, SOLUTION INTRAVENOUS; SUBCUTANEOUS at 22:15

## 2024-06-22 RX ADMIN — ACETAMINOPHEN 1000 MG: 500 TABLET, FILM COATED ORAL at 14:50

## 2024-06-22 RX ADMIN — INSULIN GLARGINE 20 UNITS: 100 INJECTION, SOLUTION SUBCUTANEOUS at 22:16

## 2024-06-22 RX ADMIN — Medication 5 MG: at 22:15

## 2024-06-22 RX ADMIN — Medication 1 CAPSULE: at 08:40

## 2024-06-22 NOTE — THERAPY TREATMENT NOTE
Patient Name: Easton Alvarez  : 1965    MRN: [id number]                              Today's Date: 2024       Admit Date: 2024    Visit Dx:     ICD-10-CM ICD-9-CM   1. S/P BKA (below knee amputation), left  Z89.512 V49.75   2. Diabetic ulcer of left midfoot associated with type 2 diabetes mellitus, with necrosis of muscle  E11.621 250.80    L97.423 707.14     728.89   3. Elevated LFTs  R79.89 790.6   4. Below knee amputation  S88.119A 897.0   5. Impaired functional mobility, balance, gait, and endurance  Z74.09 V49.89   6. Infection of prosthetic total hip joint, initial encounter  T84.59XA 996.66    Z96.649 V43.64     Patient Active Problem List   Diagnosis    Diabetic ulcer of left foot with necrosis of muscle    Severe malnutrition    Infection of prosthetic total hip joint     Past Medical History:   Diagnosis Date    Diabetes mellitus     Diabetic foot infection 2018    left (partial amputation)     Past Surgical History:   Procedure Laterality Date    AMPUTATION FOOT / TOE Right     partial foot    BELOW KNEE AMPUTATION Left 2024    Procedure: AMPUTATION BELOW KNEE AND WOUND VAC ASSISTED CLOSURE LEFT;  Surgeon: Brijesh Em Jr., MD;  Location:  [facility] OR;  Service: Orthopedics;  Laterality: Left;    INCISION AND DRAINAGE HIP Right 2024    Procedure: HIP ANTERIOR INCISION AND DRAINAGE WITH HANA TABLE, POLY SWAP RIGHT WITH LEFT LEG PIN TRACTION;  Surgeon: Lex Sethi MD;  Location:  [facility];  Service: Orthopedics;  Laterality: Right;    INCISION AND DRAINAGE LEG Left 6/15/2024    Procedure: SECONDARY CLOSURE LEFT BELOW KNEE AMPUTATION;  Surgeon: Brijesh Em Jr., MD;  Location:  [facility];  Service: Orthopedics;  Laterality: Left;    PLACEMENT OF WOUND VAC Left 2024    Procedure: PLACEMENT OF WOUND VAC LEFT;  Surgeon: Brijesh Em Jr., MD;  Location:  [facility];  Service: Orthopedics;  Laterality: Left;    TOTAL HIP ARTHROPLASTY Right 2024    From fall  "\"whole hip was shattered\"      General Information       Row Name 06/22/24 1309          Physical Therapy Time and Intention    Document Type therapy note (daily note)  -MB     Mode of Treatment physical therapy  -MB       Row Name 06/22/24 1309          General Information    Patient Profile Reviewed yes  -MB     Existing Precautions/Restrictions fall;other (see comments)  Recent L BKA, s/p I&D of R MARIA LUISA due to infection, hemovac, wound vac, remote R foot TMA  -MB     Barriers to Rehab medically complex;previous functional deficit;cognitive status  -MB       Row Name 06/22/24 1309          Cognition    Orientation Status (Cognition) oriented x 3  -MB       Row Name 06/22/24 1309          Safety Issues, Functional Mobility    Safety Issues Affecting Function (Mobility) awareness of need for assistance;insight into deficits/self-awareness;problem-solving;safety precaution awareness;safety precautions follow-through/compliance;sequencing abilities  -MB     Impairments Affecting Function (Mobility) balance;range of motion (ROM);sensation/sensory awareness;strength;coordination;endurance/activity tolerance  -MB               User Key  (r) = Recorded By, (t) = Taken By, (c) = Cosigned By      Initials Name Provider Type    MB Shauna Verdin, PT Physical Therapist                   Mobility       Row Name 06/22/24 3751          Bed Mobility    Rolling Left [place] (Bed Mobility) moderate assist (50% patient effort);2 person assist;verbal cues  -MB     Rolling Right [place] (Bed Mobility) moderate assist (50% patient effort);2 person assist;verbal cues  -MB     Supine-Sit [place] (Bed Mobility) moderate assist (50% patient effort);2 person assist;verbal cues  -MB     Sit-Supine [place] (Bed Mobility) moderate assist (50% patient effort);2 person assist;verbal cues  -MB     Assistive Device (Bed Mobility) bed rails;head of bed elevated  -MB     Comment, (Bed Mobility) Pt. rolled L/R multiple times " for dependent hygiene and required assist to support trunk and manage BLEs for supine <-> sitting EOB.  -MB       Row Name 06/22/24 1459          Transfers    Comment, (Transfers) STS attempted from EOB/recliner w/ assist for set up and consistent cueing for safe hand placement and sequencing. Pt. demo significant RLE buckling upon standing w/ minimal hip clearance from chair.  -MB       Row Name 06/22/24 1459          Bed-Chair Transfer    Bed-Chair [place] (Transfers) dependent (less than 25% patient effort);2 person assist  -MB     Assistive Device (Bed-Chair Transfers) lift device  -MB       Row Name 06/22/24 1459          Sit-Stand Transfer    Sit-Stand [place] (Transfers) maximum assist (25% patient effort);2 person assist;verbal cues;nonverbal cues (demo/gesture)  -MB     Assistive Device (Sit-Stand Transfers) walker, front-wheeled  -MB       Row Name 06/22/24 1457          Gait/Stairs (Locomotion)    [place] Level (Gait) not tested  -MB     Comment, (Gait/Stairs) Not appropriate at this time.  -MB               User Key  (r) = Recorded By, (t) = Taken By, (c) = Cosigned By      Initials Name Provider Type    MB Shauna Verdin, PT Physical Therapist                   Obj/Interventions       Row Name 06/22/24 1507          Motor Skills    Therapeutic Exercise hip;knee;ankle  -MB       Row Name 06/22/24 1507          Hip (Therapeutic Exercise)    Hip (Therapeutic Exercise) isometric exercises;strengthening exercise  -MB     Hip Isometrics (Therapeutic Exercise) bilateral;gluteal sets;10 repetitions  -MB     Hip Strengthening (Therapeutic Exercise) bilateral;aBduction;aDduction;right;heel slides;10 repetitions  -MB       Row Name 06/22/24 1507          Knee (Therapeutic Exercise)    Knee (Therapeutic Exercise) isometric exercises;strengthening exercise  -MB     Knee Isometrics (Therapeutic Exercise) bilateral;quad sets;10 repetitions  -MB     Knee Strengthening (Therapeutic Exercise)  right;LAQ (long arc quad);10 repetitions;SLR (straight leg raise)  -MB       Row Name 06/22/24 1507          Ankle (Therapeutic Exercise)    Ankle AROM (Therapeutic Exercise) right;dorsiflexion;plantarflexion;10 repetitions  -[facility] Name 06/22/24 1507          Balance    Balance Assessment sitting static balance;standing static balance  -MB     Static Sitting Balance standby assist  -MB     Dynamic Sitting Balance contact guard  -MB     Position, Sitting Balance unsupported;sitting in chair;sitting edge of bed  -MB     Static Standing Balance maximum assist  -MB     Dynamic Standing Balance unable to assess  -MB     Position/Device Used, Standing Balance supported;walker, front-wheeled  -MB     Balance Interventions standing;sit to stand;weight shifting activity  -MB               User Key  (r) = Recorded By, (t) = Taken By, (c) = Cosigned By      Initials Name Provider Type    Shauna Salvador, PT Physical Therapist                   Goals/Plan    No documentation.                  Clinical Impression       [facility] Name 06/22/24 1513          Pain    Pretreatment Pain Rating 0/10 - no pain  -MB     Posttreatment Pain Rating 0/10 - no pain  -[facility] Name 06/22/24 1513          Plan of Care Review    Plan of Care Reviewed With patient  -MB     Progress no change  -MB     Outcome Evaluation Patient limited by significant weakness with R knee buckling during transfer attempts, impaired balance, decreased activity tolerance, inability to ambulate, and remains well below his baseline. He required max A x 2 for transfers and was unable to stand fully upright d/t RLE weakness. PT continues to recommend IP rehab at D/C.  -[facility] Name 06/22/24 1513          Positioning and Restraints    Pre-Treatment Position in bed  -MB     Post Treatment Position chair  -MB     In Chair notified nsg;reclined;call light within reach;encouraged to call for assist;exit alarm on;waffle cushion;on mechanical lift sling;legs  elevated;R heel elevated  -MB               User Key  (r) = Recorded By, (t) = Taken By, (c) = Cosigned By      Initials Name Provider Type    Shauna Salvador, PT Physical Therapist                   Outcome Measures       Row Name 06/22/24 1516          How much help from another person do you currently need...    Turning from your back to your side while in flat bed without using bedrails? 2  -MB     Moving from lying on back to sitting on the side of a flat bed without bedrails? 2  -MB     Moving to and from a bed to a chair (including a wheelchair)? 1  -MB     Standing up from a chair using your arms (e.g., wheelchair, bedside chair)? 1  -MB     Climbing 3-5 steps with a railing? 1  -MB     To walk in hospital room? 1  -MB     AM-PAC 6 Clicks Score (PT) 8  -MB     Highest Level of Mobility Goal 3 --> Sit at edge of bed  -MB       Row Name 06/22/24 1516 06/22/24 1445       Functional Assessment    Outcome Measure Options AM-PAC 6 Clicks Basic Mobility (PT)  -MB AM-PAC 6 Clicks Daily Activity (OT)  -              User Key  (r) = Recorded By, (t) = Taken By, (c) = Cosigned By      Initials Name Provider Type    Shauna Salvador, PT Physical Therapist    Laura Shafer, OT Occupational Therapist                                 Physical Therapy Education       Title: PT OT SLP Therapies (In Progress)       Topic: Physical Therapy (Done)       Point: Mobility training (Done)       Learning Progress Summary             Patient LEONELA Varela VU, DU by SC at 6/21/2024 1348    Comment: reviewed HEP    LEONELA Hoyt D, VU, NR by SD at 6/19/2024 1203    Comment: PT ed for expected outcomes, risks, and benefits of IPPT services and progression of activity. Pt given visual demo of unilateral gait with RW. Discussion regarding dispo planning and IRF.                         Point: Home exercise program (Done)       Learning Progress Summary             Patient LEONELA Varela VU, DU by SC at 6/21/2024 1020    Comment:  reviewed HEP    Acceptance, E,D, VU,NR by SD at 6/19/2024 1203    Comment: PT ed for expected outcomes, risks, and benefits of IPPT services and progression of activity. Pt given visual demo of unilateral gait with RW. Discussion regarding dispo planning and IRF.                         Point: Body mechanics (Done)       Learning Progress Summary             Patient Eager, E, VU,DU by SC at 6/21/2024 1349    Comment: reviewed HEP    Acceptance, E,D, VU,NR by SD at 6/19/2024 1203    Comment: PT ed for expected outcomes, risks, and benefits of IPPT services and progression of activity. Pt given visual demo of unilateral gait with RW. Discussion regarding dispo planning and IRF.                         Point: Precautions (Done)       Learning Progress Summary             Patient Eager, E, VU,DU by SC at 6/21/2024 1349    Comment: reviewed HEP    Acceptance, E,D, VU,NR by SD at 6/19/2024 1203    Comment: PT ed for expected outcomes, risks, and benefits of IPPT services and progression of activity. Pt given visual demo of unilateral gait with RW. Discussion regarding dispo planning and IRF.                                         User Key       Initials Effective Dates Name Provider Type Discipline    SC 02/03/23 -  Jorge Luis Childress, PT Physical Therapist PT    SD 03/13/23 -  Charity Dela Cruz PT Physical Therapist PT                  PT Recommendation and Plan     Plan of Care Reviewed With: patient  Progress: no change  Outcome Evaluation: Patient limited by significant weakness with R knee buckling during transfer attempts, impaired balance, decreased activity tolerance, inability to ambulate, and remains well below his baseline. He required max A x 2 for transfers and was unable to stand fully upright d/t RLE weakness. PT continues to recommend IP rehab at D/C.     Time Calculation:         PT Charges       Row Name 06/22/24 4416             Time Calculation    Start Time 1309  -MB      PT Received On 06/22/24   -MB      PT Goal Re-Cert Due Date 07/01/24  -MB         Timed Charges    15517 - PT Therapeutic Exercise Minutes 35  -MB      44983 - PT Therapeutic Activity Minutes 10  -MB         Total Minutes    Timed Charges Total Minutes 45  -MB       Total Minutes 45  -MB                User Key  (r) = Recorded By, (t) = Taken By, (c) = Cosigned By      Initials Name Provider Type    Shauna Salvador, PT Physical Therapist                  Therapy Charges for Today       Code Description Service Date Service Provider Modifiers Qty    09709262488 HC PT THER PROC EA 15 MIN 6/22/2024 Shauna Verdin, PT GP 2    20128639376 HC PT THERAPEUTIC ACT EA 15 MIN 6/22/2024 Shauna Verdin, PT GP 1            PT G-Codes  Outcome Measure Options: AM-PAC 6 Clicks Basic Mobility (PT)  AM-PAC 6 Clicks Score (PT): 8  AM-PAC 6 Clicks Score (OT): 12  PT Discharge Summary  Anticipated Discharge Disposition (PT): inpatient rehabilitation facility    Shauna Verdin PT  6/22/2024

## 2024-06-22 NOTE — PLAN OF CARE
Goal Outcome Evaluation:  Plan of Care Reviewed With: patient        Progress: no change  Outcome Evaluation: Pt. presents below baseline with ADLs and functional mobility. Limited by decreased activity tolerance, generalized weakness, and balance. Skilled OT services warranted to promote return to PLOF. Recommend IPR at discharge.      Anticipated Discharge Disposition (OT): inpatient rehabilitation facility

## 2024-06-22 NOTE — PROGRESS NOTES
"Easton Alvarez  1965  [id number]          Chief Complaint: left foot infection    Reason for Consultation: left foot infection    History of present illness:     Patient is a 58 y.o.  Yr old male with history of diabetes with prior right TMA 2019 at , history strep septicemia 2016 ; he reports right total hip arthroplasty 2024 after fall. He reports a fall several weeks prior to his June admission with injury to the left foot, wound present with deterioration several days preceding admission with severe discoloration/redness and swelling.  CT scan showed concern for subcutaneous emphysema and necrotizing infection.  Transferred to [facility] with evaluation by Dr. Em and Dr. Lundberg and arrangements made for urgent surgery. Subsequently, blood cultures called with gram-positive cocci     6/13 left LE amp, Dr Em    6/15  \"PROCEDURE:            Left      15857: Secondary closure above-knee amputation\"      6/18/24  right hip aspiration, culture with GB strep; wbc  down some postop from revision; blood cultures 6/15 negative so far    6/19/24 Dr. Em helping facilitate right hip surgery with orthopedic partners/team    6/20/24 surgery Dr Sethi  \"Procedure(s):  HIP ANTERIOR INCISION AND DRAINAGE WITH HANA TABLE, POLY SWAP- RIGHT\"    6/22/24 no new distress or focal pain per nursing staff.  Encouraged incentive spirometry. postop pain controlled at present; He has left stump  pain that is constant, sharp at times, blunted by neuropathy, worse with palpation, better with pain meds and 4  out of 10 in severity. No distress per nursing; no new focal symptoms otherwise; he has right hip pain with range of motion and weightbearing increased and out of 10 at present with movement, nonradiating, better with pain meds.     No headache photophobia or neck stiffness.  No shortness of breath cough or hemoptysis.  No nausea vomiting diarrhea or abdominal pain.  No dysuria hematuria or " "pyuria.  No other new skin rash    Past Medical History:   Diagnosis Date    Diabetes mellitus     Diabetic foot infection 2018    left (partial amputation)       Past Surgical History:   Procedure Laterality Date    AMPUTATION FOOT / TOE Right     partial foot    BELOW KNEE AMPUTATION Left 6/13/2024    Procedure: AMPUTATION BELOW KNEE AND WOUND VAC ASSISTED CLOSURE LEFT;  Surgeon: Brijesh Em Jr., MD;  Location:  [facility];  Service: Orthopedics;  Laterality: Left;    INCISION AND DRAINAGE HIP Right 6/20/2024    Procedure: HIP ANTERIOR INCISION AND DRAINAGE WITH HANA TABLE, POLY SWAP RIGHT WITH LEFT LEG PIN TRACTION;  Surgeon: Lex Sethi MD;  Location:  [facility];  Service: Orthopedics;  Laterality: Right;    INCISION AND DRAINAGE LEG Left 6/15/2024    Procedure: SECONDARY CLOSURE LEFT BELOW KNEE AMPUTATION;  Surgeon: Brijesh Em Jr., MD;  Location:  [facility];  Service: Orthopedics;  Laterality: Left;    PLACEMENT OF WOUND VAC Left 6/13/2024    Procedure: PLACEMENT OF WOUND VAC LEFT;  Surgeon: Brijesh Em Jr., MD;  Location:  [facility];  Service: Orthopedics;  Laterality: Left;    TOTAL HIP ARTHROPLASTY Right 02/2024    From fall \"whole hip was shattered\"       Pediatric History   Patient Parents    Not on file     Other Topics Concern    Not on file   Social History Narrative    Not on file       family history is not on file. High blood pressure mom/dad    No Known Allergies    Medication:  Current Facility-Administered Medications   Medication Dose Route Frequency Provider Last Rate Last Admin    acetaminophen (TYLENOL) tablet 1,000 mg  1,000 mg Oral Q8H Lex Sethi MD   1,000 mg at 06/21/24 2108    aspirin EC tablet 81 mg  81 mg Oral Q12H Lex Sethi MD   81 mg at 06/22/24 0840    sennosides-docusate (PERICOLACE) 8.6-50 MG per tablet 2 tablet  2 tablet Oral BID Lex Sethi MD   2 tablet at 06/20/24 2122    And    polyethylene glycol (MIRALAX) packet 17 g  17 g Oral Daily PRN " Lex Sethi MD        And    bisacodyl (DULCOLAX) EC tablet 5 mg  5 mg Oral Daily PRN Lex Sethi MD        And    bisacodyl (DULCOLAX) suppository 10 mg  10 mg Rectal Daily PRN Lex Sethi MD        Calcium Replacement - Follow Nurse / BPA Driven Protocol   Does not apply PRN Lex Sethi MD        cefTRIAXone (ROCEPHIN) 2,000 mg in sodium chloride 0.9 % 100 mL MBP  2,000 mg Intravenous Q24H Cleve Bills  mL/hr at 06/22/24 0842 2,000 mg at 06/22/24 0842    dextrose (D50W) (25 g/50 mL) IV injection 25 g  25 g Intravenous Q15 Min PRN Lex Sethi MD        dextrose (GLUTOSE) oral gel 15 g  15 g Oral Q15 Min PRN Lex Sethi MD        glucagon (GLUCAGEN) injection 1 mg  1 mg Intramuscular Q15 Min PRN Lex Sethi MD        HYDROmorphone (DILAUDID) injection 0.5 mg  0.5 mg Intravenous Q2H PRN Lex Sethi MD        And    naloxone (NARCAN) injection 0.1 mg  0.1 mg Intravenous Q5 Min PRN Lex Sethi MD        insulin glargine (LANTUS, SEMGLEE) injection 20 Units  20 Units Subcutaneous Nightly Lex Sethi MD   20 Units at 06/21/24 2109    Insulin Lispro (humaLOG) injection 2-9 Units  2-9 Units Subcutaneous 4x Daily AC & at Bedtime Lex Sethi MD   4 Units at 06/22/24 0840    Insulin Lispro (humaLOG) injection 5 Units  5 Units Subcutaneous TID With Meals Lex Sethi MD   5 Units at 06/22/24 0841    lactated ringers infusion  9 mL/hr Intravenous Continuous Lex Sethi MD 9 mL/hr at 06/15/24 1307 9 mL/hr at 06/15/24 1307    lactated ringers infusion  100 mL/hr Intravenous Continuous Lex Sethi  mL/hr at 06/20/24 2122 100 mL/hr at 06/20/24 2122    lactobacillus acidophilus (RISAQUAD) capsule 1 capsule  1 capsule Oral Daily Lex Sethi MD   1 capsule at 06/22/24 0840    [Held by provider] lisinopril (PRINIVIL,ZESTRIL) tablet 10 mg  10 mg Oral Q24H Lex Sethi MD   10 mg at 06/21/24 0831    Magnesium Standard Dose Replacement -  Follow Nurse / BPA Driven Protocol   Does not apply PRN Lex Sethi MD        melatonin tablet 5 mg  5 mg Oral Nightly Lex Sethi MD   5 mg at 06/21/24 2109    nitroglycerin (NITROSTAT) SL tablet 0.4 mg  0.4 mg Sublingual Q5 Min PRN Lex Sethi MD        oxyCODONE (ROXICODONE) immediate release tablet 10 mg  10 mg Oral Q4H PRN Lex Sethi MD        oxyCODONE (ROXICODONE) immediate release tablet 5 mg  5 mg Oral Q4H PRN Lex Sethi MD   5 mg at 06/21/24 1934    pantoprazole (PROTONIX) injection 40 mg  40 mg Intravenous BID AC Lex Sethi MD   40 mg at 06/22/24 0841    Phosphorus Replacement - Follow Nurse / BPA Driven Protocol   Does not apply PRLex Kwon MD        Potassium Replacement - Follow Nurse / BPA Driven Protocol   Does not apply Lex Romano MD        ropivacaine (NAROPIN) 0.2 % infusion (INFUSYSTEM)   Peripheral Nerve Continuous Lex Sethi MD   Stopped at 06/17/24 2230    sodium chloride 0.9 % flush 10 mL  10 mL Intravenous Q12H Lex Sethi MD   10 mL at 06/22/24 0842    sodium chloride 0.9 % flush 10 mL  10 mL Intravenous PRN Lex Sethi MD        sodium chloride 0.9 % infusion 40 mL  40 mL Intravenous PRN Lex Sethi MD        traMADol (ULTRAM) tablet 50 mg  50 mg Oral Q8H PRN Lex Sethi MD           Antibiotics:  Anti-Infectives (From admission, onward)      Ordered     Dose/Rate Route Frequency Start Stop    06/21/24 0756  cefTRIAXone (ROCEPHIN) 2,000 mg in sodium chloride 0.9 % 100 mL MBP        Ordering Provider: Cleve Bills MD    2,000 mg  200 mL/hr over 30 Minutes Intravenous Every 24 Hours 06/21/24 0900 07/29/24 0859    06/20/24 1457  ceFAZolin 2000 mg IVPB in 100 mL NS (MBP)        Ordering Provider: Lex Sethi MD    2,000 mg  over 30 Minutes Intravenous Once 06/20/24 1459 06/20/24 1557    06/12/24 1746  piperacillin-tazobactam (ZOSYN) 4.5 g IVPB in 100 mL NS MBP (CD)        Ordering Provider: Shraddha Scott,  "MD    4.5 g  over 30 Minutes Intravenous Once 06/12/24 1900 06/13/24 0907              Review of Systems    6/21/24     Constitutional-- No Fever, chills or sweats.  Appetite diminished with fatigue.  Heent-- No new vision, hearing or throat complaints.  No epistaxis or oral sores.  Denies odynophagia or dysphagia.  No flashers, floaters or eye pain. No odynophagia or dysphagia. No headache, photophobia or neck stiffness.  CV-- No chest pain, palpitation or syncope  Resp-- No SOB/cough/Hemoptysis  GI- No nausea, vomiting, or diarrhea.  No hematochezia, melena, or hematemesis. Denies jaundice or chronic liver disease.  -- No dysuria, hematuria, or flank pain.  Denies hesitancy, urgency or flank pain.  Lymph- no swollen lymph nodes in neck/axilla or groin.   Heme- No active bruising or bleeding; no Hx of DVT or PE.  MS-- aside from above, no swelling or pain in the bones or joints of arms/legs.  No new back pain.  Neuro-- No acute focal weakness or numbness in the arms or legs.  No seizures.    Full 12 point review of systems reviewed and negative otherwise for acute complaints, except for above    Physical Exam:   Vital Signs   /66 (BP Location: Right arm, Patient Position: Lying)   Pulse 95   Temp 98.6 °F (37 °C) (Oral)   Resp 16   Ht 172.7 cm (67.99\")   Wt 67.6 kg (149 lb)   SpO2 98%   BMI 22.66 kg/m²     GENERAL: awake; in no acute distress.   HEENT: Normocephalic, atraumatic.   No conjunctival injection. No icterus. Oropharynx clear without evidence of thrush or exudate. No evidence of periodontal disease.    NECK: Supple without nuchal rigidity. No mass.   HEART: RRR; No murmur, rubs, gallops.   LUNGS: diminished at bases.  Trace crackles at the bases but no rhonchi or wheeze. Normal respiratory effort. Nonlabored. No dullness.  ABDOMEN: Soft, nontender, nondistended. Positive bowel sounds. No rebound or guarding. NO mass or HSM.  EXT:  see below.     MSK: right hip surgical site cvered;  no new " visible redness/purulence or fluctuance  SKIN: Warm and dry without cutaneous eruptions on Inspection/palpation.      Left   BKA stump covered;  no new visible redness or purulence; no discrete fluctuance.  No crepitus    Laboratory Data    Results from last 7 days   Lab Units 06/22/24  0445 06/21/24  1205 06/19/24  0437   WBC 10*3/mm3 21.14* 25.58* 13.29*   HEMOGLOBIN g/dL 9.4* 10.6* 10.9*   HEMATOCRIT % 27.9* 32.3* 32.4*   PLATELETS 10*3/mm3 250 339 184     Results from last 7 days   Lab Units 06/22/24  0445   SODIUM mmol/L 131*   POTASSIUM mmol/L 4.5   CHLORIDE mmol/L 100   CO2 mmol/L 23.0   BUN mg/dL 42*   CREATININE mg/dL 1.07   GLUCOSE mg/dL 192*   CALCIUM mg/dL 7.1*     Results from last 7 days   Lab Units 06/16/24  0400   ALK PHOS U/L 232*   BILIRUBIN mg/dL 1.9*   ALT (SGPT) U/L 33   AST (SGOT) U/L 49*                 Estimated Creatinine Clearance: 70.2 mL/min (by C-G formula based on SCr of 1.07 mg/dL).      Microbiology:      Radiology:  Imaging Results (Last 72 Hours)       Procedure Component Value Units Date/Time    FL C Arm During Surgery [[id number]] Resulted: 06/20/24 1733     Updated: 06/20/24 1733    Narrative:      This procedure was auto-finalized with no dictation required.              Impression:     --acute sepsis as per admission notes, severe left foot infection with gangrene/cellulitis and concern for necrotizing soft tissue infection by imaging, urgent surgical arrangements made June 13; repeat surg 6/15  ;  subsequent right hip surg 6/20; He knows risk for persistent/recurrent or nonhealing wounds, persistet / progressive or recurrent infection and risk for further amputation/functional-limb loss and chronic pain.  Associated with GB strep bacteremia as below.    --Acute/severe left foot infection as above, urgent surgical arrangements  made 6/13    --Acute gb strep bacteremia,   source from foot.  High risk for further serious morbidity and other serious sequela including dire  consequences and metastatic foci of involvement/endovascular sequela etc. No new metastatic focus of involvement. TTE 6/17    --Right hip fracture with repair February 2024.  +pain ; orthopedics with  aspiration June 18 and culture with GB strep likely hematogenous seeding, MRI imaging with concern for septic joint, surgery on June 20 as above.   Patient understands risk for persistent/recurrent or progressive infection and potential for further surgical intervention in the future that could include functional/limb loss and other serious sequela/morbidity; he knows antibiotics and surgery not a guarantee for care    --postop leukocytosis, possible stress response associated with surgery.  Trending down somewhat on June 22.  No new respiratory symptoms.  Encouraged incentive spirometry.  Abdomen benign with no diarrhea.  No new urinary tract complaints.  IV sites with no suppurative change.  No rash.  Monitor for new process; no new focal muscoskeletal symptoms to suggest new metastatic focus of infection    --diabetes.  Described as uncontrolled by medicine team at admission.  Glucose control per medicine team      PLAN:      --IV  rocephin    **wound culture at left foot mixed with  MSSA Klebsiella and GB strep  **blood cultures with group B strep  ** right hip cultures with group B strep    --orthopedics  making surgical plans with concern for right hipprosthetic/periprosthetic infection     --Check/review labs cultures and scans    --TTE described as technically adequate by cardiology; no description of vegetation by cardiology per report    --Partial history per nursing staff    --encouraged incentive spirometry.  Nursing aware to instruct and encourage    --Discussed with microbiology    --Highly complex at of issues with high risk for further serious morbidity and other serious sequela    Copied text in this note has been reviewed and is accurate as of 06/22/24.        Cleve Bills,  MD  6/22/2024

## 2024-06-22 NOTE — THERAPY EVALUATION
Patient Name: Easton Alvarez  : 1965    MRN: [id number]                              Today's Date: 2024       Admit Date: 2024    Visit Dx:     ICD-10-CM ICD-9-CM   1. S/P BKA (below knee amputation), left  Z89.512 V49.75   2. Diabetic ulcer of left midfoot associated with type 2 diabetes mellitus, with necrosis of muscle  E11.621 250.80    L97.423 707.14     728.89   3. Elevated LFTs  R79.89 790.6   4. Below knee amputation  S88.119A 897.0   5. Impaired functional mobility, balance, gait, and endurance  Z74.09 V49.89   6. Infection of prosthetic total hip joint, initial encounter  T84.59XA 996.66    Z96.649 V43.64     Patient Active Problem List   Diagnosis    Diabetic ulcer of left foot with necrosis of muscle    Severe malnutrition    Infection of prosthetic total hip joint     Past Medical History:   Diagnosis Date    Diabetes mellitus     Diabetic foot infection 2018    left (partial amputation)     Past Surgical History:   Procedure Laterality Date    AMPUTATION FOOT / TOE Right     partial foot    BELOW KNEE AMPUTATION Left 2024    Procedure: AMPUTATION BELOW KNEE AND WOUND VAC ASSISTED CLOSURE LEFT;  Surgeon: Brijesh Em Jr., MD;  Location:  [facility];  Service: Orthopedics;  Laterality: Left;    INCISION AND DRAINAGE HIP Right 2024    Procedure: HIP ANTERIOR INCISION AND DRAINAGE WITH HANA TABLE, POLY SWAP RIGHT WITH LEFT LEG PIN TRACTION;  Surgeon: Lex Sethi MD;  Location:  [facility];  Service: Orthopedics;  Laterality: Right;    INCISION AND DRAINAGE LEG Left 6/15/2024    Procedure: SECONDARY CLOSURE LEFT BELOW KNEE AMPUTATION;  Surgeon: Brijesh Em Jr., MD;  Location:  [facility];  Service: Orthopedics;  Laterality: Left;    PLACEMENT OF WOUND VAC Left 2024    Procedure: PLACEMENT OF WOUND VAC LEFT;  Surgeon: Brijesh Em Jr., MD;  Location:  [facility];  Service: Orthopedics;  Laterality: Left;    TOTAL HIP ARTHROPLASTY Right 2024    From fall  "\"whole hip was shattered\"      General Information       Row Name 06/22/24 1414          OT Time and Intention    Document Type re-evaluation  -     Mode of Treatment occupational therapy  -       Row Name 06/22/24 1414          General Information    Patient Profile Reviewed yes  -LC     Prior Level of Function independent:;all household mobility;transfer;ADL's  -     Existing Precautions/Restrictions fall;other (see comments)  Recent L BKA, I&D of R MARIA LUISA due to infection, hemovac, wound vac  -     Barriers to Rehab medically complex  -       Row Name 06/22/24 1414          Living Environment    People in Home alone  -       Row Name 06/22/24 1414          Home Main Entrance    Number of Stairs, Main Entrance none  -       Row Name 06/22/24 1414          Stairs Within Home, Primary    Number of Stairs, Within Home, Primary none  -       Row Name 06/22/24 1414          Cognition    Orientation Status (Cognition) oriented x 4  -       Row Name 06/22/24 1414          Safety Issues, Functional Mobility    Safety Issues Affecting Function (Mobility) sequencing abilities;safety precaution awareness;safety precautions follow-through/compliance  -     Impairments Affecting Function (Mobility) balance;range of motion (ROM);sensation/sensory awareness;strength  -               User Key  (r) = Recorded By, (t) = Taken By, (c) = Cosigned By      Initials Name Provider Type     Laura Lyons OT Occupational Therapist                     Mobility/ADL's       Row Name 06/22/24 1424          Bed Mobility    Bed Mobility supine-sit;sit-supine;rolling left;rolling right  -LC     Rolling Left [place] (Bed Mobility) moderate assist (50% patient effort);2 person assist;verbal cues  -LC     Rolling Right [place] (Bed Mobility) moderate assist (50% patient effort);2 person assist;verbal cues  -LC     Supine-Sit [place] (Bed Mobility) moderate assist (50% patient effort);2 person assist;verbal cues  " -     Sit-Supine [place] (Bed Mobility) moderate assist (50% patient effort);2 person assist;verbal cues  -     Assistive Device (Bed Mobility) bed rails;head of bed elevated  -     Comment, (Bed Mobility) Completed multiple rolls L and R to perform clean up. Assist to bring LE's to EOB and trunk into upright position. denied diziness in sitting.  -[facility] Name 06/22/24 1424          Transfers    Transfers sit-stand transfer;bed-chair transfer  -     Comment, (Transfers) Attempted STS from EOB and recliner, R LE buckling and unable to clear hips.  -[facility] Name 06/22/24 1424          Bed-Chair Transfer    Bed-Chair [place] (Transfers) dependent (less than 25% patient effort);2 person assist  -     Assistive Device (Bed-Chair Transfers) lift device  -LC       Row Name 06/22/24 1424          Sit-Stand Transfer    Comment, (Sit-Stand Transfer) Attempted x 2. u/a to achieve stand  -LC       Row Name 06/22/24 1424          Activities of Daily Living    BADL Assessment/Intervention bathing;upper body dressing  -LC       Row Name 06/22/24 1424          Bathing Assessment/Intervention    [place] Level (Bathing) lower body;proximal lower extremities;perineal area;dependent (less than 25% patient effort)  -     Position (Bathing) supine  -     Comment, (Bathing) Increased time to peform clean up.  -LC       Row Name 06/22/24 1424          Upper Body Dressing Assessment/Training    [place] Level (Upper Body Dressing) don;doff;front opening garment;maximum assist (25% patient effort)  -     Position (Upper Body Dressing) supine  -               User Key  (r) = Recorded By, (t) = Taken By, (c) = Cosigned By      Initials Name Provider Type     Laura Lyons OT Occupational Therapist                   Obj/Interventions       [facility] Name 06/22/24 1439          Sensory Assessment (Somatosensory)    Sensory Assessment (Somatosensory) UE sensation intact  -LC       Row Name 06/22/24  1439          Vision Assessment/Intervention    Visual Impairment/Limitations corrective lenses for reading  -       Row Name 06/22/24 1439          Motor Skills    Motor Skills functional endurance  -     Functional Endurance impaired  -       Row Name 06/22/24 1439          Balance    Balance Assessment sitting static balance;sitting dynamic balance  -     Static Sitting Balance standby assist  -     Dynamic Sitting Balance contact guard  -     Position, Sitting Balance unsupported;sitting edge of bed  -     Balance Interventions sitting;static;dynamic  -     Comment, Balance R knee buckling when attempting to stand  -               User Key  (r) = Recorded By, (t) = Taken By, (c) = Cosigned By      Initials Name Provider Type     Laura Lyons, OT Occupational Therapist                   Goals/Plan       [facility] Name 06/22/24 1444          Bed Mobility Goal 1 (OT)    Activity/Assistive Device (Bed Mobility Goal 1, OT) sit to supine;supine to sit  -     [place] Level/Cues Needed (Bed Mobility Goal 1, OT) minimum assist (75% or more patient effort)  -     Time Frame (Bed Mobility Goal 1, OT) short term goal (STG);by discharge  -     Progress/Outcomes (Bed Mobility Goal 1, OT) goal ongoing  -[facility] Name 06/22/24 1444          Transfer Goal 1 (OT)    Activity/Assistive Device (Transfer Goal 1, OT) sit-to-stand/stand-to-sit;bed-to-chair/chair-to-bed;commode, bedside without drop arms;walker, rolling  -     [place] Level/Cues Needed (Transfer Goal 1, OT) moderate assist (50-74% patient effort)  -     Time Frame (Transfer Goal 1, OT) long term goal (LTG);by discharge  -     Progress/Outcome (Transfer Goal 1, OT) goal ongoing  -LC       Row Name 06/22/24 1444          Dressing Goal 1 (OT)    Activity/Device (Dressing Goal 1, OT) lower body dressing  -     [place]/Cues Needed (Dressing Goal 1, OT) minimum assist (75% or more patient effort)  -     Time Frame (Dressing  Goal 1, OT) long term goal (LTG);10 days  -     Strategies/Barriers (Dressing Goal 1, OT) AE use prn  -     Progress/Outcome (Dressing Goal 1, OT) goal ongoing  -       Row Name 06/22/24 1444          Toileting Goal 1 (OT)    Activity/Device (Toileting Goal 1, OT) adjust/manage clothing;perform perineal hygiene;commode, bedside without drop arms  -     [place] Level/Cues Needed (Toileting Goal 1, OT) moderate assist (50-74% patient effort)  -     Time Frame (Toileting Goal 1, OT) long term goal (LTG);10 days  -LC     Progress/Outcome (Toileting Goal 1, OT) goal ongoing  -       Row Name 06/22/24 1444          Strength Goal 1 (OT)    Strength Goal 1 (OT) Pt. will complete 10-15 reps each BUE tband strengthening TE to support BADL and transfer independence.  -     Time Frame (Strength Goal 1, OT) long term goal (LTG);10 days  -LC     Progress/Outcome (Strength Goal 1, OT) goal ongoing  -       Row Name 06/22/24 1444          Therapy Assessment/Plan (OT)    Planned Therapy Interventions (OT) activity tolerance training;adaptive equipment training;BADL retraining;occupation/activity based interventions;functional balance retraining;patient/caregiver education/training;strengthening exercise;transfer/mobility retraining  -               User Key  (r) = Recorded By, (t) = Taken By, (c) = Cosigned By      Initials Name Provider Type     Laura Lyons, [facility] Occupational Therapist                   Clinical Impression       Row Name 06/22/24 1442          Pain Assessment    Pretreatment Pain Rating 0/10 - no pain  -     Posttreatment Pain Rating 0/10 - no pain  -     Additional Documentation Pain Scale: Word Pre/Post-Treatment (Group)  -       Row Name 06/22/24 1442          Plan of Care Review    Plan of Care Reviewed With patient  -     Progress no change  -     Outcome Evaluation Pt. presents below baseline with ADLs and functional mobility. Limited by decreased activity tolerance,  generalized weakness, and balance. Skilled OT services warranted to promote return to PLOF. Recommend IPR at discharge.  -       Row Name 06/22/24 1442          Therapy Assessment/Plan (OT)    Patient/Family Therapy Goal Statement (OT) To take care of self  -LC     Therapy Frequency (OT) daily  -     Predicted Duration of Therapy Intervention (OT) 10 days  -       Row Name 06/22/24 1442          Therapy Plan Review/Discharge Plan (OT)    Anticipated Discharge Disposition (OT) inpatient rehabilitation facility  -       Row Name 06/22/24 1442          Positioning and Restraints    Pre-Treatment Position in bed  -     Post Treatment Position chair  -LC     In Chair notified nsg;reclined;call light within reach;encouraged to call for assist;exit alarm on;waffle cushion;legs elevated;on mechanical lift sling  -               User Key  (r) = Recorded By, (t) = Taken By, (c) = Cosigned By      Initials Name Provider Type    Laura Shafer OT Occupational Therapist                   Outcome Measures       Row Name 06/22/24 1445          How much help from another is currently needed...    Putting on and taking off regular lower body clothing? 1  -LC     Bathing (including washing, rinsing, and drying) 2  -LC     Toileting (which includes using toilet bed pan or urinal) 1  -LC     Putting on and taking off regular upper body clothing 2  -LC     Taking care of personal grooming (such as brushing teeth) 3  -LC     Eating meals 3  -LC     AM-PAC 6 Clicks Score (OT) 12  -[facility] Name 06/22/24 1445          Functional Assessment    Outcome Measure Options AM-PAC 6 Clicks Daily Activity (OT)  -               User Key  (r) = Recorded By, (t) = Taken By, (c) = Cosigned By      Initials Name Provider Type    Laura Shafer OT Occupational Therapist                    Occupational Therapy Education       Title: PT OT SLP Therapies (In Progress)       Topic: Occupational Therapy (In Progress)       Point: ADL  training (In Progress)       Description:   Instruct learner(s) on proper safety adaptation and remediation techniques during self care or transfers.   Instruct in proper use of assistive devices.                  Learning Progress Summary             Patient Acceptance, E, NR by  at 6/22/2024 1315    Acceptance, E,D, VU,DU,NR by  at 6/19/2024 1343    Comment: reason for consult, noted deficits, chair push ups                         Point: Home exercise program (Done)       Description:   Instruct learner(s) on appropriate technique for monitoring, assisting and/or progressing therapeutic exercises/activities.                  Learning Progress Summary             Patient Acceptance, E,D, VU,DU,NR by GEORGINA at 6/19/2024 1343    Comment: reason for consult, noted deficits, chair push ups                         Point: Precautions (In Progress)       Description:   Instruct learner(s) on prescribed precautions during self-care and functional transfers.                  Learning Progress Summary             Patient Acceptance, E, NR by  at 6/22/2024 1315                         Point: Body mechanics (In Progress)       Description:   Instruct learner(s) on proper positioning and spine alignment during self-care, functional mobility activities and/or exercises.                  Learning Progress Summary             Patient Acceptance, E, NR by  at 6/22/2024 1315                                         User Key       Initials Effective Dates Name Provider Type Discipline    GEORGINA 07/11/23 -  Ana Carvajal, OT Occupational Therapist OT     06/16/21 -  Laura Lyons OT Occupational Therapist OT                  OT Recommendation and Plan  Planned Therapy Interventions (OT): activity tolerance training, adaptive equipment training, BADL retraining, occupation/activity based interventions, functional balance retraining, patient/caregiver education/training, strengthening exercise, transfer/mobility  retraining  Therapy Frequency (OT): daily  Plan of Care Review  Plan of Care Reviewed With: patient  Progress: no change  Outcome Evaluation: Pt. presents below baseline with ADLs and functional mobility. Limited by decreased activity tolerance, generalized weakness, and balance. Skilled OT services warranted to promote return to PLOF. Recommend IPR at discharge.     Time Calculation:         Time Calculation- OT       Row Name 06/22/24 1447             Time Calculation- OT    OT Start Time 1315  -LC      OT Received On 06/22/24  -LC      OT Goal Re-Cert Due Date 07/02/24  -         Timed Charges    68645 - OT Self Care/Mgmt Minutes 23  -LC         Untimed Charges    OT Eval/Re-eval Minutes 34  -LC         Total Minutes    Timed Charges Total Minutes 23  -LC      Untimed Charges Total Minutes 34  -LC       Total Minutes 57  -LC                User Key  (r) = Recorded By, (t) = Taken By, (c) = Cosigned By      Initials Name Provider Type     Laura Lyons OT Occupational Therapist                  Therapy Charges for Today       Code Description Service Date Service Provider Modifiers Qty    [id number]  OT SELF CARE/MGMT/TRAIN EA 15 MIN 6/22/2024 Laura Lyons OT GO 2    [id number]  OT RE-EVAL 2 6/22/2024 Laura Lyons OT GO 1                 Laura Lyons OT  6/22/2024

## 2024-06-22 NOTE — PLAN OF CARE
Problem: Adult Inpatient Plan of Care  Goal: Absence of Hospital-Acquired Illness or Injury  Intervention: Identify and Manage Fall Risk  Description: Perform standard risk assessment on admission using a validated tool or comprehensive approach appropriate to the patient; reassess fall risk frequently, with change in status or transfer to another level of care.  Communicate fall injury risk to interprofessional healthcare team.  Determine need for increased observation, equipment and environmental modification, such as low bed, signage and supportive, nonskid footwear.  Adjust safety measures to individual developmental age, stage and identified risk factors.  Reinforce the importance of safety and physical activity with patient and family.  Perform regular intentional rounding to assess need for position change, pain assessment and personal needs, including assistance with toileting.  Recent Flowsheet Documentation  Taken 6/22/2024 0432 by Lynn Mo RN  Safety Promotion/Fall Prevention:   activity supervised   assistive device/personal items within reach   mobility aid in reach   lighting adjusted   fall prevention program maintained   nonskid shoes/slippers when out of bed   room organization consistent   safety round/check completed   toileting scheduled  Taken 6/22/2024 0203 by Lynn Mo, RN  Safety Promotion/Fall Prevention:   activity supervised   assistive device/personal items within reach   fall prevention program maintained   lighting adjusted   mobility aid in reach   gait belt   nonskid shoes/slippers when out of bed   room organization consistent   safety round/check completed   toileting scheduled  Taken 6/22/2024 0000 by Lynn Mo, RN  Safety Promotion/Fall Prevention:   activity supervised   assistive device/personal items within reach   mobility aid in reach   lighting adjusted   fall prevention program maintained   nonskid shoes/slippers when out of bed   room  organization consistent   safety round/check completed   toileting scheduled  Taken 6/21/2024 2200 by Lynn Mo RN  Safety Promotion/Fall Prevention:   activity supervised   assistive device/personal items within reach   mobility aid in reach   lighting adjusted   fall prevention program maintained   nonskid shoes/slippers when out of bed   room organization consistent   safety round/check completed   toileting scheduled  Taken 6/21/2024 2059 by Lynn Mo RN  Safety Promotion/Fall Prevention:   activity supervised   assistive device/personal items within reach   mobility aid in reach   lighting adjusted   fall prevention program maintained   nonskid shoes/slippers when out of bed   room organization consistent   safety round/check completed   toileting scheduled  Taken 6/21/2024 1930 by Lynn Mo RN  Safety Promotion/Fall Prevention:   activity supervised   assistive device/personal items within reach   mobility aid in reach   lighting adjusted   fall prevention program maintained   nonskid shoes/slippers when out of bed   room organization consistent   safety round/check completed   toileting scheduled  Intervention: Prevent Skin Injury  Description: Perform a screening for skin injury risk, such as pressure or moisture associated skin damage on admission and at regular intervals throughout hospital stay.  Keep all areas of skin (especially folds) clean and dry.  Maintain adequate skin hydration.  Relieve and redistribute pressure and protect bony prominences; implement measures based on patient-specific risk factors.  Match turning and repositioning schedule to clinical condition.  Encourage weight shift frequently; assist with reposition if unable to complete independently.  Float heels off bed; avoid pressure on the Achilles tendon.  Keep skin free from extended contact with medical devices.  Encourage functional activity and mobility, as early as tolerated.  Use aids (e.g., slide  boards, mechanical lift) during transfer.  Recent Flowsheet Documentation  Taken 6/22/2024 0432 by Lynn Mo RN  Body Position:   tilted   right   position changed independently  Skin Protection:   adhesive use limited   skin-to-device areas padded   skin-to-skin areas padded   transparent dressing maintained   tubing/devices free from skin contact  Taken 6/22/2024 0203 by Lynn Mo RN  Body Position: position changed independently  Skin Protection:   adhesive use limited   skin-to-device areas padded   skin-to-skin areas padded   transparent dressing maintained   tubing/devices free from skin contact  Taken 6/22/2024 0000 by Lynn Mo RN  Body Position: position changed independently  Skin Protection:   adhesive use limited   skin-to-device areas padded   skin-to-skin areas padded   transparent dressing maintained   tubing/devices free from skin contact  Taken 6/21/2024 2200 by Lynn Mo RN  Skin Protection:   adhesive use limited   skin-to-device areas padded   skin-to-skin areas padded   transparent dressing maintained   tubing/devices free from skin contact  Taken 6/21/2024 2059 by Lynn Mo RN  Body Position:   side-lying   position changed independently  Skin Protection:   adhesive use limited   skin-to-device areas padded   skin-to-skin areas padded   transparent dressing maintained   tubing/devices free from skin contact  Taken 6/21/2024 1930 by Lynn Mo RN  Skin Protection:   adhesive use limited   skin-to-device areas padded   skin-to-skin areas padded   transparent dressing maintained   tubing/devices free from skin contact  Intervention: Prevent and Manage VTE (Venous Thromboembolism) Risk  Description: Assess for VTE (venous thromboembolism) risk.  Encourage and assist with early ambulation.  Initiate and maintain compression or other therapy, as indicated, based on identified risk in accordance with organizational protocol and provider  order.  Encourage both active and passive leg exercises while in bed, if unable to ambulate.  Recent Flowsheet Documentation  Taken 6/22/2024 0432 by Lynn Mo RN  Activity Management: activity encouraged  Taken 6/22/2024 0203 by Lynn Mo RN  Activity Management: activity encouraged  Taken 6/22/2024 0000 by Lynn Mo RN  Activity Management: activity encouraged  VTE Prevention/Management:   sequential compression devices on   right  Taken 6/21/2024 2200 by Lynn Mo RN  Activity Management: (L BKVICTOR MANUEL)   unable to complete activity (see comments)   other (see comments)  Taken 6/21/2024 2059 by Lynn Mo RN  Activity Management: activity encouraged  VTE Prevention/Management:   sequential compression devices on   right  Range of Motion: active ROM (range of motion) encouraged  Taken 6/21/2024 1930 by Lynn Mo RN  Activity Management: activity encouraged  VTE Prevention/Management:   sequential compression devices on   right  Intervention: Prevent Infection  Description: Maintain skin and mucous membrane integrity; promote hand, oral and pulmonary hygiene.  Optimize fluid balance, nutrition, sleep and glycemic control to maximize infection resistance.  Identify potential sources of infection early to prevent or mitigate progression of infection (e.g., wound, lines, devices).  Evaluate ongoing need for invasive devices; remove promptly when no longer indicated.  Recent Flowsheet Documentation  Taken 6/22/2024 0432 by Lynn Mo RN  Infection Prevention:   environmental surveillance performed   single patient room provided  Taken 6/22/2024 0203 by Lynn Mo RN  Infection Prevention:   single patient room provided   environmental surveillance performed  Taken 6/22/2024 0000 by Lynn Mo RN  Infection Prevention:   environmental surveillance performed   single patient room provided  Taken 6/21/2024 2200 by Lynn Mo  RN  Infection Prevention:   environmental surveillance performed   single patient room provided  Taken 6/21/2024 2059 by Lynn Mo RN  Infection Prevention:   environmental surveillance performed   single patient room provided  Taken 6/21/2024 1930 by Lynn Mo RN  Infection Prevention:   environmental surveillance performed   single patient room provided  Goal: Optimal Comfort and Wellbeing  Intervention: Monitor Pain and Promote Comfort  Description: Assess pain level, treatment efficacy and patient response at regular intervals using a consistent pain scale.  Consider the presence and impact of preexisting chronic pain.  Encourage patient and caregiver involvement in pain assessment, interventions and safety measures.  Recent Flowsheet Documentation  Taken 6/21/2024 2059 by Lynn Mo RN  Pain Management Interventions:   see MAR   position adjusted   pillow support provided  Intervention: Provide Person-Centered Care  Description: Use a family-focused approach to care.  Develop trust and rapport by proactively providing information, encouraging questions, addressing concerns and offering reassurance.  Acknowledge emotional response to hospitalization.  Recognize and utilize personal coping strategies.  Honor spiritual and cultural preferences.  Recent Flowsheet Documentation  Taken 6/21/2024 2059 by Lynn Mo RN  Trust Relationship/Rapport:   care explained   reassurance provided   thoughts/feelings acknowledged     Problem: Skin Injury Risk Increased  Goal: Skin Health and Integrity  Intervention: Optimize Skin Protection  Description: Perform a full pressure injury risk assessment, as indicated by screening, upon admission to care unit.  Reassess skin (injury risk, full inspection) frequently (e.g., scheduled interval, with change in condition) to provide optimal early detection and prevention.  Maintain adequate tissue perfusion (e.g., encourage fluid balance; avoid  crossing legs, constrictive clothing or devices) to promote tissue oxygenation.  Maintain head of bed at lowest degree of elevation tolerated, considering medical condition and other restrictions.  Avoid positioning onto an area that remains reddened.  Minimize incontinence and moisture (e.g., toileting schedule; moisture-wicking pad, diaper or incontinence collection device; skin moisture barrier).  Cleanse skin promptly and gently when soiled utilizing a pH-balanced cleanser.  Relieve and redistribute pressure (e.g., scheduled position changes, weight shifts, use of support surface, medical device repositioning, protective dressing application, use of positioning device, microclimate control, use of pressure-injury-monitor  Encourage increased activity, such as sitting in a chair at the bedside or early mobilization, when able to tolerate.  Recent Flowsheet Documentation  Taken 6/22/2024 0432 by Lynn Mo RN  Pressure Reduction Techniques:   weight shift assistance provided   heels elevated off bed  Head of Bed (\[facility]\"") Positioning: \[facility]\"" elevated  Pressure Reduction Devices: pressure-redistributing mattress utilized  Skin Protection:   adhesive use limited   skin-to-device areas padded   skin-to-skin areas padded   transparent dressing maintained   tubing/devices free from skin contact  Taken 6/22/2024 0203 by Lynn Mo RN  Pressure Reduction Techniques:   weight shift assistance provided   heels elevated off bed  Head of Bed (\[facility]\"") Positioning: \[facility]\"" elevated  Pressure Reduction Devices: pressure-redistributing mattress utilized  Skin Protection:   adhesive use limited   skin-to-device areas padded   skin-to-skin areas padded   transparent dressing maintained   tubing/devices free from skin contact  Taken 6/22/2024 0000 by Lynn Mo RN  Pressure Reduction Techniques:   weight shift assistance provided   heels elevated off bed  Head of Bed (\[facility]\"") Positioning: \[facility]\"" elevated  Pressure Reduction  Devices: pressure-redistributing mattress utilized  Skin Protection:   adhesive use limited   skin-to-device areas padded   skin-to-skin areas padded   transparent dressing maintained   tubing/devices free from skin contact  Taken 6/21/2024 2200 by Lynn Mo RN  Pressure Reduction Techniques:   weight shift assistance provided   heels elevated off bed  Head of Bed (HOB) Positioning: [facility] elevated  Pressure Reduction Devices: pressure-redistributing mattress utilized  Skin Protection:   adhesive use limited   skin-to-device areas padded   skin-to-skin areas padded   transparent dressing maintained   tubing/devices free from skin contact  Taken 6/21/2024 2059 by Lynn Mo RN  Pressure Reduction Techniques:   weight shift assistance provided   heels elevated off bed  Head of Bed ([facility]) Positioning: [facility] elevated  Pressure Reduction Devices: pressure-redistributing mattress utilized  Skin Protection:   adhesive use limited   skin-to-device areas padded   skin-to-skin areas padded   transparent dressing maintained   tubing/devices free from skin contact  Taken 6/21/2024 1930 by Lynn Mo RN  Pressure Reduction Techniques:   weight shift assistance provided   heels elevated off bed  Head of Bed ([facility]) Positioning: [facility] elevated  Pressure Reduction Devices: pressure-redistributing mattress utilized  Skin Protection:   adhesive use limited   skin-to-device areas padded   skin-to-skin areas padded   transparent dressing maintained   tubing/devices free from skin contact     Problem: Skin Injury Risk Increased  Goal: Skin Health and Integrity  Outcome: Ongoing, Progressing  Intervention: Optimize Skin Protection  Description: Perform a full pressure injury risk assessment, as indicated by screening, upon admission to care unit.  Reassess skin (injury risk, full inspection) frequently (e.g., scheduled interval, with change in condition) to provide optimal early detection and prevention.  Maintain  adequate tissue perfusion (e.g., encourage fluid balance; avoid crossing legs, constrictive clothing or devices) to promote tissue oxygenation.  Maintain head of bed at lowest degree of elevation tolerated, considering medical condition and other restrictions.  Avoid positioning onto an area that remains reddened.  Minimize incontinence and moisture (e.g., toileting schedule; moisture-wicking pad, diaper or incontinence collection device; skin moisture barrier).  Cleanse skin promptly and gently when soiled utilizing a pH-balanced cleanser.  Relieve and redistribute pressure (e.g., scheduled position changes, weight shifts, use of support surface, medical device repositioning, protective dressing application, use of positioning device, microclimate control, use of pressure-injury-monitor  Encourage increased activity, such as sitting in a chair at the bedside or early mobilization, when able to tolerate.  Recent Flowsheet Documentation  Taken 6/22/2024 0432 by Lynn Mo RN  Pressure Reduction Techniques:   weight shift assistance provided   heels elevated off bed  Head of Bed (HOB) Positioning: [facility] elevated  Pressure Reduction Devices: pressure-redistributing mattress utilized  Skin Protection:   adhesive use limited   skin-to-device areas padded   skin-to-skin areas padded   transparent dressing maintained   tubing/devices free from skin contact  Taken 6/22/2024 0203 by Lynn Mo RN  Pressure Reduction Techniques:   weight shift assistance provided   heels elevated off bed  Head of Bed (HOB) Positioning: [facility] elevated  Pressure Reduction Devices: pressure-redistributing mattress utilized  Skin Protection:   adhesive use limited   skin-to-device areas padded   skin-to-skin areas padded   transparent dressing maintained   tubing/devices free from skin contact  Taken 6/22/2024 0000 by Lynn Mo RN  Pressure Reduction Techniques:   weight shift assistance provided   heels elevated off  bed  Head of Bed (HOB) Positioning: [facility] elevated  Pressure Reduction Devices: pressure-redistributing mattress utilized  Skin Protection:   adhesive use limited   skin-to-device areas padded   skin-to-skin areas padded   transparent dressing maintained   tubing/devices free from skin contact  Taken 6/21/2024 2200 by Lynn Mo RN  Pressure Reduction Techniques:   weight shift assistance provided   heels elevated off bed  Head of Bed ([facility]) Positioning: [facility] elevated  Pressure Reduction Devices: pressure-redistributing mattress utilized  Skin Protection:   adhesive use limited   skin-to-device areas padded   skin-to-skin areas padded   transparent dressing maintained   tubing/devices free from skin contact  Taken 6/21/2024 2059 by Lynn Mo RN  Pressure Reduction Techniques:   weight shift assistance provided   heels elevated off bed  Head of Bed ([facility]) Positioning: [facility] elevated  Pressure Reduction Devices: pressure-redistributing mattress utilized  Skin Protection:   adhesive use limited   skin-to-device areas padded   skin-to-skin areas padded   transparent dressing maintained   tubing/devices free from skin contact  Taken 6/21/2024 1930 by Lynn Mo RN  Pressure Reduction Techniques:   weight shift assistance provided   heels elevated off bed  Head of Bed ([facility]) Positioning: [facility] elevated  Pressure Reduction Devices: pressure-redistributing mattress utilized  Skin Protection:   adhesive use limited   skin-to-device areas padded   skin-to-skin areas padded   transparent dressing maintained   tubing/devices free from skin contact     Problem: Fall Injury Risk  Goal: Absence of Fall and Fall-Related Injury  Intervention: Identify and Manage Contributors  Description: Develop a fall prevention plan with the patient and caregiver/family.  Provide reorientation, appropriate sensory stimulation and routines with changes in mental status to decrease risk of fall.  Promote use of personal vision and  auditory aids.  Assess assistance level required for safe and effective self-care; provide support as needed, such as toileting, mobilization. For age 65 and older, implement timed toileting with assistance.  Encourage physical activity, such as performance of mobility and self-care at highest level of patient ability, multicomponent exercise program and provision of appropriate assistive devices.  If fall occurs, assess the severity of injury; implement fall injury protocol. Determine the cause and revise fall injury prevention plan.  Regularly review medication contribution to fall risk; adjust medication administration times to minimize risk of falling.  Consider risk related to polypharmacy and age.  Balance adequate pain management with potential for oversedation.  Recent Flowsheet Documentation  Taken 6/22/2024 0432 by Lynn Mo RN  Medication Review/Management: medications reviewed  Taken 6/22/2024 0203 by Lynn Mo RN  Medication Review/Management: medications reviewed  Taken 6/22/2024 0000 by Lynn Mo RN  Medication Review/Management: medications reviewed  Taken 6/21/2024 2200 by Lynn Mo RN  Medication Review/Management: medications reviewed  Taken 6/21/2024 2059 by Lynn Mo RN  Medication Review/Management: medications reviewed  Taken 6/21/2024 1930 by Lynn Mo RN  Medication Review/Management: medications reviewed  Intervention: Promote Injury-Free Environment  Description: Provide a safe, barrier-free environment that encourages independent activity.  Keep care area uncluttered and well-lighted.  Determine need for increased observation or monitoring.  Avoid use of devices that minimize mobility, such as restraints or indwelling urinary catheter.  Recent Flowsheet Documentation  Taken 6/22/2024 0432 by Lynn Mo RN  Safety Promotion/Fall Prevention:   activity supervised   assistive device/personal items within reach   mobility  aid in reach   lighting adjusted   fall prevention program maintained   nonskid shoes/slippers when out of bed   room organization consistent   safety round/check completed   toileting scheduled  Taken 6/22/2024 0203 by Lynn Mo RN  Safety Promotion/Fall Prevention:   activity supervised   assistive device/personal items within reach   fall prevention program maintained   lighting adjusted   mobility aid in reach   gait belt   nonskid shoes/slippers when out of bed   room organization consistent   safety round/check completed   toileting scheduled  Taken 6/22/2024 0000 by Lynn Mo RN  Safety Promotion/Fall Prevention:   activity supervised   assistive device/personal items within reach   mobility aid in reach   lighting adjusted   fall prevention program maintained   nonskid shoes/slippers when out of bed   room organization consistent   safety round/check completed   toileting scheduled  Taken 6/21/2024 2200 by Lynn Mo RN  Safety Promotion/Fall Prevention:   activity supervised   assistive device/personal items within reach   mobility aid in reach   lighting adjusted   fall prevention program maintained   nonskid shoes/slippers when out of bed   room organization consistent   safety round/check completed   toileting scheduled  Taken 6/21/2024 2059 by Lynn Mo, RN  Safety Promotion/Fall Prevention:   activity supervised   assistive device/personal items within reach   mobility aid in reach   lighting adjusted   fall prevention program maintained   nonskid shoes/slippers when out of bed   room organization consistent   safety round/check completed   toileting scheduled  Taken 6/21/2024 1930 by Lynn Mo, RN  Safety Promotion/Fall Prevention:   activity supervised   assistive device/personal items within reach   mobility aid in reach   lighting adjusted   fall prevention program maintained   nonskid shoes/slippers when out of bed   room organization consistent    safety round/check completed   toileting scheduled     Problem: Fall Injury Risk  Goal: Absence of Fall and Fall-Related Injury  Outcome: Ongoing, Progressing  Intervention: Identify and Manage Contributors  Description: Develop a fall prevention plan with the patient and caregiver/family.  Provide reorientation, appropriate sensory stimulation and routines with changes in mental status to decrease risk of fall.  Promote use of personal vision and auditory aids.  Assess assistance level required for safe and effective self-care; provide support as needed, such as toileting, mobilization. For age 65 and older, implement timed toileting with assistance.  Encourage physical activity, such as performance of mobility and self-care at highest level of patient ability, multicomponent exercise program and provision of appropriate assistive devices.  If fall occurs, assess the severity of injury; implement fall injury protocol. Determine the cause and revise fall injury prevention plan.  Regularly review medication contribution to fall risk; adjust medication administration times to minimize risk of falling.  Consider risk related to polypharmacy and age.  Balance adequate pain management with potential for oversedation.  Recent Flowsheet Documentation  Taken 6/22/2024 0432 by Lynn Mo RN  Medication Review/Management: medications reviewed  Taken 6/22/2024 0203 by Lynn Mo RN  Medication Review/Management: medications reviewed  Taken 6/22/2024 0000 by Lynn Mo RN  Medication Review/Management: medications reviewed  Taken 6/21/2024 2200 by Lynn Mo RN  Medication Review/Management: medications reviewed  Taken 6/21/2024 2059 by Lynn Mo RN  Medication Review/Management: medications reviewed  Taken 6/21/2024 1930 by Lynn Mo RN  Medication Review/Management: medications reviewed  Intervention: Promote Injury-Free Environment  Description: Provide a safe,  barrier-free environment that encourages independent activity.  Keep care area uncluttered and well-lighted.  Determine need for increased observation or monitoring.  Avoid use of devices that minimize mobility, such as restraints or indwelling urinary catheter.  Recent Flowsheet Documentation  Taken 6/22/2024 0432 by Lynn Mo RN  Safety Promotion/Fall Prevention:   activity supervised   assistive device/personal items within reach   mobility aid in reach   lighting adjusted   fall prevention program maintained   nonskid shoes/slippers when out of bed   room organization consistent   safety round/check completed   toileting scheduled  Taken 6/22/2024 0203 by Lynn Mo RN  Safety Promotion/Fall Prevention:   activity supervised   assistive device/personal items within reach   fall prevention program maintained   lighting adjusted   mobility aid in reach   gait belt   nonskid shoes/slippers when out of bed   room organization consistent   safety round/check completed   toileting scheduled  Taken 6/22/2024 0000 by Lynn Mo RN  Safety Promotion/Fall Prevention:   activity supervised   assistive device/personal items within reach   mobility aid in reach   lighting adjusted   fall prevention program maintained   nonskid shoes/slippers when out of bed   room organization consistent   safety round/check completed   toileting scheduled  Taken 6/21/2024 2200 by Lynn Mo, RN  Safety Promotion/Fall Prevention:   activity supervised   assistive device/personal items within reach   mobility aid in reach   lighting adjusted   fall prevention program maintained   nonskid shoes/slippers when out of bed   room organization consistent   safety round/check completed   toileting scheduled  Taken 6/21/2024 2059 by Lynn Mo, RN  Safety Promotion/Fall Prevention:   activity supervised   assistive device/personal items within reach   mobility aid in reach   lighting adjusted   fall  prevention program maintained   nonskid shoes/slippers when out of bed   room organization consistent   safety round/check completed   toileting scheduled  Taken 6/21/2024 1930 by Lynn Mo, RN  Safety Promotion/Fall Prevention:   activity supervised   assistive device/personal items within reach   mobility aid in reach   lighting adjusted   fall prevention program maintained   nonskid shoes/slippers when out of bed   room organization consistent   safety round/check completed   toileting scheduled     Problem: Adjustment to Illness (Sepsis/Septic Shock)  Goal: Optimal Coping  Intervention: Optimize Psychosocial Adjustment to Illness  Description: Acknowledge, normalize, validate intensity and complexity of patient and support system response to situation.  Provide opportunity for expression of thoughts, feelings and concerns; respond with compassion and reassurance.  Decrease stress and anxiety by providing information about patient’s status and treatment.  Facilitate support system presence and participation in care; consider providing a diary in intensive care situation.  Support coping by recognizing current coping strategies; provide aid in developing new strategies.  Acknowledge and normalize difficulty in managing life-long lifestyle changes and expectations.  Assess and monitor for signs and symptoms of psychologic distress, anxiety and depression.  Consider palliative care consult for goals of care conversation, if the condition is worsening despite treatment.  Recent Flowsheet Documentation  Taken 6/21/2024 2059 by Lynn Mo, RN  Family/Support System Care:   self-care encouraged   support provided     Problem: Infection Progression (Sepsis/Septic Shock)  Goal: Absence of Infection Signs and Symptoms  Intervention: Initiate Sepsis Management  Description: Provide fluid therapy, such as crystalloid or albumin, to increase intravascular volume, organ perfusion and oxygen delivery.  Provide  respiratory support, such as oxygen therapy, noninvasive or invasive positive pressure ventilation, to achieve oxygenation and ventilation goal; avoid hyperoxemia.  Obtain cultures prior to initiating antimicrobial therapy when possible. Do not delay for laboratory results in the presence of high suspicion or clinical indicators.  Administer intravenous broad-spectrum antimicrobial therapy promptly.  Implement hemodynamic monitoring to guide intravascular support based on individual targeted parameters.  Determine and address underlying source of infection aggressively; implement transmission-based precautions and isolation, as indicated.  Recent Flowsheet Documentation  Taken 6/22/2024 0432 by Lynn Mo RN  Infection Prevention:   environmental surveillance performed   single patient room provided  Taken 6/22/2024 0203 by Lynn Mo RN  Infection Prevention:   single patient room provided   environmental surveillance performed  Taken 6/22/2024 0000 by Lynn Mo RN  Infection Prevention:   environmental surveillance performed   single patient room provided  Taken 6/21/2024 2200 by Lynn Mo RN  Infection Prevention:   environmental surveillance performed   single patient room provided  Taken 6/21/2024 2059 by Lynn Mo RN  Infection Prevention:   environmental surveillance performed   single patient room provided  Taken 6/21/2024 1930 by Lynn Mo RN  Infection Prevention:   environmental surveillance performed   single patient room provided  Intervention: Promote Recovery  Description: Encourage pulmonary hygiene, such as cough-enhancement and airway-clearance techniques, that may include use of incentive spirometry, deep breathing and cough.  Encourage early rehabilitation and physical activity to optimize functional ability and activity tolerance, as well as minimize delirium.  Promote energy conservation; minimize oxygen demand and consumption by  adjusting environment, decreasing stimulation, maintaining normothermia and treating pain.  Optimize fluid balance, nutrition intake, sleep and glycemic control to maintain tissue perfusion and enhance immune response.  Recent Flowsheet Documentation  Taken 6/22/2024 0432 by Lynn Mo RN  Activity Management: activity encouraged  Taken 6/22/2024 0203 by Lynn Mo RN  Activity Management: activity encouraged  Taken 6/22/2024 0000 by Lynn Mo RN  Activity Management: activity encouraged  Taken 6/21/2024 2200 by Lynn Mo RN  Activity Management: (L BKA)   unable to complete activity (see comments)   other (see comments)  Taken 6/21/2024 2059 by Lynn Mo RN  Activity Management: activity encouraged  Taken 6/21/2024 1930 by Lynn oM RN  Activity Management: activity encouraged   Goal Outcome Evaluation:         Patient is alert and oriented X4, pleasant, compliant with treatment regimen. L stump redressed per daily drsg change stated in progress note on 6/22. Dressed with xeroform, gauze, kerlix, ACE bandage. Small row of blisters with drainage above BKA, placed gauze and kerlix with tape to control leaking. Medicated for pain per eMAR. VSS. Lynn Mo RN

## 2024-06-22 NOTE — PROGRESS NOTES
[facility] Medicine Services  PROGRESS NOTE    Patient Name: Easton Alvarez  : 1965  MRN: [id number]    Date of Admission: 2024  Primary Care Physician: Provider, No Known    Subjective   Subjective     CC:  F/u left foot gangrene    HPI:    Patient feels well, comfortable, without pain today.      Objective   Objective     Vital Signs:   Temp:  [98 °F (36.7 °C)-98.6 °F (37 °C)] 98.1 °F (36.7 °C)  Heart Rate:  [] 97  Resp:  [16] 16  BP: (113-127)/(64-90) 122/69     Physical Exam:   Constitutional: Awake, alert, resting comfortably  HENT: NCAT, mucous membranes moist  Respiratory: Clear to auscultation bilaterally  Cardiovascular: Regular rate and rhythm  Gastrointestinal: soft, nondistended, nontender  Musculoskeletal: s/p L BKA, wrapped with ACE wrap  Neurologic: Alert and oriented x 3, no focal deficits, speech clear  Skin: No rashes      Results Reviewed:  LAB RESULTS:      Lab 24  0445 24  1205 24  0437 24  0507 24  0506 24  0400   WBC 21.14* 25.58* 13.29* 16.60* 18.47* 25.41*   HEMOGLOBIN 9.4* 10.6* 10.9* 11.5* 11.7* 11.5*   HEMATOCRIT 27.9* 32.3* 32.4* 33.4* 34.7* 33.5*   PLATELETS 250 339 184 231 179 174   NEUTROS ABS  --   --  10.61* 13.49* 15.00* 22.05*   IMMATURE GRANS (ABS)  --   --  0.33* 0.30* 0.69* 0.77*   LYMPHS ABS  --   --  1.24 1.54 1.41 1.29   MONOS ABS  --   --  0.96* 1.08* 1.09* 1.07*   EOS ABS  --   --  0.12 0.16 0.22 0.10   MCV 97.6* 98.8* 97.3* 95.7 94.8 92.8         Lab 24  0445 24  0721 24  0437 24  0507 24  0506   SODIUM 131* 129* 130* 134* 136   POTASSIUM 4.5 5.6* 4.4 4.4 4.1   CHLORIDE 100 96* 98 99 102   CO2 23.0 18.0* 25.0 27.0 33.0*   ANION GAP 8.0 15.0 7.0 8.0 1.0*   BUN 42* 24* 16 16 21   CREATININE 1.07 0.67* 0.49* 0.54* 0.51*   EGFR 79.4 106.9 117.5 114.1 116.1   GLUCOSE 192* 164* 211* 112* 120*   CALCIUM 7.1* 7.3* 7.4* 7.5* 7.7*         Lab 24  0400   TOTAL  PROTEIN 5.8*   ALBUMIN 1.8*   GLOBULIN 4.0   ALT (SGPT) 33   AST (SGOT) 49*   BILIRUBIN 1.9*   ALK PHOS 232*                       Brief Urine Lab Results  (Last result in the past 365 days)        Color   Clarity   Blood   Leuk Est   Nitrite   Protein   CREAT   Urine HCG        06/14/24 1109 Yellow   Clear   Negative   Negative   Negative   Negative                   Microbiology Results Abnormal       Procedure Component Value - Date/Time    Tissue / Bone Culture - Synovium, Hip, Right [[id number]] Collected: 06/20/24 1700    Lab Status: Preliminary result Specimen: Synovium from Hip, Right Updated: 06/22/24 0925     Tissue Culture No growth at 2 days     Gram Stain Many (4+) Red blood cells      Moderate (3+) WBCs seen      No organisms seen    Tissue / Bone Culture - Synovium, Hip, Right [[id number]] Collected: 06/20/24 1700    Lab Status: Preliminary result Specimen: Synovium from Hip, Right Updated: 06/22/24 0925     Tissue Culture No growth at 2 days     Gram Stain Many (4+) Red blood cells      Few (2+) WBCs seen      No organisms seen    Tissue / Bone Culture - Synovium, Hip, Right [[id number]] Collected: 06/20/24 1700    Lab Status: Preliminary result Specimen: Synovium from Hip, Right Updated: 06/22/24 0925     Tissue Culture No growth at 2 days     Gram Stain Moderate (3+) WBCs seen      No organisms seen     Comment: Modified report. Previous result was Rare (1+) Gram positive cocci in pairs on 6/20/2024 at 1949 EDT.         Many (4+) Red blood cells    AFB Culture - Synovium, Hip, Right [[id number]] Collected: 06/20/24 1700    Lab Status: Preliminary result Specimen: Synovium from Hip, Right Updated: 06/21/24 1102     AFB Stain No acid fast bacilli seen on concentrated smear    AFB Culture - Synovium, Hip, Right [[id number]] Collected: 06/20/24 1700    Lab Status: Preliminary result Specimen: Synovium from Hip, Right Updated: 06/21/24 1102     AFB Stain No acid fast bacilli seen on concentrated smear    AFB  Culture - Synovium, Hip, Right [[id number]] Collected: 06/20/24 1700    Lab Status: Preliminary result Specimen: Synovium from Hip, Right Updated: 06/21/24 1101     AFB Stain No acid fast bacilli seen on concentrated smear    Anaerobic Culture 10 Day Incubation - Wound, Leg, Left [[id number]]  (Normal) Collected: 06/15/24 1501    Lab Status: Preliminary result Specimen: Wound from Leg, Left Updated: 06/21/24 0636     Anaerobic Culture No anaerobes isolated at 5 days    Fungus Culture - Wound, Leg, Left [[id number]] Collected: 06/15/24 1501    Lab Status: Preliminary result Specimen: Wound from Leg, Left Updated: 06/20/24 2215     Fungus Culture No fungus isolated at less than 1 week    AFB Culture - Wound, Leg, Left [[id number]] Collected: 06/15/24 1501    Lab Status: Preliminary result Specimen: Wound from Leg, Left Updated: 06/20/24 2215     AFB Culture No AFB isolated at less than 1 week     AFB Stain No acid fast bacilli seen on concentrated smear    Fungus Culture - Surgical Site, Leg, Left [[id number]] Collected: 06/13/24 1031    Lab Status: Preliminary result Specimen: Surgical Site from Leg, Left Updated: 06/20/24 1201     Fungus Culture No fungus isolated at 1 week    AFB Culture - Surgical Site, Leg, Left [[id number]] Collected: 06/13/24 1031    Lab Status: Preliminary result Specimen: Surgical Site from Leg, Left Updated: 06/20/24 1201     AFB Culture No AFB isolated at 1 week     AFB Stain No acid fast bacilli seen on concentrated smear    Blood Culture - Blood, Hand, Left [[id number]]  (Normal) Collected: 06/15/24 1104    Lab Status: Final result Specimen: Blood from Hand, Left Updated: 06/20/24 1201     Blood Culture No growth at 5 days    Blood Culture - Blood, Hand, Right [[id number]]  (Normal) Collected: 06/15/24 1104    Lab Status: Final result Specimen: Blood from Hand, Right Updated: 06/20/24 1201     Blood Culture No growth at 5 days    AFB Culture - Aspirate, Hip, Right [[id number]] Collected: 06/18/24  1052    Lab Status: Preliminary result Specimen: Aspirate from Hip, Right Updated: 06/19/24 1102     AFB Stain No acid fast bacilli seen on concentrated smear    Wound Culture - Wound, Leg, Left [[id number]] Collected: 06/15/24 1501    Lab Status: Final result Specimen: Wound from Leg, Left Updated: 06/19/24 0711     Wound Culture No growth at 3 days     Gram Stain Rare (1+) WBCs seen      No organisms seen    Anaerobic Culture - Surgical Site, Leg, Left [[id number]]  (Normal) Collected: 06/13/24 1031    Lab Status: Final result Specimen: Surgical Site from Leg, Left Updated: 06/18/24 0751     Anaerobic Culture No anaerobes isolated at 5 days    Blood Culture - Blood, Arm, Left [[id number]]  (Normal) Collected: 06/12/24 1832    Lab Status: Final result Specimen: Blood from Arm, Left Updated: 06/17/24 1900     Blood Culture No growth at 5 days    Blood Culture - Blood, Hand, Right [[id number]]  (Normal) Collected: 06/12/24 1837    Lab Status: Final result Specimen: Blood from Hand, Right Updated: 06/17/24 1900     Blood Culture No growth at 5 days    Fungus Smear - Surgical Site, Leg, Left [[id number]] Collected: 06/13/24 1031    Lab Status: Final result Specimen: Surgical Site from Leg, Left Updated: 06/14/24 1406     Fungal Stain No fungal elements seen            FL C Arm During Surgery    Result Date: 6/20/2024  This procedure was auto-finalized with no dictation required.     Results for orders placed during the hospital encounter of 06/12/24    Adult Transthoracic Echo Complete w/ Color, Spectral and Contrast if Necessary Per Protocol    Interpretation Summary    Left ventricular systolic function is normal. Left ventricular ejection fraction appears to be 56 - 60%.    Left ventricular wall thickness is consistent with borderline concentric hypertrophy.    Left ventricular diastolic function is consistent with (grade Ia w/high LAP) impaired relaxation.      Current medications:  Scheduled Meds:acetaminophen, 1,000  mg, Oral, Q8H  aspirin, 81 mg, Oral, Q12H  cefTRIAXone, 2,000 mg, Intravenous, Q24H  insulin glargine, 20 Units, Subcutaneous, Nightly  insulin lispro, 2-9 Units, Subcutaneous, 4x Daily AC & at Bedtime  Insulin Lispro, 5 Units, Subcutaneous, TID With Meals  lactobacillus acidophilus, 1 capsule, Oral, Daily  [Held by provider] lisinopril, 10 mg, Oral, Q24H  melatonin, 5 mg, Oral, Nightly  pantoprazole, 40 mg, Intravenous, BID AC  senna-docusate sodium, 2 tablet, Oral, BID  sodium chloride, 10 mL, Intravenous, Q12H      Continuous Infusions:lactated ringers, 9 mL/hr, Last Rate: 9 mL/hr (06/15/24 1307)  lactated ringers, 100 mL/hr, Last Rate: 100 mL/hr (06/20/24 2122)  ropivacaine, , Last Rate: Stopped (06/17/24 2230)      PRN Meds:.  senna-docusate sodium **AND** polyethylene glycol **AND** bisacodyl **AND** bisacodyl    Calcium Replacement - Follow Nurse / BPA Driven Protocol    dextrose    dextrose    glucagon (human recombinant)    HYDROmorphone **AND** naloxone    Magnesium Standard Dose Replacement - Follow Nurse / BPA Driven Protocol    nitroglycerin    oxyCODONE    oxyCODONE    Phosphorus Replacement - Follow Nurse / BPA Driven Protocol    Potassium Replacement - Follow Nurse / BPA Driven Protocol    sodium chloride    sodium chloride    traMADol    Assessment & Plan   Assessment & Plan     Active Hospital Problems    Diagnosis  POA    **Diabetic ulcer of left foot with necrosis of muscle [E11.621, L97.523]  Yes    Severe malnutrition [E43]  Yes    Infection of prosthetic total hip joint [T84.59XA, Z96.649]  Not Applicable      Resolved Hospital Problems   No resolved problems to display.        Brief Hospital Course to date:  Easton Alvarez is a 58 y.o. male with past medical history significant for T2DM, diabetic neuropathy, right hip fracture/ORIF ([facility] 2/2024), and right transmetatarsal amputation 2019 at  who presented to OSH ED on 6/12/2024 with severe left foot cellulitis/necrosis after recent injury.  Lactate at OSH was 8.8 and CT imaging of left leg showed gas gangrene. Orthopedic surgeon Dr. Em was consulted and performed a left below-knee amputation on 6/13/2024 with further debridement and irrigation on 6/15. Infectious disease has followed and managed antimicrobial therapy.     This patient's problems and plans were partially entered by my partner and updated as appropriate by me 06/22/24.    Sepsis secondary to group B strep bacteremia  Left foot cellulitis/gangrene   Leukocytosis  -MRI left foot showed extensive soft tissue infection with abscesses along the foot to the ankle and into the lower leg with soft tissue gas.    -Wound cultures 6/12 with heavy growth group B strep and Kebsiella, blood cultures x 2 with group B strep; wound culture 6/13 with group B strep  -TTE described as technically adequate by cardiology; no description of vegetation by cardiology per report   -s/p left BKA with wound VAC on 6/13/24    -s/p further debridement in OR on 6/15/24 by Dr. Em, drain removed 6/17  -Infectious disease, Dr. Bills following; continue IV ceftriaxone.  -PT wound care following.  -Added Boost glucose control to increase protein intake for wound healing, nutrition following.  -Pain control with scheduled Tylenol, PRN oxycodone.   -Daily dressing changes to surgical site: Xeroform, 4X4, Kerlix, ACE  -Follow up with  Orthopedics in 2 weeks for wound check, imaging.    Right glenys-prosthetic hip infection  -IR performed fluid aspiration from the hip which was purulent.  -s/p I&D on 6/20, Dr. Sethi   -Fluid culture 6/18 with rare growth group B strep  -Supportive care, mobilize, pain control. Antibiotics as above.    Concern for GIB  Acute on chronic anemia  -melena and fecal occult positive overnight 6/14  -GI consulted, recommended medical management with PPI BID x 1 month  -GI referral at discharge for elevated LFTs, thrombocytopenia and concern for early cirrhosis, plan to purse liver US  after recovery from acute illness.    Elevated blood pressure  -No history of taking meds for BP per patient   -BP has been running high this admission, possibly secondary to pain  -Started low-dose ACE given DM2, lisinopril 10 mg daily.    Mild hyponatremia  -Continue to monitor.     Anion gap metabolic acidosis POA, resolved   -AG 20.1, likely from lactate, serum acetone negative.    Uncontrolled diabetes mellitus type 2 with hyperglycemia  -Patient was not taking meds or checking glucoses, though insulin had been prescribed in the past.  -A1c 9.5% this admission  -Continue Lantus 20 units nightly, lispro 5 units TID with meals, moderate dose SSI. Monitor glucose and adjust insulin as needed. Diabetes educator following.    Expected Discharge Location and Transportation: rehab  Expected Discharge   Expected Discharge Date: 6/21/2024; Expected Discharge Time:      VTE Prophylaxis:  Mechanical VTE prophylaxis orders are present.         AM-PAC 6 Clicks Score (PT): 8 (06/22/24 1516)    CODE STATUS:   Code Status and Medical Interventions:   Ordered at: 06/20/24 2026     Level Of Support Discussed With:    Patient     Code Status (Patient has no pulse and is not breathing):    CPR (Attempt to Resuscitate)     Medical Interventions (Patient has pulse or is breathing):    Full       Sandra Garza, DO  06/22/24

## 2024-06-22 NOTE — PLAN OF CARE
Goal Outcome Evaluation:  Plan of Care Reviewed With: patient        Progress: no change  Outcome Evaluation: Patient limited by significant weakness with R knee buckling during transfer attempts, impaired balance, decreased activity tolerance, inability to ambulate, and remains well below his baseline. He required max A x 2 for transfers and was unable to stand fully upright d/t RLE weakness. PT continues to recommend IP rehab at D/C.      Anticipated Discharge Disposition (PT): inpatient rehabilitation facility

## 2024-06-23 LAB
ANION GAP SERPL CALCULATED.3IONS-SCNC: 5 MMOL/L (ref 5–15)
BACTERIA SPEC AEROBE CULT: NORMAL
BUN SERPL-MCNC: 42 MG/DL (ref 8–23)
BUN/CREAT SERPL: 51.2 (ref 7–25)
CALCIUM SPEC-SCNC: 6.8 MG/DL (ref 8.6–10.5)
CHLORIDE SERPL-SCNC: 102 MMOL/L (ref 98–107)
CO2 SERPL-SCNC: 25 MMOL/L (ref 22–29)
CREAT SERPL-MCNC: 0.82 MG/DL (ref 0.76–1.27)
DEPRECATED RDW RBC AUTO: 46.9 FL (ref 37–54)
EGFRCR SERPLBLD CKD-EPI 2021: 100.6 ML/MIN/1.73
ERYTHROCYTE [DISTWIDTH] IN BLOOD BY AUTOMATED COUNT: 13.8 % (ref 12.3–15.4)
GLUCOSE BLDC GLUCOMTR-MCNC: 168 MG/DL (ref 70–130)
GLUCOSE BLDC GLUCOMTR-MCNC: 178 MG/DL (ref 70–130)
GLUCOSE BLDC GLUCOMTR-MCNC: 179 MG/DL (ref 70–130)
GLUCOSE BLDC GLUCOMTR-MCNC: 189 MG/DL (ref 70–130)
GLUCOSE SERPL-MCNC: 189 MG/DL (ref 65–99)
GRAM STN SPEC: NORMAL
HCT VFR BLD AUTO: 23.8 % (ref 37.5–51)
HGB BLD-MCNC: 8.1 G/DL (ref 13–17.7)
MCH RBC QN AUTO: 31.9 PG (ref 26.6–33)
MCHC RBC AUTO-ENTMCNC: 34 G/DL (ref 31.5–35.7)
MCV RBC AUTO: 93.7 FL (ref 79–97)
PLATELET # BLD AUTO: 169 10*3/MM3 (ref 140–450)
PMV BLD AUTO: 9.2 FL (ref 6–12)
POTASSIUM SERPL-SCNC: 4.2 MMOL/L (ref 3.5–5.2)
RBC # BLD AUTO: 2.54 10*6/MM3 (ref 4.14–5.8)
SODIUM SERPL-SCNC: 132 MMOL/L (ref 136–145)
WBC NRBC COR # BLD AUTO: 11.35 10*3/MM3 (ref 3.4–10.8)

## 2024-06-23 PROCEDURE — 85027 COMPLETE CBC AUTOMATED: CPT | Performed by: STUDENT IN AN ORGANIZED HEALTH CARE EDUCATION/TRAINING PROGRAM

## 2024-06-23 PROCEDURE — 99232 SBSQ HOSP IP/OBS MODERATE 35: CPT | Performed by: STUDENT IN AN ORGANIZED HEALTH CARE EDUCATION/TRAINING PROGRAM

## 2024-06-23 PROCEDURE — 97110 THERAPEUTIC EXERCISES: CPT

## 2024-06-23 PROCEDURE — 63710000001 INSULIN LISPRO (HUMAN) PER 5 UNITS: Performed by: ORTHOPAEDIC SURGERY

## 2024-06-23 PROCEDURE — 82948 REAGENT STRIP/BLOOD GLUCOSE: CPT

## 2024-06-23 PROCEDURE — 25010000002 CEFTRIAXONE PER 250 MG: Performed by: INTERNAL MEDICINE

## 2024-06-23 PROCEDURE — 63710000001 INSULIN GLARGINE PER 5 UNITS: Performed by: ORTHOPAEDIC SURGERY

## 2024-06-23 PROCEDURE — 80048 BASIC METABOLIC PNL TOTAL CA: CPT | Performed by: STUDENT IN AN ORGANIZED HEALTH CARE EDUCATION/TRAINING PROGRAM

## 2024-06-23 RX ADMIN — OXYCODONE HYDROCHLORIDE 5 MG: 5 TABLET ORAL at 18:46

## 2024-06-23 RX ADMIN — INSULIN LISPRO 5 UNITS: 100 INJECTION, SOLUTION INTRAVENOUS; SUBCUTANEOUS at 08:33

## 2024-06-23 RX ADMIN — INSULIN LISPRO 2 UNITS: 100 INJECTION, SOLUTION INTRAVENOUS; SUBCUTANEOUS at 08:34

## 2024-06-23 RX ADMIN — ACETAMINOPHEN 1000 MG: 500 TABLET, FILM COATED ORAL at 05:34

## 2024-06-23 RX ADMIN — PANTOPRAZOLE SODIUM 40 MG: 40 INJECTION, POWDER, FOR SOLUTION INTRAVENOUS at 08:34

## 2024-06-23 RX ADMIN — TRAMADOL HYDROCHLORIDE 50 MG: 50 TABLET ORAL at 21:15

## 2024-06-23 RX ADMIN — Medication 10 ML: at 18:25

## 2024-06-23 RX ADMIN — Medication 10 ML: at 21:16

## 2024-06-23 RX ADMIN — Medication 1 CAPSULE: at 10:42

## 2024-06-23 RX ADMIN — INSULIN LISPRO 2 UNITS: 100 INJECTION, SOLUTION INTRAVENOUS; SUBCUTANEOUS at 12:47

## 2024-06-23 RX ADMIN — INSULIN LISPRO 2 UNITS: 100 INJECTION, SOLUTION INTRAVENOUS; SUBCUTANEOUS at 18:24

## 2024-06-23 RX ADMIN — ACETAMINOPHEN 1000 MG: 500 TABLET, FILM COATED ORAL at 21:15

## 2024-06-23 RX ADMIN — INSULIN LISPRO 5 UNITS: 100 INJECTION, SOLUTION INTRAVENOUS; SUBCUTANEOUS at 12:47

## 2024-06-23 RX ADMIN — INSULIN LISPRO 2 UNITS: 100 INJECTION, SOLUTION INTRAVENOUS; SUBCUTANEOUS at 21:15

## 2024-06-23 RX ADMIN — PANTOPRAZOLE SODIUM 40 MG: 40 INJECTION, POWDER, FOR SOLUTION INTRAVENOUS at 18:24

## 2024-06-23 RX ADMIN — ASPIRIN 81 MG: 81 TABLET, COATED ORAL at 21:15

## 2024-06-23 RX ADMIN — Medication 5 MG: at 21:14

## 2024-06-23 RX ADMIN — INSULIN GLARGINE 20 UNITS: 100 INJECTION, SOLUTION SUBCUTANEOUS at 21:15

## 2024-06-23 RX ADMIN — CEFTRIAXONE 2000 MG: 2 INJECTION, POWDER, FOR SOLUTION INTRAMUSCULAR; INTRAVENOUS at 08:33

## 2024-06-23 RX ADMIN — INSULIN LISPRO 5 UNITS: 100 INJECTION, SOLUTION INTRAVENOUS; SUBCUTANEOUS at 18:24

## 2024-06-23 RX ADMIN — ACETAMINOPHEN 1000 MG: 500 TABLET, FILM COATED ORAL at 14:47

## 2024-06-23 RX ADMIN — ASPIRIN 81 MG: 81 TABLET, COATED ORAL at 10:42

## 2024-06-23 NOTE — PROGRESS NOTES
"Easton Alvarez       LOS: 11 days   Patient Care Team:  Provider, No Known as PCP - General    Chief Complaint:  left foot / leg necrotizing soft tissue infection.    Subjective     Interval History:     Pain tolerable overnight    Review of Systems:      Gen- No fevers, chills  CV- No chest pain, palpitations  Resp- No cough, dyspnea  GI- No N/V/D, abd pain    Objective     Vital Signs  Vital Signs (last 24 hours)         06/13 0700  06/14 0659 06/14 0700  06/14 0839   Most Recent      Temp (°F) 97 -  98.7      98.1     98.1 (36.7) 06/14 0724    Heart Rate 75 -  84       81 06/14 0340    Resp 13 -  20      20     20 06/14 0724    BP 97/62 -  145/70      149/85     149/85 06/14 0724    SpO2 (%) 96 -  99       98 06/14 0340    Flow (L/min) 2 -  4       2 06/13 1100              Physical Exam:     Alert, oriented.  No acute distress.  Nonlabored respirations.  Regular rate and rhythm.  Abdomen nondistended.  Left lower extremity: Operative dressing intact, drain with minimal serosanguineous output.  Incision inspected, skin edges well-approximated, staples in place.  Incision appears clean, no erythema, drainage, warmth, purulence.  Right lower extremity: Operative dressing in place.     Results Review:     I reviewed the patient's new clinical results.    Medication Review:   Hospital Medications (active)         Dose Frequency Start End    bisacodyl (DULCOLAX) EC tablet 5 mg 5 mg Daily PRN 6/12/2024 --    Admin Instructions: Use if no bowel movement after 12 hours.  Swallow whole. Do not crush, split, or chew tablet.    Route: Oral    Linked Group 1: Placed in \"And\" Linked Group        bisacodyl (DULCOLAX) suppository 10 mg 10 mg Daily PRN 6/12/2024 --    Admin Instructions: Use if no bowel movement after 12 hours.  Hold for diarrhea    Route: Rectal    Linked Group 1: Placed in \"And\" Linked Group        Calcium Replacement - Follow Nurse / BPA Driven Protocol  As Needed 6/13/2024 --    Admin Instructions: Open " "Order & Select \"[facility] Electrolyte Replacement Protocol Algorithm\" to View Details    Route: Does not apply    clindamycin (CLEOCIN) 900 mg in dextrose 5% 50 mL IVPB (premix) 900 mg Every 8 Hours 6/12/2024 6/17/2024    Admin Instructions: Do Not refrigerate.    Route: Intravenous    DAPTOmycin (CUBICIN) 400 mg in sodium chloride 0.9 % 50 mL IVPB 6 mg/kg × 68 kg Every 24 Hours 6/13/2024 6/23/2024    Admin Instructions: Caution: Look alike/sound alike drug alert.  Refrigerate. Do not shake.    Route: Intravenous    dextrose (D50W) (25 g/50 mL) IV injection 25 g 25 g Every 15 Minutes PRN 6/12/2024 --    Admin Instructions: Blood sugar less than 70; patient has IV access - Unresponsive, NPO or Unable To Safely Swallow    Route: Intravenous    dextrose (GLUTOSE) oral gel 15 g 15 g Every 15 Minutes PRN 6/12/2024 --    Admin Instructions: BS<70, Patient Alert, Is not NPO, Can safely swallow.    Route: Oral    glucagon (GLUCAGEN) injection 1 mg 1 mg Every 15 Minutes PRN 6/12/2024 --    Admin Instructions: Blood Glucose Less Than 70 - Patient Without IV Access - Unresponsive, NPO or Unable To Safely Swallow  Reconstitute powder for injection by adding 1 mL of -supplied sterile diluent or sterile water for injection to a vial containing 1 mg of the drug, to provide solutions containing 1 mg/mL. Shake vial gently to dissolve.    Route: Intramuscular    HYDROcodone-acetaminophen (NORCO) 5-325 MG per tablet 1 tablet 1 tablet Every 4 Hours PRN 6/12/2024 6/17/2024    Admin Instructions: Based on patient request - if ordered for moderate or severe pain, provider allows for administration of a medication prescribed for a lower pain scale.  [ISRAEL]    Do not exceed 4 grams of acetaminophen in a 24 hr period. Max dose of 2gm for AST/ALT greater than 120 units/L        If given for pain, use the following pain scale:   Mild Pain = Pain Score of 1-3, CPOT 1-2  Moderate Pain = Pain Score of 4-6, CPOT 3-4  Severe Pain = Pain Score " "of 7-10, CPOT 5-8    Route: Oral    HYDROmorphone (DILAUDID) injection 0.5 mg 0.5 mg Every 4 Hours PRN 6/12/2024 6/17/2024    Admin Instructions: Based on patient request - if ordered for moderate or severe pain, provider allows for administration of a medication prescribed for a lower pain scale.  If given for pain, use the following pain scale:  Mild Pain = Pain Score of 1-3, CPOT 1-2  Moderate Pain = Pain Score of 4-6, CPOT 3-4  Severe Pain = Pain Score of 7-10, CPOT 5-8    Route: Intravenous    insulin glargine (LANTUS, SEMGLEE) injection 10 Units 10 Units Nightly 6/13/2024 --    Admin Instructions: Do not hold basal insulin without an order. Consider requesting a dose edit, if needed.      Route: Subcutaneous    insulin regular (humuLIN R,novoLIN R) injection 2-7 Units 2-7 Units 4 Times Daily Before Meals & Nightly 6/13/2024 --    Admin Instructions: Correction Insulin - Low Dose - Total Insulin Dose Less Than 40 units/day (Lean, Elderly or Renal Patients)    Blood Glucose 150-199 mg/dL - 2 units  Blood Glucose 200-249 mg/dL - 3 units  Blood Glucose 250-299 mg/dL - 4 units  Blood Glucose 300-349 mg/dL - 5 units  Blood Glucose 350-400 mg/dL - 6 units  Blood Glucose Greater Than 400 mg/dL - 7 units & Call Provider   Caution: Look alike/sound alike drug alert    Route: Subcutaneous    lactated ringers infusion 100 mL/hr Continuous 6/12/2024 --    Route: Intravenous    lactated ringers infusion 9 mL/hr Continuous 6/13/2024 --    Admin Instructions: May switch to NS IV at KVO if renal / if indicated    Route: Intravenous    lactobacillus acidophilus (RISAQUAD) capsule 1 capsule 1 capsule Daily 6/12/2024 --    Route: Oral    Magnesium Standard Dose Replacement - Follow Nurse / BPA Driven Protocol  As Needed 6/13/2024 --    Admin Instructions: Open Order & Select \"[facility] Electrolyte Replacement Protocol Algorithm\" to View Details    Route: Does not apply    morphine injection 2 mg 2 mg Every 4 Hours PRN 6/13/2024 " "6/18/2024    Admin Instructions: Based on patient request - if ordered for moderate or severe pain, provider allows for administration of a medication prescribed for a lower pain scale.  If given for pain, use the following pain scale:  Mild Pain = Pain Score of 1-3, CPOT 1-2  Moderate Pain = Pain Score of 4-6, CPOT 3-4  Severe Pain = Pain Score of 7-10, CPOT 5-8    Route: Intravenous    nitroglycerin (NITROSTAT) SL tablet 0.4 mg 0.4 mg Every 5 Minutes PRN 6/12/2024 --    Admin Instructions: If Pain Unrelieved After 3 Doses Notify MD  May administer up to 3 doses per episode.    Route: Sublingual    oxyCODONE (ROXICODONE) immediate release tablet 5 mg 5 mg Every 4 Hours PRN 6/13/2024 6/20/2024    Admin Instructions: Based on patient request - if ordered for moderate or severe pain, provider allows for administration of a medication prescribed for a lower pain scale.    If given for pain, use the following pain scale:  Mild Pain = Pain Score of 1-3, CPOT 1-2  Moderate Pain = Pain Score of 4-6, CPOT 3-4  Severe Pain = Pain Score of 7-10, CPOT 5-8    Route: Oral    pantoprazole (PROTONIX) injection 40 mg 40 mg 2 Times Daily Before Meals 6/14/2024 --    Admin Instructions: Dilute with 10 mL of 0.9% NaCl and give IV push over 2 minutes.    Route: Intravenous    Phosphorus Replacement - Follow Nurse / BPA Driven Protocol  As Needed 6/13/2024 --    Admin Instructions: Open Order & Select \"[facility] Electrolyte Replacement Protocol Algorithm\" to View Details    Route: Does not apply    piperacillin-tazobactam (ZOSYN) 4.5 g IVPB in 100 mL NS MBP (CD) 4.5 g Every 8 Hours 6/13/2024 6/18/2024    Route: Intravenous    polyethylene glycol (MIRALAX) packet 17 g 17 g Daily PRN 6/12/2024 --    Admin Instructions: Use if no bowel movement after 12 hours. Mix in 6-8 ounces of water.  Use 4-8 ounces of water, tea, or juice for each 17 gram dose.    Route: Oral    Linked Group 1: Placed in \"And\" Linked Group        Potassium Replacement - " "Follow Nurse / BPA Driven Protocol  As Needed 6/13/2024 --    Admin Instructions: Open Order & Select \"[facility] Electrolyte Replacement Protocol Algorithm\" to View Details    Route: Does not apply    ropivacaine (NAROPIN) 0.2 % infusion (INFUSYSTEM)  Continuous 6/13/2024 --    Admin Instructions:  When Pt. In-House Infusion complete do not order refill until discussing with APS,  If pain greater than 7 out of 10, please call [phone] or [phone].  Thank you    Route: Peripheral Nerve    sennosides-docusate (PERICOLACE) 8.6-50 MG per tablet 2 tablet 2 tablet 2 Times Daily 6/12/2024 --    Admin Instructions: HOLD MEDICATION IF PATIENT HAS HAD BOWEL MOVEMENT. Start bowel management regimen if patient has not had a bowel movement after 12 hours.    Route: Oral    Linked Group 1: Placed in \"And\" Linked Group        sodium chloride 0.9 % flush 10 mL 10 mL Every 12 Hours Scheduled 6/12/2024 --    Route: Intravenous    sodium chloride 0.9 % flush 10 mL 10 mL As Needed 6/12/2024 --    Route: Intravenous    sodium chloride 0.9 % infusion 40 mL 40 mL As Needed 6/12/2024 --    Admin Instructions: Following administration of an IV intermittent medication, flush line with 40mL NS at 100mL/hr.    Route: Intravenous              Assessment & Plan     58-year-old male with left foot wet gangrene, necrotizing gas-forming soft tissue infection status post left below-knee amputation, wound vacuum-assisted closure June 13, 2024, delayed wound closure 6/15/24.        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  Follow cultures.  PT/OT.    Dressing changed, surgical incision inspected and drain removed on rounds 6/17/2024.    Daily dressing change:    Xeroform  4x4  Kerlix  Ace     Follow up in two weeks for wound check and xrays, [facility] location.    [facility], [facility] at [facility]  [address]  [place], [place] [address]  Please schedule at [phone]    Please arrange follow up prior to discharge.    Call [phone] to schedule.     Brijesh Em Jr, MD  06/23/24  11:16 EDT

## 2024-06-23 NOTE — PROGRESS NOTES
[facility] Medicine Services  PROGRESS NOTE    Patient Name: Easton Alvarez  : 1965  MRN: [id number]    Date of Admission: 2024  Primary Care Physician: Provider, No Known    Subjective   Subjective     CC:  F/u left foot gangrene    HPI:    Patient reports right hip soreness, no foot pain today. Cousin present at bedside.      Objective   Objective     Vital Signs:   Temp:  [97.7 °F (36.5 °C)-98.5 °F (36.9 °C)] 97.7 °F (36.5 °C)  Heart Rate:  [86-91] 91  Resp:  [16-18] 18  BP: (121-144)/(65-79) 144/79     Physical Exam:   Constitutional: Awake, alert, resting comfortably  HENT: NCAT, mucous membranes moist  Respiratory: Clear to auscultation bilaterally  Cardiovascular: Regular rate and rhythm  Gastrointestinal: soft, nondistended, nontender  Musculoskeletal: s/p L BKA with dressing in place  Neurologic: Alert and oriented x 3, no focal deficits, speech clear  Skin: No rashes      Results Reviewed:  LAB RESULTS:      Lab 24  0617 24  0445 24  1205 24  0437 24  0507 24  0506   WBC 11.35* 21.14* 25.58* 13.29* 16.60* 18.47*   HEMOGLOBIN 8.1* 9.4* 10.6* 10.9* 11.5* 11.7*   HEMATOCRIT 23.8* 27.9* 32.3* 32.4* 33.4* 34.7*   PLATELETS 169 250 339 184 231 179   NEUTROS ABS  --   --   --  10.61* 13.49* 15.00*   IMMATURE GRANS (ABS)  --   --   --  0.33* 0.30* 0.69*   LYMPHS ABS  --   --   --  1.24 1.54 1.41   MONOS ABS  --   --   --  0.96* 1.08* 1.09*   EOS ABS  --   --   --  0.12 0.16 0.22   MCV 93.7 97.6* 98.8* 97.3* 95.7 94.8         Lab 24  0617 24  0445 24  0721 24  0437 24  0507   SODIUM 132* 131* 129* 130* 134*   POTASSIUM 4.2 4.5 5.6* 4.4 4.4   CHLORIDE 102 100 96* 98 99   CO2 25.0 23.0 18.0* 25.0 27.0   ANION GAP 5.0 8.0 15.0 7.0 8.0   BUN 42* 42* 24* 16 16   CREATININE 0.82 1.07 0.67* 0.49* 0.54*   EGFR 100.6 79.4 106.9 117.5 114.1   GLUCOSE 189* 192* 164* 211* 112*   CALCIUM 6.8* 7.1* 7.3* 7.4* 7.5*                              Brief Urine Lab Results  (Last result in the past 365 days)        Color   Clarity   Blood   Leuk Est   Nitrite   Protein   CREAT   Urine HCG        06/14/24 1109 Yellow   Clear   Negative   Negative   Negative   Negative                   Microbiology Results Abnormal       Procedure Component Value - Date/Time    Fungus Culture - Aspirate, Hip, Right [[id number]] Collected: 06/18/24 1052    Lab Status: Preliminary result Specimen: Aspirate from Hip, Right Updated: 06/23/24 1130     Fungus Culture No fungus isolated at less than 1 week    AFB Culture - Aspirate, Hip, Right [[id number]] Collected: 06/18/24 1052    Lab Status: Preliminary result Specimen: Aspirate from Hip, Right Updated: 06/23/24 1130     AFB Culture No AFB isolated at less than 1 week     AFB Stain No acid fast bacilli seen on concentrated smear    Anaerobic Culture 10 Day Incubation - Synovium, Hip, Right [[id number]]  (Normal) Collected: 06/20/24 1700    Lab Status: Final result Specimen: Synovium from Hip, Right Updated: 06/23/24 1010     Anaerobic Culture No anaerobes isolated at 3 days    Tissue / Bone Culture - Synovium, Hip, Right [[id number]] Collected: 06/20/24 1700    Lab Status: Final result Specimen: Synovium from Hip, Right Updated: 06/23/24 1010     Tissue Culture No growth at 3 days     Gram Stain Many (4+) Red blood cells      Moderate (3+) WBCs seen      No organisms seen    Tissue / Bone Culture - Synovium, Hip, Right [[id number]] Collected: 06/20/24 1700    Lab Status: Final result Specimen: Synovium from Hip, Right Updated: 06/23/24 1009     Tissue Culture No growth at 3 days     Gram Stain Moderate (3+) WBCs seen      No organisms seen     Comment: Modified report. Previous result was Rare (1+) Gram positive cocci in pairs on 6/20/2024 at 1949 EDT.         Many (4+) Red blood cells    Tissue / Bone Culture - Synovium, Hip, Right [[id number]] Collected: 06/20/24 1700    Lab Status: Final result Specimen: Synovium from  Hip, Right Updated: 06/23/24 1009     Tissue Culture No growth at 3 days     Gram Stain Many (4+) Red blood cells      Few (2+) WBCs seen      No organisms seen    Anaerobic Culture 10 Day Incubation - Synovium, Hip, Right [[id number]]  (Normal) Collected: 06/20/24 1700    Lab Status: Preliminary result Specimen: Synovium from Hip, Right Updated: 06/23/24 0712     Anaerobic Culture No anaerobes isolated at 3 days    Anaerobic Culture 10 Day Incubation - Synovium, Hip, Right [[id number]]  (Normal) Collected: 06/20/24 1700    Lab Status: Preliminary result Specimen: Synovium from Hip, Right Updated: 06/23/24 0712     Anaerobic Culture No anaerobes isolated at 3 days    Fungus Culture - Wound, Leg, Left [[id number]] Collected: 06/15/24 1501    Lab Status: Preliminary result Specimen: Wound from Leg, Left Updated: 06/22/24 2215     Fungus Culture No fungus isolated at 1 week    AFB Culture - Wound, Leg, Left [[id number]] Collected: 06/15/24 1501    Lab Status: Preliminary result Specimen: Wound from Leg, Left Updated: 06/22/24 2215     AFB Culture No AFB isolated at 1 week     AFB Stain No acid fast bacilli seen on concentrated smear    AFB Culture - Synovium, Hip, Right [[id number]] Collected: 06/20/24 1700    Lab Status: Preliminary result Specimen: Synovium from Hip, Right Updated: 06/21/24 1102     AFB Stain No acid fast bacilli seen on concentrated smear    AFB Culture - Synovium, Hip, Right [[id number]] Collected: 06/20/24 1700    Lab Status: Preliminary result Specimen: Synovium from Hip, Right Updated: 06/21/24 1102     AFB Stain No acid fast bacilli seen on concentrated smear    AFB Culture - Synovium, Hip, Right [[id number]] Collected: 06/20/24 1700    Lab Status: Preliminary result Specimen: Synovium from Hip, Right Updated: 06/21/24 1101     AFB Stain No acid fast bacilli seen on concentrated smear    Anaerobic Culture 10 Day Incubation - Wound, Leg, Left [[id number]]  (Normal) Collected: 06/15/24 1501    Lab  Status: Preliminary result Specimen: Wound from Leg, Left Updated: 06/21/24 0636     Anaerobic Culture No anaerobes isolated at 5 days    Fungus Culture - Surgical Site, Leg, Left [[id number]] Collected: 06/13/24 1031    Lab Status: Preliminary result Specimen: Surgical Site from Leg, Left Updated: 06/20/24 1201     Fungus Culture No fungus isolated at 1 week    AFB Culture - Surgical Site, Leg, Left [[id number]] Collected: 06/13/24 1031    Lab Status: Preliminary result Specimen: Surgical Site from Leg, Left Updated: 06/20/24 1201     AFB Culture No AFB isolated at 1 week     AFB Stain No acid fast bacilli seen on concentrated smear    Blood Culture - Blood, Hand, Left [[id number]]  (Normal) Collected: 06/15/24 1104    Lab Status: Final result Specimen: Blood from Hand, Left Updated: 06/20/24 1201     Blood Culture No growth at 5 days    Blood Culture - Blood, Hand, Right [[id number]]  (Normal) Collected: 06/15/24 1104    Lab Status: Final result Specimen: Blood from Hand, Right Updated: 06/20/24 1201     Blood Culture No growth at 5 days    Wound Culture - Wound, Leg, Left [[id number]] Collected: 06/15/24 1501    Lab Status: Final result Specimen: Wound from Leg, Left Updated: 06/19/24 0711     Wound Culture No growth at 3 days     Gram Stain Rare (1+) WBCs seen      No organisms seen    Anaerobic Culture - Surgical Site, Leg, Left [[id number]]  (Normal) Collected: 06/13/24 1031    Lab Status: Final result Specimen: Surgical Site from Leg, Left Updated: 06/18/24 0751     Anaerobic Culture No anaerobes isolated at 5 days    Blood Culture - Blood, Arm, Left [[id number]]  (Normal) Collected: 06/12/24 1832    Lab Status: Final result Specimen: Blood from Arm, Left Updated: 06/17/24 1900     Blood Culture No growth at 5 days    Blood Culture - Blood, Hand, Right [[id number]]  (Normal) Collected: 06/12/24 1837    Lab Status: Final result Specimen: Blood from Hand, Right Updated: 06/17/24 1900     Blood Culture No growth at 5  days    Fungus Smear - Surgical Site, Leg, Left [[id number]] Collected: 06/13/24 1031    Lab Status: Final result Specimen: Surgical Site from Leg, Left Updated: 06/14/24 1406     Fungal Stain No fungal elements seen            No radiology results from the last 24 hrs    Results for orders placed during the hospital encounter of 06/12/24    Adult Transthoracic Echo Complete w/ Color, Spectral and Contrast if Necessary Per Protocol    Interpretation Summary    Left ventricular systolic function is normal. Left ventricular ejection fraction appears to be 56 - 60%.    Left ventricular wall thickness is consistent with borderline concentric hypertrophy.    Left ventricular diastolic function is consistent with (grade Ia w/high LAP) impaired relaxation.      Current medications:  Scheduled Meds:acetaminophen, 1,000 mg, Oral, Q8H  aspirin, 81 mg, Oral, Q12H  cefTRIAXone, 2,000 mg, Intravenous, Q24H  insulin glargine, 20 Units, Subcutaneous, Nightly  insulin lispro, 2-9 Units, Subcutaneous, 4x Daily AC & at Bedtime  Insulin Lispro, 5 Units, Subcutaneous, TID With Meals  lactobacillus acidophilus, 1 capsule, Oral, Daily  [Held by provider] lisinopril, 10 mg, Oral, Q24H  melatonin, 5 mg, Oral, Nightly  pantoprazole, 40 mg, Intravenous, BID AC  senna-docusate sodium, 2 tablet, Oral, BID  sodium chloride, 10 mL, Intravenous, Q12H      Continuous Infusions:lactated ringers, 9 mL/hr, Last Rate: 9 mL/hr (06/15/24 1307)  lactated ringers, 100 mL/hr, Last Rate: 100 mL/hr (06/20/24 2122)  ropivacaine, , Last Rate: Stopped (06/17/24 2230)      PRN Meds:.  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        Brief Hospital Course to date:  Easton Alvarez is a 58 y.o. male with past medical history significant for T2DM, diabetic neuropathy, right hip fracture/ORIF ([facility] 2/2024), and right transmetatarsal amputation 2019 at  who presented to OSH ED on 6/12/2024 with severe left foot cellulitis/necrosis after recent injury. Lactate at OSH was 8.8 and CT imaging of left leg showed gas gangrene. Orthopedic surgeon Dr. Em was consulted and performed a left below-knee amputation on 6/13/2024 with further debridement and irrigation on 6/15. Infectious disease has followed and managed antimicrobial therapy.     This patient's problems and plans were partially entered by my partner and updated as appropriate by me 06/23/24.    Sepsis secondary to group B strep bacteremia  Left foot cellulitis/gangrene   Leukocytosis  -MRI left foot showed extensive soft tissue infection with abscesses along the foot to the ankle and into the lower leg with soft tissue gas.    -Wound cultures 6/12 with heavy growth group B strep and Kebsiella, blood cultures x 2 with group B strep; wound culture 6/13 with group B strep  -TTE described as technically adequate by cardiology; no description of vegetation by cardiology per report   -s/p left BKA with wound VAC on 6/13/24    -s/p further debridement in OR on 6/15/24 by Dr. Em, drain removed 6/17  -Infectious disease, Dr. Bills following; continue IV ceftriaxone.  -PT wound care following.  -Added Boost glucose control to increase protein  intake for wound healing, nutrition following.  -Pain control with scheduled Tylenol, PRN oxycodone.   -Daily dressing changes to surgical site: Xeroform, 4X4, Kerlix, ACE  -Follow up with  Orthopedics in 2 weeks for wound check, imaging.    Right glenys-prosthetic hip infection  -IR performed fluid aspiration from the hip which was purulent.  -s/p I&D on 6/20, Dr. Sethi   -Fluid culture 6/18 with rare growth group B strep  -Supportive care, mobilize, pain control. Antibiotics as above.    Concern for GIB  Acute on chronic anemia  -with melena and fecal occult positive overnight 6/14  -GI consulted, recommended medical management with PPI BID x 1 month.  -GI referral at discharge for elevated LFTs, thrombocytopenia and concern for early cirrhosis, plan to pursue liver US after recovery from acute illness.    Elevated blood pressure  -No history of taking meds for BP per patient   -BP has been running high this admission, possibly secondary to pain  -Started low-dose ACE given DM2, lisinopril 10 mg daily.    Mild hyponatremia  -Clinically insignificant  -Continue to monitor intermittently.     Anion gap metabolic acidosis POA, resolved   -AG 20.1, likely from lactate, serum acetone negative.    Uncontrolled diabetes mellitus type 2 with hyperglycemia  -Patient was not taking meds or checking glucoses, though insulin had been prescribed in the past.  -A1c 9.5% this admission  -Continue Lantus 20 units nightly, lispro 5 units TID with meals, moderate dose SSI. Monitor glucose and adjust insulin as needed. Diabetes educator following.      Expected Discharge Location and Transportation: rehab  Expected Discharge   Expected Discharge Date: 6/26/2024; Expected Discharge Time:      VTE Prophylaxis:  Mechanical VTE prophylaxis orders are present.         AM-PAC 6 Clicks Score (PT): 9 (06/23/24 7576)    CODE STATUS:   Code Status and Medical Interventions:   Ordered at: 06/20/24 2026     Level Of Support Discussed With:     Patient     Code Status (Patient has no pulse and is not breathing):    CPR (Attempt to Resuscitate)     Medical Interventions (Patient has pulse or is breathing):    Parviz Garza,   06/23/24

## 2024-06-23 NOTE — THERAPY TREATMENT NOTE
Patient Name: Easton Alvarez  : 1965    MRN: [id number]                              Today's Date: 2024       Admit Date: 2024    Visit Dx:     ICD-10-CM ICD-9-CM   1. S/P BKA (below knee amputation), left  Z89.512 V49.75   2. Diabetic ulcer of left midfoot associated with type 2 diabetes mellitus, with necrosis of muscle  E11.621 250.80    L97.423 707.14     728.89   3. Elevated LFTs  R79.89 790.6   4. Below knee amputation  S88.119A 897.0   5. Impaired functional mobility, balance, gait, and endurance  Z74.09 V49.89   6. Infection of prosthetic total hip joint, initial encounter  T84.59XA 996.66    Z96.649 V43.64     Patient Active Problem List   Diagnosis    Diabetic ulcer of left foot with necrosis of muscle    Severe malnutrition    Infection of prosthetic total hip joint     Past Medical History:   Diagnosis Date    Diabetes mellitus     Diabetic foot infection 2018    left (partial amputation)     Past Surgical History:   Procedure Laterality Date    AMPUTATION FOOT / TOE Right     partial foot    BELOW KNEE AMPUTATION Left 2024    Procedure: AMPUTATION BELOW KNEE AND WOUND VAC ASSISTED CLOSURE LEFT;  Surgeon: Brijesh Em Jr., MD;  Location:  [facility];  Service: Orthopedics;  Laterality: Left;    INCISION AND DRAINAGE HIP Right 2024    Procedure: HIP ANTERIOR INCISION AND DRAINAGE WITH HANA TABLE, POLY SWAP RIGHT WITH LEFT LEG PIN TRACTION;  Surgeon: Lex Sethi MD;  Location:  [facility];  Service: Orthopedics;  Laterality: Right;    INCISION AND DRAINAGE LEG Left 6/15/2024    Procedure: SECONDARY CLOSURE LEFT BELOW KNEE AMPUTATION;  Surgeon: Brijesh mE Jr., MD;  Location:  [facility];  Service: Orthopedics;  Laterality: Left;    PLACEMENT OF WOUND VAC Left 2024    Procedure: PLACEMENT OF WOUND VAC LEFT;  Surgeon: Brijesh Em Jr., MD;  Location:  [facility];  Service: Orthopedics;  Laterality: Left;    TOTAL HIP ARTHROPLASTY Right 2024    From fall  "\"whole hip was shattered\"      General Information       [facility] Name 06/23/24 1413          Physical Therapy Time and Intention    Document Type therapy note (daily note)  -     Mode of Treatment physical therapy  -[facility] Name 06/23/24 1413          General Information    Patient Profile Reviewed yes  -GEORGINA     Existing Precautions/Restrictions fall;other (see comments)  Recent L BKA, s/p I&D of R MARIA LUISA due to infection, hemovac, woundvac, remote R foot TMA  -[facility] Name 06/23/24 1413          Cognition    Orientation Status (Cognition) oriented x 4  -       Row Name 06/23/24 1413          Safety Issues, Functional Mobility    Impairments Affecting Function (Mobility) balance;range of motion (ROM);sensation/sensory awareness;strength  -               User Key  (r) = Recorded By, (t) = Taken By, (c) = Cosigned By      Initials Name Provider Type    Radha Webb PT Physical Therapist                   Mobility       [facility] Name 06/23/24 1414          Bed Mobility    Comment, (Bed Mobility) Deferred. Pt opted for exercises in bed this session. Stated he stayed in chair too long yesterday.  -[facility] Name 06/23/24 1414          Transfers    Comment, (Transfers) not attempted  -GEORGINA               User Key  (r) = Recorded By, (t) = Taken By, (c) = Cosigned By      Initials Name Provider Type    Radha Webb PT Physical Therapist                   Obj/Interventions       [facility] Name 06/23/24 1415          Motor Skills    Therapeutic Exercise hip;knee;ankle  -[facility] Name 06/23/24 1415          Hip (Therapeutic Exercise)    Hip (Therapeutic Exercise) AROM (active range of motion);AAROM (active assistive range of motion)  -     Hip AROM (Therapeutic Exercise) left;flexion;5 repetitions;supine  -     Hip AAROM (Therapeutic Exercise) right;5 repetitions  SLR AAROM  -       Row Name 06/23/24 1415          Knee (Therapeutic Exercise)    Knee (Therapeutic Exercise) AROM (active range of " motion);isometric exercises  -     Knee AROM (Therapeutic Exercise) bilateral;5 repetitions;heel slides;other (see comments)  Min Range with R LE due to tightness.  -     Knee Isometrics (Therapeutic Exercise) bilateral;quad sets;10 repetitions;supine;gluteal sets  -       Row Name 06/23/24 1415          Ankle (Therapeutic Exercise)    Ankle (Therapeutic Exercise) AROM (active range of motion)  -     Ankle AROM (Therapeutic Exercise) right;dorsiflexion;plantarflexion;20 repititions;supine  -               User Key  (r) = Recorded By, (t) = Taken By, (c) = Cosigned By      Initials Name Provider Type    GEORGINA Radha Miguel, PT Physical Therapist                   Goals/Plan    No documentation.                  Clinical Impression       Row Name 06/23/24 1417          Pain    Pretreatment Pain Rating 0/10 - no pain  -     Posttreatment Pain Rating 0/10 - no pain  -       Row Name 06/23/24 1417          Plan of Care Review    Plan of Care Reviewed With patient  -     Progress improving  -     Outcome Evaluation Session focused on therapeutic exercise program completion and progression. Pt req AAROM with R SLR, min range, completed AROM and isometrics with B LEs. Educated to cont throughout the day on his own, verbalized understanding. Pt will cont to benefit from skilled PT services, recommend IP Rehab upon DC.  -       Row Name 06/23/24 1417          Therapy Assessment/Plan (PT)    Rehab Potential (PT) good, to achieve stated therapy goals  -     Criteria for Skilled Interventions Met (PT) yes;meets criteria;skilled treatment is necessary  -     Therapy Frequency (PT) daily  -       Row Name 06/23/24 1417          Vital Signs    Pre Systolic BP Rehab 144  -GEORGINA     Pre Treatment Diastolic BP 80  -GEORGINA     Post Systolic BP Rehab 142  -GEORGINA     Post Treatment Diastolic BP 84  -GEORGINA     O2 Delivery Pre Treatment room air  -GEORGINA     O2 Delivery Intra Treatment room air  -GEORGINA     Post SpO2 (%) 98  -GEORGINA      O2 Delivery Post Treatment room air  -GEORGINA     Pre Patient Position Supine  -GEORGINA     Intra Patient Position Supine  -GEORGINA     Post Patient Position Supine  -GEORGINA       Row Name 06/23/24 1417          Positioning and Restraints    Pre-Treatment Position in bed  -GEORGINA     Post Treatment Position bed  -GEORGINA     In Bed notified nsg;supine;exit alarm on;encouraged to call for assist;call light within reach;SCD pump applied  -GEORGINA               User Key  (r) = Recorded By, (t) = Taken By, (c) = Cosigned By      Initials Name Provider Type    Radha Webb PT Physical Therapist                   Outcome Measures       Row Name 06/23/24 1420          How much help from another person do you currently need...    Turning from your back to your side while in flat bed without using bedrails? 2  -GEORGINA     Moving from lying on back to sitting on the side of a flat bed without bedrails? 2  -GEORGINA     Moving to and from a bed to a chair (including a wheelchair)? 2  -GEORGINA     Standing up from a chair using your arms (e.g., wheelchair, bedside chair)? 1  -GEORGINA     Climbing 3-5 steps with a railing? 1  -GEORGINA     To walk in hospital room? 1  -GEORGINA     AM-PAC 6 Clicks Score (PT) 9  -GEORGINA     Highest Level of Mobility Goal 3 --> Sit at edge of bed  -GEORGINA       Row Name 06/23/24 1420          Functional Assessment    Outcome Measure Options AM-PAC 6 Clicks Basic Mobility (PT)  -GEORGINA               User Key  (r) = Recorded By, (t) = Taken By, (c) = Cosigned By      Initials Name Provider Type    Radha Webb PT Physical Therapist                                 Physical Therapy Education       Title: PT OT SLP Therapies (In Progress)       Topic: Physical Therapy (Done)       Point: Mobility training (Done)       Learning Progress Summary             Patient LEONELA Varela VU, DU by SC at 6/21/2024 1349    Comment: reviewed HEP    LEONELA Hoyt D, VU, NR by SD at 6/19/2024 1203    Comment: PT ed for expected outcomes, risks, and benefits of IPPT services and  progression of activity. Pt given visual demo of unilateral gait with RW. Discussion regarding dispo planning and IRF.                         Point: Home exercise program (Done)       Learning Progress Summary             Patient Acceptance, E, VU,NR,DU by  at 6/23/2024 1420    Eager, E, VU,DU by SC at 6/21/2024 1349    Comment: reviewed HEP    Acceptance, E,D, VU,NR by SD at 6/19/2024 1203    Comment: PT ed for expected outcomes, risks, and benefits of IPPT services and progression of activity. Pt given visual demo of unilateral gait with RW. Discussion regarding dispo planning and IRF.                         Point: Body mechanics (Done)       Learning Progress Summary             Patient Eager, E, VU,DU by SC at 6/21/2024 1349    Comment: reviewed HEP    Acceptance, E,D, VU,NR by SD at 6/19/2024 1203    Comment: PT ed for expected outcomes, risks, and benefits of IPPT services and progression of activity. Pt given visual demo of unilateral gait with RW. Discussion regarding dispo planning and IRF.                         Point: Precautions (Done)       Learning Progress Summary             Patient Eager, E, VU,DU by SC at 6/21/2024 1349    Comment: reviewed HEP    Acceptance, E,D, VU,NR by SD at 6/19/2024 1203    Comment: PT ed for expected outcomes, risks, and benefits of IPPT services and progression of activity. Pt given visual demo of unilateral gait with RW. Discussion regarding dispo planning and IRF.                                         User Key       Initials Effective Dates Name Provider Type Discipline    SC 02/03/23 -  Jorge Luis Childress, PT Physical Therapist PT    SD 03/13/23 -  Charity Dela Cruz, PT Physical Therapist PT    GEORGINA 06/16/21 -  Radha Miguel, PT Physical Therapist PT                  PT Recommendation and Plan     Plan of Care Reviewed With: patient  Progress: improving  Outcome Evaluation: Session focused on therapeutic exercise program completion and progression. Pt clifton PRYOR  with R SLR, min range, completed AROM and isometrics with B LEs. Educated to cont throughout the day on his own, verbalized understanding. Pt will cont to benefit from skilled PT services, recommend IP Rehab upon DC.     Time Calculation:         PT Charges       Row Name 06/23/24 1421             Time Calculation    Start Time 1358  -GEORGINA      PT Received On 06/23/24  -GEORGINA      PT Goal Re-Cert Due Date 07/01/24  -GEORGINA         Time Calculation- PT    Total Timed Code Minutes- PT 16 minute(s)  -GEORGINA         Timed Charges    29526 - PT Therapeutic Exercise Minutes 16  -GEORGINA         Total Minutes    Timed Charges Total Minutes 16  -GEORGINA       Total Minutes 16  -GEORGINA                User Key  (r) = Recorded By, (t) = Taken By, (c) = Cosigned By      Initials Name Provider Type    Radha Webb, LINDA Physical Therapist                  Therapy Charges for Today       Code Description Service Date Service Provider Modifiers Qty    10881806481 HC PT THER PROC EA 15 MIN 6/23/2024 Radha Miguel, LINDA GP 1            PT G-Codes  Outcome Measure Options: AM-PAC 6 Clicks Basic Mobility (PT)  AM-PAC 6 Clicks Score (PT): 9  AM-PAC 6 Clicks Score (OT): 12  PT Discharge Summary  Anticipated Discharge Disposition (PT): inpatient rehabilitation facility    Radha Miguel PT  6/23/2024

## 2024-06-23 NOTE — PLAN OF CARE
Goal Outcome Evaluation:  Plan of Care Reviewed With: patient        Progress: improving  Outcome Evaluation: Session focused on therapeutic exercise program completion and progression. Pt req AAROM with R SLR, min range, completed AROM and isometrics with DEBBIE LEs. Educated to cont throughout the day on his own, verbalized understanding. Pt will cont to benefit from skilled PT services, recommend IP Rehab upon DC.      Anticipated Discharge Disposition (PT): inpatient rehabilitation facility

## 2024-06-24 LAB
ANION GAP SERPL CALCULATED.3IONS-SCNC: 9 MMOL/L (ref 5–15)
BUN SERPL-MCNC: 38 MG/DL (ref 8–23)
BUN/CREAT SERPL: 50.7 (ref 7–25)
CALCIUM SPEC-SCNC: 6.7 MG/DL (ref 8.6–10.5)
CHLORIDE SERPL-SCNC: 102 MMOL/L (ref 98–107)
CO2 SERPL-SCNC: 24 MMOL/L (ref 22–29)
CREAT SERPL-MCNC: 0.75 MG/DL (ref 0.76–1.27)
DEPRECATED RDW RBC AUTO: 47.7 FL (ref 37–54)
EGFRCR SERPLBLD CKD-EPI 2021: 103.3 ML/MIN/1.73
ERYTHROCYTE [DISTWIDTH] IN BLOOD BY AUTOMATED COUNT: 14 % (ref 12.3–15.4)
GLUCOSE BLDC GLUCOMTR-MCNC: 104 MG/DL (ref 70–130)
GLUCOSE BLDC GLUCOMTR-MCNC: 191 MG/DL (ref 70–130)
GLUCOSE BLDC GLUCOMTR-MCNC: 198 MG/DL (ref 70–130)
GLUCOSE BLDC GLUCOMTR-MCNC: 214 MG/DL (ref 70–130)
GLUCOSE SERPL-MCNC: 191 MG/DL (ref 65–99)
HCT VFR BLD AUTO: 22.6 % (ref 37.5–51)
HCT VFR BLD AUTO: 24.3 % (ref 37.5–51)
HGB BLD-MCNC: 7.7 G/DL (ref 13–17.7)
HGB BLD-MCNC: 8.1 G/DL (ref 13–17.7)
MCH RBC QN AUTO: 32.4 PG (ref 26.6–33)
MCHC RBC AUTO-ENTMCNC: 34.1 G/DL (ref 31.5–35.7)
MCV RBC AUTO: 95 FL (ref 79–97)
PLATELET # BLD AUTO: 159 10*3/MM3 (ref 140–450)
PMV BLD AUTO: 9.3 FL (ref 6–12)
POTASSIUM SERPL-SCNC: 4 MMOL/L (ref 3.5–5.2)
RBC # BLD AUTO: 2.38 10*6/MM3 (ref 4.14–5.8)
SODIUM SERPL-SCNC: 135 MMOL/L (ref 136–145)
WBC NRBC COR # BLD AUTO: 11.24 10*3/MM3 (ref 3.4–10.8)

## 2024-06-24 PROCEDURE — 63710000001 INSULIN LISPRO (HUMAN) PER 5 UNITS: Performed by: ORTHOPAEDIC SURGERY

## 2024-06-24 PROCEDURE — 97530 THERAPEUTIC ACTIVITIES: CPT

## 2024-06-24 PROCEDURE — 97110 THERAPEUTIC EXERCISES: CPT

## 2024-06-24 PROCEDURE — 80048 BASIC METABOLIC PNL TOTAL CA: CPT | Performed by: STUDENT IN AN ORGANIZED HEALTH CARE EDUCATION/TRAINING PROGRAM

## 2024-06-24 PROCEDURE — 85018 HEMOGLOBIN: CPT | Performed by: INTERNAL MEDICINE

## 2024-06-24 PROCEDURE — 85027 COMPLETE CBC AUTOMATED: CPT | Performed by: STUDENT IN AN ORGANIZED HEALTH CARE EDUCATION/TRAINING PROGRAM

## 2024-06-24 PROCEDURE — 99232 SBSQ HOSP IP/OBS MODERATE 35: CPT | Performed by: INTERNAL MEDICINE

## 2024-06-24 PROCEDURE — 25010000002 CEFTRIAXONE PER 250 MG: Performed by: INTERNAL MEDICINE

## 2024-06-24 PROCEDURE — 63710000001 INSULIN GLARGINE PER 5 UNITS: Performed by: INTERNAL MEDICINE

## 2024-06-24 PROCEDURE — 82948 REAGENT STRIP/BLOOD GLUCOSE: CPT

## 2024-06-24 PROCEDURE — 85014 HEMATOCRIT: CPT | Performed by: INTERNAL MEDICINE

## 2024-06-24 RX ORDER — INSULIN LISPRO 100 [IU]/ML
8 INJECTION, SOLUTION INTRAVENOUS; SUBCUTANEOUS
Status: DISCONTINUED | OUTPATIENT
Start: 2024-06-25 | End: 2024-07-06 | Stop reason: HOSPADM

## 2024-06-24 RX ADMIN — INSULIN LISPRO 4 UNITS: 100 INJECTION, SOLUTION INTRAVENOUS; SUBCUTANEOUS at 17:14

## 2024-06-24 RX ADMIN — ACETAMINOPHEN 1000 MG: 500 TABLET, FILM COATED ORAL at 05:11

## 2024-06-24 RX ADMIN — Medication 5 MG: at 20:36

## 2024-06-24 RX ADMIN — INSULIN LISPRO 5 UNITS: 100 INJECTION, SOLUTION INTRAVENOUS; SUBCUTANEOUS at 08:19

## 2024-06-24 RX ADMIN — Medication 1 CAPSULE: at 08:20

## 2024-06-24 RX ADMIN — SENNOSIDES AND DOCUSATE SODIUM 2 TABLET: 8.6; 5 TABLET ORAL at 20:36

## 2024-06-24 RX ADMIN — PANTOPRAZOLE SODIUM 40 MG: 40 INJECTION, POWDER, FOR SOLUTION INTRAVENOUS at 05:11

## 2024-06-24 RX ADMIN — INSULIN LISPRO 2 UNITS: 100 INJECTION, SOLUTION INTRAVENOUS; SUBCUTANEOUS at 12:30

## 2024-06-24 RX ADMIN — INSULIN GLARGINE 25 UNITS: 100 INJECTION, SOLUTION SUBCUTANEOUS at 20:36

## 2024-06-24 RX ADMIN — Medication 10 ML: at 08:20

## 2024-06-24 RX ADMIN — ACETAMINOPHEN 1000 MG: 500 TABLET, FILM COATED ORAL at 21:15

## 2024-06-24 RX ADMIN — TRAMADOL HYDROCHLORIDE 50 MG: 50 TABLET ORAL at 17:15

## 2024-06-24 RX ADMIN — OXYCODONE HYDROCHLORIDE 5 MG: 5 TABLET ORAL at 05:11

## 2024-06-24 RX ADMIN — Medication 10 ML: at 20:38

## 2024-06-24 RX ADMIN — TRAMADOL HYDROCHLORIDE 50 MG: 50 TABLET ORAL at 08:35

## 2024-06-24 RX ADMIN — INSULIN LISPRO 5 UNITS: 100 INJECTION, SOLUTION INTRAVENOUS; SUBCUTANEOUS at 12:30

## 2024-06-24 RX ADMIN — ACETAMINOPHEN 1000 MG: 500 TABLET, FILM COATED ORAL at 13:36

## 2024-06-24 RX ADMIN — ASPIRIN 81 MG: 81 TABLET, COATED ORAL at 20:36

## 2024-06-24 RX ADMIN — OXYCODONE HYDROCHLORIDE 5 MG: 5 TABLET ORAL at 00:55

## 2024-06-24 RX ADMIN — CEFTRIAXONE 2000 MG: 2 INJECTION, POWDER, FOR SOLUTION INTRAMUSCULAR; INTRAVENOUS at 08:20

## 2024-06-24 RX ADMIN — PANTOPRAZOLE SODIUM 40 MG: 40 INJECTION, POWDER, FOR SOLUTION INTRAVENOUS at 17:15

## 2024-06-24 RX ADMIN — ASPIRIN 81 MG: 81 TABLET, COATED ORAL at 08:20

## 2024-06-24 RX ADMIN — INSULIN LISPRO 2 UNITS: 100 INJECTION, SOLUTION INTRAVENOUS; SUBCUTANEOUS at 08:19

## 2024-06-24 RX ADMIN — INSULIN LISPRO 5 UNITS: 100 INJECTION, SOLUTION INTRAVENOUS; SUBCUTANEOUS at 17:15

## 2024-06-24 NOTE — PLAN OF CARE
Goal Outcome Evaluation:  Plan of Care Reviewed With: (P) patient        Progress: (P) no change  Outcome Evaluation: (P) Pt was able to complete STS with Max A x 2 this session with poor ability to stand fully upright. Pt able to complete ther-ex in bed and given HEP to complete on own upon request from patient. Pt was dependent lift transfer from bed to chair this session. Wheelchair training would be appropriate for future sessions with pt. Pt presents below baseline with deficits in generalized weakness, increased pain, and decreased balance. IPPT continues to be appropriate during admission. PT recommended DC to IRF for bestt functional outcomes.      Anticipated Discharge Disposition (PT): (P) inpatient rehabilitation facility

## 2024-06-24 NOTE — CASE MANAGEMENT/SOCIAL WORK
Continued Stay Note  [facility]     Patient Name: Easton Alvarez  MRN: [id number]  Today's Date: 6/24/2024    Admit Date: 6/12/2024    Plan: Home with    Discharge Plan       Row Name 06/24/24 1107       Plan    Plan Comments Annie with [facility] has been updated on most recent therapy notes. If accepted he will need insurance precert                   Discharge Codes    No documentation.                 Expected Discharge Date and Time       Expected Discharge Date Expected Discharge Time    Jun 26, 2024               Anat Zarate RN

## 2024-06-24 NOTE — PROGRESS NOTES
"Easton Alvarez  1965  [id number]          Chief Complaint: left foot infection    Reason for Consultation: left foot infection    History of present illness:     Patient is a 58 y.o.  Yr old male with history of diabetes with prior right TMA 2019 at , history strep septicemia 2016 ; he reports right total hip arthroplasty 2024 after fall. He reports a fall several weeks prior to his June admission with injury to the left foot, wound present with deterioration several days preceding admission with severe discoloration/redness and swelling.  CT scan showed concern for subcutaneous emphysema and necrotizing infection.  Transferred to [facility] with evaluation by Dr. Em and Dr. Lundberg and arrangements made for urgent surgery. Subsequently, blood cultures called with gram-positive cocci     6/13 left LE amp, Dr Em    6/15  \"PROCEDURE:            Left      59789: Secondary closure above-knee amputation\"      6/18/24  right hip aspiration, culture with GB strep; wbc  down some postop from revision; blood cultures 6/15 negative so far    6/19/24 Dr. Em helping facilitate right hip surgery with orthopedic partners/team    6/20/24 surgery Dr Sethi  \"Procedure(s):  HIP ANTERIOR INCISION AND DRAINAGE WITH HANA TABLE, POLY SWAP- RIGHT\"    6/24/24 no new distress or focal pain per nursing staff.  Encouraged incentive spirometry. postop pain controlled at present; He has left stump  pain that is constant, sharp at times, blunted by neuropathy, worse with palpation, better with pain meds and 4  out of 10 in severity. No distress per nursing; no new focal symptoms otherwise; he has right hip pain with range of motion and weightbearing increased and out of 10 at present with movement, nonradiating, better with pain meds.     No headache photophobia or neck stiffness.  No shortness of breath cough or hemoptysis.  No nausea vomiting diarrhea or abdominal pain.  No dysuria hematuria or " "pyuria.  No other new skin rash    Past Medical History:   Diagnosis Date    Diabetes mellitus     Diabetic foot infection 2018    left (partial amputation)       Past Surgical History:   Procedure Laterality Date    AMPUTATION FOOT / TOE Right     partial foot    BELOW KNEE AMPUTATION Left 6/13/2024    Procedure: AMPUTATION BELOW KNEE AND WOUND VAC ASSISTED CLOSURE LEFT;  Surgeon: Brijesh Em Jr., MD;  Location:  [facility];  Service: Orthopedics;  Laterality: Left;    INCISION AND DRAINAGE HIP Right 6/20/2024    Procedure: HIP ANTERIOR INCISION AND DRAINAGE WITH HANA TABLE, POLY SWAP RIGHT WITH LEFT LEG PIN TRACTION;  Surgeon: Lex Sethi MD;  Location:  [facility];  Service: Orthopedics;  Laterality: Right;    INCISION AND DRAINAGE LEG Left 6/15/2024    Procedure: SECONDARY CLOSURE LEFT BELOW KNEE AMPUTATION;  Surgeon: Brijesh Em Jr., MD;  Location:  [facility];  Service: Orthopedics;  Laterality: Left;    PLACEMENT OF WOUND VAC Left 6/13/2024    Procedure: PLACEMENT OF WOUND VAC LEFT;  Surgeon: Brijesh Em Jr., MD;  Location:  [facility];  Service: Orthopedics;  Laterality: Left;    TOTAL HIP ARTHROPLASTY Right 02/2024    From fall \"whole hip was shattered\"       Pediatric History   Patient Parents    Not on file     Other Topics Concern    Not on file   Social History Narrative    Not on file       family history is not on file. High blood pressure mom/dad    No Known Allergies    Medication:  Current Facility-Administered Medications   Medication Dose Route Frequency Provider Last Rate Last Admin    acetaminophen (TYLENOL) tablet 1,000 mg  1,000 mg Oral Q8H Lex Sethi MD   1,000 mg at 06/24/24 0511    aspirin EC tablet 81 mg  81 mg Oral Q12H Lex Sethi MD   81 mg at 06/23/24 2115    sennosides-docusate (PERICOLACE) 8.6-50 MG per tablet 2 tablet  2 tablet Oral BID Lex Sethi MD   2 tablet at 06/20/24 2122    And    polyethylene glycol (MIRALAX) packet 17 g  17 g Oral Daily PRN " Lex Sethi MD        And    bisacodyl (DULCOLAX) EC tablet 5 mg  5 mg Oral Daily PRN Lex Sethi MD        And    bisacodyl (DULCOLAX) suppository 10 mg  10 mg Rectal Daily PRN Lex Sethi MD        Calcium Replacement - Follow Nurse / BPA Driven Protocol   Does not apply PRN Lex Sethi MD        cefTRIAXone (ROCEPHIN) 2,000 mg in sodium chloride 0.9 % 100 mL MBP  2,000 mg Intravenous Q24H Cleve Bills  mL/hr at 06/23/24 0833 2,000 mg at 06/23/24 0833    dextrose (D50W) (25 g/50 mL) IV injection 25 g  25 g Intravenous Q15 Min PRN Lex Sethi MD        dextrose (GLUTOSE) oral gel 15 g  15 g Oral Q15 Min PRN Lex Sethi MD        glucagon (GLUCAGEN) injection 1 mg  1 mg Intramuscular Q15 Min PRN Lex Sethi MD        insulin glargine (LANTUS, SEMGLEE) injection 20 Units  20 Units Subcutaneous Nightly Lex Sethi MD   20 Units at 06/23/24 2115    Insulin Lispro (humaLOG) injection 2-9 Units  2-9 Units Subcutaneous 4x Daily AC & at Bedtime Lex Sethi MD   2 Units at 06/23/24 2115    Insulin Lispro (humaLOG) injection 5 Units  5 Units Subcutaneous TID With Meals Lex Sethi MD   5 Units at 06/23/24 1824    lactated ringers infusion  9 mL/hr Intravenous Continuous Lex Sethi MD 9 mL/hr at 06/15/24 1307 9 mL/hr at 06/15/24 1307    lactated ringers infusion  100 mL/hr Intravenous Continuous Lex Sethi  mL/hr at 06/20/24 2122 100 mL/hr at 06/20/24 2122    lactobacillus acidophilus (RISAQUAD) capsule 1 capsule  1 capsule Oral Daily Lex Sethi MD   1 capsule at 06/23/24 1042    [Held by provider] lisinopril (PRINIVIL,ZESTRIL) tablet 10 mg  10 mg Oral Q24H Lex Sethi MD   10 mg at 06/21/24 0831    Magnesium Standard Dose Replacement - Follow Nurse / BPA Driven Protocol   Does not apply PRN Lex Sethi MD        melatonin tablet 5 mg  5 mg Oral Nightly Lex Sethi MD   5 mg at 06/23/24 2114    nitroglycerin (NITROSTAT) SL  tablet 0.4 mg  0.4 mg Sublingual Q5 Min PRN Lex Sethi MD        oxyCODONE (ROXICODONE) immediate release tablet 5 mg  5 mg Oral Q4H PRN Lex Sethi MD   5 mg at 06/24/24 0511    pantoprazole (PROTONIX) injection 40 mg  40 mg Intravenous BID AC Lex Sethi MD   40 mg at 06/24/24 0511    Phosphorus Replacement - Follow Nurse / BPA Driven Protocol   Does not apply PRN Lex Sethi MD        Potassium Replacement - Follow Nurse / BPA Driven Protocol   Does not apply PRN Lex Sethi MD        ropivacaine (NAROPIN) 0.2 % infusion (INFUSYSTEM)   Peripheral Nerve Continuous Lex Sethi MD   Stopped at 06/17/24 2230    sodium chloride 0.9 % flush 10 mL  10 mL Intravenous Q12H Lex Sethi MD   10 mL at 06/23/24 2116    sodium chloride 0.9 % flush 10 mL  10 mL Intravenous PRN Lex Sethi MD        sodium chloride 0.9 % infusion 40 mL  40 mL Intravenous PRN Lex Sethi MD        traMADol (ULTRAM) tablet 50 mg  50 mg Oral Q8H PRN Lex Sethi MD   50 mg at 06/23/24 2115       Antibiotics:  Anti-Infectives (From admission, onward)      Ordered     Dose/Rate Route Frequency Start Stop    06/21/24 0756  cefTRIAXone (ROCEPHIN) 2,000 mg in sodium chloride 0.9 % 100 mL MBP        Ordering Provider: Cleve Bills MD    2,000 mg  200 mL/hr over 30 Minutes Intravenous Every 24 Hours 06/21/24 0900 07/29/24 0859    06/20/24 1457  ceFAZolin 2000 mg IVPB in 100 mL NS (MBP)        Ordering Provider: Lex Sethi MD    2,000 mg  over 30 Minutes Intravenous Once 06/20/24 1459 06/20/24 1557    06/12/24 1746  piperacillin-tazobactam (ZOSYN) 4.5 g IVPB in 100 mL NS MBP (CD)        Ordering Provider: Shraddha Scott MD    4.5 g  over 30 Minutes Intravenous Once 06/12/24 1900 06/13/24 0907              Review of Systems    6/24/24     Constitutional-- No Fever, chills or sweats.  Appetite diminished with fatigue.  Heent-- No new vision, hearing or throat complaints.  No epistaxis or oral  "sores.  Denies odynophagia or dysphagia.  No flashers, floaters or eye pain. No odynophagia or dysphagia. No headache, photophobia or neck stiffness.  CV-- No chest pain, palpitation or syncope  Resp-- No SOB/cough/Hemoptysis  GI- No nausea, vomiting, or diarrhea.  No hematochezia, melena, or hematemesis. Denies jaundice or chronic liver disease.  -- No dysuria, hematuria, or flank pain.  Denies hesitancy, urgency or flank pain.  Lymph- no swollen lymph nodes in neck/axilla or groin.   Heme- No active bruising or bleeding; no Hx of DVT or PE.  MS-- aside from above, no swelling or pain in the bones or joints of arms/legs.  No new back pain.  Neuro-- No acute focal weakness or numbness in the arms or legs.  No seizures.    Full 12 point review of systems reviewed and negative otherwise for acute complaints, except for above    Physical Exam:   Vital Signs   /69 (BP Location: Right arm, Patient Position: Lying)   Pulse 98   Temp 98.4 °F (36.9 °C) (Oral)   Resp 16   Ht 172.7 cm (67.99\")   Wt 67.6 kg (149 lb)   SpO2 92%   BMI 22.66 kg/m²     GENERAL: awake; in no acute distress.   HEENT: Normocephalic, atraumatic.   No conjunctival injection. No icterus. Oropharynx clear without evidence of thrush or exudate. No evidence of periodontal disease.    NECK: Supple without nuchal rigidity. No mass.   HEART: RRR; No murmur, rubs, gallops.   LUNGS: diminished at bases.  Trace crackles at the bases but no rhonchi or wheeze. Normal respiratory effort. Nonlabored. No dullness.  ABDOMEN: Soft, nontender, nondistended. Positive bowel sounds. No rebound or guarding. NO mass or HSM.  EXT:  see below.     MSK: right hip surgical site cvered;  no new visible redness/purulence or fluctuance  SKIN: Warm and dry without cutaneous eruptions on Inspection/palpation.      Left   BKA stump covered;  no new visible redness or purulence; no discrete fluctuance.  No crepitus    Laboratory Data    Results from last 7 days   Lab " Units 06/23/24  0617 06/22/24  0445 06/21/24  1205   WBC 10*3/mm3 11.35* 21.14* 25.58*   HEMOGLOBIN g/dL 8.1* 9.4* 10.6*   HEMATOCRIT % 23.8* 27.9* 32.3*   PLATELETS 10*3/mm3 169 250 339     Results from last 7 days   Lab Units 06/23/24  0617   SODIUM mmol/L 132*   POTASSIUM mmol/L 4.2   CHLORIDE mmol/L 102   CO2 mmol/L 25.0   BUN mg/dL 42*   CREATININE mg/dL 0.82   GLUCOSE mg/dL 189*   CALCIUM mg/dL 6.8*                       Estimated Creatinine Clearance: 91.6 mL/min (by C-G formula based on SCr of 0.82 mg/dL).      Microbiology:      Radiology:  Imaging Results (Last 72 Hours)       ** No results found for the last 72 hours. **              Impression:     --acute sepsis as per admission notes, severe left foot infection with gangrene/cellulitis and concern for necrotizing soft tissue infection by imaging, urgent surgical arrangements made June 13; repeat surg 6/15  ;  subsequent right hip surg 6/20; He knows risk for persistent/recurrent or nonhealing wounds, persistet / progressive or recurrent infection and risk for further amputation/functional-limb loss and chronic pain.  Associated with GB strep bacteremia as below.    --Acute/severe left foot infection as above, urgent surgical arrangements  made 6/13    --Acute gb strep bacteremia,   source from foot.  High risk for further serious morbidity and other serious sequela including dire consequences and metastatic foci of involvement/endovascular sequela etc. No new metastatic focus of involvement. TTE 6/17    --Right hip fracture with repair February 2024.  +pain ; orthopedics with  aspiration June 18 and culture with GB strep likely hematogenous seeding, MRI imaging with concern for septic joint, surgery on June 20 as above.   Patient understands risk for persistent/recurrent or progressive infection and potential for further surgical intervention in the future that could include functional/limb loss and other serious sequela/morbidity; he knows antibiotics  and surgery not a guarantee for care    --postop leukocytosis, possible stress response associated with surgery.  Trending down June 22-23.  No new respiratory symptoms.  Encouraged incentive spirometry.  Abdomen benign with no diarrhea.  No new urinary tract complaints.  IV sites with no suppurative change.  No rash.  Monitor for new process; no new focal muscoskeletal symptoms to suggest new metastatic focus of infection    --diabetes.  Described as uncontrolled by medicine team at admission.  Glucose control per medicine team      PLAN:      --IV  rocephin    **wound culture at left foot mixed with  MSSA Klebsiella and GB strep  **blood cultures with group B strep  ** right hip cultures with group B strep    --orthopedics  making surgical plans with concern for right hipprosthetic/periprosthetic infection     --Check/review labs cultures and scans    --TTE described as technically adequate by cardiology; no description of vegetation by cardiology per report    --Partial history per nursing staff    --encouraged incentive spirometry.  Nursing aware to instruct and encourage    --Discussed with microbiology    --Highly complex at of issues with high risk for further serious morbidity and other serious sequela    Copied text in this note has been reviewed and is accurate as of 06/24/24.        Cleve Bills MD  6/24/2024

## 2024-06-24 NOTE — PLAN OF CARE
Goal Outcome Evaluation:           Progress: no change  Outcome Evaluation: Ox4, forgetful at times, up to chair with PT, pain well controlled with tramadol, ice pack to Right hip, abdomen distended and significant amount of scrotal edema, worse than yest, scrotum elevated, Dr Goodwin notified, VSS, cont to monitor

## 2024-06-24 NOTE — THERAPY TREATMENT NOTE
Patient Name: Easton Alvarez  : 1965    MRN: [id number]                              Today's Date: 2024       Admit Date: 2024    Visit Dx:     ICD-10-CM ICD-9-CM   1. S/P BKA (below knee amputation), left  Z89.512 V49.75   2. Diabetic ulcer of left midfoot associated with type 2 diabetes mellitus, with necrosis of muscle  E11.621 250.80    L97.423 707.14     728.89   3. Elevated LFTs  R79.89 790.6   4. Below knee amputation  S88.119A 897.0   5. Impaired functional mobility, balance, gait, and endurance  Z74.09 V49.89   6. Infection of prosthetic total hip joint, initial encounter  T84.59XA 996.66    Z96.649 V43.64     Patient Active Problem List   Diagnosis    Diabetic ulcer of left foot with necrosis of muscle    Severe malnutrition    Infection of prosthetic total hip joint     Past Medical History:   Diagnosis Date    Diabetes mellitus     Diabetic foot infection 2018    left (partial amputation)     Past Surgical History:   Procedure Laterality Date    AMPUTATION FOOT / TOE Right     partial foot    BELOW KNEE AMPUTATION Left 2024    Procedure: AMPUTATION BELOW KNEE AND WOUND VAC ASSISTED CLOSURE LEFT;  Surgeon: Brijesh Em Jr., MD;  Location:  [facility];  Service: Orthopedics;  Laterality: Left;    INCISION AND DRAINAGE HIP Right 2024    Procedure: HIP ANTERIOR INCISION AND DRAINAGE WITH HANA TABLE, POLY SWAP RIGHT WITH LEFT LEG PIN TRACTION;  Surgeon: Lex Sethi MD;  Location:  [facility];  Service: Orthopedics;  Laterality: Right;    INCISION AND DRAINAGE LEG Left 6/15/2024    Procedure: SECONDARY CLOSURE LEFT BELOW KNEE AMPUTATION;  Surgeon: Brijesh Em Jr., MD;  Location:  [facility];  Service: Orthopedics;  Laterality: Left;    PLACEMENT OF WOUND VAC Left 2024    Procedure: PLACEMENT OF WOUND VAC LEFT;  Surgeon: Brijesh Em Jr., MD;  Location:  [facility];  Service: Orthopedics;  Laterality: Left;    TOTAL HIP ARTHROPLASTY Right 2024    From fall  "\"whole hip was shattered\"      General Information       Row Name 06/24/24 1041          Physical Therapy Time and Intention    Document Type therapy note (daily note) (P)   -     Mode of Treatment physical therapy;individual therapy (P)   -       Row Name 06/24/24 1041          General Information    Patient Profile Reviewed yes (P)   -     Existing Precautions/Restrictions fall;other (see comments) (P)   Recent L BKA, s/p I&D of R MARIA LUISA due to infection, woundvac, remote R foot TMA  -     Barriers to Rehab medically complex (P)   -HM       Row Name 06/24/24 1041          Cognition    Orientation Status (Cognition) oriented x 4 (P)   -       Row Name 06/24/24 1041          Safety Issues, Functional Mobility    Safety Issues Affecting Function (Mobility) awareness of need for assistance;insight into deficits/self-awareness;safety precautions follow-through/compliance;safety precaution awareness (P)   -     Impairments Affecting Function (Mobility) balance;range of motion (ROM);sensation/sensory awareness;strength;endurance/activity tolerance;pain (P)   -     Comment, Safety Issues/Impairments (Mobility) Alert and following commands (P)   -               User Key  (r) = Recorded By, (t) = Taken By, (c) = Cosigned By      Initials Name Provider Type     Qunicy Elizabeth, PT Student PT Student                   Mobility       Row Name 06/24/24 1044          Bed Mobility    Rolling Left [place] (Bed Mobility) 2 person assist;verbal cues;minimum assist (75% patient effort) (P)   -     Rolling Right [place] (Bed Mobility) 2 person assist;verbal cues;minimum assist (75% patient effort) (P)   -     Comment, (Bed Mobility) Pt completed exercisese in bed. Pt was able to complete rolling left and right in bed to place lift with VC for hand placement and sequencing. (P)   -       Row Name 06/24/24 1044          Transfers    Comment, (Transfers) Sling transfer from bed to chair (P)   -       " Row Name 06/24/24 1044          Bed-Chair Transfer    Bed-Chair [place] (Transfers) dependent (less than 25% patient effort);2 person assist (P)   -     Assistive Device (Bed-Chair Transfers) lift device (P)   -       Row Name 06/24/24 1044          Sit-Stand Transfer    Sit-Stand [place] (Transfers) maximum assist (25% patient effort);2 person assist;verbal cues (P)   -     Assistive Device (Sit-Stand Transfers) walker, front-wheeled (P)   -     Comment, (Sit-Stand Transfer) STS x 1 from chair with VC needed throughout to standing upright and push through UE and RLE. Pt unable to achieve full upright posture remaining in flexed posture. Pt with R knee buckling with Max A x 2 to remain upright. Pt with c/o dizziness upon standing with BP measured and stable at 168/87. (P)   -       Row Name 06/24/24 1044          Gait/Stairs (Locomotion)    [place] Level (Gait) not tested (P)   -     Comment, (Gait/Stairs) Not appropriate at this time (P)   -       Row Name 06/24/24 1044          Mobility    Extremity Weight-bearing Status left lower extremity;right lower extremity (P)   -     Left Lower Extremity (Weight-bearing Status) non weight-bearing (NWB) (P)   -     Right Lower Extremity (Weight-bearing Status) weight-bearing as tolerated (WBAT) (P)   -               User Key  (r) = Recorded By, (t) = Taken By, (c) = Cosigned By      Initials Name Provider Type     Quincy Elizabeth, PT Student PT Student                   Obj/Interventions       Row Name 06/24/24 1051          Motor Skills    Therapeutic Exercise hip;knee;ankle (P)   -[facility] Name 06/24/24 1051          Hip (Therapeutic Exercise)    Hip (Therapeutic Exercise) isometric exercises;strengthening exercise (P)   -     Hip Isometrics (Therapeutic Exercise) bilateral;gluteal sets;10 repetitions;3 second hold (P)   -     Hip Strengthening (Therapeutic Exercise) right;heel slides;aBduction;10 repetitions (P)   Physical  assistance needed for R heel slides  -       Row Name 06/24/24 1051          Knee (Therapeutic Exercise)    Knee (Therapeutic Exercise) strengthening exercise;isometric exercises (P)   -     Knee AROM (Therapeutic Exercise) right;SAQ (short arc quad);bilateral;SLR (straight leg raise);10 repetitions (P)   Physical assistance needed for R SLR  -     Knee Isometrics (Therapeutic Exercise) bilateral;quad sets;3 second hold;10 repetitions (P)   -       Row Name 06/24/24 1051          Ankle (Therapeutic Exercise)    Ankle (Therapeutic Exercise) strengthening exercise (P)   -     Ankle Strengthening (Therapeutic Exercise) bilateral;dorsiflexion;plantarflexion;10 repetitions (P)   -       Row Name 06/24/24 1051          Balance    Balance Assessment sitting static balance;sitting dynamic balance;sit to stand dynamic balance;standing static balance (P)   -     Static Sitting Balance standby assist (P)   -     Dynamic Sitting Balance contact guard (P)   -     Position, Sitting Balance unsupported;sitting in chair (P)   -     Static Standing Balance maximum assist;2-person assist (P)   -     Position/Device Used, Standing Balance supported;walker, front-wheeled (P)   -     Balance Interventions sitting;standing;sit to stand (P)   -     Comment, Balance Pt with good sitting balance and dynamic sitting with scooting in chair. Pt with poor standing balance secondary to R knee buckling and generalized weakness. (P)   -               User Key  (r) = Recorded By, (t) = Taken By, (c) = Cosigned By      Initials Name Provider Type     Quincy Elizabeth, PT Student PT Student                   Goals/Plan    No documentation.                  Clinical Impression       Row Name 06/24/24 1057          Pain    Pain Location - Side/Orientation Right (P)   -     Pain Location lower (P)   -     Pain Location - extremity (P)   -     Pre/Posttreatment Pain Comment Main complaint of pain is R hip. Pt reported  having little pain with L BKA (P)   -     Pain Intervention(s) Repositioned;Elevated;Ambulation/increased activity (P)   -       Row Name 06/24/24 1057          Plan of Care Review    Plan of Care Reviewed With patient (P)   -     Progress no change (P)   -     Outcome Evaluation Pt was able to complete STS with Max A x 2 this session with poor ability to stand fully upright. Pt able to complete ther-ex in bed and given HEP to complete on own upon request from patient. Pt was dependent lift transfer from bed to chair this session. Wheelchair training would be appropriate for future sessions with pt. Pt presents below baseline with deficits in generalized weakness, increased pain, and decreased balance. IPPT continues to be appropriate during admission. PT recommended DC to IRF for bestt functional outcomes. (P)   -       Row Name 06/24/24 1057          Therapy Assessment/Plan (PT)    Patient/Family Therapy Goals Statement (PT) Increased mobility and get back to standing and walking (P)   -       Row Name 06/24/24 1057          Vital Signs    Pre Systolic BP Rehab 131 (P)   -HM     Pre Treatment Diastolic BP 70 (P)   -HM     Post Systolic BP Rehab 168 (P)   -HM     Post Treatment Diastolic BP 87 (P)   -HM     Pretreatment Heart Rate (beats/min) 102 (P)   -HM     Posttreatment Heart Rate (beats/min) 108 (P)   -HM     Pre SpO2 (%) 96 (P)   -HM     O2 Delivery Pre Treatment room air (P)   -HM     Post SpO2 (%) 96 (P)   -HM     O2 Delivery Post Treatment room air (P)   -HM     Pre Patient Position Supine (P)   -HM     Post Patient Position Sitting (P)   -       Row Name 06/24/24 1054          Positioning and Restraints    Pre-Treatment Position in bed (P)   -HM     Post Treatment Position chair (P)   -     In Chair notified nsg;reclined;call light within reach;encouraged to call for assist;exit alarm on;on mechanical lift sling;waffle cushion;compression device;RLE elevated (P)   -               User  Key  (r) = Recorded By, (t) = Taken By, (c) = Cosigned By      Initials Name Provider Type     Quincy Elizabeth, PT Student PT Student                   Outcome Measures       Row Name 06/24/24 1104          How much help from another person do you currently need...    Turning from your back to your side while in flat bed without using bedrails? 3 (P)   -HM     Moving from lying on back to sitting on the side of a flat bed without bedrails? 2 (P)   -HM     Moving to and from a bed to a chair (including a wheelchair)? 1 (P)   -HM     Standing up from a chair using your arms (e.g., wheelchair, bedside chair)? 2 (P)   -HM     Climbing 3-5 steps with a railing? 1 (P)   -HM     To walk in hospital room? 1 (P)   -HM     AM-PAC 6 Clicks Score (PT) 10 (P)   -HM     Highest Level of Mobility Goal 4 --> Transfer to chair/commode (P)   -HM       Row Name 06/24/24 1104          Functional Assessment    Outcome Measure Options AM-PAC 6 Clicks Basic Mobility (PT) (P)   -HM               User Key  (r) = Recorded By, (t) = Taken By, (c) = Cosigned By      Initials Name Provider Type     Quincy Elizabeth, PT Student PT Student                                 Physical Therapy Education       Title: PT OT SLP Therapies (In Progress)       Topic: Physical Therapy (Done)       Point: Mobility training (Done)       Learning Progress Summary             Patient Acceptance, E,H, VU by  at 6/24/2024 1105    Comment: Pt educated on bed positioning with L BKA and importance of exercise on rehab.    LEONELA Varela VU,DU by SC at 6/21/2024 1349    Comment: reviewed HEP    Acceptance, E,D, VU,NR by SD at 6/19/2024 1203    Comment: PT ed for expected outcomes, risks, and benefits of IPPT services and progression of activity. Pt given visual demo of unilateral gait with RW. Discussion regarding dispo planning and IRF.                         Point: Home exercise program (Done)       Learning Progress Summary             Patient Acceptance, E,H,  VU by  at 6/24/2024 1105    Comment: Pt educated on bed positioning with L BKA and importance of exercise on rehab.    Acceptance, E, VU,NR,DU by  at 6/23/2024 1420    Eager, E, VU,DU by SC at 6/21/2024 1349    Comment: reviewed HEP    Acceptance, E,D, VU,NR by SD at 6/19/2024 1203    Comment: PT ed for expected outcomes, risks, and benefits of IPPT services and progression of activity. Pt given visual demo of unilateral gait with RW. Discussion regarding dispo planning and IRF.                         Point: Body mechanics (Done)       Learning Progress Summary             Patient Acceptance, E,H, VU by  at 6/24/2024 1105    Comment: Pt educated on bed positioning with L BKA and importance of exercise on rehab.    Eager, E, VU,DU by SC at 6/21/2024 1349    Comment: reviewed HEP    Acceptance, E,D, VU,NR by SD at 6/19/2024 1203    Comment: PT ed for expected outcomes, risks, and benefits of IPPT services and progression of activity. Pt given visual demo of unilateral gait with RW. Discussion regarding dispo planning and IRF.                         Point: Precautions (Done)       Learning Progress Summary             Patient Acceptance, E,H, VU by  at 6/24/2024 1105    Comment: Pt educated on bed positioning with L BKA and importance of exercise on rehab.    Eager, E, VU,DU by SC at 6/21/2024 1349    Comment: reviewed HEP    Acceptance, E,D, VU,NR by SD at 6/19/2024 1203    Comment: PT ed for expected outcomes, risks, and benefits of IPPT services and progression of activity. Pt given visual demo of unilateral gait with RW. Discussion regarding dispo planning and IRF.                                         User Key       Initials Effective Dates Name Provider Type Discipline    SC 02/03/23 -  Jorge Luis Childress, PT Physical Therapist PT    SD 03/13/23 -  Charity Dela Cruz, PT Physical Therapist PT    GEORGINA 06/16/21 -  Radha Miguel, PT Physical Therapist PT     05/07/24 -  Quincy Elizabeth, PT Student PT  Student PT                  PT Recommendation and Plan     Plan of Care Reviewed With: (P) patient  Progress: (P) no change  Outcome Evaluation: (P) Pt was able to complete STS with Max A x 2 this session with poor ability to stand fully upright. Pt able to complete ther-ex in bed and given HEP to complete on own upon request from patient. Pt was dependent lift transfer from bed to chair this session. Wheelchair training would be appropriate for future sessions with pt. Pt presents below baseline with deficits in generalized weakness, increased pain, and decreased balance. IPPT continues to be appropriate during admission. PT recommended DC to IRF for bestt functional outcomes.     Time Calculation:         PT Charges       Row Name 06/24/24 0956             Time Calculation    Start Time 0956 (P)   -      PT Received On 06/24/24 (P)   -      PT Goal Re-Cert Due Date 07/01/24 (P)   -         Timed Charges    24374 - PT Therapeutic Exercise Minutes 25 (P)   -HM      71505 - PT Therapeutic Activity Minutes 14 (P)   -HM         Total Minutes    Timed Charges Total Minutes 39 (P)   -HM       Total Minutes 39 (P)   -HM                User Key  (r) = Recorded By, (t) = Taken By, (c) = Cosigned By      Initials Name Provider Type     Quincy Elizabeth, PT Student PT Student                  Therapy Charges for Today       Code Description Service Date Service Provider Modifiers Qty    [id number]  PT THER PROC EA 15 MIN 6/24/2024 Quincy Elizabeth, PT Student GP 2    [id number]  PT THERAPEUTIC ACT EA 15 MIN 6/24/2024 Quincy Elizabeth, PT Student GP 1            PT G-Codes  Outcome Measure Options: (P) AM-PAC 6 Clicks Basic Mobility (PT)  AM-PAC 6 Clicks Score (PT): (P) 10  AM-PAC 6 Clicks Score (OT): 12  PT Discharge Summary  Anticipated Discharge Disposition (PT): (P) inpatient rehabilitation facility    LINDA Francis  6/24/2024

## 2024-06-24 NOTE — PLAN OF CARE
Goal Outcome Evaluation:              Outcome Evaluation: ox4, flat affect, 2 episodes of loose Bm today, skin interventions implemented, dressing changed to left stump, no drainage or redness, prevena wound vac to left hip, no drainage, VSS, RA, slightly tachy, cont to monitor

## 2024-06-24 NOTE — PLAN OF CARE
VSS. A/Ox4. RA. Complaints of pain given PRN Tramadol and Oxycodone with relief. Skin interventions continued.       Problem: Skin Injury Risk Increased  Goal: Skin Health and Integrity  Outcome: Ongoing, Not Progressing  Intervention: Optimize Skin Protection  Recent Flowsheet Documentation  Taken 6/24/2024 0200 by Nakia Willis RN  Head of Bed (HOB) Positioning: HOB elevated  Taken 6/24/2024 0000 by Nakia Willis RN  Head of Bed (HOB) Positioning: HOB elevated  Taken 6/23/2024 2200 by Nakia Willis RN  Head of Bed (HOB) Positioning:   HOB elevated   HOB at 30 degrees  Taken 6/23/2024 2000 by Nakia Willis RN  Pressure Reduction Techniques:   weight shift assistance provided   positioned off wounds   pressure points protected  Head of Bed (HOB) Positioning: HOB elevated  Pressure Reduction Devices: pressure-redistributing mattress utilized  Skin Protection:   tubing/devices free from skin contact   incontinence pads utilized   skin sealant/moisture barrier applied     Problem: Fall Injury Risk  Goal: Absence of Fall and Fall-Related Injury  Outcome: Ongoing, Not Progressing  Intervention: Promote Injury-Free Environment  Recent Flowsheet Documentation  Taken 6/24/2024 0200 by Nakia Willis RN  Safety Promotion/Fall Prevention:   activity supervised   clutter free environment maintained   assistive device/personal items within reach   fall prevention program maintained   nonskid shoes/slippers when out of bed   room organization consistent   safety round/check completed   toileting scheduled  Taken 6/24/2024 0000 by Nakia Willis RN  Safety Promotion/Fall Prevention:   activity supervised   assistive device/personal items within reach   clutter free environment maintained   fall prevention program maintained   nonskid shoes/slippers when out of bed   room organization consistent   safety round/check completed   toileting scheduled  Taken 6/23/2024 2200 by Nakia Willis RN  Safety  Promotion/Fall Prevention:   activity supervised   clutter free environment maintained   assistive device/personal items within reach   fall prevention program maintained   nonskid shoes/slippers when out of bed   safety round/check completed   room organization consistent   toileting scheduled  Taken 6/23/2024 2000 by Nakia Willis RN  Safety Promotion/Fall Prevention:   activity supervised   assistive device/personal items within reach   clutter free environment maintained   fall prevention program maintained   nonskid shoes/slippers when out of bed   room organization consistent   safety round/check completed   toileting scheduled     Problem: Adjustment to Surgery (Extremity Amputation)  Goal: Optimal Coping with Amputation  Outcome: Ongoing, Not Progressing     Problem: Bleeding (Extremity Amputation)  Goal: Absence of Bleeding  Outcome: Ongoing, Not Progressing     Problem: Bowel Motility Impaired (Extremity Amputation)  Goal: Effective Bowel Elimination  Outcome: Ongoing, Not Progressing     Problem: Fluid and Electrolyte Imbalance (Extremity Amputation)  Goal: Fluid and Electrolyte Balance  Outcome: Ongoing, Not Progressing     Problem: Functional Ability Impaired (Extremity Amputation)  Goal: Optimal Functional Ability  Outcome: Ongoing, Not Progressing  Intervention: Optimize Functional Ability  Recent Flowsheet Documentation  Taken 6/24/2024 0200 by Nakia Willis RN  Activity Management: activity encouraged  Activity Assistance Provided: assistance, 2 people  Taken 6/24/2024 0000 by Nakia Willis RN  Activity Management: activity encouraged  Activity Assistance Provided: assistance, 2 people  Taken 6/23/2024 2200 by Nakia Willis RN  Activity Management: activity encouraged  Activity Assistance Provided: assistance, 2 people  Taken 6/23/2024 2000 by Nakia Willis, RN  Activity Management: activity encouraged  Activity Assistance Provided: assistance, 2 people     Problem: Infection  (Extremity Amputation)  Goal: Absence of Infection Signs and Symptoms  Outcome: Ongoing, Not Progressing     Problem: Ongoing Anesthesia Effects (Extremity Amputation)  Goal: Anesthesia/Sedation Recovery  Outcome: Ongoing, Not Progressing  Intervention: Optimize Anesthesia Recovery  Recent Flowsheet Documentation  Taken 6/24/2024 0200 by Nakia Willis, RN  Safety Promotion/Fall Prevention:   activity supervised   clutter free environment maintained   assistive device/personal items within reach   fall prevention program maintained   nonskid shoes/slippers when out of bed   room organization consistent   safety round/check completed   toileting scheduled  Taken 6/24/2024 0000 by Nakia Willis, RN  Safety Promotion/Fall Prevention:   activity supervised   assistive device/personal items within reach   clutter free environment maintained   fall prevention program maintained   nonskid shoes/slippers when out of bed   room organization consistent   safety round/check completed   toileting scheduled  Taken 6/23/2024 2200 by Nakia Willis, RN  Safety Promotion/Fall Prevention:   activity supervised   clutter free environment maintained   assistive device/personal items within reach   fall prevention program maintained   nonskid shoes/slippers when out of bed   safety round/check completed   room organization consistent   toileting scheduled  Taken 6/23/2024 2000 by Nakia Willis, RN  Safety Promotion/Fall Prevention:   activity supervised   assistive device/personal items within reach   clutter free environment maintained   fall prevention program maintained   nonskid shoes/slippers when out of bed   room organization consistent   safety round/check completed   toileting scheduled     Problem: Pain (Extremity Amputation)  Goal: Acceptable Pain Control  Outcome: Ongoing, Not Progressing     Problem: Postoperative Nausea and Vomiting (Extremity Amputation)  Goal: Nausea and Vomiting Relief  Outcome: Ongoing,  Not Progressing     Problem: Postoperative Urinary Retention (Extremity Amputation)  Goal: Effective Urinary Elimination  Outcome: Ongoing, Not Progressing     Problem: Residual Limb Care (Extremity Amputation)  Goal: Optimal Residual Limb Healing  Outcome: Ongoing, Not Progressing     Problem: Mobility Impairment  Goal: Optimal Mobility  Outcome: Ongoing, Not Progressing  Intervention: Optimize Mobility  Recent Flowsheet Documentation  Taken 6/24/2024 0200 by Nakia Willis RN  Activity Management: activity encouraged  Positioning/Transfer Devices:   pillows   in use  Taken 6/24/2024 0000 by Nakia Willis RN  Activity Management: activity encouraged  Positioning/Transfer Devices:   pillows   in use  Taken 6/23/2024 2200 by Nakia Willis RN  Activity Management: activity encouraged  Positioning/Transfer Devices:   pillows   removed  Taken 6/23/2024 2000 by Nakai Willis RN  Activity Management: activity encouraged  Positioning/Transfer Devices:   pillows   in use     Problem: Adult Inpatient Plan of Care  Goal: Plan of Care Review  Outcome: Ongoing, Not Progressing  Goal: Patient-Specific Goal (Individualized)  Outcome: Ongoing, Not Progressing  Goal: Absence of Hospital-Acquired Illness or Injury  Outcome: Ongoing, Not Progressing  Intervention: Identify and Manage Fall Risk  Recent Flowsheet Documentation  Taken 6/24/2024 0200 by Nakia Willis RN  Safety Promotion/Fall Prevention:   activity supervised   clutter free environment maintained   assistive device/personal items within reach   fall prevention program maintained   nonskid shoes/slippers when out of bed   room organization consistent   safety round/check completed   toileting scheduled  Taken 6/24/2024 0000 by Nakia Willis RN  Safety Promotion/Fall Prevention:   activity supervised   assistive device/personal items within reach   clutter free environment maintained   fall prevention program maintained   nonskid shoes/slippers  when out of bed   room organization consistent   safety round/check completed   toileting scheduled  Taken 6/23/2024 2200 by Nakia Willis RN  Safety Promotion/Fall Prevention:   activity supervised   clutter free environment maintained   assistive device/personal items within reach   fall prevention program maintained   nonskid shoes/slippers when out of bed   safety round/check completed   room organization consistent   toileting scheduled  Taken 6/23/2024 2000 by Nakia Willis RN  Safety Promotion/Fall Prevention:   activity supervised   assistive device/personal items within reach   clutter free environment maintained   fall prevention program maintained   nonskid shoes/slippers when out of bed   room organization consistent   safety round/check completed   toileting scheduled  Intervention: Prevent Skin Injury  Recent Flowsheet Documentation  Taken 6/24/2024 0200 by Nakia Willis RN  Body Position: position changed independently  Taken 6/24/2024 0000 by Nakia Willis RN  Body Position: position changed independently  Taken 6/23/2024 2200 by Nakia Willis RN  Body Position:   position changed independently   weight shifting  Taken 6/23/2024 2000 by Nakia Willis RN  Body Position: position changed independently  Skin Protection:   tubing/devices free from skin contact   incontinence pads utilized   skin sealant/moisture barrier applied  Intervention: Prevent and Manage VTE (Venous Thromboembolism) Risk  Recent Flowsheet Documentation  Taken 6/24/2024 0200 by Nakia Willis RN  Activity Management: activity encouraged  Taken 6/24/2024 0000 by Nakia Willis RN  Activity Management: activity encouraged  Taken 6/23/2024 2200 by Nakia Willis RN  Activity Management: activity encouraged  Taken 6/23/2024 2000 by Nakia Willis RN  Activity Management: activity encouraged  VTE Prevention/Management:   right   sequential compression devices on  Goal: Optimal Comfort and  Wellbeing  Outcome: Ongoing, Not Progressing  Intervention: Provide Person-Centered Care  Recent Flowsheet Documentation  Taken 6/23/2024 2000 by Nakia Willis, RN  Trust Relationship/Rapport:   care explained   choices provided   questions answered   questions encouraged  Goal: Readiness for Transition of Care  Outcome: Ongoing, Not Progressing   Goal Outcome Evaluation:

## 2024-06-24 NOTE — PROGRESS NOTES
[facility] Medicine Services  PROGRESS NOTE    Patient Name: Easton Alvarez  : 1965  MRN: [id number]    Date of Admission: 2024  Primary Care Physician: Provider, No Known    Subjective   Subjective     CC: Follow-up left foot gangrene s/p BKA    HPI: No acute events overnight, patient resting well, seated in chair, denies any ongoing pain      Objective   Objective     Vital Signs:   Temp:  [98 °F (36.7 °C)-98.9 °F (37.2 °C)] 98.9 °F (37.2 °C)  Heart Rate:  [] 97  Resp:  [16-18] 16  BP: (131-143)/(68-89) 131/70     Physical Exam:  Constitutional: No acute distress, awake, alert  HENT: NCAT, mucous membranes moist  Respiratory: Clear to auscultation bilaterally, respiratory effort normal   Cardiovascular: RRR, no murmurs, rubs, or gallops  Gastrointestinal: Positive bowel sounds, soft, nontender, nondistended  Musculoskeletal: No bilateral ankle edema, s/p left BKA  Psychiatric: Appropriate affect, cooperative  Neurologic: Oriented x 3, nonfocal  Skin: No rashes    Results Reviewed:  LAB RESULTS:      Lab 24  0628 24  0617 24  0445 24  1205 24  0437 24  0507   WBC 11.24* 11.35* 21.14* 25.58* 13.29* 16.60*   HEMOGLOBIN 7.7* 8.1* 9.4* 10.6* 10.9* 11.5*   HEMATOCRIT 22.6* 23.8* 27.9* 32.3* 32.4* 33.4*   PLATELETS 159 169 250 339 184 231   NEUTROS ABS  --   --   --   --  10.61* 13.49*   IMMATURE GRANS (ABS)  --   --   --   --  0.33* 0.30*   LYMPHS ABS  --   --   --   --  1.24 1.54   MONOS ABS  --   --   --   --  0.96* 1.08*   EOS ABS  --   --   --   --  0.12 0.16   MCV 95.0 93.7 97.6* 98.8* 97.3* 95.7         Lab 24  0628 24  0617 24  0445 24  0721 24  0437   SODIUM 135* 132* 131* 129* 130*   POTASSIUM 4.0 4.2 4.5 5.6* 4.4   CHLORIDE 102 102 100 96* 98   CO2 24.0 25.0 23.0 18.0* 25.0   ANION GAP 9.0 5.0 8.0 15.0 7.0   BUN 38* 42* 42* 24* 16   CREATININE 0.75* 0.82 1.07 0.67* 0.49*   EGFR 103.3 100.6 79.4  106.9 117.5   GLUCOSE 191* 189* 192* 164* 211*   CALCIUM 6.7* 6.8* 7.1* 7.3* 7.4*                         Brief Urine Lab Results  (Last result in the past 365 days)        Color   Clarity   Blood   Leuk Est   Nitrite   Protein   CREAT   Urine HCG        06/14/24 1109 Yellow   Clear   Negative   Negative   Negative   Negative                   Microbiology Results Abnormal       Procedure Component Value - Date/Time    Fungus Culture - Aspirate, Hip, Right [[id number]] Collected: 06/18/24 1052    Lab Status: Preliminary result Specimen: Aspirate from Hip, Right Updated: 06/23/24 1130     Fungus Culture No fungus isolated at less than 1 week    AFB Culture - Aspirate, Hip, Right [[id number]] Collected: 06/18/24 1052    Lab Status: Preliminary result Specimen: Aspirate from Hip, Right Updated: 06/23/24 1130     AFB Culture No AFB isolated at less than 1 week     AFB Stain No acid fast bacilli seen on concentrated smear    Anaerobic Culture 10 Day Incubation - Synovium, Hip, Right [[id number]]  (Normal) Collected: 06/20/24 1700    Lab Status: Final result Specimen: Synovium from Hip, Right Updated: 06/23/24 1010     Anaerobic Culture No anaerobes isolated at 3 days    Tissue / Bone Culture - Synovium, Hip, Right [[id number]] Collected: 06/20/24 1700    Lab Status: Final result Specimen: Synovium from Hip, Right Updated: 06/23/24 1010     Tissue Culture No growth at 3 days     Gram Stain Many (4+) Red blood cells      Moderate (3+) WBCs seen      No organisms seen    Tissue / Bone Culture - Synovium, Hip, Right [[id number]] Collected: 06/20/24 1700    Lab Status: Final result Specimen: Synovium from Hip, Right Updated: 06/23/24 1009     Tissue Culture No growth at 3 days     Gram Stain Moderate (3+) WBCs seen      No organisms seen     Comment: Modified report. Previous result was Rare (1+) Gram positive cocci in pairs on 6/20/2024 at 1949 EDT.         Many (4+) Red blood cells    Tissue / Bone Culture - Synovium, Hip, Right  [[id number]] Collected: 06/20/24 1700    Lab Status: Final result Specimen: Synovium from Hip, Right Updated: 06/23/24 1009     Tissue Culture No growth at 3 days     Gram Stain Many (4+) Red blood cells      Few (2+) WBCs seen      No organisms seen    Anaerobic Culture 10 Day Incubation - Synovium, Hip, Right [[id number]]  (Normal) Collected: 06/20/24 1700    Lab Status: Preliminary result Specimen: Synovium from Hip, Right Updated: 06/23/24 0712     Anaerobic Culture No anaerobes isolated at 3 days    Anaerobic Culture 10 Day Incubation - Synovium, Hip, Right [[id number]]  (Normal) Collected: 06/20/24 1700    Lab Status: Preliminary result Specimen: Synovium from Hip, Right Updated: 06/23/24 0712     Anaerobic Culture No anaerobes isolated at 3 days    Fungus Culture - Wound, Leg, Left [[id number]] Collected: 06/15/24 1501    Lab Status: Preliminary result Specimen: Wound from Leg, Left Updated: 06/22/24 2215     Fungus Culture No fungus isolated at 1 week    AFB Culture - Wound, Leg, Left [[id number]] Collected: 06/15/24 1501    Lab Status: Preliminary result Specimen: Wound from Leg, Left Updated: 06/22/24 2215     AFB Culture No AFB isolated at 1 week     AFB Stain No acid fast bacilli seen on concentrated smear    AFB Culture - Synovium, Hip, Right [[id number]] Collected: 06/20/24 1700    Lab Status: Preliminary result Specimen: Synovium from Hip, Right Updated: 06/21/24 1102     AFB Stain No acid fast bacilli seen on concentrated smear    AFB Culture - Synovium, Hip, Right [[id number]] Collected: 06/20/24 1700    Lab Status: Preliminary result Specimen: Synovium from Hip, Right Updated: 06/21/24 1102     AFB Stain No acid fast bacilli seen on concentrated smear    AFB Culture - Synovium, Hip, Right [[id number]] Collected: 06/20/24 1700    Lab Status: Preliminary result Specimen: Synovium from Hip, Right Updated: 06/21/24 1101     AFB Stain No acid fast bacilli seen on concentrated smear    Anaerobic Culture 10 Day  Incubation - Wound, Leg, Left [[id number]]  (Normal) Collected: 06/15/24 1501    Lab Status: Preliminary result Specimen: Wound from Leg, Left Updated: 06/21/24 0636     Anaerobic Culture No anaerobes isolated at 5 days    Fungus Culture - Surgical Site, Leg, Left [[id number]] Collected: 06/13/24 1031    Lab Status: Preliminary result Specimen: Surgical Site from Leg, Left Updated: 06/20/24 1201     Fungus Culture No fungus isolated at 1 week    AFB Culture - Surgical Site, Leg, Left [[id number]] Collected: 06/13/24 1031    Lab Status: Preliminary result Specimen: Surgical Site from Leg, Left Updated: 06/20/24 1201     AFB Culture No AFB isolated at 1 week     AFB Stain No acid fast bacilli seen on concentrated smear    Blood Culture - Blood, Hand, Left [[id number]]  (Normal) Collected: 06/15/24 1104    Lab Status: Final result Specimen: Blood from Hand, Left Updated: 06/20/24 1201     Blood Culture No growth at 5 days    Blood Culture - Blood, Hand, Right [[id number]]  (Normal) Collected: 06/15/24 1104    Lab Status: Final result Specimen: Blood from Hand, Right Updated: 06/20/24 1201     Blood Culture No growth at 5 days    Wound Culture - Wound, Leg, Left [[id number]] Collected: 06/15/24 1501    Lab Status: Final result Specimen: Wound from Leg, Left Updated: 06/19/24 0711     Wound Culture No growth at 3 days     Gram Stain Rare (1+) WBCs seen      No organisms seen    Anaerobic Culture - Surgical Site, Leg, Left [[id number]]  (Normal) Collected: 06/13/24 1031    Lab Status: Final result Specimen: Surgical Site from Leg, Left Updated: 06/18/24 0751     Anaerobic Culture No anaerobes isolated at 5 days    Blood Culture - Blood, Arm, Left [[id number]]  (Normal) Collected: 06/12/24 1832    Lab Status: Final result Specimen: Blood from Arm, Left Updated: 06/17/24 1900     Blood Culture No growth at 5 days    Blood Culture - Blood, Hand, Right [[id number]]  (Normal) Collected: 06/12/24 1837    Lab Status: Final result  Specimen: Blood from Hand, Right Updated: 06/17/24 1900     Blood Culture No growth at 5 days    Fungus Smear - Surgical Site, Leg, Left [[id number]] Collected: 06/13/24 1031    Lab Status: Final result Specimen: Surgical Site from Leg, Left Updated: 06/14/24 1406     Fungal Stain No fungal elements seen            No radiology results from the last 24 hrs    Results for orders placed during the hospital encounter of 06/12/24    Adult Transthoracic Echo Complete w/ Color, Spectral and Contrast if Necessary Per Protocol    Interpretation Summary    Left ventricular systolic function is normal. Left ventricular ejection fraction appears to be 56 - 60%.    Left ventricular wall thickness is consistent with borderline concentric hypertrophy.    Left ventricular diastolic function is consistent with (grade Ia w/high LAP) impaired relaxation.      Current medications:  Scheduled Meds:acetaminophen, 1,000 mg, Oral, Q8H  aspirin, 81 mg, Oral, Q12H  cefTRIAXone, 2,000 mg, Intravenous, Q24H  insulin glargine, 20 Units, Subcutaneous, Nightly  insulin lispro, 2-9 Units, Subcutaneous, 4x Daily AC & at Bedtime  Insulin Lispro, 5 Units, Subcutaneous, TID With Meals  lactobacillus acidophilus, 1 capsule, Oral, Daily  [Held by provider] lisinopril, 10 mg, Oral, Q24H  melatonin, 5 mg, Oral, Nightly  pantoprazole, 40 mg, Intravenous, BID AC  senna-docusate sodium, 2 tablet, Oral, BID  sodium chloride, 10 mL, Intravenous, Q12H      Continuous Infusions:lactated ringers, 9 mL/hr, Last Rate: 9 mL/hr (06/15/24 1307)  lactated ringers, 100 mL/hr, Last Rate: 100 mL/hr (06/20/24 2122)  ropivacaine, , Last Rate: Stopped (06/17/24 2230)      PRN Meds:.  senna-docusate sodium **AND** polyethylene glycol **AND** bisacodyl **AND** bisacodyl    Calcium Replacement - Follow Nurse / BPA Driven Protocol    dextrose    dextrose    glucagon (human recombinant)    Magnesium Standard Dose Replacement - Follow Nurse / BPA Driven Protocol    nitroglycerin     oxyCODONE    Phosphorus Replacement - Follow Nurse / BPA Driven Protocol    Potassium Replacement - Follow Nurse / BPA Driven Protocol    sodium chloride    sodium chloride    traMADol    Assessment & Plan   Assessment & Plan     Active Hospital Problems    Diagnosis  POA    **Diabetic ulcer of left foot with necrosis of muscle [E11.621, L97.523]  Yes    Severe malnutrition [E43]  Yes    Infection of prosthetic total hip joint [T84.59XA, Z96.649]  Not Applicable      Resolved Hospital Problems   No resolved problems to display.      Brief Hospital Course to date:  Easton Alvarez is a 58 y.o. male with past medical history significant for T2DM, diabetic neuropathy, right hip fracture/ORIF ([facility] 2/2024), and right transmetatarsal amputation 2019 at  who presented to OSH ED on 6/12/2024 with severe left foot cellulitis/necrosis after recent injury. Lactate at OSH was 8.8 and CT imaging of left leg showed gas gangrene. Orthopedic surgeon Dr. Em was consulted and performed a left below-knee amputation on 6/13/2024 with further debridement and irrigation on 6/15. Infectious disease has followed and managed antimicrobial therapy.     This patient's problems and plans were partially entered by my partner and updated as appropriate by me 06/24/24.      Sepsis secondary to group B strep bacteremia  Left foot cellulitis/gangrene   Leukocytosis  -MRI left foot showed extensive soft tissue infection with abscesses along the foot to the ankle and into the lower leg with soft tissue gas.    -Wound cultures 6/12 with heavy growth group B strep and Kebsiella, blood cultures x 2 with group B strep; wound culture 6/13 with group B strep  -TTE described as technically adequate by cardiology; no description of vegetation by cardiology per report   -s/p left BKA with wound VAC on 6/13/24    -s/p further debridement in OR on 6/15/24 by Dr. Em, drain removed 6/17  -Infectious disease, Dr. Bills following; continue IV  ceftriaxone.  -PT wound care following.  -Added Boost glucose control to increase protein intake for wound healing, nutrition following.  -Pain control with scheduled Tylenol, PRN oxycodone.   -Daily dressing changes to surgical site: Xeroform, 4X4, Kerlix, ACE  -Follow up with  Orthopedics in 2 weeks for wound check, imaging.     Right glenys-prosthetic hip infection  -IR performed fluid aspiration from the hip which was purulent.  -s/p I&D on 6/20, Dr. Sethi   -Fluid culture 6/18 with rare growth group B strep  -Supportive care, mobilize, pain control. Antibiotics as above.     Concern for GIB  Acute on chronic anemia  -with melena and fecal occult positive overnight 6/14  -GI consulted, recommended medical management with PPI BID x 1 month.  -GI referral at discharge for elevated LFTs, thrombocytopenia and concern for early cirrhosis, plan to pursue liver US after recovery from acute illness.  -H&H however seems to be trending down, will repeat at 12 PM, if continues to drop we will ask GI to reevaluate.     Elevated blood pressure  -No history of taking meds for BP per patient   -BP has been running high this admission, possibly secondary to pain  -Started low-dose ACE given DM2, lisinopril 10 mg daily.     Mild hyponatremia  -Clinically insignificant  -Continue to monitor intermittently.      Anion gap metabolic acidosis POA, resolved   -AG was 20.1, likely from lactate, serum acetone negative.     Uncontrolled diabetes mellitus type 2 with hyperglycemia  -Patient was not taking meds or checking glucoses, though insulin had been prescribed in the past.  -A1c 9.5% this admission  -Continue basal, prandial and SSI, adjust as warranted   - Diabetes educator following.    Expected Discharge Location and Transportation: rehab  Expected Discharge   Expected Discharge Date: 6/26/2024; Expected Discharge Time:      VTE Prophylaxis:  Mechanical VTE prophylaxis orders are present.         AM-PAC 6 Clicks Score (PT): 10  (06/23/24 2200)    CODE STATUS:   Code Status and Medical Interventions:   Ordered at: 06/20/24 2026     Level Of Support Discussed With:    Patient     Code Status (Patient has no pulse and is not breathing):    CPR (Attempt to Resuscitate)     Medical Interventions (Patient has pulse or is breathing):    Full       Prashant Goodwin MD  06/24/24

## 2024-06-25 LAB
ANION GAP SERPL CALCULATED.3IONS-SCNC: 4 MMOL/L (ref 5–15)
BUN SERPL-MCNC: 32 MG/DL (ref 8–23)
BUN/CREAT SERPL: 38.6 (ref 7–25)
CALCIUM SPEC-SCNC: 7.2 MG/DL (ref 8.6–10.5)
CHLORIDE SERPL-SCNC: 102 MMOL/L (ref 98–107)
CO2 SERPL-SCNC: 27 MMOL/L (ref 22–29)
CREAT SERPL-MCNC: 0.83 MG/DL (ref 0.76–1.27)
DEPRECATED RDW RBC AUTO: 48 FL (ref 37–54)
EGFRCR SERPLBLD CKD-EPI 2021: 100.2 ML/MIN/1.73
ERYTHROCYTE [DISTWIDTH] IN BLOOD BY AUTOMATED COUNT: 14 % (ref 12.3–15.4)
GLUCOSE BLDC GLUCOMTR-MCNC: 110 MG/DL (ref 70–130)
GLUCOSE BLDC GLUCOMTR-MCNC: 117 MG/DL (ref 70–130)
GLUCOSE BLDC GLUCOMTR-MCNC: 164 MG/DL (ref 70–130)
GLUCOSE BLDC GLUCOMTR-MCNC: 171 MG/DL (ref 70–130)
GLUCOSE SERPL-MCNC: 118 MG/DL (ref 65–99)
HCT VFR BLD AUTO: 23 % (ref 37.5–51)
HGB BLD-MCNC: 7.9 G/DL (ref 13–17.7)
MCH RBC QN AUTO: 32.6 PG (ref 26.6–33)
MCHC RBC AUTO-ENTMCNC: 34.3 G/DL (ref 31.5–35.7)
MCV RBC AUTO: 95 FL (ref 79–97)
PLATELET # BLD AUTO: 140 10*3/MM3 (ref 140–450)
PMV BLD AUTO: 9.1 FL (ref 6–12)
POTASSIUM SERPL-SCNC: 3.9 MMOL/L (ref 3.5–5.2)
RBC # BLD AUTO: 2.42 10*6/MM3 (ref 4.14–5.8)
SODIUM SERPL-SCNC: 133 MMOL/L (ref 136–145)
WBC NRBC COR # BLD AUTO: 8.15 10*3/MM3 (ref 3.4–10.8)

## 2024-06-25 PROCEDURE — 63710000001 INSULIN LISPRO (HUMAN) PER 5 UNITS: Performed by: INTERNAL MEDICINE

## 2024-06-25 PROCEDURE — 82948 REAGENT STRIP/BLOOD GLUCOSE: CPT

## 2024-06-25 PROCEDURE — 97110 THERAPEUTIC EXERCISES: CPT

## 2024-06-25 PROCEDURE — 63710000001 INSULIN LISPRO (HUMAN) PER 5 UNITS: Performed by: ORTHOPAEDIC SURGERY

## 2024-06-25 PROCEDURE — 97530 THERAPEUTIC ACTIVITIES: CPT

## 2024-06-25 PROCEDURE — 99232 SBSQ HOSP IP/OBS MODERATE 35: CPT | Performed by: INTERNAL MEDICINE

## 2024-06-25 PROCEDURE — 25010000002 CEFTRIAXONE PER 250 MG: Performed by: INTERNAL MEDICINE

## 2024-06-25 PROCEDURE — 80048 BASIC METABOLIC PNL TOTAL CA: CPT | Performed by: STUDENT IN AN ORGANIZED HEALTH CARE EDUCATION/TRAINING PROGRAM

## 2024-06-25 PROCEDURE — 63710000001 INSULIN GLARGINE PER 5 UNITS: Performed by: INTERNAL MEDICINE

## 2024-06-25 PROCEDURE — 85027 COMPLETE CBC AUTOMATED: CPT | Performed by: STUDENT IN AN ORGANIZED HEALTH CARE EDUCATION/TRAINING PROGRAM

## 2024-06-25 RX ADMIN — ASPIRIN 81 MG: 81 TABLET, COATED ORAL at 20:00

## 2024-06-25 RX ADMIN — OXYCODONE HYDROCHLORIDE 5 MG: 5 TABLET ORAL at 19:59

## 2024-06-25 RX ADMIN — Medication 5 MG: at 20:00

## 2024-06-25 RX ADMIN — INSULIN LISPRO 8 UNITS: 100 INJECTION, SOLUTION INTRAVENOUS; SUBCUTANEOUS at 08:37

## 2024-06-25 RX ADMIN — INSULIN LISPRO 8 UNITS: 100 INJECTION, SOLUTION INTRAVENOUS; SUBCUTANEOUS at 12:35

## 2024-06-25 RX ADMIN — INSULIN LISPRO 8 UNITS: 100 INJECTION, SOLUTION INTRAVENOUS; SUBCUTANEOUS at 17:16

## 2024-06-25 RX ADMIN — PANTOPRAZOLE SODIUM 40 MG: 40 INJECTION, POWDER, FOR SOLUTION INTRAVENOUS at 08:36

## 2024-06-25 RX ADMIN — INSULIN GLARGINE 25 UNITS: 100 INJECTION, SOLUTION SUBCUTANEOUS at 20:00

## 2024-06-25 RX ADMIN — Medication 10 ML: at 20:01

## 2024-06-25 RX ADMIN — PANTOPRAZOLE SODIUM 40 MG: 40 INJECTION, POWDER, FOR SOLUTION INTRAVENOUS at 17:16

## 2024-06-25 RX ADMIN — CEFTRIAXONE 2000 MG: 2 INJECTION, POWDER, FOR SOLUTION INTRAMUSCULAR; INTRAVENOUS at 08:37

## 2024-06-25 RX ADMIN — Medication 1 CAPSULE: at 08:36

## 2024-06-25 RX ADMIN — ACETAMINOPHEN 1000 MG: 500 TABLET, FILM COATED ORAL at 20:01

## 2024-06-25 RX ADMIN — TRAMADOL HYDROCHLORIDE 50 MG: 50 TABLET ORAL at 22:31

## 2024-06-25 RX ADMIN — INSULIN LISPRO 2 UNITS: 100 INJECTION, SOLUTION INTRAVENOUS; SUBCUTANEOUS at 17:16

## 2024-06-25 RX ADMIN — ASPIRIN 81 MG: 81 TABLET, COATED ORAL at 08:36

## 2024-06-25 RX ADMIN — INSULIN LISPRO 2 UNITS: 100 INJECTION, SOLUTION INTRAVENOUS; SUBCUTANEOUS at 20:00

## 2024-06-25 RX ADMIN — ACETAMINOPHEN 1000 MG: 500 TABLET, FILM COATED ORAL at 05:28

## 2024-06-25 RX ADMIN — ACETAMINOPHEN 1000 MG: 500 TABLET, FILM COATED ORAL at 13:51

## 2024-06-25 RX ADMIN — Medication 10 ML: at 08:37

## 2024-06-25 NOTE — PLAN OF CARE
Goal Outcome Evaluation:  Plan of Care Reviewed With: patient           Outcome Evaluation: Continues with edema. Required lift to transfer from chair to bed, unable to bear any weight on right leg. Rested well through the night and denies needs.

## 2024-06-25 NOTE — DISCHARGE INSTR - APPOINTMENTS
Follow up in two weeks for wound check and xrays, [facility] location.     [facility], satellite campus at [facility]  [address] [facility] [address] [place] [id number]  Please schedule at [phone]

## 2024-06-25 NOTE — PLAN OF CARE
Goal Outcome Evaluation:              Outcome Evaluation: VSS on room air, patient denied complaints of pain. Patient up to chair for breakfast and lunch, patient declined out of bed for dinner. PO intake encouraged, bowel movement this shift. Skini interventions as tolerated (allevyn dressings, incontinence pads, z guard), patient turned as tolerated. Patient with large serous drainage from left leg, leg wound dressing changed, dry periwound maintained with incontinence pad. Patient reeducated about call light use, fall precautions, body positioning with pillows, skin injury prevention.

## 2024-06-25 NOTE — PLAN OF CARE
Goal Outcome Evaluation:  Plan of Care Reviewed With: patient        Progress: improving  Outcome Evaluation: Pt continues to present below baseline function d/t L BKA, R hip pain, and deficits in strength, balance, and activity tolerance. Pt performed STS x2 from chair w/ BUE support and R knee blocking, maxA x2. Pt able to achieve full upright posture this date and able to stand ~30 seconds - 1 min during each STS trial. Pt would continue to benefit from skilled IP PT. Recommend IRF at d/c.      Anticipated Discharge Disposition (PT): inpatient rehabilitation facility

## 2024-06-25 NOTE — PROGRESS NOTES
"Easton Alvarez  1965  [id number]          Chief Complaint: left foot infection    Reason for Consultation: left foot infection    History of present illness:     Patient is a 58 y.o.  Yr old male with history of diabetes with prior right TMA 2019 at , history strep septicemia 2016 ; he reports right total hip arthroplasty 2024 after fall. He reports a fall several weeks prior to his June admission with injury to the left foot, wound present with deterioration several days preceding admission with severe discoloration/redness and swelling.  CT scan showed concern for subcutaneous emphysema and necrotizing infection.  Transferred to [facility] with evaluation by Dr. Em and Dr. Lundberg and arrangements made for urgent surgery. Subsequently, blood cultures called with gram-positive cocci     6/13 left LE amp, Dr Em    6/15  \"PROCEDURE:            Left      [address]591: Secondary closure above-knee amputation\"      6/18/24  right hip aspiration, culture with GB strep; wbc  down some postop from revision; blood cultures 6/15 negative so far    6/19/24 Dr. Em helping facilitate right hip surgery with orthopedic partners/team    6/20/24 surgery Dr Sethi  \"Procedure(s):  HIP ANTERIOR INCISION AND DRAINAGE WITH HANA TABLE, POLY SWAP- RIGHT\"    6/25/24 case management looking into options; no new distress or focal pain per nursing staff.  Encouraged incentive spirometry. postop pain controlled at present; He has left stump  pain that is constant, sharp at times, blunted by neuropathy, worse with palpation, better with pain meds and 4  out of 10 in severity. No distress per nursing; no new focal symptoms otherwise; he has right hip pain with range of motion and weightbearing increased and out of 10 at present with movement, nonradiating, better with pain meds.     No headache photophobia or neck stiffness.  No shortness of breath cough or hemoptysis.  No nausea vomiting diarrhea or " "abdominal pain.  No dysuria hematuria or pyuria.  No other new skin rash    Past Medical History:   Diagnosis Date    Diabetes mellitus     Diabetic foot infection 2018    left (partial amputation)       Past Surgical History:   Procedure Laterality Date    AMPUTATION FOOT / TOE Right     partial foot    BELOW KNEE AMPUTATION Left 6/13/2024    Procedure: AMPUTATION BELOW KNEE AND WOUND VAC ASSISTED CLOSURE LEFT;  Surgeon: Brijesh Em Jr., MD;  Location:  [facility];  Service: Orthopedics;  Laterality: Left;    INCISION AND DRAINAGE HIP Right 6/20/2024    Procedure: HIP ANTERIOR INCISION AND DRAINAGE WITH HANA TABLE, POLY SWAP RIGHT WITH LEFT LEG PIN TRACTION;  Surgeon: Lex Sethi MD;  Location:  [facility];  Service: Orthopedics;  Laterality: Right;    INCISION AND DRAINAGE LEG Left 6/15/2024    Procedure: SECONDARY CLOSURE LEFT BELOW KNEE AMPUTATION;  Surgeon: Brijesh Em Jr., MD;  Location:  [facility];  Service: Orthopedics;  Laterality: Left;    PLACEMENT OF WOUND VAC Left 6/13/2024    Procedure: PLACEMENT OF WOUND VAC LEFT;  Surgeon: Brijesh Em Jr., MD;  Location:  [facility];  Service: Orthopedics;  Laterality: Left;    TOTAL HIP ARTHROPLASTY Right 02/2024    From fall \"whole hip was shattered\"       Pediatric History   Patient Parents    Not on file     Other Topics Concern    Not on file   Social History Narrative    Not on file       family history is not on file. High blood pressure mom/dad    No Known Allergies    Medication:  Current Facility-Administered Medications   Medication Dose Route Frequency Provider Last Rate Last Admin    acetaminophen (TYLENOL) tablet 1,000 mg  1,000 mg Oral Q8H Lex Sethi MD   1,000 mg at 06/25/24 0528    aspirin EC tablet 81 mg  81 mg Oral Q12H Lex Sethi MD   81 mg at 06/25/24 0836    sennosides-docusate (PERICOLACE) 8.6-50 MG per tablet 2 tablet  2 tablet Oral BID Lex Sethi MD   2 tablet at 06/24/24 2036    And    polyethylene glycol " (MIRALAX) packet 17 g  17 g Oral Daily PRN Lex Sethi MD        And    bisacodyl (DULCOLAX) EC tablet 5 mg  5 mg Oral Daily PRN Lex Sethi MD        And    bisacodyl (DULCOLAX) suppository 10 mg  10 mg Rectal Daily PRN Lex Sethi MD        Calcium Replacement - Follow Nurse / BPA Driven Protocol   Does not apply PRN Lex Sethi MD        cefTRIAXone (ROCEPHIN) 2,000 mg in sodium chloride 0.9 % 100 mL MBP  2,000 mg Intravenous Q24H Cleve Bills  mL/hr at 06/25/24 0837 2,000 mg at 06/25/24 0837    dextrose (D50W) (25 g/50 mL) IV injection 25 g  25 g Intravenous Q15 Min PRN Lex Sethi MD        dextrose (GLUTOSE) oral gel 15 g  15 g Oral Q15 Min PRN Lex Sethi MD        glucagon (GLUCAGEN) injection 1 mg  1 mg Intramuscular Q15 Min PRN Lex Sethi MD        insulin glargine (LANTUS, SEMGLEE) injection 25 Units  25 Units Subcutaneous Nightly Prashant Goodwin MD   25 Units at 06/24/24 2036    Insulin Lispro (humaLOG) injection 2-9 Units  2-9 Units Subcutaneous 4x Daily AC & at Bedtime Lex Sethi MD   4 Units at 06/24/24 1714    Insulin Lispro (humaLOG) injection 8 Units  8 Units Subcutaneous TID With Meals Prashant Goodwin MD   8 Units at 06/25/24 0837    lactated ringers infusion  9 mL/hr Intravenous Continuous Lex Sethi MD 9 mL/hr at 06/15/24 1307 9 mL/hr at 06/15/24 1307    lactated ringers infusion  100 mL/hr Intravenous Continuous Lex Sethi  mL/hr at 06/20/24 2122 100 mL/hr at 06/20/24 2122    lactobacillus acidophilus (RISAQUAD) capsule 1 capsule  1 capsule Oral Daily Lex Sethi MD   1 capsule at 06/25/24 0836    [Held by provider] lisinopril (PRINIVIL,ZESTRIL) tablet 10 mg  10 mg Oral Q24H Lex Sethi MD   10 mg at 06/21/24 0831    Magnesium Standard Dose Replacement - Follow Nurse / BPA Driven Protocol   Does not apply PRN Lex Sethi MD        melatonin tablet 5 mg  5 mg Oral Nightly Lex Sethi MD   5 mg at  06/24/24 2036    nitroglycerin (NITROSTAT) SL tablet 0.4 mg  0.4 mg Sublingual Q5 Min PRN Lex Sethi MD        oxyCODONE (ROXICODONE) immediate release tablet 5 mg  5 mg Oral Q4H PRN Lex Sethi MD   5 mg at 06/24/24 0511    pantoprazole (PROTONIX) injection 40 mg  40 mg Intravenous BID AC Lex Sethi MD   40 mg at 06/25/24 0836    Phosphorus Replacement - Follow Nurse / BPA Driven Protocol   Does not apply PRN Lex Sethi MD        Potassium Replacement - Follow Nurse / BPA Driven Protocol   Does not apply PRN Lex Sethi MD        ropivacaine (NAROPIN) 0.2 % infusion (INFUSYSTEM)   Peripheral Nerve Continuous Lex Sethi MD   Stopped at 06/17/24 2230    sodium chloride 0.9 % flush 10 mL  10 mL Intravenous Q12H Lex Sethi MD   10 mL at 06/25/24 0837    sodium chloride 0.9 % flush 10 mL  10 mL Intravenous PRN Lex Sethi MD        sodium chloride 0.9 % infusion 40 mL  40 mL Intravenous PRN Lex Sethi MD        traMADol (ULTRAM) tablet 50 mg  50 mg Oral Q8H PRN Lex Sethi MD   50 mg at 06/24/24 1715       Antibiotics:  Anti-Infectives (From admission, onward)      Ordered     Dose/Rate Route Frequency Start Stop    06/21/24 0756  cefTRIAXone (ROCEPHIN) 2,000 mg in sodium chloride 0.9 % 100 mL MBP        Ordering Provider: Cleve Bills MD    2,000 mg  200 mL/hr over 30 Minutes Intravenous Every 24 Hours 06/21/24 0900 07/29/24 0859    06/20/24 1457  ceFAZolin 2000 mg IVPB in 100 mL NS (MBP)        Ordering Provider: Lex Sethi MD    2,000 mg  over 30 Minutes Intravenous Once 06/20/24 1459 06/20/24 1557    06/12/24 1746  piperacillin-tazobactam (ZOSYN) 4.5 g IVPB in 100 mL NS MBP (CD)        Ordering Provider: Shraddha Scott MD    4.5 g  over 30 Minutes Intravenous Once 06/12/24 1900 06/13/24 0907              Review of Systems    6/25/24     Constitutional-- No Fever, chills or sweats.  Appetite diminished with fatigue.  Heent-- No new vision, hearing  "or throat complaints.  No epistaxis or oral sores.  Denies odynophagia or dysphagia.  No flashers, floaters or eye pain. No odynophagia or dysphagia. No headache, photophobia or neck stiffness.  CV-- No chest pain, palpitation or syncope  Resp-- No SOB/cough/Hemoptysis  GI- No nausea, vomiting, or diarrhea.  No hematochezia, melena, or hematemesis. Denies jaundice or chronic liver disease.  -- No dysuria, hematuria, or flank pain.  Denies hesitancy, urgency or flank pain.  Lymph- no swollen lymph nodes in neck/axilla or groin.   Heme- No active bruising or bleeding; no Hx of DVT or PE.  MS-- aside from above, no swelling or pain in the bones or joints of arms/legs.  No new back pain.  Neuro-- No acute focal weakness or numbness in the arms or legs.  No seizures.    Full 12 point review of systems reviewed and negative otherwise for acute complaints, except for above    Physical Exam:   Vital Signs   /73 (BP Location: Right arm, Patient Position: Lying)   Pulse 81   Temp 98.1 °F (36.7 °C) (Oral)   Resp 18   Ht 172.7 cm (67.99\")   Wt 67.6 kg (149 lb)   SpO2 96%   BMI 22.66 kg/m²     GENERAL: awake; in no acute distress.   HEENT: Normocephalic, atraumatic.   No conjunctival injection. No icterus. Oropharynx clear without evidence of thrush or exudate. No evidence of periodontal disease.    NECK: Supple without nuchal rigidity. No mass.   HEART: RRR; No murmur, rubs, gallops.   LUNGS: diminished at bases.  Trace crackles at the bases but no rhonchi or wheeze. Normal respiratory effort. Nonlabored. No dullness.  ABDOMEN: Soft, nontender, nondistended. Positive bowel sounds. No rebound or guarding. NO mass or HSM.  EXT:  see below.     MSK: right hip surgical site cvered;  no new visible redness/purulence or fluctuance  SKIN: Warm and dry without cutaneous eruptions on Inspection/palpation.      Left   BKA stump covered;  no new visible redness or purulence; no discrete fluctuance.  No " crepitus    Laboratory Data    Results from last 7 days   Lab Units 06/25/24  0526 06/24/24  1214 06/24/24  0628 06/23/24  0617   WBC 10*3/mm3 8.15  --  11.24* 11.35*   HEMOGLOBIN g/dL 7.9* 8.1* 7.7* 8.1*   HEMATOCRIT % 23.0* 24.3* 22.6* 23.8*   PLATELETS 10*3/mm3 140  --  159 169     Results from last 7 days   Lab Units 06/25/24  0526   SODIUM mmol/L 133*   POTASSIUM mmol/L 3.9   CHLORIDE mmol/L 102   CO2 mmol/L 27.0   BUN mg/dL 32*   CREATININE mg/dL 0.83   GLUCOSE mg/dL 118*   CALCIUM mg/dL 7.2*                       Estimated Creatinine Clearance: 90.5 mL/min (by C-G formula based on SCr of 0.83 mg/dL).      Microbiology:      Radiology:  Imaging Results (Last 72 Hours)       ** No results found for the last 72 hours. **              Impression:     --acute sepsis as per admission notes, severe left foot infection with gangrene/cellulitis and concern for necrotizing soft tissue infection by imaging, urgent surgical arrangements made June 13; repeat surg 6/15  ;  subsequent right hip surg 6/20; He knows risk for persistent/recurrent or nonhealing wounds, persistet / progressive or recurrent infection and risk for further amputation/functional-limb loss and chronic pain.  Associated with GB strep bacteremia as below.    --Acute/severe left foot infection as above, urgent surgical arrangements  made 6/13    --Acute gb strep bacteremia,   source from foot.  High risk for further serious morbidity and other serious sequela including dire consequences and metastatic foci of involvement/endovascular sequela etc. No new metastatic focus of involvement. TTE 6/17    --Right hip fracture with repair February 2024.  +pain ; orthopedics with  aspiration June 18 and culture with GB strep likely hematogenous seeding, MRI imaging with concern for septic joint, surgery on June 20 as above.   Patient understands risk for persistent/recurrent or progressive infection and potential for further surgical intervention in the future  that could include functional/limb loss and other serious sequela/morbidity; he knows antibiotics and surgery not a guarantee for care    --postop leukocytosis, possible stress response associated with surgery.  Trending down June 22-23.  No new respiratory symptoms.  Encouraged incentive spirometry.  Abdomen benign with no diarrhea.  No new urinary tract complaints.  IV sites with no suppurative change.  No rash.  Monitor for new process; no new focal muscoskeletal symptoms to suggest new metastatic focus of infection    --diabetes.  Described as uncontrolled by medicine team at admission.  Glucose control per medicine team      PLAN:      --IV  rocephin    **wound culture at left foot mixed with  MSSA Klebsiella and GB strep  **blood cultures with group B strep  ** right hip cultures with group B strep    --orthopedics  making surgical plans with concern for right hipprosthetic/periprosthetic infection     --Check/review labs cultures and scans    --TTE described as technically adequate by cardiology; no description of vegetation by cardiology per report    --Partial history per nursing staff    --encouraged incentive spirometry.  Nursing aware to instruct and encourage    --Discussed with microbiology    --Highly complex at of issues with high risk for further serious morbidity and other serious sequela    Copied text in this note has been reviewed and is accurate as of 06/25/24.        Cleve Bills MD  6/25/2024

## 2024-06-25 NOTE — PROGRESS NOTES
"Easton Alvarez       LOS: 13 days   Patient Care Team:  Provider, No Known as PCP - General    Chief Complaint:  left foot / leg necrotizing soft tissue infection.    Subjective     Interval History:     Pain tolerable overnight    Review of Systems:      Gen- No fevers, chills  CV- No chest pain, palpitations  Resp- No cough, dyspnea  GI- No N/V/D, abd pain    Objective     Vital Signs  Vital Signs (last 24 hours)         06/13 0700  06/14 0659 06/14 0700  06/14 0839   Most Recent      Temp (°F) 97 -  98.7      98.1     98.1 (36.7) 06/14 0724    Heart Rate 75 -  84       81 06/14 0340    Resp 13 -  20      20     20 06/14 0724    BP 97/62 -  145/70      149/85     149/85 06/14 0724    SpO2 (%) 96 -  99       98 06/14 0340    Flow (L/min) 2 -  4       2 06/13 1100              Physical Exam:     Alert, oriented.  No acute distress.  Nonlabored respirations.  Regular rate and rhythm.  Abdomen nondistended.  Left lower extremity: Dressing present is clean, dry, intact.  Right lower extremity: Operative dressing in place.     Results Review:     I reviewed the patient's new clinical results.    Medication Review:   Hospital Medications (active)         Dose Frequency Start End    bisacodyl (DULCOLAX) EC tablet 5 mg 5 mg Daily PRN 6/12/2024 --    Admin Instructions: Use if no bowel movement after 12 hours.  Swallow whole. Do not crush, split, or chew tablet.    Route: Oral    Linked Group 1: Placed in \"And\" Linked Group        bisacodyl (DULCOLAX) suppository 10 mg 10 mg Daily PRN 6/12/2024 --    Admin Instructions: Use if no bowel movement after 12 hours.  Hold for diarrhea    Route: Rectal    Linked Group 1: Placed in \"And\" Linked Group        Calcium Replacement - Follow Nurse / BPA Driven Protocol  As Needed 6/13/2024 --    Admin Instructions: Open Order & Select \"BHS Electrolyte Replacement Protocol Algorithm\" to View Details    Route: Does not apply    clindamycin (CLEOCIN) 900 mg in dextrose 5% 50 mL IVPB " (premix) 900 mg Every 8 Hours 6/12/2024 6/17/2024    Admin Instructions: Do Not refrigerate.    Route: Intravenous    DAPTOmycin (CUBICIN) 400 mg in sodium chloride 0.9 % 50 mL IVPB 6 mg/kg × 68 kg Every 24 Hours 6/13/2024 6/23/2024    Admin Instructions: Caution: Look alike/sound alike drug alert.  Refrigerate. Do not shake.    Route: Intravenous    dextrose (D50W) (25 g/50 mL) IV injection 25 g 25 g Every 15 Minutes PRN 6/12/2024 --    Admin Instructions: Blood sugar less than 70; patient has IV access - Unresponsive, NPO or Unable To Safely Swallow    Route: Intravenous    dextrose (GLUTOSE) oral gel 15 g 15 g Every 15 Minutes PRN 6/12/2024 --    Admin Instructions: BS<70, Patient Alert, Is not NPO, Can safely swallow.    Route: Oral    glucagon (GLUCAGEN) injection 1 mg 1 mg Every 15 Minutes PRN 6/12/2024 --    Admin Instructions: Blood Glucose Less Than 70 - Patient Without IV Access - Unresponsive, NPO or Unable To Safely Swallow  Reconstitute powder for injection by adding 1 mL of -supplied sterile diluent or sterile water for injection to a vial containing 1 mg of the drug, to provide solutions containing 1 mg/mL. Shake vial gently to dissolve.    Route: Intramuscular    HYDROcodone-acetaminophen (NORCO) 5-325 MG per tablet 1 tablet 1 tablet Every 4 Hours PRN 6/12/2024 6/17/2024    Admin Instructions: Based on patient request - if ordered for moderate or severe pain, provider allows for administration of a medication prescribed for a lower pain scale.  [ISRAEL]    Do not exceed 4 grams of acetaminophen in a 24 hr period. Max dose of 2gm for AST/ALT greater than 120 units/L        If given for pain, use the following pain scale:   Mild Pain = Pain Score of 1-3, CPOT 1-2  Moderate Pain = Pain Score of 4-6, CPOT 3-4  Severe Pain = Pain Score of 7-10, CPOT 5-8    Route: Oral    HYDROmorphone (DILAUDID) injection 0.5 mg 0.5 mg Every 4 Hours PRN 6/12/2024 6/17/2024    Admin Instructions: Based on patient  "request - if ordered for moderate or severe pain, provider allows for administration of a medication prescribed for a lower pain scale.  If given for pain, use the following pain scale:  Mild Pain = Pain Score of 1-3, CPOT 1-2  Moderate Pain = Pain Score of 4-6, CPOT 3-4  Severe Pain = Pain Score of 7-10, CPOT 5-8    Route: Intravenous    insulin glargine (LANTUS, SEMGLEE) injection 10 Units 10 Units Nightly 6/13/2024 --    Admin Instructions: Do not hold basal insulin without an order. Consider requesting a dose edit, if needed.      Route: Subcutaneous    insulin regular (humuLIN R,novoLIN R) injection 2-7 Units 2-7 Units 4 Times Daily Before Meals & Nightly 6/13/2024 --    Admin Instructions: Correction Insulin - Low Dose - Total Insulin Dose Less Than 40 units/day (Lean, Elderly or Renal Patients)    Blood Glucose 150-199 mg/dL - 2 units  Blood Glucose 200-249 mg/dL - 3 units  Blood Glucose 250-299 mg/dL - 4 units  Blood Glucose 300-349 mg/dL - 5 units  Blood Glucose 350-400 mg/dL - 6 units  Blood Glucose Greater Than 400 mg/dL - 7 units & Call Provider   Caution: Look alike/sound alike drug alert    Route: Subcutaneous    lactated ringers infusion 100 mL/hr Continuous 6/12/2024 --    Route: Intravenous    lactated ringers infusion 9 mL/hr Continuous 6/13/2024 --    Admin Instructions: May switch to NS IV at KVO if renal / if indicated    Route: Intravenous    lactobacillus acidophilus (RISAQUAD) capsule 1 capsule 1 capsule Daily 6/12/2024 --    Route: Oral    Magnesium Standard Dose Replacement - Follow Nurse / BPA Driven Protocol  As Needed 6/13/2024 --    Admin Instructions: Open Order & Select \"BHS Electrolyte Replacement Protocol Algorithm\" to View Details    Route: Does not apply    morphine injection 2 mg 2 mg Every 4 Hours PRN 6/13/2024 6/18/2024    Admin Instructions: Based on patient request - if ordered for moderate or severe pain, provider allows for administration of a medication prescribed for a " "lower pain scale.  If given for pain, use the following pain scale:  Mild Pain = Pain Score of 1-3, CPOT 1-2  Moderate Pain = Pain Score of 4-6, CPOT 3-4  Severe Pain = Pain Score of 7-10, CPOT 5-8    Route: Intravenous    nitroglycerin (NITROSTAT) SL tablet 0.4 mg 0.4 mg Every 5 Minutes PRN 6/12/2024 --    Admin Instructions: If Pain Unrelieved After 3 Doses Notify MD  May administer up to 3 doses per episode.    Route: Sublingual    oxyCODONE (ROXICODONE) immediate release tablet 5 mg 5 mg Every 4 Hours PRN 6/13/2024 6/20/2024    Admin Instructions: Based on patient request - if ordered for moderate or severe pain, provider allows for administration of a medication prescribed for a lower pain scale.    If given for pain, use the following pain scale:  Mild Pain = Pain Score of 1-3, CPOT 1-2  Moderate Pain = Pain Score of 4-6, CPOT 3-4  Severe Pain = Pain Score of 7-10, CPOT 5-8    Route: Oral    pantoprazole (PROTONIX) injection 40 mg 40 mg 2 Times Daily Before Meals 6/14/2024 --    Admin Instructions: Dilute with 10 mL of 0.9% NaCl and give IV push over 2 minutes.    Route: Intravenous    Phosphorus Replacement - Follow Nurse / BPA Driven Protocol  As Needed 6/13/2024 --    Admin Instructions: Open Order & Select \"BHS Electrolyte Replacement Protocol Algorithm\" to View Details    Route: Does not apply    piperacillin-tazobactam (ZOSYN) 4.5 g IVPB in 100 mL NS MBP (CD) 4.5 g Every 8 Hours 6/13/2024 6/18/2024    Route: Intravenous    polyethylene glycol (MIRALAX) packet 17 g 17 g Daily PRN 6/12/2024 --    Admin Instructions: Use if no bowel movement after 12 hours. Mix in 6-8 ounces of water.  Use 4-8 ounces of water, tea, or juice for each 17 gram dose.    Route: Oral    Linked Group 1: Placed in \"And\" Linked Group        Potassium Replacement - Follow Nurse / BPA Driven Protocol  As Needed 6/13/2024 --    Admin Instructions: Open Order & Select \"BHS Electrolyte Replacement Protocol Algorithm\" to View Details    " "Route: Does not apply    ropivacaine (NAROPIN) 0.2 % infusion (INFUSYSTEM)  Continuous 6/13/2024 --    Admin Instructions:  When Pt. In-House Infusion complete do not order refill until discussing with APS,  If pain greater than 7 out of 10, please call [phone] or [phone].  Thank you    Route: Peripheral Nerve    sennosides-docusate (PERICOLACE) 8.6-50 MG per tablet 2 tablet 2 tablet 2 Times Daily 6/12/2024 --    Admin Instructions: HOLD MEDICATION IF PATIENT HAS HAD BOWEL MOVEMENT. Start bowel management regimen if patient has not had a bowel movement after 12 hours.    Route: Oral    Linked Group 1: Placed in \"And\" Linked Group        sodium chloride 0.9 % flush 10 mL 10 mL Every 12 Hours Scheduled 6/12/2024 --    Route: Intravenous    sodium chloride 0.9 % flush 10 mL 10 mL As Needed 6/12/2024 --    Route: Intravenous    sodium chloride 0.9 % infusion 40 mL 40 mL As Needed 6/12/2024 --    Admin Instructions: Following administration of an IV intermittent medication, flush line with 40mL NS at 100mL/hr.    Route: Intravenous              Assessment & Plan     58-year-old male with left foot wet gangrene, necrotizing gas-forming soft tissue infection status post left below-knee amputation, wound vacuum-assisted closure June 13, 2024, delayed wound closure 6/15/24.        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  Follow cultures.  PT/OT.    Dressing changed, surgical incision inspected and drain removed on rounds 6/17/2024.    Daily dressing change per nursing staff:   Xeroform  4x4  Kerlix  Ace     Follow up in two weeks for wound check and xrays, [facility] location.    [facility], [facility]  [address] [place] [address]  Please schedule at [phone]    Please arrange follow up prior to discharge.    Call [phone] to schedule. "     BERNARD Peñaloza  06/25/24  08:44 EDT

## 2024-06-25 NOTE — PROGRESS NOTES
[facility] Medicine Services  PROGRESS NOTE    Patient Name: Easton Alvarez  : 1965  MRN: [id number]    Date of Admission: 2024  Primary Care Physician: Provider, No Known    Subjective   Subjective     CC: Follow-up left foot gangrene s/p BKA    HPI: No acute events overnight, patient resting comfortably, pain is controlled      Objective   Objective     Vital Signs:   Temp:  [97.7 °F (36.5 °C)-98.9 °F (37.2 °C)] 98 °F (36.7 °C)  Heart Rate:  [] 89  Resp:  [16] 16  BP: (120-155)/(67-80) 155/80     Physical Exam:  Constitutional: No acute distress, awake, alert  HENT: NCAT, mucous membranes moist  Respiratory: Clear to auscultation bilaterally, respiratory effort normal   Cardiovascular: RRR, no murmurs, rubs, or gallops  Gastrointestinal: Positive bowel sounds, soft, nontender, moderately distended distended  Musculoskeletal: Status post left BKA, stump under dressing  Psychiatric: Appropriate affect, cooperative  Neurologic: Oriented x 3, nonfocal    Results Reviewed:  LAB RESULTS:      Lab 24  0526 24  1214 24  0628 24  0617 24  0445 24  1205 24  0437   WBC 8.15  --  11.24* 11.35* 21.14* 25.58* 13.29*   HEMOGLOBIN 7.9* 8.1* 7.7* 8.1* 9.4* 10.6* 10.9*   HEMATOCRIT 23.0* 24.3* 22.6* 23.8* 27.9* 32.3* 32.4*   PLATELETS 140  --  159 169 250 339 184   NEUTROS ABS  --   --   --   --   --   --  10.61*   IMMATURE GRANS (ABS)  --   --   --   --   --   --  0.33*   LYMPHS ABS  --   --   --   --   --   --  1.24   MONOS ABS  --   --   --   --   --   --  0.96*   EOS ABS  --   --   --   --   --   --  0.12   MCV 95.0  --  95.0 93.7 97.6* 98.8* 97.3*         Lab 24  0526 24  0628 24  0617 24  0445 24  0721   SODIUM 133* 135* 132* 131* 129*   POTASSIUM 3.9 4.0 4.2 4.5 5.6*   CHLORIDE 102 102 102 100 96*   CO2 27.0 24.0 25.0 23.0 18.0*   ANION GAP 4.0* 9.0 5.0 8.0 15.0   BUN 32* 38* 42* 42* 24*   CREATININE 0.83  0.75* 0.82 1.07 0.67*   EGFR 100.2 103.3 100.6 79.4 106.9   GLUCOSE 118* 191* 189* 192* 164*   CALCIUM 7.2* 6.7* 6.8* 7.1* 7.3*                         Brief Urine Lab Results  (Last result in the past 365 days)        Color   Clarity   Blood   Leuk Est   Nitrite   Protein   CREAT   Urine HCG        06/14/24 1109 Yellow   Clear   Negative   Negative   Negative   Negative                   Microbiology Results Abnormal       Procedure Component Value - Date/Time    Fungus Culture - Aspirate, Hip, Right [[id number]] Collected: 06/18/24 1052    Lab Status: Preliminary result Specimen: Aspirate from Hip, Right Updated: 06/23/24 1130     Fungus Culture No fungus isolated at less than 1 week    AFB Culture - Aspirate, Hip, Right [[id number]] Collected: 06/18/24 1052    Lab Status: Preliminary result Specimen: Aspirate from Hip, Right Updated: 06/23/24 1130     AFB Culture No AFB isolated at less than 1 week     AFB Stain No acid fast bacilli seen on concentrated smear    Anaerobic Culture 10 Day Incubation - Synovium, Hip, Right [[id number]]  (Normal) Collected: 06/20/24 1700    Lab Status: Final result Specimen: Synovium from Hip, Right Updated: 06/23/24 1010     Anaerobic Culture No anaerobes isolated at 3 days    Tissue / Bone Culture - Synovium, Hip, Right [[id number]] Collected: 06/20/24 1700    Lab Status: Final result Specimen: Synovium from Hip, Right Updated: 06/23/24 1010     Tissue Culture No growth at 3 days     Gram Stain Many (4+) Red blood cells      Moderate (3+) WBCs seen      No organisms seen    Tissue / Bone Culture - Synovium, Hip, Right [[id number]] Collected: 06/20/24 1700    Lab Status: Final result Specimen: Synovium from Hip, Right Updated: 06/23/24 1009     Tissue Culture No growth at 3 days     Gram Stain Moderate (3+) WBCs seen      No organisms seen     Comment: Modified report. Previous result was Rare (1+) Gram positive cocci in pairs on 6/20/2024 at 1949 EDT.         Many (4+) Red blood cells     Tissue / Bone Culture - Synovium, Hip, Right [[id number]] Collected: 06/20/24 1700    Lab Status: Final result Specimen: Synovium from Hip, Right Updated: 06/23/24 1009     Tissue Culture No growth at 3 days     Gram Stain Many (4+) Red blood cells      Few (2+) WBCs seen      No organisms seen    Anaerobic Culture 10 Day Incubation - Synovium, Hip, Right [[id number]]  (Normal) Collected: 06/20/24 1700    Lab Status: Preliminary result Specimen: Synovium from Hip, Right Updated: 06/23/24 0712     Anaerobic Culture No anaerobes isolated at 3 days    Anaerobic Culture 10 Day Incubation - Synovium, Hip, Right [[id number]]  (Normal) Collected: 06/20/24 1700    Lab Status: Preliminary result Specimen: Synovium from Hip, Right Updated: 06/23/24 0712     Anaerobic Culture No anaerobes isolated at 3 days    Fungus Culture - Wound, Leg, Left [[id number]] Collected: 06/15/24 1501    Lab Status: Preliminary result Specimen: Wound from Leg, Left Updated: 06/22/24 2215     Fungus Culture No fungus isolated at 1 week    AFB Culture - Wound, Leg, Left [[id number]] Collected: 06/15/24 1501    Lab Status: Preliminary result Specimen: Wound from Leg, Left Updated: 06/22/24 2215     AFB Culture No AFB isolated at 1 week     AFB Stain No acid fast bacilli seen on concentrated smear    AFB Culture - Synovium, Hip, Right [[id number]] Collected: 06/20/24 1700    Lab Status: Preliminary result Specimen: Synovium from Hip, Right Updated: 06/21/24 1102     AFB Stain No acid fast bacilli seen on concentrated smear    AFB Culture - Synovium, Hip, Right [[id number]] Collected: 06/20/24 1700    Lab Status: Preliminary result Specimen: Synovium from Hip, Right Updated: 06/21/24 1102     AFB Stain No acid fast bacilli seen on concentrated smear    AFB Culture - Synovium, Hip, Right [[id number]] Collected: 06/20/24 1700    Lab Status: Preliminary result Specimen: Synovium from Hip, Right Updated: 06/21/24 1101     AFB Stain No acid fast bacilli seen on  concentrated smear    Anaerobic Culture 10 Day Incubation - Wound, Leg, Left [[id number]]  (Normal) Collected: 06/15/24 1501    Lab Status: Preliminary result Specimen: Wound from Leg, Left Updated: 06/21/24 0636     Anaerobic Culture No anaerobes isolated at 5 days    Fungus Culture - Surgical Site, Leg, Left [[id number]] Collected: 06/13/24 1031    Lab Status: Preliminary result Specimen: Surgical Site from Leg, Left Updated: 06/20/24 1201     Fungus Culture No fungus isolated at 1 week    AFB Culture - Surgical Site, Leg, Left [[id number]] Collected: 06/13/24 1031    Lab Status: Preliminary result Specimen: Surgical Site from Leg, Left Updated: 06/20/24 1201     AFB Culture No AFB isolated at 1 week     AFB Stain No acid fast bacilli seen on concentrated smear    Blood Culture - Blood, Hand, Left [[id number]]  (Normal) Collected: 06/15/24 1104    Lab Status: Final result Specimen: Blood from Hand, Left Updated: 06/20/24 1201     Blood Culture No growth at 5 days    Blood Culture - Blood, Hand, Right [[id number]]  (Normal) Collected: 06/15/24 1104    Lab Status: Final result Specimen: Blood from Hand, Right Updated: 06/20/24 1201     Blood Culture No growth at 5 days    Wound Culture - Wound, Leg, Left [[id number]] Collected: 06/15/24 1501    Lab Status: Final result Specimen: Wound from Leg, Left Updated: 06/19/24 0711     Wound Culture No growth at 3 days     Gram Stain Rare (1+) WBCs seen      No organisms seen    Anaerobic Culture - Surgical Site, Leg, Left [[id number]]  (Normal) Collected: 06/13/24 1031    Lab Status: Final result Specimen: Surgical Site from Leg, Left Updated: 06/18/24 0751     Anaerobic Culture No anaerobes isolated at 5 days    Blood Culture - Blood, Arm, Left [[id number]]  (Normal) Collected: 06/12/24 1832    Lab Status: Final result Specimen: Blood from Arm, Left Updated: 06/17/24 1900     Blood Culture No growth at 5 days    Blood Culture - Blood, Hand, Right [[id number]]  (Normal) Collected:  06/12/24 1837    Lab Status: Final result Specimen: Blood from Hand, Right Updated: 06/17/24 1900     Blood Culture No growth at 5 days    Fungus Smear - Surgical Site, Leg, Left [[id number]] Collected: 06/13/24 1031    Lab Status: Final result Specimen: Surgical Site from Leg, Left Updated: 06/14/24 1406     Fungal Stain No fungal elements seen            No radiology results from the last 24 hrs    Results for orders placed during the hospital encounter of 06/12/24    Adult Transthoracic Echo Complete w/ Color, Spectral and Contrast if Necessary Per Protocol    Interpretation Summary    Left ventricular systolic function is normal. Left ventricular ejection fraction appears to be 56 - 60%.    Left ventricular wall thickness is consistent with borderline concentric hypertrophy.    Left ventricular diastolic function is consistent with (grade Ia w/high LAP) impaired relaxation.      Current medications:  Scheduled Meds:acetaminophen, 1,000 mg, Oral, Q8H  aspirin, 81 mg, Oral, Q12H  cefTRIAXone, 2,000 mg, Intravenous, Q24H  insulin glargine, 25 Units, Subcutaneous, Nightly  insulin lispro, 2-9 Units, Subcutaneous, 4x Daily AC & at Bedtime  Insulin Lispro, 8 Units, Subcutaneous, TID With Meals  lactobacillus acidophilus, 1 capsule, Oral, Daily  [Held by provider] lisinopril, 10 mg, Oral, Q24H  melatonin, 5 mg, Oral, Nightly  pantoprazole, 40 mg, Intravenous, BID AC  senna-docusate sodium, 2 tablet, Oral, BID  sodium chloride, 10 mL, Intravenous, Q12H      Continuous Infusions:lactated ringers, 9 mL/hr, Last Rate: 9 mL/hr (06/15/24 1307)  lactated ringers, 100 mL/hr, Last Rate: 100 mL/hr (06/20/24 2122)  ropivacaine, , Last Rate: Stopped (06/17/24 2230)      PRN Meds:.  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        Brief Hospital Course to date:  Easton Alvarez is a 58 y.o. male with past medical history significant for T2DM, diabetic neuropathy, right hip fracture/ORIF ([facility] 2/2024), and right transmetatarsal amputation 2019 at  who presented to OSH ED on 6/12/2024 with severe left foot cellulitis/necrosis after recent injury. Lactate at OSH was 8.8 and CT imaging of left leg showed gas gangrene. Orthopedic surgeon Dr. Em was consulted and performed a left below-knee amputation on 6/13/2024 with further debridement and irrigation on 6/15. Infectious disease has followed and managed antimicrobial therapy.     Sepsis secondary to group B strep bacteremia  Left foot cellulitis/gangrene   Leukocytosis  -MRI left foot showed extensive soft tissue infection with abscesses along the foot to the ankle and into the lower leg with soft tissue gas.    -Wound cultures 6/12 with heavy growth group B strep and Kebsiella, blood cultures x 2 with group B strep; wound culture 6/13 with group B strep  -TTE described as technically adequate by cardiology; no description of vegetation by cardiology per report   -s/p left BKA with wound VAC on 6/13/24    -s/p further debridement in OR on 6/15/24 by Dr. Em, drain removed 6/17  -Infectious disease, Dr. Bills following; continue IV ceftriaxone.  -PT wound care following.  -Added Boost glucose control to  increase protein intake for wound healing, nutrition following.  -Pain control with scheduled Tylenol, PRN oxycodone.   -Daily dressing changes to surgical site: Xeroform, 4X4, Kerlix, ACE  -Follow up with  Orthopedics in 2 weeks for wound check, imaging.     Right glenys-prosthetic hip infection  -IR performed fluid aspiration from the hip which was purulent.  -s/p I&D on 6/20, Dr. Sethi   -Fluid culture 6/18 with rare growth group B strep  -Supportive care, mobilize, pain control. Antibiotics as above.     Concern for GIB  Acute on chronic anemia  -with melena and fecal occult positive overnight 6/14  -GI consulted, recommended medical management with PPI BID x 1 month.  -GI referral at discharge for elevated LFTs, thrombocytopenia and concern for early cirrhosis, plan to pursue liver US after recovery from acute illness.  -H&H however seems to be trending down, but is stable, plan remains as above    Elevated blood pressure  -No history of taking meds for BP per patient   -BP has been running high this admission, possibly secondary to pain  -Started low-dose ACE given DM2, lisinopril 10 mg daily.     Mild hyponatremia  -Clinically insignificant  -Continue to monitor intermittently.      Anion gap metabolic acidosis POA, resolved   -AG was 20.1, likely from lactate, serum acetone negative.     Uncontrolled diabetes mellitus type 2 with hyperglycemia  -Patient was not taking meds or checking glucoses, though insulin had been prescribed in the past.  -A1c 9.5% this admission  -Continue basal, prandial and SSI, adjust as warranted   - Diabetes educator following.    Expected Discharge Location and Transportation: [place]  Expected Discharge   Expected Discharge Date: 6/26/2024; Expected Discharge Time:      VTE Prophylaxis:  Mechanical VTE prophylaxis orders are present.       AM-PAC 6 Clicks Score (PT): 10 (06/24/24 1103)    CODE STATUS:   Code Status and Medical Interventions:   Ordered at: 06/20/24 2026      Level Of Support Discussed With:    Patient     Code Status (Patient has no pulse and is not breathing):    CPR (Attempt to Resuscitate)     Medical Interventions (Patient has pulse or is breathing):    Full       Prashant Goodwin MD  06/25/24

## 2024-06-25 NOTE — THERAPY TREATMENT NOTE
Patient Name: Easton Alvarez  : 1965    MRN: [id number]                              Today's Date: 2024       Admit Date: 2024    Visit Dx:     ICD-10-CM ICD-9-CM   1. S/P BKA (below knee amputation), left  Z89.512 V49.75   2. Diabetic ulcer of left midfoot associated with type 2 diabetes mellitus, with necrosis of muscle  E11.621 250.80    L97.423 707.14     728.89   3. Elevated LFTs  R79.89 790.6   4. Below knee amputation  S88.119A 897.0   5. Impaired functional mobility, balance, gait, and endurance  Z74.09 V49.89   6. Infection of prosthetic total hip joint, initial encounter  T84.59XA 996.66    Z96.649 V43.64     Patient Active Problem List   Diagnosis    Diabetic ulcer of left foot with necrosis of muscle    Severe malnutrition    Infection of prosthetic total hip joint     Past Medical History:   Diagnosis Date    Diabetes mellitus     Diabetic foot infection 2018    left (partial amputation)     Past Surgical History:   Procedure Laterality Date    AMPUTATION FOOT / TOE Right     partial foot    BELOW KNEE AMPUTATION Left 2024    Procedure: AMPUTATION BELOW KNEE AND WOUND VAC ASSISTED CLOSURE LEFT;  Surgeon: Brijesh Em Jr., MD;  Location:  [facility];  Service: Orthopedics;  Laterality: Left;    INCISION AND DRAINAGE HIP Right 2024    Procedure: HIP ANTERIOR INCISION AND DRAINAGE WITH HANA TABLE, POLY SWAP RIGHT WITH LEFT LEG PIN TRACTION;  Surgeon: Lex Sethi MD;  Location:  [facility];  Service: Orthopedics;  Laterality: Right;    INCISION AND DRAINAGE LEG Left 6/15/2024    Procedure: SECONDARY CLOSURE LEFT BELOW KNEE AMPUTATION;  Surgeon: Brijesh Em Jr., MD;  Location:  [facility];  Service: Orthopedics;  Laterality: Left;    PLACEMENT OF WOUND VAC Left 2024    Procedure: PLACEMENT OF WOUND VAC LEFT;  Surgeon: Brijesh Em Jr., MD;  Location:  [facility];  Service: Orthopedics;  Laterality: Left;    TOTAL HIP ARTHROPLASTY Right 2024    From fall  "\"whole hip was shattered\"      General Information       [facility] Name 06/25/24 1057          Physical Therapy Time and Intention    Document Type therapy note (daily note)  -     Mode of Treatment physical therapy  -[facility] Name 06/25/24 1057          General Information    Patient Profile Reviewed yes  -     Existing Precautions/Restrictions fall;other (see comments)  Recent L BKA, s/p I&D of R MARIA LUISA due to infection, woundvac, remote R foot TMA  -     Barriers to Rehab medically complex  -[facility] Name 06/25/24 1057          Cognition    Orientation Status (Cognition) oriented x 4  -[facility] Name 06/25/24 1057          Safety Issues, Functional Mobility    Safety Issues Affecting Function (Mobility) awareness of need for assistance;insight into deficits/self-awareness;safety precaution awareness;safety precautions follow-through/compliance;sequencing abilities  -     Impairments Affecting Function (Mobility) balance;range of motion (ROM);sensation/sensory awareness;strength;endurance/activity tolerance;pain  -               User Key  (r) = Recorded By, (t) = Taken By, (c) = Cosigned By      Initials Name Provider Type     Rachna Cruz PT Physical Therapist                   Mobility       [facility] Name 06/25/24 1058          Bed Mobility    Bed Mobility rolling left;rolling right  -     Rolling Left [place] (Bed Mobility) minimum assist (75% patient effort);1 person assist;verbal cues  -     Rolling Right [place] (Bed Mobility) minimum assist (75% patient effort);1 person assist;verbal cues  -     Assistive Device (Bed Mobility) bed rails  -     Comment, (Bed Mobility) Rolling performed multiple times to place bedpan, perform pericare, and place lift sling. VCs for hand placement and sequencing  -[facility] Name 06/25/24 1058          Bed-Chair Transfer    Bed-Chair [place] (Transfers) dependent (less than 25% patient effort);2 person assist  -     Assistive Device " (Bed-Chair Transfers) lift device  -[facility] Name 06/25/24 1058          Sit-Stand Transfer    Sit-Stand Brooke (Transfers) maximum assist (25% patient effort);2 person assist;verbal cues  -     Assistive Device (Sit-Stand Transfers) other (see comments)  BUE support  -     Comment, (Sit-Stand Transfer) STS x2 from chair w/ BUE support and R knee blocking. Pt able to achieve full upright posture w/ cues for trunk extension and forward gaze. Able to stand ~30 seconds - 1 min during each STS trial  -[facility] Name 06/25/24 1058          Gait/Stairs (Locomotion)    Brooke Level (Gait) not tested  -     Comment, (Gait/Stairs) Not appropriate to perform at this time. Pt may benefit from  mobility training  -[facility] Name 06/25/24 1058          Mobility    Extremity Weight-bearing Status left lower extremity;right lower extremity  -     Left Lower Extremity (Weight-bearing Status) non weight-bearing (NWB)  -     Right Lower Extremity (Weight-bearing Status) weight-bearing as tolerated (WBAT)  -               User Key  (r) = Recorded By, (t) = Taken By, (c) = Cosigned By      Initials Name Provider Type     Rachna Cruz PT Physical Therapist                   Obj/Interventions       [facility] Name 06/25/24 1102          Motor Skills    Therapeutic Exercise knee;ankle  -LH       Row Name 06/25/24 1102          Knee (Therapeutic Exercise)    Knee (Therapeutic Exercise) strengthening exercise;AROM (active range of motion)  -     Knee AROM (Therapeutic Exercise) left;flexion;extension;10 repetitions  -     Knee Strengthening (Therapeutic Exercise) right;SLR (straight leg raise);heel slides;10 repetitions  -[facility] Name 06/25/24 1102          Ankle (Therapeutic Exercise)    Ankle (Therapeutic Exercise) AROM (active range of motion)  -     Ankle AROM (Therapeutic Exercise) right;dorsiflexion;plantarflexion;10 repetitions  -[facility] Name 06/25/24 1102          Balance    Balance  Assessment sitting static balance;sitting dynamic balance;sit to stand dynamic balance;standing static balance  -     Static Sitting Balance standby assist  -     Dynamic Sitting Balance contact guard  -     Position, Sitting Balance unsupported;sitting in chair  -     Sit to Stand Dynamic Balance maximum assist;2-person assist;verbal cues  -     Static Standing Balance maximum assist;2-person assist  [facility]     Position/Device Used, Standing Balance supported  -     Balance Interventions sitting;standing;sit to stand;supported;static;dynamic  -               User Key  (r) = Recorded By, (t) = Taken By, (c) = Cosigned By      Initials Name Provider Type     Rachna Cruz PT Physical Therapist                   Goals/Plan    No documentation.                  Clinical Impression       [facility] Name 06/25/24 1104          Pain    Pretreatment Pain Rating 0/10 - no pain  -     Posttreatment Pain Rating 0/10 - no pain  -     Pre/Posttreatment Pain Comment Denied pain at rest. reported R hip pain w/ WB  -     Pain Intervention(s) Repositioned;Ambulation/increased activity;Elevated  -       Row Name 06/25/24 1104          Plan of Care Review    Plan of Care Reviewed With patient  -     Progress improving  -     Outcome Evaluation Pt continues to present below baseline function d/t L BKA, R hip pain, and deficits in strength, balance, and activity tolerance. Pt performed STS x2 from chair w/ BUE support and R knee blocking, maxA x2. Pt able to achieve full upright posture this date and able to stand ~30 seconds - 1 min during each STS trial. Pt would continue to benefit from skilled IP PT. Recommend IRF at d/c.  [facility]       Row Name 06/25/24 1104          Vital Signs    Pre Systolic BP Rehab 128  -     Pre Treatment Diastolic BP 73  -     Pre SpO2 (%) 97  -     O2 Delivery Pre Treatment room air  -     O2 Delivery Intra Treatment room air  -     O2 Delivery Post Treatment room air  [facility]        Row Name 06/25/24 1104          Positioning and Restraints    Pre-Treatment Position in bed  -LH     Post Treatment Position chair  -LH     In Chair notified nsg;reclined;call light within reach;encouraged to call for assist;exit alarm on;waffle cushion;on mechanical lift sling;legs elevated;with nsg  -               User Key  (r) = Recorded By, (t) = Taken By, (c) = Cosigned By      Initials Name Provider Type     Rachna Cruz, LINDA Physical Therapist                   Outcome Measures       Row Name 06/25/24 1106 06/25/24 0835       How much help from another person do you currently need...    Turning from your back to your side while in flat bed without using bedrails? 3  - 3  -LC    Moving from lying on back to sitting on the side of a flat bed without bedrails? 2  - 2  -LC    Moving to and from a bed to a chair (including a wheelchair)? 1  - 1  -LC    Standing up from a chair using your arms (e.g., wheelchair, bedside chair)? 2  - 1  -LC    Climbing 3-5 steps with a railing? 1  - 1  -LC    To walk in hospital room? 1  - 1  -LC    AM-PAC 6 Clicks Score (PT) 10  - 9  -    Highest Level of Mobility Goal 4 --> Transfer to chair/commode  - 3 --> Sit at edge of bed  -      Row Name 06/25/24 1106          Functional Assessment    Outcome Measure Options AM-PAC 6 Clicks Basic Mobility (PT)  -               User Key  (r) = Recorded By, (t) = Taken By, (c) = Cosigned By      Initials Name Provider Type     Cathy Boothe, RN Registered Nurse     Rachna Cruz, PT Physical Therapist                                 Physical Therapy Education       Title: PT OT SLP Therapies (In Progress)       Topic: Physical Therapy (Done)       Point: Mobility training (Done)       Learning Progress Summary             Patient Acceptance, E, VU,DU,NR by  at 6/25/2024 1107    Acceptance, E,H, VU by  at 6/24/2024 1105    Comment: Pt educated on bed positioning with L CANDIDOA and importance of exercise on  rehab.    Eager, E, VU,DU by SC at 6/21/2024 1349    Comment: reviewed HEP    Acceptance, E,D, VU,NR by SD at 6/19/2024 1203    Comment: PT ed for expected outcomes, risks, and benefits of IPPT services and progression of activity. Pt given visual demo of unilateral gait with RW. Discussion regarding dispo planning and IRF.                         Point: Home exercise program (Done)       Learning Progress Summary             Patient Acceptance, E, VU,DU,NR by  at 6/25/2024 1107    Acceptance, E,H, VU by  at 6/24/2024 1105    Comment: Pt educated on bed positioning with L BKA and importance of exercise on rehab.    Acceptance, E, VU,NR,DU by  at 6/23/2024 1420    Eager, E, VU,DU by SC at 6/21/2024 1349    Comment: reviewed HEP    Acceptance, E,D, VU,NR by SD at 6/19/2024 1203    Comment: PT ed for expected outcomes, risks, and benefits of IPPT services and progression of activity. Pt given visual demo of unilateral gait with RW. Discussion regarding dispo planning and IRF.                         Point: Body mechanics (Done)       Learning Progress Summary             Patient Acceptance, E, VU,DU,NR by  at 6/25/2024 1107    Acceptance, E,H, VU by  at 6/24/2024 1105    Comment: Pt educated on bed positioning with L BKA and importance of exercise on rehab.    Eager, E, VU,DU by SC at 6/21/2024 1349    Comment: reviewed HEP    Acceptance, E,D, VU,NR by SD at 6/19/2024 1203    Comment: PT ed for expected outcomes, risks, and benefits of IPPT services and progression of activity. Pt given visual demo of unilateral gait with RW. Discussion regarding dispo planning and IRF.                         Point: Precautions (Done)       Learning Progress Summary             Patient Acceptance, E, VU,DU,NR by  at 6/25/2024 1107    Acceptance, E,H, VU by  at 6/24/2024 1105    Comment: Pt educated on bed positioning with L BKA and importance of exercise on rehab.    Eager, E, VU,DU by SC at 6/21/2024 1349    Comment:  reviewed HEP    Acceptance, E,D, VU,NR by SD at 6/19/2024 1203    Comment: PT ed for expected outcomes, risks, and benefits of IPPT services and progression of activity. Pt given visual demo of unilateral gait with RW. Discussion regarding dispo planning and IRF.                                         User Key       Initials Effective Dates Name Provider Type Discipline    SC 02/03/23 -  Jorge Luis Childress, PT Physical Therapist PT    SD 03/13/23 -  Charity Dela Cruz, PT Physical Therapist PT    GEORGINA 06/16/21 -  Radha Miguel, PT Physical Therapist PT     09/21/23 -  Rachna Cruz, LINDA Physical Therapist PT     05/07/24 -  Quincy Elizabeth, PT Student PT Student PT                  PT Recommendation and Plan     Plan of Care Reviewed With: patient  Progress: improving  Outcome Evaluation: Pt continues to present below baseline function d/t L BKA, R hip pain, and deficits in strength, balance, and activity tolerance. Pt performed STS x2 from chair w/ BUE support and R knee blocking, maxA x2. Pt able to achieve full upright posture this date and able to stand ~30 seconds - 1 min during each STS trial. Pt would continue to benefit from skilled IP PT. Recommend IRF at d/c.     Time Calculation:         PT Charges       Row Name 06/25/24 1107             Time Calculation    Start Time 0850  -LH      PT Received On 06/25/24  -      PT Goal Re-Cert Due Date 07/01/24  -         Timed Charges    68628 - PT Therapeutic Exercise Minutes 10  -LH      95674 - PT Therapeutic Activity Minutes 32  -LH         Total Minutes    Timed Charges Total Minutes 42  -LH       Total Minutes 42  -LH                User Key  (r) = Recorded By, (t) = Taken By, (c) = Cosigned By      Initials Name Provider Type     Rachna Cruz, LINDA Physical Therapist                  Therapy Charges for Today       Code Description Service Date Service Provider Modifiers Qty    00263256061 HC PT THER PROC EA 15 MIN 6/25/2024 Rachna Cruz, LINDA  GP 1    55070729973 HC PT THERAPEUTIC ACT EA 15 MIN 6/25/2024 Rachna Cruz, PT GP 2    11790642810 HC PT THER SUPP EA 15 MIN 6/25/2024 Rachna Cruz, PT GP 3            PT G-Codes  Outcome Measure Options: AM-PAC 6 Clicks Basic Mobility (PT)  AM-PAC 6 Clicks Score (PT): 10  AM-PAC 6 Clicks Score (OT): 12  PT Discharge Summary  Anticipated Discharge Disposition (PT): inpatient rehabilitation facility    Rachna Cruz, LINDA  6/25/2024

## 2024-06-26 LAB
ANION GAP SERPL CALCULATED.3IONS-SCNC: 8 MMOL/L (ref 5–15)
BACTERIA SPEC ANAEROBE CULT: NORMAL
BUN SERPL-MCNC: 30 MG/DL (ref 8–23)
BUN/CREAT SERPL: 40 (ref 7–25)
CALCIUM SPEC-SCNC: 7.2 MG/DL (ref 8.6–10.5)
CHLORIDE SERPL-SCNC: 103 MMOL/L (ref 98–107)
CO2 SERPL-SCNC: 25 MMOL/L (ref 22–29)
CREAT SERPL-MCNC: 0.75 MG/DL (ref 0.76–1.27)
DEPRECATED RDW RBC AUTO: 49.3 FL (ref 37–54)
EGFRCR SERPLBLD CKD-EPI 2021: 103.3 ML/MIN/1.73
ERYTHROCYTE [DISTWIDTH] IN BLOOD BY AUTOMATED COUNT: 14.3 % (ref 12.3–15.4)
GLUCOSE BLDC GLUCOMTR-MCNC: 111 MG/DL (ref 70–130)
GLUCOSE BLDC GLUCOMTR-MCNC: 143 MG/DL (ref 70–130)
GLUCOSE BLDC GLUCOMTR-MCNC: 158 MG/DL (ref 70–130)
GLUCOSE BLDC GLUCOMTR-MCNC: 173 MG/DL (ref 70–130)
GLUCOSE SERPL-MCNC: 149 MG/DL (ref 65–99)
HCT VFR BLD AUTO: 25.8 % (ref 37.5–51)
HGB BLD-MCNC: 8.6 G/DL (ref 13–17.7)
MCH RBC QN AUTO: 31.5 PG (ref 26.6–33)
MCHC RBC AUTO-ENTMCNC: 33.3 G/DL (ref 31.5–35.7)
MCV RBC AUTO: 94.5 FL (ref 79–97)
PLATELET # BLD AUTO: 167 10*3/MM3 (ref 140–450)
PMV BLD AUTO: 9.2 FL (ref 6–12)
POTASSIUM SERPL-SCNC: 4.2 MMOL/L (ref 3.5–5.2)
RBC # BLD AUTO: 2.73 10*6/MM3 (ref 4.14–5.8)
SODIUM SERPL-SCNC: 136 MMOL/L (ref 136–145)
WBC NRBC COR # BLD AUTO: 11.58 10*3/MM3 (ref 3.4–10.8)

## 2024-06-26 PROCEDURE — 85027 COMPLETE CBC AUTOMATED: CPT | Performed by: STUDENT IN AN ORGANIZED HEALTH CARE EDUCATION/TRAINING PROGRAM

## 2024-06-26 PROCEDURE — 63710000001 INSULIN LISPRO (HUMAN) PER 5 UNITS: Performed by: INTERNAL MEDICINE

## 2024-06-26 PROCEDURE — 99232 SBSQ HOSP IP/OBS MODERATE 35: CPT | Performed by: INTERNAL MEDICINE

## 2024-06-26 PROCEDURE — 80048 BASIC METABOLIC PNL TOTAL CA: CPT | Performed by: STUDENT IN AN ORGANIZED HEALTH CARE EDUCATION/TRAINING PROGRAM

## 2024-06-26 PROCEDURE — 63710000001 INSULIN GLARGINE PER 5 UNITS: Performed by: INTERNAL MEDICINE

## 2024-06-26 PROCEDURE — 02HV33Z INSERTION OF INFUSION DEVICE INTO SUPERIOR VENA CAVA, PERCUTANEOUS APPROACH: ICD-10-PCS | Performed by: INTERNAL MEDICINE

## 2024-06-26 PROCEDURE — 25010000002 CEFTRIAXONE PER 250 MG: Performed by: INTERNAL MEDICINE

## 2024-06-26 PROCEDURE — C1894 INTRO/SHEATH, NON-LASER: HCPCS

## 2024-06-26 PROCEDURE — 97530 THERAPEUTIC ACTIVITIES: CPT

## 2024-06-26 PROCEDURE — 63710000001 INSULIN LISPRO (HUMAN) PER 5 UNITS: Performed by: ORTHOPAEDIC SURGERY

## 2024-06-26 PROCEDURE — C1751 CATH, INF, PER/CENT/MIDLINE: HCPCS

## 2024-06-26 PROCEDURE — 97110 THERAPEUTIC EXERCISES: CPT

## 2024-06-26 PROCEDURE — 82948 REAGENT STRIP/BLOOD GLUCOSE: CPT

## 2024-06-26 RX ORDER — LISINOPRIL 10 MG/1
10 TABLET ORAL
Start: 2024-06-26

## 2024-06-26 RX ORDER — SODIUM CHLORIDE 0.9 % (FLUSH) 0.9 %
10 SYRINGE (ML) INJECTION EVERY 12 HOURS SCHEDULED
Status: DISCONTINUED | OUTPATIENT
Start: 2024-06-26 | End: 2024-07-06 | Stop reason: HOSPADM

## 2024-06-26 RX ORDER — ASPIRIN 81 MG/1
81 TABLET ORAL EVERY 12 HOURS SCHEDULED
Start: 2024-06-26

## 2024-06-26 RX ORDER — L.ACID,PARA/B.BIFIDUM/S.THERM 8B CELL
1 CAPSULE ORAL DAILY
Start: 2024-06-27

## 2024-06-26 RX ORDER — POLYETHYLENE GLYCOL 3350 17 G/17G
17 POWDER, FOR SOLUTION ORAL DAILY PRN
Start: 2024-06-26

## 2024-06-26 RX ORDER — SODIUM CHLORIDE 0.9 % (FLUSH) 0.9 %
10 SYRINGE (ML) INJECTION AS NEEDED
Status: DISCONTINUED | OUTPATIENT
Start: 2024-06-26 | End: 2024-07-06 | Stop reason: HOSPADM

## 2024-06-26 RX ADMIN — ASPIRIN 81 MG: 81 TABLET, COATED ORAL at 08:55

## 2024-06-26 RX ADMIN — ACETAMINOPHEN 1000 MG: 500 TABLET, FILM COATED ORAL at 21:15

## 2024-06-26 RX ADMIN — INSULIN LISPRO 8 UNITS: 100 INJECTION, SOLUTION INTRAVENOUS; SUBCUTANEOUS at 18:10

## 2024-06-26 RX ADMIN — CEFTRIAXONE 2000 MG: 2 INJECTION, POWDER, FOR SOLUTION INTRAMUSCULAR; INTRAVENOUS at 08:55

## 2024-06-26 RX ADMIN — INSULIN GLARGINE 25 UNITS: 100 INJECTION, SOLUTION SUBCUTANEOUS at 21:16

## 2024-06-26 RX ADMIN — Medication 10 ML: at 21:16

## 2024-06-26 RX ADMIN — ASPIRIN 81 MG: 81 TABLET, COATED ORAL at 21:16

## 2024-06-26 RX ADMIN — ACETAMINOPHEN 1000 MG: 500 TABLET, FILM COATED ORAL at 13:00

## 2024-06-26 RX ADMIN — Medication 5 MG: at 21:15

## 2024-06-26 RX ADMIN — Medication 10 ML: at 08:56

## 2024-06-26 RX ADMIN — PANTOPRAZOLE SODIUM 40 MG: 40 INJECTION, POWDER, FOR SOLUTION INTRAVENOUS at 18:11

## 2024-06-26 RX ADMIN — INSULIN LISPRO 8 UNITS: 100 INJECTION, SOLUTION INTRAVENOUS; SUBCUTANEOUS at 08:56

## 2024-06-26 RX ADMIN — PANTOPRAZOLE SODIUM 40 MG: 40 INJECTION, POWDER, FOR SOLUTION INTRAVENOUS at 08:55

## 2024-06-26 RX ADMIN — Medication 10 ML: at 21:18

## 2024-06-26 RX ADMIN — INSULIN LISPRO 2 UNITS: 100 INJECTION, SOLUTION INTRAVENOUS; SUBCUTANEOUS at 08:55

## 2024-06-26 RX ADMIN — INSULIN LISPRO 2 UNITS: 100 INJECTION, SOLUTION INTRAVENOUS; SUBCUTANEOUS at 21:16

## 2024-06-26 RX ADMIN — Medication 1 CAPSULE: at 08:55

## 2024-06-26 NOTE — THERAPY TREATMENT NOTE
Patient Name: Easton Alvarez  : 1965    MRN: [id number]                              Today's Date: 2024       Admit Date: 2024    Visit Dx:     ICD-10-CM ICD-9-CM   1. S/P BKA (below knee amputation), left  Z89.512 V49.75   2. Diabetic ulcer of left midfoot associated with type 2 diabetes mellitus, with necrosis of muscle  E11.621 250.80    L97.423 707.14     728.89   3. Elevated LFTs  R79.89 790.6   4. Below knee amputation  S88.119A 897.0   5. Impaired functional mobility, balance, gait, and endurance  Z74.09 V49.89   6. Infection of prosthetic total hip joint, initial encounter  T84.59XA 996.66    Z96.649 V43.64     Patient Active Problem List   Diagnosis    Diabetic ulcer of left foot with necrosis of muscle    Severe malnutrition    Infection of prosthetic total hip joint     Past Medical History:   Diagnosis Date    Diabetes mellitus     Diabetic foot infection 2018    left (partial amputation)     Past Surgical History:   Procedure Laterality Date    AMPUTATION FOOT / TOE Right     partial foot    BELOW KNEE AMPUTATION Left 2024    Procedure: AMPUTATION BELOW KNEE AND WOUND VAC ASSISTED CLOSURE LEFT;  Surgeon: Brijesh Em Jr., MD;  Location:  [facility];  Service: Orthopedics;  Laterality: Left;    INCISION AND DRAINAGE HIP Right 2024    Procedure: HIP ANTERIOR INCISION AND DRAINAGE WITH HANA TABLE, POLY SWAP RIGHT WITH LEFT LEG PIN TRACTION;  Surgeon: Lex Sethi MD;  Location:  [facility];  Service: Orthopedics;  Laterality: Right;    INCISION AND DRAINAGE LEG Left 6/15/2024    Procedure: SECONDARY CLOSURE LEFT BELOW KNEE AMPUTATION;  Surgeon: Brijesh Em Jr., MD;  Location:  [facility];  Service: Orthopedics;  Laterality: Left;    PLACEMENT OF WOUND VAC Left 2024    Procedure: PLACEMENT OF WOUND VAC LEFT;  Surgeon: Brijesh Em Jr., MD;  Location:  [facility];  Service: Orthopedics;  Laterality: Left;    TOTAL HIP ARTHROPLASTY Right 2024    From fall  "\"whole hip was shattered\"      General Information       Row Name 06/26/24 1540          OT Time and Intention    Document Type therapy note (daily note)  -AN     Mode of Treatment occupational therapy  -AN       Row Name 06/26/24 1540          General Information    Patient Profile Reviewed yes  -AN     Existing Precautions/Restrictions fall;other (see comments)  Recent L BKA, s/p I&D of R MARIA LUISA due to infection, woundvac, remote R foot TMA  -AN     Barriers to Rehab medically complex  -AN       Row Name 06/26/24 1540          Cognition    Orientation Status (Cognition) oriented x 4  -AN       Row Name 06/26/24 1540          Safety Issues, Functional Mobility    Safety Issues Affecting Function (Mobility) safety precaution awareness;safety precautions follow-through/compliance;awareness of need for assistance;insight into deficits/self-awareness;judgment;sequencing abilities  -AN     Impairments Affecting Function (Mobility) balance;range of motion (ROM);sensation/sensory awareness;strength;endurance/activity tolerance;pain  -AN               User Key  (r) = Recorded By, (t) = Taken By, (c) = Cosigned By      Initials Name Provider Type    AN Johana Vargas, OT Occupational Therapist                     Mobility/ADL's       Row Name 06/26/24 1540          Bed Mobility    Bed Mobility supine-sit  -AN     Supine-Sit [place] (Bed Mobility) verbal cues;minimum assist (75% patient effort);2 person assist  -AN     Bed Mobility, Safety Issues decreased use of arms for pushing/pulling;decreased use of legs for bridging/pushing;impaired trunk control for bed mobility  -AN     Assistive Device (Bed Mobility) head of bed elevated  -AN     Comment, (Bed Mobility) Pt performed sup to sit with cues for sequencing of steps and to maintain NWB of LLE  -AN       Row Name 06/26/24 1540          Transfers    Transfers sit-stand transfer;stand-sit transfer  -AN       Row Name 06/26/24 1540          Sit-Stand Transfer    " Sit-Stand [place] (Transfers) verbal cues;maximum assist (25% patient effort);2 person assist  -AN       Row Name 06/26/24 1540          Stand-Sit Transfer    Stand-Sit [place] (Transfers) verbal cues;maximum assist (25% patient effort);2 person assist  -AN       Row Name 06/26/24 1540          Activities of Daily Living    BADL Assessment/Intervention lower body dressing;toileting  -AN       Row Name 06/26/24 1540          Mobility    Extremity Weight-bearing Status left lower extremity;right lower extremity  -AN     Left Lower Extremity (Weight-bearing Status) non weight-bearing (NWB)  -AN     Right Lower Extremity (Weight-bearing Status) weight-bearing as tolerated (WBAT)  -AN       Row Name 06/26/24 1540          Lower Body Dressing Assessment/Training    [place] Level (Lower Body Dressing) don;socks;dependent (less than 25% patient effort)  -AN     Position (Lower Body Dressing) edge of bed sitting  -AN       Row Name 06/26/24 1540          Toileting Assessment/Training    [place] Level (Toileting) adjust/manage clothing;perform perineal hygiene;dependent (less than 25% patient effort)  -AN     Position (Toileting) supported standing  -AN               User Key  (r) = Recorded By, (t) = Taken By, (c) = Cosigned By      Initials Name Provider Type    Johana Shelton OT Occupational Therapist                   Obj/Interventions       Row Name 06/26/24 1543          Shoulder (Therapeutic Exercise)    Shoulder (Therapeutic Exercise) strengthening exercise  -AN     Shoulder Strengthening (Therapeutic Exercise) bilateral;flexion;extension;horizontal aBduction/aDduction;sitting;resistance band;red;10 repetitions  -AN       Row Name 06/26/24 1543          Elbow/Forearm (Therapeutic Exercise)    Elbow/Forearm (Therapeutic Exercise) strengthening exercise  -AN     Elbow/Forearm Strengthening (Therapeutic Exercise) bilateral;flexion;extension;supine;10 repetitions  -AN       Row Name 06/26/24  1543          Motor Skills    Therapeutic Exercise shoulder;elbow/forearm  -AN       [facility] Name 06/26/24 1543          Balance    Balance Assessment sitting static balance;sitting dynamic balance  -AN     Balance Interventions standing;sit to stand;supported;static;highly challenging  -AN               User Key  (r) = Recorded By, (t) = Taken By, (c) = Cosigned By      Initials Name Provider Type    Johana Shelton, [facility] Occupational Therapist                   Goals/Plan    No documentation.                  Clinical Impression       Row Name 06/26/24 1544          Pain Assessment    Additional Documentation Pain Scale: FACES Pre/Post-Treatment (Group)  -AN       Row Name 06/26/24 1544          Pain Scale: FACES Pre/Post-Treatment    Pain: FACES Scale, Pretreatment 2-->hurts little bit  -AN     Posttreatment Pain Rating 2-->hurts little bit  -AN     Pain Location generalized  -AN     Pre/Posttreatment Pain Comment tolerated  -AN       Row Name 06/26/24 1544          Plan of Care Review    Plan of Care Reviewed With patient  -AN     Progress improving  -AN     Outcome Evaluation Pt continues to present with generalized weakness, impaired balance and decreased activity tolerance warranting skilled OT services. Pt performed bed mobility with Min A x 2 and STS with Max A x 2. Pt tolerated UE ther-ex with red theraband. Cont POC.  -AN       Row Name 06/26/24 1544          Therapy Plan Review/Discharge Plan (OT)    Anticipated Discharge Disposition (OT) inpatient rehabilitation facility  -AN       Row Name 06/26/24 1544          Vital Signs    O2 Delivery Pre Treatment room air  -AN     O2 Delivery Intra Treatment room air  -AN     O2 Delivery Post Treatment room air  -AN     Pre Patient Position Supine  -AN     Intra Patient Position Standing  -AN     Post Patient Position Sitting  -AN       Row Name 06/26/24 1544          Positioning and Restraints    Pre-Treatment Position in bed  -AN     Post Treatment Position bed   -AN     In Bed notified nsg;sitting EOB;with PT;call light within reach;encouraged to call for assist  -AN               User Key  (r) = Recorded By, (t) = Taken By, (c) = Cosigned By      Initials Name Provider Type    Johana Shelton OT Occupational Therapist                   Outcome Measures       Row Name 06/26/24 1547          How much help from another is currently needed...    Putting on and taking off regular lower body clothing? 1  -AN     Bathing (including washing, rinsing, and drying) 2  -AN     Toileting (which includes using toilet bed pan or urinal) 1  -AN     Putting on and taking off regular upper body clothing 3  -AN     Taking care of personal grooming (such as brushing teeth) 3  -AN     Eating meals 4  -AN     AM-PAC 6 Clicks Score (OT) 14  -AN       Row Name 06/26/24 1547          Functional Assessment    Outcome Measure Options AM-PAC 6 Clicks Daily Activity (OT)  -AN               User Key  (r) = Recorded By, (t) = Taken By, (c) = Cosigned By      Initials Name Provider Type    Johana Shelton OT Occupational Therapist                    Occupational Therapy Education       Title: PT OT SLP Therapies (Done)       Topic: Occupational Therapy (Done)       Point: ADL training (Done)       Description:   Instruct learner(s) on proper safety adaptation and remediation techniques during self care or transfers.   Instruct in proper use of assistive devices.                  Learning Progress Summary             Patient Acceptance, E, VU by MARIA FERNANDA at 6/26/2024 1548    Acceptance, E, NR by AMAN at 6/22/2024 1315    Acceptance, E,D, VU,DU,NR by GEORGINA at 6/19/2024 1343    Comment: reason for consult, noted deficits, chair push ups                         Point: Home exercise program (Done)       Description:   Instruct learner(s) on appropriate technique for monitoring, assisting and/or progressing therapeutic exercises/activities.                  Learning Progress Summary             Patient  Acceptance, E, VU by AN at 6/26/2024 1548    Acceptance, E,D, VU,DU,NR by  at 6/19/2024 1343    Comment: reason for consult, noted deficits, chair push ups                         Point: Precautions (Done)       Description:   Instruct learner(s) on prescribed precautions during self-care and functional transfers.                  Learning Progress Summary             Patient Acceptance, E, VU by AN at 6/26/2024 1548    Acceptance, E, NR by  at 6/22/2024 1315                         Point: Body mechanics (Done)       Description:   Instruct learner(s) on proper positioning and spine alignment during self-care, functional mobility activities and/or exercises.                  Learning Progress Summary             Patient Acceptance, E, VU by AN at 6/26/2024 1548    Acceptance, E, NR by  at 6/22/2024 1315                                         User Key       Initials Effective Dates Name Provider Type Discipline     07/11/23 -  Ana Carvajal, OT Occupational Therapist OT     06/16/21 -  Laura Lyons, OT Occupational Therapist OT    MARIA FERNANDA 09/21/21 -  Johana Vargas, OT Occupational Therapist OT                  OT Recommendation and Plan     Plan of Care Review  Plan of Care Reviewed With: patient  Progress: improving  Outcome Evaluation: Pt continues to present with generalized weakness, impaired balance and decreased activity tolerance warranting skilled OT services. Pt performed bed mobility with Min A x 2 and STS with Max A x 2. Pt tolerated UE ther-ex with red theraband. Cont POC.     Time Calculation:         Time Calculation- OT       Row Name 06/26/24 1549             Time Calculation- OT    OT Start Time 1505  -AN      OT Received On 06/26/24  -AN         Timed Charges    32531 - OT Therapeutic Exercise Minutes 10  -AN      69079 - OT Self Care/Mgmt Minutes 5  -AN         Total Minutes    Timed Charges Total Minutes 15  -AN       Total Minutes 15  -AN                User Key  (r) = Recorded By,  (t) = Taken By, (c) = Cosigned By      Initials Name Provider Type    Johana Shelton OT Occupational Therapist                  Therapy Charges for Today       Code Description Service Date Service Provider Modifiers Qty    86891907178 HC OT THER PROC EA 15 MIN 6/26/2024 Johana Vargas OT GO 1                 Johana Vargas OT  6/26/2024

## 2024-06-26 NOTE — PLAN OF CARE
Problem: Skin Injury Risk Increased  Goal: Skin Health and Integrity  6/26/2024 0358 by Raiza Pinedo RN  Outcome: Ongoing, Progressing  6/26/2024 0358 by Raiza Pinedo RN  Outcome: Ongoing, Progressing  Intervention: Optimize Skin Protection  Recent Flowsheet Documentation  Taken 6/25/2024 1959 by Raiza Pinedo RN  Pressure Reduction Techniques:   frequent weight shift encouraged   heels elevated off bed   weight shift assistance provided   positioned off wounds  Pressure Reduction Devices:   pressure-redistributing mattress utilized   positioning supports utilized   heel offloading device utilized  Skin Protection:   adhesive use limited   tubing/devices free from skin contact     Problem: Fall Injury Risk  Goal: Absence of Fall and Fall-Related Injury  6/26/2024 0358 by Raiza Pinedo RN  Outcome: Ongoing, Progressing  6/26/2024 0358 by Raiza Pinedo RN  Outcome: Ongoing, Progressing  Intervention: Identify and Manage Contributors  Recent Flowsheet Documentation  Taken 6/25/2024 2000 by Raiza Pinedo RN  Medication Review/Management: medications reviewed  Self-Care Promotion:   independence encouraged   BADL personal objects within reach  Intervention: Promote Injury-Free Environment  Recent Flowsheet Documentation  Taken 6/26/2024 0200 by Raiza Pinedo RN  Safety Promotion/Fall Prevention: safety round/check completed  Taken 6/26/2024 0000 by Raiza Pinedo RN  Safety Promotion/Fall Prevention: room organization consistent  Taken 6/25/2024 2200 by Raiza Pinedo RN  Safety Promotion/Fall Prevention: safety round/check completed  Taken 6/25/2024 2000 by Raiza Pinedo RN  Safety Promotion/Fall Prevention: safety round/check completed     Problem: Adjustment to Surgery (Extremity Amputation)  Goal: Optimal Coping with Amputation  6/26/2024 0358 by Raiza Pinedo RN  Outcome: Ongoing, Progressing  6/26/2024 0358 by Raiza Pinedo RN  Outcome: Ongoing, Progressing  Intervention:  Support Psychsocial Response  Recent Flowsheet Documentation  Taken 6/25/2024 1959 by Raiza Pinedo RN  Supportive Measures: active listening utilized  Family/Support System Care: self-care encouraged     Problem: Bleeding (Extremity Amputation)  Goal: Absence of Bleeding  6/26/2024 0358 by Raiza Pinedo RN  Outcome: Ongoing, Progressing  6/26/2024 0358 by Raiza Pinedo RN  Outcome: Ongoing, Progressing  Intervention: Monitor and Manage Bleeding  Recent Flowsheet Documentation  Taken 6/25/2024 1959 by Raiza Pinedo RN  Bleeding Management: dressing monitored     Problem: Bowel Motility Impaired (Extremity Amputation)  Goal: Effective Bowel Elimination  6/26/2024 0358 by Raiza Pinedo RN  Outcome: Ongoing, Progressing  6/26/2024 0358 by Raiza Pinedo RN  Outcome: Ongoing, Progressing  Intervention: Enhance Bowel Motility and Elimination  Recent Flowsheet Documentation  Taken 6/25/2024 1959 by Raiza Pinedo RN  Bowel Elimination Management: toileting offered  Bowel Motility Enhancement: fluid intake encouraged     Problem: Fluid and Electrolyte Imbalance (Extremity Amputation)  Goal: Fluid and Electrolyte Balance  6/26/2024 0358 by Raiza Pinedo RN  Outcome: Ongoing, Progressing  6/26/2024 0358 by Raiza Pinedo RN  Outcome: Ongoing, Progressing  Intervention: Monitor and Manage Fluid and Electrolyte Balance  Recent Flowsheet Documentation  Taken 6/25/2024 1959 by Raiza Pinedo RN  Fluid/Electrolyte Management: fluids provided     Problem: Functional Ability Impaired (Extremity Amputation)  Goal: Optimal Functional Ability  6/26/2024 0358 by Raiza Pinedo RN  Outcome: Ongoing, Progressing  6/26/2024 0358 by Raiza Pinedo RN  Outcome: Ongoing, Progressing  Intervention: Optimize Functional Ability  Recent Flowsheet Documentation  Taken 6/25/2024 2200 by Raiza Pinedo RN  Activity Management: patient refuses activity  Taken 6/25/2024 2000 by Raiza Pinedo RN  Self-Care  Promotion:   independence encouraged   BADL personal objects within reach  Taken 6/25/2024 1959 by Raiza Pinedo RN  Activity Management: activity encouraged     Problem: Infection (Extremity Amputation)  Goal: Absence of Infection Signs and Symptoms  6/26/2024 0358 by Raiza Pinedo RN  Outcome: Ongoing, Progressing  6/26/2024 0358 by Raiza Pinedo RN  Outcome: Ongoing, Progressing  Intervention: Prevent or Manage Infection  Recent Flowsheet Documentation  Taken 6/26/2024 0200 by Raiza Pinedo RN  Infection Prevention:   hand hygiene promoted   rest/sleep promoted  Taken 6/26/2024 0000 by Raiza Pinedo RN  Infection Prevention:   hand hygiene promoted   rest/sleep promoted  Taken 6/25/2024 2200 by Raiza Pinedo RN  Infection Prevention:   hand hygiene promoted   rest/sleep promoted  Taken 6/25/2024 2000 by Raiza Pinedo RN  Infection Prevention:   environmental surveillance performed   hand hygiene promoted     Problem: Ongoing Anesthesia Effects (Extremity Amputation)  Goal: Anesthesia/Sedation Recovery  6/26/2024 0358 by Raiza Pinedo RN  Outcome: Ongoing, Progressing  6/26/2024 0358 by Raiza Pinedo RN  Outcome: Ongoing, Progressing  Intervention: Optimize Anesthesia Recovery  Recent Flowsheet Documentation  Taken 6/26/2024 0200 by Raiza Pinedo RN  Safety Promotion/Fall Prevention: safety round/check completed  Taken 6/26/2024 0000 by Raiza Pinedo RN  Safety Promotion/Fall Prevention: room organization consistent  Taken 6/25/2024 2200 by Raiza Pinedo RN  Safety Promotion/Fall Prevention: safety round/check completed  Administration (IS): self-administered  Taken 6/25/2024 2000 by Raiza Pinedo RN  Safety Promotion/Fall Prevention: safety round/check completed  Administration (IS): self-administered     Problem: Pain (Extremity Amputation)  Goal: Acceptable Pain Control  6/26/2024 0358 by Raiza Pinedo RN  Outcome: Ongoing, Progressing  6/26/2024 0358 by Khris  OH Eastman  Outcome: Ongoing, Progressing  Intervention: Prevent or Manage Pain  Recent Flowsheet Documentation  Taken 6/25/2024 1959 by Raiza Pinedo RN  Pain Management Interventions:   see MAR   quiet environment facilitated   position adjusted   pillow support provided  Diversional Activities:   television   smartphone     Problem: Postoperative Nausea and Vomiting (Extremity Amputation)  Goal: Nausea and Vomiting Relief  6/26/2024 0358 by Raiza Pinedo RN  Outcome: Ongoing, Progressing  6/26/2024 0358 by Raiza Pinedo RN  Outcome: Ongoing, Progressing     Problem: Postoperative Urinary Retention (Extremity Amputation)  Goal: Effective Urinary Elimination  6/26/2024 0358 by Raiza Pinedo RN  Outcome: Ongoing, Progressing  6/26/2024 0358 by Raiza Pinedo RN  Outcome: Ongoing, Progressing  Intervention: Monitor and Manage Urinary Retention  Recent Flowsheet Documentation  Taken 6/25/2024 1959 by Raiza Pinedo RN  Urinary Elimination Promotion: toileting offered     Problem: Residual Limb Care (Extremity Amputation)  Goal: Optimal Residual Limb Healing  6/26/2024 0358 by Raiza Pinedo RN  Outcome: Ongoing, Progressing  6/26/2024 0358 by Raiza Pinedo RN  Outcome: Ongoing, Progressing     Problem: Mobility Impairment  Goal: Optimal Mobility  6/26/2024 0358 by Raiza Pinedo RN  Outcome: Ongoing, Progressing  6/26/2024 0358 by Raiza Pinedo RN  Outcome: Ongoing, Progressing  Intervention: Optimize Mobility  Recent Flowsheet Documentation  Taken 6/25/2024 2200 by Raiza Pinedo RN  Activity Management: patient refuses activity  Taken 6/25/2024 2000 by Raiza Pinedo RN  Positioning/Transfer Devices:   pillows   in use  Taken 6/25/2024 1959 by Raiza Pinedo RN  Activity Management: activity encouraged     Problem: Adult Inpatient Plan of Care  Goal: Plan of Care Review  6/26/2024 0358 by Raiza Pinedo RN  Outcome: Ongoing, Progressing  6/26/2024 0358 by Raiza Pinedo  RN  Outcome: Ongoing, Progressing  Goal: Patient-Specific Goal (Individualized)  6/26/2024 0358 by Raiza Pinedo RN  Outcome: Ongoing, Progressing  6/26/2024 0358 by Raiza Pinedo RN  Outcome: Ongoing, Progressing  Goal: Absence of Hospital-Acquired Illness or Injury  6/26/2024 0358 by Raiza Pinedo RN  Outcome: Ongoing, Progressing  6/26/2024 0358 by Raiza Pinedo RN  Outcome: Ongoing, Progressing  Intervention: Identify and Manage Fall Risk  Recent Flowsheet Documentation  Taken 6/26/2024 0200 by Raiza Pinedo RN  Safety Promotion/Fall Prevention: safety round/check completed  Taken 6/26/2024 0000 by Raiza Pinedo RN  Safety Promotion/Fall Prevention: room organization consistent  Taken 6/25/2024 2200 by Raiza Pinedo RN  Safety Promotion/Fall Prevention: safety round/check completed  Taken 6/25/2024 2000 by Raiza Pinedo RN  Safety Promotion/Fall Prevention: safety round/check completed  Intervention: Prevent Skin Injury  Recent Flowsheet Documentation  Taken 6/25/2024 2000 by Raiza Pinedo RN  Body Position:   lower extremity elevated   turned   right  Taken 6/25/2024 1959 by Raiza Pinedo RN  Skin Protection:   adhesive use limited   tubing/devices free from skin contact  Intervention: Prevent and Manage VTE (Venous Thromboembolism) Risk  Recent Flowsheet Documentation  Taken 6/25/2024 2200 by Raiza Pinedo RN  Activity Management: patient refuses activity  Taken 6/25/2024 1959 by Raiza Pinedo RN  Activity Management: activity encouraged  Range of Motion: active ROM (range of motion) encouraged  Intervention: Prevent Infection  Recent Flowsheet Documentation  Taken 6/26/2024 0200 by Raiza Pinedo RN  Infection Prevention:   hand hygiene promoted   rest/sleep promoted  Taken 6/26/2024 0000 by Raiza Pinedo RN  Infection Prevention:   hand hygiene promoted   rest/sleep promoted  Taken 6/25/2024 2200 by Raiza Pinedo RN  Infection Prevention:   hand hygiene promoted    rest/sleep promoted  Taken 6/25/2024 2000 by Raiza Pinedo RN  Infection Prevention:   environmental surveillance performed   hand hygiene promoted  Goal: Optimal Comfort and Wellbeing  6/26/2024 0358 by Raiza Pinedo RN  Outcome: Ongoing, Progressing  6/26/2024 0358 by Raiza Pinedo RN  Outcome: Ongoing, Progressing  Intervention: Monitor Pain and Promote Comfort  Recent Flowsheet Documentation  Taken 6/25/2024 1959 by Raiza Pinedo RN  Pain Management Interventions:   see MAR   quiet environment facilitated   position adjusted   pillow support provided  Intervention: Provide Person-Centered Care  Recent Flowsheet Documentation  Taken 6/25/2024 1959 by Raiza Pinedo RN  Trust Relationship/Rapport:   care explained   choices provided   emotional support provided   empathic listening provided   questions answered   questions encouraged   reassurance provided   thoughts/feelings acknowledged  Goal: Readiness for Transition of Care  6/26/2024 0358 by Raiza Pinedo RN  Outcome: Ongoing, Progressing  6/26/2024 0358 by Raiza Pinedo RN  Outcome: Ongoing, Progressing   Goal Outcome Evaluation:

## 2024-06-26 NOTE — PROGRESS NOTES
"Easton Alvarez  1965  [id number]          Chief Complaint: left foot infection    Reason for Consultation: left foot infection    History of present illness:     Patient is a 58 y.o.  Yr old male with history of diabetes with prior right TMA 2019 at , history strep septicemia 2016 ; he reports right total hip arthroplasty 2024 after fall. He reports a fall several weeks prior to his June admission with injury to the left foot, wound present with deterioration several days preceding admission with severe discoloration/redness and swelling.  CT scan showed concern for subcutaneous emphysema and necrotizing infection.  Transferred to [facility] with evaluation by Dr. Em and Dr. Lundberg and arrangements made for urgent surgery. Subsequently, blood cultures called with gram-positive cocci     6/13 left LE amp, Dr Em    6/15  \"PROCEDURE:            Left      [address]263: Secondary closure above-knee amputation\"      6/18/24  right hip aspiration, culture with GB strep; wbc  down some postop from revision; blood cultures 6/15 negative so far    6/19/24 Dr. Em helping facilitate right hip surgery with orthopedic partners/team    6/20/24 surgery Dr Sethi  \"Procedure(s):  HIP ANTERIOR INCISION AND DRAINAGE WITH HANA TABLE, POLY SWAP- RIGHT\"    6/26/24 case management looking into options;  postop pain controlled at present; He has left stump  pain that is constant, sharp at times, blunted by neuropathy, worse with palpation, better with pain meds and 4  out of 10 in severity. No distress per nursing; no new focal symptoms otherwise; he has right hip pain with range of motion and weightbearing increased and out of 10 at present with movement, nonradiating, better with pain meds. Some soft stools, 2 bowel movements on June 25 by his report.  Denies overt diarrhea today so far     No headache photophobia or neck stiffness.  No shortness of breath cough or hemoptysis.  No nausea vomiting  " "or abdominal pain.  No dysuria hematuria or pyuria.  No other new skin rash    Past Medical History:   Diagnosis Date    Diabetes mellitus     Diabetic foot infection 2018    left (partial amputation)       Past Surgical History:   Procedure Laterality Date    AMPUTATION FOOT / TOE Right     partial foot    BELOW KNEE AMPUTATION Left 6/13/2024    Procedure: AMPUTATION BELOW KNEE AND WOUND VAC ASSISTED CLOSURE LEFT;  Surgeon: Brijesh Em Jr., MD;  Location:  [facility];  Service: Orthopedics;  Laterality: Left;    INCISION AND DRAINAGE HIP Right 6/20/2024    Procedure: HIP ANTERIOR INCISION AND DRAINAGE WITH HANA TABLE, POLY SWAP RIGHT WITH LEFT LEG PIN TRACTION;  Surgeon: Lex Sethi MD;  Location:  [facility];  Service: Orthopedics;  Laterality: Right;    INCISION AND DRAINAGE LEG Left 6/15/2024    Procedure: SECONDARY CLOSURE LEFT BELOW KNEE AMPUTATION;  Surgeon: Brijesh Em Jr., MD;  Location:  [facility];  Service: Orthopedics;  Laterality: Left;    PLACEMENT OF WOUND VAC Left 6/13/2024    Procedure: PLACEMENT OF WOUND VAC LEFT;  Surgeon: Brijesh Em Jr., MD;  Location:  [facility];  Service: Orthopedics;  Laterality: Left;    TOTAL HIP ARTHROPLASTY Right 02/2024    From fall \"whole hip was shattered\"       Pediatric History   Patient Parents    Not on file     Other Topics Concern    Not on file   Social History Narrative    Not on file       family history is not on file. High blood pressure mom/dad    No Known Allergies    Medication:  Current Facility-Administered Medications   Medication Dose Route Frequency Provider Last Rate Last Admin    acetaminophen (TYLENOL) tablet 1,000 mg  1,000 mg Oral Q8H Lex Sethi MD   1,000 mg at 06/25/24 2001    aspirin EC tablet 81 mg  81 mg Oral Q12H Lex Sethi MD   81 mg at 06/25/24 2000    sennosides-docusate (PERICOLACE) 8.6-50 MG per tablet 2 tablet  2 tablet Oral BID Lex Sethi MD   2 tablet at 06/24/24 2036    And    polyethylene glycol " (MIRALAX) packet 17 g  17 g Oral Daily PRN Lex Sethi MD        And    bisacodyl (DULCOLAX) EC tablet 5 mg  5 mg Oral Daily PRN Lex Sethi MD        And    bisacodyl (DULCOLAX) suppository 10 mg  10 mg Rectal Daily PRN Lex Sethi MD        Calcium Replacement - Follow Nurse / BPA Driven Protocol   Does not apply PRN Lex Sethi MD        cefTRIAXone (ROCEPHIN) 2,000 mg in sodium chloride 0.9 % 100 mL MBP  2,000 mg Intravenous Q24H Cleve Bills  mL/hr at 06/25/24 0837 2,000 mg at 06/25/24 0837    dextrose (D50W) (25 g/50 mL) IV injection 25 g  25 g Intravenous Q15 Min PRN Lex Sethi MD        dextrose (GLUTOSE) oral gel 15 g  15 g Oral Q15 Min PRN Lex Sethi MD        glucagon (GLUCAGEN) injection 1 mg  1 mg Intramuscular Q15 Min PRN Lex Sethi MD        insulin glargine (LANTUS, SEMGLEE) injection 25 Units  25 Units Subcutaneous Nightly Prashant Goodwin MD   25 Units at 06/25/24 2000    Insulin Lispro (humaLOG) injection 2-9 Units  2-9 Units Subcutaneous 4x Daily AC & at Bedtime Lex Sethi MD   2 Units at 06/25/24 2000    Insulin Lispro (humaLOG) injection 8 Units  8 Units Subcutaneous TID With Meals Prashant Goodwin MD   8 Units at 06/25/24 1716    lactated ringers infusion  9 mL/hr Intravenous Continuous Lex Sethi MD 9 mL/hr at 06/15/24 1307 9 mL/hr at 06/15/24 1307    lactated ringers infusion  100 mL/hr Intravenous Continuous Lex Sethi  mL/hr at 06/20/24 2122 100 mL/hr at 06/20/24 2122    lactobacillus acidophilus (RISAQUAD) capsule 1 capsule  1 capsule Oral Daily Lex Sethi MD   1 capsule at 06/25/24 0836    [Held by provider] lisinopril (PRINIVIL,ZESTRIL) tablet 10 mg  10 mg Oral Q24H Lex Sethi MD   10 mg at 06/21/24 0831    Magnesium Standard Dose Replacement - Follow Nurse / BPA Driven Protocol   Does not apply PRN Lex Sethi MD        melatonin tablet 5 mg  5 mg Oral Nightly Lex Sethi MD   5 mg at  06/25/24 2000    nitroglycerin (NITROSTAT) SL tablet 0.4 mg  0.4 mg Sublingual Q5 Min PRN Lex Sethi MD        oxyCODONE (ROXICODONE) immediate release tablet 5 mg  5 mg Oral Q4H PRN Lex Sethi MD   5 mg at 06/25/24 1959    pantoprazole (PROTONIX) injection 40 mg  40 mg Intravenous BID AC Lex Sethi MD   40 mg at 06/25/24 1716    Phosphorus Replacement - Follow Nurse / BPA Driven Protocol   Does not apply PRN Lex Sethi MD        Potassium Replacement - Follow Nurse / BPA Driven Protocol   Does not apply PRN Lex Sethi MD        ropivacaine (NAROPIN) 0.2 % infusion (INFUSYSTEM)   Peripheral Nerve Continuous Lex Sethi MD   Stopped at 06/17/24 2230    sodium chloride 0.9 % flush 10 mL  10 mL Intravenous Q12H Lex Sethi MD   10 mL at 06/25/24 2001    sodium chloride 0.9 % flush 10 mL  10 mL Intravenous PRN Lex Sethi MD        sodium chloride 0.9 % infusion 40 mL  40 mL Intravenous PRN Lex Sethi MD        traMADol (ULTRAM) tablet 50 mg  50 mg Oral Q8H PRN Lex Sethi MD   50 mg at 06/25/24 2231       Antibiotics:  Anti-Infectives (From admission, onward)      Ordered     Dose/Rate Route Frequency Start Stop    06/21/24 0756  cefTRIAXone (ROCEPHIN) 2,000 mg in sodium chloride 0.9 % 100 mL MBP        Ordering Provider: Cleve Bills MD    2,000 mg  200 mL/hr over 30 Minutes Intravenous Every 24 Hours 06/21/24 0900 07/29/24 0859    06/20/24 1457  ceFAZolin 2000 mg IVPB in 100 mL NS (MBP)        Ordering Provider: Lex Sethi MD    2,000 mg  over 30 Minutes Intravenous Once 06/20/24 1459 06/20/24 1557    06/12/24 1746  piperacillin-tazobactam (ZOSYN) 4.5 g IVPB in 100 mL NS MBP (CD)        Ordering Provider: Shraddha Scott MD    4.5 g  over 30 Minutes Intravenous Once 06/12/24 1900 06/13/24 0907              Review of Systems    6/25/24     Constitutional-- No Fever, chills or sweats.  Appetite diminished with fatigue.  Heent-- No new vision, hearing  "or throat complaints.  No epistaxis or oral sores.  Denies odynophagia or dysphagia.  No flashers, floaters or eye pain. No odynophagia or dysphagia. No headache, photophobia or neck stiffness.  CV-- No chest pain, palpitation or syncope  Resp-- No SOB/cough/Hemoptysis  GI- No nausea, vomiting, or diarrhea.  No hematochezia, melena, or hematemesis. Denies jaundice or chronic liver disease.  -- No dysuria, hematuria, or flank pain.  Denies hesitancy, urgency or flank pain.  Lymph- no swollen lymph nodes in neck/axilla or groin.   Heme- No active bruising or bleeding; no Hx of DVT or PE.  MS-- aside from above, no swelling or pain in the bones or joints of arms/legs.  No new back pain.  Neuro-- No acute focal weakness or numbness in the arms or legs.  No seizures.    Full 12 point review of systems reviewed and negative otherwise for acute complaints, except for above    Physical Exam:   Vital Signs   /75 (BP Location: Right arm, Patient Position: Lying)   Pulse 109   Temp 98.5 °F (36.9 °C) (Axillary)   Resp 18   Ht 172.7 cm (67.99\")   Wt 67.6 kg (149 lb)   SpO2 95%   BMI 22.66 kg/m²     GENERAL: awake; in no acute distress.   HEENT: Normocephalic, atraumatic.   No conjunctival injection. No icterus. Oropharynx clear without evidence of thrush or exudate. No evidence of periodontal disease.    NECK: Supple without nuchal rigidity. No mass.   HEART: RRR; No murmur, rubs, gallops.   LUNGS: diminished at bases.  Trace crackles at the bases but no rhonchi or wheeze. Normal respiratory effort. Nonlabored. No dullness.  ABDOMEN: Soft, nontender, nondistended. Positive bowel sounds. No rebound or guarding. NO mass or HSM.  EXT:  see below.     MSK: right hip surgical site cvered;  no new visible redness/purulence or fluctuance  SKIN: Warm and dry without cutaneous eruptions on Inspection/palpation.      Left   BKA stump covered;  no new visible redness or purulence; no discrete fluctuance.  No " crepitus    Laboratory Data    Results from last 7 days   Lab Units 06/26/24  0648 06/25/24  0526 06/24/24  1214 06/24/24  0628   WBC 10*3/mm3 11.58* 8.15  --  11.24*   HEMOGLOBIN g/dL 8.6* 7.9* 8.1* 7.7*   HEMATOCRIT % 25.8* 23.0* 24.3* 22.6*   PLATELETS 10*3/mm3 167 140  --  159     Results from last 7 days   Lab Units 06/25/24  0526   SODIUM mmol/L 133*   POTASSIUM mmol/L 3.9   CHLORIDE mmol/L 102   CO2 mmol/L 27.0   BUN mg/dL 32*   CREATININE mg/dL 0.83   GLUCOSE mg/dL 118*   CALCIUM mg/dL 7.2*                       Estimated Creatinine Clearance: 90.5 mL/min (by C-G formula based on SCr of 0.83 mg/dL).      Microbiology:      Radiology:  Imaging Results (Last 72 Hours)       ** No results found for the last 72 hours. **              Impression:     --acute sepsis as per admission notes, severe left foot infection with gangrene/cellulitis and concern for necrotizing soft tissue infection by imaging, urgent surgical arrangements made June 13; repeat surg 6/15  ;  subsequent right hip surg 6/20; He knows risk for persistent/recurrent or nonhealing wounds, persistet / progressive or recurrent infection and risk for further amputation/functional-limb loss and chronic pain.  Associated with GB strep bacteremia as below.    --Acute/severe left foot infection as above, urgent surgical arrangements  made 6/13    --Acute gb strep bacteremia,   source from foot.  High risk for further serious morbidity and other serious sequela including dire consequences and metastatic foci of involvement/endovascular sequela etc. No new metastatic focus of involvement. TTE 6/17    --Right hip fracture with repair February 2024.  +pain ; orthopedics with  aspiration June 18 and culture with GB strep likely hematogenous seeding, MRI imaging with concern for septic joint, surgery on June 20 as above.   Patient understands risk for persistent/recurrent or progressive infection and potential for further surgical intervention in the future  that could include functional/limb loss and other serious sequela/morbidity; he knows antibiotics and surgery not a guarantee for care    --postop leukocytosis, possible stress response associated with surgery.  Trending down June 22-23.  No new respiratory symptoms.  Encouraged incentive spirometry.  Abdomen benign with no diarrhea.  No new urinary tract complaints.  IV sites with no suppurative change.  No rash.  Monitor for new process; no new focal muscoskeletal symptoms to suggest new metastatic focus of infection    --diabetes.  Described as uncontrolled by medicine team at admission.  Glucose control per medicine team      PLAN:      --IV  rocephin likely at least 6 weeks from hip surgery and anticipate step down to oral agents after    **wound culture at left foot mixed with  MSSA Klebsiella and GB strep  **blood cultures with group B strep  ** right hip cultures with group B strep    --orthopedics  making surgical plans with concern for right hipprosthetic/periprosthetic infection     --Check/review labs cultures and scans    --TTE described as technically adequate by cardiology; no description of vegetation by cardiology per report    --Partial history per nursing staff    --encouraged incentive spirometry.  Nursing aware to instruct and encourage    --Discussed with microbiology    --Highly complex at of issues with high risk for further serious morbidity and other serious sequela    Copied text in this note has been reviewed and is accurate as of 06/26/24.        Cleve Bills MD  6/26/2024

## 2024-06-26 NOTE — PLAN OF CARE
Goal Outcome Evaluation:  Plan of Care Reviewed With: (P) patient        Progress: (P) improving  Outcome Evaluation: (P) Pt continues to present below functional baseline with generalized weakness, impaired balance and decreased activity tolerance. STS x 4 w/ Max A x 2 BUE and gait belt support. Pt requires Mod A for knee blocking to prevent LOB with each stand. HEP completed. PT recommends IRF following IPPT stay.      Anticipated Discharge Disposition (PT): (P) inpatient rehabilitation facility

## 2024-06-26 NOTE — THERAPY TREATMENT NOTE
Patient Name: Easton Alvarez  : 1965    MRN: [id number]                              Today's Date: 2024       Admit Date: 2024    Visit Dx:     ICD-10-CM ICD-9-CM   1. S/P BKA (below knee amputation), left  Z89.512 V49.75   2. Diabetic ulcer of left midfoot associated with type 2 diabetes mellitus, with necrosis of muscle  E11.621 250.80    L97.423 707.14     728.89   3. Elevated LFTs  R79.89 790.6   4. Below knee amputation  S88.119A 897.0   5. Impaired functional mobility, balance, gait, and endurance  Z74.09 V49.89   6. Infection of prosthetic total hip joint, initial encounter  T84.59XA 996.66    Z96.649 V43.64     Patient Active Problem List   Diagnosis    Diabetic ulcer of left foot with necrosis of muscle    Severe malnutrition    Infection of prosthetic total hip joint     Past Medical History:   Diagnosis Date    Diabetes mellitus     Diabetic foot infection 2018    left (partial amputation)     Past Surgical History:   Procedure Laterality Date    AMPUTATION FOOT / TOE Right     partial foot    BELOW KNEE AMPUTATION Left 2024    Procedure: AMPUTATION BELOW KNEE AND WOUND VAC ASSISTED CLOSURE LEFT;  Surgeon: Brijesh Em Jr., MD;  Location:  [facility];  Service: Orthopedics;  Laterality: Left;    INCISION AND DRAINAGE HIP Right 2024    Procedure: HIP ANTERIOR INCISION AND DRAINAGE WITH HANA TABLE, POLY SWAP RIGHT WITH LEFT LEG PIN TRACTION;  Surgeon: Lex Sethi MD;  Location:  [facility];  Service: Orthopedics;  Laterality: Right;    INCISION AND DRAINAGE LEG Left 6/15/2024    Procedure: SECONDARY CLOSURE LEFT BELOW KNEE AMPUTATION;  Surgeon: Brijesh Em Jr., MD;  Location:  [facility];  Service: Orthopedics;  Laterality: Left;    PLACEMENT OF WOUND VAC Left 2024    Procedure: PLACEMENT OF WOUND VAC LEFT;  Surgeon: Brijesh Em Jr., MD;  Location:  [facility];  Service: Orthopedics;  Laterality: Left;    TOTAL HIP ARTHROPLASTY Right 2024    From fall  "\"whole hip was shattered\"      General Information       Row Name 06/26/24 1545          Physical Therapy Time and Intention    Document Type therapy note (daily note) (P)   -EM     Mode of Treatment physical therapy;individual therapy (P)   -EM       Row Name 06/26/24 1542          General Information    Patient Profile Reviewed yes (P)   -EM     Existing Precautions/Restrictions fall;other (see comments);non-weight bearing (P)   Recent L BKA, s/p I&D of R MARIA LUISA due to infection, woundvac, remote R foot TMA. NWB L LE.  -EM     Barriers to Rehab medically complex;previous functional deficit (P)   -EM       Row Name 06/26/24 1547          Cognition    Orientation Status (Cognition) oriented x 3 (P)   -EM       Row Name 06/26/24 1540          Safety Issues, Functional Mobility    Safety Issues Affecting Function (Mobility) safety precaution awareness;safety precautions follow-through/compliance;sequencing abilities;insight into deficits/self-awareness;awareness of need for assistance;impulsivity (P)   -EM     Impairments Affecting Function (Mobility) balance;range of motion (ROM);strength;endurance/activity tolerance (P)   -EM               User Key  (r) = Recorded By, (t) = Taken By, (c) = Cosigned By      Initials Name Provider Type    EM Rancho Schneider, PT Student PT Student                   Mobility       Row Name 06/26/24 1540          Transfers    Comment, (Transfers) Pt received EOB. Stand pivot transfer, bed>WC>Chair (P)   -EM       Row Name 06/26/24 1549          Bed-Chair Transfer    Bed-Chair [place] (Transfers) 2 person assist;verbal cues;nonverbal cues (demo/gesture) (P)   -EM     Assistive Device (Bed-Chair Transfers) -- (P)   BUE support, Gait belt support  -EM       Row Name 06/26/24 1544          Sit-Stand Transfer    Sit-Stand [place] (Transfers) verbal cues;maximum assist (25% patient effort);2 person assist;nonverbal cues (demo/gesture) (P)   BUE support, support on gait belt. VC and demo " for how to sequence Stand pivot transfer  -EM     Assistive Device (Sit-Stand Transfers) other (see comments) (P)   BUE support, support on gait belt.  -EM     Comment, (Sit-Stand Transfer) STS x 4 this date. Pt requires Mod knee blocking of R knee to prevent buckling. Pt requires mod A and tactile cues to achieve full standing. Upon initial Bed>WC transfer pt w/ bowel movement. When standing from WC Pt able to stand for ~1 minute for glenys care. As each STS progressed, pt requires less A progressing to Mod A x 2. (P)   -EM       Row Name 06/26/24 1549          Gait/Stairs (Locomotion)    [place] Level (Gait) not tested (P)   -EM     Patient was able to Ambulate no, other medical factors prevent ambulation (P)   -EM     Reason Patient was unable to Ambulate Excessive Weakness;Other (Comment) (P)   NWB status of L LE, Excessive knee buckling prevent safe gait  -EM     Comment, (Gait/Stairs) Pt unable to ambulate this date. STS x4 performed. Excessive knee buckling preventing safe gait. Would benefit from WC training. (P)   -EM       Row Name 06/26/24 1549          Mobility    Extremity Weight-bearing Status left lower extremity;right lower extremity (P)   -EM     Left Lower Extremity (Weight-bearing Status) non weight-bearing (NWB) (P)   -EM     Right Lower Extremity (Weight-bearing Status) weight-bearing as tolerated (WBAT) (P)   -EM               User Key  (r) = Recorded By, (t) = Taken By, (c) = Cosigned By      Initials Name Provider Type    EM Rancho Schneider, PT Student PT Student                   Obj/Interventions       Row Name 06/26/24 1557          Motor Skills    Therapeutic Exercise hip;knee (P)   -EM       Row Name 06/26/24 1557          Hip (Therapeutic Exercise)    Hip (Therapeutic Exercise) strengthening exercise;AROM (active range of motion);AAROM (active assistive range of motion) (P)   -EM     Hip AROM (Therapeutic Exercise) bilateral;aBduction;10 repetitions;sitting (P)   -EM     Hip AAROM  (Therapeutic Exercise) right;10 repetitions;sitting (P)   Min A SLR on R  -EM     Hip Strengthening (Therapeutic Exercise) left;10 repetitions;sitting (P)   SLR  -EM       Row Name 06/26/24 0167          Knee (Therapeutic Exercise)    Knee (Therapeutic Exercise) strengthening exercise (P)   -EM     Knee Strengthening (Therapeutic Exercise) LAQ (long arc quad);bilateral;10 repetitions;marching while seated;sitting (P)   -EM       Row Name 06/26/24 7587          Balance    Balance Assessment sitting static balance;sitting dynamic balance;sit to stand dynamic balance;standing static balance;standing dynamic balance (P)   -EM     Static Sitting Balance standby assist (P)   -EM     Dynamic Sitting Balance contact guard (P)   -EM     Position, Sitting Balance unsupported;sitting in chair;sitting edge of bed (P)   -EM     Sit to Stand Dynamic Balance 2-person assist;maximum assist (P)   -EM     Static Standing Balance 2-person assist;maximum assist (P)   -EM     Dynamic Standing Balance maximum assist;2-person assist (P)   -EM     Position/Device Used, Standing Balance supported;other (see comments) (P)   BUE support, Support on gait belt  -EM     Balance Interventions sitting;standing;sit to stand;supported;static;occupation based/functional task (P)   -EM     Comment, Balance Pt requires Mod A for knee blocking. Pt able to Stand for ~ 1 minute during glenys care, Requires Max A x 2 (P)   -EM               User Key  (r) = Recorded By, (t) = Taken By, (c) = Cosigned By      Initials Name Provider Type    EM Rancho Schneider, PT Student PT Student                   Goals/Plan    No documentation.                  Clinical Impression       Row Name 06/26/24 1602          Pain    Pretreatment Pain Rating 0/10 - no pain (P)   -EM     Posttreatment Pain Rating 0/10 - no pain (P)   -EM     Pre/Posttreatment Pain Comment Pt denied pain before and after session. (P)   -EM     Additional Documentation Pain Scale: Numbers  Pre/Post-Treatment (Group) (P)   -EM       Row Name 06/26/24 1602          Plan of Care Review    Plan of Care Reviewed With patient (P)   -EM     Progress improving (P)   -EM     Outcome Evaluation Pt continues to present below functional baseline with generalized weakness, impaired balance and decreased activity tolerance. STS x 4 w/ Max A x 2 BUE and gait belt support. Pt requires Mod A for knee blocking to prevent LOB with each stand. HEP completed. PT recommends IRF following IPPT stay. (P)   -EM       Row Name 06/26/24 1602          Therapy Assessment/Plan (PT)    Rehab Potential (PT) good, to achieve stated therapy goals (P)   -EM     Criteria for Skilled Interventions Met (PT) yes;meets criteria;skilled treatment is necessary (P)   -EM     Therapy Frequency (PT) daily (P)   -EM       Row Name 06/26/24 1602          Vital Signs    Pre Systolic BP Rehab 136 (P)   -EM     Pre Treatment Diastolic BP 75 (P)   -EM     Posttreatment Heart Rate (beats/min) 103 (P)   -EM     Intratreatment Resp Rate (breaths/min) 102 (P)   -EM     Pre SpO2 (%) 96 (P)   -EM     O2 Delivery Pre Treatment room air (P)   -EM     O2 Delivery Intra Treatment room air (P)   -EM     Post SpO2 (%) 97 (P)   -EM     O2 Delivery Post Treatment room air (P)   -EM     Pre Patient Position Sitting (P)   -EM     Intra Patient Position Standing (P)   -EM     Post Patient Position Sitting (P)   -EM       Row Name 06/26/24 1602          Positioning and Restraints    Pre-Treatment Position in bed (P)   -EM     Post Treatment Position chair (P)   -EM     In Chair notified nsg;reclined;sitting;call light within reach;encouraged to call for assist;exit alarm on;compression device;waffle cushion;on mechanical lift sling;legs elevated (P)   -EM               User Key  (r) = Recorded By, (t) = Taken By, (c) = Cosigned By      Initials Name Provider Type    Rancho Arshad, PT Student PT Student                   Outcome Measures       Row Name 06/26/24  1607          How much help from another person do you currently need...    Turning from your back to your side while in flat bed without using bedrails? 3 (P)   -EM     Moving from lying on back to sitting on the side of a flat bed without bedrails? 2 (P)   -EM     Moving to and from a bed to a chair (including a wheelchair)? 2 (P)   -EM     Standing up from a chair using your arms (e.g., wheelchair, bedside chair)? 2 (P)   -EM     Climbing 3-5 steps with a railing? 1 (P)   -EM     To walk in hospital room? 1 (P)   -EM     AM-PAC 6 Clicks Score (PT) 11 (P)   -EM     Highest Level of Mobility Goal 4 --> Transfer to chair/commode (P)   -EM       Row Name 06/26/24 1607 06/26/24 1547       Functional Assessment    Outcome Measure Options AM-PAC 6 Clicks Basic Mobility (PT) (P)   -EM AM-PAC 6 Clicks Daily Activity (OT)  -AN              User Key  (r) = Recorded By, (t) = Taken By, (c) = Cosigned By      Initials Name Provider Type    Johana Shelton, OT Occupational Therapist    EM Rancho Schneider, PT Student PT Student                                 Physical Therapy Education       Title: PT OT SLP Therapies (Done)       Topic: Physical Therapy (Done)       Point: Mobility training (Done)       Learning Progress Summary             Patient Acceptance, E,D, VU by EM at 6/26/2024 1608    Comment: Pt educated regarding transfers, weight bearing status and HEP.    Acceptance, E, VU,DU,NR by  at 6/25/2024 1107    Acceptance, E,H, VU by  at 6/24/2024 1105    Comment: Pt educated on bed positioning with L BKA and importance of exercise on rehab.    Eager, E, VU,DU by SC at 6/21/2024 1349    Comment: reviewed HEP    Acceptance, E,D, VU,NR by SD at 6/19/2024 1203    Comment: PT ed for expected outcomes, risks, and benefits of IPPT services and progression of activity. Pt given visual demo of unilateral gait with RW. Discussion regarding dispo planning and IRF.                         Point: Home exercise program  (Done)       Learning Progress Summary             Patient Acceptance, E,D, VU by EM at 6/26/2024 1608    Comment: Pt educated regarding transfers, weight bearing status and HEP.    Acceptance, E, VU,DU,NR by  at 6/25/2024 1107    Acceptance, E,H, VU by  at 6/24/2024 1105    Comment: Pt educated on bed positioning with L BKA and importance of exercise on rehab.    Acceptance, E, VU,NR,DU by GEORGINA at 6/23/2024 1420    Eager, E, VU,DU by SC at 6/21/2024 1349    Comment: reviewed HEP    Acceptance, E,D, VU,NR by SD at 6/19/2024 1203    Comment: PT ed for expected outcomes, risks, and benefits of IPPT services and progression of activity. Pt given visual demo of unilateral gait with RW. Discussion regarding dispo planning and IRF.                         Point: Body mechanics (Done)       Learning Progress Summary             Patient Acceptance, E,D, VU by EM at 6/26/2024 1608    Comment: Pt educated regarding transfers, weight bearing status and HEP.    Acceptance, E, VU,DU,NR by  at 6/25/2024 1107    Acceptance, E,H, VU by  at 6/24/2024 1105    Comment: Pt educated on bed positioning with L BKA and importance of exercise on rehab.    Eager, E, VU,DU by SC at 6/21/2024 1349    Comment: reviewed HEP    Acceptance, E,D, VU,NR by SD at 6/19/2024 1203    Comment: PT ed for expected outcomes, risks, and benefits of IPPT services and progression of activity. Pt given visual demo of unilateral gait with RW. Discussion regarding dispo planning and IRF.                         Point: Precautions (Done)       Learning Progress Summary             Patient Acceptance, E,D, VU by EM at 6/26/2024 1608    Comment: Pt educated regarding transfers, weight bearing status and HEP.    Acceptance, E, VU,DU,NR by  at 6/25/2024 1107    Acceptance, E,H, VU by  at 6/24/2024 1105    Comment: Pt educated on bed positioning with L BKA and importance of exercise on rehab.    Eager, E, VU,DU by SC at 6/21/2024 1349    Comment: reviewed HEP     Acceptance, E,D, VU,NR by SD at 6/19/2024 1203    Comment: PT ed for expected outcomes, risks, and benefits of IPPT services and progression of activity. Pt given visual demo of unilateral gait with RW. Discussion regarding dispo planning and IRF.                                         User Key       Initials Effective Dates Name Provider Type Discipline    SC 02/03/23 -  Jorge Luis Childress, PT Physical Therapist PT    SD 03/13/23 -  Charity Dela Cruz, PT Physical Therapist PT    GEORGINA 06/16/21 -  Radha Miguel, PT Physical Therapist PT    LH 09/21/23 -  Rachna Cruz, PT Physical Therapist PT    EM 05/07/24 -  Rancho Schneider, PT Student PT Student PT     05/07/24 -  Quincy Elizabeth, PT Student PT Student PT                  PT Recommendation and Plan     Plan of Care Reviewed With: (P) patient  Progress: (P) improving  Outcome Evaluation: (P) Pt continues to present below functional baseline with generalized weakness, impaired balance and decreased activity tolerance. STS x 4 w/ Max A x 2 BUE and gait belt support. Pt requires Mod A for knee blocking to prevent LOB with each stand. HEP completed. PT recommends IRF following IPPT stay.     Time Calculation:         PT Charges       Row Name 06/26/24 1608             Time Calculation    Start Time 1517 (P)   -EM      PT Received On 06/26/24 (P)   -EM         Timed Charges    84028 - PT Therapeutic Exercise Minutes 8 (P)   -EM      29155 - PT Therapeutic Activity Minutes 16 (P)   -EM         Total Minutes    Timed Charges Total Minutes 24 (P)   -EM       Total Minutes 24 (P)   -EM                User Key  (r) = Recorded By, (t) = Taken By, (c) = Cosigned By      Initials Name Provider Type    EM Rancho Schneider, PT Student PT Student                  Therapy Charges for Today       Code Description Service Date Service Provider Modifiers Qty    64335187081 HC PT THER PROC EA 15 MIN 6/26/2024 Rancho Schneider, PT Student GP 1    88612540829 HC PT THERAPEUTIC  ACT EA 15 MIN 6/26/2024 Rancho Schneider, PT Student GP 1            PT G-Codes  Outcome Measure Options: (P) AM-PAC 6 Clicks Basic Mobility (PT)  AM-PAC 6 Clicks Score (PT): (P) 11  AM-PAC 6 Clicks Score (OT): 14  PT Discharge Summary  Anticipated Discharge Disposition (PT): (P) inpatient rehabilitation facility    Rancho Schneider, PT Student  6/26/2024

## 2024-06-26 NOTE — CASE MANAGEMENT/SOCIAL WORK
Continued Stay Note  [facility]     Patient Name: Easton Alvarez  MRN: [id number]  Today's Date: 6/26/2024    Admit Date: 6/12/2024    Plan: Home with    Discharge Plan       Row Name 06/26/24 0928       Plan    Plan Comments Per April with [facility] they have submitted for insurance precert. CM will continue to follow                   Discharge Codes    No documentation.                 Expected Discharge Date and Time       Expected Discharge Date Expected Discharge Time    Jun 26, 2024               Anat Zarate RN

## 2024-06-26 NOTE — PLAN OF CARE
April 19, 2018      [facility]  [address] [place] [address]  Phone: [phone]  Fax: [phone]       Patient: Yash Miguel   YOB: 1993  Date of Visit: 04/19/2018    To Whom It May Concern:    Aditya Miguel  was at [facility] on 04/19/2018. He may return to work/school on 04/20/2018 with no restrictions. If you have any questions or concerns, or if I can be of further assistance, please do not hesitate to contact me.    Sincerely,          Dimple Pittman NP      Goal Outcome Evaluation:  Plan of Care Reviewed With: patient        Progress: improving  Outcome Evaluation: Pt continues to present with generalized weakness, impaired balance and decreased activity tolerance warranting skilled OT services. Pt performed bed mobility with Min A x 2 and STS with Max A x 2. Pt tolerated UE ther-ex with red theraband. Cont POC.      Anticipated Discharge Disposition (OT): inpatient rehabilitation facility

## 2024-06-26 NOTE — PROGRESS NOTES
[facility] Medicine Services  PROGRESS NOTE    Patient Name: Easton Alvarez  : 1965  MRN: [id number]    Date of Admission: 2024  Primary Care Physician: Provider, No Known    Subjective   Subjective     CC:Follow-up left foot gangrene s/p BKA    HPI:No acute events overnight, patient feels his belly is more distended      Objective   Objective     Vital Signs:   Temp:  [97.9 °F (36.6 °C)-99.3 °F (37.4 °C)] 98.8 °F (37.1 °C)  Heart Rate:  [] 101  Resp:  [18] 18  BP: (122-141)/(71-76) 125/71     Physical Exam:  Constitutional: No acute distress, awake, alert  HENT: NCAT, mucous membranes moist  Respiratory: Clear to auscultation bilaterally, respiratory effort normal   Cardiovascular: RRR, no murmurs, rubs, or gallops  Gastrointestinal: Positive bowel sounds, soft, nontender, distended  Musculoskeletal: s/p left BKA, stump under dressing  Psychiatric: Appropriate affect, cooperative  Neurologic: Nonfocal  Skin: No rashes      Results Reviewed:  LAB RESULTS:      Lab 24  0526 24  1214 24  0628 24  0617 24  0445 24  1205   WBC 8.15  --  11.24* 11.35* 21.14* 25.58*   HEMOGLOBIN 7.9* 8.1* 7.7* 8.1* 9.4* 10.6*   HEMATOCRIT 23.0* 24.3* 22.6* 23.8* 27.9* 32.3*   PLATELETS 140  --  159 169 250 339   MCV 95.0  --  95.0 93.7 97.6* 98.8*         Lab 24  0526 24  0628 24  0617 24  0445 24  0721   SODIUM 133* 135* 132* 131* 129*   POTASSIUM 3.9 4.0 4.2 4.5 5.6*   CHLORIDE 102 102 102 100 96*   CO2 27.0 24.0 25.0 23.0 18.0*   ANION GAP 4.0* 9.0 5.0 8.0 15.0   BUN 32* 38* 42* 42* 24*   CREATININE 0.83 0.75* 0.82 1.07 0.67*   EGFR 100.2 103.3 100.6 79.4 106.9   GLUCOSE 118* 191* 189* 192* 164*   CALCIUM 7.2* 6.7* 6.8* 7.1* 7.3*                         Brief Urine Lab Results  (Last result in the past 365 days)        Color   Clarity   Blood   Leuk Est   Nitrite   Protein   CREAT   Urine HCG        24 1109 Yellow    Clear   Negative   Negative   Negative   Negative                   Microbiology Results Abnormal       Procedure Component Value - Date/Time    Fungus Culture - Synovium, Hip, Right [[id number]] Collected: 06/20/24 1700    Lab Status: Preliminary result Specimen: Synovium from Hip, Right Updated: 06/25/24 1831     Fungus Culture No fungus isolated at less than 1 week    Fungus Culture - Synovium, Hip, Right [[id number]] Collected: 06/20/24 1700    Lab Status: Preliminary result Specimen: Synovium from Hip, Right Updated: 06/25/24 1831     Fungus Culture No fungus isolated at less than 1 week    Fungus Culture - Synovium, Hip, Right [[id number]] Collected: 06/20/24 1700    Lab Status: Preliminary result Specimen: Synovium from Hip, Right Updated: 06/25/24 1831     Fungus Culture No fungus isolated at less than 1 week    AFB Culture - Synovium, Hip, Right [[id number]] Collected: 06/20/24 1700    Lab Status: Preliminary result Specimen: Synovium from Hip, Right Updated: 06/25/24 1831     AFB Culture No AFB isolated at less than 1 week     AFB Stain No acid fast bacilli seen on concentrated smear    AFB Culture - Synovium, Hip, Right [[id number]] Collected: 06/20/24 1700    Lab Status: Preliminary result Specimen: Synovium from Hip, Right Updated: 06/25/24 1831     AFB Culture No AFB isolated at less than 1 week     AFB Stain No acid fast bacilli seen on concentrated smear    AFB Culture - Synovium, Hip, Right [[id number]] Collected: 06/20/24 1700    Lab Status: Preliminary result Specimen: Synovium from Hip, Right Updated: 06/25/24 1831     AFB Culture No AFB isolated at less than 1 week     AFB Stain No acid fast bacilli seen on concentrated smear    Fungus Culture - Aspirate, Hip, Right [[id number]] Collected: 06/18/24 1052    Lab Status: Preliminary result Specimen: Aspirate from Hip, Right Updated: 06/25/24 1130     Fungus Culture No fungus isolated at 1 week    AFB Culture - Aspirate, Hip, Right [[id number]] Collected:  06/18/24 1052    Lab Status: Preliminary result Specimen: Aspirate from Hip, Right Updated: 06/25/24 1130     AFB Culture No AFB isolated at 1 week     AFB Stain No acid fast bacilli seen on concentrated smear    Anaerobic Culture 10 Day Incubation - Synovium, Hip, Right [[id number]]  (Normal) Collected: 06/20/24 1700    Lab Status: Preliminary result Specimen: Synovium from Hip, Right Updated: 06/25/24 0938     Anaerobic Culture No anaerobes isolated at 5 days    Anaerobic Culture 10 Day Incubation - Synovium, Hip, Right [[id number]]  (Normal) Collected: 06/20/24 1700    Lab Status: Preliminary result Specimen: Synovium from Hip, Right Updated: 06/25/24 0928     Anaerobic Culture No anaerobes isolated at 5 days    Anaerobic Culture 10 Day Incubation - Synovium, Hip, Right [[id number]]  (Normal) Collected: 06/20/24 1700    Lab Status: Preliminary result Specimen: Synovium from Hip, Right Updated: 06/25/24 0928     Anaerobic Culture No anaerobes isolated at 5 days    Tissue / Bone Culture - Synovium, Hip, Right [[id number]] Collected: 06/20/24 1700    Lab Status: Final result Specimen: Synovium from Hip, Right Updated: 06/23/24 1010     Tissue Culture No growth at 3 days     Gram Stain Many (4+) Red blood cells      Moderate (3+) WBCs seen      No organisms seen    Tissue / Bone Culture - Synovium, Hip, Right [[id number]] Collected: 06/20/24 1700    Lab Status: Final result Specimen: Synovium from Hip, Right Updated: 06/23/24 1009     Tissue Culture No growth at 3 days     Gram Stain Moderate (3+) WBCs seen      No organisms seen     Comment: Modified report. Previous result was Rare (1+) Gram positive cocci in pairs on 6/20/2024 at 1949 EDT.         Many (4+) Red blood cells    Tissue / Bone Culture - Synovium, Hip, Right [[id number]] Collected: 06/20/24 1700    Lab Status: Final result Specimen: Synovium from Hip, Right Updated: 06/23/24 1009     Tissue Culture No growth at 3 days     Gram Stain Many (4+) Red blood cells       Few (2+) WBCs seen      No organisms seen    Fungus Culture - Wound, Leg, Left [[id number]] Collected: 06/15/24 1501    Lab Status: Preliminary result Specimen: Wound from Leg, Left Updated: 06/22/24 2215     Fungus Culture No fungus isolated at 1 week    AFB Culture - Wound, Leg, Left [[id number]] Collected: 06/15/24 1501    Lab Status: Preliminary result Specimen: Wound from Leg, Left Updated: 06/22/24 2215     AFB Culture No AFB isolated at 1 week     AFB Stain No acid fast bacilli seen on concentrated smear    Anaerobic Culture 10 Day Incubation - Wound, Leg, Left [[id number]]  (Normal) Collected: 06/15/24 1501    Lab Status: Preliminary result Specimen: Wound from Leg, Left Updated: 06/21/24 0636     Anaerobic Culture No anaerobes isolated at 5 days    Fungus Culture - Surgical Site, Leg, Left [[id number]] Collected: 06/13/24 1031    Lab Status: Preliminary result Specimen: Surgical Site from Leg, Left Updated: 06/20/24 1201     Fungus Culture No fungus isolated at 1 week    AFB Culture - Surgical Site, Leg, Left [[id number]] Collected: 06/13/24 1031    Lab Status: Preliminary result Specimen: Surgical Site from Leg, Left Updated: 06/20/24 1201     AFB Culture No AFB isolated at 1 week     AFB Stain No acid fast bacilli seen on concentrated smear    Blood Culture - Blood, Hand, Left [[id number]]  (Normal) Collected: 06/15/24 1104    Lab Status: Final result Specimen: Blood from Hand, Left Updated: 06/20/24 1201     Blood Culture No growth at 5 days    Blood Culture - Blood, Hand, Right [[id number]]  (Normal) Collected: 06/15/24 1104    Lab Status: Final result Specimen: Blood from Hand, Right Updated: 06/20/24 1201     Blood Culture No growth at 5 days    Wound Culture - Wound, Leg, Left [[id number]] Collected: 06/15/24 1501    Lab Status: Final result Specimen: Wound from Leg, Left Updated: 06/19/24 0711     Wound Culture No growth at 3 days     Gram Stain Rare (1+) WBCs seen      No organisms seen    Anaerobic  Culture - Surgical Site, Leg, Left [[id number]]  (Normal) Collected: 06/13/24 1031    Lab Status: Final result Specimen: Surgical Site from Leg, Left Updated: 06/18/24 0751     Anaerobic Culture No anaerobes isolated at 5 days    Blood Culture - Blood, Arm, Left [[id number]]  (Normal) Collected: 06/12/24 1832    Lab Status: Final result Specimen: Blood from Arm, Left Updated: 06/17/24 1900     Blood Culture No growth at 5 days    Blood Culture - Blood, Hand, Right [[id number]]  (Normal) Collected: 06/12/24 1837    Lab Status: Final result Specimen: Blood from Hand, Right Updated: 06/17/24 1900     Blood Culture No growth at 5 days    Fungus Smear - Surgical Site, Leg, Left [[id number]] Collected: 06/13/24 1031    Lab Status: Final result Specimen: Surgical Site from Leg, Left Updated: 06/14/24 1406     Fungal Stain No fungal elements seen            No radiology results from the last 24 hrs    Results for orders placed during the hospital encounter of 06/12/24    Adult Transthoracic Echo Complete w/ Color, Spectral and Contrast if Necessary Per Protocol    Interpretation Summary    Left ventricular systolic function is normal. Left ventricular ejection fraction appears to be 56 - 60%.    Left ventricular wall thickness is consistent with borderline concentric hypertrophy.    Left ventricular diastolic function is consistent with (grade Ia w/high LAP) impaired relaxation.      Current medications:  Scheduled Meds:acetaminophen, 1,000 mg, Oral, Q8H  aspirin, 81 mg, Oral, Q12H  cefTRIAXone, 2,000 mg, Intravenous, Q24H  insulin glargine, 25 Units, Subcutaneous, Nightly  insulin lispro, 2-9 Units, Subcutaneous, 4x Daily AC & at Bedtime  Insulin Lispro, 8 Units, Subcutaneous, TID With Meals  lactobacillus acidophilus, 1 capsule, Oral, Daily  [Held by provider] lisinopril, 10 mg, Oral, Q24H  melatonin, 5 mg, Oral, Nightly  pantoprazole, 40 mg, Intravenous, BID AC  senna-docusate sodium, 2 tablet, Oral, BID  sodium chloride, 10  mL, Intravenous, Q12H      Continuous Infusions:lactated ringers, 9 mL/hr, Last Rate: 9 mL/hr (06/15/24 1307)  lactated ringers, 100 mL/hr, Last Rate: 100 mL/hr (06/20/24 2122)  ropivacaine, , Last Rate: Stopped (06/17/24 2230)      PRN Meds:.  senna-docusate sodium **AND** polyethylene glycol **AND** bisacodyl **AND** bisacodyl    Calcium Replacement - Follow Nurse / BPA Driven Protocol    dextrose    dextrose    glucagon (human recombinant)    Magnesium Standard Dose Replacement - Follow Nurse / BPA Driven Protocol    nitroglycerin    oxyCODONE    Phosphorus Replacement - Follow Nurse / BPA Driven Protocol    Potassium Replacement - Follow Nurse / BPA Driven Protocol    sodium chloride    sodium chloride    traMADol    Assessment & Plan   Assessment & Plan     Active Hospital Problems    Diagnosis  POA    **Diabetic ulcer of left foot with necrosis of muscle [E11.621, L97.523]  Yes    Severe malnutrition [E43]  Yes    Infection of prosthetic total hip joint [T84.59XA, Z96.649]  Not Applicable      Resolved Hospital Problems   No resolved problems to display.        Brief Hospital Course to date:  Easton Alvarez is a 58 y.o. male with past medical history significant for T2DM, diabetic neuropathy, right hip fracture/ORIF ([facility] 2/2024), and right transmetatarsal amputation 2019 at  who presented to OSH ED on 6/12/2024 with severe left foot cellulitis/necrosis after recent injury. Lactate at OSH was 8.8 and CT imaging of left leg showed gas gangrene. Orthopedic surgeon Dr. Em was consulted and performed a left below-knee amputation on 6/13/2024 with further debridement and irrigation on 6/15. Infectious disease has followed and managed antimicrobial therapy.     Sepsis secondary to group B strep bacteremia  Left foot cellulitis/gangrene   Leukocytosis  -MRI left foot showed extensive soft tissue infection with abscesses along the foot to the ankle and into the lower leg with soft tissue gas.    -Wound  cultures 6/12 with heavy growth group B strep and Kebsiella, blood cultures x 2 with group B strep; wound culture 6/13 with group B strep  -TTE described as technically adequate by cardiology; no description of vegetation by cardiology per report   -s/p left BKA with wound VAC on 6/13/24    -s/p further debridement in OR on 6/15/24 by Dr. Em, drain removed 6/17  -Infectious disease, Dr. Bills following; continue IV ceftriaxone.  -PT wound care following.  -Added Boost glucose control to increase protein intake for wound healing, nutrition following.  -Pain control with scheduled Tylenol, PRN oxycodone.   -Daily dressing changes to surgical site: Xeroform, 4X4, Kerlix, ACE  -Follow up with  Orthopedics in 2 weeks for wound check, imaging.     Right glenys-prosthetic hip infection  -IR performed fluid aspiration from the hip which was purulent.  -s/p I&D on 6/20, Dr. Sethi   -Fluid culture 6/18 with rare growth group B strep  -Supportive care, mobilize, pain control. Antibiotics as above.     Concern for GIB  Acute on chronic anemia  -with melena and fecal occult positive overnight 6/14  -GI consulted, recommended medical management with PPI BID x 1 month.  -GI referral at discharge for elevated LFTs, thrombocytopenia and concern for early cirrhosis, plan to pursue liver US after recovery from acute illness.  -H&H however seems to be trending down, but is stable, plan remains as above     Elevated blood pressure  -No history of taking meds for BP per patient   -BP has been running high this admission, possibly secondary to pain  -Started low-dose ACE given DM2, lisinopril 10 mg daily.     Mild hyponatremia  -Clinically insignificant  -Continue to monitor intermittently.      Anion gap metabolic acidosis POA, resolved   -AG was 20.1, likely from lactate, serum acetone negative.     Uncontrolled diabetes mellitus type 2 with hyperglycemia  -Patient was not taking meds or checking glucoses, though insulin had  been prescribed in the past.  -A1c 9.5% this admission  -Continue basal, prandial and SSI, adjust as warranted   - Diabetes educator following.     Expected Discharge Location and Transportation: Inpatient rehab  Expected Discharge   Expected Discharge Date: 6/26/2024; Expected Discharge Time:      VTE Prophylaxis:  Mechanical VTE prophylaxis orders are present.         AM-PAC 6 Clicks Score (PT): 10 (06/25/24 2000)    CODE STATUS:   Code Status and Medical Interventions:   Ordered at: 06/20/24 2026     Level Of Support Discussed With:    Patient     Code Status (Patient has no pulse and is not breathing):    CPR (Attempt to Resuscitate)     Medical Interventions (Patient has pulse or is breathing):    Full       Prashant Goodwin MD  06/26/24

## 2024-06-27 LAB
ANION GAP SERPL CALCULATED.3IONS-SCNC: 9 MMOL/L (ref 5–15)
BUN SERPL-MCNC: 27 MG/DL (ref 8–23)
BUN/CREAT SERPL: 40.3 (ref 7–25)
CALCIUM SPEC-SCNC: 7.3 MG/DL (ref 8.6–10.5)
CHLORIDE SERPL-SCNC: 102 MMOL/L (ref 98–107)
CO2 SERPL-SCNC: 23 MMOL/L (ref 22–29)
CREAT SERPL-MCNC: 0.67 MG/DL (ref 0.76–1.27)
DEPRECATED RDW RBC AUTO: 51.8 FL (ref 37–54)
EGFRCR SERPLBLD CKD-EPI 2021: 106.9 ML/MIN/1.73
ERYTHROCYTE [DISTWIDTH] IN BLOOD BY AUTOMATED COUNT: 14.6 % (ref 12.3–15.4)
GLUCOSE BLDC GLUCOMTR-MCNC: 109 MG/DL (ref 70–130)
GLUCOSE BLDC GLUCOMTR-MCNC: 141 MG/DL (ref 70–130)
GLUCOSE BLDC GLUCOMTR-MCNC: 170 MG/DL (ref 70–130)
GLUCOSE BLDC GLUCOMTR-MCNC: 88 MG/DL (ref 70–130)
GLUCOSE SERPL-MCNC: 91 MG/DL (ref 65–99)
HCT VFR BLD AUTO: 25.2 % (ref 37.5–51)
HGB BLD-MCNC: 8.2 G/DL (ref 13–17.7)
MCH RBC QN AUTO: 31.8 PG (ref 26.6–33)
MCHC RBC AUTO-ENTMCNC: 32.5 G/DL (ref 31.5–35.7)
MCV RBC AUTO: 97.7 FL (ref 79–97)
PLATELET # BLD AUTO: 126 10*3/MM3 (ref 140–450)
PMV BLD AUTO: 9.2 FL (ref 6–12)
POTASSIUM SERPL-SCNC: 3.7 MMOL/L (ref 3.5–5.2)
RBC # BLD AUTO: 2.58 10*6/MM3 (ref 4.14–5.8)
SODIUM SERPL-SCNC: 134 MMOL/L (ref 136–145)
WBC NRBC COR # BLD AUTO: 6.84 10*3/MM3 (ref 3.4–10.8)

## 2024-06-27 PROCEDURE — 25010000002 CEFTRIAXONE PER 250 MG: Performed by: INTERNAL MEDICINE

## 2024-06-27 PROCEDURE — 80048 BASIC METABOLIC PNL TOTAL CA: CPT | Performed by: STUDENT IN AN ORGANIZED HEALTH CARE EDUCATION/TRAINING PROGRAM

## 2024-06-27 PROCEDURE — 97542 WHEELCHAIR MNGMENT TRAINING: CPT

## 2024-06-27 PROCEDURE — 63710000001 INSULIN LISPRO (HUMAN) PER 5 UNITS: Performed by: ORTHOPAEDIC SURGERY

## 2024-06-27 PROCEDURE — 97530 THERAPEUTIC ACTIVITIES: CPT

## 2024-06-27 PROCEDURE — 63710000001 INSULIN GLARGINE PER 5 UNITS: Performed by: INTERNAL MEDICINE

## 2024-06-27 PROCEDURE — 82948 REAGENT STRIP/BLOOD GLUCOSE: CPT

## 2024-06-27 PROCEDURE — 99232 SBSQ HOSP IP/OBS MODERATE 35: CPT | Performed by: INTERNAL MEDICINE

## 2024-06-27 PROCEDURE — 85027 COMPLETE CBC AUTOMATED: CPT | Performed by: STUDENT IN AN ORGANIZED HEALTH CARE EDUCATION/TRAINING PROGRAM

## 2024-06-27 RX ADMIN — OXYCODONE HYDROCHLORIDE 5 MG: 5 TABLET ORAL at 15:54

## 2024-06-27 RX ADMIN — ACETAMINOPHEN 1000 MG: 500 TABLET, FILM COATED ORAL at 21:12

## 2024-06-27 RX ADMIN — Medication 5 MG: at 21:12

## 2024-06-27 RX ADMIN — CEFTRIAXONE 2000 MG: 2 INJECTION, POWDER, FOR SOLUTION INTRAMUSCULAR; INTRAVENOUS at 09:34

## 2024-06-27 RX ADMIN — INSULIN LISPRO 2 UNITS: 100 INJECTION, SOLUTION INTRAVENOUS; SUBCUTANEOUS at 21:12

## 2024-06-27 RX ADMIN — OXYCODONE HYDROCHLORIDE 5 MG: 5 TABLET ORAL at 21:11

## 2024-06-27 RX ADMIN — ASPIRIN 81 MG: 81 TABLET, COATED ORAL at 21:12

## 2024-06-27 RX ADMIN — OXYCODONE HYDROCHLORIDE 5 MG: 5 TABLET ORAL at 10:09

## 2024-06-27 RX ADMIN — TRAMADOL HYDROCHLORIDE 50 MG: 50 TABLET ORAL at 13:26

## 2024-06-27 RX ADMIN — INSULIN GLARGINE 25 UNITS: 100 INJECTION, SOLUTION SUBCUTANEOUS at 21:13

## 2024-06-27 RX ADMIN — Medication 1 CAPSULE: at 09:35

## 2024-06-27 RX ADMIN — Medication 10 ML: at 09:35

## 2024-06-27 RX ADMIN — ASPIRIN 81 MG: 81 TABLET, COATED ORAL at 09:35

## 2024-06-27 RX ADMIN — PANTOPRAZOLE SODIUM 40 MG: 40 INJECTION, POWDER, FOR SOLUTION INTRAVENOUS at 09:35

## 2024-06-27 RX ADMIN — PANTOPRAZOLE SODIUM 40 MG: 40 INJECTION, POWDER, FOR SOLUTION INTRAVENOUS at 17:57

## 2024-06-27 NOTE — PLAN OF CARE
Problem: Skin Injury Risk Increased  Goal: Skin Health and Integrity  Outcome: Ongoing, Progressing  Intervention: Optimize Skin Protection  Recent Flowsheet Documentation  Taken 6/27/2024 0356 by Lester Martel RN  Pressure Reduction Techniques:   frequent weight shift encouraged   heels elevated off bed   positioned off wounds   pressure points protected   weight shift assistance provided  Head of Bed (HOB) Positioning: HOB at 20-30 degrees  Pressure Reduction Devices:   pressure-redistributing mattress utilized   heel offloading device utilized   positioning supports utilized  Skin Protection:   adhesive use limited   incontinence pads utilized   skin sealant/moisture barrier applied   transparent dressing maintained  Taken 6/27/2024 0200 by Lester Martel RN  Pressure Reduction Techniques:   frequent weight shift encouraged   heels elevated off bed   positioned off wounds   pressure points protected   weight shift assistance provided  Head of Bed (HOB) Positioning: HOB at 20-30 degrees  Pressure Reduction Devices:   pressure-redistributing mattress utilized   heel offloading device utilized   positioning supports utilized  Skin Protection:   adhesive use limited   incontinence pads utilized   skin sealant/moisture barrier applied   transparent dressing maintained  Taken 6/26/2024 2352 by Lester Martel RN  Pressure Reduction Techniques:   frequent weight shift encouraged   heels elevated off bed   positioned off wounds   pressure points protected   weight shift assistance provided  Head of Bed (HOB) Positioning: HOB at 30 degrees  Pressure Reduction Devices:   pressure-redistributing mattress utilized   heel offloading device utilized   positioning supports utilized  Skin Protection:   adhesive use limited   incontinence pads utilized   skin sealant/moisture barrier applied   transparent dressing maintained  Taken 6/26/2024 2154 by Lester Martel RN  Pressure Reduction Techniques:    frequent weight shift encouraged   heels elevated off bed   positioned off wounds   pressure points protected   weight shift assistance provided  Head of Bed (HOB) Positioning: HOB at 30 degrees  Pressure Reduction Devices:   pressure-redistributing mattress utilized   heel offloading device utilized   positioning supports utilized  Skin Protection:   adhesive use limited   incontinence pads utilized   skin sealant/moisture barrier applied   transparent dressing maintained  Taken 6/26/2024 1951 by Lester Martel, RN  Pressure Reduction Techniques:   frequent weight shift encouraged   heels elevated off bed   positioned off wounds   pressure points protected   weight shift assistance provided  Pressure Reduction Devices:   pressure-redistributing mattress utilized   heel offloading device utilized   positioning supports utilized  Skin Protection:   adhesive use limited   incontinence pads utilized   skin sealant/moisture barrier applied   transparent dressing maintained     Problem: Fall Injury Risk  Goal: Absence of Fall and Fall-Related Injury  Outcome: Ongoing, Progressing  Intervention: Identify and Manage Contributors  Recent Flowsheet Documentation  Taken 6/27/2024 0356 by Lester Martel, RN  Medication Review/Management: medications reviewed  Taken 6/27/2024 0200 by Lester Martel RN  Medication Review/Management: medications reviewed  Taken 6/26/2024 2352 by Lester Martel RN  Medication Review/Management: medications reviewed  Taken 6/26/2024 2154 by Lester Martel RN  Medication Review/Management: medications reviewed  Taken 6/26/2024 1951 by Lester Martel RN  Medication Review/Management: medications reviewed  Self-Care Promotion: independence encouraged  Intervention: Promote Injury-Free Environment  Recent Flowsheet Documentation  Taken 6/27/2024 0356 by Lester Martel RN  Safety Promotion/Fall Prevention:   activity supervised   assistive device/personal items within  reach   clutter free environment maintained   fall prevention program maintained   gait belt   nonskid shoes/slippers when out of bed   room organization consistent   safety round/check completed  Taken 6/27/2024 0200 by Lester Martel, RN  Safety Promotion/Fall Prevention:   activity supervised   assistive device/personal items within reach   clutter free environment maintained   fall prevention program maintained   nonskid shoes/slippers when out of bed   room organization consistent   safety round/check completed  Taken 6/26/2024 2352 by Lester Martel, RN  Safety Promotion/Fall Prevention:   activity supervised   assistive device/personal items within reach   clutter free environment maintained   fall prevention program maintained   gait belt   muscle strengthening facilitated   nonskid shoes/slippers when out of bed   room organization consistent   safety round/check completed  Taken 6/26/2024 2154 by Lester Martel, RN  Safety Promotion/Fall Prevention:   activity supervised   assistive device/personal items within reach   clutter free environment maintained   fall prevention program maintained   nonskid shoes/slippers when out of bed   room organization consistent   safety round/check completed  Taken 6/26/2024 1951 by Lester Martel, RN  Safety Promotion/Fall Prevention:   activity supervised   assistive device/personal items within reach   clutter free environment maintained   fall prevention program maintained   nonskid shoes/slippers when out of bed   room organization consistent   safety round/check completed   Goal Outcome Evaluation:  Plan of Care Reviewed With: patient        Progress: improving  Outcome Evaluation: Pt a/o x4. VSS. Maintaining SpO2 on RA. Respirations even and unlabored. Pt denying pain. Denies needs at this time. Continuing IV antibiotics. Pt compliant with incentive spirometer. 1600 achieved. Dressing to LLE CDI. Wound vac in place to right hip. Abdominal distention  noted as well as BLE and scrotal edema. Pt denies tenderness or nausea. Pt states there have been discussions of possible paracentesis. Awaiting d/c to inpatient rehab.

## 2024-06-27 NOTE — PLAN OF CARE
Goal Outcome Evaluation:  Plan of Care Reviewed With: (P) patient        Progress: (P) improving  Outcome Evaluation: (P) Pt continues to present below functional baseline with generalized weakness, impaired balance and decreased activity tolerance. STS x 4 w/ mod A x 2 BUE and gait belt support. Bed>commode>WC> chair transfers performed. Pt able to propel self 350' w/ use of B UE. Pt requires Mod A for knee blocking to prevent LOB with each stand. PT recommends IRF following IPPT stay.      Anticipated Discharge Disposition (PT): (P) inpatient rehabilitation facility

## 2024-06-27 NOTE — THERAPY TREATMENT NOTE
Patient Name: Easton Alvarez  : 1965    MRN: [id number]                              Today's Date: 2024       Admit Date: 2024    Visit Dx:     ICD-10-CM ICD-9-CM   1. S/P BKA (below knee amputation), left  Z89.512 V49.75   2. Diabetic ulcer of left midfoot associated with type 2 diabetes mellitus, with necrosis of muscle  E11.621 250.80    L97.423 707.14     728.89   3. Elevated LFTs  R79.89 790.6   4. Below knee amputation  S88.119A 897.0   5. Impaired functional mobility, balance, gait, and endurance  Z74.09 V49.89   6. Infection of prosthetic total hip joint, initial encounter  T84.59XA 996.66    Z96.649 V43.64     Patient Active Problem List   Diagnosis    Diabetic ulcer of left foot with necrosis of muscle    Severe malnutrition    Infection of prosthetic total hip joint     Past Medical History:   Diagnosis Date    Diabetes mellitus     Diabetic foot infection 2018    left (partial amputation)     Past Surgical History:   Procedure Laterality Date    AMPUTATION FOOT / TOE Right     partial foot    BELOW KNEE AMPUTATION Left 2024    Procedure: AMPUTATION BELOW KNEE AND WOUND VAC ASSISTED CLOSURE LEFT;  Surgeon: Brijesh Em Jr., MD;  Location:  [facility];  Service: Orthopedics;  Laterality: Left;    INCISION AND DRAINAGE HIP Right 2024    Procedure: HIP ANTERIOR INCISION AND DRAINAGE WITH HANA TABLE, POLY SWAP RIGHT WITH LEFT LEG PIN TRACTION;  Surgeon: Lex Sethi MD;  Location:  [facility];  Service: Orthopedics;  Laterality: Right;    INCISION AND DRAINAGE LEG Left 6/15/2024    Procedure: SECONDARY CLOSURE LEFT BELOW KNEE AMPUTATION;  Surgeon: Brijesh Em Jr., MD;  Location:  [facility];  Service: Orthopedics;  Laterality: Left;    PLACEMENT OF WOUND VAC Left 2024    Procedure: PLACEMENT OF WOUND VAC LEFT;  Surgeon: Brijesh Em Jr., MD;  Location:  [facility];  Service: Orthopedics;  Laterality: Left;    TOTAL HIP ARTHROPLASTY Right 2024    From fall  "\"whole hip was shattered\"      General Information       Row Name 06/27/24 1525          Physical Therapy Time and Intention    Document Type therapy note (daily note) (P)   -EM     Mode of Treatment physical therapy;individual therapy (P)   -EM       Row Name 06/27/24 1525          General Information    Patient Profile Reviewed yes (P)   -EM     Existing Precautions/Restrictions fall;other (see comments);non-weight bearing;hip, anterior;right  (P)   Recent L BKA, s/p I&D of R MARIA LUISA due to infection, woundvac, remote R foot TMA. NWB L LE.  -EM     Barriers to Rehab medically complex;previous functional deficit (P)   -EM       Row Name 06/27/24 1525          Cognition    Orientation Status (Cognition) oriented x 4 (P)   -EM       Row Name 06/27/24 1525          Safety Issues, Functional Mobility    Safety Issues Affecting Function (Mobility) insight into deficits/self-awareness;safety precaution awareness;safety precautions follow-through/compliance;problem-solving;friction/shear risk;awareness of need for assistance;sequencing abilities (P)   -EM     Impairments Affecting Function (Mobility) balance;range of motion (ROM);strength;endurance/activity tolerance;pain (P)   -EM               User Key  (r) = Recorded By, (t) = Taken By, (c) = Cosigned By      Initials Name Provider Type    EM Rancho Schneider, PT Student PT Student                   Mobility       Row Name 06/27/24 1525          Bed Mobility    Bed Mobility supine-sit (P)   -EM     Scooting/Bridging [place] (Bed Mobility) 1 person assist;minimum assist (75% patient effort) (P)   -EM     Supine-Sit [place] (Bed Mobility) verbal cues;2 person assist;moderate assist (50% patient effort) (P)   -EM     Assistive Device (Bed Mobility) draw sheet;head of bed elevated;bed rails (P)   -EM     Comment, (Bed Mobility) Pt requires Mod A x 2 to maintain NWB of LLE, advancing hips to EOB w/ draw sheet and management of RLE to reach floor (P)   -EM       Row " Name 06/27/24 1525          Bed-Chair Transfer    Bed-Chair [place] (Transfers) 2 person assist;verbal cues;nonverbal cues (demo/gesture);moderate assist (50% patient effort) (P)   VC for how to sequence stand [facility]  [facility]  -EM     Assistive Device (Bed-Chair Transfers) other (see comments) (P)   BUE support, gait belt support  -EM     Comment, (Bed-Chair Transfer) Bed>commode>WC>Chair (P)   -EM       Row Name 06/27/24 1525          Sit-Stand Transfer    Sit-Stand [place] (Transfers) moderate assist (50% patient effort);2 person assist;verbal cues;nonverbal cues (demo/gesture) (P)   BUE support, support on gait belt. VC and demo for how to sequence Stand pivot transfer  -EM     Assistive Device (Sit-Stand Transfers) other (see comments) (P)   BUE support, support on gait belt.  -EM     Comment, (Sit-Stand Transfer) STS x 4 this date. Pt requires Mod knee blocking of R knee to prevent buckling. Pt requires mod A and tactile cues to achieve full standing. Upon initial transfer to [facility] and later pt stands for ~ 1 minute for glenys care. Later pt transferred to  for wheelchair propulsion/management (P)   -EM       Row Name 06/27/24 1525          Gait/Stairs (Locomotion)    [place] Level (Gait) not tested (P)   -EM     Assistive Device (Gait) other (see comments) (P)   Wheelchair  -EM     Patient was able to Ambulate -- (P)   Simultaneous filing. User may not have seen previous data.  -EM     Distance in Feet (Gait) 350 (P)   Wheelchair propulsion in feet  -EM     Comment, (Gait/Stairs) Pt unable to ambulate this date. STS x4 performed. WC training initiated this date. R Foot rest placed. Propelled self 350' w/ UE. Educated on brake and arm rest management. (P)   -EM       Row Name 06/27/24 1525          Mobility    Extremity Weight-bearing Status left lower extremity;right lower extremity (P)   -EM     Left Lower Extremity (Weight-bearing Status) non weight-bearing (NWB)  (P)    -EM     Right Lower Extremity (Weight-bearing Status) weight-bearing as tolerated (WBAT) (P)   -EM               User Key  (r) = Recorded By, (t) = Taken By, (c) = Cosigned By      Initials Name Provider Type    Rancho Arshad, PT Student PT Student                   Obj/Interventions       Row Name 06/27/24 1531          Balance    Balance Assessment sitting static balance;sitting dynamic balance;sit to stand dynamic balance;standing static balance;standing dynamic balance (P)   -EM     Static Sitting Balance standby assist;1-person assist (P)   -EM     Dynamic Sitting Balance contact guard;1-person assist (P)   -EM     Position, Sitting Balance unsupported;sitting in chair;sitting edge of bed (P)   -EM     Sit to Stand Dynamic Balance 2-person assist;moderate assist;verbal cues (P)   VC for how to sequence STS  -EM     Static Standing Balance moderate assist;2-person assist (P)   -EM     Dynamic Standing Balance 2-person assist;moderate assist;non-verbal cues (demo/gesture) (P)   Mod Knee blocking  -EM     Position/Device Used, Standing Balance supported (P)   BUE support, Support on gait belt  -EM     Balance Interventions sitting;standing;sit to stand;supported;static;dynamic;occupation based/functional task (P)   -EM     Comment, Balance Pt requires Mod A for knee blocking. Pt able to Stand for ~ 1 minute during glenys care, Requires Mod A x 2 (P)   -EM               User Key  (r) = Recorded By, (t) = Taken By, (c) = Cosigned By      Initials Name Provider Type    Rancho Arshad, PT Student PT Student                   Goals/Plan    No documentation.                  Clinical Impression       Row Name 06/27/24 1535          Pain    Pretreatment Pain Rating 5/10 (P)   -EM     Posttreatment Pain Rating 5/10 (P)   -EM     Pain Location - Side/Orientation Right (P)   -EM     Pain Location lower (P)   -EM     Pain Location - extremity (P)   -EM     Pre/Posttreatment Pain Comment Pt w/ pain d/t increased  soreness this date. (P)   -EM     Pain Intervention(s) Repositioned;Elevated;Ambulation/increased activity (P)   -EM     Additional Documentation Pain Scale: Numbers Pre/Post-Treatment (Group) (P)   -EM       Row Name 06/27/24 2532          Plan of Care Review    Plan of Care Reviewed With patient (P)   -EM     Progress improving (P)   -EM     Outcome Evaluation Pt continues to present below functional baseline with generalized weakness, impaired balance and decreased activity tolerance. STS x 4 w/ mod A x 2 BUE and gait belt support. Bed>commode>WC> chair transfers performed. Pt able to propel self 350' w/ use of B UE. Pt requires Mod A for knee blocking to prevent LOB with each stand. PT recommends IRF following IPPT stay. (P)   -EM       Row Name 06/27/24 5690          Therapy Assessment/Plan (PT)    Rehab Potential (PT) good, to achieve stated therapy goals (P)   -EM     Criteria for Skilled Interventions Met (PT) yes;meets criteria;skilled treatment is necessary (P)   -EM     Therapy Frequency (PT) daily (P)   -EM       Row Name 06/27/24 1538          Vital Signs    Pre Systolic BP Rehab 153 (P)   -EM     Pre Treatment Diastolic BP 75 (P)   -EM     Pretreatment Heart Rate (beats/min) 113 (P)   -EM     Posttreatment Heart Rate (beats/min) 115 (P)   -EM     Pre SpO2 (%) 98 (P)   -EM     O2 Delivery Pre Treatment room air (P)   -EM     O2 Delivery Intra Treatment room air (P)   -EM     Post SpO2 (%) 97 (P)   -EM     O2 Delivery Post Treatment room air (P)   -EM     Pre Patient Position Supine (P)   -EM     Intra Patient Position Standing (P)   -EM     Post Patient Position Sitting (P)   -EM       Row Name 06/27/24 3903          Positioning and Restraints    Pre-Treatment Position in bed (P)   -EM     Post Treatment Position chair (P)   -EM     In Chair notified nsg;reclined;sitting;call light within reach;encouraged to call for assist;exit alarm on;compression device;waffle cushion;on mechanical lift sling;legs  elevated (P)   -EM               User Key  (r) = Recorded By, (t) = Taken By, (c) = Cosigned By      Initials Name Provider Type    Rancho Arshad, PT Student PT Student                   Outcome Measures       Row Name 06/27/24 1539 06/27/24 1009       How much help from another person do you currently need...    Turning from your back to your side while in flat bed without using bedrails? 3 (P)   -EM 3  -SJ    Moving from lying on back to sitting on the side of a flat bed without bedrails? 2 (P)   -EM 2  -SJ    Moving to and from a bed to a chair (including a wheelchair)? 2 (P)   -EM 2  -SJ    Standing up from a chair using your arms (e.g., wheelchair, bedside chair)? 2 (P)   -EM 2  -SJ    Climbing 3-5 steps with a railing? 1 (P)   -EM 1  -SJ    To walk in hospital room? 1 (P)   -EM 1  -SJ    AM-PAC 6 Clicks Score (PT) 11 (P)   -EM 11  -SJ    Highest Level of Mobility Goal 4 --> Transfer to chair/commode (P)   -EM 4 --> Transfer to chair/commode  -SJ      Row Name 06/27/24 1539          Functional Assessment    Outcome Measure Options AM-PAC 6 Clicks Basic Mobility (PT) (P)   -EM               User Key  (r) = Recorded By, (t) = Taken By, (c) = Cosigned By      Initials Name Provider Type     Lisa Hayes RN Registered Nurse    Rancho Arshad, PT Student PT Student                                 Physical Therapy Education       Title: PT OT SLP Therapies (Done)       Topic: Physical Therapy (Done)       Point: Mobility training (Done)       Learning Progress Summary             Patient Eager, E, VU,DU by  at 6/27/2024 1539    Comment: Pt eudcated regarding WC management, transfers and symptom managment    Acceptance, E,D, VU by  at 6/26/2024 1608    Comment: Pt educated regarding transfers, weight bearing status and HEP.    Acceptance, E, VU,DU,NR by  at 6/25/2024 1107    Acceptance, E,H, VU by  at 6/24/2024 1105    Comment: Pt educated on bed positioning with L CANDIDOA and importance of  exercise on rehab.    Eager, E, VU,DU by SC at 6/21/2024 1349    Comment: reviewed HEP    Acceptance, E,D, VU,NR by SD at 6/19/2024 1203    Comment: PT ed for expected outcomes, risks, and benefits of IPPT services and progression of activity. Pt given visual demo of unilateral gait with RW. Discussion regarding dispo planning and IRF.                         Point: Home exercise program (Done)       Learning Progress Summary             Patient Acceptance, E,D, VU by EM at 6/26/2024 1608    Comment: Pt educated regarding transfers, weight bearing status and HEP.    Acceptance, E, VU,DU,NR by  at 6/25/2024 1107    Acceptance, E,H, VU by  at 6/24/2024 1105    Comment: Pt educated on bed positioning with L BKA and importance of exercise on rehab.    Acceptance, E, VU,NR,DU by  at 6/23/2024 1420    Eager, E, VU,DU by SC at 6/21/2024 1349    Comment: reviewed HEP    Acceptance, E,D, VU,NR by SD at 6/19/2024 1203    Comment: PT ed for expected outcomes, risks, and benefits of IPPT services and progression of activity. Pt given visual demo of unilateral gait with RW. Discussion regarding dispo planning and IRF.                         Point: Body mechanics (Done)       Learning Progress Summary             Patient Eager, E, VU,DU by EM at 6/27/2024 1539    Comment: Pt eudcated regarding WC management, transfers and symptom managment    Acceptance, E,D, VU by EM at 6/26/2024 1608    Comment: Pt educated regarding transfers, weight bearing status and HEP.    Acceptance, E, VU,DU,NR by  at 6/25/2024 1107    Acceptance, E,H, VU by  at 6/24/2024 1105    Comment: Pt educated on bed positioning with L BKA and importance of exercise on rehab.    Eager, E, VU,DU by SC at 6/21/2024 1349    Comment: reviewed HEP    Acceptance, E,D, VU,NR by SD at 6/19/2024 1203    Comment: PT ed for expected outcomes, risks, and benefits of IPPT services and progression of activity. Pt given visual demo of unilateral gait with RW.  Discussion regarding dispo planning and IRF.                         Point: Precautions (Done)       Learning Progress Summary             Patient Eager, E, VU,DU by EM at 6/27/2024 1539    Comment: Pt eudcated regarding WC management, transfers and symptom managment    Acceptance, E,D, VU by  at 6/26/2024 1608    Comment: Pt educated regarding transfers, weight bearing status and HEP.    Acceptance, E, VU,DU,NR by  at 6/25/2024 1107    Acceptance, E,H, VU by  at 6/24/2024 1105    Comment: Pt educated on bed positioning with L BKA and importance of exercise on rehab.    Eager, E, VU,DU by SC at 6/21/2024 1349    Comment: reviewed HEP    Acceptance, E,D, VU,NR by SD at 6/19/2024 1203    Comment: PT ed for expected outcomes, risks, and benefits of IPPT services and progression of activity. Pt given visual demo of unilateral gait with RW. Discussion regarding dispo planning and IRF.                                         User Key       Initials Effective Dates Name Provider Type Discipline    SC 02/03/23 -  Jorge Luis Childress, PT Physical Therapist PT    SD 03/13/23 -  Charity Dela Cruz, PT Physical Therapist PT    GEORGINA 06/16/21 -  Radha Miguel, PT Physical Therapist PT     09/21/23 -  Rachna Cruz, PT Physical Therapist PT    EM 05/07/24 -  Rancho Schneider, PT Student PT Student PT     05/07/24 -  Quincy Elizabeth, PT Student PT Student PT                  PT Recommendation and Plan     Plan of Care Reviewed With: (P) patient  Progress: (P) improving  Outcome Evaluation: (P) Pt continues to present below functional baseline with generalized weakness, impaired balance and decreased activity tolerance. STS x 4 w/ mod A x 2 BUE and gait belt support. Bed>commode>WC> chair transfers performed. Pt able to propel self 350' w/ use of B UE. Pt requires Mod A for knee blocking to prevent LOB with each stand. PT recommends IRF following IPPT stay.     Time Calculation:         PT Charges       Row Name 06/27/24  1540             Time Calculation    Start Time 1406 (P)   -EM      PT Received On 06/27/24 (P)   -EM         Timed Charges    04408 - PT Therapeutic Activity Minutes 18 (P)   -EM      98609 - Wheelchair Management Training 13 (P)   -EM         Total Minutes    Timed Charges Total Minutes 31 (P)   -EM       Total Minutes 31 (P)   -EM                User Key  (r) = Recorded By, (t) = Taken By, (c) = Cosigned By      Initials Name Provider Type    EM Rancho Schneider, PT Student PT Student                  Therapy Charges for Today       Code Description Service Date Service Provider Modifiers Qty    55187222920 HC PT THER PROC EA 15 MIN 6/26/2024 Rancho Schneider, PT Student GP 1    75725071271 HC PT THERAPEUTIC ACT EA 15 MIN 6/26/2024 Rancho Schneider, PT Student GP 1    [id number]  PT THERAPEUTIC ACT EA 15 MIN 6/27/2024 Rancho Schneider, PT Student GP 1    58314465845 HC PT WHEELCHAIR MGMT EA 15 MIN 6/27/2024 Rancho Schneider, PT Student GP 1    [id number]  PT THER SUPP EA 15 MIN 6/27/2024 Rancho Schneider, PT Student GP 2            PT G-Codes  Outcome Measure Options: (P) AM-PAC 6 Clicks Basic Mobility (PT)  AM-PAC 6 Clicks Score (PT): (P) 11  AM-PAC 6 Clicks Score (OT): 14  PT Discharge Summary  Anticipated Discharge Disposition (PT): (P) inpatient rehabilitation facility    Rancho Schneider PT Student  6/27/2024

## 2024-06-27 NOTE — CASE MANAGEMENT/SOCIAL WORK
Continued Stay Note  [facility]     Patient Name: Easton Alvarez  MRN: [id number]  Today's Date: 6/27/2024    Admit Date: 6/12/2024    Plan: Home with    Discharge Plan       Row Name 06/27/24 1114       Plan    Plan Comments April with [facility] reports that while they were able to get insurance precert for acute rehab they currently do not have an accepting MD. If they find one they will update CM. CM has given referral to Rolf Aj who has declined and [place]. CM will continue to follow                   Discharge Codes    No documentation.                 Expected Discharge Date and Time       Expected Discharge Date Expected Discharge Time    Jul 2, 2024               Anat Zarate RN

## 2024-06-27 NOTE — PROGRESS NOTES
"Easton Alvarez  1965  [id number]          Chief Complaint: left foot infection    Reason for Consultation: left foot infection    History of present illness:     Patient is a 58 y.o.  Yr old male with history of diabetes with prior right TMA 2019 at , history strep septicemia 2016 ; he reports right total hip arthroplasty 2024 after fall. He reports a fall several weeks prior to his June admission with injury to the left foot, wound present with deterioration several days preceding admission with severe discoloration/redness and swelling.  CT scan showed concern for subcutaneous emphysema and necrotizing infection.  Transferred to [facility] with evaluation by Dr. Em and Dr. Lundberg and arrangements made for urgent surgery. Subsequently, blood cultures called with gram-positive cocci     6/13 left LE amp, Dr Em    6/15  \"PROCEDURE:            Left      83999: Secondary closure above-knee amputation\"      6/18/24  right hip aspiration, culture with GB strep; wbc  down some postop from revision; blood cultures 6/15 negative so far    6/19/24 Dr. Em helping facilitate right hip surgery with orthopedic partners/team    6/20/24 surgery Dr Sethi  \"Procedure(s):  HIP ANTERIOR INCISION AND DRAINAGE WITH HANA TABLE, POLY SWAP- RIGHT\"    6/26/24 urinary retention per nursing notes, Dr Goodwin aware.  Sleepy at my visit earlier.case management looking into options;  postop pain controlled at present; He has left stump  pain that is constant, sharp at times, blunted by neuropathy, worse with palpation, better with pain meds and 4  out of 10 in severity. No distress per nursing; no new focal symptoms otherwise; he has right hip pain with range of motion and weightbearing increased and out of 10 at present with movement, nonradiating, better with pain meds.   Denies overt diarrhea today so far     No headache photophobia or neck stiffness.  No shortness of breath cough or hemoptysis.  No " "nausea vomiting  or abdominal pain.  No dysuria hematuria or pyuria.  No other new skin rash    Past Medical History:   Diagnosis Date    Diabetes mellitus     Diabetic foot infection 2018    left (partial amputation)       Past Surgical History:   Procedure Laterality Date    AMPUTATION FOOT / TOE Right     partial foot    BELOW KNEE AMPUTATION Left 6/13/2024    Procedure: AMPUTATION BELOW KNEE AND WOUND VAC ASSISTED CLOSURE LEFT;  Surgeon: Brijesh Em Jr., MD;  Location:  [facility];  Service: Orthopedics;  Laterality: Left;    INCISION AND DRAINAGE HIP Right 6/20/2024    Procedure: HIP ANTERIOR INCISION AND DRAINAGE WITH HANA TABLE, POLY SWAP RIGHT WITH LEFT LEG PIN TRACTION;  Surgeon: Lex Sethi MD;  Location:  [facility];  Service: Orthopedics;  Laterality: Right;    INCISION AND DRAINAGE LEG Left 6/15/2024    Procedure: SECONDARY CLOSURE LEFT BELOW KNEE AMPUTATION;  Surgeon: Brijesh Em Jr., MD;  Location:  [facility];  Service: Orthopedics;  Laterality: Left;    PLACEMENT OF WOUND VAC Left 6/13/2024    Procedure: PLACEMENT OF WOUND VAC LEFT;  Surgeon: Brijesh Em Jr., MD;  Location:  [facility];  Service: Orthopedics;  Laterality: Left;    TOTAL HIP ARTHROPLASTY Right 02/2024    From fall \"whole hip was shattered\"           No Known Allergies       Review of Systems    6/27/24     Constitutional-- No Fever, chills or sweats.  Appetite diminished with fatigue.  Heent-- No new vision, hearing or throat complaints.  No epistaxis or oral sores.  Denies odynophagia or dysphagia.  No flashers, floaters or eye pain. No odynophagia or dysphagia. No headache, photophobia or neck stiffness.  CV-- No chest pain, palpitation or syncope  Resp-- No SOB/cough/Hemoptysis  GI- No nausea, vomiting, or diarrhea.  No hematochezia, melena, or hematemesis. Denies jaundice or chronic liver disease.  -- No dysuria, hematuria, or flank pain.  Denies hesitancy, urgency or flank pain.  Lymph- no swollen lymph nodes in " "neck/axilla or groin.   Heme- No active bruising or bleeding; no Hx of DVT or PE.  MS-- aside from above, no swelling or pain in the bones or joints of arms/legs.  No new back pain.  Neuro-- No acute focal weakness or numbness in the arms or legs.  No seizures.    Full 12 point review of systems reviewed and negative otherwise for acute complaints, except for above    Physical Exam:   Vital Signs   /82 (BP Location: Left arm, Patient Position: Lying)   Pulse 95   Temp 98.3 °F (36.8 °C)   Resp 18   Ht 172.7 cm (67.99\")   Wt 67.6 kg (149 lb)   SpO2 98%   BMI 22.66 kg/m²     GENERAL: sleepy; in no acute distress.   HEENT: Normocephalic, atraumatic.   No conjunctival injection. No icterus. Oropharynx clear without evidence of thrush or exudate. No evidence of periodontal disease.    NECK: Supple without nuchal rigidity. No mass.   HEART: RRR; No murmur, rubs, gallops.   LUNGS: diminished at bases.  Trace crackles at the bases but no rhonchi or wheeze. Normal respiratory effort. Nonlabored. No dullness.  ABDOMEN: Soft, nontender, nondistended. Positive bowel sounds. No rebound or guarding. NO mass or HSM.  EXT:  see below.     MSK: right hip surgical site cvered;  no new visible redness/purulence or fluctuance  SKIN: Warm and dry without cutaneous eruptions on Inspection/palpation.      Left   BKA stump covered;  no new visible redness or purulence; no discrete fluctuance.  No crepitus    Laboratory Data    Results from last 7 days   Lab Units 06/27/24  0436 06/26/24  0648 06/25/24  0526   WBC 10*3/mm3 6.84 11.58* 8.15   HEMOGLOBIN g/dL 8.2* 8.6* 7.9*   HEMATOCRIT % 25.2* 25.8* 23.0*   PLATELETS 10*3/mm3 126* 167 140     Results from last 7 days   Lab Units 06/27/24  0436   SODIUM mmol/L 134*   POTASSIUM mmol/L 3.7   CHLORIDE mmol/L 102   CO2 mmol/L 23.0   BUN mg/dL 27*   CREATININE mg/dL 0.67*   GLUCOSE mg/dL 91   CALCIUM mg/dL 7.3*                       Estimated Creatinine Clearance: 112.1 mL/min (A) (by " C-G formula based on SCr of 0.67 mg/dL (L)).      Microbiology:      Radiology:  Imaging Results (Last 72 Hours)       ** No results found for the last 72 hours. **              Impression:     --acute sepsis as per admission notes, severe left foot infection with gangrene/cellulitis and concern for necrotizing soft tissue infection by imaging, urgent surgical arrangements made June 13; repeat surg 6/15  ;  subsequent right hip surg 6/20; He knows risk for persistent/recurrent or nonhealing wounds, persistet / progressive or recurrent infection and risk for further amputation/functional-limb loss and chronic pain.  Associated with GB strep bacteremia as below.    --Acute/severe left foot infection as above, urgent surgical arrangements  made 6/13    --Acute gb strep bacteremia,   source from foot.  High risk for further serious morbidity and other serious sequela including dire consequences and metastatic foci of involvement/endovascular sequela etc. No new metastatic focus of involvement. TTE 6/17    --Right hip fracture with repair February 2024.  +pain ; orthopedics with  aspiration June 18 and culture with GB strep likely hematogenous seeding, MRI imaging with concern for septic joint, surgery on June 20 as above.   Patient understands risk for persistent/recurrent or progressive infection and potential for further surgical intervention in the future that could include functional/limb loss and other serious sequela/morbidity; he knows antibiotics and surgery not a guarantee for care    --postop leukocytosis, possible stress response associated with surgery.  Trended down June 22-27.  No new respiratory symptoms.  Encouraged incentive spirometry.  Abdomen benign with no diarrhea.  No new urinary tract complaints.  IV sites with no suppurative change.  No rash.  Monitor for new process; no new focal muscoskeletal symptoms to suggest new metastatic focus of infection    --diabetes.  Described as uncontrolled by  medicine team at admission.  Glucose control per medicine team    --urinary retention per nursing staff.  Per their notes patient refused in/out catheterization.  Dr. Goodwin aware and he will discuss options with patient    --thrombocytopenia.  Monitor      PLAN:      --IV  rocephin likely at least 6 weeks from hip surgery and anticipate step down to oral agents after    **wound culture at left foot mixed with  MSSA Klebsiella and GB strep  **blood cultures with group B strep  ** right hip cultures with group B strep    --orthopedics  making surgical plans with concern for right hipprosthetic/periprosthetic infection     --Check/review labs cultures and scans    --TTE described as technically adequate by cardiology; no description of vegetation by cardiology per report    --Partial history per nursing staff    --encouraged incentive spirometry.  Nursing aware to instruct and encourage    --Discussed with microbiology    --d/w Dr Goodwin    --Highly complex at of issues with high risk for further serious morbidity and other serious sequela    Copied text in this note has been reviewed and is accurate as of 06/27/24.        Cleve Bills MD  6/27/2024

## 2024-06-27 NOTE — PROGRESS NOTES
[facility]  PROGRESS NOTE    Patient Name: Easton Alvarez  : 1965  MRN: [id number]    Date of Admission: 2024  Primary Care Physician: Provider, No Known    Subjective   Subjective     CC: Follow-up left foot gangrene, status post BKA    HPI:No acute events overnight, patient states that he is doing ok      Objective   Objective     Vital Signs:   Temp:  [97.7 °F (36.5 °C)-98.7 °F (37.1 °C)] 98.3 °F (36.8 °C)  Heart Rate:  [95-97] 97  Resp:  [16-18] 16  BP: (125-155)/(73-82) 153/75     Physical Exam:  Constitutional: No acute distress, awake, alert  HENT: NCAT, mucous membranes moist  Respiratory: Clear to auscultation bilaterally, respiratory effort normal   Cardiovascular: RRR, no murmurs, rubs, or gallops  Gastrointestinal: Positive bowel sounds, soft, nontender, nondistended  Musculoskeletal: s/p left BKA, stump under dressing  Psychiatric: flat affect  Neurologic: non-focal    Results Reviewed:  LAB RESULTS:      Lab 24  0436 24  0648 24  0526 24  1214 24  0628 24  0617   WBC 6.84 11.58* 8.15  --  11.24* 11.35*   HEMOGLOBIN 8.2* 8.6* 7.9* 8.1* 7.7* 8.1*   HEMATOCRIT 25.2* 25.8* 23.0* 24.3* 22.6* 23.8*   PLATELETS 126* 167 140  --  159 169   MCV 97.7* 94.5 95.0  --  95.0 93.7         Lab 24  0436 24  0648 24  0526 24  0628 24  0617   SODIUM 134* 136 133* 135* 132*   POTASSIUM 3.7 4.2 3.9 4.0 4.2   CHLORIDE 102 103 102 102 102   CO2 23.0 25.0 27.0 24.0 25.0   ANION GAP 9.0 8.0 4.0* 9.0 5.0   BUN 27* 30* 32* 38* 42*   CREATININE 0.67* 0.75* 0.83 0.75* 0.82   EGFR 106.9 103.3 100.2 103.3 100.6   GLUCOSE 91 149* 118* 191* 189*   CALCIUM 7.3* 7.2* 7.2* 6.7* 6.8*                         Brief Urine Lab Results  (Last result in the past 365 days)        Color   Clarity   Blood   Leuk Est   Nitrite   Protein   CREAT   Urine HCG        24 1109 Yellow   Clear   Negative   Negative   Negative    Negative                   Microbiology Results Abnormal       Procedure Component Value - Date/Time    Anaerobic Culture 10 Day Incubation - Wound, Leg, Left [[id number]]  (Normal) Collected: 06/15/24 1501    Lab Status: Final result Specimen: Wound from Leg, Left Updated: 06/26/24 0647     Anaerobic Culture No anaerobes isolated at 10 days    Fungus Culture - Synovium, Hip, Right [[id number]] Collected: 06/20/24 1700    Lab Status: Preliminary result Specimen: Synovium from Hip, Right Updated: 06/25/24 1831     Fungus Culture No fungus isolated at less than 1 week    Fungus Culture - Synovium, Hip, Right [[id number]] Collected: 06/20/24 1700    Lab Status: Preliminary result Specimen: Synovium from Hip, Right Updated: 06/25/24 1831     Fungus Culture No fungus isolated at less than 1 week    Fungus Culture - Synovium, Hip, Right [[id number]] Collected: 06/20/24 1700    Lab Status: Preliminary result Specimen: Synovium from Hip, Right Updated: 06/25/24 1831     Fungus Culture No fungus isolated at less than 1 week    AFB Culture - Synovium, Hip, Right [[id number]] Collected: 06/20/24 1700    Lab Status: Preliminary result Specimen: Synovium from Hip, Right Updated: 06/25/24 1831     AFB Culture No AFB isolated at less than 1 week     AFB Stain No acid fast bacilli seen on concentrated smear    AFB Culture - Synovium, Hip, Right [[id number]] Collected: 06/20/24 1700    Lab Status: Preliminary result Specimen: Synovium from Hip, Right Updated: 06/25/24 1831     AFB Culture No AFB isolated at less than 1 week     AFB Stain No acid fast bacilli seen on concentrated smear    AFB Culture - Synovium, Hip, Right [[id number]] Collected: 06/20/24 1700    Lab Status: Preliminary result Specimen: Synovium from Hip, Right Updated: 06/25/24 1831     AFB Culture No AFB isolated at less than 1 week     AFB Stain No acid fast bacilli seen on concentrated smear    Fungus Culture - Aspirate, Hip, Right [[id number]] Collected: 06/18/24 1052     Lab Status: Preliminary result Specimen: Aspirate from Hip, Right Updated: 06/25/24 1130     Fungus Culture No fungus isolated at 1 week    AFB Culture - Aspirate, Hip, Right [[id number]] Collected: 06/18/24 1052    Lab Status: Preliminary result Specimen: Aspirate from Hip, Right Updated: 06/25/24 1130     AFB Culture No AFB isolated at 1 week     AFB Stain No acid fast bacilli seen on concentrated smear    Anaerobic Culture 10 Day Incubation - Synovium, Hip, Right [[id number]]  (Normal) Collected: 06/20/24 1700    Lab Status: Preliminary result Specimen: Synovium from Hip, Right Updated: 06/25/24 0938     Anaerobic Culture No anaerobes isolated at 5 days    Anaerobic Culture 10 Day Incubation - Synovium, Hip, Right [[id number]]  (Normal) Collected: 06/20/24 1700    Lab Status: Preliminary result Specimen: Synovium from Hip, Right Updated: 06/25/24 0928     Anaerobic Culture No anaerobes isolated at 5 days    Anaerobic Culture 10 Day Incubation - Synovium, Hip, Right [[id number]]  (Normal) Collected: 06/20/24 1700    Lab Status: Preliminary result Specimen: Synovium from Hip, Right Updated: 06/25/24 0928     Anaerobic Culture No anaerobes isolated at 5 days    Tissue / Bone Culture - Synovium, Hip, Right [[id number]] Collected: 06/20/24 1700    Lab Status: Final result Specimen: Synovium from Hip, Right Updated: 06/23/24 1010     Tissue Culture No growth at 3 days     Gram Stain Many (4+) Red blood cells      Moderate (3+) WBCs seen      No organisms seen    Tissue / Bone Culture - Synovium, Hip, Right [[id number]] Collected: 06/20/24 1700    Lab Status: Final result Specimen: Synovium from Hip, Right Updated: 06/23/24 1009     Tissue Culture No growth at 3 days     Gram Stain Moderate (3+) WBCs seen      No organisms seen     Comment: Modified report. Previous result was Rare (1+) Gram positive cocci in pairs on 6/20/2024 at 1949 EDT.         Many (4+) Red blood cells    Tissue / Bone Culture - Synovium, Hip, Right  [[id number]] Collected: 06/20/24 1700    Lab Status: Final result Specimen: Synovium from Hip, Right Updated: 06/23/24 1009     Tissue Culture No growth at 3 days     Gram Stain Many (4+) Red blood cells      Few (2+) WBCs seen      No organisms seen    Fungus Culture - Wound, Leg, Left [[id number]] Collected: 06/15/24 1501    Lab Status: Preliminary result Specimen: Wound from Leg, Left Updated: 06/22/24 2215     Fungus Culture No fungus isolated at 1 week    AFB Culture - Wound, Leg, Left [[id number]] Collected: 06/15/24 1501    Lab Status: Preliminary result Specimen: Wound from Leg, Left Updated: 06/22/24 2215     AFB Culture No AFB isolated at 1 week     AFB Stain No acid fast bacilli seen on concentrated smear    Fungus Culture - Surgical Site, Leg, Left [[id number]] Collected: 06/13/24 1031    Lab Status: Preliminary result Specimen: Surgical Site from Leg, Left Updated: 06/20/24 1201     Fungus Culture No fungus isolated at 1 week    AFB Culture - Surgical Site, Leg, Left [[id number]] Collected: 06/13/24 1031    Lab Status: Preliminary result Specimen: Surgical Site from Leg, Left Updated: 06/20/24 1201     AFB Culture No AFB isolated at 1 week     AFB Stain No acid fast bacilli seen on concentrated smear    Blood Culture - Blood, Hand, Left [[id number]]  (Normal) Collected: 06/15/24 1104    Lab Status: Final result Specimen: Blood from Hand, Left Updated: 06/20/24 1201     Blood Culture No growth at 5 days    Blood Culture - Blood, Hand, Right [[id number]]  (Normal) Collected: 06/15/24 1104    Lab Status: Final result Specimen: Blood from Hand, Right Updated: 06/20/24 1201     Blood Culture No growth at 5 days    Wound Culture - Wound, Leg, Left [[id number]] Collected: 06/15/24 1501    Lab Status: Final result Specimen: Wound from Leg, Left Updated: 06/19/24 0711     Wound Culture No growth at 3 days     Gram Stain Rare (1+) WBCs seen      No organisms seen    Anaerobic Culture - Surgical Site, Leg, Left  [[id number]]  (Normal) Collected: 06/13/24 1031    Lab Status: Final result Specimen: Surgical Site from Leg, Left Updated: 06/18/24 0751     Anaerobic Culture No anaerobes isolated at 5 days    Blood Culture - Blood, Arm, Left [[id number]]  (Normal) Collected: 06/12/24 1832    Lab Status: Final result Specimen: Blood from Arm, Left Updated: 06/17/24 1900     Blood Culture No growth at 5 days    Blood Culture - Blood, Hand, Right [[id number]]  (Normal) Collected: 06/12/24 1837    Lab Status: Final result Specimen: Blood from Hand, Right Updated: 06/17/24 1900     Blood Culture No growth at 5 days    Fungus Smear - Surgical Site, Leg, Left [[id number]] Collected: 06/13/24 1031    Lab Status: Final result Specimen: Surgical Site from Leg, Left Updated: 06/14/24 1406     Fungal Stain No fungal elements seen            No radiology results from the last 24 hrs    Results for orders placed during the hospital encounter of 06/12/24    Adult Transthoracic Echo Complete w/ Color, Spectral and Contrast if Necessary Per Protocol    Interpretation Summary    Left ventricular systolic function is normal. Left ventricular ejection fraction appears to be 56 - 60%.    Left ventricular wall thickness is consistent with borderline concentric hypertrophy.    Left ventricular diastolic function is consistent with (grade Ia w/high LAP) impaired relaxation.      Current medications:  Scheduled Meds:acetaminophen, 1,000 mg, Oral, Q8H  aspirin, 81 mg, Oral, Q12H  cefTRIAXone, 2,000 mg, Intravenous, Q24H  insulin glargine, 25 Units, Subcutaneous, Nightly  insulin lispro, 2-9 Units, Subcutaneous, 4x Daily AC & at Bedtime  Insulin Lispro, 8 Units, Subcutaneous, TID With Meals  lactobacillus acidophilus, 1 capsule, Oral, Daily  [Held by provider] lisinopril, 10 mg, Oral, Q24H  melatonin, 5 mg, Oral, Nightly  pantoprazole, 40 mg, Intravenous, BID AC  senna-docusate sodium, 2 tablet, Oral, BID  sodium chloride, 10 mL, Intravenous, Q12H  sodium  chloride, 10 mL, Intravenous, Q12H      Continuous Infusions:lactated ringers, 9 mL/hr, Last Rate: 9 mL/hr (06/15/24 1307)  lactated ringers, 100 mL/hr, Last Rate: 100 mL/hr (06/20/24 2122)  ropivacaine, , Last Rate: Stopped (06/17/24 2230)      PRN Meds:.  senna-docusate sodium **AND** polyethylene glycol **AND** bisacodyl **AND** bisacodyl    Calcium Replacement - Follow Nurse / BPA Driven Protocol    dextrose    dextrose    glucagon (human recombinant)    Magnesium Standard Dose Replacement - Follow Nurse / BPA Driven Protocol    nitroglycerin    oxyCODONE    Phosphorus Replacement - Follow Nurse / BPA Driven Protocol    Potassium Replacement - Follow Nurse / BPA Driven Protocol    sodium chloride    sodium chloride    sodium chloride    traMADol    Assessment & Plan   Assessment & Plan     Active Hospital Problems    Diagnosis  POA    **Diabetic ulcer of left foot with necrosis of muscle [E11.621, L97.523]  Yes    Severe malnutrition [E43]  Yes    Infection of prosthetic total hip joint [T84.59XA, Z96.649]  Not Applicable      Resolved Hospital Problems   No resolved problems to display.        Brief Hospital Course to date:  Easton Alvarez is a 58 y.o. male with past medical history significant for T2DM, diabetic neuropathy, right hip fracture/ORIF ([facility] 2/2024), and right transmetatarsal amputation 2019 at  who presented to OSH ED on 6/12/2024 with severe left foot cellulitis/necrosis after recent injury. Lactate at OSH was 8.8 and CT imaging of left leg showed gas gangrene. Orthopedic surgeon Dr. Em was consulted and performed a left below-knee amputation on 6/13/2024 with further debridement and irrigation on 6/15. Infectious disease has followed and managed antimicrobial therapy.     Sepsis secondary to group B strep bacteremia  Left foot cellulitis/gangrene   Leukocytosis  -MRI left foot showed extensive soft tissue infection with abscesses along the foot to the ankle and into the lower leg with  soft tissue gas.    -Wound cultures 6/12 with heavy growth group B strep and Kebsiella, blood cultures x 2 with group B strep; wound culture 6/13 with group B strep  -TTE described as technically adequate by cardiology; no description of vegetation by cardiology per report   -s/p left BKA with wound VAC on 6/13/24    -s/p further debridement in OR on 6/15/24 by Dr. Em, drain removed 6/17  -Infectious disease, Dr. Bills following; continue IV ceftriaxone, he will likely need at least 6 weeks of antibiotics.  -PT wound care following.  -Added Boost glucose control to increase protein intake for wound healing, nutrition following.  -Pain control with scheduled Tylenol, PRN oxycodone.   -Daily dressing changes to surgical site: Xeroform, 4X4, Kerlix, ACE  -Follow up with  Orthopedics in 2 weeks for wound check, imaging.     Right glenys-prosthetic hip infection  -IR performed fluid aspiration from the hip which was purulent.  -s/p I&D on 6/20, Dr. Sethi   -Fluid culture 6/18 with rare growth group B strep  -Supportive care, mobilize, pain control. Antibiotics as above.     Concern for GIB  Acute on chronic anemia  -with melena and fecal occult positive overnight 6/14  -GI consulted, recommended medical management with PPI BID x 1 month.  -GI referral at discharge for elevated LFTs, thrombocytopenia and concern for early cirrhosis, plan to pursue liver US after recovery from acute illness.  -H&H however seems to be trending down, but is stable, plan remains as above     Elevated blood pressure  -No history of taking meds for BP per patient   -BP has been running high this admission, possibly secondary to pain  -Started low-dose ACE given DM2, lisinopril 10 mg daily.     Mild hyponatremia  -Clinically insignificant  -Continue to monitor intermittently.      Anion gap metabolic acidosis POA, resolved   -AG was 20.1, likely from lactate, serum acetone negative.     Uncontrolled diabetes mellitus type 2 with  hyperglycemia  -Patient was not taking meds or checking glucoses, though insulin had been prescribed in the past.  -A1c 9.5% this admission  -Continue basal, prandial and SSI, adjust as warranted   - Diabetes educator following.     Expected Discharge Location and Transportation: Rehab  Expected Discharge   Expected Discharge Date: 7/2/2024; Expected Discharge Time:      VTE Prophylaxis:  Mechanical VTE prophylaxis orders are present.       AM-PAC 6 Clicks Score (PT): 11 (06/26/24 1951)    CODE STATUS:   Code Status and Medical Interventions:   Ordered at: 06/20/24 2026     Level Of Support Discussed With:    Patient     Code Status (Patient has no pulse and is not breathing):    CPR (Attempt to Resuscitate)     Medical Interventions (Patient has pulse or is breathing):    Full       Prashant Goodwin MD  06/27/24

## 2024-06-27 NOTE — NURSING NOTE
No UOP this shift. Pt denies ability to urinate at this time. Pt denies tenderness upon palpation of bladder. Pt states he has not had any UOP simply because he has been sleeping, resting well. Bladder scan read 714 ml. Pt encouraged to try to urinate but pt states he does not need to at this time. Vehemently refusing in/out cath at this time.

## 2024-06-27 NOTE — PROGRESS NOTES
"  Orthopedic Progress Note      Patient: Easton Alvarez  YOB: 1965     Date of Admission: 6/12/2024  5:04 PM Medical Record Number: [id number]     Attending Physician: Prashant Goodwin MD    Status Post:  Procedure(s):  HIP ANTERIOR INCISION AND DRAINAGE WITH HANA TABLE, POLY SWAP, WITH LEFT LEG PIN TRACTION - RIGHT Post Operative Day Number: 7    Subjective : No new orthopaedic complaints     Pain Relief: some relief with present medication.     Systemic Complaints: No Complaints  Vitals:    06/26/24 1545 06/26/24 1935 06/27/24 0317 06/27/24 0714   BP: 125/73 151/78 155/82 153/75   BP Location: Right arm Left arm Left arm Right arm   Patient Position: Sitting Lying Lying Lying   Pulse: 97 95  97   Resp: 18 18 18 16   Temp: 98 °F (36.7 °C) 98.7 °F (37.1 °C) 97.7 °F (36.5 °C) 98.3 °F (36.8 °C)   TempSrc: Oral Oral Oral Oral   SpO2: 96% 98%  95%   Weight:       Height:           Physical Exam: 58 y.o. male    General Appearance:       Alert, cooperative, in no acute distress                  Extremities:    Dressing Clean, Dry and Intact             No clinical sign of DVT        Able to do good movements of digits    Pulses:   Pulses palpable and equal bilaterally           Diagnostic Tests:     Results from last 7 days   Lab Units 06/27/24  0436 06/26/24  0648 06/25/24  0526   WBC 10*3/mm3 6.84 11.58* 8.15   HEMOGLOBIN g/dL 8.2* 8.6* 7.9*   HEMATOCRIT % 25.2* 25.8* 23.0*   PLATELETS 10*3/mm3 126* 167 140     Results from last 7 days   Lab Units 06/27/24  0436 06/26/24  0648 06/25/24  0526   SODIUM mmol/L 134* 136 133*   POTASSIUM mmol/L 3.7 4.2 3.9   CHLORIDE mmol/L 102 103 102   CO2 mmol/L 23.0 25.0 27.0   BUN mg/dL 27* 30* 32*   CREATININE mg/dL 0.67* 0.75* 0.83   GLUCOSE mg/dL 91 149* 118*   CALCIUM mg/dL 7.3* 7.2* 7.2*           No results found for: \"URICACID\"  Lab Results   Component Value Date    CRYSTAL None Seen 06/18/2024     Microbiology Results (last 10 days)       Procedure Component " Value - Date/Time    Fungus Culture - Synovium, Hip, Right [[id number]] Collected: 06/20/24 1700    Lab Status: Preliminary result Specimen: Synovium from Hip, Right Updated: 06/25/24 1831     Fungus Culture No fungus isolated at less than 1 week    Fungus Culture - Synovium, Hip, Right [[id number]] Collected: 06/20/24 1700    Lab Status: Preliminary result Specimen: Synovium from Hip, Right Updated: 06/25/24 1831     Fungus Culture No fungus isolated at less than 1 week    Fungus Culture - Synovium, Hip, Right [[id number]] Collected: 06/20/24 1700    Lab Status: Preliminary result Specimen: Synovium from Hip, Right Updated: 06/25/24 1831     Fungus Culture No fungus isolated at less than 1 week    Tissue / Bone Culture - Synovium, Hip, Right [[id number]] Collected: 06/20/24 1700    Lab Status: Final result Specimen: Synovium from Hip, Right Updated: 06/23/24 1009     Tissue Culture No growth at 3 days     Gram Stain Moderate (3+) WBCs seen      No organisms seen     Comment: Modified report. Previous result was Rare (1+) Gram positive cocci in pairs on 6/20/2024 at 1949 EDT.         Many (4+) Red blood cells    Tissue / Bone Culture - Synovium, Hip, Right [[id number]] Collected: 06/20/24 1700    Lab Status: Final result Specimen: Synovium from Hip, Right Updated: 06/23/24 1010     Tissue Culture No growth at 3 days     Gram Stain Many (4+) Red blood cells      Moderate (3+) WBCs seen      No organisms seen    Tissue / Bone Culture - Synovium, Hip, Right [[id number]] Collected: 06/20/24 1700    Lab Status: Final result Specimen: Synovium from Hip, Right Updated: 06/23/24 1009     Tissue Culture No growth at 3 days     Gram Stain Many (4+) Red blood cells      Few (2+) WBCs seen      No organisms seen    AFB Culture - Synovium, Hip, Right [[id number]] Collected: 06/20/24 1700    Lab Status: Preliminary result Specimen: Synovium from Hip, Right Updated: 06/25/24 1831     AFB Culture No AFB isolated at less than 1 week      AFB Stain No acid fast bacilli seen on concentrated smear    AFB Culture - Synovium, Hip, Right [[id number]] Collected: 06/20/24 1700    Lab Status: Preliminary result Specimen: Synovium from Hip, Right Updated: 06/25/24 1831     AFB Culture No AFB isolated at less than 1 week     AFB Stain No acid fast bacilli seen on concentrated smear    AFB Culture - Synovium, Hip, Right [[id number]] Collected: 06/20/24 1700    Lab Status: Preliminary result Specimen: Synovium from Hip, Right Updated: 06/25/24 1831     AFB Culture No AFB isolated at less than 1 week     AFB Stain No acid fast bacilli seen on concentrated smear    Anaerobic Culture 10 Day Incubation - Synovium, Hip, Right [[id number]]  (Normal) Collected: 06/20/24 1700    Lab Status: Preliminary result Specimen: Synovium from Hip, Right Updated: 06/25/24 0928     Anaerobic Culture No anaerobes isolated at 5 days    Anaerobic Culture 10 Day Incubation - Synovium, Hip, Right [[id number]]  (Normal) Collected: 06/20/24 1700    Lab Status: Preliminary result Specimen: Synovium from Hip, Right Updated: 06/25/24 0938     Anaerobic Culture No anaerobes isolated at 5 days    Anaerobic Culture 10 Day Incubation - Synovium, Hip, Right [[id number]]  (Normal) Collected: 06/20/24 1700    Lab Status: Preliminary result Specimen: Synovium from Hip, Right Updated: 06/25/24 0928     Anaerobic Culture No anaerobes isolated at 5 days    Body Fluid Culture - Body Fluid, Hip, Right [[id number]]  (Abnormal)  (Susceptibility) Collected: 06/18/24 1052    Lab Status: Final result Specimen: Body Fluid from Hip, Right Updated: 06/21/24 0952     Body Fluid Culture Rare growth Streptococcus agalactiae (Group B)     Comment:   This organism is considered to be universally susceptible to penicillin.  No further antibiotic testing will be performed.        Gram Stain Many (4+) WBCs seen      No organisms seen    Susceptibility        Streptococcus agalactiae (Group B)      GLADYS      Ceftriaxone  Susceptible      Clindamycin Resistant      Inducible Clindamycin Resistance Negative      Penicillin G Susceptible      Vancomycin Susceptible                           Fungus Culture - Aspirate, Hip, Right [[id number]] Collected: 06/18/24 1052    Lab Status: Preliminary result Specimen: Aspirate from Hip, Right Updated: 06/25/24 1130     Fungus Culture No fungus isolated at 1 week    AFB Culture - Aspirate, Hip, Right [[id number]] Collected: 06/18/24 1052    Lab Status: Preliminary result Specimen: Aspirate from Hip, Right Updated: 06/25/24 1130     AFB Culture No AFB isolated at 1 week     AFB Stain No acid fast bacilli seen on concentrated smear          No radiology results for the last 7 days      Current Medications:  Scheduled Meds:acetaminophen, 1,000 mg, Oral, Q8H  aspirin, 81 mg, Oral, Q12H  cefTRIAXone, 2,000 mg, Intravenous, Q24H  insulin glargine, 25 Units, Subcutaneous, Nightly  insulin lispro, 2-9 Units, Subcutaneous, 4x Daily AC & at Bedtime  Insulin Lispro, 8 Units, Subcutaneous, TID With Meals  lactobacillus acidophilus, 1 capsule, Oral, Daily  [Held by provider] lisinopril, 10 mg, Oral, Q24H  melatonin, 5 mg, Oral, Nightly  pantoprazole, 40 mg, Intravenous, BID AC  senna-docusate sodium, 2 tablet, Oral, BID  sodium chloride, 10 mL, Intravenous, Q12H  sodium chloride, 10 mL, Intravenous, Q12H      Continuous Infusions:lactated ringers, 9 mL/hr, Last Rate: 9 mL/hr (06/15/24 1307)  lactated ringers, 100 mL/hr, Last Rate: 100 mL/hr (06/20/24 2122)  ropivacaine, , Last Rate: Stopped (06/17/24 2230)      PRN Meds:.  senna-docusate sodium **AND** polyethylene glycol **AND** bisacodyl **AND** bisacodyl    Calcium Replacement - Follow Nurse / BPA Driven Protocol    dextrose    dextrose    glucagon (human recombinant)    Magnesium Standard Dose Replacement - Follow Nurse / BPA Driven Protocol    nitroglycerin    oxyCODONE    Phosphorus Replacement - Follow Nurse / BPA Driven Protocol    Potassium Replacement  - Follow Nurse / BPA Driven Protocol    sodium chloride    sodium chloride    sodium chloride    traMADol    Assessment: Status post  No admission procedures for hospital encounter.    Patient Active Problem List   Diagnosis    Diabetic ulcer of left foot with necrosis of muscle    Severe malnutrition    Infection of prosthetic total hip joint       PLAN:   Continues current post-op course  Anticoagulation: Aspirin started 81mg BIDx 6 weeks  Mobilize with PT as tolerated per protocol  Abx per ID  Incisional wound vac x1 week postop; okay to replace with surgical optifoam ag dressing after  Follow-up with me in 1 week for suture removal    Weight Bearing: WBAT  Discharge Plan:     Lex Sethi MD    Date: 6/27/2024    Time: 09:55 EDT

## 2024-06-28 LAB
ANION GAP SERPL CALCULATED.3IONS-SCNC: 7 MMOL/L (ref 5–15)
BUN SERPL-MCNC: 24 MG/DL (ref 8–23)
BUN/CREAT SERPL: 34.3 (ref 7–25)
CALCIUM SPEC-SCNC: 7.1 MG/DL (ref 8.6–10.5)
CHLORIDE SERPL-SCNC: 103 MMOL/L (ref 98–107)
CO2 SERPL-SCNC: 25 MMOL/L (ref 22–29)
CREAT SERPL-MCNC: 0.7 MG/DL (ref 0.76–1.27)
DEPRECATED RDW RBC AUTO: 50.4 FL (ref 37–54)
EGFRCR SERPLBLD CKD-EPI 2021: 105.5 ML/MIN/1.73
ERYTHROCYTE [DISTWIDTH] IN BLOOD BY AUTOMATED COUNT: 14.1 % (ref 12.3–15.4)
GLUCOSE BLDC GLUCOMTR-MCNC: 140 MG/DL (ref 70–130)
GLUCOSE BLDC GLUCOMTR-MCNC: 200 MG/DL (ref 70–130)
GLUCOSE BLDC GLUCOMTR-MCNC: 219 MG/DL (ref 70–130)
GLUCOSE BLDC GLUCOMTR-MCNC: 454 MG/DL (ref 70–130)
GLUCOSE BLDC GLUCOMTR-MCNC: 74 MG/DL (ref 70–130)
GLUCOSE SERPL-MCNC: 76 MG/DL (ref 65–99)
HCT VFR BLD AUTO: 23.8 % (ref 37.5–51)
HGB BLD-MCNC: 7.9 G/DL (ref 13–17.7)
MCH RBC QN AUTO: 32.4 PG (ref 26.6–33)
MCHC RBC AUTO-ENTMCNC: 33.2 G/DL (ref 31.5–35.7)
MCV RBC AUTO: 97.5 FL (ref 79–97)
PLATELET # BLD AUTO: 121 10*3/MM3 (ref 140–450)
PMV BLD AUTO: 9.4 FL (ref 6–12)
POTASSIUM SERPL-SCNC: 3.8 MMOL/L (ref 3.5–5.2)
RBC # BLD AUTO: 2.44 10*6/MM3 (ref 4.14–5.8)
SODIUM SERPL-SCNC: 135 MMOL/L (ref 136–145)
WBC NRBC COR # BLD AUTO: 7.09 10*3/MM3 (ref 3.4–10.8)

## 2024-06-28 PROCEDURE — 82948 REAGENT STRIP/BLOOD GLUCOSE: CPT

## 2024-06-28 PROCEDURE — 99232 SBSQ HOSP IP/OBS MODERATE 35: CPT | Performed by: INTERNAL MEDICINE

## 2024-06-28 PROCEDURE — 63710000001 INSULIN LISPRO (HUMAN) PER 5 UNITS: Performed by: INTERNAL MEDICINE

## 2024-06-28 PROCEDURE — 63710000001 INSULIN GLARGINE PER 5 UNITS: Performed by: INTERNAL MEDICINE

## 2024-06-28 PROCEDURE — 80048 BASIC METABOLIC PNL TOTAL CA: CPT | Performed by: STUDENT IN AN ORGANIZED HEALTH CARE EDUCATION/TRAINING PROGRAM

## 2024-06-28 PROCEDURE — 97530 THERAPEUTIC ACTIVITIES: CPT

## 2024-06-28 PROCEDURE — 85027 COMPLETE CBC AUTOMATED: CPT | Performed by: STUDENT IN AN ORGANIZED HEALTH CARE EDUCATION/TRAINING PROGRAM

## 2024-06-28 PROCEDURE — 63710000001 INSULIN LISPRO (HUMAN) PER 5 UNITS: Performed by: ORTHOPAEDIC SURGERY

## 2024-06-28 PROCEDURE — 25010000002 CEFTRIAXONE PER 250 MG: Performed by: INTERNAL MEDICINE

## 2024-06-28 RX ADMIN — INSULIN LISPRO 8 UNITS: 100 INJECTION, SOLUTION INTRAVENOUS; SUBCUTANEOUS at 17:47

## 2024-06-28 RX ADMIN — Medication 10 ML: at 20:45

## 2024-06-28 RX ADMIN — PANTOPRAZOLE SODIUM 40 MG: 40 INJECTION, POWDER, FOR SOLUTION INTRAVENOUS at 08:13

## 2024-06-28 RX ADMIN — ACETAMINOPHEN 1000 MG: 500 TABLET, FILM COATED ORAL at 20:46

## 2024-06-28 RX ADMIN — PANTOPRAZOLE SODIUM 40 MG: 40 INJECTION, POWDER, FOR SOLUTION INTRAVENOUS at 17:47

## 2024-06-28 RX ADMIN — CEFTRIAXONE 2000 MG: 2 INJECTION, POWDER, FOR SOLUTION INTRAMUSCULAR; INTRAVENOUS at 08:13

## 2024-06-28 RX ADMIN — INSULIN LISPRO 4 UNITS: 100 INJECTION, SOLUTION INTRAVENOUS; SUBCUTANEOUS at 20:51

## 2024-06-28 RX ADMIN — Medication 5 MG: at 20:46

## 2024-06-28 RX ADMIN — ASPIRIN 81 MG: 81 TABLET, COATED ORAL at 20:46

## 2024-06-28 RX ADMIN — ASPIRIN 81 MG: 81 TABLET, COATED ORAL at 08:13

## 2024-06-28 RX ADMIN — OXYCODONE HYDROCHLORIDE 5 MG: 5 TABLET ORAL at 20:52

## 2024-06-28 RX ADMIN — INSULIN LISPRO 4 UNITS: 100 INJECTION, SOLUTION INTRAVENOUS; SUBCUTANEOUS at 17:47

## 2024-06-28 RX ADMIN — Medication 1 CAPSULE: at 08:13

## 2024-06-28 RX ADMIN — Medication 10 ML: at 08:13

## 2024-06-28 RX ADMIN — ACETAMINOPHEN 1000 MG: 500 TABLET, FILM COATED ORAL at 05:18

## 2024-06-28 RX ADMIN — INSULIN GLARGINE 25 UNITS: 100 INJECTION, SOLUTION SUBCUTANEOUS at 20:47

## 2024-06-28 NOTE — PROGRESS NOTES
[facility] Medicine Services  PROGRESS NOTE    Patient Name: Easton Alvarez  : 1965  MRN: [id number]    Date of Admission: 2024  Primary Care Physician: Provider, No Known    Subjective   Subjective     CC:Follow-up left foot gangrene, status post BKA    HPI: No acute events overnight, patient stated he rested well, no new complaints, pain is controlled.    Objective   Objective     Vital Signs:   Temp:  [98.3 °F (36.8 °C)-99.3 °F (37.4 °C)] 99.2 °F (37.3 °C)  Heart Rate:  [] 118  Resp:  [16-17] 17  BP: (132-153)/(75-84) 135/84     Physical Exam:  Constitutional: No acute distress, awake, alert  HENT: NCAT, mucous membranes moist  Respiratory: Clear to auscultation bilaterally, respiratory effort normal   Cardiovascular: RRR, no murmurs, rubs, or gallops  Gastrointestinal: Positive bowel sounds, soft, nontender, nondistended  Musculoskeletal: Status post left BKA, no edema  Psychiatric: Appropriate affect, cooperative  Neurologic:.Nonfocal  Skin: No rashes      Results Reviewed:  LAB RESULTS:      Lab 24  0441 24  0436 24  0648 24  0526 24  1214 24  0628   WBC 7.09 6.84 11.58* 8.15  --  11.24*   HEMOGLOBIN 7.9* 8.2* 8.6* 7.9* 8.1* 7.7*   HEMATOCRIT 23.8* 25.2* 25.8* 23.0* 24.3* 22.6*   PLATELETS 121* 126* 167 140  --  159   MCV 97.5* 97.7* 94.5 95.0  --  95.0         Lab 24  0436 24  0648 24  0526 24  0628 24  0617   SODIUM 134* 136 133* 135* 132*   POTASSIUM 3.7 4.2 3.9 4.0 4.2   CHLORIDE 102 103 102 102 102   CO2 23.0 25.0 27.0 24.0 25.0   ANION GAP 9.0 8.0 4.0* 9.0 5.0   BUN 27* 30* 32* 38* 42*   CREATININE 0.67* 0.75* 0.83 0.75* 0.82   EGFR 106.9 103.3 100.2 103.3 100.6   GLUCOSE 91 149* 118* 191* 189*   CALCIUM 7.3* 7.2* 7.2* 6.7* 6.8*                         Brief Urine Lab Results  (Last result in the past 365 days)        Color   Clarity   Blood   Leuk Est   Nitrite   Protein   CREAT   Urine HCG         06/14/24 1109 Yellow   Clear   Negative   Negative   Negative   Negative                   Microbiology Results Abnormal       Procedure Component Value - Date/Time    Fungus Culture - Synovium, Hip, Right [[id number]] Collected: 06/20/24 1700    Lab Status: Preliminary result Specimen: Synovium from Hip, Right Updated: 06/27/24 1831     Fungus Culture No fungus isolated at 1 week    Fungus Culture - Synovium, Hip, Right [[id number]] Collected: 06/20/24 1700    Lab Status: Preliminary result Specimen: Synovium from Hip, Right Updated: 06/27/24 1831     Fungus Culture No fungus isolated at 1 week    Fungus Culture - Synovium, Hip, Right [[id number]] Collected: 06/20/24 1700    Lab Status: Preliminary result Specimen: Synovium from Hip, Right Updated: 06/27/24 1831     Fungus Culture No fungus isolated at 1 week    AFB Culture - Synovium, Hip, Right [[id number]] Collected: 06/20/24 1700    Lab Status: Preliminary result Specimen: Synovium from Hip, Right Updated: 06/27/24 1831     AFB Culture No AFB isolated at 1 week     AFB Stain No acid fast bacilli seen on concentrated smear    AFB Culture - Synovium, Hip, Right [[id number]] Collected: 06/20/24 1700    Lab Status: Preliminary result Specimen: Synovium from Hip, Right Updated: 06/27/24 1831     AFB Culture No AFB isolated at 1 week     AFB Stain No acid fast bacilli seen on concentrated smear    AFB Culture - Synovium, Hip, Right [[id number]] Collected: 06/20/24 1700    Lab Status: Preliminary result Specimen: Synovium from Hip, Right Updated: 06/27/24 1831     AFB Culture No AFB isolated at 1 week     AFB Stain No acid fast bacilli seen on concentrated smear    Fungus Culture - Surgical Site, Leg, Left [[id number]] Collected: 06/13/24 1031    Lab Status: Preliminary result Specimen: Surgical Site from Leg, Left Updated: 06/27/24 1201     Fungus Culture No fungus isolated at 2 weeks    AFB Culture - Surgical Site, Leg, Left [[id number]] Collected: 06/13/24 1031    Lab  Status: Preliminary result Specimen: Surgical Site from Leg, Left Updated: 06/27/24 1201     AFB Culture No AFB isolated at 2 weeks     AFB Stain No acid fast bacilli seen on concentrated smear    Anaerobic Culture 10 Day Incubation - Wound, Leg, Left [[id number]]  (Normal) Collected: 06/15/24 1501    Lab Status: Final result Specimen: Wound from Leg, Left Updated: 06/26/24 0647     Anaerobic Culture No anaerobes isolated at 10 days    Fungus Culture - Aspirate, Hip, Right [[id number]] Collected: 06/18/24 1052    Lab Status: Preliminary result Specimen: Aspirate from Hip, Right Updated: 06/25/24 1130     Fungus Culture No fungus isolated at 1 week    AFB Culture - Aspirate, Hip, Right [[id number]] Collected: 06/18/24 1052    Lab Status: Preliminary result Specimen: Aspirate from Hip, Right Updated: 06/25/24 1130     AFB Culture No AFB isolated at 1 week     AFB Stain No acid fast bacilli seen on concentrated smear    Anaerobic Culture 10 Day Incubation - Synovium, Hip, Right [[id number]]  (Normal) Collected: 06/20/24 1700    Lab Status: Preliminary result Specimen: Synovium from Hip, Right Updated: 06/25/24 0938     Anaerobic Culture No anaerobes isolated at 5 days    Anaerobic Culture 10 Day Incubation - Synovium, Hip, Right [[id number]]  (Normal) Collected: 06/20/24 1700    Lab Status: Preliminary result Specimen: Synovium from Hip, Right Updated: 06/25/24 0928     Anaerobic Culture No anaerobes isolated at 5 days    Anaerobic Culture 10 Day Incubation - Synovium, Hip, Right [[id number]]  (Normal) Collected: 06/20/24 1700    Lab Status: Preliminary result Specimen: Synovium from Hip, Right Updated: 06/25/24 0928     Anaerobic Culture No anaerobes isolated at 5 days    Tissue / Bone Culture - Synovium, Hip, Right [[id number]] Collected: 06/20/24 1700    Lab Status: Final result Specimen: Synovium from Hip, Right Updated: 06/23/24 1010     Tissue Culture No growth at 3 days     Gram Stain Many (4+) Red blood cells       Moderate (3+) WBCs seen      No organisms seen    Tissue / Bone Culture - Synovium, Hip, Right [[id number]] Collected: 06/20/24 1700    Lab Status: Final result Specimen: Synovium from Hip, Right Updated: 06/23/24 1009     Tissue Culture No growth at 3 days     Gram Stain Moderate (3+) WBCs seen      No organisms seen     Comment: Modified report. Previous result was Rare (1+) Gram positive cocci in pairs on 6/20/2024 at 1949 EDT.         Many (4+) Red blood cells    Tissue / Bone Culture - Synovium, Hip, Right [[id number]] Collected: 06/20/24 1700    Lab Status: Final result Specimen: Synovium from Hip, Right Updated: 06/23/24 1009     Tissue Culture No growth at 3 days     Gram Stain Many (4+) Red blood cells      Few (2+) WBCs seen      No organisms seen    Fungus Culture - Wound, Leg, Left [[id number]] Collected: 06/15/24 1501    Lab Status: Preliminary result Specimen: Wound from Leg, Left Updated: 06/22/24 2215     Fungus Culture No fungus isolated at 1 week    AFB Culture - Wound, Leg, Left [[id number]] Collected: 06/15/24 1501    Lab Status: Preliminary result Specimen: Wound from Leg, Left Updated: 06/22/24 2215     AFB Culture No AFB isolated at 1 week     AFB Stain No acid fast bacilli seen on concentrated smear    Blood Culture - Blood, Hand, Left [[id number]]  (Normal) Collected: 06/15/24 1104    Lab Status: Final result Specimen: Blood from Hand, Left Updated: 06/20/24 1201     Blood Culture No growth at 5 days    Blood Culture - Blood, Hand, Right [[id number]]  (Normal) Collected: 06/15/24 1104    Lab Status: Final result Specimen: Blood from Hand, Right Updated: 06/20/24 1201     Blood Culture No growth at 5 days    Wound Culture - Wound, Leg, Left [[id number]] Collected: 06/15/24 1501    Lab Status: Final result Specimen: Wound from Leg, Left Updated: 06/19/24 0711     Wound Culture No growth at 3 days     Gram Stain Rare (1+) WBCs seen      No organisms seen    Anaerobic Culture - Surgical Site, Leg, Left  [[id number]]  (Normal) Collected: 06/13/24 1031    Lab Status: Final result Specimen: Surgical Site from Leg, Left Updated: 06/18/24 0751     Anaerobic Culture No anaerobes isolated at 5 days    Blood Culture - Blood, Arm, Left [[id number]]  (Normal) Collected: 06/12/24 1832    Lab Status: Final result Specimen: Blood from Arm, Left Updated: 06/17/24 1900     Blood Culture No growth at 5 days    Blood Culture - Blood, Hand, Right [[id number]]  (Normal) Collected: 06/12/24 1837    Lab Status: Final result Specimen: Blood from Hand, Right Updated: 06/17/24 1900     Blood Culture No growth at 5 days    Fungus Smear - Surgical Site, Leg, Left [[id number]] Collected: 06/13/24 1031    Lab Status: Final result Specimen: Surgical Site from Leg, Left Updated: 06/14/24 1406     Fungal Stain No fungal elements seen            No radiology results from the last 24 hrs    Results for orders placed during the hospital encounter of 06/12/24    Adult Transthoracic Echo Complete w/ Color, Spectral and Contrast if Necessary Per Protocol    Interpretation Summary    Left ventricular systolic function is normal. Left ventricular ejection fraction appears to be 56 - 60%.    Left ventricular wall thickness is consistent with borderline concentric hypertrophy.    Left ventricular diastolic function is consistent with (grade Ia w/high LAP) impaired relaxation.      Current medications:  Scheduled Meds:acetaminophen, 1,000 mg, Oral, Q8H  aspirin, 81 mg, Oral, Q12H  cefTRIAXone, 2,000 mg, Intravenous, Q24H  insulin glargine, 25 Units, Subcutaneous, Nightly  insulin lispro, 2-9 Units, Subcutaneous, 4x Daily AC & at Bedtime  Insulin Lispro, 8 Units, Subcutaneous, TID With Meals  lactobacillus acidophilus, 1 capsule, Oral, Daily  [Held by provider] lisinopril, 10 mg, Oral, Q24H  melatonin, 5 mg, Oral, Nightly  pantoprazole, 40 mg, Intravenous, BID AC  senna-docusate sodium, 2 tablet, Oral, BID  sodium chloride, 10 mL, Intravenous, Q12H  sodium  chloride, 10 mL, Intravenous, Q12H      Continuous Infusions:lactated ringers, 9 mL/hr, Last Rate: 9 mL/hr (06/15/24 1307)  lactated ringers, 100 mL/hr, Last Rate: 100 mL/hr (06/20/24 2122)  ropivacaine, , Last Rate: Stopped (06/17/24 2230)      PRN Meds:.  senna-docusate sodium **AND** polyethylene glycol **AND** bisacodyl **AND** bisacodyl    Calcium Replacement - Follow Nurse / BPA Driven Protocol    dextrose    dextrose    glucagon (human recombinant)    Magnesium Standard Dose Replacement - Follow Nurse / BPA Driven Protocol    nitroglycerin    oxyCODONE    Phosphorus Replacement - Follow Nurse / BPA Driven Protocol    Potassium Replacement - Follow Nurse / BPA Driven Protocol    sodium chloride    sodium chloride    sodium chloride    Assessment & Plan   Assessment & Plan     Active Hospital Problems    Diagnosis  POA    **Diabetic ulcer of left foot with necrosis of muscle [E11.621, L97.523]  Yes    Severe malnutrition [E43]  Yes    Infection of prosthetic total hip joint [T84.59XA, Z96.649]  Not Applicable      Resolved Hospital Problems   No resolved problems to display.        Brief Hospital Course to date:  Easton Alvarez is a 58 y.o. male with past medical history significant for T2DM, diabetic neuropathy, right hip fracture/ORIF ([facility] 2/2024), and right transmetatarsal amputation 2019 at  who presented to OSH ED on 6/12/2024 with severe left foot cellulitis/necrosis after recent injury. Lactate at OSH was 8.8 and CT imaging of left leg showed gas gangrene. Orthopedic surgeon Dr. Em was consulted and performed a left below-knee amputation on 6/13/2024 with further debridement and irrigation on 6/15. Infectious disease has followed and managed antimicrobial therapy.     Sepsis secondary to group B strep bacteremia  Left foot cellulitis/gangrene   Leukocytosis  -MRI left foot showed extensive soft tissue infection with abscesses along the foot to the ankle and into the lower leg with soft tissue  gas.    -Wound cultures 6/12 with heavy growth group B strep and Kebsiella, blood cultures x 2 with group B strep; wound culture 6/13 with group B strep  -TTE described as technically adequate by cardiology; no description of vegetation by cardiology per report   -s/p left BKA with wound VAC on 6/13/24    -s/p further debridement in OR on 6/15/24 by Dr. Em, drain removed 6/17  -Infectious disease, Dr. Bills following; continue IV ceftriaxone, he will likely need at least 6 weeks of antibiotics.  -PT wound care following.  -Added Boost glucose control to increase protein intake for wound healing, nutrition following.  -Pain control with scheduled Tylenol, PRN oxycodone.   -Daily dressing changes to surgical site: Xeroform, 4X4, Kerlix, ACE  -Follow up with  Orthopedics in 2 weeks for wound check, imaging.     Right glenys-prosthetic hip infection  -IR performed fluid aspiration from the hip which was purulent.  -s/p I&D on 6/20, Dr. Sethi   -Fluid culture 6/18 with rare growth group B strep  -Supportive care, mobilize, pain control. Antibiotics as above.     Concern for GIB  Acute on chronic anemia  -with melena and fecal occult positive overnight 6/14  -GI consulted, recommended medical management with PPI BID x 1 month.  -GI referral at discharge for elevated LFTs, thrombocytopenia and concern for early cirrhosis, plan to pursue liver US after recovery from acute illness.  -H&H however seems to be trending down, but is stable, plan remains as above     Elevated blood pressure  -No history of taking meds for BP per patient   -BP has been running high this admission, possibly secondary to pain  -Started low-dose ACE given DM2, lisinopril 10 mg daily.     Mild hyponatremia  -Clinically insignificant  -Continue to monitor intermittently.      Anion gap metabolic acidosis POA, resolved   -AG was 20.1, likely from lactate, serum acetone negative.     Poorly controlled type 2 diabetes with A1c 9.5%   -Patient was  not taking meds or checking glucoses, though insulin had been prescribed in the past.  -Continue basal, prandial and SSI, adjust as warranted   -Diabetes educator was consulted    Expected Discharge Location and Transportation: Rehab, ?gateway  Expected Discharge   Expected Discharge Date: 7/2/2024; Expected Discharge Time:      VTE Prophylaxis:  Mechanical VTE prophylaxis orders are present.      AM-PAC 6 Clicks Score (PT): 11 (06/27/24 2000)    CODE STATUS:   Code Status and Medical Interventions:   Ordered at: 06/20/24 2026     Level Of Support Discussed With:    Patient     Code Status (Patient has no pulse and is not breathing):    CPR (Attempt to Resuscitate)     Medical Interventions (Patient has pulse or is breathing):    Full       Prashant Goodwin MD  06/28/24

## 2024-06-28 NOTE — PLAN OF CARE
Goal Outcome Evaluation:  Plan of Care Reviewed With: patient        Progress: improving  Outcome Evaluation: Pt demonstrating improvements this date by performing t/f bed>WC using slide board w/ Arnaldo x2. Pt also performed WC mobility x500 ft w/ SBA. Pt continues to present below baseline function d/t generalized weakness, balance deficits, and decreased activity tolerance. Pt would continue to benefit from skilled IP PT. Recommend IRF at d/c.      Anticipated Discharge Disposition (PT): inpatient rehabilitation facility

## 2024-06-28 NOTE — THERAPY TREATMENT NOTE
Patient Name: Easton Alvarez  : 1965    MRN: [id number]                              Today's Date: 2024       Admit Date: 2024    Visit Dx:     ICD-10-CM ICD-9-CM   1. S/P BKA (below knee amputation), left  Z89.512 V49.75   2. Diabetic ulcer of left midfoot associated with type 2 diabetes mellitus, with necrosis of muscle  E11.621 250.80    L97.423 707.14     728.89   3. Elevated LFTs  R79.89 790.6   4. Below knee amputation  S88.119A 897.0   5. Impaired functional mobility, balance, gait, and endurance  Z74.09 V49.89   6. Infection of prosthetic total hip joint, initial encounter  T84.59XA 996.66    Z96.649 V43.64     Patient Active Problem List   Diagnosis    Diabetic ulcer of left foot with necrosis of muscle    Severe malnutrition    Infection of prosthetic total hip joint     Past Medical History:   Diagnosis Date    Diabetes mellitus     Diabetic foot infection 2018    left (partial amputation)     Past Surgical History:   Procedure Laterality Date    AMPUTATION FOOT / TOE Right     partial foot    BELOW KNEE AMPUTATION Left 2024    Procedure: AMPUTATION BELOW KNEE AND WOUND VAC ASSISTED CLOSURE LEFT;  Surgeon: Brijesh Em Jr., MD;  Location:  [facility];  Service: Orthopedics;  Laterality: Left;    INCISION AND DRAINAGE HIP Right 2024    Procedure: HIP ANTERIOR INCISION AND DRAINAGE WITH HANA TABLE, POLY SWAP RIGHT WITH LEFT LEG PIN TRACTION;  Surgeon: Lex Sethi MD;  Location:  [facility];  Service: Orthopedics;  Laterality: Right;    INCISION AND DRAINAGE LEG Left 6/15/2024    Procedure: SECONDARY CLOSURE LEFT BELOW KNEE AMPUTATION;  Surgeon: Brijesh Em Jr., MD;  Location:  [facility];  Service: Orthopedics;  Laterality: Left;    PLACEMENT OF WOUND VAC Left 2024    Procedure: PLACEMENT OF WOUND VAC LEFT;  Surgeon: Brijesh Em Jr., MD;  Location:  [facility];  Service: Orthopedics;  Laterality: Left;    TOTAL HIP ARTHROPLASTY Right 2024    From fall  "\"whole hip was shattered\"      General Information       [facility] Name 06/28/24 1350          Physical Therapy Time and Intention    Document Type therapy note (daily note)  -     Mode of Treatment physical therapy  -[facility] Name 06/28/24 1350          General Information    Patient Profile Reviewed yes  -     Existing Precautions/Restrictions fall;left;non-weight bearing;right;hip, anterior;other (see comments)  Recent L BKA, NWB LLE; s/p I&D of R MARIA LUISA, woundvac, remote R foot TMA  -     Barriers to Rehab medically complex;previous functional deficit  -       Row Name 06/28/24 1350          Cognition    Orientation Status (Cognition) oriented x 4  -[facility] Name 06/28/24 1350          Safety Issues, Functional Mobility    Safety Issues Affecting Function (Mobility) insight into deficits/self-awareness;safety precaution awareness;safety precautions follow-through/compliance;sequencing abilities;problem-solving  -     Impairments Affecting Function (Mobility) balance;range of motion (ROM);strength;endurance/activity tolerance;pain  -               User Key  (r) = Recorded By, (t) = Taken By, (c) = Cosigned By      Initials Name Provider Type     Rachna Cruz, LINDA Physical Therapist                   Mobility       Row Name 06/28/24 135          Bed Mobility    Bed Mobility supine-sit  -     Supine-Sit [place] (Bed Mobility) minimum assist (75% patient effort);2 person assist;verbal cues  -     Assistive Device (Bed Mobility) draw sheet;head of bed elevated;bed rails  -     Comment, (Bed Mobility) VCs for sequencing and for maintaining L LE NWB. Required assist w/ L LE. Required increased time and effort  -       Row Name 06/28/24 1355          Bed-Chair Transfer    Bed-Chair [place] (Transfers) minimum assist (75% patient effort);2 person assist;verbal cues;moderate assist (50% patient effort)  -     Assistive Device (Bed-Chair Transfers) transfer board;other (see comments)  " BUE support  -     Comment, (Bed-Chair Transfer) Slide board t/f bed>WC required Arnaldo x2. VCs for hand placement and sequencing. SPT WC>recliner w/ BUE support required modAx2  -[facility] Name 06/28/24 1352          Sit-Stand Transfer    Sit-Stand [place] (Transfers) moderate assist (50% patient effort);2 person assist;verbal cues  -     Assistive Device (Sit-Stand Transfers) other (see comments)  BUE support  -     Comment, (Sit-Stand Transfer) STS prior to t/f WC>recliner  -[facility] Name 06/28/24 1352          Gait/Stairs (Locomotion)    [place] Level (Gait) unable to assess  -     Patient was able to Ambulate no, other medical factors prevent ambulation  -     Reason Patient was unable to Ambulate Excessive Weakness  -     Comment, (Gait/Stairs) Pt performed  mobility x500 ft w/ SBA. Able to self-propel using BUE and demo turning and retropulsion. Attempted propulsion w/ R LE, however unable to successfully complete. Distance limited by fatigue  -[facility] Name 06/28/24 1352          Mobility    Extremity Weight-bearing Status left lower extremity;right lower extremity  -     Left Lower Extremity (Weight-bearing Status) non weight-bearing (NWB)  -     Right Lower Extremity (Weight-bearing Status) weight-bearing as tolerated (WBAT)  -               User Key  (r) = Recorded By, (t) = Taken By, (c) = Cosigned By      Initials Name Provider Type     Rachna Cruz PT Physical Therapist                   Obj/Interventions       [place] [facility] Name 06/28/24 8589          Balance    Balance Assessment sitting static balance;sitting dynamic balance;sit to stand dynamic balance;standing static balance;standing dynamic balance  -     Static Sitting Balance standby assist  -     Dynamic Sitting Balance contact guard  -     Position, Sitting Balance unsupported;sitting edge of bed;supported;sitting in chair  -     Sit to Stand Dynamic Balance moderate assist;2-person assist;verbal  cues  -     Static Standing Balance moderate assist;2-person assist  [facility]     Dynamic Standing Balance moderate assist;2-person assist;verbal cues  [facility]     Position/Device Used, Standing Balance supported  -     Balance Interventions sitting;standing;sit to stand;supported;dynamic;static  -               User Key  (r) = Recorded By, (t) = Taken By, (c) = Cosigned By      Initials Name Provider Type     Rachna Cruz PT Physical Therapist                   Goals/Plan    No documentation.                  Clinical Impression       [facility] Name 06/28/24 1400          Pain    Pretreatment Pain Rating 0/10 - no pain  -     Posttreatment Pain Rating 0/10 - no pain  -[facility] Name 06/28/24 1400          Plan of Care Review    Plan of Care Reviewed With patient  -     Progress improving  -     Outcome Evaluation Pt demonstrating improvements this date by performing t/f bed>WC using slide board w/ Arnaldo x2. Pt also performed WC mobility x500 ft w/ SBA. Pt continues to present below baseline function d/t generalized weakness, balance deficits, and decreased activity tolerance. Pt would continue to benefit from skilled IP PT. Recommend IRF at d/c.  -       Row Name 06/28/24 1400          Vital Signs    Pre Systolic BP Rehab 129  -LH     Pre Treatment Diastolic BP 65  -LH     Post Systolic BP Rehab 145  -LH     Post Treatment Diastolic BP 82  -LH     Pre SpO2 (%) 99  -LH     O2 Delivery Pre Treatment room air  -     O2 Delivery Intra Treatment room air  -LH     Post SpO2 (%) 99  -LH     O2 Delivery Post Treatment room air  -     Pre Patient Position Supine  -     Intra Patient Position Sitting  -     Post Patient Position Sitting  -[facility] Name 06/28/24 1400          Positioning and Restraints    Pre-Treatment Position in bed  -     Post Treatment Position chair  -     In Chair notified nsg;reclined;sitting;call light within reach;encouraged to call for assist;exit alarm on;waffle cushion;on  mechanical lift sling;legs elevated;R heel elevated  -               User Key  (r) = Recorded By, (t) = Taken By, (c) = Cosigned By      Initials Name Provider Type     Rachna Cruz PT Physical Therapist                   Outcome Measures       Row Name 06/28/24 1404          How much help from another person do you currently need...    Turning from your back to your side while in flat bed without using bedrails? 3  -LH     Moving from lying on back to sitting on the side of a flat bed without bedrails? 2  -LH     Moving to and from a bed to a chair (including a wheelchair)? 2  -LH     Standing up from a chair using your arms (e.g., wheelchair, bedside chair)? 2  -LH     Climbing 3-5 steps with a railing? 1  -LH     To walk in hospital room? 1  -     AM-PAC 6 Clicks Score (PT) 11  -     Highest Level of Mobility Goal 4 --> Transfer to chair/commode  -       Row Name 06/28/24 1404          Functional Assessment    Outcome Measure Options AM-PAC 6 Clicks Basic Mobility (PT)  -               User Key  (r) = Recorded By, (t) = Taken By, (c) = Cosigned By      Initials Name Provider Type     Rachna Cruz PT Physical Therapist                                 Physical Therapy Education       Title: PT OT SLP Therapies (Done)       Topic: Physical Therapy (Done)       Point: Mobility training (Done)       Learning Progress Summary             Patient Acceptance, E, VU,DU,NR by  at 6/28/2024 1404    LEONELA Varela VU,DU by  at 6/27/2024 1539    Comment: Pt eudcated regarding WC management, transfers and symptom managment    Acceptance, E,D, VU by EM at 6/26/2024 1608    Comment: Pt educated regarding transfers, weight bearing status and HEP.    Acceptance, E, VU,DU,NR by  at 6/25/2024 1107    Acceptance, E,H, VU by  at 6/24/2024 1105    Comment: Pt educated on bed positioning with L BKA and importance of exercise on rehab.    Eager, E, VU,DU by SC at 6/21/2024 1349    Comment: reviewed HEP     Acceptance, E,D, VU,NR by SD at 6/19/2024 1203    Comment: PT ed for expected outcomes, risks, and benefits of IPPT services and progression of activity. Pt given visual demo of unilateral gait with RW. Discussion regarding dispo planning and IRF.                         Point: Home exercise program (Done)       Learning Progress Summary             Patient Acceptance, E,D, VU by EM at 6/26/2024 1608    Comment: Pt educated regarding transfers, weight bearing status and HEP.    Acceptance, E, VU,DU,NR by  at 6/25/2024 1107    Acceptance, E,H, VU by  at 6/24/2024 1105    Comment: Pt educated on bed positioning with L BKA and importance of exercise on rehab.    Acceptance, E, VU,NR,DU by  at 6/23/2024 1420    Eager, E, VU,DU by SC at 6/21/2024 1349    Comment: reviewed HEP    Acceptance, E,D, VU,NR by SD at 6/19/2024 1203    Comment: PT ed for expected outcomes, risks, and benefits of IPPT services and progression of activity. Pt given visual demo of unilateral gait with RW. Discussion regarding dispo planning and IRF.                         Point: Body mechanics (Done)       Learning Progress Summary             Patient Acceptance, E, VU,DU,NR by  at 6/28/2024 1404    Eager, E, VU,DU by EM at 6/27/2024 1539    Comment: Pt eudcated regarding WC management, transfers and symptom managment    Acceptance, E,D, VU by EM at 6/26/2024 1608    Comment: Pt educated regarding transfers, weight bearing status and HEP.    Acceptance, E, VU,DU,NR by  at 6/25/2024 1107    Acceptance, E,H, VU by  at 6/24/2024 1105    Comment: Pt educated on bed positioning with L BKA and importance of exercise on rehab.    Eager, E, VU,DU by SC at 6/21/2024 1349    Comment: reviewed HEP    Acceptance, E,D, VU,NR by SD at 6/19/2024 1203    Comment: PT ed for expected outcomes, risks, and benefits of IPPT services and progression of activity. Pt given visual demo of unilateral gait with RW. Discussion regarding dispo planning and IRF.                          Point: Precautions (Done)       Learning Progress Summary             Patient Acceptance, E, VU,DU,NR by  at 6/28/2024 1404    Eager, E, VU,DU by EM at 6/27/2024 1539    Comment: Pt eudcated regarding WC management, transfers and symptom managment    Acceptance, E,D, VU by EM at 6/26/2024 1608    Comment: Pt educated regarding transfers, weight bearing status and HEP.    Acceptance, E, VU,DU,NR by  at 6/25/2024 1107    Acceptance, E,H, VU by  at 6/24/2024 1105    Comment: Pt educated on bed positioning with L BKA and importance of exercise on rehab.    Eager, E, VU,DU by SC at 6/21/2024 1349    Comment: reviewed HEP    Acceptance, E,D, VU,NR by SD at 6/19/2024 1203    Comment: PT ed for expected outcomes, risks, and benefits of IPPT services and progression of activity. Pt given visual demo of unilateral gait with RW. Discussion regarding dispo planning and IRF.                                         User Key       Initials Effective Dates Name Provider Type Discipline    SC 02/03/23 -  Jorge Luis Childress, PT Physical Therapist PT    SD 03/13/23 -  Charity Dela Cruz, PT Physical Therapist PT    GEORGINA 06/16/21 -  Radha Miguel, PT Physical Therapist PT     09/21/23 -  Rachna Cruz, PT Physical Therapist PT    EM 05/07/24 -  Rancho Schneider, PT Student PT Student PT     05/07/24 -  Quincy Elizabeth, PT Student PT Student PT                  PT Recommendation and Plan     Plan of Care Reviewed With: patient  Progress: improving  Outcome Evaluation: Pt demonstrating improvements this date by performing t/f bed>WC using slide board w/ Arnaldo x2. Pt also performed WC mobility x500 ft w/ SBA. Pt continues to present below baseline function d/t generalized weakness, balance deficits, and decreased activity tolerance. Pt would continue to benefit from skilled IP PT. Recommend IRF at d/c.     Time Calculation:         PT Charges       Row Name 06/28/24 140             Time Calculation     Start Time 1030  -      PT Received On 06/28/24  -      PT Goal Re-Cert Due Date 07/01/24  -         Timed Charges    89498 - PT Therapeutic Activity Minutes 38  -      49825 - Wheelchair Management Training --  -         Total Minutes    Timed Charges Total Minutes 38  -       Total Minutes 38  -                User Key  (r) = Recorded By, (t) = Taken By, (c) = Cosigned By      Initials Name Provider Type     Rachna Cruz, PT Physical Therapist                  Therapy Charges for Today       Code Description Service Date Service Provider Modifiers Qty    [id number]  PT THERAPEUTIC ACT EA 15 MIN 6/28/2024 Rachna Cruz, PT GP 3            PT G-Codes  Outcome Measure Options: AM-PAC 6 Clicks Basic Mobility (PT)  AM-PAC 6 Clicks Score (PT): 11  AM-PAC 6 Clicks Score (OT): 14  PT Discharge Summary  Anticipated Discharge Disposition (PT): inpatient rehabilitation facility    Rachna Cruz PT  6/28/2024

## 2024-06-28 NOTE — PROGRESS NOTES
"Easton Alvarez  1965  [id number]          Chief Complaint: left foot infection    Reason for Consultation: left foot infection    History of present illness:     Patient is a 58 y.o.  Yr old male with history of diabetes with prior right TMA 2019 at , history strep septicemia 2016 ; he reports right total hip arthroplasty 2024 after fall. He reports a fall several weeks prior to his June admission with injury to the left foot, wound present with deterioration several days preceding admission with severe discoloration/redness and swelling.  CT scan showed concern for subcutaneous emphysema and necrotizing infection.  Transferred to [facility] with evaluation by Dr. Em and Dr. Lundberg and arrangements made for urgent surgery. Subsequently, blood cultures called with gram-positive cocci     6/13 left LE amp, Dr Em    6/15  \"PROCEDURE:            Left      34173: Secondary closure above-knee amputation\"      6/18/24  right hip aspiration, culture with GB strep; wbc  down some postop from revision; blood cultures 6/15 negative so far    6/19/24 Dr. mE helping facilitate right hip surgery with orthopedic partners/team    6/20/24 surgery Dr Sethi  \"Procedure(s):  HIP ANTERIOR INCISION AND DRAINAGE WITH HANA TABLE, POLY SWAP- RIGHT\"    6/26/24 urinary retention per nursing notes, Dr Goodwin aware.  Dr Goodwin reports later that patient voiding without  retention issues    6/28/24  case management looking into options;  postop pain controlled at present; He has left stump  pain that is constant, sharp at times, blunted by neuropathy, worse with palpation, better with pain meds and 4  out of 10 in severity. No distress per nursing; no new focal symptoms otherwise; he has right hip pain with range of motion and weightbearing increased and out of 10 at present with movement, nonradiating, better with pain meds.   Denies overt diarrhea today so far     No headache photophobia or neck " "stiffness.  No shortness of breath cough or hemoptysis.  No nausea vomiting  or abdominal pain.  No dysuria hematuria or pyuria.  No other new skin rash    Past Medical History:   Diagnosis Date    Diabetes mellitus     Diabetic foot infection 2018    left (partial amputation)       Past Surgical History:   Procedure Laterality Date    AMPUTATION FOOT / TOE Right     partial foot    BELOW KNEE AMPUTATION Left 6/13/2024    Procedure: AMPUTATION BELOW KNEE AND WOUND VAC ASSISTED CLOSURE LEFT;  Surgeon: Brijesh Em Jr., MD;  Location:  [facility];  Service: Orthopedics;  Laterality: Left;    INCISION AND DRAINAGE HIP Right 6/20/2024    Procedure: HIP ANTERIOR INCISION AND DRAINAGE WITH HANA TABLE, POLY SWAP RIGHT WITH LEFT LEG PIN TRACTION;  Surgeon: Lex Sethi MD;  Location:  [facility];  Service: Orthopedics;  Laterality: Right;    INCISION AND DRAINAGE LEG Left 6/15/2024    Procedure: SECONDARY CLOSURE LEFT BELOW KNEE AMPUTATION;  Surgeon: Brijesh Em Jr., MD;  Location:  [facility];  Service: Orthopedics;  Laterality: Left;    PLACEMENT OF WOUND VAC Left 6/13/2024    Procedure: PLACEMENT OF WOUND VAC LEFT;  Surgeon: Brijesh Em Jr., MD;  Location:  [facility];  Service: Orthopedics;  Laterality: Left;    TOTAL HIP ARTHROPLASTY Right 02/2024    From fall \"whole hip was shattered\"           No Known Allergies       Review of Systems    6/28/24     Constitutional-- No Fever, chills or sweats.  Appetite diminished with fatigue.  Heent-- No new vision, hearing or throat complaints.  No epistaxis or oral sores.  Denies odynophagia or dysphagia.  No flashers, floaters or eye pain. No odynophagia or dysphagia. No headache, photophobia or neck stiffness.  CV-- No chest pain, palpitation or syncope  Resp-- No SOB/cough/Hemoptysis  GI- No nausea, vomiting, or diarrhea.  No hematochezia, melena, or hematemesis. Denies jaundice or chronic liver disease.  -- No dysuria, hematuria, or flank pain.  Denies " "hesitancy, urgency or flank pain.  Lymph- no swollen lymph nodes in neck/axilla or groin.   Heme- No active bruising or bleeding; no Hx of DVT or PE.  MS-- aside from above, no swelling or pain in the bones or joints of arms/legs.  No new back pain.  Neuro-- No acute focal weakness or numbness in the arms or legs.  No seizures.    Full 12 point review of systems reviewed and negative otherwise for acute complaints, except for above    Physical Exam:   Vital Signs   /84 (BP Location: Left arm, Patient Position: Lying)   Pulse 118   Temp 97.7 °F (36.5 °C)   Resp 17   Ht 172.7 cm (67.99\")   Wt 67.6 kg (149 lb)   SpO2 95%   BMI 22.66 kg/m²     GENERAL: sleepy; in no acute distress.   HEENT: Normocephalic, atraumatic.   No conjunctival injection. No icterus. Oropharynx clear without evidence of thrush or exudate. No evidence of periodontal disease.    NECK: Supple without nuchal rigidity. No mass.   HEART: RRR; No murmur, rubs, gallops.   LUNGS: diminished at bases.  Trace crackles at the bases but no rhonchi or wheeze. Normal respiratory effort. Nonlabored. No dullness.  ABDOMEN: Soft, nontender, nondistended. Positive bowel sounds. No rebound or guarding. NO mass or HSM.  EXT:  see below.     MSK: right hip surgical site cvered;  no new visible redness/purulence or fluctuance  SKIN: Warm and dry without cutaneous eruptions on Inspection/palpation.      Left   BKA stump covered;  no new visible redness or purulence; no discrete fluctuance.  No crepitus    Laboratory Data    Results from last 7 days   Lab Units 06/28/24  0441 06/27/24  0436 06/26/24  0648   WBC 10*3/mm3 7.09 6.84 11.58*   HEMOGLOBIN g/dL 7.9* 8.2* 8.6*   HEMATOCRIT % 23.8* 25.2* 25.8*   PLATELETS 10*3/mm3 121* 126* 167     Results from last 7 days   Lab Units 06/28/24  0441   SODIUM mmol/L 135*   POTASSIUM mmol/L 3.8   CHLORIDE mmol/L 103   CO2 mmol/L 25.0   BUN mg/dL 24*   CREATININE mg/dL 0.70*   GLUCOSE mg/dL 76   CALCIUM mg/dL 7.1*       "                 Estimated Creatinine Clearance: 107.3 mL/min (A) (by C-G formula based on SCr of 0.7 mg/dL (L)).      Microbiology:      Radiology:  Imaging Results (Last 72 Hours)       ** No results found for the last 72 hours. **              Impression:     --acute sepsis as per admission notes, severe left foot infection with gangrene/cellulitis and concern for necrotizing soft tissue infection by imaging, urgent surgical arrangements made June 13; repeat surg 6/15  ;  subsequent right hip surg 6/20; He knows risk for persistent/recurrent or nonhealing wounds, persistet / progressive or recurrent infection and risk for further amputation/functional-limb loss and chronic pain.  Associated with GB strep bacteremia as below.    --Acute/severe left foot infection as above, urgent surgical arrangements  made 6/13    --Acute gb strep bacteremia,   source from foot.  High risk for further serious morbidity and other serious sequela including dire consequences and metastatic foci of involvement/endovascular sequela etc. No new metastatic focus of involvement. TTE 6/17    --Right hip fracture with repair February 2024.  +pain ; orthopedics with  aspiration June 18 and culture with GB strep likely hematogenous seeding, MRI imaging with concern for septic joint, surgery on June 20 as above.   Patient understands risk for persistent/recurrent or progressive infection and potential for further surgical intervention in the future that could include functional/limb loss and other serious sequela/morbidity; he knows antibiotics and surgery not a guarantee for care    --postop leukocytosis, possible stress response associated with surgery.  Trended down June 22-27.  No new respiratory symptoms.  Encouraged incentive spirometry.  Abdomen benign with no diarrhea.  No new urinary tract complaints.  IV sites with no suppurative change.  No rash.  Monitor for new process; no new focal muscoskeletal symptoms to suggest new metastatic  focus of infection    --diabetes.  Described as uncontrolled by medicine team at admission.  Glucose control per medicine team    --urinary retention per nursing staff.  Per their notes patient refused in/out catheterization.  Dr. Goodwin aware and he will discuss options with patient    --thrombocytopenia.  Monitor      PLAN:      --IV  rocephin likely at least 6 weeks from hip surgery and anticipate step down to oral agents after with oral keflex    **wound culture at left foot mixed with  MSSA Klebsiella and GB strep  **blood cultures with group B strep  ** right hip cultures with group B strep    --orthopedics  making surgical plans with concern for right hipprosthetic/periprosthetic infection     --Check/review labs cultures and scans    --TTE described as technically adequate by cardiology; no description of vegetation by cardiology per report    --Partial history per nursing staff    --encouraged incentive spirometry.  Nursing aware to instruct and encourage    --Discussed with microbiology    --d/w Dr Goodwin    --Highly complex at of issues with high risk for further serious morbidity and other serious sequela    **follow-up with me one week.  Ceftriaxone IV for at least 6 weeks with oral Keflex after, no stop date for oral Keflex.  Every Monday CBC/CMP and sed rate/CRP.  Every Monday PICC line dressing change.    Copied text in this note has been reviewed and is accurate as of 06/28/24.        Cleve Bills MD  6/28/2024

## 2024-06-28 NOTE — PLAN OF CARE
Problem: Skin Injury Risk Increased  Goal: Skin Health and Integrity  Outcome: Ongoing, Progressing  Intervention: Optimize Skin Protection  Recent Flowsheet Documentation  Taken 6/28/2024 0400 by Jazmín Taylor RN  Pressure Reduction Techniques:   frequent weight shift encouraged   pressure points protected   weight shift assistance provided  Head of Bed (HOB) Positioning: HOB at 30-45 degrees  Pressure Reduction Devices: pressure-redistributing mattress utilized  Skin Protection:   adhesive use limited   incontinence pads utilized   tubing/devices free from skin contact  Taken 6/28/2024 0200 by Jazmín Taylor RN  Pressure Reduction Techniques: frequent weight shift encouraged  Head of Bed (HOB) Positioning: HOB at 30-45 degrees  Pressure Reduction Devices: pressure-redistributing mattress utilized  Skin Protection:   adhesive use limited   incontinence pads utilized   tubing/devices free from skin contact  Taken 6/27/2024 2353 by Jazmín Taylor RN  Pressure Reduction Techniques: frequent weight shift encouraged  Pressure Reduction Devices: pressure-redistributing mattress utilized  Skin Protection:   adhesive use limited   incontinence pads utilized   tubing/devices free from skin contact  Taken 6/27/2024 2200 by Jazmín Taylor RN  Pressure Reduction Techniques: frequent weight shift encouraged  Head of Bed (HOB) Positioning: HOB at 30-45 degrees  Pressure Reduction Devices: pressure-redistributing mattress utilized  Skin Protection:   adhesive use limited   incontinence pads utilized   tubing/devices free from skin contact  Taken 6/27/2024 2000 by Jazmín Taylor RN  Pressure Reduction Techniques: frequent weight shift encouraged  Head of Bed (HOB) Positioning: HOB at 30-45 degrees  Pressure Reduction Devices: pressure-redistributing mattress utilized     Problem: Fall Injury Risk  Goal: Absence of Fall and Fall-Related Injury  Outcome: Ongoing, Progressing  Intervention: Identify and Manage  Contributors  Recent Flowsheet Documentation  Taken 6/27/2024 2000 by Jazmín Taylor RN  Medication Review/Management: medications reviewed  Self-Care Promotion: independence encouraged  Intervention: Promote Injury-Free Environment  Recent Flowsheet Documentation  Taken 6/28/2024 0200 by Jazmín Taylor RN  Safety Promotion/Fall Prevention:   activity supervised   assistive device/personal items within reach   clutter free environment maintained   fall prevention program maintained   nonskid shoes/slippers when out of bed   room organization consistent   safety round/check completed  Taken 6/27/2024 2353 by Jazmín Taylor RN  Safety Promotion/Fall Prevention:   activity supervised   assistive device/personal items within reach   clutter free environment maintained   fall prevention program maintained   nonskid shoes/slippers when out of bed   room organization consistent   safety round/check completed  Taken 6/27/2024 2200 by Jazmín Taylor RN  Safety Promotion/Fall Prevention:   activity supervised   assistive device/personal items within reach   clutter free environment maintained   fall prevention program maintained   nonskid shoes/slippers when out of bed   room organization consistent   safety round/check completed  Taken 6/27/2024 2000 by Jazmín Taylor RN  Safety Promotion/Fall Prevention:   activity supervised   assistive device/personal items within reach   clutter free environment maintained   fall prevention program maintained   nonskid shoes/slippers when out of bed   room organization consistent     Problem: Adjustment to Surgery (Extremity Amputation)  Goal: Optimal Coping with Amputation  Outcome: Ongoing, Progressing  Intervention: Support Psychsocial Response  Recent Flowsheet Documentation  Taken 6/27/2024 2000 by Jazmín Taylor RN  Family/Support System Care: self-care encouraged     Problem: Bleeding (Extremity Amputation)  Goal: Absence of Bleeding  Outcome: Ongoing,  Progressing  Intervention: Monitor and Manage Bleeding  Recent Flowsheet Documentation  Taken 6/27/2024 2000 by Jazmín Taylor RN  Bleeding Management: dressing monitored     Problem: Bowel Motility Impaired (Extremity Amputation)  Goal: Effective Bowel Elimination  Outcome: Ongoing, Progressing     Problem: Fluid and Electrolyte Imbalance (Extremity Amputation)  Goal: Fluid and Electrolyte Balance  Outcome: Ongoing, Progressing  Intervention: Monitor and Manage Fluid and Electrolyte Balance  Recent Flowsheet Documentation  Taken 6/27/2024 2000 by Jazmín Taylor RN  Fluid/Electrolyte Management: fluids provided     Problem: Functional Ability Impaired (Extremity Amputation)  Goal: Optimal Functional Ability  Outcome: Ongoing, Progressing  Intervention: Optimize Functional Ability  Recent Flowsheet Documentation  Taken 6/27/2024 2000 by Jazmín Taylor RN  Activity Assistance Provided: assistance, 2 people  Assistive Device Utilized: lift device  Self-Care Promotion: independence encouraged     Problem: Infection (Extremity Amputation)  Goal: Absence of Infection Signs and Symptoms  Outcome: Ongoing, Progressing  Intervention: Prevent or Manage Infection  Recent Flowsheet Documentation  Taken 6/28/2024 0400 by Jazmín Taylor RN  Infection Prevention:   environmental surveillance performed   hand hygiene promoted   rest/sleep promoted   single patient room provided  Taken 6/28/2024 0200 by Jazmín Taylor RN  Infection Prevention:   environmental surveillance performed   hand hygiene promoted   rest/sleep promoted   single patient room provided  Taken 6/27/2024 2353 by Jazmín Taylor RN  Infection Prevention:   environmental surveillance performed   hand hygiene promoted   rest/sleep promoted   single patient room provided  Taken 6/27/2024 2200 by Jazmín Taylor RN  Infection Prevention:   environmental surveillance performed   hand hygiene promoted   rest/sleep promoted   single patient room  provided  Taken 6/27/2024 2000 by Jazmín Taylor RN  Infection Prevention:   environmental surveillance performed   hand hygiene promoted   single patient room provided     Problem: Ongoing Anesthesia Effects (Extremity Amputation)  Goal: Anesthesia/Sedation Recovery  Outcome: Ongoing, Progressing  Intervention: Optimize Anesthesia Recovery  Recent Flowsheet Documentation  Taken 6/28/2024 0200 by Jazmín Taylor RN  Safety Promotion/Fall Prevention:   activity supervised   assistive device/personal items within reach   clutter free environment maintained   fall prevention program maintained   nonskid shoes/slippers when out of bed   room organization consistent   safety round/check completed  Taken 6/27/2024 2353 by Jazmín Taylor RN  Safety Promotion/Fall Prevention:   activity supervised   assistive device/personal items within reach   clutter free environment maintained   fall prevention program maintained   nonskid shoes/slippers when out of bed   room organization consistent   safety round/check completed  Taken 6/27/2024 2200 by Jazmín Taylor RN  Safety Promotion/Fall Prevention:   activity supervised   assistive device/personal items within reach   clutter free environment maintained   fall prevention program maintained   nonskid shoes/slippers when out of bed   room organization consistent   safety round/check completed  Taken 6/27/2024 2000 by Jazmín Taylor RN  Safety Promotion/Fall Prevention:   activity supervised   assistive device/personal items within reach   clutter free environment maintained   fall prevention program maintained   nonskid shoes/slippers when out of bed   room organization consistent     Problem: Pain (Extremity Amputation)  Goal: Acceptable Pain Control  Outcome: Ongoing, Progressing  Intervention: Prevent or Manage Pain  Recent Flowsheet Documentation  Taken 6/27/2024 2000 by Jazmín Taylor RN  Diversional Activities:   television   smartphone     Problem:  Postoperative Nausea and Vomiting (Extremity Amputation)  Goal: Nausea and Vomiting Relief  Outcome: Ongoing, Progressing     Problem: Postoperative Urinary Retention (Extremity Amputation)  Goal: Effective Urinary Elimination  Outcome: Ongoing, Progressing     Problem: Residual Limb Care (Extremity Amputation)  Goal: Optimal Residual Limb Healing  Outcome: Ongoing, Progressing     Problem: Mobility Impairment  Goal: Optimal Mobility  Outcome: Ongoing, Progressing  Intervention: Optimize Mobility  Recent Flowsheet Documentation  Taken 6/28/2024 0400 by Jazmín Taylor RN  Positioning/Transfer Devices:   pillows   in use  Taken 6/28/2024 0200 by Jazmín Taylor RN  Positioning/Transfer Devices:   pillows   in use  Taken 6/27/2024 2200 by Jazmín Taylor RN  Positioning/Transfer Devices:   pillows   in use  Taken 6/27/2024 2000 by Jazmín Taylor RN  Assistive Device Utilized: lift device  Positioning/Transfer Devices:   pillows   in use     Problem: Adult Inpatient Plan of Care  Goal: Plan of Care Review  Outcome: Ongoing, Progressing  Goal: Patient-Specific Goal (Individualized)  Outcome: Ongoing, Progressing  Goal: Absence of Hospital-Acquired Illness or Injury  Outcome: Ongoing, Progressing  Intervention: Identify and Manage Fall Risk  Recent Flowsheet Documentation  Taken 6/28/2024 0200 by Jazmín Taylor RN  Safety Promotion/Fall Prevention:   activity supervised   assistive device/personal items within reach   clutter free environment maintained   fall prevention program maintained   nonskid shoes/slippers when out of bed   room organization consistent   safety round/check completed  Taken 6/27/2024 2353 by Jazmín Taylor RN  Safety Promotion/Fall Prevention:   activity supervised   assistive device/personal items within reach   clutter free environment maintained   fall prevention program maintained   nonskid shoes/slippers when out of bed   room organization consistent   safety round/check  completed  Taken 6/27/2024 2200 by Jazmín Taylor RN  Safety Promotion/Fall Prevention:   activity supervised   assistive device/personal items within reach   clutter free environment maintained   fall prevention program maintained   nonskid shoes/slippers when out of bed   room organization consistent   safety round/check completed  Taken 6/27/2024 2000 by Jazmín Taylor RN  Safety Promotion/Fall Prevention:   activity supervised   assistive device/personal items within reach   clutter free environment maintained   fall prevention program maintained   nonskid shoes/slippers when out of bed   room organization consistent  Intervention: Prevent Skin Injury  Recent Flowsheet Documentation  Taken 6/28/2024 0400 by Jazmín Taylor RN  Body Position: position changed independently  Skin Protection:   adhesive use limited   incontinence pads utilized   tubing/devices free from skin contact  Taken 6/28/2024 0200 by Jazmín Taylor RN  Body Position: position changed independently  Skin Protection:   adhesive use limited   incontinence pads utilized   tubing/devices free from skin contact  Taken 6/27/2024 2353 by Jazmín Taylor RN  Skin Protection:   adhesive use limited   incontinence pads utilized   tubing/devices free from skin contact  Taken 6/27/2024 2200 by Jazmín Taylor RN  Body Position: position changed independently  Skin Protection:   adhesive use limited   incontinence pads utilized   tubing/devices free from skin contact  Taken 6/27/2024 2000 by Jazmín Taylor RN  Body Position: position changed independently  Intervention: Prevent and Manage VTE (Venous Thromboembolism) Risk  Recent Flowsheet Documentation  Taken 6/27/2024 2000 by Jazmín Taylor RN  VTE Prevention/Management:   right   sequential compression devices on  Intervention: Prevent Infection  Recent Flowsheet Documentation  Taken 6/28/2024 0400 by Jazmín Taylor RN  Infection Prevention:   environmental surveillance  performed   hand hygiene promoted   rest/sleep promoted   single patient room provided  Taken 6/28/2024 0200 by Jazmín Taylor RN  Infection Prevention:   environmental surveillance performed   hand hygiene promoted   rest/sleep promoted   single patient room provided  Taken 6/27/2024 2353 by Jazmín Taylor RN  Infection Prevention:   environmental surveillance performed   hand hygiene promoted   rest/sleep promoted   single patient room provided  Taken 6/27/2024 2200 by Jazmín Taylor RN  Infection Prevention:   environmental surveillance performed   hand hygiene promoted   rest/sleep promoted   single patient room provided  Taken 6/27/2024 2000 by Jazmín Taylor RN  Infection Prevention:   environmental surveillance performed   hand hygiene promoted   single patient room provided  Goal: Optimal Comfort and Wellbeing  Outcome: Ongoing, Progressing  Intervention: Provide Person-Centered Care  Recent Flowsheet Documentation  Taken 6/27/2024 2000 by Jazmín Taylor RN  Trust Relationship/Rapport: care explained  Goal: Readiness for Transition of Care  Outcome: Ongoing, Progressing   Goal Outcome Evaluation:

## 2024-06-28 NOTE — PROGRESS NOTES
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    PSH:  He  has a past surgical history that includes Total hip arthroplasty (Right, 02/2024); Amputation foot / toe (Right); incision and drainage leg (Left, 6/15/2024); Leg amputation, lower tibia/fibula (Left, 6/13/2024); PLACEMENT OF WOUND VAC (Left, 6/13/2024); and Incision and drainage hip (Right, 6/20/2024).    Applicable Nutrition History:   6/13-s/p L BKA    6-15-24: s/p 2nd closure L BKA    6-20-24: s/p I+ D R hip    Anthropometrics     Height: Height: 172.7 cm (67.99\")  Last Filed Weight: Weight: 67.6 kg (149 lb) (06/18/24 1506)  Method: Weight Method: Stated  BMI: n/a 2nd BKA    UBW:  150-160lbs per pt report  Weight change:  unable to assess, only stated wt available    Nutrition Focused Physical Exam    Date: 6/13    Patient meets criteria for malnutrition diagnosis, see MSA note.     Subjective   Reported/Observed/Food/Nutrition Related History:     6-28-24: pt resting in bed, reports fair appetite, flat affect, is drinking boost    6-21:Pt resting in bed, reports appetite goes up and down, is feeling bloated at present, is drinking boost, states he does know how to give himself insulin shots, he does not really follow diabetic diet at home    DM diet education provided    6-13:Pt reports decreased appetite for a few weeks (<1 month) 2/2 poor appetite and suspects eating ~50% of usual intake. Pt     Current Nutrition " Prescription   PO: Diet: Regular/House, Diabetic, Renal; Consistent Carbohydrate; Low Potassium; Fluid Consistency: Thin (IDDSI 0)  Oral Nutrition Supplement: Boost GC 2x/day  Intake:  68% of 10 meals    Assessment & Plan   Nutrition Diagnosis   Date: 6/13             Updated:    Problem Malnutrition severe acute   Etiology Clinical status-uncontrolled DM, sepsis   Signs/Symptoms Severe muscle wasting and moderate subcutaneous fat loss, po intake <75% EEN x >/=7 days     Date:   6/13  Updated:  6-28   Problem Increased nutrient needs   Etiology Skin integrity   Signs/Symptoms S/p L BKA, s/p R hip I+D       Goal / Objectives:   Nutrition to support treatment and Continue positive trend      Nutrition Intervention      Follow treatment progress, Care plan reviewed, Advised available snacks, Interview for preferences, Menu adjusted, Education provided    Renal restriction lifted as K+ wnl    Monitoring/Evaluation:   Per protocol, PO intake, Supplement intake, Weight, Skin status    Bia He, SHERMAN  Time Spent:30min

## 2024-06-29 LAB
ANION GAP SERPL CALCULATED.3IONS-SCNC: 7 MMOL/L (ref 5–15)
BUN SERPL-MCNC: 21 MG/DL (ref 8–23)
BUN/CREAT SERPL: 29.2 (ref 7–25)
CALCIUM SPEC-SCNC: 7.2 MG/DL (ref 8.6–10.5)
CHLORIDE SERPL-SCNC: 104 MMOL/L (ref 98–107)
CO2 SERPL-SCNC: 25 MMOL/L (ref 22–29)
CREAT SERPL-MCNC: 0.72 MG/DL (ref 0.76–1.27)
DEPRECATED RDW RBC AUTO: 49.5 FL (ref 37–54)
EGFRCR SERPLBLD CKD-EPI 2021: 104.6 ML/MIN/1.73
ERYTHROCYTE [DISTWIDTH] IN BLOOD BY AUTOMATED COUNT: 14.1 % (ref 12.3–15.4)
GLUCOSE BLDC GLUCOMTR-MCNC: 114 MG/DL (ref 70–130)
GLUCOSE BLDC GLUCOMTR-MCNC: 138 MG/DL (ref 70–130)
GLUCOSE BLDC GLUCOMTR-MCNC: 288 MG/DL (ref 70–130)
GLUCOSE BLDC GLUCOMTR-MCNC: 99 MG/DL (ref 70–130)
GLUCOSE SERPL-MCNC: 75 MG/DL (ref 65–99)
HCT VFR BLD AUTO: 22.8 % (ref 37.5–51)
HGB BLD-MCNC: 7.7 G/DL (ref 13–17.7)
MCH RBC QN AUTO: 32.5 PG (ref 26.6–33)
MCHC RBC AUTO-ENTMCNC: 33.8 G/DL (ref 31.5–35.7)
MCV RBC AUTO: 96.2 FL (ref 79–97)
PLATELET # BLD AUTO: 114 10*3/MM3 (ref 140–450)
PMV BLD AUTO: 9.6 FL (ref 6–12)
POTASSIUM SERPL-SCNC: 3.8 MMOL/L (ref 3.5–5.2)
RBC # BLD AUTO: 2.37 10*6/MM3 (ref 4.14–5.8)
SODIUM SERPL-SCNC: 136 MMOL/L (ref 136–145)
WBC NRBC COR # BLD AUTO: 6.54 10*3/MM3 (ref 3.4–10.8)

## 2024-06-29 PROCEDURE — 25010000002 CEFTRIAXONE PER 250 MG: Performed by: INTERNAL MEDICINE

## 2024-06-29 PROCEDURE — 99232 SBSQ HOSP IP/OBS MODERATE 35: CPT | Performed by: NURSE PRACTITIONER

## 2024-06-29 PROCEDURE — 63710000001 INSULIN LISPRO (HUMAN) PER 5 UNITS: Performed by: INTERNAL MEDICINE

## 2024-06-29 PROCEDURE — 80048 BASIC METABOLIC PNL TOTAL CA: CPT | Performed by: STUDENT IN AN ORGANIZED HEALTH CARE EDUCATION/TRAINING PROGRAM

## 2024-06-29 PROCEDURE — 85027 COMPLETE CBC AUTOMATED: CPT | Performed by: STUDENT IN AN ORGANIZED HEALTH CARE EDUCATION/TRAINING PROGRAM

## 2024-06-29 PROCEDURE — 63710000001 INSULIN LISPRO (HUMAN) PER 5 UNITS: Performed by: ORTHOPAEDIC SURGERY

## 2024-06-29 PROCEDURE — 82948 REAGENT STRIP/BLOOD GLUCOSE: CPT

## 2024-06-29 PROCEDURE — 63710000001 INSULIN GLARGINE PER 5 UNITS: Performed by: INTERNAL MEDICINE

## 2024-06-29 PROCEDURE — 97530 THERAPEUTIC ACTIVITIES: CPT

## 2024-06-29 PROCEDURE — 97110 THERAPEUTIC EXERCISES: CPT

## 2024-06-29 RX ADMIN — INSULIN LISPRO 8 UNITS: 100 INJECTION, SOLUTION INTRAVENOUS; SUBCUTANEOUS at 17:34

## 2024-06-29 RX ADMIN — ACETAMINOPHEN 1000 MG: 500 TABLET, FILM COATED ORAL at 06:25

## 2024-06-29 RX ADMIN — ASPIRIN 81 MG: 81 TABLET, COATED ORAL at 20:52

## 2024-06-29 RX ADMIN — PANTOPRAZOLE SODIUM 40 MG: 40 INJECTION, POWDER, FOR SOLUTION INTRAVENOUS at 17:34

## 2024-06-29 RX ADMIN — ASPIRIN 81 MG: 81 TABLET, COATED ORAL at 08:49

## 2024-06-29 RX ADMIN — Medication 1 CAPSULE: at 08:49

## 2024-06-29 RX ADMIN — PANTOPRAZOLE SODIUM 40 MG: 40 INJECTION, POWDER, FOR SOLUTION INTRAVENOUS at 06:25

## 2024-06-29 RX ADMIN — INSULIN LISPRO 6 UNITS: 100 INJECTION, SOLUTION INTRAVENOUS; SUBCUTANEOUS at 08:49

## 2024-06-29 RX ADMIN — SENNOSIDES AND DOCUSATE SODIUM 2 TABLET: 8.6; 5 TABLET ORAL at 20:53

## 2024-06-29 RX ADMIN — Medication 5 MG: at 20:52

## 2024-06-29 RX ADMIN — ACETAMINOPHEN 1000 MG: 500 TABLET, FILM COATED ORAL at 20:52

## 2024-06-29 RX ADMIN — INSULIN LISPRO 8 UNITS: 100 INJECTION, SOLUTION INTRAVENOUS; SUBCUTANEOUS at 08:49

## 2024-06-29 RX ADMIN — ACETAMINOPHEN 1000 MG: 500 TABLET, FILM COATED ORAL at 13:53

## 2024-06-29 RX ADMIN — OXYCODONE HYDROCHLORIDE 5 MG: 5 TABLET ORAL at 16:11

## 2024-06-29 RX ADMIN — OXYCODONE HYDROCHLORIDE 5 MG: 5 TABLET ORAL at 20:56

## 2024-06-29 RX ADMIN — CEFTRIAXONE 2000 MG: 2 INJECTION, POWDER, FOR SOLUTION INTRAMUSCULAR; INTRAVENOUS at 08:49

## 2024-06-29 RX ADMIN — INSULIN GLARGINE 25 UNITS: 100 INJECTION, SOLUTION SUBCUTANEOUS at 20:52

## 2024-06-29 NOTE — PLAN OF CARE
Goal Outcome Evaluation:  Plan of Care Reviewed With: patient        Progress: improving  Outcome Evaluation: Patient demonstrated improving strength for sliding board transfers to w/c. Recommend drop arm BSC with this technique. Patient still too weak to ambuilate. Parallel bars may be helpful to get to standing      Anticipated Discharge Disposition (PT): inpatient rehabilitation facility

## 2024-06-29 NOTE — PLAN OF CARE
Goal Outcome Evaluation:  Plan of Care Reviewed With: patient        Progress: no change          Pt is alert and oriented X 4. VSS. RA.. Pain controlled with prn pain meds.

## 2024-06-29 NOTE — PROGRESS NOTES
[facility] Medicine Services  PROGRESS NOTE    Patient Name: Easton Alvarez  : 1965  MRN: [id number]    Date of Admission: 2024  Primary Care Physician: Provider, No Known    Subjective   Subjective     CC:  Follow-up left foot gangrene, s/p BKA    HPI:  Patient seen resting in bed in no apparent distress.  No acute events overnight per nursing.  Pain is currently well-controlled.  Continues to await SNF placement.      Objective   Objective     Vital Signs:   Temp:  [97.3 °F (36.3 °C)-99.5 °F (37.5 °C)] 97.3 °F (36.3 °C)  Heart Rate:  [] 84  Resp:  [16-18] 18  BP: (118-156)/(66-85) 131/68     Physical Exam:  Constitutional: No acute distress, awake, alert, chronically ill-appearing  HENT: NCAT, mucous membranes moist  Respiratory: Clear to auscultation bilaterally, respiratory effort normal   Cardiovascular: RRR, no murmurs, palpable pedal pulses bilaterally, cap refill brisk   Gastrointestinal: Positive bowel sounds, soft, nontender, nondistended  Musculoskeletal: S/p left BKA, 1+ RLE edema  Psychiatric: Flat affect, cooperative  Neurologic: Oriented x 3, moves all extremities, speech clear  Skin: warm, dry, no visible rash     Results Reviewed:  LAB RESULTS:      Lab 24  0508 24  0441 24  0436 24  0648 24  0526   WBC 6.54 7.09 6.84 11.58* 8.15   HEMOGLOBIN 7.7* 7.9* 8.2* 8.6* 7.9*   HEMATOCRIT 22.8* 23.8* 25.2* 25.8* 23.0*   PLATELETS 114* 121* 126* 167 140   MCV 96.2 97.5* 97.7* 94.5 95.0         Lab 24  0508 24  0441 24  0436 24  0648 24  0526   SODIUM 136 135* 134* 136 133*   POTASSIUM 3.8 3.8 3.7 4.2 3.9   CHLORIDE 104 103 102 103 102   CO2 25.0 25.0 23.0 25.0 27.0   ANION GAP 7.0 7.0 9.0 8.0 4.0*   BUN 21 24* 27* 30* 32*   CREATININE 0.72* 0.70* 0.67* 0.75* 0.83   EGFR 104.6 105.5 106.9 103.3 100.2   GLUCOSE 75 76 91 149* 118*   CALCIUM 7.2* 7.1* 7.3* 7.2* 7.2*                         Brief Urine Lab  Results  (Last result in the past 365 days)        Color   Clarity   Blood   Leuk Est   Nitrite   Protein   CREAT   Urine HCG        06/14/24 1109 Yellow   Clear   Negative   Negative   Negative   Negative                   Microbiology Results Abnormal       Procedure Component Value - Date/Time    Fungus Culture - Synovium, Hip, Right [[id number]] Collected: 06/20/24 1700    Lab Status: Preliminary result Specimen: Synovium from Hip, Right Updated: 06/27/24 1831     Fungus Culture No fungus isolated at 1 week    Fungus Culture - Synovium, Hip, Right [[id number]] Collected: 06/20/24 1700    Lab Status: Preliminary result Specimen: Synovium from Hip, Right Updated: 06/27/24 1831     Fungus Culture No fungus isolated at 1 week    Fungus Culture - Synovium, Hip, Right [[id number]] Collected: 06/20/24 1700    Lab Status: Preliminary result Specimen: Synovium from Hip, Right Updated: 06/27/24 1831     Fungus Culture No fungus isolated at 1 week    AFB Culture - Synovium, Hip, Right [[id number]] Collected: 06/20/24 1700    Lab Status: Preliminary result Specimen: Synovium from Hip, Right Updated: 06/27/24 1831     AFB Culture No AFB isolated at 1 week     AFB Stain No acid fast bacilli seen on concentrated smear    AFB Culture - Synovium, Hip, Right [[id number]] Collected: 06/20/24 1700    Lab Status: Preliminary result Specimen: Synovium from Hip, Right Updated: 06/27/24 1831     AFB Culture No AFB isolated at 1 week     AFB Stain No acid fast bacilli seen on concentrated smear    AFB Culture - Synovium, Hip, Right [[id number]] Collected: 06/20/24 1700    Lab Status: Preliminary result Specimen: Synovium from Hip, Right Updated: 06/27/24 1831     AFB Culture No AFB isolated at 1 week     AFB Stain No acid fast bacilli seen on concentrated smear    Fungus Culture - Surgical Site, Leg, Left [[id number]] Collected: 06/13/24 1031    Lab Status: Preliminary result Specimen: Surgical Site from Leg, Left Updated: 06/27/24 1201      Fungus Culture No fungus isolated at 2 weeks    AFB Culture - Surgical Site, Leg, Left [[id number]] Collected: 06/13/24 1031    Lab Status: Preliminary result Specimen: Surgical Site from Leg, Left Updated: 06/27/24 1201     AFB Culture No AFB isolated at 2 weeks     AFB Stain No acid fast bacilli seen on concentrated smear    Anaerobic Culture 10 Day Incubation - Wound, Leg, Left [[id number]]  (Normal) Collected: 06/15/24 1501    Lab Status: Final result Specimen: Wound from Leg, Left Updated: 06/26/24 0647     Anaerobic Culture No anaerobes isolated at 10 days    Fungus Culture - Aspirate, Hip, Right [[id number]] Collected: 06/18/24 1052    Lab Status: Preliminary result Specimen: Aspirate from Hip, Right Updated: 06/25/24 1130     Fungus Culture No fungus isolated at 1 week    AFB Culture - Aspirate, Hip, Right [[id number]] Collected: 06/18/24 1052    Lab Status: Preliminary result Specimen: Aspirate from Hip, Right Updated: 06/25/24 1130     AFB Culture No AFB isolated at 1 week     AFB Stain No acid fast bacilli seen on concentrated smear    Anaerobic Culture 10 Day Incubation - Synovium, Hip, Right [[id number]]  (Normal) Collected: 06/20/24 1700    Lab Status: Preliminary result Specimen: Synovium from Hip, Right Updated: 06/25/24 0938     Anaerobic Culture No anaerobes isolated at 5 days    Anaerobic Culture 10 Day Incubation - Synovium, Hip, Right [[id number]]  (Normal) Collected: 06/20/24 1700    Lab Status: Preliminary result Specimen: Synovium from Hip, Right Updated: 06/25/24 0928     Anaerobic Culture No anaerobes isolated at 5 days    Anaerobic Culture 10 Day Incubation - Synovium, Hip, Right [[id number]]  (Normal) Collected: 06/20/24 1700    Lab Status: Preliminary result Specimen: Synovium from Hip, Right Updated: 06/25/24 0928     Anaerobic Culture No anaerobes isolated at 5 days    Tissue / Bone Culture - Synovium, Hip, Right [[id number]] Collected: 06/20/24 1700    Lab Status: Final result Specimen:  Synovium from Hip, Right Updated: 06/23/24 1010     Tissue Culture No growth at 3 days     Gram Stain Many (4+) Red blood cells      Moderate (3+) WBCs seen      No organisms seen    Tissue / Bone Culture - Synovium, Hip, Right [[id number]] Collected: 06/20/24 1700    Lab Status: Final result Specimen: Synovium from Hip, Right Updated: 06/23/24 1009     Tissue Culture No growth at 3 days     Gram Stain Moderate (3+) WBCs seen      No organisms seen     Comment: Modified report. Previous result was Rare (1+) Gram positive cocci in pairs on 6/20/2024 at 1949 EDT.         Many (4+) Red blood cells    Tissue / Bone Culture - Synovium, Hip, Right [[id number]] Collected: 06/20/24 1700    Lab Status: Final result Specimen: Synovium from Hip, Right Updated: 06/23/24 1009     Tissue Culture No growth at 3 days     Gram Stain Many (4+) Red blood cells      Few (2+) WBCs seen      No organisms seen    Fungus Culture - Wound, Leg, Left [[id number]] Collected: 06/15/24 1501    Lab Status: Preliminary result Specimen: Wound from Leg, Left Updated: 06/22/24 2215     Fungus Culture No fungus isolated at 1 week    AFB Culture - Wound, Leg, Left [[id number]] Collected: 06/15/24 1501    Lab Status: Preliminary result Specimen: Wound from Leg, Left Updated: 06/22/24 2215     AFB Culture No AFB isolated at 1 week     AFB Stain No acid fast bacilli seen on concentrated smear    Blood Culture - Blood, Hand, Left [[id number]]  (Normal) Collected: 06/15/24 1104    Lab Status: Final result Specimen: Blood from Hand, Left Updated: 06/20/24 1201     Blood Culture No growth at 5 days    Blood Culture - Blood, Hand, Right [[id number]]  (Normal) Collected: 06/15/24 1104    Lab Status: Final result Specimen: Blood from Hand, Right Updated: 06/20/24 1201     Blood Culture No growth at 5 days    Wound Culture - Wound, Leg, Left [[id number]] Collected: 06/15/24 1501    Lab Status: Final result Specimen: Wound from Leg, Left Updated: 06/19/24 0711     Wound  Culture No growth at 3 days     Gram Stain Rare (1+) WBCs seen      No organisms seen    Anaerobic Culture - Surgical Site, Leg, Left [[id number]]  (Normal) Collected: 06/13/24 1031    Lab Status: Final result Specimen: Surgical Site from Leg, Left Updated: 06/18/24 0751     Anaerobic Culture No anaerobes isolated at 5 days    Blood Culture - Blood, Arm, Left [[id number]]  (Normal) Collected: 06/12/24 1832    Lab Status: Final result Specimen: Blood from Arm, Left Updated: 06/17/24 1900     Blood Culture No growth at 5 days    Blood Culture - Blood, Hand, Right [[id number]]  (Normal) Collected: 06/12/24 1837    Lab Status: Final result Specimen: Blood from Hand, Right Updated: 06/17/24 1900     Blood Culture No growth at 5 days    Fungus Smear - Surgical Site, Leg, Left [[id number]] Collected: 06/13/24 1031    Lab Status: Final result Specimen: Surgical Site from Leg, Left Updated: 06/14/24 1406     Fungal Stain No fungal elements seen            No radiology results from the last 24 hrs    Results for orders placed during the hospital encounter of 06/12/24    Adult Transthoracic Echo Complete w/ Color, Spectral and Contrast if Necessary Per Protocol    Interpretation Summary    Left ventricular systolic function is normal. Left ventricular ejection fraction appears to be 56 - 60%.    Left ventricular wall thickness is consistent with borderline concentric hypertrophy.    Left ventricular diastolic function is consistent with (grade Ia w/high LAP) impaired relaxation.      Current medications:  Scheduled Meds:acetaminophen, 1,000 mg, Oral, Q8H  aspirin, 81 mg, Oral, Q12H  cefTRIAXone, 2,000 mg, Intravenous, Q24H  insulin glargine, 25 Units, Subcutaneous, Nightly  insulin lispro, 2-9 Units, Subcutaneous, 4x Daily AC & at Bedtime  Insulin Lispro, 8 Units, Subcutaneous, TID With Meals  lactobacillus acidophilus, 1 capsule, Oral, Daily  [Held by provider] lisinopril, 10 mg, Oral, Q24H  melatonin, 5 mg, Oral,  Nightly  pantoprazole, 40 mg, Intravenous, BID AC  senna-docusate sodium, 2 tablet, Oral, BID  sodium chloride, 10 mL, Intravenous, Q12H  sodium chloride, 10 mL, Intravenous, Q12H      Continuous Infusions:lactated ringers, 9 mL/hr, Last Rate: 9 mL/hr (06/15/24 1307)  lactated ringers, 100 mL/hr, Last Rate: 100 mL/hr (06/20/24 2122)  ropivacaine, , Last Rate: Stopped (06/17/24 2230)      PRN Meds:.  senna-docusate sodium **AND** polyethylene glycol **AND** bisacodyl **AND** bisacodyl    Calcium Replacement - Follow Nurse / BPA Driven Protocol    dextrose    dextrose    glucagon (human recombinant)    Magnesium Standard Dose Replacement - Follow Nurse / BPA Driven Protocol    nitroglycerin    oxyCODONE    Phosphorus Replacement - Follow Nurse / BPA Driven Protocol    Potassium Replacement - Follow Nurse / BPA Driven Protocol    sodium chloride    sodium chloride    sodium chloride    Assessment & Plan   Assessment & Plan     Active Hospital Problems    Diagnosis  POA    **Diabetic ulcer of left foot with necrosis of muscle [E11.621, L97.523]  Yes    Severe malnutrition [E43]  Yes    Infection of prosthetic total hip joint [T84.59XA, Z96.649]  Not Applicable      Resolved Hospital Problems   No resolved problems to display.        Brief Hospital Course to date:  Easton Alvarez is a 58 y.o. male  with past medical history significant for T2DM, diabetic neuropathy, right hip fracture/ORIF ([facility] 2/2024), and right transmetatarsal amputation 2019 at  who presented to OSH ED on 6/12/2024 with severe left foot cellulitis/necrosis after recent injury. Lactate at OSH was 8.8 and CT imaging of left leg showed gas gangrene. Orthopedic surgeon Dr. Em was consulted and performed a left below-knee amputation on 6/13/2024 with further debridement and irrigation on 6/15. Infectious disease has followed and managed antimicrobial therapy.     This patient's problems and plans were partially entered by my partner and updated  as appropriate by me 06/29/24.    Sepsis secondary to group B strep bacteremia  Left foot cellulitis/gangrene   Leukocytosis  -MRI left foot showed extensive soft tissue infection with abscesses along the foot to the ankle and into the lower leg with soft tissue gas.    -Wound cultures 6/12 with heavy growth group B strep and Kebsiella, blood cultures x 2 with group B strep; wound culture 6/13 with group B strep  -TTE described as technically adequate by cardiology; no description of vegetation by cardiology per report   -s/p left BKA with wound VAC on 6/13/24    -s/p further debridement in OR on 6/15/24 by Dr. Em, drain removed 6/17  -Infectious disease, Dr. Bills following; continue IV ceftriaxone for at least 6 weeks then transition to PO Keflex indefinitely   -PT wound care following  -Pain control with scheduled Tylenol, PRN oxycodone.   -Daily dressing changes to surgical site: Xeroform, 4X4, Kerlix, ACE  -Follow up with  Orthopedics in 2 weeks for wound check, imaging  -AM labs      Right glenys-prosthetic hip infection  -IR performed fluid aspiration from the hip which was purulent.  -s/p I&D on 6/20, Dr. Sethi   -Fluid culture 6/18 with rare growth group B strep  -Supportive care, mobilize, pain control. Antibiotics as above     Concern for GIB  Acute on chronic anemia  -with melena and fecal occult positive overnight 6/14  -GI consulted, recommended medical management with PPI BID x 1 month.  -GI referral at discharge for elevated LFTs, thrombocytopenia and concern for early cirrhosis, plan to pursue liver US after recovery from acute illness.  -H&H however seems to be trending down, but is stable, plan remains as above     Elevated blood pressure  -No history of taking meds for BP per patient   -BP has been running high this admission, possibly secondary to pain  -Started low-dose ACE given DM2, lisinopril 10 mg daily.     Mild hyponatremia  -Clinically insignificant  -Continue to monitor  intermittently.      Anion gap metabolic acidosis POA, resolved   -AG was 20.1, likely from lactate, serum acetone negative.     Poorly controlled type 2 diabetes with A1c 9.5%   -Patient was not taking meds or checking glucoses, though insulin had been prescribed in the past.  -Continue basal, prandial and SSI, adjust as warranted   -Diabetes educator was consulted     Expected Discharge Location and Transportation: Rehab, ?gateway  Expected Discharge   Expected Discharge Date: 7/2/2024; Expected Discharge Time:       VTE Prophylaxis:  Mechanical VTE prophylaxis orders are present.         AM-PAC 6 Clicks Score (PT): 13 (06/28/24 2052)    CODE STATUS:   Code Status and Medical Interventions:   Ordered at: 06/20/24 2026     Level Of Support Discussed With:    Patient     Code Status (Patient has no pulse and is not breathing):    CPR (Attempt to Resuscitate)     Medical Interventions (Patient has pulse or is breathing):    Full       Souleymane Marshall, GUILLAUME  06/29/24

## 2024-06-29 NOTE — PROGRESS NOTES
"Easton Alvarez  1965  [id number]          Chief Complaint: left foot infection    Reason for Consultation: left foot infection    History of present illness:     Patient is a 58 y.o.  Yr old male with history of diabetes with prior right TMA 2019 at , history strep septicemia 2016 ; he reports right total hip arthroplasty 2024 after fall. He reports a fall several weeks prior to his June admission with injury to the left foot, wound present with deterioration several days preceding admission with severe discoloration/redness and swelling.  CT scan showed concern for subcutaneous emphysema and necrotizing infection.  Transferred to [facility] with evaluation by Dr. Em and Dr. Lundberg and arrangements made for urgent surgery. Subsequently, blood cultures called with gram-positive cocci     6/13 left LE amp, Dr Em    6/15  \"PROCEDURE:            Left      87978: Secondary closure above-knee amputation\"      6/18/24  right hip aspiration, culture with GB strep; wbc  down some postop from revision; blood cultures 6/15 negative so far    6/19/24 Dr. Em helping facilitate right hip surgery with orthopedic partners/team    6/20/24 surgery Dr Sethi  \"Procedure(s):  HIP ANTERIOR INCISION AND DRAINAGE WITH HANA TABLE, POLY SWAP- RIGHT\"    6/26/24 urinary retention per nursing notes, Dr Goodwin aware.  Dr Goodwin reports later that patient voiding without  retention issues    6/29/24  case management looking into options;  postop pain controlled at present; He has right hip pain that is intermittent , blunted by neuropathy, worse with palpation, better with pain meds and 2-3 out of 10 in severity. No distress per nursing; no new focal symptoms otherwise; no fever; no left stump pain     No headache photophobia or neck stiffness.  No shortness of breath cough or hemoptysis.  No nausea vomiting  or abdominal pain.  No dysuria hematuria or pyuria.  No other new skin rash    Past Medical " "History:   Diagnosis Date    Diabetes mellitus     Diabetic foot infection 2018    left (partial amputation)       Past Surgical History:   Procedure Laterality Date    AMPUTATION FOOT / TOE Right     partial foot    BELOW KNEE AMPUTATION Left 6/13/2024    Procedure: AMPUTATION BELOW KNEE AND WOUND VAC ASSISTED CLOSURE LEFT;  Surgeon: Brijesh Em Jr., MD;  Location:  [facility];  Service: Orthopedics;  Laterality: Left;    INCISION AND DRAINAGE HIP Right 6/20/2024    Procedure: HIP ANTERIOR INCISION AND DRAINAGE WITH HANA TABLE, POLY SWAP RIGHT WITH LEFT LEG PIN TRACTION;  Surgeon: Lex Sethi MD;  Location:  [facility];  Service: Orthopedics;  Laterality: Right;    INCISION AND DRAINAGE LEG Left 6/15/2024    Procedure: SECONDARY CLOSURE LEFT BELOW KNEE AMPUTATION;  Surgeon: Brijesh Em Jr., MD;  Location:  [facility];  Service: Orthopedics;  Laterality: Left;    PLACEMENT OF WOUND VAC Left 6/13/2024    Procedure: PLACEMENT OF WOUND VAC LEFT;  Surgeon: Brijesh Em Jr., MD;  Location:  [facility];  Service: Orthopedics;  Laterality: Left;    TOTAL HIP ARTHROPLASTY Right 02/2024    From fall \"whole hip was shattered\"           No Known Allergies       Review of Systems    6/29/24     Constitutional-- No Fever, chills or sweats.  Appetite diminished with fatigue.  Heent-- No new vision, hearing or throat complaints.  No epistaxis or oral sores.  Denies odynophagia or dysphagia.  No flashers, floaters or eye pain. No odynophagia or dysphagia. No headache, photophobia or neck stiffness.  CV-- No chest pain, palpitation or syncope  Resp-- No SOB/cough/Hemoptysis  GI- No nausea, vomiting, or diarrhea.  No hematochezia, melena, or hematemesis. Denies jaundice or chronic liver disease.  -- No dysuria, hematuria, or flank pain.  Denies hesitancy, urgency or flank pain.  Lymph- no swollen lymph nodes in neck/axilla or groin.   Heme- No active bruising or bleeding; no Hx of DVT or PE.  MS-- aside from above, no " "swelling or pain in the bones or joints of arms/legs.  No new back pain.  Neuro-- No acute focal weakness or numbness in the arms or legs.  No seizures.    Full 12 point review of systems reviewed and negative otherwise for acute complaints, except for above    Physical Exam:   Vital Signs   /66 (BP Location: Left arm, Patient Position: Lying)   Pulse 92   Temp 98.6 °F (37 °C) (Oral)   Resp 16   Ht 172.7 cm (67.99\")   Wt 67.6 kg (149 lb)   SpO2 97%   BMI 22.66 kg/m²     GENERAL: sleepy; in no acute distress.   HEENT: Normocephalic, atraumatic.   No conjunctival injection. No icterus. Oropharynx clear without evidence of thrush or exudate. No evidence of periodontal disease.    NECK: Supple without nuchal rigidity. No mass.   HEART: RRR; No murmur, rubs, gallops.   LUNGS: diminished at bases.  Trace crackles at the bases but no rhonchi or wheeze. Normal respiratory effort. Nonlabored. No dullness.  ABDOMEN: Soft, nontender, nondistended. Positive bowel sounds. No rebound or guarding. NO mass or HSM.  EXT:  see below.     MSK: right hip surgical site cvered;  no new visible redness/purulence or fluctuance  SKIN: Warm and dry without cutaneous eruptions on Inspection/palpation.      Left   BKA stump covered;  no new visible redness or purulence; no discrete fluctuance.  No crepitus    Laboratory Data    Results from last 7 days   Lab Units 06/29/24  0508 06/28/24  0441 06/27/24  0436   WBC 10*3/mm3 6.54 7.09 6.84   HEMOGLOBIN g/dL 7.7* 7.9* 8.2*   HEMATOCRIT % 22.8* 23.8* 25.2*   PLATELETS 10*3/mm3 114* 121* 126*     Results from last 7 days   Lab Units 06/29/24  0508   SODIUM mmol/L 136   POTASSIUM mmol/L 3.8   CHLORIDE mmol/L 104   CO2 mmol/L 25.0   BUN mg/dL 21   CREATININE mg/dL 0.72*   GLUCOSE mg/dL 75   CALCIUM mg/dL 7.2*                       Estimated Creatinine Clearance: 104.3 mL/min (A) (by C-G formula based on SCr of 0.72 mg/dL (L)).      Microbiology:      Radiology:  Imaging Results (Last 72 " Hours)       ** No results found for the last 72 hours. **              Impression:     --acute sepsis as per admission notes, severe left foot infection with gangrene/cellulitis and concern for necrotizing soft tissue infection by imaging, urgent surgical arrangements made June 13; repeat surg 6/15  ;  subsequent right hip surg 6/20; He knows risk for persistent/recurrent or nonhealing wounds, persistet / progressive or recurrent infection and risk for further amputation/functional-limb loss and chronic pain.  Associated with GB strep bacteremia as below.    --Acute/severe left foot infection as above, urgent surgical arrangements  made 6/13    --Acute gb strep bacteremia,   source from foot.  High risk for further serious morbidity and other serious sequela including dire consequences and metastatic foci of involvement/endovascular sequela etc. No new metastatic focus of involvement. TTE 6/17    --Right hip fracture with repair February 2024.  +pain ; orthopedics with  aspiration June 18 and culture with GB strep likely hematogenous seeding, MRI imaging with concern for septic joint, surgery on June 20 as above.   Patient understands risk for persistent/recurrent or progressive infection and potential for further surgical intervention in the future that could include functional/limb loss and other serious sequela/morbidity; he knows antibiotics and surgery not a guarantee for care    --postop leukocytosis, possible stress response associated with surgery.  Trended down June 22-27.  No new respiratory symptoms.  Encouraged incentive spirometry.  Abdomen benign with no diarrhea.  No new urinary tract complaints.  IV sites with no suppurative change.  No rash.  Monitor for new process; no new focal muscoskeletal symptoms to suggest new metastatic focus of infection    --diabetes.  Described as uncontrolled by medicine team at admission.  Glucose control per medicine team    --urinary retention per nursing staff.  Per  their notes patient refused in/out catheterization.  Dr. Goodwin aware and he will discuss options with patient    --thrombocytopenia.  Monitor      PLAN:      --IV  rocephin likely at least 6 weeks from hip surgery and anticipate step down to oral agents after with oral keflex    **wound culture at left foot mixed with  MSSA Klebsiella and GB strep  **blood cultures with group B strep  ** right hip cultures with group B strep    --orthopedics  making surgical plans with concern for right hipprosthetic/periprosthetic infection     --Check/review labs cultures and scans    --TTE described as technically adequate by cardiology; no description of vegetation by cardiology per report    --Partial history per nursing staff    --encouraged incentive spirometry.  Nursing aware to instruct and encourage    --Discussed with microbiology    --d/w Dr Goodwin    --Highly complex at of issues with high risk for further serious morbidity and other serious sequela    **follow-up with me one week.  Ceftriaxone IV for at least 6 weeks with oral Keflex after, no stop date for oral Keflex.  Every Monday CBC/CMP and sed rate/CRP.  Every Monday PICC line dressing change.    Copied text in this note has been reviewed and is accurate as of 06/29/24.        Cleve Bills MD  6/29/2024

## 2024-06-29 NOTE — PLAN OF CARE
Problem: Adult Inpatient Plan of Care  Goal: Plan of Care Review  Recent Flowsheet Documentation  Taken 6/29/2024 1446 by Jorge Luis Childress, PT  Progress: improving  Plan of Care Reviewed With: patient  Outcome Evaluation: Patient demonstrated improving strength for sliding board transfers to w/c. Recommend drop arm BSC with this technique. Patient still too weak to ambuilate. Parallel bars may be helpful to get to standing   Goal Outcome Evaluation:  Plan of Care Reviewed With: patient        Progress: improving  Outcome Evaluation: Patient demonstrated improving strength for sliding board transfers to w/c. Recommend drop arm BSC with this technique. Patient still too weak to ambuilate. Parallel bars may be helpful to get to standing      Anticipated Discharge Disposition (PT): inpatient rehabilitation facility

## 2024-06-29 NOTE — THERAPY TREATMENT NOTE
Patient Name: Easton Alvarez  : 1965    MRN: [id number]                              Today's Date: 2024       Admit Date: 2024    Visit Dx:     ICD-10-CM ICD-9-CM   1. S/P BKA (below knee amputation), left  Z89.512 V49.75   2. Diabetic ulcer of left midfoot associated with type 2 diabetes mellitus, with necrosis of muscle  E11.621 250.80    L97.423 707.14     728.89   3. Elevated LFTs  R79.89 790.6   4. Below knee amputation  S88.119A 897.0   5. Impaired functional mobility, balance, gait, and endurance  Z74.09 V49.89   6. Infection of prosthetic total hip joint, initial encounter  T84.59XA 996.66    Z96.649 V43.64     Patient Active Problem List   Diagnosis    Diabetic ulcer of left foot with necrosis of muscle    Severe malnutrition    Infection of prosthetic total hip joint     Past Medical History:   Diagnosis Date    Diabetes mellitus     Diabetic foot infection 2018    left (partial amputation)     Past Surgical History:   Procedure Laterality Date    AMPUTATION FOOT / TOE Right     partial foot    BELOW KNEE AMPUTATION Left 2024    Procedure: AMPUTATION BELOW KNEE AND WOUND VAC ASSISTED CLOSURE LEFT;  Surgeon: Brijesh Em Jr., MD;  Location:  [facility];  Service: Orthopedics;  Laterality: Left;    INCISION AND DRAINAGE HIP Right 2024    Procedure: HIP ANTERIOR INCISION AND DRAINAGE WITH HANA TABLE, POLY SWAP RIGHT WITH LEFT LEG PIN TRACTION;  Surgeon: Lex Sethi MD;  Location:  [facility];  Service: Orthopedics;  Laterality: Right;    INCISION AND DRAINAGE LEG Left 6/15/2024    Procedure: SECONDARY CLOSURE LEFT BELOW KNEE AMPUTATION;  Surgeon: Brijesh Em Jr., MD;  Location:  [facility];  Service: Orthopedics;  Laterality: Left;    PLACEMENT OF WOUND VAC Left 2024    Procedure: PLACEMENT OF WOUND VAC LEFT;  Surgeon: Brijesh Em Jr., MD;  Location:  [facility];  Service: Orthopedics;  Laterality: Left;    TOTAL HIP ARTHROPLASTY Right 2024    From fall  "\"whole hip was shattered\"      General Information       Row Name 06/29/24 1440          Physical Therapy Time and Intention    Document Type therapy note (daily note)  -SC     Mode of Treatment physical therapy  -SC       Row Name 06/29/24 1440          General Information    Patient Profile Reviewed yes  -SC     Existing Precautions/Restrictions fall;left;non-weight bearing;right;hip, anterior;other (see comments)  recent L BKA, R hip MARIA LUISA  s/p I@D, R foot TMA, hip wound vac  -SC       Row Name 06/29/24 1440          Cognition    Orientation Status (Cognition) oriented x 4  -SC       Row Name 06/29/24 1440          Safety Issues, Functional Mobility    Impairments Affecting Function (Mobility) balance;range of motion (ROM);strength;endurance/activity tolerance;pain  -SC               User Key  (r) = Recorded By, (t) = Taken By, (c) = Cosigned By      Initials Name Provider Type    SC Jorge Luis Childress, PT Physical Therapist                   Mobility       Row Name 06/29/24 1441          Bed Mobility    Bed Mobility supine-sit;sit-supine;scooting/bridging  -SC     Scooting/Bridging [place] (Bed Mobility) 1 person assist;minimum assist (75% patient effort)  -SC     Supine-Sit [place] (Bed Mobility) minimum assist (75% patient effort);2 person assist;verbal cues  -SC     Sit-Supine [place] (Bed Mobility) moderate assist (50% patient effort);2 person assist;verbal cues  -SC     Assistive Device (Bed Mobility) head of bed elevated;bed rails  -SC     Comment, (Bed Mobility) up to sitting with extra time and assistance needed with R leg and trunk righting. Getting back to bed required less assisance jut with R leg  -SC       Row Name 06/29/24 1441          Transfers    Comment, (Transfers) Worked on sliding board transfers towards R side (patient with scrubs on to assist with sliding). VC for hand placememt  and board placement. Demonstrated good sequencing with some assist mid way  -SC       Row Name " 06/29/24 [facility]          Bed-Chair Transfer    Bed-Chair [place] (Transfers) minimum assist (75% patient effort);2 person assist;verbal cues;moderate assist (50% patient effort)  -SC     Assistive Device (Bed-Chair Transfers) transfer board;other (see comments)  scrubs on to assist with sliding  -SC       Row Name 06/29/24 [facility]          Gait/Stairs (Locomotion)    Comment, (Gait/Stairs) worked on locking and unlocking w/c--=patient independent with this.  Patient able to propell w/c for exercise x600 feet. To weak to ambulate at this point.  -SC               User Key  (r) = Recorded By, (t) = Taken By, (c) = Cosigned By      Initials Name Provider Type    SC Jorge Luis Childress PT Physical Therapist                   Obj/Interventions       Row Name 06/29/24 [facility]          Motor Skills    Therapeutic Exercise hip;knee;ankle  -[facility] Name 06/29/24 [facility]          Hip (Therapeutic Exercise)    Hip (Therapeutic Exercise) AAROM (active assistive range of motion);AROM (active range of motion)  -SC     Hip AROM (Therapeutic Exercise) left;flexion;extension;aBduction;aDduction;supine;10 repetitions  -SC     Hip AAROM (Therapeutic Exercise) right;flexion;extension;aBduction;aDduction;10 repetitions;supine  -[facility] Name 06/29/24 [facility]          Knee (Therapeutic Exercise)    Knee (Therapeutic Exercise) AROM (active range of motion)  -SC     Knee AROM (Therapeutic Exercise) bilateral;flexion;extension;LAQ (long arc quad);sitting;left;SLR (straight leg raise);supine;20 repititions  -SC       Row Name 06/29/24 [facility]          Balance    Balance Assessment sitting dynamic balance  -SC     Dynamic Sitting Balance contact guard  -SC     Comment, Balance no LOB during sliding transfers transfers  -SC               User Key  (r) = Recorded By, (t) = Taken By, (c) = Cosigned By      Initials Name Provider Type    SC Jorge Luis Childress PT Physical Therapist                   Goals/Plan    No documentation.                   Clinical Impression       [facility] Name 06/29/24 1446          Pain    Pain Location - Side/Orientation Right  -SC     Pain Location - hip  -[facility] Name 06/29/24 1446          Pain Scale: FACES Pre/Post-Treatment    Pain: FACES Scale, Pretreatment 2-->hurts little bit  -SC     Posttreatment Pain Rating 4-->hurts little more  -[facility] Name 06/29/24 1446          Plan of Care Review    Plan of Care Reviewed With patient  -SC     Progress improving  -SC     Outcome Evaluation Patient demonstrated improving strength for sliding board transfers to w/c. Recommend drop arm [facility] with this technique. Patient still too weak to ambuilate. Parallel bars may be helpful to get to standing  -[facility] Name 06/29/24 1446          Therapy Assessment/Plan (PT)    Patient/Family Therapy Goals Statement (PT) did not state  -SC     Rehab Potential (PT) good, to achieve stated therapy goals  -SC     Criteria for Skilled Interventions Met (PT) yes;meets criteria;skilled treatment is necessary  -SC     Therapy Frequency (PT) daily  -[facility] Name 06/29/24 1446          Positioning and Restraints    Pre-Treatment Position in bed  -SC     Post Treatment Position bed  -SC     In Bed supine;sitting;exit alarm on;call light within reach  -SC               User Key  (r) = Recorded By, (t) = Taken By, (c) = Cosigned By      Initials Name Provider Type    SC Jorge Luis Childress, PT Physical Therapist                   Outcome Measures       [facility] Name 06/29/24 1449          How much help from another person do you currently need...    Turning from your back to your side while in flat bed without using bedrails? 4  -SC     Moving from lying on back to sitting on the side of a flat bed without bedrails? 3  -SC     Moving to and from a bed to a chair (including a wheelchair)? 3  -SC     Standing up from a chair using your arms (e.g., wheelchair, bedside chair)? 1  -SC     Climbing 3-5 steps with a railing? 1  -SC     To walk in hospital room? 1   -[facility] 6 Clicks Score (PT) 13  -SC     Highest Level of Mobility Goal 4 --> Transfer to chair/commode  -SC       Row Name 06/29/24 1449          Functional Assessment    Outcome Measure Options AM-[facility] 6 Clicks Basic Mobility (PT)  -SC               User Key  (r) = Recorded By, (t) = Taken By, (c) = Cosigned By      Initials Name Provider Type    SC Jorge Luis Childress PT Physical Therapist                                 Physical Therapy Education       Title: PT OT SLP Therapies (Done)       Topic: Physical Therapy (Done)       Point: Mobility training (Done)       Learning Progress Summary             Patient Eager, E, VU by SC at 6/29/2024 1450    Comment: reviewed safety with sliding board    Acceptance, E, VU,DU,NR by  at 6/28/2024 1404    Eager, E, VU,DU by  at 6/27/2024 1539    Comment: Pt eudcated regarding WC management, transfers and symptom managment    Acceptance, E,D, VU by  at 6/26/2024 1608    Comment: Pt educated regarding transfers, weight bearing status and HEP.    Acceptance, E, VU,DU,NR by  at 6/25/2024 1107    Acceptance, E,H, VU by  at 6/24/2024 1105    Comment: Pt educated on bed positioning with L BKA and importance of exercise on rehab.    Eager, E, VU,DU by SC at 6/21/2024 1349    Comment: reviewed HEP    Acceptance, E,D, VU,NR by SD at 6/19/2024 1203    Comment: PT ed for expected outcomes, risks, and benefits of IPPT services and progression of activity. Pt given visual demo of unilateral gait with RW. Discussion regarding dispo planning and IRF.                         Point: Home exercise program (Done)       Learning Progress Summary             Patient Eager, E, VU by SC at 6/29/2024 1450    Comment: reviewed safety with sliding board    Acceptance, E,D, VU by  at 6/26/2024 1608    Comment: Pt educated regarding transfers, weight bearing status and HEP.    Acceptance, E, VU,DU,NR by  at 6/25/2024 1107    Acceptance, E,H, VU by  at 6/24/2024 1105    Comment: Pt  educated on bed positioning with L BKA and importance of exercise on rehab.    Acceptance, E, VU,NR,DU by  at 6/23/2024 1420    Eager, E, VU,DU by SC at 6/21/2024 1349    Comment: reviewed HEP    Acceptance, E,D, VU,NR by SD at 6/19/2024 1203    Comment: PT ed for expected outcomes, risks, and benefits of IPPT services and progression of activity. Pt given visual demo of unilateral gait with RW. Discussion regarding dispo planning and IRF.                         Point: Body mechanics (Done)       Learning Progress Summary             Patient Eager, E, VU by SC at 6/29/2024 1450    Comment: reviewed safety with sliding board    Acceptance, E, VU,DU,NR by  at 6/28/2024 1404    Eager, E, VU,DU by  at 6/27/2024 1539    Comment: Pt eudcated regarding WC management, transfers and symptom managment    Acceptance, E,D, VU by  at 6/26/2024 1608    Comment: Pt educated regarding transfers, weight bearing status and HEP.    Acceptance, E, VU,DU,NR by  at 6/25/2024 1107    Acceptance, E,H, VU by  at 6/24/2024 1105    Comment: Pt educated on bed positioning with L BKA and importance of exercise on rehab.    Eager, E, VU,DU by SC at 6/21/2024 1349    Comment: reviewed HEP    Acceptance, E,D, VU,NR by SD at 6/19/2024 1203    Comment: PT ed for expected outcomes, risks, and benefits of IPPT services and progression of activity. Pt given visual demo of unilateral gait with RW. Discussion regarding dispo planning and IRF.                         Point: Precautions (Done)       Learning Progress Summary             Patient Eager, E, VU by SC at 6/29/2024 1450    Comment: reviewed safety with sliding board    Acceptance, E, VU,DU,NR by  at 6/28/2024 1404    Eager, E, VU,DU by  at 6/27/2024 1539    Comment: Pt eudcated regarding WC management, transfers and symptom managment    Acceptance, E,D, VU by  at 6/26/2024 1608    Comment: Pt educated regarding transfers, weight bearing status and HEP.    Acceptance, E,  LIDIA RAUSCH,NR by  at 6/25/2024 1107    Acceptance, PORFIRIO GIPSON, VU by  at 6/24/2024 1105    Comment: Pt educated on bed positioning with L BKA and importance of exercise on rehab.    LEONELA Varela VU, DU by SC at 6/21/2024 1349    Comment: reviewed HEP    Acceptance, BEST GIPSON, SHALOM,NR by SD at 6/19/2024 1203    Comment: PT ed for expected outcomes, risks, and benefits of IPPT services and progression of activity. Pt given visual demo of unilateral gait with RW. Discussion regarding dispo planning and IRF.                                         User Key       Initials Effective Dates Name Provider Type Discipline    SC 02/03/23 -  Jorge Luis Childress, PT Physical Therapist PT    SD 03/13/23 -  Charity Dela Cruz, PT Physical Therapist PT    GEORGINA 06/16/21 -  Radha Miguel, PT Physical Therapist PT     09/21/23 -  Rachna Cruz, PT Physical Therapist PT    EM 05/07/24 -  Rancho Schneider, PT Student PT Student PT     05/07/24 -  Quincy Elizabeth, PT Student PT Student PT                  PT Recommendation and Plan     Plan of Care Reviewed With: patient  Progress: improving  Outcome Evaluation: Patient demonstrated improving strength for sliding board transfers to w/c. Recommend drop arm BSC with this technique. Patient still too weak to ambuilate. Parallel bars may be helpful to get to standing     Time Calculation:         PT Charges       Row Name 06/29/24 1453             Time Calculation    Start Time 1400  -SC      PT Received On 06/29/24  -SC      PT Goal Re-Cert Due Date 07/01/24  -SC         Timed Charges    08195 - PT Therapeutic Exercise Minutes 15  -SC      77720 - PT Therapeutic Activity Minutes 15  -SC         Total Minutes    Timed Charges Total Minutes 30  -SC       Total Minutes 30  -SC                User Key  (r) = Recorded By, (t) = Taken By, (c) = Cosigned By      Initials Name Provider Type    SC Jorge Luis Childress, PT Physical Therapist                  Therapy Charges for Today       Code Description Service Date  Service Provider Modifiers Qty    [id number]  PT THER PROC EA 15 MIN 6/29/2024 Jorge Luis Childress, PT GP 1    [id number] HC PT THERAPEUTIC ACT EA 15 MIN 6/29/2024 Jorge Luis Childress PT GP 1            PT G-Codes  Outcome Measure Options: AM-PAC 6 Clicks Basic Mobility (PT)  AM-PAC 6 Clicks Score (PT): 13  AM-PAC 6 Clicks Score (OT): 14  PT Discharge Summary  Anticipated Discharge Disposition (PT): inpatient rehabilitation facility    Jorge Luis Childress, PT  6/29/2024

## 2024-06-30 LAB
ANION GAP SERPL CALCULATED.3IONS-SCNC: 8 MMOL/L (ref 5–15)
BACTERIA SPEC ANAEROBE CULT: NORMAL
BUN SERPL-MCNC: 18 MG/DL (ref 8–23)
BUN/CREAT SERPL: 28.6 (ref 7–25)
CALCIUM SPEC-SCNC: 7 MG/DL (ref 8.6–10.5)
CHLORIDE SERPL-SCNC: 103 MMOL/L (ref 98–107)
CO2 SERPL-SCNC: 24 MMOL/L (ref 22–29)
CREAT SERPL-MCNC: 0.63 MG/DL (ref 0.76–1.27)
DEPRECATED RDW RBC AUTO: 50.2 FL (ref 37–54)
EGFRCR SERPLBLD CKD-EPI 2021: 108.9 ML/MIN/1.73
ERYTHROCYTE [DISTWIDTH] IN BLOOD BY AUTOMATED COUNT: 14.2 % (ref 12.3–15.4)
GLUCOSE BLDC GLUCOMTR-MCNC: 105 MG/DL (ref 70–130)
GLUCOSE BLDC GLUCOMTR-MCNC: 114 MG/DL (ref 70–130)
GLUCOSE BLDC GLUCOMTR-MCNC: 122 MG/DL (ref 70–130)
GLUCOSE BLDC GLUCOMTR-MCNC: 135 MG/DL (ref 70–130)
GLUCOSE SERPL-MCNC: 109 MG/DL (ref 65–99)
HCT VFR BLD AUTO: 22.7 % (ref 37.5–51)
HGB BLD-MCNC: 7.6 G/DL (ref 13–17.7)
MCH RBC QN AUTO: 32.5 PG (ref 26.6–33)
MCHC RBC AUTO-ENTMCNC: 33.5 G/DL (ref 31.5–35.7)
MCV RBC AUTO: 97 FL (ref 79–97)
PLATELET # BLD AUTO: 94 10*3/MM3 (ref 140–450)
PMV BLD AUTO: 9.8 FL (ref 6–12)
POTASSIUM SERPL-SCNC: 3.9 MMOL/L (ref 3.5–5.2)
RBC # BLD AUTO: 2.34 10*6/MM3 (ref 4.14–5.8)
SODIUM SERPL-SCNC: 135 MMOL/L (ref 136–145)
WBC NRBC COR # BLD AUTO: 6.9 10*3/MM3 (ref 3.4–10.8)

## 2024-06-30 PROCEDURE — 25010000002 FUROSEMIDE PER 20 MG: Performed by: NURSE PRACTITIONER

## 2024-06-30 PROCEDURE — 63710000001 INSULIN LISPRO (HUMAN) PER 5 UNITS: Performed by: INTERNAL MEDICINE

## 2024-06-30 PROCEDURE — 99232 SBSQ HOSP IP/OBS MODERATE 35: CPT | Performed by: NURSE PRACTITIONER

## 2024-06-30 PROCEDURE — 82948 REAGENT STRIP/BLOOD GLUCOSE: CPT

## 2024-06-30 PROCEDURE — 80048 BASIC METABOLIC PNL TOTAL CA: CPT | Performed by: STUDENT IN AN ORGANIZED HEALTH CARE EDUCATION/TRAINING PROGRAM

## 2024-06-30 PROCEDURE — 25010000002 CEFTRIAXONE PER 250 MG: Performed by: INTERNAL MEDICINE

## 2024-06-30 PROCEDURE — 85027 COMPLETE CBC AUTOMATED: CPT | Performed by: STUDENT IN AN ORGANIZED HEALTH CARE EDUCATION/TRAINING PROGRAM

## 2024-06-30 PROCEDURE — 63710000001 INSULIN GLARGINE PER 5 UNITS: Performed by: INTERNAL MEDICINE

## 2024-06-30 RX ORDER — FUROSEMIDE 10 MG/ML
40 INJECTION INTRAMUSCULAR; INTRAVENOUS ONCE
Status: COMPLETED | OUTPATIENT
Start: 2024-06-30 | End: 2024-06-30

## 2024-06-30 RX ORDER — OXYCODONE HYDROCHLORIDE 5 MG/1
5 TABLET ORAL EVERY 4 HOURS PRN
Status: DISCONTINUED | OUTPATIENT
Start: 2024-06-30 | End: 2024-07-06 | Stop reason: HOSPADM

## 2024-06-30 RX ADMIN — ASPIRIN 81 MG: 81 TABLET, COATED ORAL at 21:19

## 2024-06-30 RX ADMIN — SENNOSIDES AND DOCUSATE SODIUM 2 TABLET: 8.6; 5 TABLET ORAL at 10:02

## 2024-06-30 RX ADMIN — INSULIN GLARGINE 25 UNITS: 100 INJECTION, SOLUTION SUBCUTANEOUS at 21:18

## 2024-06-30 RX ADMIN — INSULIN LISPRO 8 UNITS: 100 INJECTION, SOLUTION INTRAVENOUS; SUBCUTANEOUS at 08:06

## 2024-06-30 RX ADMIN — ACETAMINOPHEN 1000 MG: 500 TABLET, FILM COATED ORAL at 13:21

## 2024-06-30 RX ADMIN — INSULIN LISPRO 8 UNITS: 100 INJECTION, SOLUTION INTRAVENOUS; SUBCUTANEOUS at 17:14

## 2024-06-30 RX ADMIN — Medication 1 CAPSULE: at 08:06

## 2024-06-30 RX ADMIN — Medication 10 ML: at 08:06

## 2024-06-30 RX ADMIN — PANTOPRAZOLE SODIUM 40 MG: 40 INJECTION, POWDER, FOR SOLUTION INTRAVENOUS at 06:28

## 2024-06-30 RX ADMIN — ACETAMINOPHEN 1000 MG: 500 TABLET, FILM COATED ORAL at 06:28

## 2024-06-30 RX ADMIN — CEFTRIAXONE 2000 MG: 2 INJECTION, POWDER, FOR SOLUTION INTRAMUSCULAR; INTRAVENOUS at 08:06

## 2024-06-30 RX ADMIN — Medication 10 ML: at 21:18

## 2024-06-30 RX ADMIN — PANTOPRAZOLE SODIUM 40 MG: 40 INJECTION, POWDER, FOR SOLUTION INTRAVENOUS at 17:15

## 2024-06-30 RX ADMIN — Medication 5 MG: at 21:19

## 2024-06-30 RX ADMIN — FUROSEMIDE 40 MG: 10 INJECTION, SOLUTION INTRAMUSCULAR; INTRAVENOUS at 10:01

## 2024-06-30 RX ADMIN — OXYCODONE HYDROCHLORIDE 5 MG: 5 TABLET ORAL at 21:55

## 2024-06-30 RX ADMIN — ASPIRIN 81 MG: 81 TABLET, COATED ORAL at 08:06

## 2024-06-30 RX ADMIN — INSULIN LISPRO 8 UNITS: 100 INJECTION, SOLUTION INTRAVENOUS; SUBCUTANEOUS at 13:21

## 2024-06-30 RX ADMIN — ACETAMINOPHEN 1000 MG: 500 TABLET, FILM COATED ORAL at 21:19

## 2024-06-30 NOTE — PROGRESS NOTES
[facility] Medicine Services  PROGRESS NOTE    Patient Name: Easton Alvarez  : 1965  MRN: [id number]    Date of Admission: 2024  Primary Care Physician: Provider, No Known    Subjective   Subjective     CC:  Follow-up left foot gangrene, right hip infection    HPI:  Patient was seen resting in bed no apparent distress.  No acute events overnight per nursing.  Pain is currently well-controlled.  Noted to have significant lower extremity edema up to abdomen.  Will give Lasix 40 mg IV x 1 today.  Continues to await SNF placement.  No new complaints at this time    Objective   Objective     Vital Signs:   Temp:  [96.6 °F (35.9 °C)-99.2 °F (37.3 °C)] 96.6 °F (35.9 °C)  Heart Rate:  [] 92  Resp:  [16-18] 18  BP: (121-128)/(69-73) 126/69     Physical Exam:  Constitutional: No acute distress, awake, alert, chronically ill-appearing  HENT: NCAT, mucous membranes moist  Respiratory: Clear to auscultation bilaterally, respiratory effort normal   Cardiovascular: RRR, no murmurs, cap refill brisk   Gastrointestinal: Positive bowel sounds, soft, mildly distended, nontender  Musculoskeletal: S/p left BKA with dressing in place, right hip wound VAC in place, 2+ BLE pitting edema up to abdomen  Psychiatric: Flat affect, cooperative  Neurologic: Oriented x 3, moves all extremities, speech clear  Skin: warm, dry, no visible rash     Results Reviewed:  LAB RESULTS:      Lab 24  0647 24  0508 24  04324  0648   WBC 6.90 6.54 7.09 6.84 11.58*   HEMOGLOBIN 7.6* 7.7* 7.9* 8.2* 8.6*   HEMATOCRIT 22.7* 22.8* 23.8* 25.2* 25.8*   PLATELETS 94* 114* 121* 126* 167   MCV 97.0 96.2 97.5* 97.7* 94.5         Lab 24  0647 24  0508 24  04424  04324  0648   SODIUM 135* 136 135* 134* 136   POTASSIUM 3.9 3.8 3.8 3.7 4.2   CHLORIDE 103 104 103 102 103   CO2 24.0 25.0 25.0 23.0 25.0   ANION GAP 8.0 7.0 7.0 9.0 8.0   BUN 18 21 24* 27* 30*    CREATININE 0.63* 0.72* 0.70* 0.67* 0.75*   EGFR 108.9 104.6 105.5 106.9 103.3   GLUCOSE 109* 75 76 91 149*   CALCIUM 7.0* 7.2* 7.1* 7.3* 7.2*                         Brief Urine Lab Results  (Last result in the past 365 days)        Color   Clarity   Blood   Leuk Est   Nitrite   Protein   CREAT   Urine HCG        06/14/24 1109 Yellow   Clear   Negative   Negative   Negative   Negative                   Microbiology Results Abnormal       Procedure Component Value - Date/Time    Anaerobic Culture 10 Day Incubation - Synovium, Hip, Right [[id number]]  (Normal) Collected: 06/20/24 1700    Lab Status: Final result Specimen: Synovium from Hip, Right Updated: 06/30/24 0719     Anaerobic Culture No anaerobes isolated at 10 days    Anaerobic Culture 10 Day Incubation - Synovium, Hip, Right [[id number]]  (Normal) Collected: 06/20/24 1700    Lab Status: Edited Result - FINAL Specimen: Synovium from Hip, Right Updated: 06/30/24 0719     Anaerobic Culture No anaerobes isolated at 10 days    Anaerobic Culture 10 Day Incubation - Synovium, Hip, Right [[id number]]  (Normal) Collected: 06/20/24 1700    Lab Status: Final result Specimen: Synovium from Hip, Right Updated: 06/30/24 0719     Anaerobic Culture No anaerobes isolated at 10 days    Fungus Culture - Wound, Leg, Left [[id number]] Collected: 06/15/24 1501    Lab Status: Preliminary result Specimen: Wound from Leg, Left Updated: 06/29/24 2215     Fungus Culture No fungus isolated at 2 weeks    AFB Culture - Wound, Leg, Left [[id number]] Collected: 06/15/24 1501    Lab Status: Preliminary result Specimen: Wound from Leg, Left Updated: 06/29/24 2215     AFB Culture No AFB isolated at 2 weeks     AFB Stain No acid fast bacilli seen on concentrated smear    Fungus Culture - Synovium, Hip, Right [[id number]] Collected: 06/20/24 1700    Lab Status: Preliminary result Specimen: Synovium from Hip, Right Updated: 06/27/24 1831     Fungus Culture No fungus isolated at 1 week    Fungus  Culture - Synovium, Hip, Right [[id number]] Collected: 06/20/24 1700    Lab Status: Preliminary result Specimen: Synovium from Hip, Right Updated: 06/27/24 1831     Fungus Culture No fungus isolated at 1 week    Fungus Culture - Synovium, Hip, Right [[id number]] Collected: 06/20/24 1700    Lab Status: Preliminary result Specimen: Synovium from Hip, Right Updated: 06/27/24 1831     Fungus Culture No fungus isolated at 1 week    AFB Culture - Synovium, Hip, Right [[id number]] Collected: 06/20/24 1700    Lab Status: Preliminary result Specimen: Synovium from Hip, Right Updated: 06/27/24 1831     AFB Culture No AFB isolated at 1 week     AFB Stain No acid fast bacilli seen on concentrated smear    AFB Culture - Synovium, Hip, Right [[id number]] Collected: 06/20/24 1700    Lab Status: Preliminary result Specimen: Synovium from Hip, Right Updated: 06/27/24 1831     AFB Culture No AFB isolated at 1 week     AFB Stain No acid fast bacilli seen on concentrated smear    AFB Culture - Synovium, Hip, Right [[id number]] Collected: 06/20/24 1700    Lab Status: Preliminary result Specimen: Synovium from Hip, Right Updated: 06/27/24 1831     AFB Culture No AFB isolated at 1 week     AFB Stain No acid fast bacilli seen on concentrated smear    Fungus Culture - Surgical Site, Leg, Left [[id number]] Collected: 06/13/24 1031    Lab Status: Preliminary result Specimen: Surgical Site from Leg, Left Updated: 06/27/24 1201     Fungus Culture No fungus isolated at 2 weeks    AFB Culture - Surgical Site, Leg, Left [[id number]] Collected: 06/13/24 1031    Lab Status: Preliminary result Specimen: Surgical Site from Leg, Left Updated: 06/27/24 1201     AFB Culture No AFB isolated at 2 weeks     AFB Stain No acid fast bacilli seen on concentrated smear    Anaerobic Culture 10 Day Incubation - Wound, Leg, Left [[id number]]  (Normal) Collected: 06/15/24 1501    Lab Status: Final result Specimen: Wound from Leg, Left Updated: 06/26/24 0647     Anaerobic  Culture No anaerobes isolated at 10 days    Fungus Culture - Aspirate, Hip, Right [[id number]] Collected: 06/18/24 1052    Lab Status: Preliminary result Specimen: Aspirate from Hip, Right Updated: 06/25/24 1130     Fungus Culture No fungus isolated at 1 week    AFB Culture - Aspirate, Hip, Right [[id number]] Collected: 06/18/24 1052    Lab Status: Preliminary result Specimen: Aspirate from Hip, Right Updated: 06/25/24 1130     AFB Culture No AFB isolated at 1 week     AFB Stain No acid fast bacilli seen on concentrated smear    Tissue / Bone Culture - Synovium, Hip, Right [[id number]] Collected: 06/20/24 1700    Lab Status: Final result Specimen: Synovium from Hip, Right Updated: 06/23/24 1010     Tissue Culture No growth at 3 days     Gram Stain Many (4+) Red blood cells      Moderate (3+) WBCs seen      No organisms seen    Tissue / Bone Culture - Synovium, Hip, Right [[id number]] Collected: 06/20/24 1700    Lab Status: Final result Specimen: Synovium from Hip, Right Updated: 06/23/24 1009     Tissue Culture No growth at 3 days     Gram Stain Moderate (3+) WBCs seen      No organisms seen     Comment: Modified report. Previous result was Rare (1+) Gram positive cocci in pairs on 6/20/2024 at 1949 EDT.         Many (4+) Red blood cells    Tissue / Bone Culture - Synovium, Hip, Right [[id number]] Collected: 06/20/24 1700    Lab Status: Final result Specimen: Synovium from Hip, Right Updated: 06/23/24 1009     Tissue Culture No growth at 3 days     Gram Stain Many (4+) Red blood cells      Few (2+) WBCs seen      No organisms seen    Blood Culture - Blood, Hand, Left [[id number]]  (Normal) Collected: 06/15/24 1104    Lab Status: Final result Specimen: Blood from Hand, Left Updated: 06/20/24 1201     Blood Culture No growth at 5 days    Blood Culture - Blood, Hand, Right [[id number]]  (Normal) Collected: 06/15/24 1104    Lab Status: Final result Specimen: Blood from Hand, Right Updated: 06/20/24 1201     Blood Culture No  growth at 5 days    Wound Culture - Wound, Leg, Left [[id number]] Collected: 06/15/24 1501    Lab Status: Final result Specimen: Wound from Leg, Left Updated: 06/19/24 0711     Wound Culture No growth at 3 days     Gram Stain Rare (1+) WBCs seen      No organisms seen    Anaerobic Culture - Surgical Site, Leg, Left [[id number]]  (Normal) Collected: 06/13/24 1031    Lab Status: Final result Specimen: Surgical Site from Leg, Left Updated: 06/18/24 0751     Anaerobic Culture No anaerobes isolated at 5 days    Blood Culture - Blood, Arm, Left [[id number]]  (Normal) Collected: 06/12/24 1832    Lab Status: Final result Specimen: Blood from Arm, Left Updated: 06/17/24 1900     Blood Culture No growth at 5 days    Blood Culture - Blood, Hand, Right [[id number]]  (Normal) Collected: 06/12/24 1837    Lab Status: Final result Specimen: Blood from Hand, Right Updated: 06/17/24 1900     Blood Culture No growth at 5 days    Fungus Smear - Surgical Site, Leg, Left [[id number]] Collected: 06/13/24 1031    Lab Status: Final result Specimen: Surgical Site from Leg, Left Updated: 06/14/24 1406     Fungal Stain No fungal elements seen            No radiology results from the last 24 hrs    Results for orders placed during the hospital encounter of 06/12/24    Adult Transthoracic Echo Complete w/ Color, Spectral and Contrast if Necessary Per Protocol    Interpretation Summary    Left ventricular systolic function is normal. Left ventricular ejection fraction appears to be 56 - 60%.    Left ventricular wall thickness is consistent with borderline concentric hypertrophy.    Left ventricular diastolic function is consistent with (grade Ia w/high LAP) impaired relaxation.      Current medications:  Scheduled Meds:acetaminophen, 1,000 mg, Oral, Q8H  aspirin, 81 mg, Oral, Q12H  cefTRIAXone, 2,000 mg, Intravenous, Q24H  furosemide, 40 mg, Intravenous, Once  insulin glargine, 25 Units, Subcutaneous, Nightly  insulin lispro, 2-9 Units, Subcutaneous,  4x Daily AC & at Bedtime  Insulin Lispro, 8 Units, Subcutaneous, TID With Meals  lactobacillus acidophilus, 1 capsule, Oral, Daily  lisinopril, 10 mg, Oral, Q24H  melatonin, 5 mg, Oral, Nightly  pantoprazole, 40 mg, Intravenous, BID AC  senna-docusate sodium, 2 tablet, Oral, BID  sodium chloride, 10 mL, Intravenous, Q12H  sodium chloride, 10 mL, Intravenous, Q12H      Continuous Infusions:lactated ringers, 9 mL/hr, Last Rate: 9 mL/hr (06/15/24 1307)  lactated ringers, 100 mL/hr, Last Rate: 100 mL/hr (06/20/24 2122)  ropivacaine, , Last Rate: Stopped (06/17/24 2230)      PRN Meds:.  senna-docusate sodium **AND** polyethylene glycol **AND** bisacodyl **AND** bisacodyl    Calcium Replacement - Follow Nurse / BPA Driven Protocol    dextrose    dextrose    glucagon (human recombinant)    Magnesium Standard Dose Replacement - Follow Nurse / BPA Driven Protocol    nitroglycerin    oxyCODONE    Phosphorus Replacement - Follow Nurse / BPA Driven Protocol    Potassium Replacement - Follow Nurse / BPA Driven Protocol    sodium chloride    sodium chloride    sodium chloride    Assessment & Plan   Assessment & Plan     Active Hospital Problems    Diagnosis  POA    **Diabetic ulcer of left foot with necrosis of muscle [E11.621, L97.523]  Yes    Severe malnutrition [E43]  Yes    Infection of prosthetic total hip joint [T84.59XA, Z96.649]  Not Applicable      Resolved Hospital Problems   No resolved problems to display.        Brief Hospital Course to date:  Easton Alvarez is a 58 y.o. male with past medical history significant for T2DM, diabetic neuropathy, right hip fracture/ORIF ([facility] 2/2024), and right transmetatarsal amputation 2019 at  who presented to OSH ED on 6/12/2024 with severe left foot cellulitis/necrosis after recent injury. Lactate at OSH was 8.8 and CT imaging of left leg showed gas gangrene. Orthopedic surgeon Dr. Em was consulted and performed a left below-knee amputation on 6/13/2024 with further  debridement and irrigation on 6/15. Infectious disease has followed and managed antimicrobial therapy.     This patient's problems and plans were partially entered by my partner and updated as appropriate by me 06/30/24.     Sepsis secondary to group B strep bacteremia  Left foot cellulitis/gangrene   Leukocytosis  -MRI left foot showed extensive soft tissue infection with abscesses along the foot to the ankle and into the lower leg with soft tissue gas.    -Wound cultures 6/12 with heavy growth group B strep and Kebsiella, blood cultures x 2 with group B strep; wound culture 6/13 with group B strep  -TTE described as technically adequate by cardiology; no description of vegetation by cardiology per report   -s/p left BKA with wound VAC on 6/13/24    -s/p further debridement in OR on 6/15/24 by Dr. Em, drain removed 6/17  -Infectious disease, Dr. Bills following; continue IV ceftriaxone for at least 6 weeks then transition to PO Keflex indefinitely   -PT wound care following  -Pain control with scheduled Tylenol, PRN oxycodone.   -Daily dressing changes to surgical site: Xeroform, 4X4, Kerlix, ACE  -Follow up with  Orthopedics in 2 weeks for wound check, imaging  -AM labs      Right glenys-prosthetic hip infection  -IR performed fluid aspiration from the hip which was purulent.  -s/p I&D on 6/20, Dr. Sethi   -Fluid culture 6/18 with rare growth group B strep  -Supportive care, mobilize, pain control. Antibiotics as above     Concern for GIB  Acute on chronic anemia  -with melena and fecal occult positive overnight 6/14  -GI consulted, recommended medical management with PPI BID x 1 month.  -GI referral at discharge for elevated LFTs, thrombocytopenia and concern for early cirrhosis, plan to pursue liver US after recovery from acute illness.  -H&H however seems to be trending down, but is stable, plan remains as above    HFpEF   Elevated blood pressure  Anasarca   -No history of taking meds for BP per  patient   -BP has been running high this admission, possibly secondary to pain  -Echo from 6/18 revealed EF 56-60% with diastolic dysfunction  -Started lisinopril 10 mg daily, resumed 6/30  -Lasix 40 mg IV x1 today   -Strict I&O, daily weights   -AM labs      Mild hyponatremia  -Clinically insignificant  -Continue to monitor intermittently.      Anion gap metabolic acidosis POA, resolved   -AG was 20.1, likely from lactate, serum acetone negative.     Poorly controlled type 2 diabetes with A1c 9.5%   -Patient was not taking meds or checking glucoses, though insulin had been prescribed in the past.  -Continue basal, prandial and SSI, adjust as warranted   -Diabetes educator was consulted     Expected Discharge Location and Transportation: Rehab  Expected Discharge   Expected Discharge Date: 7/2/2024; Expected Discharge Time:       VTE Prophylaxis:  Mechanical VTE prophylaxis orders are present.         AM-PAC 6 Clicks Score (PT): 16 (06/29/24 2052)    CODE STATUS:   Code Status and Medical Interventions:   Ordered at: 06/20/24 2026     Level Of Support Discussed With:    Patient     Code Status (Patient has no pulse and is not breathing):    CPR (Attempt to Resuscitate)     Medical Interventions (Patient has pulse or is breathing):    Full       Souleymane Marshall, GUILLAUME  06/30/24

## 2024-06-30 NOTE — PROGRESS NOTES
"Easton Alvarez  1965  [id number]          Chief Complaint: left foot infection    Reason for Consultation: left foot infection    History of present illness:     Patient is a 58 y.o.  Yr old male with history of diabetes with prior right TMA 2019 at , history strep septicemia 2016 ; he reports right total hip arthroplasty 2024 after fall. He reports a fall several weeks prior to his June admission with injury to the left foot, wound present with deterioration several days preceding admission with severe discoloration/redness and swelling.  CT scan showed concern for subcutaneous emphysema and necrotizing infection.  Transferred to [facility] with evaluation by Dr. Em and Dr. Lundberg and arrangements made for urgent surgery. Subsequently, blood cultures called with gram-positive cocci     6/13 left LE amp, Dr Em    6/15  \"PROCEDURE:            Left      83315: Secondary closure above-knee amputation\"      6/18/24  right hip aspiration, culture with GB strep; wbc  down some postop from revision; blood cultures 6/15 negative so far    6/19/24 Dr. Em helping facilitate right hip surgery with orthopedic partners/team    6/20/24 surgery Dr Sethi  \"Procedure(s):  HIP ANTERIOR INCISION AND DRAINAGE WITH HANA TABLE, POLY SWAP- RIGHT\"    6/26/24 urinary retention per nursing notes, Dr Goodwin aware.  Dr Goodwin reports later that patient voiding without  retention issues    6/30/24  seen early and sleepy;  no new pain or distress per nursing; case management looking into options;  postop pain controlled at present; He has right hip pain that is intermittent , blunted by neuropathy, worse with palpation, better with pain meds and 2-3 out of 10 in severity. No distress per nursing; no new focal symptoms otherwise; no fever; no left stump pain     No headache photophobia or neck stiffness.  No shortness of breath cough or hemoptysis.  No nausea vomiting  or abdominal pain.  No dysuria " "hematuria or pyuria.  No other new skin rash    Past Medical History:   Diagnosis Date    Diabetes mellitus     Diabetic foot infection 2018    left (partial amputation)       Past Surgical History:   Procedure Laterality Date    AMPUTATION FOOT / TOE Right     partial foot    BELOW KNEE AMPUTATION Left 6/13/2024    Procedure: AMPUTATION BELOW KNEE AND WOUND VAC ASSISTED CLOSURE LEFT;  Surgeon: Brijesh Em Jr., MD;  Location:  [facility];  Service: Orthopedics;  Laterality: Left;    INCISION AND DRAINAGE HIP Right 6/20/2024    Procedure: HIP ANTERIOR INCISION AND DRAINAGE WITH HANA TABLE, POLY SWAP RIGHT WITH LEFT LEG PIN TRACTION;  Surgeon: Lex Sethi MD;  Location:  [facility];  Service: Orthopedics;  Laterality: Right;    INCISION AND DRAINAGE LEG Left 6/15/2024    Procedure: SECONDARY CLOSURE LEFT BELOW KNEE AMPUTATION;  Surgeon: Brijesh Em Jr., MD;  Location:  [facility];  Service: Orthopedics;  Laterality: Left;    PLACEMENT OF WOUND VAC Left 6/13/2024    Procedure: PLACEMENT OF WOUND VAC LEFT;  Surgeon: Brijesh Em Jr., MD;  Location:  [facility];  Service: Orthopedics;  Laterality: Left;    TOTAL HIP ARTHROPLASTY Right 02/2024    From fall \"whole hip was shattered\"           No Known Allergies       Review of Systems    6/30/24 per nursing also    Constitutional-- No Fever, chills or sweats.  Appetite diminished with fatigue.  Heent-- No new vision, hearing or throat complaints.  No epistaxis or oral sores.  Denies odynophagia or dysphagia.  No flashers, floaters or eye pain. No odynophagia or dysphagia. No headache, photophobia or neck stiffness.  CV-- No chest pain, palpitation or syncope  Resp-- No SOB/cough/Hemoptysis  GI- No nausea, vomiting, or diarrhea.  No hematochezia, melena, or hematemesis. Denies jaundice or chronic liver disease.  -- No dysuria, hematuria, or flank pain.  Denies hesitancy, urgency or flank pain.  Lymph- no swollen lymph nodes in neck/axilla or groin.   Heme- " "No active bruising or bleeding; no Hx of DVT or PE.  MS-- aside from above, no swelling or pain in the bones or joints of arms/legs.  No new back pain.  Neuro-- No acute focal weakness or numbness in the arms or legs.  No seizures.    Full 12 point review of systems reviewed and negative otherwise for acute complaints, except for above    Physical Exam:   Vital Signs   /71 (BP Location: Left arm, Patient Position: Lying)   Pulse 94   Temp 98.3 °F (36.8 °C) (Oral)   Resp 16   Ht 172.7 cm (67.99\")   Wt 67.6 kg (149 lb)   SpO2 95%   BMI 22.66 kg/m²     GENERAL: sleepy; in no acute distress.   HEENT: Normocephalic, atraumatic.   No conjunctival injection. No icterus. Oropharynx clear without evidence of thrush or exudate. No evidence of periodontal disease.    NECK: Supple without nuchal rigidity. No mass.   HEART: RRR; No murmur, rubs, gallops.   LUNGS: diminished at bases.  Trace crackles at the bases but no rhonchi or wheeze. Normal respiratory effort. Nonlabored. No dullness.  ABDOMEN: Soft, nontender, nondistended. Positive bowel sounds. No rebound or guarding. NO mass or HSM.  EXT:  see below.     MSK: right hip surgical site cvered;  no new visible redness/purulence or fluctuance  SKIN: Warm and dry without cutaneous eruptions on Inspection/palpation.      Left   BKA stump covered;  no new visible redness or purulence; no discrete fluctuance.  No crepitus    Laboratory Data    Results from last 7 days   Lab Units 06/29/24  0508 06/28/24  0441 06/27/24  0436   WBC 10*3/mm3 6.54 7.09 6.84   HEMOGLOBIN g/dL 7.7* 7.9* 8.2*   HEMATOCRIT % 22.8* 23.8* 25.2*   PLATELETS 10*3/mm3 114* 121* 126*     Results from last 7 days   Lab Units 06/29/24  0508   SODIUM mmol/L 136   POTASSIUM mmol/L 3.8   CHLORIDE mmol/L 104   CO2 mmol/L 25.0   BUN mg/dL 21   CREATININE mg/dL 0.72*   GLUCOSE mg/dL 75   CALCIUM mg/dL 7.2*                       Estimated Creatinine Clearance: 104.3 mL/min (A) (by C-G formula based on SCr " of 0.72 mg/dL (L)).      Microbiology:      Radiology:  Imaging Results (Last 72 Hours)       ** No results found for the last 72 hours. **              Impression:     --acute sepsis as per admission notes, severe left foot infection with gangrene/cellulitis and concern for necrotizing soft tissue infection by imaging, urgent surgical arrangements made June 13; repeat surg 6/15  ;  subsequent right hip surg 6/20; He knows risk for persistent/recurrent or nonhealing wounds, persistet / progressive or recurrent infection and risk for further amputation/functional-limb loss and chronic pain.  Associated with GB strep bacteremia as below.    --Acute/severe left foot infection as above, urgent surgical arrangements  made 6/13    --Acute gb strep bacteremia,   source from foot.  High risk for further serious morbidity and other serious sequela including dire consequences and metastatic foci of involvement/endovascular sequela etc. No new metastatic focus of involvement. TTE 6/17    --Right hip fracture with repair February 2024.  +pain ; orthopedics with  aspiration June 18 and culture with GB strep likely hematogenous seeding, MRI imaging with concern for septic joint, surgery on June 20 as above.   Patient understands risk for persistent/recurrent or progressive infection and potential for further surgical intervention in the future that could include functional/limb loss and other serious sequela/morbidity; he knows antibiotics and surgery not a guarantee for care    --postop leukocytosis, possible stress response associated with surgery.  Trended down June 22-27.  No new respiratory symptoms.  Encouraged incentive spirometry.  Abdomen benign with no diarrhea.  No new urinary tract complaints.  IV sites with no suppurative change.  No rash.  Monitor for new process; no new focal muscoskeletal symptoms to suggest new metastatic focus of infection    --diabetes.  Described as uncontrolled by medicine team at admission.   Glucose control per medicine team    --urinary retention per nursing staff.  Per their notes patient refused in/out catheterization.  Dr. Goodwin aware and he will discuss options with patient    --thrombocytopenia.  Monitor      PLAN:      --IV  rocephin likely at least 6 weeks from hip surgery and anticipate step down to oral agents after with oral keflex    **wound culture at left foot mixed with  MSSA Klebsiella and GB strep  **blood cultures with group B strep  ** right hip cultures with group B strep    --orthopedics  making surgical plans with concern for right hipprosthetic/periprosthetic infection     --Check/review labs cultures and scans    --TTE described as technically adequate by cardiology; no description of vegetation by cardiology per report    --Partial history per nursing staff    --encouraged incentive spirometry.  Nursing aware to instruct and encourage    --Discussed with microbiology    --d/w Dr Goodwin    --Highly complex at of issues with high risk for further serious morbidity and other serious sequela    **follow-up with me one week.  Ceftriaxone IV for at least 6 weeks with oral Keflex after, no stop date for oral Keflex.  Every Monday CBC/CMP and sed rate/CRP.  Every Monday PICC line dressing change.    Copied text in this note has been reviewed and is accurate as of 06/30/24.        Cleve Bills MD  6/30/2024

## 2024-07-01 LAB
ALBUMIN SERPL-MCNC: 1.8 G/DL (ref 3.5–5.2)
ALBUMIN/GLOB SERPL: 0.4 G/DL
ALP SERPL-CCNC: 233 U/L (ref 39–117)
ALT SERPL W P-5'-P-CCNC: 21 U/L (ref 1–41)
ANION GAP SERPL CALCULATED.3IONS-SCNC: 6 MMOL/L (ref 5–15)
AST SERPL-CCNC: 45 U/L (ref 1–40)
BILIRUB SERPL-MCNC: 0.6 MG/DL (ref 0–1.2)
BUN SERPL-MCNC: 17 MG/DL (ref 8–23)
BUN/CREAT SERPL: 21.3 (ref 7–25)
CALCIUM SPEC-SCNC: 8 MG/DL (ref 8.6–10.5)
CHLORIDE SERPL-SCNC: 104 MMOL/L (ref 98–107)
CO2 SERPL-SCNC: 28 MMOL/L (ref 22–29)
CREAT SERPL-MCNC: 0.8 MG/DL (ref 0.76–1.27)
DEPRECATED RDW RBC AUTO: 50.2 FL (ref 37–54)
EGFRCR SERPLBLD CKD-EPI 2021: 101.3 ML/MIN/1.73
ERYTHROCYTE [DISTWIDTH] IN BLOOD BY AUTOMATED COUNT: 14.1 % (ref 12.3–15.4)
GLOBULIN UR ELPH-MCNC: 4.3 GM/DL
GLUCOSE BLDC GLUCOMTR-MCNC: 113 MG/DL (ref 70–130)
GLUCOSE BLDC GLUCOMTR-MCNC: 142 MG/DL (ref 70–130)
GLUCOSE BLDC GLUCOMTR-MCNC: 152 MG/DL (ref 70–130)
GLUCOSE BLDC GLUCOMTR-MCNC: 95 MG/DL (ref 70–130)
GLUCOSE SERPL-MCNC: 95 MG/DL (ref 65–99)
HCT VFR BLD AUTO: 27.8 % (ref 37.5–51)
HGB BLD-MCNC: 9.3 G/DL (ref 13–17.7)
MCH RBC QN AUTO: 32.4 PG (ref 26.6–33)
MCHC RBC AUTO-ENTMCNC: 33.5 G/DL (ref 31.5–35.7)
MCV RBC AUTO: 96.9 FL (ref 79–97)
PLATELET # BLD AUTO: 126 10*3/MM3 (ref 140–450)
PMV BLD AUTO: 9.8 FL (ref 6–12)
POTASSIUM SERPL-SCNC: 4.8 MMOL/L (ref 3.5–5.2)
PROT SERPL-MCNC: 6.1 G/DL (ref 6–8.5)
RBC # BLD AUTO: 2.87 10*6/MM3 (ref 4.14–5.8)
SODIUM SERPL-SCNC: 138 MMOL/L (ref 136–145)
WBC NRBC COR # BLD AUTO: 7.81 10*3/MM3 (ref 3.4–10.8)

## 2024-07-01 PROCEDURE — 25010000002 CEFTRIAXONE PER 250 MG: Performed by: INTERNAL MEDICINE

## 2024-07-01 PROCEDURE — 63710000001 INSULIN LISPRO (HUMAN) PER 5 UNITS: Performed by: ORTHOPAEDIC SURGERY

## 2024-07-01 PROCEDURE — 97530 THERAPEUTIC ACTIVITIES: CPT

## 2024-07-01 PROCEDURE — 63710000001 INSULIN LISPRO (HUMAN) PER 5 UNITS: Performed by: INTERNAL MEDICINE

## 2024-07-01 PROCEDURE — 99232 SBSQ HOSP IP/OBS MODERATE 35: CPT | Performed by: NURSE PRACTITIONER

## 2024-07-01 PROCEDURE — 63710000001 INSULIN GLARGINE PER 5 UNITS: Performed by: INTERNAL MEDICINE

## 2024-07-01 PROCEDURE — 80053 COMPREHEN METABOLIC PANEL: CPT | Performed by: INTERNAL MEDICINE

## 2024-07-01 PROCEDURE — 85027 COMPLETE CBC AUTOMATED: CPT | Performed by: STUDENT IN AN ORGANIZED HEALTH CARE EDUCATION/TRAINING PROGRAM

## 2024-07-01 PROCEDURE — 82948 REAGENT STRIP/BLOOD GLUCOSE: CPT

## 2024-07-01 RX ORDER — FENTANYL 100 UG/1
1 PATCH TRANSDERMAL
Status: DISCONTINUED | OUTPATIENT
Start: 2024-07-01 | End: 2024-07-01

## 2024-07-01 RX ORDER — TRAMADOL HYDROCHLORIDE 50 MG/1
25 TABLET ORAL ONCE
Status: COMPLETED | OUTPATIENT
Start: 2024-07-01 | End: 2024-07-01

## 2024-07-01 RX ORDER — PANTOPRAZOLE SODIUM 40 MG/1
40 TABLET, DELAYED RELEASE ORAL
Status: DISCONTINUED | OUTPATIENT
Start: 2024-07-01 | End: 2024-07-06 | Stop reason: HOSPADM

## 2024-07-01 RX ADMIN — INSULIN LISPRO 2 UNITS: 100 INJECTION, SOLUTION INTRAVENOUS; SUBCUTANEOUS at 20:34

## 2024-07-01 RX ADMIN — ACETAMINOPHEN 1000 MG: 500 TABLET, FILM COATED ORAL at 22:40

## 2024-07-01 RX ADMIN — INSULIN LISPRO 8 UNITS: 100 INJECTION, SOLUTION INTRAVENOUS; SUBCUTANEOUS at 12:55

## 2024-07-01 RX ADMIN — Medication 10 ML: at 09:52

## 2024-07-01 RX ADMIN — ASPIRIN 81 MG: 81 TABLET, COATED ORAL at 09:49

## 2024-07-01 RX ADMIN — Medication 10 ML: at 20:35

## 2024-07-01 RX ADMIN — Medication 1 CAPSULE: at 09:49

## 2024-07-01 RX ADMIN — OXYCODONE HYDROCHLORIDE 5 MG: 5 TABLET ORAL at 14:52

## 2024-07-01 RX ADMIN — ASPIRIN 81 MG: 81 TABLET, COATED ORAL at 20:34

## 2024-07-01 RX ADMIN — CEFTRIAXONE 2000 MG: 2 INJECTION, POWDER, FOR SOLUTION INTRAMUSCULAR; INTRAVENOUS at 09:14

## 2024-07-01 RX ADMIN — INSULIN LISPRO 8 UNITS: 100 INJECTION, SOLUTION INTRAVENOUS; SUBCUTANEOUS at 09:50

## 2024-07-01 RX ADMIN — INSULIN GLARGINE 25 UNITS: 100 INJECTION, SOLUTION SUBCUTANEOUS at 20:34

## 2024-07-01 RX ADMIN — ACETAMINOPHEN 1000 MG: 500 TABLET, FILM COATED ORAL at 06:16

## 2024-07-01 RX ADMIN — INSULIN LISPRO 8 UNITS: 100 INJECTION, SOLUTION INTRAVENOUS; SUBCUTANEOUS at 18:03

## 2024-07-01 RX ADMIN — TRAMADOL HYDROCHLORIDE 25 MG: 50 TABLET ORAL at 18:03

## 2024-07-01 RX ADMIN — OXYCODONE HYDROCHLORIDE 5 MG: 5 TABLET ORAL at 22:40

## 2024-07-01 RX ADMIN — LISINOPRIL 10 MG: 10 TABLET ORAL at 09:49

## 2024-07-01 RX ADMIN — PANTOPRAZOLE SODIUM 40 MG: 40 TABLET, DELAYED RELEASE ORAL at 18:03

## 2024-07-01 RX ADMIN — ACETAMINOPHEN 1000 MG: 500 TABLET, FILM COATED ORAL at 14:52

## 2024-07-01 RX ADMIN — Medication 5 MG: at 20:34

## 2024-07-01 RX ADMIN — PANTOPRAZOLE SODIUM 40 MG: 40 INJECTION, POWDER, FOR SOLUTION INTRAVENOUS at 06:16

## 2024-07-01 NOTE — PLAN OF CARE
Goal Outcome Evaluation:  Plan of Care Reviewed With: (P) patient        Progress: (P) improving  Outcome Evaluation: (P) Pt able to complete STS x 2 with Max A x 2 in parallel bars for roughly 45-60s this session. Pt continues to work on retention with sliding board trasfer with sliding board transfer x 2 this session. Pt continues to present below baseline with deficits in balance, strength, and activity tolerance. IPPT continues to be appropriate during admission with goals revisted and adjusted as appropriate this session. PT continues recommended DC to IRF for best functional outcomes.      Anticipated Discharge Disposition (PT): (P) inpatient rehabilitation facility

## 2024-07-01 NOTE — PROGRESS NOTES
[facility] Medicine Services  PROGRESS NOTE    Patient Name: Easton Alvarez  : 1965  MRN: [id number]    Date of Admission: 2024  Primary Care Physician: Provider, No Known    Subjective   Subjective     CC:  Follow-up left foot gangrene, right hip infection    HPI:  Increased pain today with therapy getting him up to chair    Objective   Objective     Vital Signs:   Temp:  [97.7 °F (36.5 °C)-98.5 °F (36.9 °C)] 98 °F (36.7 °C)  Heart Rate:  [98] 98  Resp:  [16-18] 16  BP: (135-138)/(74-78) 138/78     Physical Exam:  Constitutional: No acute distress, awake, alert  HENT: NCAT, mucous membranes moist  Respiratory: Clear to auscultation bilaterally, respiratory effort normal   Cardiovascular: Regular, tachycardic, no murmurs, rubs, or gallops  Gastrointestinal: Positive bowel sounds, soft, nontender, nondistended  Musculoskeletal: 1+ right ankle edema  Psychiatric: Appropriate affect, cooperative  Neurologic: Oriented x 3, moves remaining extremities, speech clear  Skin: No rashes noted.  Left stump dressing intact      Results Reviewed:  LAB RESULTS:      Lab 24  1114 24  0647 24  0508 24  0441 24  0436   WBC 7.81 6.90 6.54 7.09 6.84   HEMOGLOBIN 9.3* 7.6* 7.7* 7.9* 8.2*   HEMATOCRIT 27.8* 22.7* 22.8* 23.8* 25.2*   PLATELETS 126* 94* 114* 121* 126*   MCV 96.9 97.0 96.2 97.5* 97.7*         Lab 24  1114 24  0647 24  0508 24  0441 24  0436   SODIUM 138 135* 136 135* 134*   POTASSIUM 4.8 3.9 3.8 3.8 3.7   CHLORIDE 104 103 104 103 102   CO2 28.0 24.0 25.0 25.0 23.0   ANION GAP 6.0 8.0 7.0 7.0 9.0   BUN 17 18 21 24* 27*   CREATININE 0.80 0.63* 0.72* 0.70* 0.67*   EGFR 101.3 108.9 104.6 105.5 106.9   GLUCOSE 95 109* 75 76 91   CALCIUM 8.0* 7.0* 7.2* 7.1* 7.3*         Lab 24  1114   TOTAL PROTEIN 6.1   ALBUMIN 1.8*   GLOBULIN 4.3   ALT (SGPT) 21   AST (SGOT) 45*   BILIRUBIN 0.6   ALK PHOS 233*         Brief Urine Lab  Results  (Last result in the past 365 days)        Color   Clarity   Blood   Leuk Est   Nitrite   Protein   CREAT   Urine HCG        06/14/24 1109 Yellow   Clear   Negative   Negative   Negative   Negative                   Microbiology Results Abnormal       Procedure Component Value - Date/Time    Anaerobic Culture 10 Day Incubation - Synovium, Hip, Right [[id number]]  (Normal) Collected: 06/20/24 1700    Lab Status: Final result Specimen: Synovium from Hip, Right Updated: 06/30/24 0719     Anaerobic Culture No anaerobes isolated at 10 days    Anaerobic Culture 10 Day Incubation - Synovium, Hip, Right [[id number]]  (Normal) Collected: 06/20/24 1700    Lab Status: Edited Result - FINAL Specimen: Synovium from Hip, Right Updated: 06/30/24 0719     Anaerobic Culture No anaerobes isolated at 10 days    Anaerobic Culture 10 Day Incubation - Synovium, Hip, Right [[id number]]  (Normal) Collected: 06/20/24 1700    Lab Status: Final result Specimen: Synovium from Hip, Right Updated: 06/30/24 0719     Anaerobic Culture No anaerobes isolated at 10 days    Fungus Culture - Wound, Leg, Left [[id number]] Collected: 06/15/24 1501    Lab Status: Preliminary result Specimen: Wound from Leg, Left Updated: 06/29/24 2215     Fungus Culture No fungus isolated at 2 weeks    AFB Culture - Wound, Leg, Left [[id number]] Collected: 06/15/24 1501    Lab Status: Preliminary result Specimen: Wound from Leg, Left Updated: 06/29/24 2215     AFB Culture No AFB isolated at 2 weeks     AFB Stain No acid fast bacilli seen on concentrated smear    Fungus Culture - Synovium, Hip, Right [[id number]] Collected: 06/20/24 1700    Lab Status: Preliminary result Specimen: Synovium from Hip, Right Updated: 06/27/24 1831     Fungus Culture No fungus isolated at 1 week    Fungus Culture - Synovium, Hip, Right [[id number]] Collected: 06/20/24 1700    Lab Status: Preliminary result Specimen: Synovium from Hip, Right Updated: 06/27/24 1831     Fungus Culture No fungus  isolated at 1 week    Fungus Culture - Synovium, Hip, Right [[id number]] Collected: 06/20/24 1700    Lab Status: Preliminary result Specimen: Synovium from Hip, Right Updated: 06/27/24 1831     Fungus Culture No fungus isolated at 1 week    AFB Culture - Synovium, Hip, Right [[id number]] Collected: 06/20/24 1700    Lab Status: Preliminary result Specimen: Synovium from Hip, Right Updated: 06/27/24 1831     AFB Culture No AFB isolated at 1 week     AFB Stain No acid fast bacilli seen on concentrated smear    AFB Culture - Synovium, Hip, Right [[id number]] Collected: 06/20/24 1700    Lab Status: Preliminary result Specimen: Synovium from Hip, Right Updated: 06/27/24 1831     AFB Culture No AFB isolated at 1 week     AFB Stain No acid fast bacilli seen on concentrated smear    AFB Culture - Synovium, Hip, Right [[id number]] Collected: 06/20/24 1700    Lab Status: Preliminary result Specimen: Synovium from Hip, Right Updated: 06/27/24 1831     AFB Culture No AFB isolated at 1 week     AFB Stain No acid fast bacilli seen on concentrated smear    Fungus Culture - Surgical Site, Leg, Left [[id number]] Collected: 06/13/24 1031    Lab Status: Preliminary result Specimen: Surgical Site from Leg, Left Updated: 06/27/24 1201     Fungus Culture No fungus isolated at 2 weeks    AFB Culture - Surgical Site, Leg, Left [[id number]] Collected: 06/13/24 1031    Lab Status: Preliminary result Specimen: Surgical Site from Leg, Left Updated: 06/27/24 1201     AFB Culture No AFB isolated at 2 weeks     AFB Stain No acid fast bacilli seen on concentrated smear    Anaerobic Culture 10 Day Incubation - Wound, Leg, Left [[id number]]  (Normal) Collected: 06/15/24 1501    Lab Status: Final result Specimen: Wound from Leg, Left Updated: 06/26/24 0647     Anaerobic Culture No anaerobes isolated at 10 days    Fungus Culture - Aspirate, Hip, Right [[id number]] Collected: 06/18/24 1052    Lab Status: Preliminary result Specimen: Aspirate from Hip, Right  Updated: 06/25/24 1130     Fungus Culture No fungus isolated at 1 week    AFB Culture - Aspirate, Hip, Right [[id number]] Collected: 06/18/24 1052    Lab Status: Preliminary result Specimen: Aspirate from Hip, Right Updated: 06/25/24 1130     AFB Culture No AFB isolated at 1 week     AFB Stain No acid fast bacilli seen on concentrated smear    Tissue / Bone Culture - Synovium, Hip, Right [[id number]] Collected: 06/20/24 1700    Lab Status: Final result Specimen: Synovium from Hip, Right Updated: 06/23/24 1010     Tissue Culture No growth at 3 days     Gram Stain Many (4+) Red blood cells      Moderate (3+) WBCs seen      No organisms seen    Tissue / Bone Culture - Synovium, Hip, Right [[id number]] Collected: 06/20/24 1700    Lab Status: Final result Specimen: Synovium from Hip, Right Updated: 06/23/24 1009     Tissue Culture No growth at 3 days     Gram Stain Moderate (3+) WBCs seen      No organisms seen     Comment: Modified report. Previous result was Rare (1+) Gram positive cocci in pairs on 6/20/2024 at 1949 EDT.         Many (4+) Red blood cells    Tissue / Bone Culture - Synovium, Hip, Right [[id number]] Collected: 06/20/24 1700    Lab Status: Final result Specimen: Synovium from Hip, Right Updated: 06/23/24 1009     Tissue Culture No growth at 3 days     Gram Stain Many (4+) Red blood cells      Few (2+) WBCs seen      No organisms seen    Blood Culture - Blood, Hand, Left [[id number]]  (Normal) Collected: 06/15/24 1104    Lab Status: Final result Specimen: Blood from Hand, Left Updated: 06/20/24 1201     Blood Culture No growth at 5 days    Blood Culture - Blood, Hand, Right [[id number]]  (Normal) Collected: 06/15/24 1104    Lab Status: Final result Specimen: Blood from Hand, Right Updated: 06/20/24 1201     Blood Culture No growth at 5 days    Wound Culture - Wound, Leg, Left [[id number]] Collected: 06/15/24 1501    Lab Status: Final result Specimen: Wound from Leg, Left Updated: 06/19/24 0711     Wound Culture  No growth at 3 days     Gram Stain Rare (1+) WBCs seen      No organisms seen    Anaerobic Culture - Surgical Site, Leg, Left [[id number]]  (Normal) Collected: 06/13/24 1031    Lab Status: Final result Specimen: Surgical Site from Leg, Left Updated: 06/18/24 0751     Anaerobic Culture No anaerobes isolated at 5 days    Blood Culture - Blood, Arm, Left [[id number]]  (Normal) Collected: 06/12/24 1832    Lab Status: Final result Specimen: Blood from Arm, Left Updated: 06/17/24 1900     Blood Culture No growth at 5 days    Blood Culture - Blood, Hand, Right [[id number]]  (Normal) Collected: 06/12/24 1837    Lab Status: Final result Specimen: Blood from Hand, Right Updated: 06/17/24 1900     Blood Culture No growth at 5 days    Fungus Smear - Surgical Site, Leg, Left [[id number]] Collected: 06/13/24 1031    Lab Status: Final result Specimen: Surgical Site from Leg, Left Updated: 06/14/24 1406     Fungal Stain No fungal elements seen            No radiology results from the last 24 hrs    Results for orders placed during the hospital encounter of 06/12/24    Adult Transthoracic Echo Complete w/ Color, Spectral and Contrast if Necessary Per Protocol    Interpretation Summary    Left ventricular systolic function is normal. Left ventricular ejection fraction appears to be 56 - 60%.    Left ventricular wall thickness is consistent with borderline concentric hypertrophy.    Left ventricular diastolic function is consistent with (grade Ia w/high LAP) impaired relaxation.      Current medications:  Scheduled Meds:acetaminophen, 1,000 mg, Oral, Q8H  aspirin, 81 mg, Oral, Q12H  cefTRIAXone, 2,000 mg, Intravenous, Q24H  insulin glargine, 25 Units, Subcutaneous, Nightly  insulin lispro, 2-9 Units, Subcutaneous, 4x Daily AC & at Bedtime  Insulin Lispro, 8 Units, Subcutaneous, TID With Meals  lactobacillus acidophilus, 1 capsule, Oral, Daily  lisinopril, 10 mg, Oral, Q24H  melatonin, 5 mg, Oral, Nightly  pantoprazole, 40 mg, Oral, BID  AC  senna-docusate sodium, 2 tablet, Oral, BID  sodium chloride, 10 mL, Intravenous, Q12H  sodium chloride, 10 mL, Intravenous, Q12H  traMADol, 25 mg, Oral, Once      Continuous Infusions:lactated ringers, 9 mL/hr, Last Rate: 9 mL/hr (06/15/24 1307)  lactated ringers, 100 mL/hr, Last Rate: 100 mL/hr (06/20/24 2122)  ropivacaine, , Last Rate: Stopped (06/17/24 2230)      PRN Meds:.  senna-docusate sodium **AND** polyethylene glycol **AND** bisacodyl **AND** bisacodyl    Calcium Replacement - Follow Nurse / BPA Driven Protocol    dextrose    dextrose    glucagon (human recombinant)    Magnesium Standard Dose Replacement - Follow Nurse / BPA Driven Protocol    nitroglycerin    oxyCODONE    Phosphorus Replacement - Follow Nurse / BPA Driven Protocol    Potassium Replacement - Follow Nurse / BPA Driven Protocol    sodium chloride    sodium chloride    sodium chloride    Assessment & Plan   Assessment & Plan     Active Hospital Problems    Diagnosis  POA    **Diabetic ulcer of left foot with necrosis of muscle [E11.621, L97.523]  Yes    Severe malnutrition [E43]  Yes    Infection of prosthetic total hip joint [T84.59XA, Z96.649]  Not Applicable      Resolved Hospital Problems   No resolved problems to display.        Brief Hospital Course to date:  Easton Alvarez is a 58 y.o. male with past medical history significant for T2DM, diabetic neuropathy, right hip fracture/ORIF ([facility] 2/2024), and right transmetatarsal amputation 2019 at  who presented to OSH ED on 6/12/2024 with severe left foot cellulitis/necrosis after recent injury. Lactate at OSH was 8.8 and CT imaging of left leg showed gas gangrene. Orthopedic surgeon Dr. Em was consulted and performed a left below-knee amputation on 6/13/2024 with further debridement and irrigation on 6/15. Infectious disease has followed and managed antimicrobial therapy.     This patient's problems and plans were partially entered by my partner and updated as appropriate by me  07/01/24.     Sepsis secondary to group B strep bacteremia  Left foot cellulitis/gangrene   Leukocytosis  -MRI left foot showed extensive soft tissue infection with abscesses along the foot to the ankle and into the lower leg with soft tissue gas.    -Wound cultures 6/12 with heavy growth group B strep and Kebsiella, blood cultures x 2 with group B strep; wound culture 6/13 with group B strep  -TTE described as technically adequate by cardiology; no description of vegetation by cardiology per report   -s/p left BKA with wound VAC on 6/13/24    -s/p further debridement in OR on 6/15/24 by Dr. Em, drain removed 6/17  -Infectious disease, Dr. Bills following; continue IV ceftriaxone for at least 6 weeks then transition to PO Keflex indefinitely   -PT wound care following  -Pain control with scheduled Tylenol, PRN oxycodone.   -Daily dressing changes to surgical site: Xeroform, 4X4, Kerlix, ACE  -Follow up with  Orthopedics in 2 weeks for wound check, imaging     Right glenys-prosthetic hip infection  -IR performed fluid aspiration from the hip which was purulent.  -s/p I&D on 6/20, Dr. Sethi   -Fluid culture 6/18 with rare growth group B strep  -Supportive care, mobilize, pain control. Antibiotics as above     Concern for GIB  Acute on chronic anemia  -with melena and fecal occult positive overnight 6/14  -GI consulted, recommended medical management with PPI BID x 1 month.  -H&H however seems to be trending down, but is stable, plan remains as above    HFpEF   Elevated blood pressure  Anasarca   -No history of taking meds for BP per patient   -BP has been running high this admission, possibly secondary to pain  -Echo from 6/18 revealed EF 56-60% with diastolic dysfunction  -Started lisinopril 10 mg daily, resumed 6/30  -Lasix 40 mg IV x1   -Strict I&O, daily weights      Elevated LFTs  Thrombocytopenia, improved  Mild hyponatremia, clinically insignificant  -Continue to monitor intermittently.   -GI referral  at discharge for elevated LFTs, thrombocytopenia and concern for early cirrhosis, plan to pursue liver US after recovery from acute illness.     Anion gap metabolic acidosis POA, resolved   -AG was 20.1, likely from lactate, serum acetone negative.     Poorly controlled type 2 diabetes with A1c 9.5%   -Patient was not taking meds or checking glucoses, though insulin had been prescribed in the past.  -Continue basal, prandial and SSI, adjust as warranted   -Diabetes educator has seen    Latest Reference Range & Units 06/30/24 20:11 07/01/24 08:56 07/01/24 11:14 07/01/24 12:48   Glucose 70 - 130 mg/dL 105 95 95 113     Expected Discharge Location and Transportation: Rehab @ [place] pending precert  Expected Discharge   Expected Discharge Date: 7/2/2024; Expected Discharge Time:        VTE Prophylaxis:  Mechanical VTE prophylaxis orders are present.         AM-PAC 6 Clicks Score (PT): 15 (07/01/24 1632)    CODE STATUS:   Code Status and Medical Interventions:   Ordered at: 06/20/24 2026     Level Of Support Discussed With:    Patient     Code Status (Patient has no pulse and is not breathing):    CPR (Attempt to Resuscitate)     Medical Interventions (Patient has pulse or is breathing):    Full       Gay Baker, GUILLAUME  07/01/24

## 2024-07-01 NOTE — CASE MANAGEMENT/SOCIAL WORK
Continued Stay Note  [facility]     Patient Name: Easton Alvarez  MRN: [id number]  Today's Date: 7/1/2024    Admit Date: 6/12/2024    Plan: IPR   Discharge Plan       Row Name 07/01/24 1113       Plan    Plan IPR    Plan Comments I have met with Mr. Alvarez to discuss discharge planning.  He remains agreeable to bed offer from [facility].  Per Riri with [facility] they have started a precert for a bed available soon.  CM will await insurance approval, cont to follow the plan of care and assist with discharge needs as appropriate.    Final Discharge Disposition Code 62 - inpatient rehab facility                   Discharge Codes    No documentation.                 Expected Discharge Date and Time       Expected Discharge Date Expected Discharge Time    Jul 2, 2024               Missy Obrien RN

## 2024-07-01 NOTE — THERAPY PROGRESS REPORT/RE-CERT
"Patient Name: Easton Alvarez  : 1965    MRN: [id number]                              Today's Date: 2024       Admit Date: 2024    Visit Dx:     ICD-10-CM ICD-9-CM   1. S/P BKA (below knee amputation), left  Z89.512 V49.75   2. Diabetic ulcer of left midfoot associated with type 2 diabetes mellitus, with necrosis of muscle  E11.621 250.80    L97.423 707.14     728.89   3. Elevated LFTs  R79.89 790.6   4. Below knee amputation  S88.119A 897.0   5. Impaired functional mobility, balance, gait, and endurance  Z74.09 V49.89   6. Infection of prosthetic total hip joint, initial encounter  T84.59XA 996.66    Z96.649 V43.64     Patient Active Problem List   Diagnosis    Diabetic ulcer of left foot with necrosis of muscle    Severe malnutrition    Infection of prosthetic total hip joint     Past Medical History:   Diagnosis Date    Diabetes mellitus     Diabetic foot infection 2018    left (partial amputation)     Past Surgical History:   Procedure Laterality Date    AMPUTATION FOOT / TOE Right     partial foot    BELOW KNEE AMPUTATION Left 2024    Procedure: AMPUTATION BELOW KNEE AND WOUND VAC ASSISTED CLOSURE LEFT;  Surgeon: Brijesh Em Jr., MD;  Location:  [facility];  Service: Orthopedics;  Laterality: Left;    INCISION AND DRAINAGE HIP Right 2024    Procedure: HIP ANTERIOR INCISION AND DRAINAGE WITH HANA TABLE, POLY SWAP RIGHT WITH LEFT LEG PIN TRACTION;  Surgeon: Lex Sethi MD;  Location:  [facility];  Service: Orthopedics;  Laterality: Right;    INCISION AND DRAINAGE LEG Left 6/15/2024    Procedure: SECONDARY CLOSURE LEFT BELOW KNEE AMPUTATION;  Surgeon: Brijesh Em Jr., MD;  Location:  [facility];  Service: Orthopedics;  Laterality: Left;    PLACEMENT OF WOUND VAC Left 2024    Procedure: PLACEMENT OF WOUND VAC LEFT;  Surgeon: Brijesh Em Jr., MD;  Location:  [facility];  Service: Orthopedics;  Laterality: Left;    TOTAL HIP ARTHROPLASTY Right 2024    From fall \"whole " "hip was shattered\"      General Information       Row Name 07/01/24 1613          Physical Therapy Time and Intention    Document Type progress note/recertification (P)   -     Mode of Treatment physical therapy;individual therapy (P)   -       Row Name 07/01/24 1613          General Information    Patient Profile Reviewed yes (P)   -     Existing Precautions/Restrictions fall;left;non-weight bearing;right;hip, anterior;other (see comments) (P)   recent L BKA, R hip MARIA LUISA s/p I@D, R foot TMA  -     Barriers to Rehab medically complex;previous functional deficit (P)   -       Row Name 07/01/24 1613          Cognition    Orientation Status (Cognition) oriented x 4 (P)   -       Row Name 07/01/24 1613          Safety Issues, Functional Mobility    Safety Issues Affecting Function (Mobility) awareness of need for assistance;insight into deficits/self-awareness;positioning of assistive device;sequencing abilities;safety precautions follow-through/compliance;safety precaution awareness (P)   -     Impairments Affecting Function (Mobility) balance;range of motion (ROM);strength;endurance/activity tolerance;pain (P)   -     Comment, Safety Issues/Impairments (Mobility) Alert and following commands (P)   -               User Key  (r) = Recorded By, (t) = Taken By, (c) = Cosigned By      Initials Name Provider Type     Quincy Elizabeth, PT Student PT Student                   Mobility       Row Name 07/01/24 1615          Bed Mobility    Bed Mobility supine-sit;sit-supine;scooting/bridging (P)   -     Scooting/Bridging [place] (Bed Mobility) standby assist (P)   -     Supine-Sit [place] (Bed Mobility) standby assist;verbal cues (P)   -     Assistive Device (Bed Mobility) head of bed elevated;bed rails (P)   -     Comment, (Bed Mobility) Pt required no physical assistance this date but required increase in time and effort to complete. (P)   -       Row Name 07/01/24 1615          " Transfers    Comment, (Transfers) Pt completed sliding board transfer to the R x 2 with bed > WC and WC >recliner (with scrub pants and pillow case on SB for assist). Pt with good retention of placement of sliding board for trasnfers (P)   -       Row Name 07/01/24 1615          Bed-Chair Transfer    Bed-Chair [place] (Transfers) minimum assist (75% patient effort);verbal cues (P)   -     Assistive Device (Bed-Chair Transfers) transfer board  -CK     Comment, (Bed-Chair Transfer) Pt originally reached for B UE support for bed > WC transfer but was corrected with VC to use UE for pushing from bed and reaching to chair. Pt with good hand placement and sequencing for WC to recliner transfer. (P)   -       Row Name 07/01/24 1615          Sit-Stand Transfer    Sit-Stand [place] (Transfers) maximum assist (25% patient effort);2 person assist;verbal cues (P)   -     Assistive Device (Sit-Stand Transfers) parallel bars (P)   -HM     Comment, (Sit-Stand Transfer) Pt with STS x 2 from parallel bars with VC to stand upright and tactile cue to engage hip extension. Pt requires blocking with R knee secondary to knee buckling. Pt able to stand roughly 45s - 60s for both trials. Pt with strong pushing through UE on bars to maintain upright. Pt trialed twisting in bars today attempting to de-weight R foot. (P)   -       Row Name 07/01/24 1613          Gait/Stairs (Locomotion)    [place] Level (Gait) unable to assess (P)   -     Assistive Device (Gait) other (see comments) (P)   -HM     Distance in Feet (Gait) 350 (P)   WC propulsion  -     Comment, (Gait/Stairs) Pt continues to work on locking and unlocking wc with propulsion. Pt pushed wc with BUEs and was able to navigate corners with ease. Pt attempted to de-weight foot in parallel bars, potentially could trial hopping in bars in future session. (P)   -       Row Name 07/01/24 1618          Mobility    Extremity Weight-bearing Status left lower  extremity;right lower extremity (P)   -HM     Left Lower Extremity (Weight-bearing Status) non weight-bearing (NWB) (P)   -     Right Lower Extremity (Weight-bearing Status) weight-bearing as tolerated (WBAT) (P)   -               User Key  (r) = Recorded By, (t) = Taken By, (c) = Cosigned By      Initials Name Provider Type    CK Elizabeth Ospina, PT Physical Therapist     Quincy Elizabeth, PT Student PT Student                   Obj/Interventions       Row Name 07/01/24 1625          Balance    Balance Assessment sitting static balance;sitting dynamic balance;sit to stand dynamic balance;standing static balance (P)   -     Static Sitting Balance standby assist (P)   -     Dynamic Sitting Balance contact guard (P)   -     Position, Sitting Balance unsupported;sitting edge of bed;sitting in chair (P)   -     Sit to Stand Dynamic Balance maximum assist;2-person assist (P)   -     Static Standing Balance moderate assist;2-person assist;verbal cues (P)   -     Position/Device Used, Standing Balance supported;parallel bars (P)   -     Balance Interventions sitting;standing;sit to stand;occupation based/functional task (P)   -     Comment, Balance No lob with standing with R knee buckling at times with R knee being blocked (P)   -               User Key  (r) = Recorded By, (t) = Taken By, (c) = Cosigned By      Initials Name Provider Type     Quincy Elizabeth, PT Student PT Student                   Goals/Plan       Row Name 07/01/24 1630          Bed Mobility Goal 1 (PT)    Activity/Assistive Device (Bed Mobility Goal 1, PT) sit to supine;supine to sit;rolling to left;rolling to right (P)   -     [place] Level/Cues Needed (Bed Mobility Goal 1, PT) modified independence (P)   -     Time Frame (Bed Mobility Goal 1, PT) 3 days;short term goal (STG) (P)   -     Progress/Outcomes (Bed Mobility Goal 1, PT) goal ongoing (P)   -       Row Name 07/01/24 1630          Transfer Goal 1  (PT)    Activity/Assistive Device (Transfer Goal 1, PT) wheelchair transfer;sliding board;sit-to-stand/stand-to-sit;bed-to-chair/chair-to-bed (P)   -HM     [place] Level/Cues Needed (Transfer Goal 1, PT) contact guard required (P)   -HM     Time Frame (Transfer Goal 1, PT) long term goal (LTG);2 weeks (P)   -HM     Progress/Outcome (Transfer Goal 1, PT) goal revised this date (P)   -HM       Row Name 07/01/24 1630          Gait Training Goal 1 (PT)    Activity/Assistive Device (Gait Training Goal 1, PT) gait (walking locomotion);walker, rolling (P)   -HM     [place] Level (Gait Training Goal 1, PT) minimum assist (75% or more patient effort) (P)   -HM     Distance (Gait Training Goal 1, PT) 10ft (P)   -HM     Time Frame (Gait Training Goal 1, PT) long term goal (LTG);2 weeks (P)   -HM     Progress/Outcome (Gait Training Goal 1, PT) goal ongoing (P)   -HM       Row Name 07/01/24 1630          Patient Education Goal (PT)    Activity (Patient Education Goal, PT) HEP (P)   -HM     [place]/Cues/Accuracy (Memory Goal 2, PT) demonstrates adequately;verbalizes understanding (P)   -HM     Time Frame (Patient Education Goal, PT) long term goal (LTG);2 weeks (P)   -HM     Progress/Outcome (Patient Education Goal, PT) goal met (P)   -HM       Row Name 07/01/24 1630          Therapy Assessment/Plan (PT)    Planned Therapy Interventions (PT) balance training;bed mobility training;gait training;home exercise program;joint mobilization;manual therapy techniques;stretching;strengthening;ROM (range of motion);prosthetic fitting/training;postural re-education;patient/family education;orthotic fitting/training;neuromuscular re-education;transfer training;wheelchair management/propulsion training (P)   -HM               User Key  (r) = Recorded By, (t) = Taken By, (c) = Cosigned By      Initials Name Provider Type     Quincy Elizabeth, PT Student PT Student                   Clinical Impression       Row Name 07/01/24  1627          Pain    Pretreatment Pain Rating 0/10 - no pain (P)   -HM     Posttreatment Pain Rating 4/10 (P)   -HM     Pain Location - Side/Orientation Right (P)   -HM     Pain Location generalized (P)   -HM     Pain Location - hip (P)   -     Pain Intervention(s) Cold applied;Elevated;Ambulation/increased activity (P)   -       Row Name 07/01/24 1627          Plan of Care Review    Plan of Care Reviewed With patient (P)   -HM     Progress improving (P)   -HM     Outcome Evaluation Pt able to complete STS x 2 with Max A x 2 in parallel bars for roughly 45-60s this session. Pt continues to work on retention with sliding board trasfer with sliding board transfer x 2 this session. Pt continues to present below baseline with deficits in balance, strength, and activity tolerance. IPPT continues to be appropriate during admission with goals revisted and adjusted as appropriate this session. PT continues recommended DC to IRF for best functional outcomes. (P)   -       Row Name 07/01/24 1627          Therapy Assessment/Plan (PT)    Patient/Family Therapy Goals Statement (PT) To get stronger (P)   -       Row Name 07/01/24 1627          Vital Signs    Pre Systolic BP Rehab 112 (P)   -HM     Pre Treatment Diastolic BP 72 (P)   -       Row Name 07/01/24 1627          Positioning and Restraints    Pre-Treatment Position in bed (P)   -     Post Treatment Position chair (P)   -     In Chair notified nsg;reclined;call light within reach;encouraged to call for assist;exit alarm on;waffle cushion;legs elevated (P)   -               User Key  (r) = Recorded By, (t) = Taken By, (c) = Cosigned By      Initials Name Provider Type     Quincy Elizabeth, PT Student PT Student                   Outcome Measures       Row Name 07/01/24 1632          How much help from another person do you currently need...    Turning from your back to your side while in flat bed without using bedrails? 4 (P)   -     Moving from lying  on back to sitting on the side of a flat bed without bedrails? 3 (P)   -HM     Moving to and from a bed to a chair (including a wheelchair)? 3 (P)   -HM     Standing up from a chair using your arms (e.g., wheelchair, bedside chair)? 2 (P)   -HM     Climbing 3-5 steps with a railing? 1 (P)   -HM     To walk in hospital room? 2 (P)   -HM     AM-PAC 6 Clicks Score (PT) 15 (P)   -HM     Highest Level of Mobility Goal 4 --> Transfer to chair/commode (P)   -HM       Row Name 07/01/24 1632          Functional Assessment    Outcome Measure Options AM-PAC 6 Clicks Basic Mobility (PT) (P)   -               User Key  (r) = Recorded By, (t) = Taken By, (c) = Cosigned By      Initials Name Provider Type     Quincy Elizabeth, PT Student PT Student                                 Physical Therapy Education       Title: PT OT SLP Therapies (Done)       Topic: Physical Therapy (Done)       Point: Mobility training (Done)       Learning Progress Summary             Patient Acceptance, E, VU by  at 7/1/2024 1633    Comment: Educated on the importance of standing and increasing strength and endurance during admission    Eager, E, VU by SC at 6/29/2024 1450    Comment: reviewed safety with sliding board    Acceptance, E, VU,DU,NR by  at 6/28/2024 1404    Eager, E, VU,DU by  at 6/27/2024 1539    Comment: Pt eudcated regarding WC management, transfers and symptom managment    Acceptance, E,D, VU by  at 6/26/2024 1608    Comment: Pt educated regarding transfers, weight bearing status and HEP.    Acceptance, E, VU,DU,NR by  at 6/25/2024 1107    Acceptance, E,H, VU by  at 6/24/2024 1105    Comment: Pt educated on bed positioning with L BKA and importance of exercise on rehab.    Eager, E, VU,DU by SC at 6/21/2024 1349    Comment: reviewed HEP    Acceptance, E,D, VU,NR by SD at 6/19/2024 1203    Comment: PT ed for expected outcomes, risks, and benefits of IPPT services and progression of activity. Pt given visual demo of  unilateral gait with RW. Discussion regarding dispo planning and IRF.                         Point: Home exercise program (Done)       Learning Progress Summary             Patient Eager, E, VU by SC at 6/29/2024 1450    Comment: reviewed safety with sliding board    Acceptance, E,D, VU by EM at 6/26/2024 1608    Comment: Pt educated regarding transfers, weight bearing status and HEP.    Acceptance, E, VU,DU,NR by  at 6/25/2024 1107    Acceptance, E,H, VU by  at 6/24/2024 1105    Comment: Pt educated on bed positioning with L BKA and importance of exercise on rehab.    Acceptance, E, VU,NR,DU by GEORGINA at 6/23/2024 1420    Eager, E, VU,DU by SC at 6/21/2024 1349    Comment: reviewed HEP    Acceptance, E,D, VU,NR by SD at 6/19/2024 1203    Comment: PT ed for expected outcomes, risks, and benefits of IPPT services and progression of activity. Pt given visual demo of unilateral gait with RW. Discussion regarding dispo planning and IRF.                         Point: Body mechanics (Done)       Learning Progress Summary             Patient Acceptance, E, VU by  at 7/1/2024 1633    Comment: Educated on the importance of standing and increasing strength and endurance during admission    Eager, E, VU by SC at 6/29/2024 1450    Comment: reviewed safety with sliding board    Acceptance, E, VU,DU,NR by  at 6/28/2024 1404    Eager, E, VU,DU by EM at 6/27/2024 1539    Comment: Pt eudcated regarding WC management, transfers and symptom managment    Acceptance, E,D, VU by EM at 6/26/2024 1608    Comment: Pt educated regarding transfers, weight bearing status and HEP.    Acceptance, E, VU,DU,NR by  at 6/25/2024 1107    Acceptance, E,H, VU by  at 6/24/2024 1105    Comment: Pt educated on bed positioning with L BKA and importance of exercise on rehab.    Eager, E, VU,DU by SC at 6/21/2024 1349    Comment: reviewed HEP    Acceptance, E,D, VU,NR by SD at 6/19/2024 1203    Comment: PT ed for expected outcomes, risks, and  benefits of IPPT services and progression of activity. Pt given visual demo of unilateral gait with RW. Discussion regarding dispo planning and IRF.                         Point: Precautions (Done)       Learning Progress Summary             Patient Acceptance, E, VU by  at 7/1/2024 1633    Comment: Educated on the importance of standing and increasing strength and endurance during admission    Eager, E, VU by SC at 6/29/2024 1450    Comment: reviewed safety with sliding board    Acceptance, E, VU,DU,NR by  at 6/28/2024 1404    Eager, E, VU,DU by  at 6/27/2024 1539    Comment: Pt eudcated regarding WC management, transfers and symptom managment    Acceptance, E,D, VU by  at 6/26/2024 1608    Comment: Pt educated regarding transfers, weight bearing status and HEP.    Acceptance, E, VU,DU,NR by  at 6/25/2024 1107    Acceptance, E,H, VU by  at 6/24/2024 1105    Comment: Pt educated on bed positioning with L BKA and importance of exercise on rehab.    Eager, E, VU,DU by SC at 6/21/2024 1349    Comment: reviewed HEP    Acceptance, E,D, VU,NR by SD at 6/19/2024 1203    Comment: PT ed for expected outcomes, risks, and benefits of IPPT services and progression of activity. Pt given visual demo of unilateral gait with RW. Discussion regarding dispo planning and IRF.                                         User Key       Initials Effective Dates Name Provider Type Discipline    SC 02/03/23 -  Jorge Luis Childress, PT Physical Therapist PT    SD 03/13/23 -  Charity Dela Cruz, PT Physical Therapist PT    GEORGINA 06/16/21 -  Radha Miguel, PT Physical Therapist PT     09/21/23 -  Rachna Cruz, PT Physical Therapist PT    EM 05/07/24 -  Rancho Schneider, PT Student PT Student PT     05/07/24 -  Quincy Elizabeth, PT Student PT Student PT                  PT Recommendation and Plan  Planned Therapy Interventions (PT): (P) balance training, bed mobility training, gait training, home exercise program, joint  mobilization, manual therapy techniques, stretching, strengthening, ROM (range of motion), prosthetic fitting/training, postural re-education, patient/family education, orthotic fitting/training, neuromuscular re-education, transfer training, wheelchair management/propulsion training  Plan of Care Reviewed With: (P) patient  Progress: (P) improving  Outcome Evaluation: (P) Pt able to complete STS x 2 with Max A x 2 in parallel bars for roughly 45-60s this session. Pt continues to work on retention with sliding board trasfer with sliding board transfer x 2 this session. Pt continues to present below baseline with deficits in balance, strength, and activity tolerance. IPPT continues to be appropriate during admission with goals revisted and adjusted as appropriate this session. PT continues recommended DC to IRF for best functional outcomes.     Time Calculation:         PT Charges       Row Name 07/01/24 1355             Time Calculation    Start Time 1355 (P)   -      PT Received On 07/01/24 (P)   -      PT Goal Re-Cert Due Date 07/11/24 (P)   -         Timed Charges    22923 - PT Therapeutic Activity Minutes 43 (P)   -         Total Minutes    Timed Charges Total Minutes 43 (P)   -HM       Total Minutes 43 (P)   -                User Key  (r) = Recorded By, (t) = Taken By, (c) = Cosigned By      Initials Name Provider Type     Quincy Elizabeth, PT Student PT Student                  Therapy Charges for Today       Code Description Service Date Service Provider Modifiers Qty    [id number]  PT THERAPEUTIC ACT EA 15 MIN 7/1/2024 Quincy Elizabeth, PT Student GP 3            PT G-Codes  Outcome Measure Options: (P) AM-PAC 6 Clicks Basic Mobility (PT)  AM-PAC 6 Clicks Score (PT): (P) 15  AM-PAC 6 Clicks Score (OT): 14  PT Discharge Summary  Anticipated Discharge Disposition (PT): (P) inpatient rehabilitation facility    LINDA Francis  7/1/2024

## 2024-07-01 NOTE — PROGRESS NOTES
"Easton Alvarez  1965  [id number]          Chief Complaint: left foot infection    Reason for Consultation: left foot infection    History of present illness:     Patient is a 58 y.o.  Yr old male with history of diabetes with prior right TMA 2019 at , history strep septicemia 2016 ; he reports right total hip arthroplasty 2024 after fall. He reports a fall several weeks prior to his June admission with injury to the left foot, wound present with deterioration several days preceding admission with severe discoloration/redness and swelling.  CT scan showed concern for subcutaneous emphysema and necrotizing infection.  Transferred to [facility] with evaluation by Dr. Em and Dr. Lundberg and arrangements made for urgent surgery. Subsequently, blood cultures called with gram-positive cocci     6/13 left LE amp, Dr Em    6/15  \"PROCEDURE:            Left      38299: Secondary closure above-knee amputation\"      6/18/24  right hip aspiration, culture with GB strep; wbc  down some postop from revision; blood cultures 6/15 negative so far    6/19/24 Dr. Em helping facilitate right hip surgery with orthopedic partners/team    6/20/24 surgery Dr Sethi  \"Procedure(s):  HIP ANTERIOR INCISION AND DRAINAGE WITH HANA TABLE, POLY SWAP- RIGHT\"    6/26/24 urinary retention per nursing notes, Dr Goodwin aware.  Dr Goodwin reports later that patient voiding without  retention issues    7/1/24  seen early and sleepy;  no new pain or distress per nursing; case management looking into options;  postop pain controlled at present; He has right hip pain that is intermittent , blunted by neuropathy, worse with palpation, better with pain meds and 2-3 out of 10 in severity. No distress per nursing; no new focal symptoms otherwise; no fever; no left stump pain; He did not know about any prior issues of thrombocytopenia     No headache photophobia or neck stiffness.  No shortness of breath cough or " "hemoptysis.  No nausea vomiting  or abdominal pain.  No dysuria hematuria or pyuria.  No other new skin rash    Past Medical History:   Diagnosis Date    Diabetes mellitus     Diabetic foot infection 2018    left (partial amputation)       Past Surgical History:   Procedure Laterality Date    AMPUTATION FOOT / TOE Right     partial foot    BELOW KNEE AMPUTATION Left 6/13/2024    Procedure: AMPUTATION BELOW KNEE AND WOUND VAC ASSISTED CLOSURE LEFT;  Surgeon: Brijesh Em Jr., MD;  Location:  [facility];  Service: Orthopedics;  Laterality: Left;    INCISION AND DRAINAGE HIP Right 6/20/2024    Procedure: HIP ANTERIOR INCISION AND DRAINAGE WITH HANA TABLE, POLY SWAP RIGHT WITH LEFT LEG PIN TRACTION;  Surgeon: Lex Sethi MD;  Location:  [facility];  Service: Orthopedics;  Laterality: Right;    INCISION AND DRAINAGE LEG Left 6/15/2024    Procedure: SECONDARY CLOSURE LEFT BELOW KNEE AMPUTATION;  Surgeon: Brijesh Em Jr., MD;  Location:  [facility];  Service: Orthopedics;  Laterality: Left;    PLACEMENT OF WOUND VAC Left 6/13/2024    Procedure: PLACEMENT OF WOUND VAC LEFT;  Surgeon: Brijesh Em Jr., MD;  Location:  [facility];  Service: Orthopedics;  Laterality: Left;    TOTAL HIP ARTHROPLASTY Right 02/2024    From fall \"whole hip was shattered\"           No Known Allergies       Review of Systems    7/1/24 per nursing also    Constitutional-- No Fever, chills or sweats.  Appetite diminished with fatigue.  Heent-- No new vision, hearing or throat complaints.  No epistaxis or oral sores.  Denies odynophagia or dysphagia.  No flashers, floaters or eye pain. No odynophagia or dysphagia. No headache, photophobia or neck stiffness.  CV-- No chest pain, palpitation or syncope  Resp-- No SOB/cough/Hemoptysis  GI- No nausea, vomiting, or diarrhea.  No hematochezia, melena, or hematemesis. Denies jaundice or chronic liver disease.  -- No dysuria, hematuria, or flank pain.  Denies hesitancy, urgency or flank " "pain.  Lymph- no swollen lymph nodes in neck/axilla or groin.   Heme- No active bruising or bleeding; no Hx of DVT or PE.  MS-- aside from above, no swelling or pain in the bones or joints of arms/legs.  No new back pain.  Neuro-- No acute focal weakness or numbness in the arms or legs.  No seizures.    Full 12 point review of systems reviewed and negative otherwise for acute complaints, except for above    Physical Exam:   Vital Signs   /74 (BP Location: Left arm, Patient Position: Lying)   Pulse 92   Temp 97.7 °F (36.5 °C) (Oral)   Resp 18   Ht 172.7 cm (67.99\")   Wt 67.6 kg (149 lb)   SpO2 96%   BMI 22.66 kg/m²     GENERAL: sleepy; in no acute distress.   HEENT: Normocephalic, atraumatic.   No conjunctival injection. No icterus. Oropharynx clear without evidence of thrush or exudate. No evidence of periodontal disease.    NECK: Supple without nuchal rigidity. No mass.   HEART: RRR; No murmur, rubs, gallops.   LUNGS: diminished at bases.  Trace crackles at the bases but no rhonchi or wheeze. Normal respiratory effort. Nonlabored. No dullness.  ABDOMEN: Soft, nontender, nondistended. Positive bowel sounds. No rebound or guarding. NO mass or HSM.  EXT:  see below.     MSK: right hip surgical site cvered;  no new visible redness/purulence or fluctuance  SKIN: Warm and dry without cutaneous eruptions on Inspection/palpation.      Left   BKA stump covered;  no new visible redness or purulence; no discrete fluctuance.  No crepitus    Laboratory Data    Results from last 7 days   Lab Units 06/30/24  0647 06/29/24  0508 06/28/24  0441   WBC 10*3/mm3 6.90 6.54 7.09   HEMOGLOBIN g/dL 7.6* 7.7* 7.9*   HEMATOCRIT % 22.7* 22.8* 23.8*   PLATELETS 10*3/mm3 94* 114* 121*     Results from last 7 days   Lab Units 06/30/24  0647   SODIUM mmol/L 135*   POTASSIUM mmol/L 3.9   CHLORIDE mmol/L 103   CO2 mmol/L 24.0   BUN mg/dL 18   CREATININE mg/dL 0.63*   GLUCOSE mg/dL 109*   CALCIUM mg/dL 7.0*                 "       Estimated Creatinine Clearance: 119.2 mL/min (A) (by C-G formula based on SCr of 0.63 mg/dL (L)).      Microbiology:      Radiology:  Imaging Results (Last 72 Hours)       ** No results found for the last 72 hours. **              Impression:     --acute sepsis as per admission notes, severe left foot infection with gangrene/cellulitis and concern for necrotizing soft tissue infection by imaging, urgent surgical arrangements made June 13; repeat surg 6/15  ;  subsequent right hip surg 6/20; He knows risk for persistent/recurrent or nonhealing wounds, persistet / progressive or recurrent infection and risk for further amputation/functional-limb loss and chronic pain.  Associated with GB strep bacteremia as below.    --Acute/severe left foot infection as above, urgent surgical arrangements  made 6/13    --Acute gb strep bacteremia,   source from foot.  High risk for further serious morbidity and other serious sequela including dire consequences and metastatic foci of involvement/endovascular sequela etc. No new metastatic focus of involvement. TTE 6/17      --thrombocytopenia.  Worse with Decrease to <100 on 6/30, labs 7/1 pending; Protonix stopped as per my discussion with Dr. Lombardo; in general, not common with ceftriaxone but if continued decline and no other specific etiology found may need to consider empiric adjustment.    --Right hip fracture with repair February 2024.  +pain ; orthopedics with  aspiration June 18 and culture with GB strep likely hematogenous seeding, MRI imaging with concern for septic joint, surgery on June 20 as above.   Patient understands risk for persistent/recurrent or progressive infection and potential for further surgical intervention in the future that could include functional/limb loss and other serious sequela/morbidity; he knows antibiotics and surgery not a guarantee for care    --postop leukocytosis, possible stress response associated with surgery.  Trended down June  22-27.  No new respiratory symptoms.  Encouraged incentive spirometry.  Abdomen benign with no diarrhea.  No new urinary tract complaints.  IV sites with no suppurative change.  No rash.  Monitor for new process; no new focal muscoskeletal symptoms to suggest new metastatic focus of infection    --diabetes.  Described as uncontrolled by medicine team at admission.  Glucose control per medicine team    --urinary retention per nursing staff.  Per their notes patient refused in/out catheterization.  Dr. Goodwin aware and he will discuss options with patient      PLAN:      --IV  rocephin likely at least 6 weeks from hip surgery and anticipate step down to oral agents after with oral keflex    **wound culture at left foot mixed with  MSSA Klebsiella and GB strep  **blood cultures with group B strep  ** right hip cultures with group B strep    --orthopedics  making surgical plans with concern for right hipprosthetic/periprosthetic infection     --Check/review labs cultures and scans    --TTE described as technically adequate by cardiology; no description of vegetation by cardiology per report    --Partial history per nursing staff    --encouraged incentive spirometry.  Nursing aware to instruct and encourage    --Discussed with microbiology    --d/w Dr Lombardo    --Highly complex at of issues with high risk for further serious morbidity and other serious sequela    **thrombocytopenia worsened in recent days, may require antibiotic adjustment prior to discharge and need further clarification regarding trend/stability prior to discharge.  Discussed with Dr. Lombardo    Copied text in this note has been reviewed and is accurate as of 07/01/24.        Cleve Bills MD  7/1/2024

## 2024-07-01 NOTE — PLAN OF CARE
Goal Outcome Evaluation:  Plan of Care Reviewed With: patient        Progress: no change          Pt is alert and oriented X 4. VSS, RA. Pain controlled with prn oxycodone and schedule tylenol. Residual limb dressing changed. Frequently turned/encouraged.

## 2024-07-02 LAB
ALBUMIN SERPL-MCNC: 1.7 G/DL (ref 3.5–5.2)
ALBUMIN/GLOB SERPL: 0.5 G/DL
ALP SERPL-CCNC: 235 U/L (ref 39–117)
ALT SERPL W P-5'-P-CCNC: 19 U/L (ref 1–41)
ANION GAP SERPL CALCULATED.3IONS-SCNC: 7 MMOL/L (ref 5–15)
AST SERPL-CCNC: 39 U/L (ref 1–40)
BILIRUB SERPL-MCNC: 0.4 MG/DL (ref 0–1.2)
BUN SERPL-MCNC: 18 MG/DL (ref 8–23)
BUN/CREAT SERPL: 27.7 (ref 7–25)
CALCIUM SPEC-SCNC: 7.2 MG/DL (ref 8.6–10.5)
CHLORIDE SERPL-SCNC: 104 MMOL/L (ref 98–107)
CO2 SERPL-SCNC: 26 MMOL/L (ref 22–29)
CREAT SERPL-MCNC: 0.65 MG/DL (ref 0.76–1.27)
DEPRECATED RDW RBC AUTO: 48.7 FL (ref 37–54)
EGFRCR SERPLBLD CKD-EPI 2021: 107.9 ML/MIN/1.73
ERYTHROCYTE [DISTWIDTH] IN BLOOD BY AUTOMATED COUNT: 14.3 % (ref 12.3–15.4)
FUNGUS WND CULT: NORMAL
GLOBULIN UR ELPH-MCNC: 3.1 GM/DL
GLUCOSE BLDC GLUCOMTR-MCNC: 101 MG/DL (ref 70–130)
GLUCOSE BLDC GLUCOMTR-MCNC: 116 MG/DL (ref 70–130)
GLUCOSE BLDC GLUCOMTR-MCNC: 82 MG/DL (ref 70–130)
GLUCOSE BLDC GLUCOMTR-MCNC: 94 MG/DL (ref 70–130)
GLUCOSE SERPL-MCNC: 79 MG/DL (ref 65–99)
HCT VFR BLD AUTO: 23.5 % (ref 37.5–51)
HGB BLD-MCNC: 8 G/DL (ref 13–17.7)
INR PPP: 1.33 (ref 0.89–1.12)
MCH RBC QN AUTO: 32 PG (ref 26.6–33)
MCHC RBC AUTO-ENTMCNC: 34 G/DL (ref 31.5–35.7)
MCV RBC AUTO: 94 FL (ref 79–97)
MYCOBACTERIUM SPEC CULT: NORMAL
NIGHT BLUE STAIN TISS: NORMAL
PLATELET # BLD AUTO: 117 10*3/MM3 (ref 140–450)
PMV BLD AUTO: 9.2 FL (ref 6–12)
POTASSIUM SERPL-SCNC: 3.6 MMOL/L (ref 3.5–5.2)
POTASSIUM SERPL-SCNC: 4.5 MMOL/L (ref 3.5–5.2)
PROT SERPL-MCNC: 4.8 G/DL (ref 6–8.5)
PROTHROMBIN TIME: 16.6 SECONDS (ref 12.2–14.5)
RBC # BLD AUTO: 2.5 10*6/MM3 (ref 4.14–5.8)
SODIUM SERPL-SCNC: 137 MMOL/L (ref 136–145)
WBC NRBC COR # BLD AUTO: 7.76 10*3/MM3 (ref 3.4–10.8)

## 2024-07-02 PROCEDURE — 97530 THERAPEUTIC ACTIVITIES: CPT

## 2024-07-02 PROCEDURE — 99232 SBSQ HOSP IP/OBS MODERATE 35: CPT | Performed by: NURSE PRACTITIONER

## 2024-07-02 PROCEDURE — 84132 ASSAY OF SERUM POTASSIUM: CPT | Performed by: STUDENT IN AN ORGANIZED HEALTH CARE EDUCATION/TRAINING PROGRAM

## 2024-07-02 PROCEDURE — 85027 COMPLETE CBC AUTOMATED: CPT | Performed by: STUDENT IN AN ORGANIZED HEALTH CARE EDUCATION/TRAINING PROGRAM

## 2024-07-02 PROCEDURE — 82948 REAGENT STRIP/BLOOD GLUCOSE: CPT

## 2024-07-02 PROCEDURE — 25010000002 CEFTRIAXONE PER 250 MG: Performed by: INTERNAL MEDICINE

## 2024-07-02 PROCEDURE — 80053 COMPREHEN METABOLIC PANEL: CPT | Performed by: INTERNAL MEDICINE

## 2024-07-02 PROCEDURE — 85610 PROTHROMBIN TIME: CPT | Performed by: INTERNAL MEDICINE

## 2024-07-02 PROCEDURE — 63710000001 INSULIN GLARGINE PER 5 UNITS: Performed by: INTERNAL MEDICINE

## 2024-07-02 PROCEDURE — 63710000001 INSULIN LISPRO (HUMAN) PER 5 UNITS: Performed by: INTERNAL MEDICINE

## 2024-07-02 RX ORDER — POTASSIUM CHLORIDE 1.5 G/1.58G
40 POWDER, FOR SOLUTION ORAL EVERY 4 HOURS
Status: COMPLETED | OUTPATIENT
Start: 2024-07-02 | End: 2024-07-02

## 2024-07-02 RX ADMIN — POTASSIUM CHLORIDE 40 MEQ: 1.5 POWDER, FOR SOLUTION ORAL at 08:15

## 2024-07-02 RX ADMIN — OXYCODONE HYDROCHLORIDE 5 MG: 5 TABLET ORAL at 21:41

## 2024-07-02 RX ADMIN — ACETAMINOPHEN 1000 MG: 500 TABLET, FILM COATED ORAL at 21:41

## 2024-07-02 RX ADMIN — CEFTRIAXONE 2000 MG: 2 INJECTION, POWDER, FOR SOLUTION INTRAMUSCULAR; INTRAVENOUS at 08:16

## 2024-07-02 RX ADMIN — ACETAMINOPHEN 1000 MG: 500 TABLET, FILM COATED ORAL at 13:41

## 2024-07-02 RX ADMIN — LISINOPRIL 10 MG: 10 TABLET ORAL at 08:16

## 2024-07-02 RX ADMIN — Medication 10 ML: at 21:42

## 2024-07-02 RX ADMIN — Medication 5 MG: at 21:41

## 2024-07-02 RX ADMIN — ASPIRIN 81 MG: 81 TABLET, COATED ORAL at 21:41

## 2024-07-02 RX ADMIN — Medication 10 ML: at 08:17

## 2024-07-02 RX ADMIN — PANTOPRAZOLE SODIUM 40 MG: 40 TABLET, DELAYED RELEASE ORAL at 08:16

## 2024-07-02 RX ADMIN — INSULIN LISPRO 8 UNITS: 100 INJECTION, SOLUTION INTRAVENOUS; SUBCUTANEOUS at 08:15

## 2024-07-02 RX ADMIN — INSULIN LISPRO 8 UNITS: 100 INJECTION, SOLUTION INTRAVENOUS; SUBCUTANEOUS at 18:09

## 2024-07-02 RX ADMIN — INSULIN LISPRO 8 UNITS: 100 INJECTION, SOLUTION INTRAVENOUS; SUBCUTANEOUS at 13:40

## 2024-07-02 RX ADMIN — ACETAMINOPHEN 1000 MG: 500 TABLET, FILM COATED ORAL at 06:21

## 2024-07-02 RX ADMIN — POTASSIUM CHLORIDE 40 MEQ: 1.5 POWDER, FOR SOLUTION ORAL at 13:41

## 2024-07-02 RX ADMIN — OXYCODONE HYDROCHLORIDE 5 MG: 5 TABLET ORAL at 08:29

## 2024-07-02 RX ADMIN — INSULIN GLARGINE 25 UNITS: 100 INJECTION, SOLUTION SUBCUTANEOUS at 21:41

## 2024-07-02 RX ADMIN — Medication 1 CAPSULE: at 08:15

## 2024-07-02 RX ADMIN — PANTOPRAZOLE SODIUM 40 MG: 40 TABLET, DELAYED RELEASE ORAL at 18:10

## 2024-07-02 RX ADMIN — ASPIRIN 81 MG: 81 TABLET, COATED ORAL at 08:16

## 2024-07-02 NOTE — THERAPY TREATMENT NOTE
"Patient Name: Easton Alvarez  : 1965    MRN: [id number]                              Today's Date: 2024       Admit Date: 2024    Visit Dx:     ICD-10-CM ICD-9-CM   1. S/P BKA (below knee amputation), left  Z89.512 V49.75   2. Diabetic ulcer of left midfoot associated with type 2 diabetes mellitus, with necrosis of muscle  E11.621 250.80    L97.423 707.14     728.89   3. Elevated LFTs  R79.89 790.6   4. Below knee amputation  S88.119A 897.0   5. Impaired functional mobility, balance, gait, and endurance  Z74.09 V49.89   6. Infection of prosthetic total hip joint, initial encounter  T84.59XA 996.66    Z96.649 V43.64     Patient Active Problem List   Diagnosis    Diabetic ulcer of left foot with necrosis of muscle    Severe malnutrition    Infection of prosthetic total hip joint     Past Medical History:   Diagnosis Date    Diabetes mellitus     Diabetic foot infection 2018    left (partial amputation)     Past Surgical History:   Procedure Laterality Date    AMPUTATION FOOT / TOE Right     partial foot    BELOW KNEE AMPUTATION Left 2024    Procedure: AMPUTATION BELOW KNEE AND WOUND VAC ASSISTED CLOSURE LEFT;  Surgeon: Brijesh Em Jr., MD;  Location:  [facility];  Service: Orthopedics;  Laterality: Left;    INCISION AND DRAINAGE HIP Right 2024    Procedure: HIP ANTERIOR INCISION AND DRAINAGE WITH HANA TABLE, POLY SWAP RIGHT WITH LEFT LEG PIN TRACTION;  Surgeon: Lex Sethi MD;  Location:  [facility];  Service: Orthopedics;  Laterality: Right;    INCISION AND DRAINAGE LEG Left 6/15/2024    Procedure: SECONDARY CLOSURE LEFT BELOW KNEE AMPUTATION;  Surgeon: Brijesh Em Jr., MD;  Location:  [facility];  Service: Orthopedics;  Laterality: Left;    PLACEMENT OF WOUND VAC Left 2024    Procedure: PLACEMENT OF WOUND VAC LEFT;  Surgeon: Brijesh Em Jr., MD;  Location:  [facility];  Service: Orthopedics;  Laterality: Left;    TOTAL HIP ARTHROPLASTY Right 2024    From fall \"whole " "hip was shattered\"      General Information       Row Name 07/02/24 1015          Physical Therapy Time and Intention    Document Type therapy note (daily note) (P)   -     Mode of Treatment physical therapy;individual therapy (P)   -       Row Name 07/02/24 1015          General Information    Patient Profile Reviewed yes (P)   -     Existing Precautions/Restrictions fall;left;non-weight bearing;right;hip, anterior;other (see comments) (P)   recent L BKA, R hip MARIA LUISA s/p I@D, R foot TMA  -     Barriers to Rehab medically complex;previous functional deficit (P)   -       Row Name 07/02/24 1015          Cognition    Orientation Status (Cognition) oriented x 4 (P)   -       Row Name 07/02/24 1015          Safety Issues, Functional Mobility    Safety Issues Affecting Function (Mobility) awareness of need for assistance;insight into deficits/self-awareness;safety precaution awareness;safety precautions follow-through/compliance;sequencing abilities;problem-solving (P)   -     Impairments Affecting Function (Mobility) balance;range of motion (ROM);strength;endurance/activity tolerance;pain (P)   -     Comment, Safety Issues/Impairments (Mobility) Alert and following commands (P)   -               User Key  (r) = Recorded By, (t) = Taken By, (c) = Cosigned By      Initials Name Provider Type     Quincy Elizabeth, PT Student PT Student                   Mobility       Row Name 07/02/24 1017          Bed Mobility    Bed Mobility supine-sit;sit-supine;scooting/bridging (P)   -     Scooting/Bridging [place] (Bed Mobility) standby assist (P)   -     Supine-Sit [place] (Bed Mobility) standby assist;verbal cues (P)   -     Sit-Supine [place] (Bed Mobility) standby assist;verbal cues (P)   -     Assistive Device (Bed Mobility) head of bed elevated;bed rails (P)   -     Comment, (Bed Mobility) Pt required no physical assistance but required increased time and effort to comoplete task. " (P)   -       Row Name 07/02/24 1017          Transfers    Comment, (Transfers) Sliding board transfer from bed > WC x 1, WC > bed x 1 (scrub pants and pillow case on board for assist). STS x 2 in parallel bars. (P)   -       Row Name 07/02/24 1017          Bed-Chair Transfer    Bed-Chair [place] (Transfers) minimum assist (75% patient effort);verbal cues (P)   -     Assistive Device (Bed-Chair Transfers) transfer board (P)   -     Comment, (Bed-Chair Transfer) Pt required assistance for sliding board placement but was able to offweight hip appropriately to do so. Pt required VC throughout for hand placement and sequencing as well as wc setup for transfer. (P)   -       Row Name 07/02/24 1017          Sit-Stand Transfer    Sit-Stand [place] (Transfers) verbal cues;moderate assist (50% patient effort);2 person assist (P)   -     Assistive Device (Sit-Stand Transfers) parallel bars (P)   -     Comment, (Sit-Stand Transfer) STS x 2 from parallell bars wwith VC to stand upright and adjusting hand placement on bars. Pt requires tactile cue for hip extension and requires blocking and R knee. (P)   -       Row Name 07/02/24 1017          Gait/Stairs (Locomotion)    [place] Level (Gait) moderate assist (50% patient effort);2 person assist;verbal cues (P)   -     Assistive Device (Gait) parallel bars (P)   -     Distance in Feet (Gait) 3 (P)   3+3  -HM     Deviations/Abnormal Patterns (Gait) right sided deviations;trevin decreased;gait speed decreased;stride length decreased (P)   -HM     Bilateral Gait Deviations forward flexed posture (P)   -HM     Right Sided Gait Deviations decreased knee extension;forward flexed posture;heel strike decreased;knee buckling, right side (P)   -     Comment, (Gait/Stairs) Pt able to complete 2 trials of hopping in parallel bars this session. Pt required blocking and R knee throughout and pushed strongly through BUE to maintain upright. Pt with no LOB  throughout. Pt able to complete wc propulsion 350' with BUE and navigating corners without assistance. (P)   -       Row Name 07/02/24 1017          Mobility    Extremity Weight-bearing Status left lower extremity;right lower extremity (P)   -     Left Lower Extremity (Weight-bearing Status) non weight-bearing (NWB) (P)   -     Right Lower Extremity (Weight-bearing Status) weight-bearing as tolerated (WBAT) (P)   -               User Key  (r) = Recorded By, (t) = Taken By, (c) = Cosigned By      Initials Name Provider Type     Quincy Elizabeth, PT Student PT Student                   Obj/Interventions       Row Name 07/02/24 1025          Balance    Balance Assessment sitting static balance;sitting dynamic balance;sit to stand dynamic balance;standing static balance;standing dynamic balance (P)   -     Static Sitting Balance standby assist (P)   -     Dynamic Sitting Balance contact guard (P)   -     Position, Sitting Balance unsupported;sitting edge of bed;sitting in chair (P)   -     Sit to Stand Dynamic Balance moderate assist;2-person assist;verbal cues (P)   -     Static Standing Balance minimal assist;2-person assist;verbal cues (P)   -     Dynamic Standing Balance moderate assist;2-person assist;verbal cues (P)   -     Position/Device Used, Standing Balance supported;parallel bars (P)   -     Balance Interventions sitting;standing;sit to stand;occupation based/functional task (P)   -     Comment, Balance Pt able to complete hopping in // bars with no lob but knee buckling at times requiring blocking to maintain upright. Pt with increased balance this session with sliding board transfer. (P)   -               User Key  (r) = Recorded By, (t) = Taken By, (c) = Cosigned By      Initials Name Provider Type     Quincy Elizabeth, PT Student PT Student                   Goals/Plan    No documentation.                  Clinical Impression       Row Name 07/02/24 1027          Pain     Pretreatment Pain Rating 0/10 - no pain (P)   -HM     Posttreatment Pain Rating 0/10 - no pain (P)   -HM       Row Name 07/02/24 1027          Plan of Care Review    Plan of Care Reviewed With patient (P)   -     Progress improving (P)   -     Outcome Evaluation Pt able to complete sliding board transfer x 2 from wc <> bed with Min A. Pt completed STS x 2 in parallel bars and 2 trials of hopping 3' with Mod A x 2. Pt continues to present below baseline with deficits in balance,strength, ROM, and activity tolerance. IPPt continues to be appropriate during admission. PT continues recommended DC to IRF for best frunctional outcomes. (P)   -       Row Name 07/02/24 1027          Therapy Assessment/Plan (PT)    Patient/Family Therapy Goals Statement (PT) Continue improving (P)   -       Row Name 07/02/24 1027          Vital Signs    Pre Systolic BP Rehab 133 (P)   -HM     Pre Treatment Diastolic BP 76 (P)   -     Pretreatment Heart Rate (beats/min) 109 (P)   -HM     Posttreatment Heart Rate (beats/min) 110 (P)   -HM     Pre SpO2 (%) 97 (P)   -HM     O2 Delivery Pre Treatment room air (P)   -HM     Post SpO2 (%) 97 (P)   -HM     O2 Delivery Post Treatment room air (P)   -HM     Pre Patient Position Supine (P)   -     Post Patient Position Supine (P)   -       Row Name 07/02/24 1027          Positioning and Restraints    Pre-Treatment Position in bed (P)   -HM     Post Treatment Position bed (P)   -HM     In Bed notified nsg;fowlers;call light within reach;encouraged to call for assist;exit alarm on (P)   -               User Key  (r) = Recorded By, (t) = Taken By, (c) = Cosigned By      Initials Name Provider Type     Quincy Elizabeth, PT Student PT Student                   Outcome Measures       Row Name 07/02/24 1030 07/02/24 0800       How much help from another person do you currently need...    Turning from your back to your side while in flat bed without using bedrails? 4 (P)   - 4  -BM     Moving from lying on back to sitting on the side of a flat bed without bedrails? 3 (P)   - 3  -BM    Moving to and from a bed to a chair (including a wheelchair)? 3 (P)   sliding board  - 3  -BM    Standing up from a chair using your arms (e.g., wheelchair, bedside chair)? 2 (P)   - 2  -BM    Climbing 3-5 steps with a railing? 1 (P)   - 1  -BM    To walk in hospital room? 2 (P)   - 2  -BM    AM-PAC 6 Clicks Score (PT) 15 (P)   - 15  -BM    Highest Level of Mobility Goal 4 --> Transfer to chair/commode (P)   - 4 --> Transfer to chair/commode  -BM      Row Name 07/01/24 2240          How much help from another person do you currently need...    Turning from your back to your side while in flat bed without using bedrails? 4  -SP     Moving from lying on back to sitting on the side of a flat bed without bedrails? 3  -SP     Moving to and from a bed to a chair (including a wheelchair)? 3  -SP     Standing up from a chair using your arms (e.g., wheelchair, bedside chair)? 2  -SP     Climbing 3-5 steps with a railing? 1  -SP     To walk in hospital room? 2  -SP     AM-PAC 6 Clicks Score (PT) 15  -SP     Highest Level of Mobility Goal 4 --> Transfer to chair/commode  -SP       Row Name 07/02/24 1030          Functional Assessment    Outcome Measure Options AM-PAC 6 Clicks Basic Mobility (PT) (P)   -               User Key  (r) = Recorded By, (t) = Taken By, (c) = Cosigned By      Initials Name Provider Type    SP Shayy Carey, RN Registered Nurse     Marina Soto, RN Registered Nurse     Quincy Elizabeth, PT Student PT Student                                 Physical Therapy Education       Title: PT OT SLP Therapies (Done)       Topic: Physical Therapy (Done)       Point: Mobility training (Done)       Learning Progress Summary             Patient LEONELA Varela, VU by  at 7/2/2024 1031    Comment: Educated on the importance of continued mobility and strengthening on rehab process    Acceptance,  E,TB, VU by  at 7/2/2024 0800    Acceptance, E, VU by  at 7/1/2024 1633    Comment: Educated on the importance of standing and increasing strength and endurance during admission    Acceptance, E, VU by BM at 7/1/2024 0845    Eager, E, VU by SC at 6/29/2024 1450    Comment: reviewed safety with sliding board    Acceptance, E, VU,DU,NR by  at 6/28/2024 1404    Eager, E, VU,DU by EM at 6/27/2024 1539    Comment: Pt eudcated regarding WC management, transfers and symptom managment    Acceptance, E,D, VU by EM at 6/26/2024 1608    Comment: Pt educated regarding transfers, weight bearing status and HEP.    Acceptance, E, VU,DU,NR by  at 6/25/2024 1107    Acceptance, E,H, VU by  at 6/24/2024 1105    Comment: Pt educated on bed positioning with L BKA and importance of exercise on rehab.    Eager, E, VU,DU by SC at 6/21/2024 1349    Comment: reviewed HEP    Acceptance, E,D, VU,NR by SD at 6/19/2024 1203    Comment: PT ed for expected outcomes, risks, and benefits of IPPT services and progression of activity. Pt given visual demo of unilateral gait with RW. Discussion regarding dispo planning and IRF.                         Point: Home exercise program (Done)       Learning Progress Summary             Patient Acceptance, E,TB, VU by BM at 7/2/2024 0800    Acceptance, E, VU by  at 7/1/2024 0845    Eager, E, VU by SC at 6/29/2024 1450    Comment: reviewed safety with sliding board    Acceptance, E,D, VU by EM at 6/26/2024 1608    Comment: Pt educated regarding transfers, weight bearing status and HEP.    Acceptance, E, VU,DU,NR by  at 6/25/2024 1107    Acceptance, E,H, VU by  at 6/24/2024 1105    Comment: Pt educated on bed positioning with L BKA and importance of exercise on rehab.    Acceptance, E, VU,NR,DU by  at 6/23/2024 1420    Eager, E, VU,DU by SC at 6/21/2024 1349    Comment: reviewed HEP    Acceptance, E,D, VU,NR by SD at 6/19/2024 1203    Comment: PT ed for expected outcomes, risks, and benefits  of IPPT services and progression of activity. Pt given visual demo of unilateral gait with RW. Discussion regarding dispo planning and IRF.                         Point: Body mechanics (Done)       Learning Progress Summary             Patient Eager, E, VU by  at 7/2/2024 1031    Comment: Educated on the importance of continued mobility and strengthening on rehab process    Acceptance, E,TB, VU by  at 7/2/2024 0800    Acceptance, E, VU by  at 7/1/2024 1633    Comment: Educated on the importance of standing and increasing strength and endurance during admission    Acceptance, E, VU by  at 7/1/2024 0845    Eager, E, VU by SC at 6/29/2024 1450    Comment: reviewed safety with sliding board    Acceptance, E, VU,DU,NR by  at 6/28/2024 1404    Eager, E, VU,DU by  at 6/27/2024 1539    Comment: Pt eudcated regarding WC management, transfers and symptom managment    Acceptance, E,D, VU by  at 6/26/2024 1608    Comment: Pt educated regarding transfers, weight bearing status and HEP.    Acceptance, E, VU,DU,NR by  at 6/25/2024 1107    Acceptance, E,H, VU by  at 6/24/2024 1105    Comment: Pt educated on bed positioning with L BKA and importance of exercise on rehab.    Eager, E, VU,DU by SC at 6/21/2024 1349    Comment: reviewed HEP    Acceptance, E,D, VU,NR by SD at 6/19/2024 1203    Comment: PT ed for expected outcomes, risks, and benefits of IPPT services and progression of activity. Pt given visual demo of unilateral gait with RW. Discussion regarding dispo planning and IRF.                         Point: Precautions (Done)       Learning Progress Summary             Patient Eager, E, VU by  at 7/2/2024 1031    Comment: Educated on the importance of continued mobility and strengthening on rehab process    Acceptance, E,TB, VU by  at 7/2/2024 0800    Acceptance, E, VU by  at 7/1/2024 1633    Comment: Educated on the importance of standing and increasing strength and endurance during admission     Acceptance, E, VU by  at 7/1/2024 0845    Eager, E, VU by SC at 6/29/2024 1450    Comment: reviewed safety with sliding board    Acceptance, E, VU,DU,NR by  at 6/28/2024 1404    Eager, E, VU,DU by  at 6/27/2024 1539    Comment: Pt eudcated regarding WC management, transfers and symptom managment    Acceptance, E,D, VU by  at 6/26/2024 1608    Comment: Pt educated regarding transfers, weight bearing status and HEP.    Acceptance, E, VU,DU,NR by  at 6/25/2024 1107    Acceptance, E,H, VU by  at 6/24/2024 1105    Comment: Pt educated on bed positioning with L BKA and importance of exercise on rehab.    Eager, E, VU,DU by SC at 6/21/2024 1349    Comment: reviewed HEP    Acceptance, E,D, VU,NR by SD at 6/19/2024 1203    Comment: PT ed for expected outcomes, risks, and benefits of IPPT services and progression of activity. Pt given visual demo of unilateral gait with RW. Discussion regarding dispo planning and IRF.                                         User Key       Initials Effective Dates Name Provider Type Discipline    SC 02/03/23 -  Jorge Luis Childress, PT Physical Therapist PT    SD 03/13/23 -  Charity Dela Cruz, PT Physical Therapist PT    GEORGINA 06/16/21 -  Radha Miguel, PT Physical Therapist PT     09/21/23 -  Rachna Cruz, PT Physical Therapist PT     04/08/24 -  Marina Soto, RN Registered Nurse Nurse    EM 05/07/24 -  Rancho Schneider, PT Student PT Student PT     05/07/24 -  Quincy Elizabeth, PT Student PT Student PT                  PT Recommendation and Plan  Planned Therapy Interventions (PT): balance training, bed mobility training, gait training, home exercise program, joint mobilization, manual therapy techniques, stretching, strengthening, ROM (range of motion), prosthetic fitting/training, postural re-education, patient/family education, orthotic fitting/training, neuromuscular re-education, transfer training, wheelchair management/propulsion training  Plan of Care Reviewed  With: (P) patient  Progress: (P) improving  Outcome Evaluation: (P) Pt able to complete sliding board transfer x 2 from wc <> bed with Min A. Pt completed STS x 2 in parallel bars and 2 trials of hopping 3' with Mod A x 2. Pt continues to present below baseline with deficits in balance,strength, ROM, and activity tolerance. IPPt continues to be appropriate during admission. PT continues recommended DC to IRF for best frunctional outcomes.     Time Calculation:         PT Charges       Row Name 07/02/24 0923             Time Calculation    Start Time 0923 (P)   -      PT Received On 07/02/24 (P)   -      PT Goal Re-Cert Due Date 07/11/24 (P)   -         Timed Charges    78356 - Gait Training Minutes  5 (P)   -      44626 - PT Therapeutic Activity Minutes 28 (P)   -         Total Minutes    Timed Charges Total Minutes 33 (P)   -       Total Minutes 33 (P)   -                User Key  (r) = Recorded By, (t) = Taken By, (c) = Cosigned By      Initials Name Provider Type     Quincy Elizabeth, PT Student PT Student                  Therapy Charges for Today       Code Description Service Date Service Provider Modifiers Qty    89941659515  PT THERAPEUTIC ACT EA 15 MIN 7/1/2024 Quincy Elizabeth, PT Student GP 3    [id number]  PT THERAPEUTIC ACT EA 15 MIN 7/2/2024 Quincy Elizabeth, PT Student GP 2    [id number]  PT THER SUPP EA 15 MIN 7/2/2024 Quincy Elizabeth, PT Student GP 2            PT G-Codes  Outcome Measure Options: (P) AM-PAC 6 Clicks Basic Mobility (PT)  AM-PAC 6 Clicks Score (PT): (P) 15  AM-PAC 6 Clicks Score (OT): 14  PT Discharge Summary  Anticipated Discharge Disposition (PT): (P) inpatient rehabilitation facility    LINDA Francis  7/2/2024

## 2024-07-02 NOTE — PLAN OF CARE
Goal Outcome Evaluation:  Plan of Care Reviewed With: (P) patient        Progress: (P) improving  Outcome Evaluation: (P) Pt able to complete sliding board transfer x 2 from wc <> bed with Min A. Pt completed STS x 2 in parallel bars and 2 trials of hopping 3' with Mod A x 2. Pt continues to present below baseline with deficits in balance,strength, ROM, and activity tolerance. IPPt continues to be appropriate during admission. PT continues recommended DC to IRF for best frunctional outcomes.      Anticipated Discharge Disposition (PT): (P) inpatient rehabilitation facility

## 2024-07-02 NOTE — PROGRESS NOTES
[facility] Medicine Services  PROGRESS NOTE    Patient Name: Easton Alvarez  : 1965  MRN: [id number]    Date of Admission: 2024  Primary Care Physician: Provider, No Known    Subjective   Subjective     CC:  Follow-up left foot gangrene, right hip infection    HPI:  Resting in bed, NAD. No pain.     Objective   Objective     Vital Signs:   Temp:  [98.4 °F (36.9 °C)-99.2 °F (37.3 °C)] 98.5 °F (36.9 °C)  Heart Rate:  [100-109] 109  Resp:  [17-18] 18  BP: (110-133)/(65-76) 110/73     Physical Exam:  Constitutional: No acute distress, awake, alert  HENT: NCAT, mucous membranes moist  Respiratory: Clear to auscultation bilaterally, respiratory effort normal   Cardiovascular: Regular, tachycardic, no murmurs, rubs, or gallops  Gastrointestinal: Positive bowel sounds, soft, nontender, nondistended  Musculoskeletal: 1+ right lower ext edema  Psychiatric: Appropriate affect, cooperative  Neurologic: Oriented x 3, moves remaining extremities, speech clear  Skin: No rashes noted.  Left stump dressing intact      Results Reviewed:  LAB RESULTS:      Lab 24  0401 24  1114 24  0647 24  0508 24  0441   WBC 7.76 7.81 6.90 6.54 7.09   HEMOGLOBIN 8.0* 9.3* 7.6* 7.7* 7.9*   HEMATOCRIT 23.5* 27.8* 22.7* 22.8* 23.8*   PLATELETS 117* 126* 94* 114* 121*   MCV 94.0 96.9 97.0 96.2 97.5*   PROTIME 16.6*  --   --   --   --          Lab 24  0401 24  1114 24  0647 24  0508 24  0441   SODIUM 137 138 135* 136 135*   POTASSIUM 3.6 4.8 3.9 3.8 3.8   CHLORIDE 104 104 103 104 103   CO2 26.0 28.0 24.0 25.0 25.0   ANION GAP 7.0 6.0 8.0 7.0 7.0   BUN 18 17 18 21 24*   CREATININE 0.65* 0.80 0.63* 0.72* 0.70*   EGFR 107.9 101.3 108.9 104.6 105.5   GLUCOSE 79 95 109* 75 76   CALCIUM 7.2* 8.0* 7.0* 7.2* 7.1*         Lab 24  0401 24  1114   TOTAL PROTEIN 4.8* 6.1   ALBUMIN 1.7* 1.8*   GLOBULIN 3.1 4.3   ALT (SGPT) 19 21   AST (SGOT) 39 45*    BILIRUBIN 0.4 0.6   ALK PHOS 235* 233*         Brief Urine Lab Results  (Last result in the past 365 days)        Color   Clarity   Blood   Leuk Est   Nitrite   Protein   CREAT   Urine HCG        06/14/24 1109 Yellow   Clear   Negative   Negative   Negative   Negative                   Microbiology Results Abnormal       Procedure Component Value - Date/Time    Fungus Culture - Aspirate, Hip, Right [[id number]] Collected: 06/18/24 1052    Lab Status: Preliminary result Specimen: Aspirate from Hip, Right Updated: 07/02/24 1130     Fungus Culture No fungus isolated at 2 weeks    AFB Culture - Aspirate, Hip, Right [[id number]] Collected: 06/18/24 1052    Lab Status: Preliminary result Specimen: Aspirate from Hip, Right Updated: 07/02/24 1130     AFB Culture No AFB isolated at 2 weeks     AFB Stain No acid fast bacilli seen on concentrated smear    Anaerobic Culture 10 Day Incubation - Synovium, Hip, Right [[id number]]  (Normal) Collected: 06/20/24 1700    Lab Status: Final result Specimen: Synovium from Hip, Right Updated: 06/30/24 0719     Anaerobic Culture No anaerobes isolated at 10 days    Anaerobic Culture 10 Day Incubation - Synovium, Hip, Right [[id number]]  (Normal) Collected: 06/20/24 1700    Lab Status: Edited Result - FINAL Specimen: Synovium from Hip, Right Updated: 06/30/24 0719     Anaerobic Culture No anaerobes isolated at 10 days    Anaerobic Culture 10 Day Incubation - Synovium, Hip, Right [[id number]]  (Normal) Collected: 06/20/24 1700    Lab Status: Final result Specimen: Synovium from Hip, Right Updated: 06/30/24 0719     Anaerobic Culture No anaerobes isolated at 10 days    Fungus Culture - Wound, Leg, Left [[id number]] Collected: 06/15/24 1501    Lab Status: Preliminary result Specimen: Wound from Leg, Left Updated: 06/29/24 2215     Fungus Culture No fungus isolated at 2 weeks    AFB Culture - Wound, Leg, Left [[id number]] Collected: 06/15/24 1501    Lab Status: Preliminary result Specimen: Wound from  Leg, Left Updated: 06/29/24 2215     AFB Culture No AFB isolated at 2 weeks     AFB Stain No acid fast bacilli seen on concentrated smear    Fungus Culture - Synovium, Hip, Right [[id number]] Collected: 06/20/24 1700    Lab Status: Preliminary result Specimen: Synovium from Hip, Right Updated: 06/27/24 1831     Fungus Culture No fungus isolated at 1 week    Fungus Culture - Synovium, Hip, Right [[id number]] Collected: 06/20/24 1700    Lab Status: Preliminary result Specimen: Synovium from Hip, Right Updated: 06/27/24 1831     Fungus Culture No fungus isolated at 1 week    Fungus Culture - Synovium, Hip, Right [[id number]] Collected: 06/20/24 1700    Lab Status: Preliminary result Specimen: Synovium from Hip, Right Updated: 06/27/24 1831     Fungus Culture No fungus isolated at 1 week    AFB Culture - Synovium, Hip, Right [[id number]] Collected: 06/20/24 1700    Lab Status: Preliminary result Specimen: Synovium from Hip, Right Updated: 06/27/24 1831     AFB Culture No AFB isolated at 1 week     AFB Stain No acid fast bacilli seen on concentrated smear    AFB Culture - Synovium, Hip, Right [[id number]] Collected: 06/20/24 1700    Lab Status: Preliminary result Specimen: Synovium from Hip, Right Updated: 06/27/24 1831     AFB Culture No AFB isolated at 1 week     AFB Stain No acid fast bacilli seen on concentrated smear    AFB Culture - Synovium, Hip, Right [[id number]] Collected: 06/20/24 1700    Lab Status: Preliminary result Specimen: Synovium from Hip, Right Updated: 06/27/24 1831     AFB Culture No AFB isolated at 1 week     AFB Stain No acid fast bacilli seen on concentrated smear    Fungus Culture - Surgical Site, Leg, Left [[id number]] Collected: 06/13/24 1031    Lab Status: Preliminary result Specimen: Surgical Site from Leg, Left Updated: 06/27/24 1201     Fungus Culture No fungus isolated at 2 weeks    AFB Culture - Surgical Site, Leg, Left [[id number]] Collected: 06/13/24 1031    Lab Status: Preliminary result  Specimen: Surgical Site from Leg, Left Updated: 06/27/24 1201     AFB Culture No AFB isolated at 2 weeks     AFB Stain No acid fast bacilli seen on concentrated smear    Anaerobic Culture 10 Day Incubation - Wound, Leg, Left [[id number]]  (Normal) Collected: 06/15/24 1501    Lab Status: Final result Specimen: Wound from Leg, Left Updated: 06/26/24 0647     Anaerobic Culture No anaerobes isolated at 10 days    Tissue / Bone Culture - Synovium, Hip, Right [[id number]] Collected: 06/20/24 1700    Lab Status: Final result Specimen: Synovium from Hip, Right Updated: 06/23/24 1010     Tissue Culture No growth at 3 days     Gram Stain Many (4+) Red blood cells      Moderate (3+) WBCs seen      No organisms seen    Tissue / Bone Culture - Synovium, Hip, Right [[id number]] Collected: 06/20/24 1700    Lab Status: Final result Specimen: Synovium from Hip, Right Updated: 06/23/24 1009     Tissue Culture No growth at 3 days     Gram Stain Moderate (3+) WBCs seen      No organisms seen     Comment: Modified report. Previous result was Rare (1+) Gram positive cocci in pairs on 6/20/2024 at 1949 EDT.         Many (4+) Red blood cells    Tissue / Bone Culture - Synovium, Hip, Right [[id number]] Collected: 06/20/24 1700    Lab Status: Final result Specimen: Synovium from Hip, Right Updated: 06/23/24 1009     Tissue Culture No growth at 3 days     Gram Stain Many (4+) Red blood cells      Few (2+) WBCs seen      No organisms seen    Blood Culture - Blood, Hand, Left [[id number]]  (Normal) Collected: 06/15/24 1104    Lab Status: Final result Specimen: Blood from Hand, Left Updated: 06/20/24 1201     Blood Culture No growth at 5 days    Blood Culture - Blood, Hand, Right [[id number]]  (Normal) Collected: 06/15/24 1104    Lab Status: Final result Specimen: Blood from Hand, Right Updated: 06/20/24 1201     Blood Culture No growth at 5 days    Wound Culture - Wound, Leg, Left [[id number]] Collected: 06/15/24 1501    Lab Status: Final result  Specimen: Wound from Leg, Left Updated: 06/19/24 0711     Wound Culture No growth at 3 days     Gram Stain Rare (1+) WBCs seen      No organisms seen    Anaerobic Culture - Surgical Site, Leg, Left [[id number]]  (Normal) Collected: 06/13/24 1031    Lab Status: Final result Specimen: Surgical Site from Leg, Left Updated: 06/18/24 0751     Anaerobic Culture No anaerobes isolated at 5 days    Blood Culture - Blood, Arm, Left [[id number]]  (Normal) Collected: 06/12/24 1832    Lab Status: Final result Specimen: Blood from Arm, Left Updated: 06/17/24 1900     Blood Culture No growth at 5 days    Blood Culture - Blood, Hand, Right [[id number]]  (Normal) Collected: 06/12/24 1837    Lab Status: Final result Specimen: Blood from Hand, Right Updated: 06/17/24 1900     Blood Culture No growth at 5 days    Fungus Smear - Surgical Site, Leg, Left [[id number]] Collected: 06/13/24 1031    Lab Status: Final result Specimen: Surgical Site from Leg, Left Updated: 06/14/24 1406     Fungal Stain No fungal elements seen            No radiology results from the last 24 hrs    Results for orders placed during the hospital encounter of 06/12/24    Adult Transthoracic Echo Complete w/ Color, Spectral and Contrast if Necessary Per Protocol    Interpretation Summary    Left ventricular systolic function is normal. Left ventricular ejection fraction appears to be 56 - 60%.    Left ventricular wall thickness is consistent with borderline concentric hypertrophy.    Left ventricular diastolic function is consistent with (grade Ia w/high LAP) impaired relaxation.      Current medications:  Scheduled Meds:acetaminophen, 1,000 mg, Oral, Q8H  aspirin, 81 mg, Oral, Q12H  cefTRIAXone, 2,000 mg, Intravenous, Q24H  insulin glargine, 25 Units, Subcutaneous, Nightly  insulin lispro, 2-9 Units, Subcutaneous, 4x Daily AC & at Bedtime  Insulin Lispro, 8 Units, Subcutaneous, TID With Meals  lactobacillus acidophilus, 1 capsule, Oral, Daily  lisinopril, 10 mg, Oral,  Q24H  melatonin, 5 mg, Oral, Nightly  pantoprazole, 40 mg, Oral, BID AC  potassium chloride, 40 mEq, Oral, Q4H  senna-docusate sodium, 2 tablet, Oral, BID  sodium chloride, 10 mL, Intravenous, Q12H  sodium chloride, 10 mL, Intravenous, Q12H      Continuous Infusions:lactated ringers, 9 mL/hr, Last Rate: 9 mL/hr (06/15/24 1307)  lactated ringers, 100 mL/hr, Last Rate: 100 mL/hr (06/20/24 2122)  ropivacaine, , Last Rate: Stopped (06/17/24 2230)      PRN Meds:.  senna-docusate sodium **AND** polyethylene glycol **AND** bisacodyl **AND** bisacodyl    Calcium Replacement - Follow Nurse / BPA Driven Protocol    dextrose    dextrose    glucagon (human recombinant)    Magnesium Standard Dose Replacement - Follow Nurse / BPA Driven Protocol    nitroglycerin    oxyCODONE    Phosphorus Replacement - Follow Nurse / BPA Driven Protocol    Potassium Replacement - Follow Nurse / BPA Driven Protocol    sodium chloride    sodium chloride    sodium chloride    Assessment & Plan   Assessment & Plan     Active Hospital Problems    Diagnosis  POA    **Diabetic ulcer of left foot with necrosis of muscle [E11.621, L97.523]  Yes    Severe malnutrition [E43]  Yes    Infection of prosthetic total hip joint [T84.59XA, Z96.649]  Not Applicable      Resolved Hospital Problems   No resolved problems to display.        Brief Hospital Course to date:  Easton Alvarez is a 58 y.o. male with past medical history significant for T2DM, diabetic neuropathy, right hip fracture/ORIF ([facility] 2/2024), and right transmetatarsal amputation 2019 at  who presented to OSH ED on 6/12/2024 with severe left foot cellulitis/necrosis after recent injury. Lactate at OSH was 8.8 and CT imaging of left leg showed gas gangrene. Orthopedic surgeon Dr. Em was consulted and performed a left below-knee amputation on 6/13/2024 with further debridement and irrigation on 6/15. Infectious disease has followed and managed antimicrobial therapy.     This patient's problems  and plans were partially entered by my partner and updated as appropriate by me 07/02/24.     Sepsis secondary to group B strep bacteremia  Left foot cellulitis/gangrene s/p left BKA 6/13  Leukocytosis  -MRI left foot showed extensive soft tissue infection with abscesses along the foot to the ankle and into the lower leg with soft tissue gas.    -Wound cultures 6/12 with heavy growth group B strep and Kebsiella, blood cultures x 2 with group B strep; wound culture 6/13 with group B strep  -TTE described as technically adequate by cardiology; no description of vegetation by cardiology per report   -s/p left BKA with wound VAC on 6/13/24    -s/p further debridement in OR on 6/15/24 by Dr. Em, drain removed 6/17  -Infectious disease, Dr. Bills following; continue IV ceftriaxone for at least 6 weeks then transition to PO Keflex indefinitely   -PT wound care following  -Pain control with scheduled Tylenol, PRN oxycodone.   -Daily dressing changes to surgical site: Xeroform, 4X4, Kerlix, ACE  -Follow up with  Orthopedics in 2 weeks for wound check, imaging     Right glenys-prosthetic hip infection  -IR performed fluid aspiration from the hip which was purulent.  -s/p I&D on 6/20, Dr. Sethi   -Fluid culture 6/18 with rare growth group B strep  -Supportive care, mobilize, pain control. Antibiotics as above     Concern for GIB  Acute on chronic anemia  -with melena and fecal occult positive overnight 6/14  -GI consulted, recommended medical management with PPI BID x 1 month.  -H&H however seems to be trending down, but is stable, plan remains as above    HFpEF   Elevated blood pressure  Anasarca   -No history of taking meds for BP per patient   -BP has been running high this admission, possibly secondary to pain  -Echo from 6/18 revealed EF 56-60% with diastolic dysfunction  -Started lisinopril 10 mg daily, resumed 6/30  -Lasix 40 mg IV x1   -Strict I&O, daily weights      Elevated LFTs  Thrombocytopenia,  improved  Mild hyponatremia, clinically insignificant  -Continue to monitor intermittently.   -GI referral at discharge for elevated LFTs, thrombocytopenia and concern for early cirrhosis, plan to pursue liver US after recovery from acute illness.     Anion gap metabolic acidosis POA, resolved   -AG was 20.1, likely from lactate, serum acetone negative.     Poorly controlled type 2 diabetes with A1c 9.5%   -Patient was not taking meds or checking glucoses, though insulin had been prescribed in the past.  -Continue basal, prandial and SSI, adjust as warranted   -Diabetes educator has seen     Expected Discharge Location and Transportation: Rehab @ [facility] pending precert  Expected Discharge   Expected Discharge Date: 7/3/2024; Expected Discharge Time:        VTE Prophylaxis:  Mechanical VTE prophylaxis orders are present.         AM-PAC 6 Clicks Score (PT): 15 (07/02/24 1030)    CODE STATUS:   Code Status and Medical Interventions:   Ordered at: 06/20/24 2026     Level Of Support Discussed With:    Patient     Code Status (Patient has no pulse and is not breathing):    CPR (Attempt to Resuscitate)     Medical Interventions (Patient has pulse or is breathing):    Full       Lashonda Bravo, GUILLAUME  07/02/24

## 2024-07-02 NOTE — PLAN OF CARE
Goal Outcome Evaluation:        Pt is A&O x 4  Pt has been very pleasant today  Pt had one BM today.  Pt had no pain today, premedicated prior to PT.  Pt had bandage on BKA changed today.   Incision on right hip, was found to be saturated, bandage was changed out and remained dry and intact.  Dietician spoke with pt today about better food choices, and removed cardiac heart healthy from diet.  Blood sugar has been well controlled today with no extra sliding scale needed.

## 2024-07-02 NOTE — PROGRESS NOTES
"          Clinical Nutrition Assessment     Patient Name: Easton Alvarez  YOB: 1965  MRN: [id number]  Date of Encounter: 07/02/24 17:08 EDT  Admission date: 6/12/2024  Reason for Visit: Follow-up protocol, Consult    Assessment   Nutrition Assessment   Admission Diagnosis:  Diabetic ulcer of left foot with necrosis of muscle [E11.621, L97.523]    Problem List:    Diabetic ulcer of left foot with necrosis of muscle    Severe malnutrition    Infection of prosthetic total hip joint      PMH:   He  has a past medical history of Diabetes mellitus and Diabetic foot infection (2018).    PSH:  He  has a past surgical history that includes Total hip arthroplasty (Right, 02/2024); Amputation foot / toe (Right); incision and drainage leg (Left, 6/15/2024); Leg amputation, lower tibia/fibula (Left, 6/13/2024); PLACEMENT OF WOUND VAC (Left, 6/13/2024); and Incision and drainage hip (Right, 6/20/2024).    Applicable Nutrition History:   6/13-s/p L BKA    6-15-24: s/p 2nd closure L BKA    6-20-24: s/p I+ D R hip    Anthropometrics     Height: Height: 172.7 cm (67.99\")  Last Filed Weight: Weight: 67.6 kg (149 lb) (06/18/24 1506)  Method: Weight Method: Stated  BMI: n/a 2nd BKA    UBW:  150-160lbs per pt report  Weight change:  unable to assess, only stated wt available    Nutrition Focused Physical Exam    Date: 6/13    Patient meets criteria for malnutrition diagnosis, see MSA note.     Subjective   Reported/Observed/Food/Nutrition Related History:     7-2: per RN: pt has not been eating well because of his diet restrictions, was not allowed to have a hamburger by kitchen    Pt resting in bed, reports he has not been allowed to order certain foods, wants a cheeseburger with no bun    Diet liberalized, cardiac restriction dc'd to allow more food options, encourage increased protein intake, monitor carbohydrate intake to maintain good blood sugar control for wound healing      6-28-24: pt resting in bed, reports fair " appetite, flat affect, is drinking boost    6-21:Pt resting in bed, reports appetite goes up and down, is feeling bloated at present, is drinking boost, states he does know how to give himself insulin shots, he does not really follow diabetic diet at home    DM diet education provided    6-13:Pt reports decreased appetite for a few weeks (<1 month) 2/2 poor appetite and suspects eating ~50% of usual intake. Pt     Current Nutrition Prescription   PO: Diet: Regular/House, Diabetic; Consistent Carbohydrate; Fluid Consistency: Thin (IDDSI 0)  Oral Nutrition Supplement: Boost GC 2x/day  Intake:  79% of 7 meals    Assessment & Plan   Nutrition Diagnosis   Date: 6/13             Updated:    Problem Malnutrition severe acute   Etiology Clinical status-uncontrolled DM, sepsis   Signs/Symptoms Severe muscle wasting and moderate subcutaneous fat loss, po intake <75% EEN x >/=7 days     Date:   6/13  Updated:  7-2  Problem Increased nutrient needs   Etiology Skin integrity   Signs/Symptoms S/p L BKA, s/p R hip I+D       Goal / Objectives:   Nutrition to support treatment and Continue positive trend      Nutrition Intervention      Follow treatment progress, Care plan reviewed, Advised available snacks, Interview for preferences, Menu provided, Menu adjusted, Encourage intake, Education provided    Diet liberalized, cardiac restriction dc'd to allow more food options, encourage increased protein intake, monitor carbohydrate intake to maintain good blood sugar control for wound healing    Monitoring/Evaluation:   Per protocol, PO intake, Supplement intake, Weight, Skin status    Bia He, SHERMAN  Time Spent:30min

## 2024-07-02 NOTE — PLAN OF CARE
Problem: Skin Injury Risk Increased  Goal: Skin Health and Integrity  Outcome: Ongoing, Progressing  Intervention: Optimize Skin Protection  Description: Perform a full pressure injury risk assessment, as indicated by screening, upon admission to care unit.  Reassess skin (injury risk, full inspection) frequently (e.g., scheduled interval, with change in condition) to provide optimal early detection and prevention.  Maintain adequate tissue perfusion (e.g., encourage fluid balance; avoid crossing legs, constrictive clothing or devices) to promote tissue oxygenation.  Maintain head of bed at lowest degree of elevation tolerated, considering medical condition and other restrictions.  Avoid positioning onto an area that remains reddened.  Minimize incontinence and moisture (e.g., toileting schedule; moisture-wicking pad, diaper or incontinence collection device; skin moisture barrier).  Cleanse skin promptly and gently when soiled utilizing a pH-balanced cleanser.  Relieve and redistribute pressure (e.g., scheduled position changes, weight shifts, use of support surface, medical device repositioning, protective dressing application, use of positioning device, microclimate control, use of pressure-injury-monitor  Encourage increased activity, such as sitting in a chair at the bedside or early mobilization, when able to tolerate.     Problem: Fall Injury Risk  Goal: Absence of Fall and Fall-Related Injury  Outcome: Ongoing, Progressing  Intervention: Identify and Manage Contributors  Description: Develop a fall prevention plan with the patient and caregiver/family.  Provide reorientation, appropriate sensory stimulation and routines with changes in mental status to decrease risk of fall.  Promote use of personal vision and auditory aids.  Assess assistance level required for safe and effective self-care; provide support as needed, such as toileting, mobilization. For age 65 and older, implement timed toileting with  assistance.  Encourage physical activity, such as performance of mobility and self-care at highest level of patient ability, multicomponent exercise program and provision of appropriate assistive devices.  If fall occurs, assess the severity of injury; implement fall injury protocol. Determine the cause and revise fall injury prevention plan.  Regularly review medication contribution to fall risk; adjust medication administration times to minimize risk of falling.  Consider risk related to polypharmacy and age.  Balance adequate pain management with potential for oversedation.  Intervention: Promote Injury-Free Environment  Description: Provide a safe, barrier-free environment that encourages independent activity.  Keep care area uncluttered and well-lighted.  Determine need for increased observation or monitoring.  Avoid use of devices that minimize mobility, such as restraints or indwelling urinary catheter.     Problem: Bleeding (Extremity Amputation)  Goal: Absence of Bleeding  Outcome: Ongoing, Progressing  Intervention: Monitor and Manage Bleeding  Description: Assess bleeding risk and presence of bleeding; review laboratory result trends, medical history and physical presentation.  Identify source of bleeding; consider potential for additional diagnostic testing. If able, apply direct pressure to visible bleeding site; notify healthcare provider.  Maintain body temperature within desired range to optimize clotting ability.  Maintain dressing integrity to avoid disrupting clotting process; reinforce as needed.  Monitor and measure drainage amount and characteristics from surgical site, drain and dressing.  Consider need for fluid volume replacement (e.g., intravenous fluid, blood products) to maintain perfusion.  Evaluate for orthostatic hypotension prior to activity.  Consider need for pharmacologic treatment, such as topical hemostatic, coagulation factor concentrate or antifibrinolytic.  Anticipate need for  surgical intervention with continued bleeding and circulatory instability.     Problem: Bowel Motility Impaired (Extremity Amputation)  Goal: Effective Bowel Elimination  Outcome: Ongoing, Progressing  Intervention: Enhance Bowel Motility and Elimination  Description: Initiate early feeding (oral, enteral) to maintain gut integrity and function; if oral intake is not feasible (e.g., nausea and vomiting), gum chewing can be an alternative strategy.  Promote activity and mobility.  Evaluate factors that may contribute to decreased bowel motility and constipation (e.g., iron supplement, opiate use, pain or fear); anticipate need for stool softener or change in pain regimen.  Continue home bowel regimen, if possible.  Establish regular, unhurried time for elimination.  Promote privacy and comfort; position to facilitate elimination.  Monitor stool characteristics, abdominal girth, bowel sounds, expression of symptoms and relief.     Problem: Pain (Extremity Amputation)  Goal: Acceptable Pain Control  Outcome: Ongoing, Progressing  Intervention: Prevent or Manage Pain  Description: Set pain management goals; mutually determine pain management plan; review regularly.  Evaluate risk for opioid use; individualize pharmacologic pain management plan and titrate medication to patient response.  Combine multimodal analgesia and nonpharmacologic strategies to help potentiate synergistic effects, enhance comfort and improve function (e.g., complementary therapy, diversional activity, mindfulness).  Provide around-the-clock dosing of pain medication to keep pain levels in control.  Manage medication-induced effects, such as respiratory depression, constipation, nausea, vomiting.  Minimize pain stimuli; coordinate care and adjust environment (e.g., light, noise, unnecessary movement); promote sleep/rest for optimal healing.  Address musculoskeletal imbalances and alterations in posture or body mechanics that contribute to  pain.  Determine effective methods for phantom pain management including increased use of prosthesis and edema control.  Consider pharmacologic management for phantom limb pain, such as local anesthetic, calcitonin, antidepressant, anticonvulsant, NMDA (N-methyl-D-aspartate) receptor antagonist.     Problem: Mobility Impairment  Goal: Optimal Mobility  Outcome: Ongoing, Progressing  Intervention: Optimize Mobility  Description: Assess mobility skills (e.g., bed, transfers, ambulation, gait, stair climbing, wheelchair) and factors influencing mobility, such as balance, safety, range of motion, strength, muscle tone, cognition and sensory processing.  Instruct in transfer and mobility techniques supporting highest level of independence while ensuring safety.  Consider any contraindications or precautions to individualize treatment plan (e.g., joint or ligament instability, weightbearing restrictions).  Encourage early mobilization and performance of daily activities, if able, while providing level of assistance needed for safety.  Schedule mobility activities when pain and fatigue are at a minimum to encourage optimal performance.  Pace activity; allow adequate time and rest periods to conserve energy.  Provide frequent encouragement, along with prompting and assistance as needed.  Individualize instructions and prompts to patient’s cognitive status to promote effective communication; simplify verbal directions, give encouragement and provide demonstrated cues as needed.  Design and implement therapeutic interventions to address impairments (e.g., functional mobility training, mat and standing balance activities, strengthening).  Train in and reinforce use of adaptive equipment and assistive devices, such as a walker or transfer board.  Utilize appropriate modalities, devices or techniques to facilitate mobility (e.g., ankle foot orthosis, electrical stimulation, sit-to-stand lift, treadmill-training).  Assess fall  risk using standardized tool; implement appropriate interventions, such as behavioral or environmental modifications.  Use proper body mechanics and patient alignment during mobility to ensure safety.   Goal Outcome Evaluation:

## 2024-07-02 NOTE — PROGRESS NOTES
"Easton Alvarez  1965  [id number]          Chief Complaint: left foot infection    Reason for Consultation: left foot infection    History of present illness:     Patient is a 58 y.o.  Yr old male with history of diabetes with prior right TMA 2019 at , history strep septicemia 2016 ; he reports right total hip arthroplasty 2024 after fall. He reports a fall several weeks prior to his June admission with injury to the left foot, wound present with deterioration several days preceding admission with severe discoloration/redness and swelling.  CT scan showed concern for subcutaneous emphysema and necrotizing infection.  Transferred to [facility] with evaluation by Dr. Em and Dr. Lundberg and arrangements made for urgent surgery. Subsequently, blood cultures called with gram-positive cocci     6/13 left LE amp, Dr Em    6/15  \"PROCEDURE:            Left      76578: Secondary closure above-knee amputation\"      6/18/24  right hip aspiration, culture with GB strep; wbc  down some postop from revision; blood cultures 6/15 negative so far    6/19/24 Dr. Em helping facilitate right hip surgery with orthopedic partners/team    6/20/24 surgery Dr Sethi  \"Procedure(s):  HIP ANTERIOR INCISION AND DRAINAGE WITH HANA TABLE, POLY SWAP- RIGHT\"    6/26/24 urinary retention per nursing notes, Dr Goodwin aware.  Dr Goodwin reports later that patient voiding without  retention issues    7/2/24   Per discussion with medicine team, GI has seen him regarding abnormal LFT/thrombocytopenia with concern for early cirrhosis/splenomegaly and further workup at their discretion. no new pain or distress per nursing; case management looking into options;  postop pain controlled at present; He has right hip pain that is intermittent , blunted by neuropathy, worse with palpation, better with pain meds and 2-3 out of 10 in severity. No distress per nursing; no new focal symptoms otherwise; no fever; no left stump " "pain; He did not know about any prior issues of thrombocytopenia     No headache photophobia or neck stiffness.  No shortness of breath cough or hemoptysis.  No nausea vomiting  or abdominal pain.  No dysuria hematuria or pyuria.  No other new skin rash    Past Medical History:   Diagnosis Date    Diabetes mellitus     Diabetic foot infection 2018    left (partial amputation)       Past Surgical History:   Procedure Laterality Date    AMPUTATION FOOT / TOE Right     partial foot    BELOW KNEE AMPUTATION Left 6/13/2024    Procedure: AMPUTATION BELOW KNEE AND WOUND VAC ASSISTED CLOSURE LEFT;  Surgeon: Brijesh Em Jr., MD;  Location:  [facility];  Service: Orthopedics;  Laterality: Left;    INCISION AND DRAINAGE HIP Right 6/20/2024    Procedure: HIP ANTERIOR INCISION AND DRAINAGE WITH HANA TABLE, POLY SWAP RIGHT WITH LEFT LEG PIN TRACTION;  Surgeon: Lex Sethi MD;  Location:  [facility];  Service: Orthopedics;  Laterality: Right;    INCISION AND DRAINAGE LEG Left 6/15/2024    Procedure: SECONDARY CLOSURE LEFT BELOW KNEE AMPUTATION;  Surgeon: Brijesh Em Jr., MD;  Location:  [facility];  Service: Orthopedics;  Laterality: Left;    PLACEMENT OF WOUND VAC Left 6/13/2024    Procedure: PLACEMENT OF WOUND VAC LEFT;  Surgeon: Brijesh Em Jr., MD;  Location:  [facility];  Service: Orthopedics;  Laterality: Left;    TOTAL HIP ARTHROPLASTY Right 02/2024    From fall \"whole hip was shattered\"           No Known Allergies       Review of Systems    7/2/24 per nursing also    Constitutional-- No Fever, chills or sweats.  Appetite diminished with fatigue.  Heent-- No new vision, hearing or throat complaints.  No epistaxis or oral sores.  Denies odynophagia or dysphagia.  No flashers, floaters or eye pain. No odynophagia or dysphagia. No headache, photophobia or neck stiffness.  CV-- No chest pain, palpitation or syncope  Resp-- No SOB/cough/Hemoptysis  GI- No nausea, vomiting, or diarrhea.  No hematochezia, melena, " "or hematemesis.   -- No dysuria, hematuria, or flank pain.  Denies hesitancy, urgency or flank pain.  Lymph- no swollen lymph nodes in neck/axilla or groin.   Heme- No active bruising or bleeding; no Hx of DVT or PE.  MS-- aside from above, no swelling or pain in the bones or joints of arms/legs.  No new back pain.  Neuro-- No acute focal weakness or numbness in the arms or legs.  No seizures.    Full 12 point review of systems reviewed and negative otherwise for acute complaints, except for above    Physical Exam:   Vital Signs   /76 (BP Location: Left arm, Patient Position: Lying)   Pulse 100   Temp 98.4 °F (36.9 °C) (Oral)   Resp 18   Ht 172.7 cm (67.99\")   Wt 67.6 kg (149 lb)   SpO2 95%   BMI 22.66 kg/m²     GENERAL: sleepy; in no acute distress.   HEENT: Normocephalic, atraumatic.   No conjunctival injection. No icterus. Oropharynx clear without evidence of thrush or exudate. No evidence of periodontal disease.    NECK: Supple without nuchal rigidity. No mass.   HEART: RRR; No murmur, rubs, gallops.   LUNGS: diminished at bases.  Trace crackles at the bases but no rhonchi or wheeze. Normal respiratory effort. Nonlabored. No dullness.  ABDOMEN: Soft, nontender, nondistended. Positive bowel sounds. No rebound or guarding. NO mass or HSM.  EXT:  see below.     MSK: right hip surgical site cvered;  no new visible redness/purulence or fluctuance  SKIN: Warm and dry without cutaneous eruptions on Inspection/palpation.      Left   BKA stump covered;  no new visible redness or purulence; no discrete fluctuance.  No crepitus    Laboratory Data    Results from last 7 days   Lab Units 07/02/24  0401 07/01/24  1114 06/30/24  0647   WBC 10*3/mm3 7.76 7.81 6.90   HEMOGLOBIN g/dL 8.0* 9.3* 7.6*   HEMATOCRIT % 23.5* 27.8* 22.7*   PLATELETS 10*3/mm3 117* 126* 94*     Results from last 7 days   Lab Units 07/02/24  0401   SODIUM mmol/L 137   POTASSIUM mmol/L 3.6   CHLORIDE mmol/L 104   CO2 mmol/L 26.0   BUN mg/dL " 18   CREATININE mg/dL 0.65*   GLUCOSE mg/dL 79   CALCIUM mg/dL 7.2*     Results from last 7 days   Lab Units 07/02/24  0401   ALK PHOS U/L 235*   BILIRUBIN mg/dL 0.4   ALT (SGPT) U/L 19   AST (SGOT) U/L 39                   Estimated Creatinine Clearance: 115.6 mL/min (A) (by C-G formula based on SCr of 0.65 mg/dL (L)).      Microbiology:      Radiology:  Imaging Results (Last 72 Hours)       ** No results found for the last 72 hours. **              Impression:     --acute sepsis as per admission notes, severe left foot infection with gangrene/cellulitis and concern for necrotizing soft tissue infection by imaging, urgent surgical arrangements made June 13; repeat surg 6/15  ;  subsequent right hip surg 6/20; He knows risk for persistent/recurrent or nonhealing wounds, persistet / progressive or recurrent infection and risk for further amputation/functional-limb loss and chronic pain.  Associated with GB strep bacteremia as below.    --Acute/severe left foot infection as above, urgent surgical arrangements  made 6/13    --Acute gb strep bacteremia,   source from foot.  High risk for further serious morbidity and other serious sequela including dire consequences and metastatic foci of involvement/endovascular sequela etc. No new metastatic focus of involvement. TTE 6/17      --thrombocytopenia.   Per GI team, concerned by imaging and laboratory data for early cirrhosis with splenomegaly and risk for splenic sequestration/thrombocytopenia.  Some variability and platelet counts.  in general, thrombocytopenia not common with ceftriaxone but if persistent decline and no other specific etiology found , then you could consider empiric adjustment.    --Right hip fracture with repair February 2024.  +pain ; orthopedics with  aspiration June 18 and culture with GB strep likely hematogenous seeding, MRI imaging with concern for septic joint, surgery on June 20 as above.   Patient understands risk for persistent/recurrent or  progressive infection and potential for further surgical intervention in the future that could include functional/limb loss and other serious sequela/morbidity; he knows antibiotics and surgery not a guarantee for care    --postop leukocytosis, possible stress response associated with surgery.  Trended down June 22-27.  No new respiratory symptoms.  Encouraged incentive spirometry.  Abdomen benign with no diarrhea.  No new urinary tract complaints.  IV sites with no suppurative change.  No rash.  Monitor for new process; no new focal muscoskeletal symptoms to suggest new metastatic focus of infection    --diabetes.  Described as uncontrolled by medicine team at admission.  Glucose control per medicine team    --urinary retention per nursing staff.  Per their notes patient refused in/out catheterization.  Dr. Goodwin aware and he will discuss options with patient      PLAN:      --IV  rocephin likely at least 6 weeks from hip surgery and anticipate step down to oral agents after with oral keflex    **wound culture at left foot mixed with  MSSA Klebsiella and GB strep  **blood cultures with group B strep  ** right hip cultures with group B strep    --orthopedics  making surgical plans with concern for right hipprosthetic/periprosthetic infection     --Check/review labs cultures and scans    --TTE described as technically adequate by cardiology; no description of vegetation by cardiology per report    --Partial history per nursing staff    --encouraged incentive spirometry.  Nursing aware to instruct and encourage    --Discussed with microbiology    --d/w Dr Lombardo    --Highly complex at of issues with high risk for further serious morbidity and other serious sequela     **follow-up with me one week.  Every Monday CBC/CMP and sed rate/CRP.  I anticipate 6 weeks IV antibiotic from hip surgery and probable stepdown after that to oral Keflex with no specific end date so far    Copied text in this note has been reviewed and is  accurate as of 07/02/24.        Cleve Bills MD  7/2/2024

## 2024-07-03 ENCOUNTER — APPOINTMENT (OUTPATIENT)
Dept: CT IMAGING | Facility: HOSPITAL | Age: 59
End: 2024-07-03
Payer: MEDICAID

## 2024-07-03 LAB
ANION GAP SERPL CALCULATED.3IONS-SCNC: 5 MMOL/L (ref 5–15)
BUN SERPL-MCNC: 16 MG/DL (ref 8–23)
BUN/CREAT SERPL: 27.6 (ref 7–25)
CALCIUM SPEC-SCNC: 7.6 MG/DL (ref 8.6–10.5)
CHLORIDE SERPL-SCNC: 106 MMOL/L (ref 98–107)
CO2 SERPL-SCNC: 27 MMOL/L (ref 22–29)
CREAT SERPL-MCNC: 0.58 MG/DL (ref 0.76–1.27)
DEPRECATED RDW RBC AUTO: 50.6 FL (ref 37–54)
EGFRCR SERPLBLD CKD-EPI 2021: 111.6 ML/MIN/1.73
ERYTHROCYTE [DISTWIDTH] IN BLOOD BY AUTOMATED COUNT: 14 % (ref 12.3–15.4)
GLUCOSE BLDC GLUCOMTR-MCNC: 105 MG/DL (ref 70–130)
GLUCOSE BLDC GLUCOMTR-MCNC: 114 MG/DL (ref 70–130)
GLUCOSE BLDC GLUCOMTR-MCNC: 169 MG/DL (ref 70–130)
GLUCOSE BLDC GLUCOMTR-MCNC: 97 MG/DL (ref 70–130)
GLUCOSE SERPL-MCNC: 89 MG/DL (ref 65–99)
HCT VFR BLD AUTO: 24.9 % (ref 37.5–51)
HGB BLD-MCNC: 8.1 G/DL (ref 13–17.7)
MCH RBC QN AUTO: 31.9 PG (ref 26.6–33)
MCHC RBC AUTO-ENTMCNC: 32.5 G/DL (ref 31.5–35.7)
MCV RBC AUTO: 98 FL (ref 79–97)
PLATELET # BLD AUTO: 148 10*3/MM3 (ref 140–450)
PMV BLD AUTO: 9.6 FL (ref 6–12)
POTASSIUM SERPL-SCNC: 5.3 MMOL/L (ref 3.5–5.2)
RBC # BLD AUTO: 2.54 10*6/MM3 (ref 4.14–5.8)
SODIUM SERPL-SCNC: 138 MMOL/L (ref 136–145)
WBC NRBC COR # BLD AUTO: 6.15 10*3/MM3 (ref 3.4–10.8)

## 2024-07-03 PROCEDURE — 63710000001 INSULIN LISPRO (HUMAN) PER 5 UNITS: Performed by: INTERNAL MEDICINE

## 2024-07-03 PROCEDURE — 99232 SBSQ HOSP IP/OBS MODERATE 35: CPT | Performed by: NURSE PRACTITIONER

## 2024-07-03 PROCEDURE — 97164 PT RE-EVAL EST PLAN CARE: CPT

## 2024-07-03 PROCEDURE — 80048 BASIC METABOLIC PNL TOTAL CA: CPT | Performed by: STUDENT IN AN ORGANIZED HEALTH CARE EDUCATION/TRAINING PROGRAM

## 2024-07-03 PROCEDURE — 97607 NEG PRS WND THR NDME<=50SQCM: CPT

## 2024-07-03 PROCEDURE — 63710000001 INSULIN GLARGINE PER 5 UNITS: Performed by: INTERNAL MEDICINE

## 2024-07-03 PROCEDURE — 82948 REAGENT STRIP/BLOOD GLUCOSE: CPT

## 2024-07-03 PROCEDURE — 97110 THERAPEUTIC EXERCISES: CPT

## 2024-07-03 PROCEDURE — 63710000001 INSULIN LISPRO (HUMAN) PER 5 UNITS: Performed by: ORTHOPAEDIC SURGERY

## 2024-07-03 PROCEDURE — 85027 COMPLETE CBC AUTOMATED: CPT | Performed by: STUDENT IN AN ORGANIZED HEALTH CARE EDUCATION/TRAINING PROGRAM

## 2024-07-03 PROCEDURE — 25010000002 CEFTRIAXONE PER 250 MG: Performed by: INTERNAL MEDICINE

## 2024-07-03 RX ADMIN — INSULIN GLARGINE 25 UNITS: 100 INJECTION, SOLUTION SUBCUTANEOUS at 21:24

## 2024-07-03 RX ADMIN — ASPIRIN 81 MG: 81 TABLET, COATED ORAL at 08:40

## 2024-07-03 RX ADMIN — INSULIN LISPRO 8 UNITS: 100 INJECTION, SOLUTION INTRAVENOUS; SUBCUTANEOUS at 08:42

## 2024-07-03 RX ADMIN — Medication 10 ML: at 21:24

## 2024-07-03 RX ADMIN — LISINOPRIL 10 MG: 10 TABLET ORAL at 08:40

## 2024-07-03 RX ADMIN — INSULIN LISPRO 2 UNITS: 100 INJECTION, SOLUTION INTRAVENOUS; SUBCUTANEOUS at 18:05

## 2024-07-03 RX ADMIN — Medication 5 MG: at 21:24

## 2024-07-03 RX ADMIN — ASPIRIN 81 MG: 81 TABLET, COATED ORAL at 21:23

## 2024-07-03 RX ADMIN — PANTOPRAZOLE SODIUM 40 MG: 40 TABLET, DELAYED RELEASE ORAL at 18:04

## 2024-07-03 RX ADMIN — OXYCODONE HYDROCHLORIDE 5 MG: 5 TABLET ORAL at 08:52

## 2024-07-03 RX ADMIN — PANTOPRAZOLE SODIUM 40 MG: 40 TABLET, DELAYED RELEASE ORAL at 08:40

## 2024-07-03 RX ADMIN — ACETAMINOPHEN 1000 MG: 500 TABLET, FILM COATED ORAL at 21:23

## 2024-07-03 RX ADMIN — ACETAMINOPHEN 1000 MG: 500 TABLET, FILM COATED ORAL at 08:44

## 2024-07-03 RX ADMIN — INSULIN LISPRO 8 UNITS: 100 INJECTION, SOLUTION INTRAVENOUS; SUBCUTANEOUS at 18:04

## 2024-07-03 RX ADMIN — OXYCODONE HYDROCHLORIDE 5 MG: 5 TABLET ORAL at 21:24

## 2024-07-03 RX ADMIN — CEFTRIAXONE 2000 MG: 2 INJECTION, POWDER, FOR SOLUTION INTRAMUSCULAR; INTRAVENOUS at 08:49

## 2024-07-03 RX ADMIN — Medication 1 CAPSULE: at 08:40

## 2024-07-03 NOTE — PROGRESS NOTES
[facility] Medicine Services  PROGRESS NOTE    Patient Name: Easton Alvarez  : 1965  MRN: [id number]    Date of Admission: 2024  Primary Care Physician: Provider, No Known    Subjective   Subjective     CC:  Follow-up left foot gangrene, right hip infection    HPI:  No new issues overnight  Denies pain currently  No fever    Objective   Objective     Vital Signs:   Temp:  [97 °F (36.1 °C)-98.9 °F (37.2 °C)] 97 °F (36.1 °C)  Heart Rate:  [] 101  Resp:  [18] 18  BP: (126-142)/(70-83) 127/70     Physical Exam:  Constitutional: No acute distress, awake, alert  HENT: NCAT, mucous membranes moist  Respiratory: Clear to auscultation bilaterally, respiratory effort normal   Cardiovascular: RRR, no murmurs, rubs, or gallops  Gastrointestinal: Positive bowel sounds, soft, nontender, nondistended  Musculoskeletal: No right ankle edema  Psychiatric: Flat affect, cooperative  Neurologic: Oriented x 3, moves remaining extremities, speech clear  Skin: No rashes noted.  Left stump dressing intact.  Right hip optifoam dressing saturated with bloody/purulent drainage. No foul odor noted.  No erythema around incision noted. Sutures intact        Results Reviewed:  LAB RESULTS:      Lab 24  0825 24  0401 24  1114 24  0647 24  0508   WBC 6.15 7.76 7.81 6.90 6.54   HEMOGLOBIN 8.1* 8.0* 9.3* 7.6* 7.7*   HEMATOCRIT 24.9* 23.5* 27.8* 22.7* 22.8*   PLATELETS 148 117* 126* 94* 114*   MCV 98.0* 94.0 96.9 97.0 96.2   PROTIME  --  16.6*  --   --   --          Lab 24  0825 24  1800 24  0401 24  1114 24  0647 24  0508   SODIUM 138  --  137 138 135* 136   POTASSIUM 5.3* 4.5 3.6 4.8 3.9 3.8   CHLORIDE 106  --  104 104 103 104   CO2 27.0  --  26.0 28.0 24.0 25.0   ANION GAP 5.0  --  7.0 6.0 8.0 7.0   BUN 16  --  18 17 18 21   CREATININE 0.58*  --  0.65* 0.80 0.63* 0.72*   EGFR 111.6  --  107.9 101.3 108.9 104.6   GLUCOSE 89  --  79 95 109*  75   CALCIUM 7.6*  --  7.2* 8.0* 7.0* 7.2*         Lab 07/02/24  0401 07/01/24  1114   TOTAL PROTEIN 4.8* 6.1   ALBUMIN 1.7* 1.8*   GLOBULIN 3.1 4.3   ALT (SGPT) 19 21   AST (SGOT) 39 45*   BILIRUBIN 0.4 0.6   ALK PHOS 235* 233*         Brief Urine Lab Results  (Last result in the past 365 days)        Color   Clarity   Blood   Leuk Est   Nitrite   Protein   CREAT   Urine HCG        06/14/24 1109 Yellow   Clear   Negative   Negative   Negative   Negative                   Microbiology Results Abnormal       Procedure Component Value - Date/Time    Fungus Culture - Aspirate, Hip, Right [[id number]] Collected: 06/18/24 1052    Lab Status: Preliminary result Specimen: Aspirate from Hip, Right Updated: 07/02/24 1130     Fungus Culture No fungus isolated at 2 weeks    AFB Culture - Aspirate, Hip, Right [[id number]] Collected: 06/18/24 1052    Lab Status: Preliminary result Specimen: Aspirate from Hip, Right Updated: 07/02/24 1130     AFB Culture No AFB isolated at 2 weeks     AFB Stain No acid fast bacilli seen on concentrated smear    Anaerobic Culture 10 Day Incubation - Synovium, Hip, Right [[id number]]  (Normal) Collected: 06/20/24 1700    Lab Status: Final result Specimen: Synovium from Hip, Right Updated: 06/30/24 0719     Anaerobic Culture No anaerobes isolated at 10 days    Anaerobic Culture 10 Day Incubation - Synovium, Hip, Right [[id number]]  (Normal) Collected: 06/20/24 1700    Lab Status: Edited Result - FINAL Specimen: Synovium from Hip, Right Updated: 06/30/24 0719     Anaerobic Culture No anaerobes isolated at 10 days    Anaerobic Culture 10 Day Incubation - Synovium, Hip, Right [[id number]]  (Normal) Collected: 06/20/24 1700    Lab Status: Final result Specimen: Synovium from Hip, Right Updated: 06/30/24 0719     Anaerobic Culture No anaerobes isolated at 10 days    Fungus Culture - Wound, Leg, Left [[id number]] Collected: 06/15/24 1501    Lab Status: Preliminary result Specimen: Wound from Leg, Left Updated:  06/29/24 2215     Fungus Culture No fungus isolated at 2 weeks    AFB Culture - Wound, Leg, Left [[id number]] Collected: 06/15/24 1501    Lab Status: Preliminary result Specimen: Wound from Leg, Left Updated: 06/29/24 2215     AFB Culture No AFB isolated at 2 weeks     AFB Stain No acid fast bacilli seen on concentrated smear    Fungus Culture - Synovium, Hip, Right [[id number]] Collected: 06/20/24 1700    Lab Status: Preliminary result Specimen: Synovium from Hip, Right Updated: 06/27/24 1831     Fungus Culture No fungus isolated at 1 week    Fungus Culture - Synovium, Hip, Right [[id number]] Collected: 06/20/24 1700    Lab Status: Preliminary result Specimen: Synovium from Hip, Right Updated: 06/27/24 1831     Fungus Culture No fungus isolated at 1 week    Fungus Culture - Synovium, Hip, Right [[id number]] Collected: 06/20/24 1700    Lab Status: Preliminary result Specimen: Synovium from Hip, Right Updated: 06/27/24 1831     Fungus Culture No fungus isolated at 1 week    AFB Culture - Synovium, Hip, Right [[id number]] Collected: 06/20/24 1700    Lab Status: Preliminary result Specimen: Synovium from Hip, Right Updated: 06/27/24 1831     AFB Culture No AFB isolated at 1 week     AFB Stain No acid fast bacilli seen on concentrated smear    AFB Culture - Synovium, Hip, Right [[id number]] Collected: 06/20/24 1700    Lab Status: Preliminary result Specimen: Synovium from Hip, Right Updated: 06/27/24 1831     AFB Culture No AFB isolated at 1 week     AFB Stain No acid fast bacilli seen on concentrated smear    AFB Culture - Synovium, Hip, Right [[id number]] Collected: 06/20/24 1700    Lab Status: Preliminary result Specimen: Synovium from Hip, Right Updated: 06/27/24 1831     AFB Culture No AFB isolated at 1 week     AFB Stain No acid fast bacilli seen on concentrated smear    Fungus Culture - Surgical Site, Leg, Left [[id number]] Collected: 06/13/24 1031    Lab Status: Preliminary result Specimen: Surgical Site from Leg, Left  Updated: 06/27/24 1201     Fungus Culture No fungus isolated at 2 weeks    AFB Culture - Surgical Site, Leg, Left [[id number]] Collected: 06/13/24 1031    Lab Status: Preliminary result Specimen: Surgical Site from Leg, Left Updated: 06/27/24 1201     AFB Culture No AFB isolated at 2 weeks     AFB Stain No acid fast bacilli seen on concentrated smear    Anaerobic Culture 10 Day Incubation - Wound, Leg, Left [[id number]]  (Normal) Collected: 06/15/24 1501    Lab Status: Final result Specimen: Wound from Leg, Left Updated: 06/26/24 0647     Anaerobic Culture No anaerobes isolated at 10 days    Tissue / Bone Culture - Synovium, Hip, Right [[id number]] Collected: 06/20/24 1700    Lab Status: Final result Specimen: Synovium from Hip, Right Updated: 06/23/24 1010     Tissue Culture No growth at 3 days     Gram Stain Many (4+) Red blood cells      Moderate (3+) WBCs seen      No organisms seen    Tissue / Bone Culture - Synovium, Hip, Right [[id number]] Collected: 06/20/24 1700    Lab Status: Final result Specimen: Synovium from Hip, Right Updated: 06/23/24 1009     Tissue Culture No growth at 3 days     Gram Stain Moderate (3+) WBCs seen      No organisms seen     Comment: Modified report. Previous result was Rare (1+) Gram positive cocci in pairs on 6/20/2024 at 1949 EDT.         Many (4+) Red blood cells    Tissue / Bone Culture - Synovium, Hip, Right [[id number]] Collected: 06/20/24 1700    Lab Status: Final result Specimen: Synovium from Hip, Right Updated: 06/23/24 1009     Tissue Culture No growth at 3 days     Gram Stain Many (4+) Red blood cells      Few (2+) WBCs seen      No organisms seen    Blood Culture - Blood, Hand, Left [[id number]]  (Normal) Collected: 06/15/24 1104    Lab Status: Final result Specimen: Blood from Hand, Left Updated: 06/20/24 1201     Blood Culture No growth at 5 days    Blood Culture - Blood, Hand, Right [[id number]]  (Normal) Collected: 06/15/24 1104    Lab Status: Final result Specimen: Blood  from Hand, Right Updated: 06/20/24 1201     Blood Culture No growth at 5 days    Wound Culture - Wound, Leg, Left [[id number]] Collected: 06/15/24 1501    Lab Status: Final result Specimen: Wound from Leg, Left Updated: 06/19/24 0711     Wound Culture No growth at 3 days     Gram Stain Rare (1+) WBCs seen      No organisms seen    Anaerobic Culture - Surgical Site, Leg, Left [[id number]]  (Normal) Collected: 06/13/24 1031    Lab Status: Final result Specimen: Surgical Site from Leg, Left Updated: 06/18/24 0751     Anaerobic Culture No anaerobes isolated at 5 days    Blood Culture - Blood, Arm, Left [[id number]]  (Normal) Collected: 06/12/24 1832    Lab Status: Final result Specimen: Blood from Arm, Left Updated: 06/17/24 1900     Blood Culture No growth at 5 days    Blood Culture - Blood, Hand, Right [[id number]]  (Normal) Collected: 06/12/24 1837    Lab Status: Final result Specimen: Blood from Hand, Right Updated: 06/17/24 1900     Blood Culture No growth at 5 days    Fungus Smear - Surgical Site, Leg, Left [[id number]] Collected: 06/13/24 1031    Lab Status: Final result Specimen: Surgical Site from Leg, Left Updated: 06/14/24 1406     Fungal Stain No fungal elements seen            No radiology results from the last 24 hrs    Results for orders placed during the hospital encounter of 06/12/24    Adult Transthoracic Echo Complete w/ Color, Spectral and Contrast if Necessary Per Protocol    Interpretation Summary    Left ventricular systolic function is normal. Left ventricular ejection fraction appears to be 56 - 60%.    Left ventricular wall thickness is consistent with borderline concentric hypertrophy.    Left ventricular diastolic function is consistent with (grade Ia w/high LAP) impaired relaxation.      Current medications:  Scheduled Meds:acetaminophen, 1,000 mg, Oral, Q8H  aspirin, 81 mg, Oral, Q12H  cefTRIAXone, 2,000 mg, Intravenous, Q24H  insulin glargine, 25 Units, Subcutaneous, Nightly  insulin lispro,  2-9 Units, Subcutaneous, 4x Daily AC & at Bedtime  Insulin Lispro, 8 Units, Subcutaneous, TID With Meals  lactobacillus acidophilus, 1 capsule, Oral, Daily  lisinopril, 10 mg, Oral, Q24H  melatonin, 5 mg, Oral, Nightly  pantoprazole, 40 mg, Oral, BID AC  senna-docusate sodium, 2 tablet, Oral, BID  sodium chloride, 10 mL, Intravenous, Q12H  sodium chloride, 10 mL, Intravenous, Q12H      Continuous Infusions:lactated ringers, 9 mL/hr, Last Rate: 9 mL/hr (06/15/24 1307)  lactated ringers, 100 mL/hr, Last Rate: 100 mL/hr (06/20/24 2122)  ropivacaine, , Last Rate: Stopped (06/17/24 2230)      PRN Meds:.  senna-docusate sodium **AND** polyethylene glycol **AND** bisacodyl **AND** bisacodyl    Calcium Replacement - Follow Nurse / BPA Driven Protocol    dextrose    dextrose    glucagon (human recombinant)    Magnesium Standard Dose Replacement - Follow Nurse / BPA Driven Protocol    nitroglycerin    oxyCODONE    Phosphorus Replacement - Follow Nurse / BPA Driven Protocol    Potassium Replacement - Follow Nurse / BPA Driven Protocol    sodium chloride    sodium chloride    sodium chloride    Assessment & Plan   Assessment & Plan     Active Hospital Problems    Diagnosis  POA    **Diabetic ulcer of left foot with necrosis of muscle [E11.621, L97.523]  Yes    Severe malnutrition [E43]  Yes    Infection of prosthetic total hip joint [T84.59XA, Z96.649]  Not Applicable      Resolved Hospital Problems   No resolved problems to display.        Brief Hospital Course to date:  Easton Alvarez is a 58 y.o. male with past medical history significant for T2DM, diabetic neuropathy, right hip fracture/ORIF ([facility] 2/2024), and right transmetatarsal amputation 2019 at  who presented to OSH ED on 6/12/2024 with severe left foot cellulitis/necrosis after recent injury. Lactate at OSH was 8.8 and CT imaging of left leg showed gas gangrene. Orthopedic surgeon Dr. Em was consulted and performed a left below-knee amputation on 6/13/2024  with further debridement and irrigation on 6/15. Infectious disease has followed and managed antimicrobial therapy.     This patient's problems and plans were partially entered by my partner and updated as appropriate by me 07/03/24.     Sepsis secondary to group B strep bacteremia  Left foot cellulitis/gangrene s/p left BKA 6/13  Leukocytosis  -MRI left foot showed extensive soft tissue infection with abscesses along the foot to the ankle and into the lower leg with soft tissue gas.    -Wound cultures 6/12 with heavy growth group B strep and Kebsiella, blood cultures x 2 with group B strep; wound culture 6/13 with group B strep  -TTE described as technically adequate by cardiology; no description of vegetation by cardiology per report   -s/p left BKA with wound VAC on 6/13/24    -s/p further debridement in OR on 6/15/24 by Dr. Em, drain removed 6/17  -Infectious disease, Dr. Bills following; continue IV ceftriaxone for at least 6 weeks then transition to PO Keflex indefinitely   -PT wound care following  -Pain control with scheduled Tylenol, PRN oxycodone.   -Daily dressing changes to surgical stump site: Xeroform, 4X4, Kerlix, ACE  -Follow up with Dr. Em 2 weeks for wound check, imaging     Right glenys-prosthetic hip infection  -6/18 fluid aspiration from the hip which was purulent, rare growth group B strep  -6/19 MRI femur with large gas containing fluid collection  -s/p I&D on 6/20, Dr. Sethi. Wound vac placed, removed  -Fluid culture 6/18 with rare growth group B strep  -Supportive care, mobilize, pain control. Antibiotics as above  - increased bloody/purulent drainage from site since wound vac removed, saturating dressings.  Nursing discussed with Dr. Sethi who requested wound vac be replaced, CT was cancelled as he felt would not provide any new information. WOC consulted    Concern for GIB  Acute on chronic anemia  -with melena and fecal occult positive overnight 6/14  -GI consulted,  recommended medical management with PPI BID x 1 month.  -H&H however seems to be trending down, but is stable, plan remains as above    HFpEF   Elevated blood pressure  Anasarca   -No history of taking meds for BP per patient   -BP has been running high this admission, possibly secondary to pain  -Echo from 6/18 revealed EF 56-60% with diastolic dysfunction  -Started lisinopril 10 mg daily, resumed 6/30  -Lasix 40 mg IV x1   -Strict I&O, daily weights      Elevated LFTs  Thrombocytopenia, improved  Mild hyponatremia, clinically insignificant  -Continue to monitor intermittently.   -GI referral at discharge for elevated LFTs, thrombocytopenia and concern for early cirrhosis, plan to pursue liver US after recovery from acute illness.     Anion gap metabolic acidosis POA, resolved   -AG was 20.1, likely from lactate, serum acetone negative.     Poorly controlled type 2 diabetes with A1c 9.5%   -Patient was not taking meds or checking glucoses, though insulin had been prescribed in the past.  -Continue basal, prandial and SSI, adjust as warranted   -Diabetes educator has seen    Latest Reference Range & Units 07/02/24 20:48 07/03/24 07:41 07/03/24 08:25 07/03/24 12:43   Glucose 70 - 130 mg/dL 116 105 89 97     Expected Discharge Location and Transportation: Rehab @ [facility] pending precert  Expected Discharge   Expected Discharge Date: 7/5/2024; Expected Discharge Time:        VTE Prophylaxis:  Mechanical VTE prophylaxis orders are present.         AM-PAC 6 Clicks Score (PT): 15 (07/02/24 2049)    CODE STATUS:   Code Status and Medical Interventions:   Ordered at: 06/20/24 2026     Level Of Support Discussed With:    Patient     Code Status (Patient has no pulse and is not breathing):    CPR (Attempt to Resuscitate)     Medical Interventions (Patient has pulse or is breathing):    Full       Gay Baker, GUILLAUME  07/03/24

## 2024-07-03 NOTE — THERAPY TREATMENT NOTE
"Patient Name: Easton Alvarez  : 1965    MRN: [id number]                              Today's Date: 7/3/2024       Admit Date: 2024    Visit Dx:     ICD-10-CM ICD-9-CM   1. S/P BKA (below knee amputation), left  Z89.512 V49.75   2. Diabetic ulcer of left midfoot associated with type 2 diabetes mellitus, with necrosis of muscle  E11.621 250.80    L97.423 707.14     728.89   3. Elevated LFTs  R79.89 790.6   4. Below knee amputation  S88.119A 897.0   5. Impaired functional mobility, balance, gait, and endurance  Z74.09 V49.89   6. Infection of prosthetic total hip joint, initial encounter  T84.59XA 996.66    Z96.649 V43.64     Patient Active Problem List   Diagnosis    Diabetic ulcer of left foot with necrosis of muscle    Severe malnutrition    Infection of prosthetic total hip joint     Past Medical History:   Diagnosis Date    Diabetes mellitus     Diabetic foot infection 2018    left (partial amputation)     Past Surgical History:   Procedure Laterality Date    AMPUTATION FOOT / TOE Right     partial foot    BELOW KNEE AMPUTATION Left 2024    Procedure: AMPUTATION BELOW KNEE AND WOUND VAC ASSISTED CLOSURE LEFT;  Surgeon: Brijesh Em Jr., MD;  Location:  [facility];  Service: Orthopedics;  Laterality: Left;    INCISION AND DRAINAGE HIP Right 2024    Procedure: HIP ANTERIOR INCISION AND DRAINAGE WITH HANA TABLE, POLY SWAP RIGHT WITH LEFT LEG PIN TRACTION;  Surgeon: Lex Sethi MD;  Location:  [facility];  Service: Orthopedics;  Laterality: Right;    INCISION AND DRAINAGE LEG Left 6/15/2024    Procedure: SECONDARY CLOSURE LEFT BELOW KNEE AMPUTATION;  Surgeon: Brijesh Em Jr., MD;  Location:  [facility];  Service: Orthopedics;  Laterality: Left;    PLACEMENT OF WOUND VAC Left 2024    Procedure: PLACEMENT OF WOUND VAC LEFT;  Surgeon: Brijesh Em Jr., MD;  Location:  [facility];  Service: Orthopedics;  Laterality: Left;    TOTAL HIP ARTHROPLASTY Right 2024    From fall \"whole " "hip was shattered\"      General Information       [facility] Name 07/03/24 1515          Physical Therapy Time and Intention    Document Type therapy note (daily note) (P)   -     Mode of Treatment individual therapy;physical therapy (P)   -       Row Name 07/03/24 1515          General Information    Patient Profile Reviewed yes (P)   -     Existing Precautions/Restrictions fall;left;non-weight bearing;right;hip, anterior;other (see comments) (P)   recent L BKA, R hip MARIA LUISA s/p I@D, R foot TMA  -     Barriers to Rehab medically complex;previous functional deficit (P)   -       Row Name 07/03/24 1515          Cognition    Orientation Status (Cognition) oriented x 4 (P)   -       Row Name 07/03/24 1515          Safety Issues, Functional Mobility    Safety Issues Affecting Function (Mobility) awareness of need for assistance;insight into deficits/self-awareness;safety precaution awareness;safety precautions follow-through/compliance;sequencing abilities;problem-solving;positioning of assistive device (P)   -     Impairments Affecting Function (Mobility) balance;range of motion (ROM);strength;endurance/activity tolerance;pain (P)   -     Comment, Safety Issues/Impairments (Mobility) Alert and following commands (P)   -               User Key  (r) = Recorded By, (t) = Taken By, (c) = Cosigned By      Initials Name Provider Type     Quincy Elizabeth, PT Student PT Student                   Mobility       Row Name 07/03/24 1516          Bed Mobility    Comment, (Bed Mobility) Bed mobility not appropriate secondary to copious wound drainage and awaiting nsg for further instruction (P)   -       Row Name 07/03/24 1516          Transfers    Comment, (Transfers) Transfers not appropriate this session secondary to wound drainage (P)   -       Row Name 07/03/24 1516          Sit-Stand Transfer    Sit-Stand Dows (Transfers) not tested (P)   -       Row Name 07/03/24 1516          Gait/Stairs " (Locomotion)    Preble Level (Gait) not tested (P)   -     Comment, (Gait/Stairs) Not appropriate this session (P)   -       Row Name 07/03/24 1516          Mobility    Extremity Weight-bearing Status left lower extremity;right lower extremity (P)   -     Left Lower Extremity (Weight-bearing Status) non weight-bearing (NWB) (P)   -     Right Lower Extremity (Weight-bearing Status) weight-bearing as tolerated (WBAT) (P)   -               User Key  (r) = Recorded By, (t) = Taken By, (c) = Cosigned By      Initials Name Provider Type     Quincy Elizabeth, PT Student PT Student                   Obj/Interventions       Row Name 07/03/24 1518          Motor Skills    Therapeutic Exercise hip;knee;ankle (P)   -       Row Name 07/03/24 1518          Hip (Therapeutic Exercise)    Hip (Therapeutic Exercise) strengthening exercise (P)   -     Hip Strengthening (Therapeutic Exercise) bilateral;aBduction;aDduction;10 repetitions (P)   -       Row Name 07/03/24 1518          Knee (Therapeutic Exercise)    Knee (Therapeutic Exercise) isometric exercises;strengthening exercise (P)   -     Knee Isometrics (Therapeutic Exercise) bilateral;quad sets;3 second hold;10 repetitions (P)   -     Knee Strengthening (Therapeutic Exercise) bilateral;SLR (straight leg raise);SAQ (short arc quad);right;heel slides;10 repetitions (P)   -       Row Name 07/03/24 1518          Ankle (Therapeutic Exercise)    Ankle (Therapeutic Exercise) AROM (active range of motion) (P)   -     Ankle AROM (Therapeutic Exercise) bilateral;dorsiflexion;plantarflexion;10 repetitions (P)   -       Row Name 07/03/24 1518          Balance    Comment, Balance Sitting balance not assessed this date secondary to bed mobility deferred (P)   -               User Key  (r) = Recorded By, (t) = Taken By, (c) = Cosigned By      Initials Name Provider Type     Quincy Elizabeth, PT Student PT Student                   Goals/Plan    No  documentation.                  Clinical Impression       Row Name 07/03/24 1521          Pain    Pretreatment Pain Rating 0/10 - no pain (P)   -     Posttreatment Pain Rating 0/10 - no pain (P)   -       Row Name 07/03/24 1521          Plan of Care Review    Plan of Care Reviewed With patient (P)   -     Progress no change (P)   -     Outcome Evaluation Pt was able to tolerate ther-ex in bed well this session. Bed mobility and transfer training were deferred this session secondary to copious drainage from R hip with nursing notified. Pt present below baseline with deficits in strength, balance, and poor activity tolerance. IPPT continues to be appropriate during admission. PT recommended DC to IRF when medically appropriate. (P)   -       Row Name 07/03/24 1521          Therapy Assessment/Plan (PT)    Patient/Family Therapy Goals Statement (PT) Stop wound draining (P)   -[facility] Name 07/03/24 1521          Vital Signs    Pre Systolic BP Rehab 142 (P)   -     Pre Treatment Diastolic BP 80 (P)   -     Pretreatment Heart Rate (beats/min) 114 (P)   -     Pre SpO2 (%) 97 (P)   -     O2 Delivery Pre Treatment room air (P)   -[facility] Name 07/03/24 1521          Positioning and Restraints    Pre-Treatment Position in bed (P)   -     Post Treatment Position bed (P)   -     In Bed notified nsg;supine;call light within reach;encouraged to call for assist;exit alarm on;LLE elevated (P)   -               User Key  (r) = Recorded By, (t) = Taken By, (c) = Cosigned By      Initials Name Provider Type     Quincy Elizabeth, PT Student PT Student                   Outcome Measures       Row Name 07/03/24 1526 07/03/24 0815       How much help from another person do you currently need...    Turning from your back to your side while in flat bed without using bedrails? 4 (P)   - 4  -BM    Moving from lying on back to sitting on the side of a flat bed without bedrails? 3 (P)   - 3  -BM    Moving  to and from a bed to a chair (including a wheelchair)? 3 (P)   - 3  -BM    Standing up from a chair using your arms (e.g., wheelchair, bedside chair)? 2 (P)   - 2  -BM    Climbing 3-5 steps with a railing? 1 (P)   - 1  -BM    To walk in hospital room? 2 (P)   - 2  -BM    AM-PAC 6 Clicks Score (PT) 15 (P)   - 15  -BM    Highest Level of Mobility Goal 4 --> Transfer to chair/commode (P)   - 4 --> Transfer to chair/commode  -BM      Row Name 07/03/24 1526          Functional Assessment    Outcome Measure Options AM-PAC 6 Clicks Basic Mobility (PT) (P)   -               User Key  (r) = Recorded By, (t) = Taken By, (c) = Cosigned By      Initials Name Provider Type     Marina Soto, RN Registered Nurse     Quincy Elizabeth, PT Student PT Student                                 Physical Therapy Education       Title: PT OT SLP Therapies (Done)       Topic: Physical Therapy (Done)       Point: Mobility training (Done)       Learning Progress Summary             Patient Acceptance, E, VU by  at 7/3/2024 1527    Comment: Pt educated on the importance of mobility during admission    Eager, E, VU by  at 7/2/2024 1031    Comment: Educated on the importance of continued mobility and strengthening on rehab process    Acceptance, E,TB, VU by  at 7/2/2024 0800    Acceptance, E, VU by  at 7/1/2024 1633    Comment: Educated on the importance of standing and increasing strength and endurance during admission    Acceptance, E, VU by  at 7/1/2024 0845    Eager, E, VU by SC at 6/29/2024 1450    Comment: reviewed safety with sliding board    Acceptance, E, VU,DU,NR by  at 6/28/2024 1404    Eager, E, VU,DU by  at 6/27/2024 1539    Comment: Pt eudcated regarding WC management, transfers and symptom managment    Acceptance, E,D, VU by  at 6/26/2024 1608    Comment: Pt educated regarding transfers, weight bearing status and HEP.    Acceptance, E, VU,DU,NR by  at 6/25/2024 1107    Acceptance, E,H, VU  by  at 6/24/2024 1105    Comment: Pt educated on bed positioning with L BKA and importance of exercise on rehab.    Eager, E, VU,DU by SC at 6/21/2024 1349    Comment: reviewed HEP    Acceptance, E,D, VU,NR by SD at 6/19/2024 1203    Comment: PT ed for expected outcomes, risks, and benefits of IPPT services and progression of activity. Pt given visual demo of unilateral gait with RW. Discussion regarding dispo planning and IRF.                         Point: Home exercise program (Done)       Learning Progress Summary             Patient Acceptance, E, VU by  at 7/3/2024 1527    Comment: Pt educated on the importance of mobility during admission    Acceptance, E,TB, VU by  at 7/2/2024 0800    Acceptance, E, VU by  at 7/1/2024 0845    Eager, E, VU by SC at 6/29/2024 1450    Comment: reviewed safety with sliding board    Acceptance, E,D, VU by EM at 6/26/2024 1608    Comment: Pt educated regarding transfers, weight bearing status and HEP.    Acceptance, E, VU,DU,NR by  at 6/25/2024 1107    Acceptance, E,H, VU by  at 6/24/2024 1105    Comment: Pt educated on bed positioning with L BKA and importance of exercise on rehab.    Acceptance, E, VU,NR,DU by GEORGINA at 6/23/2024 1420    Eager, E, VU,DU by SC at 6/21/2024 1349    Comment: reviewed HEP    Acceptance, E,D, VU,NR by SD at 6/19/2024 1203    Comment: PT ed for expected outcomes, risks, and benefits of IPPT services and progression of activity. Pt given visual demo of unilateral gait with RW. Discussion regarding dispo planning and IRF.                         Point: Body mechanics (Done)       Learning Progress Summary             Patient Acceptance, E, VU by  at 7/3/2024 1527    Comment: Pt educated on the importance of mobility during admission    Eager, E, VU by  at 7/2/2024 1031    Comment: Educated on the importance of continued mobility and strengthening on rehab process    Acceptance, E,TB, VU by  at 7/2/2024 0800    Acceptance, E, VU by  at  7/1/2024 1633    Comment: Educated on the importance of standing and increasing strength and endurance during admission    Acceptance, E, VU by  at 7/1/2024 0845    Eager, E, VU by SC at 6/29/2024 1450    Comment: reviewed safety with sliding board    Acceptance, E, VU,DU,NR by  at 6/28/2024 1404    Eager, E, VU,DU by EM at 6/27/2024 1539    Comment: Pt eudcated regarding WC management, transfers and symptom managment    Acceptance, E,D, VU by EM at 6/26/2024 1608    Comment: Pt educated regarding transfers, weight bearing status and HEP.    Acceptance, E, VU,DU,NR by  at 6/25/2024 1107    Acceptance, E,H, VU by  at 6/24/2024 1105    Comment: Pt educated on bed positioning with L BKA and importance of exercise on rehab.    Eager, E, VU,DU by SC at 6/21/2024 1349    Comment: reviewed HEP    Acceptance, E,D, VU,NR by SD at 6/19/2024 1203    Comment: PT ed for expected outcomes, risks, and benefits of IPPT services and progression of activity. Pt given visual demo of unilateral gait with RW. Discussion regarding dispo planning and IRF.                         Point: Precautions (Done)       Learning Progress Summary             Patient Acceptance, E, VU by  at 7/3/2024 1527    Comment: Pt educated on the importance of mobility during admission    Eager, E, VU by  at 7/2/2024 1031    Comment: Educated on the importance of continued mobility and strengthening on rehab process    Acceptance, E,TB, VU by  at 7/2/2024 0800    Acceptance, E, VU by  at 7/1/2024 1633    Comment: Educated on the importance of standing and increasing strength and endurance during admission    Acceptance, E, VU by  at 7/1/2024 0845    Eager, E, VU by SC at 6/29/2024 1450    Comment: reviewed safety with sliding board    Acceptance, E, VU,DU,NR by  at 6/28/2024 1404    Eager, E, VU,DU by EM at 6/27/2024 1539    Comment: Pt eudcated regarding WC management, transfers and symptom managment    Acceptance, E,D, VU by EM at  6/26/2024 1608    Comment: Pt educated regarding transfers, weight bearing status and HEP.    Acceptance, E, VU,DU,NR by  at 6/25/2024 1107    Acceptance, E,H, VU by  at 6/24/2024 1105    Comment: Pt educated on bed positioning with L BKA and importance of exercise on rehab.    Eager, E, VU,DU by SC at 6/21/2024 1349    Comment: reviewed HEP    Acceptance, E,D, VU,NR by SD at 6/19/2024 1203    Comment: PT ed for expected outcomes, risks, and benefits of IPPT services and progression of activity. Pt given visual demo of unilateral gait with RW. Discussion regarding dispo planning and IRF.                                         User Key       Initials Effective Dates Name Provider Type Discipline    SC 02/03/23 -  Jorge Luis Childress, PT Physical Therapist PT    SD 03/13/23 -  Charity Dela Cruz, PT Physical Therapist PT    GEORGINA 06/16/21 -  Radha Miguel, PT Physical Therapist PT     09/21/23 -  Rachna Cruz, PT Physical Therapist PT    BM 04/08/24 -  Marina Soto, RN Registered Nurse Nurse    EM 05/07/24 -  Rancho Schneider, PT Student PT Student PT     05/07/24 -  Quincy Elizabeth, PT Student PT Student PT                  PT Recommendation and Plan  Planned Therapy Interventions (PT): balance training, bed mobility training, gait training, home exercise program, joint mobilization, manual therapy techniques, stretching, strengthening, ROM (range of motion), prosthetic fitting/training, postural re-education, patient/family education, orthotic fitting/training, neuromuscular re-education, transfer training, wheelchair management/propulsion training  Plan of Care Reviewed With: (P) patient  Progress: (P) no change  Outcome Evaluation: (P) Pt was able to tolerate ther-ex in bed well this session. Bed mobility and transfer training were deferred this session secondary to copious drainage from R hip with nursing notified. Pt present below baseline with deficits in strength, balance, and poor activity  tolerance. IPPT continues to be appropriate during admission. PT recommended DC to IRF when medically appropriate.     Time Calculation:         PT Charges       Row Name 07/03/24 1330 07/03/24 0920          Time Calculation    Start Time 1330  - 0920 (P)   -     PT Received On -- 07/03/24 (P)   -     PT Goal Re-Cert Due Date 07/11/24  -MF 07/11/24 (P)   -        Timed Charges    76284 - PT Therapeutic Exercise Minutes -- 10 (P)   -        Untimed Charges    PT Eval/Re-eval Minutes 25  -MF --     Wound Care 67810 Neg Press (Non-DME) wound TO 50sqcm  -MF --     54504-Jhs Pressure wound to 50sqcm Non-DME 40  -MF --        Total Minutes    Timed Charges Total Minutes -- 10 (P)   -     Untimed Charges Total Minutes 65  -MF --      Total Minutes 65  -MF 10 (P)   -HM               User Key  (r) = Recorded By, (t) = Taken By, (c) = Cosigned By      Initials Name Provider Type     Cleve Flores, PT Physical Therapist     Quincy Elizabeth, PT Student PT Student                  Therapy Charges for Today       Code Description Service Date Service Provider Modifiers Qty    82628401643 HC PT THERAPEUTIC ACT EA 15 MIN 7/2/2024 Quincy Elizabeth, PT Student GP 2    72461385525 HC PT THER SUPP EA 15 MIN 7/2/2024 Quincy Elizabeth, PT Student GP 2    15239147207 HC PT THER PROC EA 15 MIN 7/3/2024 Quincy Elizabeth, PT Student GP 1            PT G-Codes  Outcome Measure Options: (P) AM-PAC 6 Clicks Basic Mobility (PT)  AM-PAC 6 Clicks Score (PT): (P) 15  AM-PAC 6 Clicks Score (OT): 14  PT Discharge Summary  Anticipated Discharge Disposition (PT): (P) inpatient rehabilitation facility    LINDA Francis  7/3/2024

## 2024-07-03 NOTE — PLAN OF CARE
Goal Outcome Evaluation:                Pt is AO X 4  Pt is very pleasant  Pain has been well controlled today.  Dressing was changed on right hip again, dressing was saturated an hour later. Wound care was contacted and wound vac was placed on hip.  Blood glucose level within desired range.

## 2024-07-03 NOTE — CASE MANAGEMENT/SOCIAL WORK
Continued Stay Note  [facility]     Patient Name: Easton Alvarez  MRN: [id number]  Today's Date: 7/3/2024    Admit Date: 6/12/2024    Plan: IPR   Discharge Plan       Row Name 07/03/24 1120       Plan    Plan Comments [facility] has precert for pt and can accept 7/5.  has arranged transport for 0900 via [facility] if medically ready. CM to follow      Row Name 07/03/24 0922       Plan    Plan Comments CM is following for insurance precert so pt can transfer to [facility] for acute rehab. Once this has been completed CM will arrange for transport. CM to follow                   Discharge Codes    No documentation.                 Expected Discharge Date and Time       Expected Discharge Date Expected Discharge Time    Jul 3, 2024               Anat Zarate RN

## 2024-07-03 NOTE — CASE MANAGEMENT/SOCIAL WORK
Continued Stay Note  [facility]     Patient Name: Easton Alvarez  MRN: [id number]  Today's Date: 7/3/2024    Admit Date: 6/12/2024    Plan: IPR   Discharge Plan       Row Name 07/03/24 0922       Plan    Plan Comments CM is following for insurance precert so pt can transfer to [facility] for acute rehab. Once this has been completed CM will arrange for transport. CM to follow                   Discharge Codes    No documentation.                 Expected Discharge Date and Time       Expected Discharge Date Expected Discharge Time    Jul 3, 2024               Anat Zarate RN

## 2024-07-03 NOTE — PROGRESS NOTES
"Easton Alvarez  1965  [id number]          Chief Complaint: left foot infection    Reason for Consultation: left foot infection    History of present illness:     Patient is a 58 y.o.  Yr old male with history of diabetes with prior right TMA 2019 at , history strep septicemia 2016 ; he reports right total hip arthroplasty 2024 after fall. He reports a fall several weeks prior to his June admission with injury to the left foot, wound present with deterioration several days preceding admission with severe discoloration/redness and swelling.  CT scan showed concern for subcutaneous emphysema and necrotizing infection.  Transferred to [facility] with evaluation by Dr. Em and Dr. Lundberg and arrangements made for urgent surgery. Subsequently, blood cultures called with gram-positive cocci     6/13 left LE amp, Dr Em    6/15  \"PROCEDURE:            Left      [address]2220: Secondary closure above-knee amputation\"      6/18/24  right hip aspiration, culture with GB strep; wbc  down some postop from revision; blood cultures 6/15 negative so far    6/19/24 Dr. Em helping facilitate right hip surgery with orthopedic partners/team    6/20/24 surgery Dr Sethi  \"Procedure(s):  HIP ANTERIOR INCISION AND DRAINAGE WITH HANA TABLE, POLY SWAP- RIGHT\"    6/26/24 urinary retention per nursing notes, Dr Goodwin aware.  Dr Goodwin reports later that patient voiding without  retention issues    7/2/24   Per discussion with medicine team, GI has seen him regarding abnormal LFT/thrombocytopenia with concern for early cirrhosis/splenomegaly and further workup at their discretion.     7/3/24 sleepy at today's visit; per nursing, no new pain or distress per nursing; case management looking into options;  postop pain controlled at present; He has right hip pain that is intermittent , blunted by neuropathy, worse with palpation, better with pain meds and 2-3 out of 10 in severity. No distress per nursing; no new " "focal symptoms otherwise; no fever; no left stump pain; He did not know about any prior issues of thrombocytopenia     No headache photophobia or neck stiffness.  No shortness of breath cough or hemoptysis.  No nausea vomiting  or abdominal pain.  No dysuria hematuria or pyuria.  No other new skin rash    Past Medical History:   Diagnosis Date    Diabetes mellitus     Diabetic foot infection 2018    left (partial amputation)       Past Surgical History:   Procedure Laterality Date    AMPUTATION FOOT / TOE Right     partial foot    BELOW KNEE AMPUTATION Left 6/13/2024    Procedure: AMPUTATION BELOW KNEE AND WOUND VAC ASSISTED CLOSURE LEFT;  Surgeon: Brijesh Em Jr., MD;  Location:  [facility];  Service: Orthopedics;  Laterality: Left;    INCISION AND DRAINAGE HIP Right 6/20/2024    Procedure: HIP ANTERIOR INCISION AND DRAINAGE WITH HANA TABLE, POLY SWAP RIGHT WITH LEFT LEG PIN TRACTION;  Surgeon: Lex Sethi MD;  Location:  [facility];  Service: Orthopedics;  Laterality: Right;    INCISION AND DRAINAGE LEG Left 6/15/2024    Procedure: SECONDARY CLOSURE LEFT BELOW KNEE AMPUTATION;  Surgeon: Brijesh Em Jr., MD;  Location:  [facility];  Service: Orthopedics;  Laterality: Left;    PLACEMENT OF WOUND VAC Left 6/13/2024    Procedure: PLACEMENT OF WOUND VAC LEFT;  Surgeon: Brijesh Em Jr., MD;  Location:  [facility];  Service: Orthopedics;  Laterality: Left;    TOTAL HIP ARTHROPLASTY Right 02/2024    From fall \"whole hip was shattered\"           No Known Allergies       Review of Systems    7/3/24 per nursing also    Constitutional-- No Fever, chills or sweats.  Appetite diminished with fatigue.  Heent-- No new vision, hearing or throat complaints.  No epistaxis or oral sores.  Denies odynophagia or dysphagia.  No flashers, floaters or eye pain. No odynophagia or dysphagia. No headache, photophobia or neck stiffness.  CV-- No chest pain, palpitation or syncope  Resp-- No SOB/cough/Hemoptysis  GI- No nausea, " "vomiting, or diarrhea.  No hematochezia, melena, or hematemesis.   -- No dysuria, hematuria, or flank pain.  Denies hesitancy, urgency or flank pain.  Lymph- no swollen lymph nodes in neck/axilla or groin.   Heme- No active bruising or bleeding; no Hx of DVT or PE.  MS-- aside from above, no swelling or pain in the bones or joints of arms/legs.  No new back pain.  Neuro-- No acute focal weakness or numbness in the arms or legs.  No seizures.    Full 12 point review of systems reviewed and negative otherwise for acute complaints, except for above    Physical Exam:   Vital Signs   /72 (BP Location: Left arm, Patient Position: Lying)   Pulse 97   Temp 98.4 °F (36.9 °C)   Resp 18   Ht 172.7 cm (67.99\")   Wt 81.9 kg (180 lb 8.9 oz)   SpO2 93%   BMI 27.46 kg/m²     GENERAL: sleepy; in no acute distress.   HEENT: Normocephalic, atraumatic.   No conjunctival injection. No icterus. Oropharynx clear without evidence of thrush or exudate. No evidence of periodontal disease.    NECK: Supple without nuchal rigidity. No mass.   HEART: RRR; No murmur, rubs, gallops.   LUNGS: diminished at bases.  Trace crackles at the bases but no rhonchi or wheeze. Normal respiratory effort. Nonlabored. No dullness.  ABDOMEN: Soft, nontender, nondistended. Positive bowel sounds. No rebound or guarding. NO mass or HSM.  EXT:  see below.     MSK: right hip surgical site cvered;  no new visible redness/purulence or fluctuance  SKIN: Warm and dry without cutaneous eruptions on Inspection/palpation.      Left   BKA stump covered;  no new visible redness or purulence; no discrete fluctuance.  No crepitus    Laboratory Data    Results from last 7 days   Lab Units 07/02/24  0401 07/01/24  1114 06/30/24  0647   WBC 10*3/mm3 7.76 7.81 6.90   HEMOGLOBIN g/dL 8.0* 9.3* 7.6*   HEMATOCRIT % 23.5* 27.8* 22.7*   PLATELETS 10*3/mm3 117* 126* 94*     Results from last 7 days   Lab Units 07/02/24  1800 07/02/24  0401   SODIUM mmol/L  --  137 "   POTASSIUM mmol/L 4.5 3.6   CHLORIDE mmol/L  --  104   CO2 mmol/L  --  26.0   BUN mg/dL  --  18   CREATININE mg/dL  --  0.65*   GLUCOSE mg/dL  --  79   CALCIUM mg/dL  --  7.2*     Results from last 7 days   Lab Units 07/02/24  0401   ALK PHOS U/L 235*   BILIRUBIN mg/dL 0.4   ALT (SGPT) U/L 19   AST (SGOT) U/L 39                   Estimated Creatinine Clearance: 140 mL/min (A) (by C-G formula based on SCr of 0.65 mg/dL (L)).      Microbiology:      Radiology:  Imaging Results (Last 72 Hours)       ** No results found for the last 72 hours. **              Impression:     --acute sepsis as per admission notes, severe left foot infection with gangrene/cellulitis and concern for necrotizing soft tissue infection by imaging, urgent surgical arrangements made June 13; repeat surg 6/15  ;  subsequent right hip surg 6/20; He knows risk for persistent/recurrent or nonhealing wounds, persistet / progressive or recurrent infection and risk for further amputation/functional-limb loss and chronic pain.  Associated with GB strep bacteremia as below.    --Acute/severe left foot infection as above, urgent surgical arrangements  made 6/13    --Acute gb strep bacteremia,   source from foot.  High risk for further serious morbidity and other serious sequela including dire consequences and metastatic foci of involvement/endovascular sequela etc. No new metastatic focus of involvement. TTE 6/17      --thrombocytopenia.   Per GI team, concerned by imaging and laboratory data for early cirrhosis with splenomegaly and risk for splenic sequestration/thrombocytopenia.  Some variability and platelet counts.  in general, thrombocytopenia not common with ceftriaxone but if persistent decline and no other specific etiology found , then you could consider empiric adjustment.    --Right hip fracture with repair February 2024.  +pain ; orthopedics with  aspiration June 18 and culture with GB strep likely hematogenous seeding, MRI imaging with  concern for septic joint, surgery on June 20 as above.   Patient understands risk for persistent/recurrent or progressive infection and potential for further surgical intervention in the future that could include functional/limb loss and other serious sequela/morbidity; he knows antibiotics and surgery not a guarantee for care    --postop leukocytosis, possible stress response associated with surgery.  Trended down June 22-27.  No new respiratory symptoms.  Encouraged incentive spirometry.  Abdomen benign with no diarrhea.  No new urinary tract complaints.  IV sites with no suppurative change.  No rash.  Monitor for new process; no new focal muscoskeletal symptoms to suggest new metastatic focus of infection    --diabetes.  Described as uncontrolled by medicine team at admission.  Glucose control per medicine team    --urinary retention per nursing staff.  Per their notes patient refused in/out catheterization.  Dr. Goodwin aware and he will discuss options with patient      PLAN:      --IV  rocephin likely at least 6 weeks from hip surgery and anticipate step down to oral agents after with oral keflex    **wound culture at left foot mixed with  MSSA Klebsiella and GB strep  **blood cultures with group B strep  ** right hip cultures with group B strep    --orthopedics  making surgical plans with concern for right hipprosthetic/periprosthetic infection     --Check/review labs cultures and scans    --TTE described as technically adequate by cardiology; no description of vegetation by cardiology per report    --Partial history per nursing staff    --encouraged incentive spirometry.  Nursing aware to instruct and encourage    --Discussed with microbiology    --d/w Dr Lombardo    --Highly complex at of issues with high risk for further serious morbidity and other serious sequela     **follow-up with me one week.  Every Monday CBC/CMP and sed rate/CRP.  I anticipate 6 weeks IV antibiotic from hip surgery and probable stepdown  after that to oral Keflex with no specific end date so far    Copied text in this note has been reviewed and is accurate as of 07/03/24.        Cleve Bills MD  7/3/2024

## 2024-07-03 NOTE — PLAN OF CARE
Goal Outcome Evaluation:  Plan of Care Reviewed With: (P) patient        Progress: (P) no change  Outcome Evaluation: (P) Pt was able to tolerate ther-ex in bed well this session. Bed mobility and transfer training were deferred this session secondary to copious drainage from R hip with nursing notified. Pt present below baseline with deficits in strength, balance, and poor activity tolerance. IPPT continues to be appropriate during admission. PT recommended DC to IRF when medically appropriate.      Anticipated Discharge Disposition (PT): (P) inpatient rehabilitation facility

## 2024-07-03 NOTE — THERAPY TREATMENT NOTE
"Patient Name: Easton Alvarez  : 1965    MRN: [id number]                              Today's Date: 7/3/2024       Admit Date: 2024    Visit Dx:     ICD-10-CM ICD-9-CM   1. S/P BKA (below knee amputation), left  Z89.512 V49.75   2. Diabetic ulcer of left midfoot associated with type 2 diabetes mellitus, with necrosis of muscle  E11.621 250.80    L97.423 707.14     728.89   3. Elevated LFTs  R79.89 790.6   4. Below knee amputation  S88.119A 897.0   5. Impaired functional mobility, balance, gait, and endurance  Z74.09 V49.89   6. Infection of prosthetic total hip joint, initial encounter  T84.59XA 996.66    Z96.649 V43.64     Patient Active Problem List   Diagnosis    Diabetic ulcer of left foot with necrosis of muscle    Severe malnutrition    Infection of prosthetic total hip joint     Past Medical History:   Diagnosis Date    Diabetes mellitus     Diabetic foot infection 2018    left (partial amputation)     Past Surgical History:   Procedure Laterality Date    AMPUTATION FOOT / TOE Right     partial foot    BELOW KNEE AMPUTATION Left 2024    Procedure: AMPUTATION BELOW KNEE AND WOUND VAC ASSISTED CLOSURE LEFT;  Surgeon: Brijesh Em Jr., MD;  Location:  [facility];  Service: Orthopedics;  Laterality: Left;    INCISION AND DRAINAGE HIP Right 2024    Procedure: HIP ANTERIOR INCISION AND DRAINAGE WITH HANA TABLE, POLY SWAP RIGHT WITH LEFT LEG PIN TRACTION;  Surgeon: Lex Sethi MD;  Location:  [facility];  Service: Orthopedics;  Laterality: Right;    INCISION AND DRAINAGE LEG Left 6/15/2024    Procedure: SECONDARY CLOSURE LEFT BELOW KNEE AMPUTATION;  Surgeon: Brijesh Em Jr., MD;  Location:  [facility];  Service: Orthopedics;  Laterality: Left;    PLACEMENT OF WOUND VAC Left 2024    Procedure: PLACEMENT OF WOUND VAC LEFT;  Surgeon: Brijesh Em Jr., MD;  Location:  [facility];  Service: Orthopedics;  Laterality: Left;    TOTAL HIP ARTHROPLASTY Right 2024    From fall \"whole " "hip was shattered\"      General Information       Row Name 07/03/24 1520          OT Time and Intention    Document Type progress note/recertification  -     Mode of Treatment occupational therapy  -       Row Name 07/03/24 1520          General Information    Patient Profile Reviewed yes  -     Prior Level of Function independent:;all household mobility;transfer;ADL's  -     Existing Precautions/Restrictions fall;left;non-weight bearing;right;hip, anterior;other (see comments)  recent L BKA, R hip MARIA LUISA s/p I@D, R foot TMA  -     Barriers to Rehab medically complex;previous functional deficit;physical barrier  -       Row Name 07/03/24 1520          Cognition    Orientation Status (Cognition) oriented x 4  -       Row Name 07/03/24 1520          Safety Issues, Functional Mobility    Safety Issues Affecting Function (Mobility) awareness of need for assistance;insight into deficits/self-awareness;safety precaution awareness;safety precautions follow-through/compliance;sequencing abilities;problem-solving  -     Impairments Affecting Function (Mobility) balance;endurance/activity tolerance;range of motion (ROM);strength;pain  -               User Key  (r) = Recorded By, (t) = Taken By, (c) = Cosigned By      Initials Name Provider Type     Laura Lyons OT Occupational Therapist                     Mobility/ADL's       Row Name 07/03/24 1526          Bed Mobility    Comment, (Bed Mobility) Declined OOB activity, agreeable to bed lvl therex  -       Row Name 07/03/24 1526          Mobility    Extremity Weight-bearing Status left lower extremity;right lower extremity  -     Left Lower Extremity (Weight-bearing Status) non weight-bearing (NWB)  -     Right Lower Extremity (Weight-bearing Status) weight-bearing as tolerated (WBAT)  -               User Key  (r) = Recorded By, (t) = Taken By, (c) = Cosigned By      Initials Name Provider Type    Laura Shafer OT Occupational Therapist      "              Obj/Interventions       [facility] Name 07/03/24 1531          Shoulder (Therapeutic Exercise)    Shoulder (Therapeutic Exercise) strengthening exercise  -     Shoulder Strengthening (Therapeutic Exercise) bilateral;flexion;extension;aBduction;aDduction;external rotation;internal rotation;10 repetitions;red;resistance band  -[facility] Name 07/03/24 1531          Elbow/Forearm (Therapeutic Exercise)    Elbow/Forearm (Therapeutic Exercise) strengthening exercise  -     Elbow/Forearm Strengthening (Therapeutic Exercise) bilateral;flexion;extension;red;resistance band;10 repetitions  -[facility] Name 07/03/24 1531          Motor Skills    Therapeutic Exercise shoulder;elbow/forearm  Issued and reviewed UE HEP. Completed to increased activity tolerance and strength needed for T/Fs and ADL participation.  -               User Key  (r) = Recorded By, (t) = Taken By, (c) = Cosigned By      Initials Name Provider Type     Laura Lyons, OT Occupational Therapist                   Goals/Plan       [facility] Name 07/03/24 1543          Bed Mobility Goal 1 (OT)    Activity/Assistive Device (Bed Mobility Goal 1, OT) sit to supine;supine to sit  -     [place] Level/Cues Needed (Bed Mobility Goal 1, OT) contact guard required  -     Time Frame (Bed Mobility Goal 1, OT) short term goal (STG);5 days  -     Progress/Outcomes (Bed Mobility Goal 1, OT) goal ongoing;goal revised this date  -[facility] Name 07/03/24 1543          Transfer Goal 1 (OT)    Activity/Assistive Device (Transfer Goal 1, OT) commode, bedside with drop arms;wheelchair transfer  -     [place] Level/Cues Needed (Transfer Goal 1, OT) moderate assist (50-74% patient effort)  -     Time Frame (Transfer Goal 1, OT) long term goal (LTG);by discharge  -     Progress/Outcome (Transfer Goal 1, OT) goal ongoing;goal revised this date  -[facility] Name 07/03/24 1543          Dressing Goal 1 (OT)    Activity/Device (Dressing Goal 1, OT)  lower body dressing  -     [place]/Cues Needed (Dressing Goal 1, OT) minimum assist (75% or more patient effort)  -     Time Frame (Dressing Goal 1, OT) long term goal (LTG);10 days  -     Strategies/Barriers (Dressing Goal 1, OT) AE use prn  -     Progress/Outcome (Dressing Goal 1, OT) goal ongoing;continuing progress toward goal  -       Row Name 07/03/24 1549          Toileting Goal 1 (OT)    Activity/Device (Toileting Goal 1, OT) adjust/manage clothing;perform perineal hygiene;commode, bedside without drop arms  -     [place] Level/Cues Needed (Toileting Goal 1, OT) minimum assist (75% or more patient effort)  -     Time Frame (Toileting Goal 1, OT) long term goal (LTG);10 days  -LC     Progress/Outcome (Toileting Goal 1, OT) goal ongoing;goal revised this date  -       Row Name 07/03/24 8323          Strength Goal 1 (OT)    Strength Goal 1 (OT) Pt. will complete 10-15 reps each BUE tband strengthening TE to support BADL and transfer independence.  -     Time Frame (Strength Goal 1, OT) long term goal (LTG);10 days  -LC     Progress/Outcome (Strength Goal 1, OT) goal ongoing;continuing progress toward goal  -       Row Name 07/03/24 4573          Therapy Assessment/Plan (OT)    Planned Therapy Interventions (OT) activity tolerance training;adaptive equipment training;BADL retraining;occupation/activity based interventions;patient/caregiver education/training;strengthening exercise;functional balance retraining;ROM/therapeutic exercise  -               User Key  (r) = Recorded By, (t) = Taken By, (c) = Cosigned By      Initials Name Provider Type     Laura Lyons, TRINA Occupational Therapist                   Clinical Impression       Row Name 07/03/24 153          Pain Assessment    Pretreatment Pain Rating 0/10 - no pain  -     Posttreatment Pain Rating 0/10 - no pain  -     Additional Documentation Pain Scale: Word Pre/Post-Treatment (Group)  -       Row Name 07/03/24  1533          Plan of Care Review    Plan of Care Reviewed With patient  -     Progress no change  -     Outcome Evaluation Reviewed goals. Pt. declined OOB activity. Completed UE Therex bed lvl to increase activity tolerance and strength needed for functional T/Fs and ADL partcipation. Will continue to progress as able. Recommend IPR at discharge.  -       Row Name 07/03/24 1533          Positioning and Restraints    Pre-Treatment Position in bed  -     Post Treatment Position bed  -     In Bed notified nsg;fowlers;call light within reach;encouraged to call for assist;exit alarm on  -               User Key  (r) = Recorded By, (t) = Taken By, (c) = Cosigned By      Initials Name Provider Type     Laura Lyons, TRINA Occupational Therapist                   Outcome Measures       Row Name 07/03/24 1546          How much help from another is currently needed...    Putting on and taking off regular lower body clothing? 2  -LC     Bathing (including washing, rinsing, and drying) 2  -LC     Toileting (which includes using toilet bed pan or urinal) 2  -LC     Putting on and taking off regular upper body clothing 3  -LC     Taking care of personal grooming (such as brushing teeth) 3  -LC     Eating meals 4  -LC     AM-PAC 6 Clicks Score (OT) 16  -       Row Name 07/03/24 1526 07/03/24 0815       How much help from another person do you currently need...    Turning from your back to your side while in flat bed without using bedrails? 4 (P)   - 4  -BM    Moving from lying on back to sitting on the side of a flat bed without bedrails? 3 (P)   - 3  -BM    Moving to and from a bed to a chair (including a wheelchair)? 3 (P)   - 3  -BM    Standing up from a chair using your arms (e.g., wheelchair, bedside chair)? 2 (P)   - 2  -BM    Climbing 3-5 steps with a railing? 1 (P)   - 1  -BM    To walk in hospital room? 2 (P)   -HM 2  -BM    AM-PAC 6 Clicks Score (PT) 15 (P)   - 15  -BM    Highest Level of  Mobility Goal 4 --> Transfer to chair/commode (P)   - 4 --> Transfer to chair/commode  -      Row Name 07/03/24 1546 07/03/24 1526       Functional Assessment    Outcome Measure Options AM-PAC 6 Clicks Daily Activity (OT)  - AM-PAC 6 Clicks Basic Mobility (PT) (P)   -              User Key  (r) = Recorded By, (t) = Taken By, (c) = Cosigned By      Initials Name Provider Type     Laura Lyons, OT Occupational Therapist     Marina Soto, RN Registered Nurse     Quincy Elizabeth, PT Student PT Student                    Occupational Therapy Education       Title: PT OT SLP Therapies (In Progress)       Topic: Occupational Therapy (In Progress)       Point: ADL training (Done)       Description:   Instruct learner(s) on proper safety adaptation and remediation techniques during self care or transfers.   Instruct in proper use of assistive devices.                  Learning Progress Summary             Patient Acceptance, E, VU by  at 7/3/2024 1527    Comment: Pt educated on the importance of mobility during admission    Acceptance, E,TB, VU by  at 7/2/2024 0800    Acceptance, E, VU by  at 7/1/2024 0845    Eager, E, VU by SC at 6/29/2024 1450    Comment: reviewed safety with sliding board    Acceptance, E, VU by AN at 6/26/2024 1548    Acceptance, E, NR by  at 6/22/2024 1315    Acceptance, E,D, VU,DU,NR by GEORGINA at 6/19/2024 1343    Comment: reason for consult, noted deficits, chair push ups                         Point: Home exercise program (In Progress)       Description:   Instruct learner(s) on appropriate technique for monitoring, assisting and/or progressing therapeutic exercises/activities.                  Learning Progress Summary             Patient Acceptance, E, VU by  at 7/3/2024 1527    Comment: Pt educated on the importance of mobility during admission    Acceptance, E, NR by  at 7/3/2024 1422    Acceptance, E,TB, VU by  at 7/2/2024 0800    Acceptance, E, VU by  at  7/1/2024 0845    Eager, E, VU by SC at 6/29/2024 1450    Comment: reviewed safety with sliding board    Acceptance, E, VU by  at 6/26/2024 1548    Acceptance, E,D, VU,DU,NR by  at 6/19/2024 1343    Comment: reason for consult, noted deficits, chair push ups                         Point: Precautions (In Progress)       Description:   Instruct learner(s) on prescribed precautions during self-care and functional transfers.                  Learning Progress Summary             Patient Acceptance, E, VU by  at 7/3/2024 1527    Comment: Pt educated on the importance of mobility during admission    Acceptance, E, NR by  at 7/3/2024 1422    Acceptance, E,TB, VU by  at 7/2/2024 0800    Acceptance, E, VU by  at 7/1/2024 0845    Eager, E, VU by SC at 6/29/2024 1450    Comment: reviewed safety with sliding board    Acceptance, E, VU by AN at 6/26/2024 1548    Acceptance, E, NR by  at 6/22/2024 1315                         Point: Body mechanics (In Progress)       Description:   Instruct learner(s) on proper positioning and spine alignment during self-care, functional mobility activities and/or exercises.                  Learning Progress Summary             Patient Acceptance, E, VU by  at 7/3/2024 1527    Comment: Pt educated on the importance of mobility during admission    Acceptance, E, NR by  at 7/3/2024 1422    Acceptance, E,TB, VU by  at 7/2/2024 0800    Acceptance, E, VU by  at 7/1/2024 0845    Eager, E, VU by SC at 6/29/2024 1450    Comment: reviewed safety with sliding board    Acceptance, E, VU by  at 6/26/2024 1548    Acceptance, E, NR by  at 6/22/2024 1315                                         User Key       Initials Effective Dates Name Provider Type Discipline    SC 02/03/23 -  Jorge Luis Childress, PT Physical Therapist PT    GEORGINA 07/11/23 -  Ana Carvajal, OT Occupational Therapist OT    LC 06/16/21 -  Laura Lyons, OT Occupational Therapist OT    AN 09/21/21 -  Johana Vargas, OT  Occupational Therapist OT     04/08/24 -  Marina Soto, RN Registered Nurse Nurse     05/07/24 -  Quincy Elizabeth PT Student PT Student PT                  OT Recommendation and Plan  Planned Therapy Interventions (OT): activity tolerance training, adaptive equipment training, BADL retraining, occupation/activity based interventions, patient/caregiver education/training, strengthening exercise, functional balance retraining, ROM/therapeutic exercise  Therapy Frequency (OT): daily  Plan of Care Review  Plan of Care Reviewed With: patient  Progress: no change  Outcome Evaluation: Reviewed goals. Pt. declined OOB activity. Completed UE Therex bed lvl to increase activity tolerance and strength needed for functional T/Fs and ADL partcipation. Will continue to progress as able. Recommend IPR at discharge.     Time Calculation:         Time Calculation- OT       Row Name 07/03/24 1548             Time Calculation- OT    OT Start Time 1422  -LC      OT Received On 07/03/24  -      OT Goal Re-Cert Due Date 07/13/24  -         Timed Charges    36452 - OT Therapeutic Exercise Minutes 13  -LC         Total Minutes    Timed Charges Total Minutes 13  -LC       Total Minutes 13  -LC                User Key  (r) = Recorded By, (t) = Taken By, (c) = Cosigned By      Initials Name Provider Type    LC Laura Lyons OT Occupational Therapist                  Therapy Charges for Today       Code Description Service Date Service Provider Modifiers Qty    [id number] HC OT THER PROC EA 15 MIN 7/3/2024 Laura Lyons OT GO 1                 Laura Lyons OT  7/3/2024

## 2024-07-03 NOTE — THERAPY RE-EVALUATION
Acute Care - Wound/Debridement Initial Evaluation   [facility]     Patient Name: Easton Alvarez  : 1965  MRN: [id number]  Today's Date: 7/3/2024                Admit Date: 2024    Visit Dx:    ICD-10-CM ICD-9-CM   1. S/P BKA (below knee amputation), left  Z89.512 V49.75   2. Diabetic ulcer of left midfoot associated with type 2 diabetes mellitus, with necrosis of muscle  E11.621 250.80    L97.423 707.14     728.89   3. Elevated LFTs  R79.89 790.6   4. Below knee amputation  S88.119A 897.0   5. Impaired functional mobility, balance, gait, and endurance  Z74.09 V49.89   6. Infection of prosthetic total hip joint, initial encounter  T84.59XA 996.66    Z96.649 V43.64       Patient Active Problem List   Diagnosis    Diabetic ulcer of left foot with necrosis of muscle    Severe malnutrition    Infection of prosthetic total hip joint        Past Medical History:   Diagnosis Date    Diabetes mellitus     Diabetic foot infection 2018    left (partial amputation)        Past Surgical History:   Procedure Laterality Date    AMPUTATION FOOT / TOE Right     partial foot    BELOW KNEE AMPUTATION Left 2024    Procedure: AMPUTATION BELOW KNEE AND WOUND VAC ASSISTED CLOSURE LEFT;  Surgeon: Brijesh Em Jr., MD;  [facility]: [facility];  Service: Orthopedics;  Laterality: Left;    INCISION AND DRAINAGE HIP Right 2024    Procedure: HIP ANTERIOR INCISION AND DRAINAGE WITH HANA TABLE, POLY SWAP RIGHT WITH LEFT LEG PIN TRACTION;  Surgeon: Lex Sethi MD;  Location: [facility] OR;  Service: Orthopedics;  Laterality: Right;    INCISION AND DRAINAGE LEG Left 6/15/2024    Procedure: SECONDARY CLOSURE LEFT BELOW KNEE AMPUTATION;  Surgeon: Brijesh Em Jr., MD;  Location:  [facility] OR;  Service: Orthopedics;  Laterality: Left;    PLACEMENT OF WOUND VAC Left 2024    Procedure: PLACEMENT OF WOUND VAC LEFT;  Surgeon: Brijesh Em Jr., MD;  Location: [facility] OR;  Service: Orthopedics;  Laterality: Left;     "TOTAL HIP ARTHROPLASTY Right 02/2024    From fall \"whole hip was shattered\"           Wound 06/13/24 0921 Left lower leg Incision (Active)   Dressing Appearance dry;intact;no drainage 07/02/24 1600   Closure Unable to assess 07/03/24 0435   Base dressing in place, unable to visualize 07/03/24 0435   Drainage Amount none 07/02/24 1800   Dressing Care dressing changed;dressing applied;petroleum-based;gauze, dry 07/02/24 1600       Wound 06/13/24 1045 Left medial coccyx Pressure Injury (Active)   Dressing Appearance open to air 07/02/24 2049   Closure None 07/03/24 0435   Base red;blanchable 07/03/24 0435   Care, Wound cleansed with;wound cleanser 07/02/24 2049   Dressing Care skin barrier agent applied 07/02/24 2049   Periwound Care dry periwound area maintained;cleansed with pH balanced cleanser 07/02/24 2049       Wound 06/15/24 1416 Left lower leg Incision (Active)   Dressing Appearance dry;intact;no drainage 07/02/24 2049   Closure Unable to assess 07/03/24 0435   Base dressing in place, unable to visualize 07/03/24 0435   Dressing Care dressing changed 07/02/24 1538       Wound 06/20/24 Right anterior hip Incision (Active)   Wound Image   07/03/24 1330   Dressing Appearance intact;moist drainage 07/03/24 1330   Closure Sutures 07/03/24 1330   Base moist;pink;red 07/03/24 1330   Periwound pink;redness 07/03/24 1330   Periwound Temperature warm 07/03/24 1330   Periwound Skin Turgor firm 07/03/24 1330   Wound Length (cm) 2 cm 07/03/24 1330   Wound Width (cm) 0.3 cm 07/03/24 1330   Wound Depth (cm) 0.8 cm 07/03/24 1330   Wound Surface Area (cm^2) 0.6 cm^2 07/03/24 1330   Wound Volume (cm^3) 0.48 cm^3 07/03/24 1330   Drainage Characteristics/Odor tan;serosanguineous 07/03/24 1330   Drainage Amount large 07/03/24 1330   Care, Wound cleansed with;soap and water;negative pressure wound therapy 07/03/24 1330   Dressing Care dressing changed 07/03/24 1330   Periwound Care dry periwound area maintained 07/03/24 1330     "   Wound 06/20/24 Left proximal leg Incision (Active)   Dressing Appearance dry;intact;no drainage 07/02/24 2049   Closure Unable to assess 07/03/24 0435   Base dressing in place, unable to visualize 07/03/24 0435       NPWT (Negative Pressure Wound Therapy) 07/03/24 1330 R hip incision (Active)   Therapy Setting continuous therapy 07/03/24 1330   Dressing other (see comments) 07/03/24 1330   Pressure Setting 125 mmHg 07/03/24 1330   Sponges Inserted 1 07/03/24 1330   Sponges Removed other (see comments) 07/03/24 1330   Finger sweep complete Other (see somments0 07/03/24 1330         WOUND DEBRIDEMENT                     PT Assessment (Last 12 Hours)       PT Evaluation and Treatment       Row Name 07/03/24 1330          Physical Therapy Time and Intention    Subjective Information complains of;weakness;fatigue  -     Document Type evaluation;therapy note (daily note);wound care  -MF     Mode of Treatment individual therapy;physical therapy  -       Row Name 07/03/24 1330          General Information    Patient Profile Reviewed yes  -MF     Patient Observations alert;cooperative;agree to therapy  -     Pertinent History of Current Functional Problem draining R hip incision  -MF     Risks Reviewed patient:;increased discomfort  -     Benefits Reviewed patient:;improve skin integrity  -     Barriers to Rehab medically complex;previous functional deficit;physical barrier  -       Row Name 07/03/24 1330          Pain    Pretreatment Pain Rating 0/10 - no pain  -MF     Posttreatment Pain Rating 0/10 - no pain  -MF     Pain Location - Side/Orientation Right  -     Pain Location - hip  -       Row Name 07/03/24 1330          Cognition    Affect/Mental Status (Cognition) WNL  -       Row Name 07/03/24 1330          Health Promotion    Additional Documentation NPWT (Negative Pressure Wound Therapy) (LDA)  -       Row Name             Wound 06/13/24 0921 Left lower leg Incision    Wound - Properties Group  Placement Date: 06/13/24  -ALPHONSO Placement Time: 0921 -ALPHONSO Side: Left  -ALPHONSO Orientation: lower  -ALPHONSO Location: leg  -ALPHONSO Primary Wound Type: Incision  -ALPHONSO    Retired Wound - Properties Group Placement Date: 06/13/24  -ALPHONSO Placement Time: 0921 -ALPHONSO Side: Left  -ALPHONSO Orientation: lower  -ALPHONSO Location: leg  -ALPHONSO Primary Wound Type: Incision  -ALPHONSO    Retired Wound - Properties Group Date first assessed: 06/13/24  -ALPHONSO Time first assessed: 0921 -ALPHONSO Side: Left  -ALPHONSO Location: leg  -ALPHONSO Primary Wound Type: Incision  -ALPHONSO      Row Name             Wound 06/13/24 1045 Left medial coccyx Pressure Injury    Wound - Properties Group Placement Date: 06/13/24  -ML Placement Time: 1045  -ML Side: Left  -ML Orientation: medial  -ML Location: coccyx  -ML Primary Wound Type: Pressure inj  -ML    Retired Wound - Properties Group Placement Date: 06/13/24  -ML Placement Time: 1045  -ML Side: Left  -ML Orientation: medial  -ML Location: coccyx  -ML Primary Wound Type: Pressure inj  -ML    Retired Wound - Properties Group Date first assessed: 06/13/24  -ML Time first assessed: 1045  -ML Side: Left  -ML Location: coccyx  -ML Primary Wound Type: Pressure inj  -ML      Row Name             Wound 06/15/24 1416 Left lower leg Incision    Wound - Properties Group Placement Date: 06/15/24  -TB Placement Time: 1416  -TB Side: Left  -TB Orientation: lower  -TB Location: leg  -TB Primary Wound Type: Incision  -TB    Retired Wound - Properties Group Placement Date: 06/15/24  -TB Placement Time: 1416  -TB Side: Left  -TB Orientation: lower  -TB Location: leg  -TB Primary Wound Type: Incision  -TB    Retired Wound - Properties Group Date first assessed: 06/15/24  -TB Time first assessed: 1416  -TB Side: Left  -TB Location: leg  -TB Primary Wound Type: Incision  -TB      Row Name 07/03/24 1330          Wound 06/20/24 Right anterior hip Incision    Wound - Properties Group Placement Date: 06/20/24  -SS Side: Right  -SS Orientation: anterior  -SS Location: hip  -SS  Primary Wound Type: Incision  -SS [facility]  -    Wound Image Images linked: 1  -MF     Dressing Appearance intact;moist drainage  -MF     Closure Sutures  -MF     Base moist;pink;red  -MF     Periwound pink;redness  -MF     Periwound Temperature warm  -MF     Periwound Skin Turgor firm  -MF     Wound Length (cm) 2 cm  -MF     Wound Width (cm) 0.3 cm  -MF     Wound Depth (cm) 0.8 cm  -MF     Wound Surface Area (cm^2) 0.6 cm^2  -MF     Wound Volume (cm^3) 0.48 cm^3  -MF     Drainage Characteristics/Odor tan;serosanguineous  -     Drainage Amount large  -     Care, Wound cleansed with;soap and water;negative pressure wound therapy  -     Dressing Care dressing changed  -     Periwound Care dry periwound area maintained  -     Retired Wound - Properties Group Placement Date: 06/20/24  - Side: Right  -SS Orientation: anterior  -SS Location: hip  -SS Primary Wound Type: Incision  -SS Additional Comments: [facility]  -    Retired Wound - Properties Group Date first assessed: 06/20/24  -SS Side: Right  -SS Location: hip  -SS Primary Wound Type: Incision  -SS Additional Comments: [facility]  -      Row Name             Wound 06/20/24 Left proximal leg Incision    Wound - Properties Group Placement Date: 06/20/24  - Side: Left  -SS Orientation: proximal  -SS Location: leg  -SS Primary Wound Type: Incision  -SS Additional Comments: Pin traction area- xerofrom, 4x4, ace  -SS    Retired Wound - Properties Group Placement Date: 06/20/24  - Side: Left  -SS Orientation: proximal  -SS Location: leg  -SS Primary Wound Type: Incision  -SS Additional Comments: Pin traction area- xerofrom, 4x4, ace  -SS    Retired Wound - Properties Group Date first assessed: 06/20/24  - Side: Left  -SS Location: leg  -SS Primary Wound Type: Incision  -SS Additional Comments: Pin traction area- xerofrom, 4x4, ace  -SS      Row Name             Wound 06/20/24 2100 Right lower leg Abrasion    Wound - Properties Group  Placement Date: 06/20/24  -TS Placement Time: 2100 -TS Present on Original Admission: --  -TS, unknown  Side: Right  -TS Orientation: lower  -TS Location: leg  -TS Primary Wound Type: Abrasion  -TS    Retired Wound - Properties Group Placement Date: 06/20/24  -TS Placement Time: 2100 -TS Present on Original Admission: --  -TS, unknown  Side: Right  -TS Orientation: lower  -TS Location: leg  -TS Primary Wound Type: Abrasion  -TS    Retired Wound - Properties Group Date first assessed: 06/20/24  -TS Time first assessed: 2100 -TS Present on Original Admission: --  -TS, unknown  Side: Right  -TS Location: leg  -TS Primary Wound Type: Abrasion  -TS      Row Name             Wound 06/21/24 2200 Left distal thigh Blisters    Wound - Properties Group Placement Date: 06/21/24  -TS Placement Time: 2200  -TS Side: Left  -TS Orientation: distal  -TS Location: thigh  -TS Primary Wound Type: Blisters  -TS    Retired Wound - Properties Group Placement Date: 06/21/24  -TS Placement Time: 2200  -TS Side: Left  -TS Orientation: distal  -TS Location: thigh  -TS Primary Wound Type: Blisters  -TS    Retired Wound - Properties Group Date first assessed: 06/21/24  -TS Time first assessed: 2200  -TS Side: Left  -TS Location: thigh  -TS Primary Wound Type: Blisters  -TS      Row Name 07/03/24 1330          NPWT (Negative Pressure Wound Therapy) 07/03/24 1330 R hip incision    NPWT (Negative Pressure Wound Therapy) - Properties Group Placement Date: 07/03/24  -MF Placement Time: 1330  -MF Location: R hip incision  -MF    Therapy Setting continuous therapy  -MF     Dressing other (see comments)  prevena dressing  -MF     Pressure Setting 125 mmHg  -MF     Sponges Inserted 1  prevena dressing  -MF     Sponges Removed other (see comments)  cover dressing  -MF     Finger sweep complete Other (see somments0  no packing to wound  -MF     Retired NPWT (Negative Pressure Wound Therapy) - Properties Group Placement Date: 07/03/24  -MF Placement  Time: 1330  - Location: R hip incision  -MF    Retired NPWT (Negative Pressure Wound Therapy) - Properties Group Placement Date: 07/03/24  - Placement Time: 1330  -MF Location: R hip incision  -MF      Row Name 07/03/24 1330          Coping    Observed Emotional State calm;cooperative  -     Verbalized Emotional State acceptance  -     Trust Relationship/Rapport care explained  -       Row Name 07/03/24 1330          Plan of Care Review    Plan of Care Reviewed With patient  -     Outcome Evaluation Pt presents with draining R hip incision.  PT noted heavy tan drainage from wound as well as moderate periwound firmness. PT applied a prevena incisional wound vac to help manage exudate and limit further dehiscence.  -       Row Name 07/03/24 1330          Positioning and Restraints    Pre-Treatment Position in bed  -     Post Treatment Position bed  -     In Bed supine;call light within reach  -       Row Name 07/03/24 1330          Therapy Assessment/Plan (PT)    Patient/Family Therapy Goals Statement (PT) stop draining  -     PT Diagnosis (PT) draining R hip incision  -     Rehab Potential (PT) fair, will monitor progress closely  -     Criteria for Skilled Interventions Met (PT) yes;meets criteria;skilled treatment is necessary  -     Predicted Duration of Therapy Intervention (PT) 10 days  -       Row Name 07/03/24 1330          Therapy Plan Review/Discharge Plan (PT)    Therapy Plan Review (PT) evaluation/treatment results reviewed;care plan/treatment goals reviewed;risks/benefits reviewed;current/potential barriers reviewed;participants voiced agreement with care plan;participants included;patient  -       Row Name 07/03/24 1330          Physical Therapy Goals    Wound Care Goal Selection (PT) wound care, PT goal 1;wound care, PT goal 2  -       Row Name 07/03/24 1330          Wound Care Goal 1 (PT)    Wound Care Goal 1 (PT) Pt to have no issues / complications with wound vac   -MF     Time Frame (Wound Care Goal 1, PT) 5 days  -MF       Row Name 07/03/24 1330          Wound Care Goal 2 (PT)    Wound Care Goal 2 (PT) Pt to have no further needs for wound vac and transitioned to basic dressing changes.  -MF     Time Frame (Wound Care Goal 2, PT) 10 days  -MF               User Key  (r) = Recorded By, (t) = Taken By, (c) = Cosigned By      Initials Name Provider Type    MF Cleve Flores, PT Physical Therapist    TB Tawny Ocampo, RN Registered Nurse    Helene Hanson, RN Registered Nurse    SS Tana Chatterjee RN Registered Nurse    Lynn Sr RN Registered Nurse    Daina Alfonso RN Registered Nurse                  Physical Therapy Education       Title: PT OT SLP Therapies (Done)       Topic: Physical Therapy (Done)       Point: Mobility training (Done)       Learning Progress Summary             Patient Eager, E, VU by  at 7/2/2024 1031    Comment: Educated on the importance of continued mobility and strengthening on rehab process    Acceptance, E,TB, VU by  at 7/2/2024 0800    Acceptance, E, VU by  at 7/1/2024 1633    Comment: Educated on the importance of standing and increasing strength and endurance during admission    Acceptance, E, VU by  at 7/1/2024 0845    Eager, E, VU by SC at 6/29/2024 1450    Comment: reviewed safety with sliding board    Acceptance, E, VU,DU,NR by  at 6/28/2024 1404    Eager, E, VU,DU by  at 6/27/2024 1539    Comment: Pt eudcated regarding WC management, transfers and symptom managment    Acceptance, E,D, VU by  at 6/26/2024 1608    Comment: Pt educated regarding transfers, weight bearing status and HEP.    Acceptance, E, VU,DU,NR by  at 6/25/2024 1107    Acceptance, E,H, VU by  at 6/24/2024 1105    Comment: Pt educated on bed positioning with L BKA and importance of exercise on rehab.    Eager, E, VU,DU by SC at 6/21/2024 1349    Comment: reviewed HEP    Acceptance, E,D, VU,NR by SD at 6/19/2024  1203    Comment: PT ed for expected outcomes, risks, and benefits of IPPT services and progression of activity. Pt given visual demo of unilateral gait with RW. Discussion regarding dispo planning and IRF.                         Point: Home exercise program (Done)       Learning Progress Summary             Patient Acceptance, E,TB, VU by BM at 7/2/2024 0800    Acceptance, E, VU by BM at 7/1/2024 0845    Eager, E, VU by SC at 6/29/2024 1450    Comment: reviewed safety with sliding board    Acceptance, E,D, VU by EM at 6/26/2024 1608    Comment: Pt educated regarding transfers, weight bearing status and HEP.    Acceptance, E, VU,DU,NR by  at 6/25/2024 1107    Acceptance, E,H, VU by  at 6/24/2024 1105    Comment: Pt educated on bed positioning with L BKA and importance of exercise on rehab.    Acceptance, E, VU,NR,DU by GEORGINA at 6/23/2024 1420    Eager, E, VU,DU by SC at 6/21/2024 1349    Comment: reviewed HEP    Acceptance, E,D, VU,NR by SD at 6/19/2024 1203    Comment: PT ed for expected outcomes, risks, and benefits of IPPT services and progression of activity. Pt given visual demo of unilateral gait with RW. Discussion regarding dispo planning and IRF.                         Point: Body mechanics (Done)       Learning Progress Summary             Patient Eager, E, VU by  at 7/2/2024 1031    Comment: Educated on the importance of continued mobility and strengthening on rehab process    Acceptance, E,TB, VU by BM at 7/2/2024 0800    Acceptance, E, VU by  at 7/1/2024 1633    Comment: Educated on the importance of standing and increasing strength and endurance during admission    Acceptance, E, VU by BM at 7/1/2024 0845    Eager, E, VU by SC at 6/29/2024 1450    Comment: reviewed safety with sliding board    Acceptance, E, VU,DU,NR by  at 6/28/2024 1404    Eager, E, VU,DU by EM at 6/27/2024 1539    Comment: Pt eudcated regarding WC management, transfers and symptom managment    Acceptance, E,D, VU by EM at  6/26/2024 1608    Comment: Pt educated regarding transfers, weight bearing status and HEP.    Acceptance, E, VU,DU,NR by  at 6/25/2024 1107    Acceptance, E,H, VU by  at 6/24/2024 1105    Comment: Pt educated on bed positioning with L BKA and importance of exercise on rehab.    Eager, E, VU,DU by SC at 6/21/2024 1349    Comment: reviewed HEP    Acceptance, E,D, VU,NR by SD at 6/19/2024 1203    Comment: PT ed for expected outcomes, risks, and benefits of IPPT services and progression of activity. Pt given visual demo of unilateral gait with RW. Discussion regarding dispo planning and IRF.                         Point: Precautions (Done)       Learning Progress Summary             Patient Eager, E, VU by  at 7/2/2024 1031    Comment: Educated on the importance of continued mobility and strengthening on rehab process    Acceptance, E,TB, VU by  at 7/2/2024 0800    Acceptance, E, VU by  at 7/1/2024 1633    Comment: Educated on the importance of standing and increasing strength and endurance during admission    Acceptance, E, VU by  at 7/1/2024 0845    Eager, E, VU by SC at 6/29/2024 1450    Comment: reviewed safety with sliding board    Acceptance, E, VU,DU,NR by  at 6/28/2024 1404    Eager, E, VU,DU by  at 6/27/2024 1539    Comment: Pt eudcated regarding WC management, transfers and symptom managment    Acceptance, E,D, VU by  at 6/26/2024 1608    Comment: Pt educated regarding transfers, weight bearing status and HEP.    Acceptance, E, VU,DU,NR by  at 6/25/2024 1107    Acceptance, E,H, VU by  at 6/24/2024 1105    Comment: Pt educated on bed positioning with L BKA and importance of exercise on rehab.    Eager, E, VU,DU by SC at 6/21/2024 1349    Comment: reviewed HEP    Acceptance, E,D, VU,NR by SD at 6/19/2024 1203    Comment: PT ed for expected outcomes, risks, and benefits of IPPT services and progression of activity. Pt given visual demo of unilateral gait with RW. Discussion regarding dispo  planning and IRF.                                         User Key       Initials Effective Dates Name Provider Type Discipline    SC 02/03/23 -  Jorge Luis Childress, PT Physical Therapist PT    SD 03/13/23 -  Charity Dela Cruz, PT Physical Therapist PT    GEORGINA 06/16/21 -  Radha Miguel, PT Physical Therapist PT    LH 09/21/23 -  Rachna Cruz, PT Physical Therapist PT    BM 04/08/24 -  Marina Soto, RN Registered Nurse Nurse    EM 05/07/24 -  Rancho Schneider, PT Student PT Student PT     05/07/24 -  Quincy Elizabeth, PT Student PT Student PT                    Recommendation and Plan  Anticipated Equipment Needs at Discharge (PT): gait belt  Anticipated Discharge Disposition (PT): inpatient rehabilitation facility  Planned Therapy Interventions (PT): wound care  Therapy Frequency (PT): daily  Plan of Care Reviewed With: patient           Outcome Evaluation: Pt presents with draining R hip incision.  PT noted heavy tan drainage from wound as well as moderate periwound firmness. PT applied a prevena incisional wound vac to help manage exudate and limit further dehiscence.  Plan of Care Reviewed With: patient            Time Calculation   PT Charges       Row Name 07/03/24 1330             Time Calculation    Start Time 1330  -MF      PT Goal Re-Cert Due Date 07/11/24  -MF         Untimed Charges    PT Eval/Re-eval Minutes 25  -MF      Wound Care 10126 Neg Press (Non-DME) wound TO 50sqcm  -MF      42699-Vko Pressure wound to 50sqcm Non-DME 40  -MF         Total Minutes    Untimed Charges Total Minutes 65  -MF       Total Minutes 65  -MF                User Key  (r) = Recorded By, (t) = Taken By, (c) = Cosigned By      Initials Name Provider Type    MF Cleve Flores, PT Physical Therapist                            PT G-Codes  Outcome Measure Options: AM-PAC 6 Clicks Basic Mobility (PT)  AM-PAC 6 Clicks Score (PT): 15  AM-PAC 6 Clicks Score (OT): 14       Cleve Flores, PT  7/3/2024

## 2024-07-03 NOTE — PROGRESS NOTES
"Easton Alvarez       LOS: 21 days   Patient Care Team:  Provider, No Known as PCP - General    Chief Complaint:  left foot / leg necrotizing soft tissue infection.    Subjective     Interval History:     Resting comfortably in bed this morning.  Pain tolerable.  No acute events overnight.    Review of Systems:      Gen- No fevers, chills  CV- No chest pain, palpitations  Resp- No cough, dyspnea  GI- No N/V/D, abd pain    Objective     Vital Signs  Vital Signs (last 24 hours)         06/13 0700  06/14 0659 06/14 0700  06/14 0839   Most Recent      Temp (°F) 97 -  98.7      98.1     98.1 (36.7) 06/14 0724    Heart Rate 75 -  84       81 06/14 0340    Resp 13 -  20      20     20 06/14 0724    BP 97/62 -  145/70      149/85     149/85 06/14 0724    SpO2 (%) 96 -  99       98 06/14 0340    Flow (L/min) 2 -  4       2 06/13 1100              Physical Exam:     Alert, oriented.  No acute distress.  Nonlabored respirations.  Regular rate and rhythm.  Abdomen nondistended.  Left lower extremity: Dressing present is clean, dry, intact.  Incision inspected, appears clean, healing well.  Skin edges are well-approximated, staples are in place.  No erythema, drainage, purulence, warmth.  Right lower extremity: Operative dressing in place.     Results Review:     I reviewed the patient's new clinical results.    Medication Review:   Hospital Medications (active)         Dose Frequency Start End    bisacodyl (DULCOLAX) EC tablet 5 mg 5 mg Daily PRN 6/12/2024 --    Admin Instructions: Use if no bowel movement after 12 hours.  Swallow whole. Do not crush, split, or chew tablet.    Route: Oral    Linked Group 1: Placed in \"And\" Linked Group        bisacodyl (DULCOLAX) suppository 10 mg 10 mg Daily PRN 6/12/2024 --    Admin Instructions: Use if no bowel movement after 12 hours.  Hold for diarrhea    Route: Rectal    Linked Group 1: Placed in \"And\" Linked Group        Calcium Replacement - Follow Nurse / BPA Driven Protocol  As Needed " "6/13/2024 --    Admin Instructions: Open Order & Select \"[facility] Electrolyte Replacement Protocol Algorithm\" to View Details    Route: Does not apply    clindamycin (CLEOCIN) 900 mg in dextrose 5% 50 mL IVPB (premix) 900 mg Every 8 Hours 6/12/2024 6/17/2024    Admin Instructions: Do Not refrigerate.    Route: Intravenous    DAPTOmycin (CUBICIN) 400 mg in sodium chloride 0.9 % 50 mL IVPB 6 mg/kg × 68 kg Every 24 Hours 6/13/2024 6/23/2024    Admin Instructions: Caution: Look alike/sound alike drug alert.  Refrigerate. Do not shake.    Route: Intravenous    dextrose (D50W) (25 g/50 mL) IV injection 25 g 25 g Every 15 Minutes PRN 6/12/2024 --    Admin Instructions: Blood sugar less than 70; patient has IV access - Unresponsive, NPO or Unable To Safely Swallow    Route: Intravenous    dextrose (GLUTOSE) oral gel 15 g 15 g Every 15 Minutes PRN 6/12/2024 --    Admin Instructions: BS<70, Patient Alert, Is not NPO, Can safely swallow.    Route: Oral    glucagon (GLUCAGEN) injection 1 mg 1 mg Every 15 Minutes PRN 6/12/2024 --    Admin Instructions: Blood Glucose Less Than 70 - Patient Without IV Access - Unresponsive, NPO or Unable To Safely Swallow  Reconstitute powder for injection by adding 1 mL of -supplied sterile diluent or sterile water for injection to a vial containing 1 mg of the drug, to provide solutions containing 1 mg/mL. Shake vial gently to dissolve.    Route: Intramuscular    HYDROcodone-acetaminophen (NORCO) 5-325 MG per tablet 1 tablet 1 tablet Every 4 Hours PRN 6/12/2024 6/17/2024    Admin Instructions: Based on patient request - if ordered for moderate or severe pain, provider allows for administration of a medication prescribed for a lower pain scale.  [ISRAEL]    Do not exceed 4 grams of acetaminophen in a 24 hr period. Max dose of 2gm for AST/ALT greater than 120 units/L        If given for pain, use the following pain scale:   Mild Pain = Pain Score of 1-3, CPOT 1-2  Moderate Pain = Pain Score of " "4-6, CPOT 3-4  Severe Pain = Pain Score of 7-10, CPOT 5-8    Route: Oral    HYDROmorphone (DILAUDID) injection 0.5 mg 0.5 mg Every 4 Hours PRN 6/12/2024 6/17/2024    Admin Instructions: Based on patient request - if ordered for moderate or severe pain, provider allows for administration of a medication prescribed for a lower pain scale.  If given for pain, use the following pain scale:  Mild Pain = Pain Score of 1-3, CPOT 1-2  Moderate Pain = Pain Score of 4-6, CPOT 3-4  Severe Pain = Pain Score of 7-10, CPOT 5-8    Route: Intravenous    insulin glargine (LANTUS, SEMGLEE) injection 10 Units 10 Units Nightly 6/13/2024 --    Admin Instructions: Do not hold basal insulin without an order. Consider requesting a dose edit, if needed.      Route: Subcutaneous    insulin regular (humuLIN R,novoLIN R) injection 2-7 Units 2-7 Units 4 Times Daily Before Meals & Nightly 6/13/2024 --    Admin Instructions: Correction Insulin - Low Dose - Total Insulin Dose Less Than 40 units/day (Lean, Elderly or Renal Patients)    Blood Glucose 150-199 mg/dL - 2 units  Blood Glucose 200-249 mg/dL - 3 units  Blood Glucose 250-299 mg/dL - 4 units  Blood Glucose 300-349 mg/dL - 5 units  Blood Glucose 350-400 mg/dL - 6 units  Blood Glucose Greater Than 400 mg/dL - 7 units & Call Provider   Caution: Look alike/sound alike drug alert    Route: Subcutaneous    lactated ringers infusion 100 mL/hr Continuous 6/12/2024 --    Route: Intravenous    lactated ringers infusion 9 mL/hr Continuous 6/13/2024 --    Admin Instructions: May switch to NS IV at KVO if renal / if indicated    Route: Intravenous    lactobacillus acidophilus (RISAQUAD) capsule 1 capsule 1 capsule Daily 6/12/2024 --    Route: Oral    Magnesium Standard Dose Replacement - Follow Nurse / BPA Driven Protocol  As Needed 6/13/2024 --    Admin Instructions: Open Order & Select \"[facility] Electrolyte Replacement Protocol Algorithm\" to View Details    Route: Does not apply    morphine injection 2 mg " "2 mg Every 4 Hours PRN 6/13/2024 6/18/2024    Admin Instructions: Based on patient request - if ordered for moderate or severe pain, provider allows for administration of a medication prescribed for a lower pain scale.  If given for pain, use the following pain scale:  Mild Pain = Pain Score of 1-3, CPOT 1-2  Moderate Pain = Pain Score of 4-6, CPOT 3-4  Severe Pain = Pain Score of 7-10, CPOT 5-8    Route: Intravenous    nitroglycerin (NITROSTAT) SL tablet 0.4 mg 0.4 mg Every 5 Minutes PRN 6/12/2024 --    Admin Instructions: If Pain Unrelieved After 3 Doses Notify MD  May administer up to 3 doses per episode.    Route: Sublingual    oxyCODONE (ROXICODONE) immediate release tablet 5 mg 5 mg Every 4 Hours PRN 6/13/2024 6/20/2024    Admin Instructions: Based on patient request - if ordered for moderate or severe pain, provider allows for administration of a medication prescribed for a lower pain scale.    If given for pain, use the following pain scale:  Mild Pain = Pain Score of 1-3, CPOT 1-2  Moderate Pain = Pain Score of 4-6, CPOT 3-4  Severe Pain = Pain Score of 7-10, CPOT 5-8    Route: Oral    pantoprazole (PROTONIX) injection 40 mg 40 mg 2 Times Daily Before Meals 6/14/2024 --    Admin Instructions: Dilute with 10 mL of 0.9% NaCl and give IV push over 2 minutes.    Route: Intravenous    Phosphorus Replacement - Follow Nurse / BPA Driven Protocol  As Needed 6/13/2024 --    Admin Instructions: Open Order & Select \"BHS Electrolyte Replacement Protocol Algorithm\" to View Details    Route: Does not apply    piperacillin-tazobactam (ZOSYN) 4.5 g IVPB in 100 mL NS MBP (CD) 4.5 g Every 8 Hours 6/13/2024 6/18/2024    Route: Intravenous    polyethylene glycol (MIRALAX) packet 17 g 17 g Daily PRN 6/12/2024 --    Admin Instructions: Use if no bowel movement after 12 hours. Mix in 6-8 ounces of water.  Use 4-8 ounces of water, tea, or juice for each 17 gram dose.    Route: Oral    Linked Group 1: Placed in \"And\" Linked Group   " "     Potassium Replacement - Follow Nurse / BPA Driven Protocol  As Needed 6/13/2024 --    Admin Instructions: Open Order & Select \"BHS Electrolyte Replacement Protocol Algorithm\" to View Details    Route: Does not apply    ropivacaine (NAROPIN) 0.2 % infusion (INFUSYSTEM)  Continuous 6/13/2024 --    Admin Instructions:  When Pt. In-House Infusion complete do not order refill until discussing with APS,  If pain greater than 7 out of 10, please call [phone] or [phone].  Thank you    Route: Peripheral Nerve    sennosides-docusate (PERICOLACE) 8.6-50 MG per tablet 2 tablet 2 tablet 2 Times Daily 6/12/2024 --    Admin Instructions: HOLD MEDICATION IF PATIENT HAS HAD BOWEL MOVEMENT. Start bowel management regimen if patient has not had a bowel movement after 12 hours.    Route: Oral    Linked Group 1: Placed in \"And\" Linked Group        sodium chloride 0.9 % flush 10 mL 10 mL Every 12 Hours Scheduled 6/12/2024 --    Route: Intravenous    sodium chloride 0.9 % flush 10 mL 10 mL As Needed 6/12/2024 --    Route: Intravenous    sodium chloride 0.9 % infusion 40 mL 40 mL As Needed 6/12/2024 --    Admin Instructions: Following administration of an IV intermittent medication, flush line with 40mL NS at 100mL/hr.    Route: Intravenous              Assessment & Plan     58-year-old male with left foot wet gangrene, necrotizing gas-forming soft tissue infection status post left below-knee amputation, wound vacuum-assisted closure June 13, 2024, delayed wound closure 6/15/24.        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  Follow cultures.  PT/OT.    Dressing changed, surgical incision inspected and drain removed on rounds 6/17/2024.    Dressing changed, staples removed at bedside on 7/3/2024.    Daily dressing change per nursing staff:   Xeroform  4x4  Kerlix  Ace     Follow up in two weeks for wound check and xrays, [facility] " [place] location.    [facility], satellite campus at [facility]  [address]  [place], [place] [address]  Please schedule at [phone]    Please arrange follow up prior to discharge.    Call [phone] to schedule.     BERNARD Peñaloza  07/03/24  07:40 EDT

## 2024-07-03 NOTE — THERAPY PROGRESS REPORT/RE-CERT
"Patient Name: Easton Alvarez  : 1965    MRN: [id number]                              Today's Date: 7/3/2024       Admit Date: 2024    Visit Dx:     ICD-10-CM ICD-9-CM   1. S/P BKA (below knee amputation), left  Z89.512 V49.75   2. Diabetic ulcer of left midfoot associated with type 2 diabetes mellitus, with necrosis of muscle  E11.621 250.80    L97.423 707.14     728.89   3. Elevated LFTs  R79.89 790.6   4. Below knee amputation  S88.119A 897.0   5. Impaired functional mobility, balance, gait, and endurance  Z74.09 V49.89   6. Infection of prosthetic total hip joint, initial encounter  T84.59XA 996.66    Z96.649 V43.64     Patient Active Problem List   Diagnosis    Diabetic ulcer of left foot with necrosis of muscle    Severe malnutrition    Infection of prosthetic total hip joint     Past Medical History:   Diagnosis Date    Diabetes mellitus     Diabetic foot infection 2018    left (partial amputation)     Past Surgical History:   Procedure Laterality Date    AMPUTATION FOOT / TOE Right     partial foot    BELOW KNEE AMPUTATION Left 2024    Procedure: AMPUTATION BELOW KNEE AND WOUND VAC ASSISTED CLOSURE LEFT;  Surgeon: Brijesh Em Jr., MD;  Location:  [facility];  Service: Orthopedics;  Laterality: Left;    INCISION AND DRAINAGE HIP Right 2024    Procedure: HIP ANTERIOR INCISION AND DRAINAGE WITH HANA TABLE, POLY SWAP RIGHT WITH LEFT LEG PIN TRACTION;  Surgeon: Lex Sethi MD;  Location:  [facility];  Service: Orthopedics;  Laterality: Right;    INCISION AND DRAINAGE LEG Left 6/15/2024    Procedure: SECONDARY CLOSURE LEFT BELOW KNEE AMPUTATION;  Surgeon: Brijesh Em Jr., MD;  Location:  [facility];  Service: Orthopedics;  Laterality: Left;    PLACEMENT OF WOUND VAC Left 2024    Procedure: PLACEMENT OF WOUND VAC LEFT;  Surgeon: Brijesh Em Jr., MD;  Location:  [facility];  Service: Orthopedics;  Laterality: Left;    TOTAL HIP ARTHROPLASTY Right 2024    From fall \"whole " "hip was shattered\"      General Information       Row Name 07/03/24 1520          OT Time and Intention    Document Type progress note/recertification  -     Mode of Treatment occupational therapy  -       Row Name 07/03/24 1520          General Information    Patient Profile Reviewed yes  -     Prior Level of Function independent:;all household mobility;transfer;ADL's  -     Existing Precautions/Restrictions fall;left;non-weight bearing;right;hip, anterior;other (see comments)  recent L BKA, R hip MARIA LUISA s/p I@D, R foot TMA  -     Barriers to Rehab medically complex;previous functional deficit;physical barrier  -       Row Name 07/03/24 1520          Cognition    Orientation Status (Cognition) oriented x 4  -       Row Name 07/03/24 1520          Safety Issues, Functional Mobility    Safety Issues Affecting Function (Mobility) awareness of need for assistance;insight into deficits/self-awareness;safety precaution awareness;safety precautions follow-through/compliance;sequencing abilities;problem-solving  -     Impairments Affecting Function (Mobility) balance;endurance/activity tolerance;range of motion (ROM);strength;pain  -               User Key  (r) = Recorded By, (t) = Taken By, (c) = Cosigned By      Initials Name Provider Type     Laura Lyons OT Occupational Therapist                     Mobility/ADL's       Row Name 07/03/24 1526          Bed Mobility    Comment, (Bed Mobility) Declined OOB activity, agreeable to bed lvl therex  -       Row Name 07/03/24 1526          Mobility    Extremity Weight-bearing Status left lower extremity;right lower extremity  -     Left Lower Extremity (Weight-bearing Status) non weight-bearing (NWB)  -     Right Lower Extremity (Weight-bearing Status) weight-bearing as tolerated (WBAT)  -               User Key  (r) = Recorded By, (t) = Taken By, (c) = Cosigned By      Initials Name Provider Type    Laura Shafer OT Occupational Therapist      "              Obj/Interventions       [facility] Name 07/03/24 1531          Shoulder (Therapeutic Exercise)    Shoulder (Therapeutic Exercise) strengthening exercise  -     Shoulder Strengthening (Therapeutic Exercise) bilateral;flexion;extension;aBduction;aDduction;external rotation;internal rotation;10 repetitions;red;resistance band  -[facility] Name 07/03/24 1531          Elbow/Forearm (Therapeutic Exercise)    Elbow/Forearm (Therapeutic Exercise) strengthening exercise  -     Elbow/Forearm Strengthening (Therapeutic Exercise) bilateral;flexion;extension;red;resistance band;10 repetitions  -[facility] Name 07/03/24 1531          Motor Skills    Therapeutic Exercise shoulder;elbow/forearm  Issued and reviewed UE HEP. Completed to increased activity tolerance and strength needed for T/Fs and ADL participation.  -               User Key  (r) = Recorded By, (t) = Taken By, (c) = Cosigned By      Initials Name Provider Type     Laura Lyons, OT Occupational Therapist                   Goals/Plan       [facility] Name 07/03/24 1543          Bed Mobility Goal 1 (OT)    Activity/Assistive Device (Bed Mobility Goal 1, OT) sit to supine;supine to sit  -     Early Level/Cues Needed (Bed Mobility Goal 1, OT) contact guard required  -     Time Frame (Bed Mobility Goal 1, OT) short term goal (STG);5 days  -     Progress/Outcomes (Bed Mobility Goal 1, OT) goal ongoing;goal revised this date  -[facility] Name 07/03/24 1543          Transfer Goal 1 (OT)    Activity/Assistive Device (Transfer Goal 1, OT) commode, bedside with drop arms;wheelchair transfer  -     Early Level/Cues Needed (Transfer Goal 1, OT) moderate assist (50-74% patient effort)  -     Time Frame (Transfer Goal 1, OT) long term goal (LTG);by discharge  -     Progress/Outcome (Transfer Goal 1, OT) goal ongoing;goal revised this date  -[facility] Name 07/03/24 1543          Dressing Goal 1 (OT)    Activity/Device (Dressing Goal 1, OT)  lower body dressing  -     Evans/Cues Needed (Dressing Goal 1, OT) minimum assist (75% or more patient effort)  -     Time Frame (Dressing Goal 1, OT) long term goal (LTG);10 days  -     Strategies/Barriers (Dressing Goal 1, OT) AE use prn  -     Progress/Outcome (Dressing Goal 1, OT) goal ongoing;continuing progress toward goal  -       Row Name 07/03/24 1547          Toileting Goal 1 (OT)    Activity/Device (Toileting Goal 1, OT) adjust/manage clothing;perform perineal hygiene;commode, bedside without drop arms  -     Evans Level/Cues Needed (Toileting Goal 1, OT) minimum assist (75% or more patient effort)  -     Time Frame (Toileting Goal 1, OT) long term goal (LTG);10 days  -LC     Progress/Outcome (Toileting Goal 1, OT) goal ongoing;goal revised this date  -       Row Name 07/03/24 2217          Strength Goal 1 (OT)    Strength Goal 1 (OT) Pt. will complete 10-15 reps each BUE tband strengthening TE to support BADL and transfer independence.  -     Time Frame (Strength Goal 1, OT) long term goal (LTG);10 days  -LC     Progress/Outcome (Strength Goal 1, OT) goal ongoing;continuing progress toward goal  -       Row Name 07/03/24 0186          Therapy Assessment/Plan (OT)    Planned Therapy Interventions (OT) activity tolerance training;adaptive equipment training;BADL retraining;occupation/activity based interventions;patient/caregiver education/training;strengthening exercise;functional balance retraining;ROM/therapeutic exercise  -               User Key  (r) = Recorded By, (t) = Taken By, (c) = Cosigned By      Initials Name Provider Type     Laura Lyons, TRINA Occupational Therapist                   Clinical Impression       Row Name 07/03/24 1539          Pain Assessment    Pretreatment Pain Rating 0/10 - no pain  -     Posttreatment Pain Rating 0/10 - no pain  -     Additional Documentation Pain Scale: Word Pre/Post-Treatment (Group)  -       Row Name 07/03/24  1533          Plan of Care Review    Plan of Care Reviewed With patient  -     Progress no change  -     Outcome Evaluation Reviewed goals. Pt. declined OOB activity. Completed UE Therex bed lvl to increase activity tolerance and strength needed for functional T/Fs and ADL partcipation. Will continue to progress as able. Recommend IPR at discharge.  -       Row Name 07/03/24 1533          Positioning and Restraints    Pre-Treatment Position in bed  -     Post Treatment Position bed  -     In Bed notified nsg;fowlers;call light within reach;encouraged to call for assist;exit alarm on  -               User Key  (r) = Recorded By, (t) = Taken By, (c) = Cosigned By      Initials Name Provider Type     Laura Lyons, TRINA Occupational Therapist                   Outcome Measures       Row Name 07/03/24 1546          How much help from another is currently needed...    Putting on and taking off regular lower body clothing? 2  -LC     Bathing (including washing, rinsing, and drying) 2  -LC     Toileting (which includes using toilet bed pan or urinal) 2  -LC     Putting on and taking off regular upper body clothing 3  -LC     Taking care of personal grooming (such as brushing teeth) 3  -LC     Eating meals 4  -LC     AM-PAC 6 Clicks Score (OT) 16  -       Row Name 07/03/24 1526 07/03/24 0815       How much help from another person do you currently need...    Turning from your back to your side while in flat bed without using bedrails? 4 (P)   - 4  -BM    Moving from lying on back to sitting on the side of a flat bed without bedrails? 3 (P)   - 3  -BM    Moving to and from a bed to a chair (including a wheelchair)? 3 (P)   - 3  -BM    Standing up from a chair using your arms (e.g., wheelchair, bedside chair)? 2 (P)   - 2  -BM    Climbing 3-5 steps with a railing? 1 (P)   - 1  -BM    To walk in hospital room? 2 (P)   -HM 2  -BM    AM-PAC 6 Clicks Score (PT) 15 (P)   - 15  -BM    Highest Level of  Mobility Goal 4 --> Transfer to chair/commode (P)   - 4 --> Transfer to chair/commode  -      Row Name 07/03/24 1546 07/03/24 1526       Functional Assessment    Outcome Measure Options AM-PAC 6 Clicks Daily Activity (OT)  - AM-PAC 6 Clicks Basic Mobility (PT) (P)   -              User Key  (r) = Recorded By, (t) = Taken By, (c) = Cosigned By      Initials Name Provider Type     Laura Lyons, OT Occupational Therapist     Marina Soto, RN Registered Nurse     Quincy Elizabeth, PT Student PT Student                    Occupational Therapy Education       Title: PT OT SLP Therapies (In Progress)       Topic: Occupational Therapy (In Progress)       Point: ADL training (Done)       Description:   Instruct learner(s) on proper safety adaptation and remediation techniques during self care or transfers.   Instruct in proper use of assistive devices.                  Learning Progress Summary             Patient Acceptance, E, VU by  at 7/3/2024 1527    Comment: Pt educated on the importance of mobility during admission    Acceptance, E,TB, VU by  at 7/2/2024 0800    Acceptance, E, VU by  at 7/1/2024 0845    Eager, E, VU by SC at 6/29/2024 1450    Comment: reviewed safety with sliding board    Acceptance, E, VU by AN at 6/26/2024 1548    Acceptance, E, NR by  at 6/22/2024 1315    Acceptance, E,D, VU,DU,NR by GEORGINA at 6/19/2024 1343    Comment: reason for consult, noted deficits, chair push ups                         Point: Home exercise program (In Progress)       Description:   Instruct learner(s) on appropriate technique for monitoring, assisting and/or progressing therapeutic exercises/activities.                  Learning Progress Summary             Patient Acceptance, E, VU by  at 7/3/2024 1527    Comment: Pt educated on the importance of mobility during admission    Acceptance, E, NR by  at 7/3/2024 1422    Acceptance, E,TB, VU by  at 7/2/2024 0800    Acceptance, E, VU by  at  7/1/2024 0845    Eager, E, VU by SC at 6/29/2024 1450    Comment: reviewed safety with sliding board    Acceptance, E, VU by  at 6/26/2024 1548    Acceptance, E,D, VU,DU,NR by  at 6/19/2024 1343    Comment: reason for consult, noted deficits, chair push ups                         Point: Precautions (In Progress)       Description:   Instruct learner(s) on prescribed precautions during self-care and functional transfers.                  Learning Progress Summary             Patient Acceptance, E, VU by  at 7/3/2024 1527    Comment: Pt educated on the importance of mobility during admission    Acceptance, E, NR by  at 7/3/2024 1422    Acceptance, E,TB, VU by  at 7/2/2024 0800    Acceptance, E, VU by  at 7/1/2024 0845    Eager, E, VU by SC at 6/29/2024 1450    Comment: reviewed safety with sliding board    Acceptance, E, VU by AN at 6/26/2024 1548    Acceptance, E, NR by  at 6/22/2024 1315                         Point: Body mechanics (In Progress)       Description:   Instruct learner(s) on proper positioning and spine alignment during self-care, functional mobility activities and/or exercises.                  Learning Progress Summary             Patient Acceptance, E, VU by  at 7/3/2024 1527    Comment: Pt educated on the importance of mobility during admission    Acceptance, E, NR by  at 7/3/2024 1422    Acceptance, E,TB, VU by  at 7/2/2024 0800    Acceptance, E, VU by  at 7/1/2024 0845    Eager, E, VU by SC at 6/29/2024 1450    Comment: reviewed safety with sliding board    Acceptance, E, VU by  at 6/26/2024 1548    Acceptance, E, NR by  at 6/22/2024 1315                                         User Key       Initials Effective Dates Name Provider Type Discipline    SC 02/03/23 -  Jorge Luis Childress, PT Physical Therapist PT    GEORGINA 07/11/23 -  Ana Carvajal, OT Occupational Therapist OT    LC 06/16/21 -  Laura Lyons, OT Occupational Therapist OT    AN 09/21/21 -  Johana Vargas, OT  Occupational Therapist OT     04/08/24 -  Marina Soto, RN Registered Nurse Nurse     05/07/24 -  Quincy Elizabeth PT Student PT Student PT                  OT Recommendation and Plan  Planned Therapy Interventions (OT): activity tolerance training, adaptive equipment training, BADL retraining, occupation/activity based interventions, patient/caregiver education/training, strengthening exercise, functional balance retraining, ROM/therapeutic exercise  Therapy Frequency (OT): daily  Plan of Care Review  Plan of Care Reviewed With: patient  Progress: no change  Outcome Evaluation: Reviewed goals. Pt. declined OOB activity. Completed UE Therex bed lvl to increase activity tolerance and strength needed for functional T/Fs and ADL partcipation. Will continue to progress as able. Recommend IPR at discharge.     Time Calculation:         Time Calculation- OT       Row Name 07/03/24 1548             Time Calculation- OT    OT Start Time 1422  -LC      OT Received On 07/03/24  -      OT Goal Re-Cert Due Date 07/13/24  -         Timed Charges    40239 - OT Therapeutic Exercise Minutes 13  -LC         Total Minutes    Timed Charges Total Minutes 13  -LC       Total Minutes 13  -LC                User Key  (r) = Recorded By, (t) = Taken By, (c) = Cosigned By      Initials Name Provider Type    LC Laura Lyons OT Occupational Therapist                  Therapy Charges for Today       Code Description Service Date Service Provider Modifiers Qty    95121460292 HC OT THER PROC EA 15 MIN 7/3/2024 Laura Lyons OT GO 1                 Laura Lyons OT  7/3/2024

## 2024-07-03 NOTE — PLAN OF CARE
Goal Outcome Evaluation:  Plan of Care Reviewed With: patient        Progress: no change  Outcome Evaluation: Reviewed goals. Pt. declined OOB activity. Completed UE Therex bed lvl to increase activity tolerance and strength needed for functional T/Fs and ADL partcipation. Will continue to progress as able. Recommend IPR at discharge.      Anticipated Discharge Disposition (OT): inpatient rehabilitation facility

## 2024-07-03 NOTE — PLAN OF CARE
Problem: Fall Injury Risk  Goal: Absence of Fall and Fall-Related Injury  Outcome: Ongoing, Progressing  Intervention: Identify and Manage Contributors  Description: Develop a fall prevention plan with the patient and caregiver/family.  Provide reorientation, appropriate sensory stimulation and routines with changes in mental status to decrease risk of fall.  Promote use of personal vision and auditory aids.  Assess assistance level required for safe and effective self-care; provide support as needed, such as toileting, mobilization. For age 65 and older, implement timed toileting with assistance.  Encourage physical activity, such as performance of mobility and self-care at highest level of patient ability, multicomponent exercise program and provision of appropriate assistive devices.  If fall occurs, assess the severity of injury; implement fall injury protocol. Determine the cause and revise fall injury prevention plan.  Regularly review medication contribution to fall risk; adjust medication administration times to minimize risk of falling.  Consider risk related to polypharmacy and age.  Balance adequate pain management with potential for oversedation.  Intervention: Promote Injury-Free Environment  Description: Provide a safe, barrier-free environment that encourages independent activity.  Keep care area uncluttered and well-lighted.  Determine need for increased observation or monitoring.  Avoid use of devices that minimize mobility, such as restraints or indwelling urinary catheter.     Problem: Functional Ability Impaired (Extremity Amputation)  Goal: Optimal Functional Ability  Outcome: Ongoing, Progressing  Intervention: Optimize Functional Ability  Description: Identify functional limitations, such as ADL (activities of daily living), mobility safety and independence; encourage optimal participation to minimize decline associated with inactivity.  Facilitate functional mobility, such as bed mobility,  transfers and ambulation; progress and retrain as tolerated.  Encourage ADL (activities of daily living), such as self-feeding, hygiene and dressing; provide set-up, adaptations, assistance and extra time as needed.  Promote a safe, accessible environment and effective use of assistive devices and equipment.  Pace and cluster activity to balance with rest periods and conserve energy; promote adequate nutrition, sleep and rest.  Coordinate pain control to optimize comfort with activity.  Evaluate and address range of motion, muscle strength and balance limitations.  Evaluate and address body systems and performance deficits, such as activity tolerance/endurance and cognitive or sensory impairments; consider abilities necessary to compensate for changes with amputation.  Provide preprosthetic, temporary or postoperative prosthetic training as indicated.     Problem: Residual Limb Care (Extremity Amputation)  Goal: Optimal Residual Limb Healing  Outcome: Ongoing, Progressing  Intervention: Support Residual Limb Healing  Description: Promote protection, shrinking and shaping of residual limb with postsurgical dressing, such as rigid removable dressing, IPOP (immediate postoperative prosthesis), elastic wrap or , as healing allows.  Encourage neutral positioning techniques to minimize edema and risk of contracture (e.g., positioning knee in flexion); prone lying program if appropriate.  Ensure proper positioning when up in wheelchair; limit sit time to avoid contractures.  Avoid tension or pressure on residual limb surgical site to optimize tissue perfusion.  Consider the need for scar management and desensitization techniques.     Problem: Mobility Impairment  Goal: Optimal Mobility  Outcome: Ongoing, Progressing  Intervention: Optimize Mobility  Description: Assess mobility skills (e.g., bed, transfers, ambulation, gait, stair climbing, wheelchair) and factors influencing mobility, such as balance, safety,  range of motion, strength, muscle tone, cognition and sensory processing.  Instruct in transfer and mobility techniques supporting highest level of independence while ensuring safety.  Consider any contraindications or precautions to individualize treatment plan (e.g., joint or ligament instability, weightbearing restrictions).  Encourage early mobilization and performance of daily activities, if able, while providing level of assistance needed for safety.  Schedule mobility activities when pain and fatigue are at a minimum to encourage optimal performance.  Pace activity; allow adequate time and rest periods to conserve energy.  Provide frequent encouragement, along with prompting and assistance as needed.  Individualize instructions and prompts to patient’s cognitive status to promote effective communication; simplify verbal directions, give encouragement and provide demonstrated cues as needed.  Design and implement therapeutic interventions to address impairments (e.g., functional mobility training, mat and standing balance activities, strengthening).  Train in and reinforce use of adaptive equipment and assistive devices, such as a walker or transfer board.  Utilize appropriate modalities, devices or techniques to facilitate mobility (e.g., ankle foot orthosis, electrical stimulation, sit-to-stand lift, treadmill-training).  Assess fall risk using standardized tool; implement appropriate interventions, such as behavioral or environmental modifications.  Use proper body mechanics and patient alignment during mobility to ensure safety.   Goal Outcome Evaluation:

## 2024-07-03 NOTE — PLAN OF CARE
Goal Outcome Evaluation:  Plan of Care Reviewed With: patient           Outcome Evaluation: Pt presents with draining R hip incision.  PT noted heavy tan drainage from wound as well as moderate periwound firmness. PT applied a prevena incisional wound vac to help manage exudate and limit further dehiscence.

## 2024-07-04 PROBLEM — E43 SEVERE MALNUTRITION: Status: ACTIVE | Noted: 2024-07-04

## 2024-07-04 PROBLEM — L97.523: Status: RESOLVED | Noted: 2024-06-12 | Resolved: 2024-07-04

## 2024-07-04 PROBLEM — E43 SEVERE MALNUTRITION: Status: RESOLVED | Noted: 2024-06-13 | Resolved: 2024-07-04

## 2024-07-04 PROBLEM — E11.621: Status: RESOLVED | Noted: 2024-06-12 | Resolved: 2024-07-04

## 2024-07-04 PROBLEM — Z96.649 INFECTION OF PROSTHETIC TOTAL HIP JOINT: Status: RESOLVED | Noted: 2024-06-12 | Resolved: 2024-07-04

## 2024-07-04 PROBLEM — T84.59XA INFECTION OF PROSTHETIC TOTAL HIP JOINT: Status: RESOLVED | Noted: 2024-06-12 | Resolved: 2024-07-04

## 2024-07-04 LAB
ANION GAP SERPL CALCULATED.3IONS-SCNC: 5 MMOL/L (ref 5–15)
BUN SERPL-MCNC: 14 MG/DL (ref 8–23)
BUN/CREAT SERPL: 22.2 (ref 7–25)
CALCIUM SPEC-SCNC: 7.4 MG/DL (ref 8.6–10.5)
CHLORIDE SERPL-SCNC: 106 MMOL/L (ref 98–107)
CO2 SERPL-SCNC: 26 MMOL/L (ref 22–29)
CREAT SERPL-MCNC: 0.63 MG/DL (ref 0.76–1.27)
DEPRECATED RDW RBC AUTO: 49.9 FL (ref 37–54)
EGFRCR SERPLBLD CKD-EPI 2021: 108.9 ML/MIN/1.73
ERYTHROCYTE [DISTWIDTH] IN BLOOD BY AUTOMATED COUNT: 14.1 % (ref 12.3–15.4)
FUNGUS WND CULT: NORMAL
GLUCOSE BLDC GLUCOMTR-MCNC: 125 MG/DL (ref 70–130)
GLUCOSE BLDC GLUCOMTR-MCNC: 140 MG/DL (ref 70–130)
GLUCOSE BLDC GLUCOMTR-MCNC: 86 MG/DL (ref 70–130)
GLUCOSE BLDC GLUCOMTR-MCNC: 99 MG/DL (ref 70–130)
GLUCOSE SERPL-MCNC: 85 MG/DL (ref 65–99)
HCT VFR BLD AUTO: 24.3 % (ref 37.5–51)
HGB BLD-MCNC: 8 G/DL (ref 13–17.7)
MCH RBC QN AUTO: 31.7 PG (ref 26.6–33)
MCHC RBC AUTO-ENTMCNC: 32.9 G/DL (ref 31.5–35.7)
MCV RBC AUTO: 96.4 FL (ref 79–97)
MYCOBACTERIUM SPEC CULT: NORMAL
NIGHT BLUE STAIN TISS: NORMAL
PLATELET # BLD AUTO: 160 10*3/MM3 (ref 140–450)
PMV BLD AUTO: 9.3 FL (ref 6–12)
POTASSIUM SERPL-SCNC: 4.3 MMOL/L (ref 3.5–5.2)
RBC # BLD AUTO: 2.52 10*6/MM3 (ref 4.14–5.8)
SODIUM SERPL-SCNC: 137 MMOL/L (ref 136–145)
WBC NRBC COR # BLD AUTO: 5.61 10*3/MM3 (ref 3.4–10.8)

## 2024-07-04 PROCEDURE — 80048 BASIC METABOLIC PNL TOTAL CA: CPT | Performed by: STUDENT IN AN ORGANIZED HEALTH CARE EDUCATION/TRAINING PROGRAM

## 2024-07-04 PROCEDURE — 63710000001 INSULIN GLARGINE PER 5 UNITS: Performed by: INTERNAL MEDICINE

## 2024-07-04 PROCEDURE — 82948 REAGENT STRIP/BLOOD GLUCOSE: CPT

## 2024-07-04 PROCEDURE — 97605 NEG PRS WND THER DME<=50SQCM: CPT

## 2024-07-04 PROCEDURE — 99232 SBSQ HOSP IP/OBS MODERATE 35: CPT | Performed by: INTERNAL MEDICINE

## 2024-07-04 PROCEDURE — 85027 COMPLETE CBC AUTOMATED: CPT | Performed by: STUDENT IN AN ORGANIZED HEALTH CARE EDUCATION/TRAINING PROGRAM

## 2024-07-04 PROCEDURE — 97530 THERAPEUTIC ACTIVITIES: CPT

## 2024-07-04 PROCEDURE — 63710000001 INSULIN LISPRO (HUMAN) PER 5 UNITS: Performed by: INTERNAL MEDICINE

## 2024-07-04 PROCEDURE — 25010000002 CEFTRIAXONE PER 250 MG: Performed by: INTERNAL MEDICINE

## 2024-07-04 RX ORDER — INSULIN LISPRO 100 [IU]/ML
8 INJECTION, SOLUTION INTRAVENOUS; SUBCUTANEOUS
Start: 2024-07-04

## 2024-07-04 RX ORDER — PANTOPRAZOLE SODIUM 40 MG/1
40 TABLET, DELAYED RELEASE ORAL
Start: 2024-07-04

## 2024-07-04 RX ORDER — ASPIRIN 81 MG/1
81 TABLET ORAL EVERY 12 HOURS SCHEDULED
Start: 2024-07-04

## 2024-07-04 RX ORDER — INSULIN LISPRO 100 [IU]/ML
2-9 INJECTION, SOLUTION INTRAVENOUS; SUBCUTANEOUS
Start: 2024-07-04

## 2024-07-04 RX ORDER — L.ACID,PARA/B.BIFIDUM/S.THERM 8B CELL
1 CAPSULE ORAL DAILY
Start: 2024-07-05

## 2024-07-04 RX ORDER — LISINOPRIL 10 MG/1
10 TABLET ORAL
Start: 2024-07-05

## 2024-07-04 RX ORDER — ACETAMINOPHEN 500 MG
1000 TABLET ORAL EVERY 8 HOURS
Start: 2024-07-04

## 2024-07-04 RX ADMIN — LISINOPRIL 10 MG: 10 TABLET ORAL at 08:49

## 2024-07-04 RX ADMIN — INSULIN LISPRO 8 UNITS: 100 INJECTION, SOLUTION INTRAVENOUS; SUBCUTANEOUS at 12:37

## 2024-07-04 RX ADMIN — Medication 1 CAPSULE: at 08:49

## 2024-07-04 RX ADMIN — Medication 5 MG: at 21:03

## 2024-07-04 RX ADMIN — ACETAMINOPHEN 1000 MG: 500 TABLET, FILM COATED ORAL at 14:28

## 2024-07-04 RX ADMIN — ACETAMINOPHEN 1000 MG: 500 TABLET, FILM COATED ORAL at 21:03

## 2024-07-04 RX ADMIN — ASPIRIN 81 MG: 81 TABLET, COATED ORAL at 21:03

## 2024-07-04 RX ADMIN — INSULIN GLARGINE 25 UNITS: 100 INJECTION, SOLUTION SUBCUTANEOUS at 21:02

## 2024-07-04 RX ADMIN — PANTOPRAZOLE SODIUM 40 MG: 40 TABLET, DELAYED RELEASE ORAL at 08:49

## 2024-07-04 RX ADMIN — Medication 10 ML: at 21:04

## 2024-07-04 RX ADMIN — OXYCODONE HYDROCHLORIDE 5 MG: 5 TABLET ORAL at 18:29

## 2024-07-04 RX ADMIN — Medication 10 ML: at 08:49

## 2024-07-04 RX ADMIN — OXYCODONE HYDROCHLORIDE 5 MG: 5 TABLET ORAL at 14:28

## 2024-07-04 RX ADMIN — ASPIRIN 81 MG: 81 TABLET, COATED ORAL at 08:49

## 2024-07-04 RX ADMIN — CEFTRIAXONE 2000 MG: 2 INJECTION, POWDER, FOR SOLUTION INTRAMUSCULAR; INTRAVENOUS at 08:48

## 2024-07-04 RX ADMIN — PANTOPRAZOLE SODIUM 40 MG: 40 TABLET, DELAYED RELEASE ORAL at 17:37

## 2024-07-04 NOTE — CASE MANAGEMENT/SOCIAL WORK
Continued Stay Note  [facility]     Patient Name: Easton Alvarez  MRN: [id number]  Today's Date: 7/4/2024    Admit Date: 6/12/2024    Plan: [facility]   Discharge Plan       Row Name 07/04/24 1309       Plan    Plan [facility]    Plan Comments I called EMS to verify patient's transport day and time. Per [facility], patient is scheduled for transport on Saturday, July 6th at 10am to go to [facility]. I updated MD. [facility] following.    Final Discharge Disposition Code 62 - inpatient rehab facility                   Discharge Codes    No documentation.                 Expected Discharge Date and Time       Expected Discharge Date Expected Discharge Time    Jul 5, 2024               Marylou Carvalho RN

## 2024-07-04 NOTE — PROGRESS NOTES
[facility] Medicine Services  PROGRESS NOTE    Patient Name: Easton Alvarez  : 1965  MRN: [id number]    Date of Admission: 2024  Primary Care Physician: Provider, No Known    Subjective   Subjective     CC: f/u gangrene    HPI: Up in bed. Says he had a good night. Thinks he's headed the right direction. Pain is controlled.      Objective   Objective     Vital Signs:   Temp:  [97 °F (36.1 °C)-98.9 °F (37.2 °C)] 98 °F (36.7 °C)  Heart Rate:  [] 100  Resp:  [18] 18  BP: (116-151)/(70-80) 151/80     Physical Exam:  Constitutional: No acute distress, awake, alert  HENT: NCAT, mucous membranes moist  Respiratory: Clear to auscultation bilaterally, respiratory effort normal   Cardiovascular: RRR, no murmurs, rubs, or gallops  Gastrointestinal: Positive bowel sounds, soft, nontender, nondistended  Musculoskeletal: Left BKA. Right wound vac.  Psychiatric: Appropriate affect, cooperative  Neurologic: Oriented x 3, strength symmetric in all extremities, Cranial Nerves grossly intact to confrontation, speech clear  Skin: No rashes     Results Reviewed:  LAB RESULTS:      Lab 24  0542 24  0825 24  0401 24  1114 24  0647   WBC 5.61 6.15 7.76 7.81 6.90   HEMOGLOBIN 8.0* 8.1* 8.0* 9.3* 7.6*   HEMATOCRIT 24.3* 24.9* 23.5* 27.8* 22.7*   PLATELETS 160 148 117* 126* 94*   MCV 96.4 98.0* 94.0 96.9 97.0   PROTIME  --   --  16.6*  --   --          Lab 24  0542 24  0825 24  1800 24  0401 24  1114 24  0647   SODIUM 137 138  --  137 138 135*   POTASSIUM 4.3 5.3* 4.5 3.6 4.8 3.9   CHLORIDE 106 106  --  104 104 103   CO2 26.0 27.0  --  26.0 28.0 24.0   ANION GAP 5.0 5.0  --  7.0 6.0 8.0   BUN 14 16  --  18 17 18   CREATININE 0.63* 0.58*  --  0.65* 0.80 0.63*   EGFR 108.9 111.6  --  107.9 101.3 108.9   GLUCOSE 85 89  --  79 95 109*   CALCIUM 7.4* 7.6*  --  7.2* 8.0* 7.0*         Lab 24  0401 24  1114   TOTAL PROTEIN 4.8*  6.1   ALBUMIN 1.7* 1.8*   GLOBULIN 3.1 4.3   ALT (SGPT) 19 21   AST (SGOT) 39 45*   BILIRUBIN 0.4 0.6   ALK PHOS 235* 233*         Lab 07/02/24  0401   PROTIME 16.6*   INR 1.33*                 Brief Urine Lab Results  (Last result in the past 365 days)        Color   Clarity   Blood   Leuk Est   Nitrite   Protein   CREAT   Urine HCG        06/14/24 1109 Yellow   Clear   Negative   Negative   Negative   Negative                   Microbiology Results Abnormal       Procedure Component Value - Date/Time    Fungus Culture - Aspirate, Hip, Right [[id number]] Collected: 06/18/24 1052    Lab Status: Preliminary result Specimen: Aspirate from Hip, Right Updated: 07/02/24 1130     Fungus Culture No fungus isolated at 2 weeks    AFB Culture - Aspirate, Hip, Right [[id number]] Collected: 06/18/24 1052    Lab Status: Preliminary result Specimen: Aspirate from Hip, Right Updated: 07/02/24 1130     AFB Culture No AFB isolated at 2 weeks     AFB Stain No acid fast bacilli seen on concentrated smear    Anaerobic Culture 10 Day Incubation - Synovium, Hip, Right [[id number]]  (Normal) Collected: 06/20/24 1700    Lab Status: Final result Specimen: Synovium from Hip, Right Updated: 06/30/24 0719     Anaerobic Culture No anaerobes isolated at 10 days    Anaerobic Culture 10 Day Incubation - Synovium, Hip, Right [[id number]]  (Normal) Collected: 06/20/24 1700    Lab Status: Edited Result - FINAL Specimen: Synovium from Hip, Right Updated: 06/30/24 0719     Anaerobic Culture No anaerobes isolated at 10 days    Anaerobic Culture 10 Day Incubation - Synovium, Hip, Right [[id number]]  (Normal) Collected: 06/20/24 1700    Lab Status: Final result Specimen: Synovium from Hip, Right Updated: 06/30/24 0719     Anaerobic Culture No anaerobes isolated at 10 days    Fungus Culture - Wound, Leg, Left [[id number]] Collected: 06/15/24 1501    Lab Status: Preliminary result Specimen: Wound from Leg, Left Updated: 06/29/24 2215     Fungus Culture No fungus  isolated at 2 weeks    AFB Culture - Wound, Leg, Left [[id number]] Collected: 06/15/24 1501    Lab Status: Preliminary result Specimen: Wound from Leg, Left Updated: 06/29/24 2215     AFB Culture No AFB isolated at 2 weeks     AFB Stain No acid fast bacilli seen on concentrated smear    Fungus Culture - Synovium, Hip, Right [[id number]] Collected: 06/20/24 1700    Lab Status: Preliminary result Specimen: Synovium from Hip, Right Updated: 06/27/24 1831     Fungus Culture No fungus isolated at 1 week    Fungus Culture - Synovium, Hip, Right [[id number]] Collected: 06/20/24 1700    Lab Status: Preliminary result Specimen: Synovium from Hip, Right Updated: 06/27/24 1831     Fungus Culture No fungus isolated at 1 week    Fungus Culture - Synovium, Hip, Right [[id number]] Collected: 06/20/24 1700    Lab Status: Preliminary result Specimen: Synovium from Hip, Right Updated: 06/27/24 1831     Fungus Culture No fungus isolated at 1 week    AFB Culture - Synovium, Hip, Right [[id number]] Collected: 06/20/24 1700    Lab Status: Preliminary result Specimen: Synovium from Hip, Right Updated: 06/27/24 1831     AFB Culture No AFB isolated at 1 week     AFB Stain No acid fast bacilli seen on concentrated smear    AFB Culture - Synovium, Hip, Right [[id number]] Collected: 06/20/24 1700    Lab Status: Preliminary result Specimen: Synovium from Hip, Right Updated: 06/27/24 1831     AFB Culture No AFB isolated at 1 week     AFB Stain No acid fast bacilli seen on concentrated smear    AFB Culture - Synovium, Hip, Right [[id number]] Collected: 06/20/24 1700    Lab Status: Preliminary result Specimen: Synovium from Hip, Right Updated: 06/27/24 1831     AFB Culture No AFB isolated at 1 week     AFB Stain No acid fast bacilli seen on concentrated smear    Fungus Culture - Surgical Site, Leg, Left [[id number]] Collected: 06/13/24 1031    Lab Status: Preliminary result Specimen: Surgical Site from Leg, Left Updated: 06/27/24 1201     Fungus Culture  No fungus isolated at 2 weeks    AFB Culture - Surgical Site, Leg, Left [[id number]] Collected: 06/13/24 1031    Lab Status: Preliminary result Specimen: Surgical Site from Leg, Left Updated: 06/27/24 1201     AFB Culture No AFB isolated at 2 weeks     AFB Stain No acid fast bacilli seen on concentrated smear    Anaerobic Culture 10 Day Incubation - Wound, Leg, Left [[id number]]  (Normal) Collected: 06/15/24 1501    Lab Status: Final result Specimen: Wound from Leg, Left Updated: 06/26/24 0647     Anaerobic Culture No anaerobes isolated at 10 days    Tissue / Bone Culture - Synovium, Hip, Right [[id number]] Collected: 06/20/24 1700    Lab Status: Final result Specimen: Synovium from Hip, Right Updated: 06/23/24 1010     Tissue Culture No growth at 3 days     Gram Stain Many (4+) Red blood cells      Moderate (3+) WBCs seen      No organisms seen    Tissue / Bone Culture - Synovium, Hip, Right [[id number]] Collected: 06/20/24 1700    Lab Status: Final result Specimen: Synovium from Hip, Right Updated: 06/23/24 1009     Tissue Culture No growth at 3 days     Gram Stain Moderate (3+) WBCs seen      No organisms seen     Comment: Modified report. Previous result was Rare (1+) Gram positive cocci in pairs on 6/20/2024 at 1949 EDT.         Many (4+) Red blood cells    Tissue / Bone Culture - Synovium, Hip, Right [[id number]] Collected: 06/20/24 1700    Lab Status: Final result Specimen: Synovium from Hip, Right Updated: 06/23/24 1009     Tissue Culture No growth at 3 days     Gram Stain Many (4+) Red blood cells      Few (2+) WBCs seen      No organisms seen    Blood Culture - Blood, Hand, Left [[id number]]  (Normal) Collected: 06/15/24 1104    Lab Status: Final result Specimen: Blood from Hand, Left Updated: 06/20/24 1201     Blood Culture No growth at 5 days    Blood Culture - Blood, Hand, Right [[id number]]  (Normal) Collected: 06/15/24 1104    Lab Status: Final result Specimen: Blood from Hand, Right Updated: 06/20/24 1201      Blood Culture No growth at 5 days    Wound Culture - Wound, Leg, Left [[id number]] Collected: 06/15/24 1501    Lab Status: Final result Specimen: Wound from Leg, Left Updated: 06/19/24 0711     Wound Culture No growth at 3 days     Gram Stain Rare (1+) WBCs seen      No organisms seen    Anaerobic Culture - Surgical Site, Leg, Left [[id number]]  (Normal) Collected: 06/13/24 1031    Lab Status: Final result Specimen: Surgical Site from Leg, Left Updated: 06/18/24 0751     Anaerobic Culture No anaerobes isolated at 5 days    Blood Culture - Blood, Arm, Left [[id number]]  (Normal) Collected: 06/12/24 1832    Lab Status: Final result Specimen: Blood from Arm, Left Updated: 06/17/24 1900     Blood Culture No growth at 5 days    Blood Culture - Blood, Hand, Right [[id number]]  (Normal) Collected: 06/12/24 1837    Lab Status: Final result Specimen: Blood from Hand, Right Updated: 06/17/24 1900     Blood Culture No growth at 5 days    Fungus Smear - Surgical Site, Leg, Left [[id number]] Collected: 06/13/24 1031    Lab Status: Final result Specimen: Surgical Site from Leg, Left Updated: 06/14/24 1406     Fungal Stain No fungal elements seen            No radiology results from the last 24 hrs    Results for orders placed during the hospital encounter of 06/12/24    Adult Transthoracic Echo Complete w/ Color, Spectral and Contrast if Necessary Per Protocol    Interpretation Summary    Left ventricular systolic function is normal. Left ventricular ejection fraction appears to be 56 - 60%.    Left ventricular wall thickness is consistent with borderline concentric hypertrophy.    Left ventricular diastolic function is consistent with (grade Ia w/high LAP) impaired relaxation.      Current medications:  Scheduled Meds:acetaminophen, 1,000 mg, Oral, Q8H  aspirin, 81 mg, Oral, Q12H  cefTRIAXone, 2,000 mg, Intravenous, Q24H  insulin glargine, 25 Units, Subcutaneous, Nightly  insulin lispro, 2-9 Units, Subcutaneous, 4x Daily AC & at  Bedtime  Insulin Lispro, 8 Units, Subcutaneous, TID With Meals  lactobacillus acidophilus, 1 capsule, Oral, Daily  lisinopril, 10 mg, Oral, Q24H  melatonin, 5 mg, Oral, Nightly  pantoprazole, 40 mg, Oral, BID AC  senna-docusate sodium, 2 tablet, Oral, BID  sodium chloride, 10 mL, Intravenous, Q12H  sodium chloride, 10 mL, Intravenous, Q12H      Continuous Infusions:lactated ringers, 9 mL/hr, Last Rate: 9 mL/hr (06/15/24 1307)  lactated ringers, 100 mL/hr, Last Rate: 100 mL/hr (06/20/24 2122)  ropivacaine, , Last Rate: Stopped (06/17/24 2230)      PRN Meds:.  senna-docusate sodium **AND** polyethylene glycol **AND** bisacodyl **AND** bisacodyl    Calcium Replacement - Follow Nurse / BPA Driven Protocol    dextrose    dextrose    glucagon (human recombinant)    Magnesium Standard Dose Replacement - Follow Nurse / BPA Driven Protocol    nitroglycerin    oxyCODONE    Phosphorus Replacement - Follow Nurse / BPA Driven Protocol    Potassium Replacement - Follow Nurse / BPA Driven Protocol    sodium chloride    sodium chloride    sodium chloride    Assessment & Plan   Assessment & Plan     Active Hospital Problems    Diagnosis  POA    **Diabetic ulcer of left foot with necrosis of muscle [E11.621, L97.523]  Yes    Severe malnutrition [E43]  Yes    Infection of prosthetic total hip joint [T84.59XA, Z96.649]  Not Applicable      Resolved Hospital Problems   No resolved problems to display.        Brief Hospital Course to date:  Easton Alvarez is a 58 y.o. male with past medical history significant for T2DM, diabetic neuropathy, right hip fracture/ORIF ([facility] 2/2024), and right transmetatarsal amputation 2019 at  who presented to OSH ED on 6/12/2024 with severe left foot cellulitis/necrosis after recent injury. Lactate at OSH was 8.8 and CT imaging of left leg showed gas gangrene. Orthopedic surgeon Dr. Em was consulted and performed a left below-knee amputation on 6/13/2024 with further debridement and irrigation on  6/15. Infectious disease has followed and managed antimicrobial therapy.     Sepsis secondary to group B strep bacteremia  Left foot cellulitis/gangrene s/p left BKA 6/13  Leukocytosis  -MRI left foot showed extensive soft tissue infection with abscesses along the foot to the ankle and into the lower leg with soft tissue gas.    -Wound cultures 6/12 with heavy growth group B strep and Kebsiella, blood cultures x 2 with group B strep; wound culture 6/13 with group B strep  -TTE described as technically adequate by cardiology; no description of vegetation by cardiology per report   -s/p left BKA with wound VAC on 6/13/24    -s/p further debridement in OR on 6/15/24 by Dr. Em, drain removed 6/17  -Infectious disease, Dr. Bills following; continue IV ceftriaxone for at least 6 weeks then transition to PO Keflex indefinitely   -PT wound care following  -Pain control with scheduled Tylenol, PRN oxycodone.   -Daily dressing changes to surgical stump site: Xeroform, 4X4, Kerlix, ACE  -Follow up with Dr. Em 2 weeks for wound check, imaging     Right glenys-prosthetic hip infection  -6/18 fluid aspiration from the hip which was purulent, rare growth group B strep  -6/19 MRI femur with large gas containing fluid collection  -s/p I&D on 6/20, Dr. Sethi. Wound vac placed, removed  -Fluid culture 6/18 with rare growth group B strep  -Supportive care, mobilize, pain control. Antibiotics as above  - increased bloody/purulent drainage from site since wound vac removed, saturating dressings.  Nursing discussed with Dr. Sethi who requested wound vac be replaced, CT was cancelled as he felt would not provide any new information. WOC consulted     Concern for GIB  Acute on chronic anemia  -with melena and fecal occult positive overnight 6/14  -GI consulted, recommended medical management with PPI BID x 1 month.  -H&H however seems to be trending down, but is stable, plan remains as above     HFpEF   Elevated blood  pressure  Anasarca   -No history of taking meds for BP per patient   -BP has been running high this admission, possibly secondary to pain  -Echo from 6/18 revealed EF 56-60% with diastolic dysfunction  -Started lisinopril 10 mg daily, resumed 6/30  -Lasix 40 mg IV x1   -Strict I&O, daily weights      Elevated LFTs  Thrombocytopenia, improved  Mild hyponatremia, clinically insignificant  -Continue to monitor intermittently.   -GI referral at discharge for elevated LFTs, thrombocytopenia and concern for early cirrhosis, plan to pursue liver US after recovery from acute illness.     Anion gap metabolic acidosis POA, resolved   -AG was 20.1, likely from lactate, serum acetone negative.     Poorly controlled type 2 diabetes with A1c 9.5%   -Patient was not taking meds or checking glucoses, though insulin had been prescribed in the past.  -Continue basal, prandial and SSI, adjust as warranted   -Diabetes educator has seen     To rehab tomorrow.    Expected Discharge Location and Transportation:   Expected Discharge   Expected Discharge Date: 7/5/2024; Expected Discharge Time:      VTE Prophylaxis:  Mechanical VTE prophylaxis orders are present.         AM-PAC 6 Clicks Score (PT): 15 (07/03/24 2021)    CODE STATUS:   Code Status and Medical Interventions:   Ordered at: 06/20/24 2026     Level Of Support Discussed With:    Patient     Code Status (Patient has no pulse and is not breathing):    CPR (Attempt to Resuscitate)     Medical Interventions (Patient has pulse or is breathing):    Full       Luz Green II, DO  07/04/24

## 2024-07-04 NOTE — THERAPY WOUND CARE TREATMENT
Acute Care - Wound/Debridement Treatment Note   [place]     Patient Name: Easton Alvarez  : 1965  MRN: [id number]  Today's Date: 2024                Admit Date: 2024    Visit Dx:    ICD-10-CM ICD-9-CM   1. S/P BKA (below knee amputation), left  Z89.512 V49.75   2. Diabetic ulcer of left midfoot associated with type 2 diabetes mellitus, with necrosis of muscle  E11.621 250.80    L97.423 707.14     728.89   3. Elevated LFTs  R79.89 790.6   4. Below knee amputation  S88.119A 897.0   5. Impaired functional mobility, balance, gait, and endurance  Z74.09 V49.89   6. Infection of prosthetic total hip joint, initial encounter  T84.59XA 996.66    Z96.649 V43.64       Patient Active Problem List   Diagnosis    Diabetic ulcer of left foot with necrosis of muscle    Severe malnutrition    Infection of prosthetic total hip joint        Past Medical History:   Diagnosis Date    Diabetes mellitus     Diabetic foot infection 2018    left (partial amputation)        Past Surgical History:   Procedure Laterality Date    AMPUTATION FOOT / TOE Right     partial foot    BELOW KNEE AMPUTATION Left 2024    Procedure: AMPUTATION BELOW KNEE AND WOUND VAC ASSISTED CLOSURE LEFT;  Surgeon: Brijesh Em Jr., MD;  Location: [facility];  Service: Orthopedics;  Laterality: Left;    INCISION AND DRAINAGE HIP Right 2024    Procedure: HIP ANTERIOR INCISION AND DRAINAGE WITH HANA TABLE, POLY SWAP RIGHT WITH LEFT LEG PIN TRACTION;  Surgeon: Lex Sethi MD;  Location: [facility] OR;  Service: Orthopedics;  Laterality: Right;    INCISION AND DRAINAGE LEG Left 6/15/2024    Procedure: SECONDARY CLOSURE LEFT BELOW KNEE AMPUTATION;  Surgeon: Brijesh Em Jr., MD;  Location: [facility] OR;  Service: Orthopedics;  Laterality: Left;    PLACEMENT OF WOUND VAC Left 2024    Procedure: PLACEMENT OF WOUND VAC LEFT;  Surgeon: Brijesh Em Jr., MD;  Location: [facility] OR;  Service: Orthopedics;  Laterality: Left;    TOTAL  "HIP ARTHROPLASTY Right 02/2024    From fall \"whole hip was shattered\"           Wound 06/13/24 0921 Left lower leg Incision (Active)   Dressing Appearance dry;intact 07/04/24 0400   Closure Unable to assess 07/03/24 2021   Base dressing in place, unable to visualize 07/03/24 2021   Periwound Care dry periwound area maintained 07/04/24 0400       Wound 06/13/24 1045 Left medial coccyx Pressure Injury (Active)   Dressing Appearance open to air 07/03/24 2200   Closure None 07/03/24 2021   Base red;blanchable 07/03/24 2021   Periwound Care dry periwound area maintained 07/04/24 0400       Wound 06/15/24 1416 Left lower leg Incision (Active)   Dressing Appearance dry;intact;no drainage 07/03/24 2021   Closure Unable to assess 07/03/24 2021   Base dressing in place, unable to visualize 07/03/24 2021   Periwound Care dry periwound area maintained 07/03/24 2200       Wound 06/20/24 Right anterior hip Incision (Active)   Wound Image   07/03/24 1330   Dressing Appearance intact 07/04/24 1000   Closure Sutures 07/03/24 2021   Base dressing in place, unable to visualize 07/04/24 1000   Periwound pink;redness 07/03/24 1330   Periwound Temperature warm 07/03/24 1330   Periwound Skin Turgor firm 07/03/24 1330   Wound Length (cm) 2 cm 07/03/24 1330   Wound Width (cm) 0.3 cm 07/03/24 1330   Wound Depth (cm) 0.8 cm 07/03/24 1330   Wound Surface Area (cm^2) 0.6 cm^2 07/03/24 1330   Wound Volume (cm^3) 0.48 cm^3 07/03/24 1330   Drainage Characteristics/Odor tan;serosanguineous 07/04/24 1000   Drainage Amount small 07/04/24 1000   Care, Wound cleansed with;soap and water;negative pressure wound therapy 07/03/24 1330   Dressing Care dressing changed 07/03/24 1330   Periwound Care dry periwound area maintained 07/04/24 0400   Wound Output (mL) 25 07/04/24 1000       Wound 06/20/24 Left proximal leg Incision (Active)   Dressing Appearance dry;intact 07/04/24 0400   Closure Unable to assess 07/03/24 2021   Base dressing in place, unable to " visualize 07/03/24 2021       Wound 06/20/24 2100 Right lower leg Abrasion (Active)   Dressing Appearance dry;intact 07/04/24 0400   Periwound Care dry periwound area maintained 07/03/24 2021       Wound 06/21/24 2200 Left distal thigh Blisters (Active)   Dressing Appearance open to air 07/03/24 2200   Periwound Care dry periwound area maintained 07/03/24 2021       NPWT (Negative Pressure Wound Therapy) 07/03/24 1330 R hip incision (Active)   Therapy Setting continuous therapy 07/04/24 1000   Dressing other (see comments) 07/03/24 1415   Pressure Setting 125 mmHg 07/04/24 1000   Sponges Inserted 1 07/03/24 1330   Sponges Removed other (see comments) 07/03/24 1330   Finger sweep complete Other (see somments0 07/03/24 1330         WOUND DEBRIDEMENT                     PT Assessment (Last 12 Hours)       PT Evaluation and Treatment       Row Name 07/04/24 1000          Physical Therapy Time and Intention    Subjective Information no complaints  -MF     Document Type therapy note (daily note);wound care  -MF     Mode of Treatment individual therapy;physical therapy  -MF       Row Name 07/04/24 1000          Pain    Pretreatment Pain Rating 0/10 - no pain  -MF     Posttreatment Pain Rating 0/10 - no pain  -MF       Row Name             Wound 06/13/24 0921 Left lower leg Incision    Wound - Properties Group Placement Date: 06/13/24  -ALPHONSO Placement Time: 0921 -ALPHONSO Side: Left  -ALPHONSO Orientation: lower  -ALPHONSO Location: leg  -ALPHONSO Primary Wound Type: Incision  -ALPHONSO    Retired Wound - Properties Group Placement Date: 06/13/24  -ALPHONSO Placement Time: 0921 -ALPHONSO Side: Left  -ALPHONSO Orientation: lower  -ALPHONSO Location: leg  -ALPHONSO Primary Wound Type: Incision  -ALPHONSO    Retired Wound - Properties Group Date first assessed: 06/13/24  -ALPHONSO Time first assessed: 0921  -ALPHONSO Side: Left  -ALPHONSO Location: leg  -ALPHONSO Primary Wound Type: Incision  -ALPHONSO      Row Name             Wound 06/13/24 1045 Left medial coccyx Pressure Injury    Wound - Properties Group Placement  Date: 06/13/24  -ML Placement Time: 1045  -ML Side: Left  -ML Orientation: medial  -ML Location: coccyx  -ML Primary Wound Type: Pressure inj  -ML    Retired Wound - Properties Group Placement Date: 06/13/24  -ML Placement Time: 1045  -ML Side: Left  -ML Orientation: medial  -ML Location: coccyx  -ML Primary Wound Type: Pressure inj  -ML    Retired Wound - Properties Group Date first assessed: 06/13/24  -ML Time first assessed: 1045  -ML Side: Left  -ML Location: coccyx  -ML Primary Wound Type: Pressure inj  -ML      Row Name             Wound 06/15/24 1416 Left lower leg Incision    Wound - Properties Group Placement Date: 06/15/24  -TB Placement Time: 1416  -TB Side: Left  -TB Orientation: lower  -TB Location: leg  -TB Primary Wound Type: Incision  -TB    Retired Wound - Properties Group Placement Date: 06/15/24  -TB Placement Time: 1416  -TB Side: Left  -TB Orientation: lower  -TB Location: leg  -TB Primary Wound Type: Incision  -TB    Retired Wound - Properties Group Date first assessed: 06/15/24  -TB Time first assessed: 1416  -TB Side: Left  -TB Location: leg  -TB Primary Wound Type: Incision  -TB      Row Name 07/04/24 1000          Wound 06/20/24 Right anterior hip Incision    Wound - Properties Group Placement Date: 06/20/24  -SS Side: Right  -SS Orientation: anterior  -SS Location: hip  -SS Primary Wound Type: Incision  -SS Additional Comments: PREVENA  -SS    Dressing Appearance intact  -MF     Base dressing in place, unable to visualize  -MF     Drainage Characteristics/Odor tan;serosanguineous  -MF     Drainage Amount small  -MF     Wound Output (mL) 25  -MF     Retired Wound - Properties Group Placement Date: 06/20/24  -SS Side: Right  -SS Orientation: anterior  -SS Location: hip  -SS Primary Wound Type: Incision  -SS Additional Comments: PREVENA  -SS    Retired Wound - Properties Group Date first assessed: 06/20/24  -SS Side: Right  -SS Location: hip  -SS Primary Wound Type: Incision  -SS Additional  Comments: PREVENA  -SS      Row Name             Wound 06/20/24 Left proximal leg Incision    Wound - Properties Group Placement Date: 06/20/24  -SS Side: Left  -SS Orientation: proximal  -SS Location: leg  -SS Primary Wound Type: Incision  -SS Additional Comments: Pin traction area- xerofrom, 4x4, ace  -SS    Retired Wound - Properties Group Placement Date: 06/20/24  -SS Side: Left  -SS Orientation: proximal  -SS Location: leg  -SS Primary Wound Type: Incision  -SS Additional Comments: Pin traction area- xerofrom, 4x4, ace  -SS    Retired Wound - Properties Group Date first assessed: 06/20/24  -SS Side: Left  -SS Location: leg  -SS Primary Wound Type: Incision  -SS Additional Comments: Pin traction area- xerofrom, 4x4, ace  -SS      Row Name             Wound 06/20/24 2100 Right lower leg Abrasion    Wound - Properties Group Placement Date: 06/20/24  -TS Placement Time: 2100  -TS Present on Original Admission: --  -TS, unknown  Side: Right  -TS Orientation: lower  -TS Location: leg  -TS Primary Wound Type: Abrasion  -TS    Retired Wound - Properties Group Placement Date: 06/20/24  -TS Placement Time: 2100  -TS Present on Original Admission: --  -TS, unknown  Side: Right  -TS Orientation: lower  -TS Location: leg  -TS Primary Wound Type: Abrasion  -TS    Retired Wound - Properties Group Date first assessed: 06/20/24  -TS Time first assessed: 2100  -TS Present on Original Admission: --  -TS, unknown  Side: Right  -TS Location: leg  -TS Primary Wound Type: Abrasion  -TS      Row Name             Wound 06/21/24 2200 Left distal thigh Blisters    Wound - Properties Group Placement Date: 06/21/24  -TS Placement Time: 2200  -TS Side: Left  -TS Orientation: distal  -TS Location: thigh  -TS Primary Wound Type: Blisters  -TS    Retired Wound - Properties Group Placement Date: 06/21/24  -TS Placement Time: 2200  -TS Side: Left  -TS Orientation: distal  -TS Location: thigh  -TS Primary Wound Type: Blisters  -TS    Retired  Wound - Properties Group Date first assessed: 06/21/24  -TS Time first assessed: 2200  -TS Side: Left  -TS Location: thigh  -TS Primary Wound Type: Blisters  -TS      Row Name 07/04/24 1000          NPWT (Negative Pressure Wound Therapy) 07/03/24 1330 R hip incision    NPWT (Negative Pressure Wound Therapy) - Properties Group Placement Date: 07/03/24  -MF Placement Time: 1330  -MF Location: R hip incision  -MF    Therapy Setting continuous therapy  -MF     Pressure Setting 125 mmHg  -MF     Retired NPWT (Negative Pressure Wound Therapy) - Properties Group Placement Date: 07/03/24  -MF Placement Time: 1330  -MF Location: R hip incision  -MF    Retired NPWT (Negative Pressure Wound Therapy) - Properties Group Placement Date: 07/03/24  -MF Placement Time: 1330  -MF Location: R hip incision  -MF      Row Name 07/04/24 1000          Coping    Observed Emotional State calm;cooperative;happy;pleasant  -     Verbalized Emotional State acceptance  -     Trust Relationship/Rapport care explained  -MF       Row Name 07/04/24 1000          Plan of Care Review    Plan of Care Reviewed With patient  -MF     Outcome Evaluation wound vac dressing inplace with no issues noted. PT will f/u with daily checks and change in 5-6 days.  -MF       Row Name 07/04/24 1000          Positioning and Restraints    Pre-Treatment Position in bed  -MF     Post Treatment Position bed  -MF     In Bed supine;call light within reach  -               User Key  (r) = Recorded By, (t) = Taken By, (c) = Cosigned By      Initials Name Provider Type    Cleve Fraser, PT Physical Therapist    Tawny Sosa, RN Registered Nurse    Helene Hanson RN Registered Nurse    Tana Mesa RN Registered Nurse    Lynn Sr RN Registered Nurse    Daina Alfonso RN Registered Nurse                  Physical Therapy Education       Title: PT OT SLP Therapies (In Progress)       Topic: Physical Therapy (Done)        Point: Mobility training (Done)       Learning Progress Summary             Patient Acceptance, E, VU by  at 7/3/2024 1527    Comment: Pt educated on the importance of mobility during admission    Eager, E, VU by  at 7/2/2024 1031    Comment: Educated on the importance of continued mobility and strengthening on rehab process    Acceptance, E,TB, VU by  at 7/2/2024 0800    Acceptance, E, VU by  at 7/1/2024 1633    Comment: Educated on the importance of standing and increasing strength and endurance during admission    Acceptance, E, VU by  at 7/1/2024 0845    Eager, E, VU by SC at 6/29/2024 1450    Comment: reviewed safety with sliding board    Acceptance, E, VU,DU,NR by  at 6/28/2024 1404    Eager, E, VU,DU by  at 6/27/2024 1539    Comment: Pt eudcated regarding WC management, transfers and symptom managment    Acceptance, E,D, VU by  at 6/26/2024 1608    Comment: Pt educated regarding transfers, weight bearing status and HEP.    Acceptance, E, VU,DU,NR by  at 6/25/2024 1107    Acceptance, E,H, VU by  at 6/24/2024 1105    Comment: Pt educated on bed positioning with L BKA and importance of exercise on rehab.    Eager, E, VU,DU by SC at 6/21/2024 1349    Comment: reviewed HEP    Acceptance, E,D, VU,NR by SD at 6/19/2024 1203    Comment: PT ed for expected outcomes, risks, and benefits of IPPT services and progression of activity. Pt given visual demo of unilateral gait with RW. Discussion regarding dispo planning and IRF.                         Point: Home exercise program (Done)       Learning Progress Summary             Patient Acceptance, E, VU by  at 7/3/2024 1527    Comment: Pt educated on the importance of mobility during admission    Acceptance, E,TB, VU by  at 7/2/2024 0800    Acceptance, E, VU by  at 7/1/2024 0845    Eager, E, VU by SC at 6/29/2024 1450    Comment: reviewed safety with sliding board    Acceptance, E,D, VU by  at 6/26/2024 1608    Comment: Pt educated  regarding transfers, weight bearing status and HEP.    Acceptance, E, VU,DU,NR by  at 6/25/2024 1107    Acceptance, E,H, VU by  at 6/24/2024 1105    Comment: Pt educated on bed positioning with L BKA and importance of exercise on rehab.    Acceptance, E, VU,NR,DU by GEORGINA at 6/23/2024 1420    Eager, E, VU,DU by SC at 6/21/2024 1349    Comment: reviewed HEP    Acceptance, E,D, VU,NR by SD at 6/19/2024 1203    Comment: PT ed for expected outcomes, risks, and benefits of IPPT services and progression of activity. Pt given visual demo of unilateral gait with RW. Discussion regarding dispo planning and IRF.                         Point: Body mechanics (Done)       Learning Progress Summary             Patient Acceptance, E, VU by  at 7/3/2024 1527    Comment: Pt educated on the importance of mobility during admission    Eager, E, VU by  at 7/2/2024 1031    Comment: Educated on the importance of continued mobility and strengthening on rehab process    Acceptance, E,TB, VU by  at 7/2/2024 0800    Acceptance, E, VU by  at 7/1/2024 1633    Comment: Educated on the importance of standing and increasing strength and endurance during admission    Acceptance, E, VU by  at 7/1/2024 0845    Eager, E, VU by SC at 6/29/2024 1450    Comment: reviewed safety with sliding board    Acceptance, E, VU,DU,NR by  at 6/28/2024 1404    Eager, E, VU,DU by  at 6/27/2024 1539    Comment: Pt eudcated regarding WC management, transfers and symptom managment    Acceptance, E,D, VU by EM at 6/26/2024 1608    Comment: Pt educated regarding transfers, weight bearing status and HEP.    Acceptance, E, VU,DU,NR by  at 6/25/2024 1107    Acceptance, E,H, VU by  at 6/24/2024 1105    Comment: Pt educated on bed positioning with L BKA and importance of exercise on rehab.    Eager, E, VU,DU by SC at 6/21/2024 1349    Comment: reviewed HEP    Acceptance, E,D, VU,NR by SD at 6/19/2024 5992    Comment: PT ed for expected outcomes, risks, and  benefits of IPPT services and progression of activity. Pt given visual demo of unilateral gait with RW. Discussion regarding dispo planning and IRF.                         Point: Precautions (Done)       Learning Progress Summary             Patient Acceptance, E, VU by  at 7/3/2024 1527    Comment: Pt educated on the importance of mobility during admission    Eager, E, VU by  at 7/2/2024 1031    Comment: Educated on the importance of continued mobility and strengthening on rehab process    Acceptance, E,TB, VU by  at 7/2/2024 0800    Acceptance, E, VU by  at 7/1/2024 1633    Comment: Educated on the importance of standing and increasing strength and endurance during admission    Acceptance, E, VU by  at 7/1/2024 0845    Eager, E, VU by SC at 6/29/2024 1450    Comment: reviewed safety with sliding board    Acceptance, E, VU,DU,NR by  at 6/28/2024 1404    Eager, E, VU,DU by  at 6/27/2024 1539    Comment: Pt eudcated regarding WC management, transfers and symptom managment    Acceptance, E,D, VU by  at 6/26/2024 1608    Comment: Pt educated regarding transfers, weight bearing status and HEP.    Acceptance, E, VU,DU,NR by  at 6/25/2024 1107    Acceptance, E,H, VU by  at 6/24/2024 1105    Comment: Pt educated on bed positioning with L BKA and importance of exercise on rehab.    Eager, E, VU,DU by SC at 6/21/2024 1349    Comment: reviewed HEP    Acceptance, E,D, VU,NR by SD at 6/19/2024 1203    Comment: PT ed for expected outcomes, risks, and benefits of IPPT services and progression of activity. Pt given visual demo of unilateral gait with RW. Discussion regarding dispo planning and IRF.                                         User Key       Initials Effective Dates Name Provider Type Discipline    SC 02/03/23 -  Jorge Luis Childress, PT Physical Therapist PT    SD 03/13/23 -  Charity Dela Cruz, PT Physical Therapist PT    GEORGINA 06/16/21 -  Radha Miguel, PT Physical Therapist PT     09/21/23 -   Rachna Cruz, PT Physical Therapist PT    BM 04/08/24 -  Marina Soto, RN Registered Nurse Nurse    EM 05/07/24 -  Rancho Schneider, PT Student PT Student PT     05/07/24 -  Quincy Elizabeth, PT Student PT Student PT                    Recommendation and Plan  Anticipated Equipment Needs at Discharge (PT): gait belt  Anticipated Discharge Disposition (PT): inpatient rehabilitation facility  Planned Therapy Interventions (PT): wound care  Therapy Frequency (PT): daily  Plan of Care Reviewed With: patient           Outcome Evaluation: wound vac dressing inplace with no issues noted. PT will f/u with daily checks and change in 5-6 days.  Plan of Care Reviewed With: patient            Time Calculation   PT Charges       Row Name 07/04/24 1000             Time Calculation    Start Time 1000  -MF      PT Goal Re-Cert Due Date 07/11/24  -MF         Untimed Charges    [id number] Pressure wound to 50 sqcm 10  -MF         Total Minutes    Untimed Charges Total Minutes 10  -MF       Total Minutes 10  -MF                User Key  (r) = Recorded By, (t) = Taken By, (c) = Cosigned By      Initials Name Provider Type     Cleve Flores, PT Physical Therapist                      Therapy Charges for Today       Code Description Service Date Service Provider Modifiers Qty    [id number] HC PT NEG PRES WOUND TO 50SQCM NONDME 3 7/3/2024 Cleve Flores, PT  1    [id number]  PT RE-EVAL ESTABLISHED PLAN 2 7/3/2024 Cleve Flores, PT GP 1              PT G-Codes  Outcome Measure Options: AM-PAC 6 Clicks Daily Activity (OT)  AM-PAC 6 Clicks Score (PT): 15  AM-PAC 6 Clicks Score (OT): 16       Cleve Flores, PT  7/4/2024

## 2024-07-04 NOTE — PLAN OF CARE
Goal Outcome Evaluation:  Plan of Care Reviewed With: (P) patient        Progress: (P) improving  Outcome Evaluation: (P) Pt able to complete two trials of hopping in the // bars with Mod A and wc follow. Pt with improved quality in hops this session from previous session. Pt with continued improved quality and confidence gained with sliding board transfers. Pt presents below baseline with deficits in strength, balance, and activity tolerance. IPPT continues to be appropriate. PT recommended DC to IRF for best outcomes      Anticipated Discharge Disposition (PT): (P) inpatient rehabilitation facility

## 2024-07-04 NOTE — DISCHARGE SUMMARY
[facility] Medicine Services  DISCHARGE SUMMARY    Patient Name: Easton Alvarez  : 1965  MRN: [id number]    Date of Admission: 2024  5:04 PM  Date of Discharge:  2024  Primary Care Physician: Provider, No Known    Consults       Date and Time Order Name Status Description    2024  8:34 AM Inpatient Orthopedic Surgery Consult      2024  4:44 AM Inpatient Gastroenterology Consult Completed     2024  8:36 PM Inpatient Vascular Surgery Consult Completed     2024  5:47 PM Inpatient Infectious Diseases Consult Completed     2024  5:14 PM Inpatient Orthopedic Surgery Consult Completed             Hospital Course     Presenting Problem: sepsis    Active Hospital Problems    Diagnosis  POA    Severe malnutrition [E43]  Yes      Resolved Hospital Problems    Diagnosis Date Resolved POA    **Diabetic ulcer of left foot with necrosis of muscle [E11.621, L97.523] 2024 Yes    Severe malnutrition [E43] 2024 Yes    Infection of prosthetic total hip joint [T84.59XA, Z96.649] 2024 Not Applicable          Hospital Course:  Easton Alvarez is a 58 y.o. male with past medical history significant for T2DM, diabetic neuropathy, right hip fracture/ORIF ([facility] 2024), and right transmetatarsal amputation 2019 at  who presented to OSH ED on 2024 with severe left foot cellulitis/necrosis after recent injury. Lactate at OSH was 8.8 and CT imaging of left leg showed gas gangrene. Orthopedic surgeon Dr. Em was consulted and performed a left below-knee amputation on 2024 with further debridement and irrigation on 6/15. Infectious disease has followed and managed antimicrobial therapy.     Sepsis secondary to group B strep bacteremia  Left foot cellulitis/gangrene s/p left BKA   Leukocytosis  -MRI left foot showed extensive soft tissue infection with abscesses along the foot to the ankle and into the lower leg with soft tissue gas. Wound  cultures 6/12 with heavy growth group B strep and Kebsiella, blood cultures x 2 with group B strep; wound culture 6/13 with group B strep  -Ortho followed throughout stay. He is s/p left BKA with wound VAC on 6/13/24 and again s/p further debridement in OR on 6/15/24 by Dr. Em. Cont daily dressing changes to surgical stump site: Xeroform, 4X4, Kerlix, ACE  -Infectious disease, Dr. Bills following; continue IV ceftriaxone for at least 6 weeks then transition to PO Keflex indefinitely. F/U with him in 1 week and perform weekly cbc, cmp, crp, esr.  -Follow up with Dr. Em 2 weeks for wound check, imaging     Right glenys-prosthetic hip infection  -6/18 fluid aspiration from the hip which was purulent, rare growth group B strep. 6/19 MRI femur with large gas containing fluid collection  -He is s/p I&D on 6/20 w/ culture growing group b strep. Wound vac placed and changed on day of d/c. PT wound planning to leave on for about another week to help manage drainage.  -D/W ortho prior to d/c - rec leaving in sutures for another week.  -ABX, pain control as above.     Concern for GIB  Acute on chronic anemia  -GI consulted, recommended medical management with PPI BID x 1 month then daily thereafter.      A/C HFpEF   HTN  Anasarca   -Echo from 6/18 revealed EF 56-60% with diastolic dysfunction  -Started lisinopril 10 mg daily, continue.  -Lasix 40 mg IV x1 w/ good response.     Elevated LFTs  Thrombocytopenia, improved  Mild hyponatremia, clinically insignificant  -GI referral at discharge for elevated LFTs, thrombocytopenia and concern for early cirrhosis, plan to pursue liver US after recovery from acute illness.     Poorly controlled type 2 diabetes with A1c 9.5%   -Continue basal, prandial and SSI at d/c.    Discharge Follow Up Recommendations for outpatient labs/diagnostics:   Weekly cbc, cmp, esr, crp   Dr. Bills 1 week   Ortho 2 weeks   Referral to [facility]    Day of Discharge     Patient doing well. No  pain. Looking forward to d/c. Right hip wound examined with PT wound care, no redness. Minimal serous drainage.    Pertinent  and/or Most Recent Results     LAB RESULTS:      Lab 07/05/24  0508 07/04/24  0542 07/03/24  0825 07/02/24  0401 07/01/24  1114   WBC 5.32 5.61 6.15 7.76 7.81   HEMOGLOBIN 7.5* 8.0* 8.1* 8.0* 9.3*   HEMATOCRIT 23.0* 24.3* 24.9* 23.5* 27.8*   PLATELETS 171 160 148 117* 126*   MCV 97.9* 96.4 98.0* 94.0 96.9   PROTIME  --   --   --  16.6*  --          Lab 07/05/24  0508 07/04/24  0542 07/03/24  0825 07/02/24  1800 07/02/24  0401 07/01/24  1114   SODIUM 133* 137 138  --  137 138   POTASSIUM 4.2 4.3 5.3* 4.5 3.6 4.8   CHLORIDE 101 106 106  --  104 104   CO2 27.0 26.0 27.0  --  26.0 28.0   ANION GAP 5.0 5.0 5.0  --  7.0 6.0   BUN 13 14 16  --  18 17   CREATININE 0.60* 0.63* 0.58*  --  0.65* 0.80   EGFR 110.5 108.9 111.6  --  107.9 101.3   GLUCOSE 80 85 89  --  79 95   CALCIUM 7.1* 7.4* 7.6*  --  7.2* 8.0*         Lab 07/02/24  0401 07/01/24  1114   TOTAL PROTEIN 4.8* 6.1   ALBUMIN 1.7* 1.8*   GLOBULIN 3.1 4.3   ALT (SGPT) 19 21   AST (SGOT) 39 45*   BILIRUBIN 0.4 0.6   ALK PHOS 235* 233*         Lab 07/02/24  0401   PROTIME 16.6*   INR 1.33*                 Brief Urine Lab Results  (Last result in the past 365 days)        Color   Clarity   Blood   Leuk Est   Nitrite   Protein   CREAT   Urine HCG        06/14/24 1109 Yellow   Clear   Negative   Negative   Negative   Negative                 Microbiology Results (last 10 days)       ** No results found for the last 240 hours. **            No radiology results for the last 10 days            Results for orders placed during the hospital encounter of 06/12/24    Adult Transthoracic Echo Complete w/ Color, Spectral and Contrast if Necessary Per Protocol    Interpretation Summary    Left ventricular systolic function is normal. Left ventricular ejection fraction appears to be 56 - 60%.    Left ventricular wall thickness is consistent with borderline  concentric hypertrophy.    Left ventricular diastolic function is consistent with (grade Ia w/high LAP) impaired relaxation.      Plan for Follow-up of Pending Labs/Results:   Pending Labs       Order Current Status    AFB Culture - Aspirate, Hip, Right Preliminary result    AFB Culture - Surgical Site, Leg, Left Preliminary result    AFB Culture - Synovium, Hip, Right Preliminary result    AFB Culture - Synovium, Hip, Right Preliminary result    AFB Culture - Synovium, Hip, Right Preliminary result    AFB Culture - Wound, Leg, Left Preliminary result    Fungus Culture - Aspirate, Hip, Right Preliminary result    Fungus Culture - Surgical Site, Leg, Left Preliminary result    Fungus Culture - Synovium, Hip, Right Preliminary result    Fungus Culture - Synovium, Hip, Right Preliminary result    Fungus Culture - Synovium, Hip, Right Preliminary result    Fungus Culture - Wound, Leg, Left Preliminary result          Discharge Details        Discharge Medications        New Medications        Instructions Start Date   acetaminophen 500 MG tablet  Commonly known as: TYLENOL   1,000 mg, Oral, Every 8 Hours      aspirin 81 MG EC tablet   81 mg, Oral, Every 12 Hours Scheduled      cefTRIAXone 2,000 mg in sodium chloride 0.9 % 100 mL IVPB   2,000 mg, Intravenous, Every 24 Hours      insulin glargine 100 UNIT/ML injection  Commonly known as: LANTUS, SEMGLEE   25 Units, Subcutaneous, Nightly      Insulin Lispro 100 UNIT/ML injection  Commonly known as: humaLOG   2-9 Units, Subcutaneous, 4 Times Daily Before Meals & Nightly      Insulin Lispro 100 UNIT/ML injection  Commonly known as: humaLOG   8 Units, Subcutaneous, 3 Times Daily With Meals      lactobacillus acidophilus capsule capsule   1 capsule, Oral, Daily      lisinopril 10 MG tablet  Commonly known as: PRINIVIL,ZESTRIL   10 mg, Oral, Every 24 Hours Scheduled      naloxone 4 MG/0.1ML nasal spray  Commonly known as: NARCAN   Call 911. Don't prime. Spray in 1 nostril for  overdose. Repeat in 2-3 minutes in other nostril if no or minimal breathing/responsiveness.      oxyCODONE 5 MG immediate release tablet  Commonly known as: ROXICODONE   5 mg, Oral, Every 4 Hours PRN      pantoprazole 40 MG EC tablet  Commonly known as: PROTONIX   40 mg, Oral, 2 Times Daily Before Meals               No Known Allergies      Discharge Disposition:  Skilled Nursing Facility (DC - External)    Diet:  Hospital:  Diet Order   Procedures    Diet: Regular/House, Diabetic; Consistent Carbohydrate; Fluid Consistency: Thin (IDDSI 0)            Activity:      Restrictions or Other Recommendations:         CODE STATUS:    Code Status and Medical Interventions:   Ordered at: 06/20/24 2026     Level Of Support Discussed With:    Patient     Code Status (Patient has no pulse and is not breathing):    CPR (Attempt to Resuscitate)     Medical Interventions (Patient has pulse or is breathing):    Full       Future Appointments   Date Time Provider Department Center   7/6/2024 10:00 AM MED 7  [facility]       Additional Instructions for the Follow-ups that You Need to Schedule       Discharge Follow-up with Specified Provider: Dr. Bills via telehealth; 2 Weeks   As directed      To: Dr. Bills via telehealth   Follow Up: 2 Weeks        Discharge Follow-up with Specified Provider: Ortho; 2 Weeks   As directed      To: Ortho   Follow Up: 2 Weeks                      Luz Green II,   07/06/24      Time Spent on Discharge:  I spent  38  minutes on this discharge activity which included: face-to-face encounter with the patient, reviewing the data in the system, coordination of the care with the nursing staff as well as consultants, documentation, and entering orders.

## 2024-07-04 NOTE — PROGRESS NOTES
"Easton Alvarez  1965  [id number]          Chief Complaint: left foot infection    Reason for Consultation: left foot infection    History of present illness:     Patient is a 58 y.o.  Yr old male with history of diabetes with prior right TMA 2019 at , history strep septicemia 2016 ; he reports right total hip arthroplasty 2024 after fall. He reports a fall several weeks prior to his June admission with injury to the left foot, wound present with deterioration several days preceding admission with severe discoloration/redness and swelling.  CT scan showed concern for subcutaneous emphysema and necrotizing infection.  Transferred to [facility] with evaluation by Dr. Em and Dr. Lundberg and arrangements made for urgent surgery. Subsequently, blood cultures called with gram-positive cocci     6/13 left LE amp, Dr Em    6/15  \"PROCEDURE:            Left      61265: Secondary closure above-knee amputation\"      6/18/24  right hip aspiration, culture with GB strep; wbc  down some postop from revision; blood cultures 6/15 negative so far    6/19/24 Dr. Em helping facilitate right hip surgery with orthopedic partners/team    6/20/24 surgery Dr Sethi  \"Procedure(s):  HIP ANTERIOR INCISION AND DRAINAGE WITH HANA TABLE, POLY SWAP- RIGHT\"    6/26/24 urinary retention per nursing notes, Dr Goodwin aware.  Dr Goodwin reports later that patient voiding without  retention issues    7/2/24   Per discussion with medicine team, GI has seen him regarding abnormal LFT/thrombocytopenia with concern for early cirrhosis/splenomegaly and further workup at their discretion.     7/3/24 wound care team noted drainage at surgical site, wound ac applied    7/4/24 no new pain or distress per nursing; case management looking into options;  postop pain controlled at present; He has right hip pain that is intermittent , blunted by neuropathy, worse with palpation, better with pain meds and 2-3 out of 10 in " "severity. No distress per nursing; no new focal symptoms otherwise; no fever; no left stump pain; He did not know about any prior issues of thrombocytopenia     No headache photophobia or neck stiffness.  No shortness of breath cough or hemoptysis.  No nausea vomiting  or abdominal pain.  No dysuria hematuria or pyuria.  No other new skin rash    Past Medical History:   Diagnosis Date    Diabetes mellitus     Diabetic foot infection 2018    left (partial amputation)       Past Surgical History:   Procedure Laterality Date    AMPUTATION FOOT / TOE Right     partial foot    BELOW KNEE AMPUTATION Left 6/13/2024    Procedure: AMPUTATION BELOW KNEE AND WOUND VAC ASSISTED CLOSURE LEFT;  Surgeon: Brijesh Em Jr., MD;  Location:  [facility];  Service: Orthopedics;  Laterality: Left;    INCISION AND DRAINAGE HIP Right 6/20/2024    Procedure: HIP ANTERIOR INCISION AND DRAINAGE WITH HANA TABLE, POLY SWAP RIGHT WITH LEFT LEG PIN TRACTION;  Surgeon: Lex Sethi MD;  Location:  [facility];  Service: Orthopedics;  Laterality: Right;    INCISION AND DRAINAGE LEG Left 6/15/2024    Procedure: SECONDARY CLOSURE LEFT BELOW KNEE AMPUTATION;  Surgeon: Brijesh Em Jr., MD;  Location:  [facility];  Service: Orthopedics;  Laterality: Left;    PLACEMENT OF WOUND VAC Left 6/13/2024    Procedure: PLACEMENT OF WOUND VAC LEFT;  Surgeon: Brijesh Em Jr., MD;  Location:  [facility];  Service: Orthopedics;  Laterality: Left;    TOTAL HIP ARTHROPLASTY Right 02/2024    From fall \"whole hip was shattered\"           No Known Allergies       Review of Systems    7/4/24 per nursing also    Constitutional-- No Fever, chills or sweats.  Appetite diminished with fatigue.  Heent-- No new vision, hearing or throat complaints.  No epistaxis or oral sores.  Denies odynophagia or dysphagia.  No flashers, floaters or eye pain. No odynophagia or dysphagia. No headache, photophobia or neck stiffness.  CV-- No chest pain, palpitation or " "syncope  Resp-- No SOB/cough/Hemoptysis  GI- No nausea, vomiting, or diarrhea.  No hematochezia, melena, or hematemesis.   -- No dysuria, hematuria, or flank pain.  Denies hesitancy, urgency or flank pain.  Lymph- no swollen lymph nodes in neck/axilla or groin.   Heme- No active bruising or bleeding; no Hx of DVT or PE.  MS-- aside from above, no swelling or pain in the bones or joints of arms/legs.  No new back pain.  Neuro-- No acute focal weakness or numbness in the arms or legs.  No seizures.    Full 12 point review of systems reviewed and negative otherwise for acute complaints, except for above    Physical Exam:   Vital Signs   /76 (BP Location: Left arm, Patient Position: Lying)   Pulse 91   Temp 98.1 °F (36.7 °C) (Oral)   Resp 18   Ht 172.7 cm (67.99\")   Wt 81.9 kg (180 lb 8.9 oz)   SpO2 95%   BMI 27.46 kg/m²     GENERAL: sleepy; in no acute distress.   HEENT: Normocephalic, atraumatic.   No conjunctival injection. No icterus. Oropharynx clear without evidence of thrush or exudate. No evidence of periodontal disease.    NECK: Supple without nuchal rigidity. No mass.   HEART: RRR; No murmur, rubs, gallops.   LUNGS: diminished at bases.  Trace crackles at the bases but no rhonchi or wheeze. Normal respiratory effort. Nonlabored. No dullness.  ABDOMEN: Soft, nontender, nondistended. Positive bowel sounds. No rebound or guarding. NO mass or HSM.  EXT:  see below.     MSK: right hip surgical site covered;  no new visible redness/purulence or fluctuance; see PT wound care notes  SKIN: Warm and dry without cutaneous eruptions on Inspection/palpation.      Left   BKA stump covered;  no new visible redness or purulence; no discrete fluctuance.  No crepitus    Laboratory Data    Results from last 7 days   Lab Units 07/04/24  0542 07/03/24  0825 07/02/24  0401   WBC 10*3/mm3 5.61 6.15 7.76   HEMOGLOBIN g/dL 8.0* 8.1* 8.0*   HEMATOCRIT % 24.3* 24.9* 23.5*   PLATELETS 10*3/mm3 160 148 117*     Results from " last 7 days   Lab Units 07/04/24  0542   SODIUM mmol/L 137   POTASSIUM mmol/L 4.3   CHLORIDE mmol/L 106   CO2 mmol/L 26.0   BUN mg/dL 14   CREATININE mg/dL 0.63*   GLUCOSE mg/dL 85   CALCIUM mg/dL 7.4*     Results from last 7 days   Lab Units 07/02/24  0401   ALK PHOS U/L 235*   BILIRUBIN mg/dL 0.4   ALT (SGPT) U/L 19   AST (SGOT) U/L 39                   Estimated Creatinine Clearance: 144.4 mL/min (A) (by C-G formula based on SCr of 0.63 mg/dL (L)).      Microbiology:      Radiology:  Imaging Results (Last 72 Hours)       ** No results found for the last 72 hours. **              Impression:     --acute sepsis as per admission notes, severe left foot infection with gangrene/cellulitis and concern for necrotizing soft tissue infection by imaging, urgent surgical arrangements made June 13; repeat surg 6/15  ;  subsequent right hip surg 6/20; He knows risk for persistent/recurrent or nonhealing wounds, persistet / progressive or recurrent infection and risk for further amputation/functional-limb loss and chronic pain.  Associated with GB strep bacteremia as below.    --Acute/severe left foot infection as above, urgent surgical arrangements  made 6/13    --Acute gb strep bacteremia,   source from foot.  High risk for further serious morbidity and other serious sequela including dire consequences and metastatic foci of involvement/endovascular sequela etc. No new metastatic focus of involvement. TTE 6/17      --thrombocytopenia.   Per GI team, concerned by imaging and laboratory data for early cirrhosis with splenomegaly and risk for splenic sequestration/thrombocytopenia.  Some variability and platelet counts.  in general, thrombocytopenia not common with ceftriaxone but if recurrent/persistent decline and no other specific etiology found , then you could consider empiric adjustment.    --Right hip fracture with repair February 2024.  +pain ; orthopedics with  aspiration June 18 and culture with GB strep likely  hematogenous seeding, MRI imaging with concern for septic joint, surgery on June 20 as above.   Patient understands risk for persistent/recurrent or progressive infection and potential for further surgical intervention in the future that could include functional/limb loss and other serious sequela/morbidity; he knows antibiotics and surgery not a guarantee for care    --postop leukocytosis, possible stress response associated with surgery.  Trended down June 22-27.  No new respiratory symptoms.  Encouraged incentive spirometry.  Abdomen benign with no diarrhea.  No new urinary tract complaints.  IV sites with no suppurative change.  No rash.  Monitor for new process; no new focal muscoskeletal symptoms to suggest new metastatic focus of infection    --diabetes.  Described as uncontrolled by medicine team at admission.  Glucose control per medicine team    --urinary retention per nursing staff.  Per their notes patient refused in/out catheterization.  Dr. Goodwin aware and he will discuss options with patient      PLAN:      --IV  rocephin likely at least 6 weeks from hip surgery and anticipate step down to oral agents after with oral keflex    **wound culture at left foot mixed with  MSSA Klebsiella and GB strep  **blood cultures with group B strep  ** right hip cultures with group B strep    --orthopedics  making surgical plans with concern for right hipprosthetic/periprosthetic infection     --Check/review labs cultures and scans    --TTE described as technically adequate by cardiology; no description of vegetation by cardiology per report    --Partial history per nursing staff    --encouraged incentive spirometry.  Nursing aware to instruct and encourage    --Discussed with microbiology    --d/w Dr Lombardo    --Highly complex at of issues with high risk for further serious morbidity and other serious sequela     **follow-up with me one week.  Every Monday CBC/CMP and sed rate/CRP.  I anticipate 6 weeks IV antibiotic  from hip surgery and probable stepdown after that to oral Keflex with no specific end date so far    Copied text in this note has been reviewed and is accurate as of 07/04/24.        Cleve Bills MD  7/4/2024

## 2024-07-04 NOTE — PLAN OF CARE
Goal Outcome Evaluation:  Plan of Care Reviewed With: patient           Outcome Evaluation: wound vac dressing inplace with no issues noted. PT will f/u with daily checks and change in 5-6 days.

## 2024-07-04 NOTE — THERAPY TREATMENT NOTE
"Patient Name: Easton Alvarez  : 1965    MRN: [id number]                              Today's Date: 2024       Admit Date: 2024    Visit Dx:     ICD-10-CM ICD-9-CM   1. S/P BKA (below knee amputation), left  Z89.512 V49.75   2. Diabetic ulcer of left midfoot associated with type 2 diabetes mellitus, with necrosis of muscle  E11.621 250.80    L97.423 707.14     728.89   3. Elevated LFTs  R79.89 790.6   4. Below knee amputation  S88.119A 897.0   5. Impaired functional mobility, balance, gait, and endurance  Z74.09 V49.89   6. Infection of prosthetic total hip joint, initial encounter  T84.59XA 996.66    Z96.649 V43.64     Patient Active Problem List   Diagnosis    Severe malnutrition     Past Medical History:   Diagnosis Date    Diabetes mellitus     Diabetic foot infection 2018    left (partial amputation)     Past Surgical History:   Procedure Laterality Date    AMPUTATION FOOT / TOE Right     partial foot    BELOW KNEE AMPUTATION Left 2024    Procedure: AMPUTATION BELOW KNEE AND WOUND VAC ASSISTED CLOSURE LEFT;  Surgeon: Brijesh Em Jr., MD;  Location:  [facility];  Service: Orthopedics;  Laterality: Left;    INCISION AND DRAINAGE HIP Right 2024    Procedure: HIP ANTERIOR INCISION AND DRAINAGE WITH HANA TABLE, POLY SWAP RIGHT WITH LEFT LEG PIN TRACTION;  Surgeon: Lex Sethi MD;  Location:  [facility];  Service: Orthopedics;  Laterality: Right;    INCISION AND DRAINAGE LEG Left 6/15/2024    Procedure: SECONDARY CLOSURE LEFT BELOW KNEE AMPUTATION;  Surgeon: Brijesh Em Jr., MD;  Location:  [facility];  Service: Orthopedics;  Laterality: Left;    PLACEMENT OF WOUND VAC Left 2024    Procedure: PLACEMENT OF WOUND VAC LEFT;  Surgeon: Brijesh Em Jr., MD;  Location:  [facility];  Service: Orthopedics;  Laterality: Left;    TOTAL HIP ARTHROPLASTY Right 2024    From fall \"whole hip was shattered\"      General Information       Row Name 24 1403          Physical " Therapy Time and Intention    Document Type therapy note (daily note) (P)   -     Mode of Treatment individual therapy;physical therapy (P)   -       Row Name 07/04/24 1403          General Information    Patient Profile Reviewed yes (P)   -     Existing Precautions/Restrictions fall;left;non-weight bearing;right;hip, anterior;other (see comments) (P)   recent L BKA, R hip MARIA LUISA s/p I@D, R foot TMA. R wound vac on hip  -HM     Barriers to Rehab medically complex;previous functional deficit (P)   -       Row Name 07/04/24 1403          Cognition    Orientation Status (Cognition) oriented x 4 (P)   -       Row Name 07/04/24 1403          Safety Issues, Functional Mobility    Safety Issues Affecting Function (Mobility) awareness of need for assistance;insight into deficits/self-awareness;safety precaution awareness;safety precautions follow-through/compliance;sequencing abilities;problem-solving (P)   -     Impairments Affecting Function (Mobility) balance;endurance/activity tolerance;range of motion (ROM);strength;pain (P)   -     Comment, Safety Issues/Impairments (Mobility) Alert and following commands (P)   -               User Key  (r) = Recorded By, (t) = Taken By, (c) = Cosigned By      Initials Name Provider Type     Quincy Elizabeth, PT Student PT Student                   Mobility       Row Name 07/04/24 1404          Bed Mobility    Bed Mobility supine-sit;sit-supine;scooting/bridging (P)   -     Scooting/Bridging [place] (Bed Mobility) standby assist (P)   -     Supine-Sit [place] (Bed Mobility) standby assist;verbal cues (P)   -     Sit-Supine [place] (Bed Mobility) standby assist;verbal cues (P)   -     Assistive Device (Bed Mobility) head of bed elevated;bed rails (P)   -     Comment, (Bed Mobility) No physical assistance required but required increased time and assistance to complete task (P)   -       Row Name 07/04/24 6464          Transfers    Comment,  (Transfers) Sliding board x 2 from wc <> bed . STS x 2 from wc in // bars (P)   -       Row Name 07/04/24 1404          Bed-Chair Transfer    Bed-Chair Wilkinson (Transfers) minimum assist (75% patient effort);verbal cues (P)   -     Assistive Device (Bed-Chair Transfers) transfer board (P)   -     Comment, (Bed-Chair Transfer) Pt required assistance for sliding board placement but able to hold onto bed railing to offload hip. Pt required VC for tucking R LE back for transfer and hand placement (P)   -       Row Name 07/04/24 1404          Sit-Stand Transfer    Sit-Stand Wilkinson (Transfers) moderate assist (50% patient effort);2 person assist;verbal cues (P)   -HM     Assistive Device (Sit-Stand Transfers) parallel bars (P)   -     Comment, (Sit-Stand Transfer) Pt with one unssuccessful stand this session with ability to clear hips but not achieve upright. Pt was then able to complete STS x 2 with VC to stand upright and for hand placement and sequencing. Pt requires tactile cues for hip extension and blocking at R knee (P)   -HM       Row Name 07/04/24 1404          Gait/Stairs (Locomotion)    Wilkinson Level (Gait) moderate assist (50% patient effort);verbal cues (P)   -HM     Assistive Device (Gait) parallel bars (P)   -     Distance in Feet (Gait) 3 (P)   3+3  -HM     Deviations/Abnormal Patterns (Gait) right sided deviations;trevin decreased;gait speed decreased;stride length decreased (P)   -HM     Bilateral Gait Deviations forward flexed posture (P)   -HM     Right Sided Gait Deviations decreased knee extension;forward flexed posture;heel strike decreased;knee buckling, right side (P)   -     Comment, (Gait/Stairs) Pt able to complete 2 trials of hopping in // bars with blocking at R knee throughout. Pt with better quality hops this session from previous sessions with more control. Pt with one knee buckle this session needing knee blocking and physical assistance to stand fully  upright. (P)   -[facility] Name 07/04/24 1404          Mobility    Extremity Weight-bearing Status left lower extremity;right lower extremity (P)   -     Left Lower Extremity (Weight-bearing Status) non weight-bearing (NWB) (P)   -     Right Lower Extremity (Weight-bearing Status) weight-bearing as tolerated (WBAT) (P)   -               User Key  (r) = Recorded By, (t) = Taken By, (c) = Cosigned By      Initials Name Provider Type     Quincy Elizabeth, PT Student PT Student                   Obj/Interventions       [facility] Name 07/04/24 1411          Balance    Balance Assessment sit to stand dynamic balance;standing static balance;standing dynamic balance (P)   -     Sit to Stand Dynamic Balance minimal assist;verbal cues (P)   -     Static Standing Balance minimal assist;2-person assist;verbal cues (P)   -     Dynamic Standing Balance moderate assist;2-person assist;verbal cues (P)   -     Position/Device Used, Standing Balance supported;parallel bars (P)   -     Balance Interventions standing;sit to stand;occupation based/functional task (P)   -     Comment, Balance Pt with decreased balance in standing requiring // bars and Mod A to maintain upright with hopping in the bars. Pt with one lob with knee buckle requiring physical assistance to maintain upright (P)   -               User Key  (r) = Recorded By, (t) = Taken By, (c) = Cosigned By      Initials Name Provider Type     Quincy Elizabeth, PT Student PT Student                   Goals/Plan    No documentation.                  Clinical Impression       Row Name 07/04/24 1414          Pain    Pretreatment Pain Rating 0/10 - no pain (P)   -     Posttreatment Pain Rating 0/10 - no pain (P)   -     Pain Location - Side/Orientation Right (P)   -     Pain Location generalized (P)   -     Pain Location - hip (P)   -     Pain Intervention(s) Repositioned;Elevated;Ambulation/increased activity (P)   -       Row Name 07/04/24 1418           Plan of Care Review    Plan of Care Reviewed With patient (P)   -     Progress improving (P)   -     Outcome Evaluation Pt able to complete two trials of hopping in the // bars with Mod A and wc follow. Pt with improved quality in hops this session from previous session. Pt with continued improved quality and confidence gained with sliding board transfers. Pt presents below baseline with deficits in strength, balance, and activity tolerance. IPPT continues to be appropriate. PT recommended DC to IRF for best outcomes (P)   -       Row Name 07/04/24 1414          Therapy Assessment/Plan (PT)    Patient/Family Therapy Goals Statement (PT) Keep getting stronger (P)   -       Row Name 07/04/24 1414          Vital Signs    Pre Systolic BP Rehab 1322 (P)   -     Pre Treatment Diastolic BP 1355 (P)   -       Row Name 07/04/24 1414          Positioning and Restraints    Pre-Treatment Position in bed (P)   -     Post Treatment Position bed (P)   -     In Bed notified nsg;supine;call light within reach;encouraged to call for assist;exit alarm on;RLE elevated (P)   -               User Key  (r) = Recorded By, (t) = Taken By, (c) = Cosigned By      Initials Name Provider Type     Quincy Elizabeth, PT Student PT Student                   Outcome Measures       Row Name 07/04/24 1417 07/04/24 0850       How much help from another person do you currently need...    Turning from your back to your side while in flat bed without using bedrails? 4 (P)   - 4  -SJ    Moving from lying on back to sitting on the side of a flat bed without bedrails? 3 (P)   - 3  -SJ    Moving to and from a bed to a chair (including a wheelchair)? 3 (P)   - 3  -SJ    Standing up from a chair using your arms (e.g., wheelchair, bedside chair)? 2 (P)   - 2  -SJ    Climbing 3-5 steps with a railing? 1 (P)   - 1  -SJ    To walk in hospital room? 2 (P)   - 2  -SJ    AM-PAC 6 Clicks Score (PT) 15 (P)   - 15  -SJ     Highest Level of Mobility Goal 4 --> Transfer to chair/commode (P)   - 4 --> Transfer to chair/commode  -      Row Name 07/04/24 1417          Functional Assessment    Outcome Measure Options AM-PAC 6 Clicks Basic Mobility (PT) (P)   -               User Key  (r) = Recorded By, (t) = Taken By, (c) = Cosigned By      Initials Name Provider Type     Lisa Hayes, RN Registered Nurse     Quincy Elizabeth, PT Student PT Student                                 Physical Therapy Education       Title: PT OT SLP Therapies (In Progress)       Topic: Physical Therapy (Done)       Point: Mobility training (Done)       Learning Progress Summary             Patient Eager, E, VU by  at 7/4/2024 1417    Comment: Educated on the importance of mobility during admission    Acceptance, E, VU by  at 7/3/2024 1527    Comment: Pt educated on the importance of mobility during admission    Eager, E, VU by  at 7/2/2024 1031    Comment: Educated on the importance of continued mobility and strengthening on rehab process    Acceptance, E,TB, VU by  at 7/2/2024 0800    Acceptance, E, VU by  at 7/1/2024 1633    Comment: Educated on the importance of standing and increasing strength and endurance during admission    Acceptance, E, VU by  at 7/1/2024 0845    Eager, E, VU by SC at 6/29/2024 1450    Comment: reviewed safety with sliding board    Acceptance, E, VU,DU,NR by  at 6/28/2024 1404    Eager, E, VU,DU by  at 6/27/2024 1539    Comment: Pt eudcated regarding WC management, transfers and symptom managment    Acceptance, E,D, VU by  at 6/26/2024 1608    Comment: Pt educated regarding transfers, weight bearing status and HEP.    Acceptance, E, VU,DU,NR by  at 6/25/2024 1107    Acceptance, E,H, VU by  at 6/24/2024 1105    Comment: Pt educated on bed positioning with L BKA and importance of exercise on rehab.    Eager, E, VU,DU by SC at 6/21/2024 1349    Comment: reviewed HEP    Acceptance, E,D, VU,NR by SD at  6/19/2024 1203    Comment: PT ed for expected outcomes, risks, and benefits of IPPT services and progression of activity. Pt given visual demo of unilateral gait with RW. Discussion regarding dispo planning and IRF.                         Point: Home exercise program (Done)       Learning Progress Summary             Patient Eager, E, VU by  at 7/4/2024 1417    Comment: Educated on the importance of mobility during admission    Acceptance, E, VU by  at 7/3/2024 1527    Comment: Pt educated on the importance of mobility during admission    Acceptance, E,TB, VU by  at 7/2/2024 0800    Acceptance, E, VU by  at 7/1/2024 0845    Eager, E, VU by SC at 6/29/2024 1450    Comment: reviewed safety with sliding board    Acceptance, E,D, VU by EM at 6/26/2024 1608    Comment: Pt educated regarding transfers, weight bearing status and HEP.    Acceptance, E, VU,DU,NR by  at 6/25/2024 1107    Acceptance, E,H, VU by  at 6/24/2024 1105    Comment: Pt educated on bed positioning with L BKA and importance of exercise on rehab.    Acceptance, E, VU,NR,DU by GEORGINA at 6/23/2024 1420    Eager, E, VU,DU by SC at 6/21/2024 1349    Comment: reviewed HEP    Acceptance, E,D, VU,NR by SD at 6/19/2024 1203    Comment: PT ed for expected outcomes, risks, and benefits of IPPT services and progression of activity. Pt given visual demo of unilateral gait with RW. Discussion regarding dispo planning and IRF.                         Point: Body mechanics (Done)       Learning Progress Summary             Patient Eager, E, VU by  at 7/4/2024 1417    Comment: Educated on the importance of mobility during admission    Acceptance, E, VU by  at 7/3/2024 1527    Comment: Pt educated on the importance of mobility during admission    Eager, E, VU by  at 7/2/2024 1031    Comment: Educated on the importance of continued mobility and strengthening on rehab process    Acceptance, E,TB, VU by  at 7/2/2024 0800    Acceptance, E, VU by  at  7/1/2024 1633    Comment: Educated on the importance of standing and increasing strength and endurance during admission    Acceptance, E, VU by  at 7/1/2024 0845    Eager, E, VU by SC at 6/29/2024 1450    Comment: reviewed safety with sliding board    Acceptance, E, VU,DU,NR by  at 6/28/2024 1404    Eager, E, VU,DU by EM at 6/27/2024 1539    Comment: Pt eudcated regarding WC management, transfers and symptom managment    Acceptance, E,D, VU by EM at 6/26/2024 1608    Comment: Pt educated regarding transfers, weight bearing status and HEP.    Acceptance, E, VU,DU,NR by  at 6/25/2024 1107    Acceptance, E,H, VU by  at 6/24/2024 1105    Comment: Pt educated on bed positioning with L BKA and importance of exercise on rehab.    Eager, E, VU,DU by SC at 6/21/2024 1349    Comment: reviewed HEP    Acceptance, E,D, VU,NR by SD at 6/19/2024 1203    Comment: PT ed for expected outcomes, risks, and benefits of IPPT services and progression of activity. Pt given visual demo of unilateral gait with RW. Discussion regarding dispo planning and IRF.                         Point: Precautions (Done)       Learning Progress Summary             Patient Eager, E, VU by  at 7/4/2024 1417    Comment: Educated on the importance of mobility during admission    Acceptance, E, VU by  at 7/3/2024 1527    Comment: Pt educated on the importance of mobility during admission    Eager, E, VU by  at 7/2/2024 1031    Comment: Educated on the importance of continued mobility and strengthening on rehab process    Acceptance, E,TB, VU by  at 7/2/2024 0800    Acceptance, E, VU by  at 7/1/2024 1633    Comment: Educated on the importance of standing and increasing strength and endurance during admission    Acceptance, E, VU by  at 7/1/2024 0845    Eager, E, VU by SC at 6/29/2024 1450    Comment: reviewed safety with sliding board    Acceptance, E, VU,DU,NR by  at 6/28/2024 1404    Eager, E, VU,DU by EM at 6/27/2024 1539    Comment: Pt  eudcated regarding WC management, transfers and symptom managment    Acceptance, E,D, VU by  at 6/26/2024 1608    Comment: Pt educated regarding transfers, weight bearing status and HEP.    Acceptance, E, VU,DU,NR by  at 6/25/2024 1107    Acceptance, E,H, VU by  at 6/24/2024 1105    Comment: Pt educated on bed positioning with L BKA and importance of exercise on rehab.    Eager, E, VU,DU by SC at 6/21/2024 1349    Comment: reviewed HEP    Acceptance, E,D, VU,NR by SD at 6/19/2024 1203    Comment: PT ed for expected outcomes, risks, and benefits of IPPT services and progression of activity. Pt given visual demo of unilateral gait with RW. Discussion regarding dispo planning and IRF.                                         User Key       Initials Effective Dates Name Provider Type Discipline    SC 02/03/23 -  Jorge Luis Childress, PT Physical Therapist PT    SD 03/13/23 -  Charity Dela Cruz, PT Physical Therapist PT    GEORGINA 06/16/21 -  Radha Miguel, PT Physical Therapist PT     09/21/23 -  Rachna Cruz, PT Physical Therapist PT     04/08/24 -  Marina Soto, RN Registered Nurse Nurse    EM 05/07/24 -  Rancho Schneider, PT Student PT Student PT     05/07/24 -  Quincy Elizabeth, PT Student PT Student PT                  PT Recommendation and Plan  Planned Therapy Interventions (PT): balance training, bed mobility training, gait training, home exercise program, joint mobilization, manual therapy techniques, stretching, strengthening, ROM (range of motion), prosthetic fitting/training, postural re-education, patient/family education, orthotic fitting/training, neuromuscular re-education, transfer training, wheelchair management/propulsion training  Plan of Care Reviewed With: (P) patient  Progress: (P) improving  Outcome Evaluation: (P) Pt able to complete two trials of hopping in the // bars with Mod A and wc follow. Pt with improved quality in hops this session from previous session. Pt with  continued improved quality and confidence gained with sliding board transfers. Pt presents below baseline with deficits in strength, balance, and activity tolerance. IPPT continues to be appropriate. PT recommended DC to IRF for best outcomes     Time Calculation:         PT Charges       Row Name 07/04/24 1322 07/04/24 1000          Time Calculation    Start Time 1322 (P)   -HM 1000  -MF     PT Goal Re-Cert Due Date -- 07/11/24  -MF        Timed Charges    79953 - Gait Training Minutes  8 (P)   -HM --     15389 - PT Therapeutic Activity Minutes 25 (P)   -HM --        Untimed Charges    53819-Ddm Pressure wound to 50 sqcm -- 10  -MF        Total Minutes    Timed Charges Total Minutes 33 (P)   -HM --     Untimed Charges Total Minutes -- 10  -MF      Total Minutes 33 (P)   - 10  -MF               User Key  (r) = Recorded By, (t) = Taken By, (c) = Cosigned By      Initials Name Provider Type     Cleve Flores, PT Physical Therapist     Quincy Elizabeth, PT Student PT Student                  Therapy Charges for Today       Code Description Service Date Service Provider Modifiers Qty    89125757215  PT THER PROC EA 15 MIN 7/3/2024 Quincy Elizabeth, PT Student GP 1    85916231834 HC PT THERAPEUTIC ACT EA 15 MIN 7/4/2024 Quincy Elizabeth, PT Student GP 2    [id number]  PT THER SUPP EA 15 MIN 7/4/2024 Quincy Elizabeth, PT Student GP 2            PT G-Codes  Outcome Measure Options: (P) AM-PAC 6 Clicks Basic Mobility (PT)  AM-PAC 6 Clicks Score (PT): (P) 15  AM-PAC 6 Clicks Score (OT): 16  PT Discharge Summary  Anticipated Discharge Disposition (PT): (P) inpatient rehabilitation facility    LINDA Francis  7/4/2024

## 2024-07-05 LAB
ANION GAP SERPL CALCULATED.3IONS-SCNC: 5 MMOL/L (ref 5–15)
BUN SERPL-MCNC: 13 MG/DL (ref 8–23)
BUN/CREAT SERPL: 21.7 (ref 7–25)
CALCIUM SPEC-SCNC: 7.1 MG/DL (ref 8.6–10.5)
CHLORIDE SERPL-SCNC: 101 MMOL/L (ref 98–107)
CO2 SERPL-SCNC: 27 MMOL/L (ref 22–29)
CREAT SERPL-MCNC: 0.6 MG/DL (ref 0.76–1.27)
DEPRECATED RDW RBC AUTO: 50.7 FL (ref 37–54)
EGFRCR SERPLBLD CKD-EPI 2021: 110.5 ML/MIN/1.73
ERYTHROCYTE [DISTWIDTH] IN BLOOD BY AUTOMATED COUNT: 14.3 % (ref 12.3–15.4)
GLUCOSE BLDC GLUCOMTR-MCNC: 106 MG/DL (ref 70–130)
GLUCOSE BLDC GLUCOMTR-MCNC: 112 MG/DL (ref 70–130)
GLUCOSE BLDC GLUCOMTR-MCNC: 137 MG/DL (ref 70–130)
GLUCOSE BLDC GLUCOMTR-MCNC: 92 MG/DL (ref 70–130)
GLUCOSE SERPL-MCNC: 80 MG/DL (ref 65–99)
HCT VFR BLD AUTO: 23 % (ref 37.5–51)
HGB BLD-MCNC: 7.5 G/DL (ref 13–17.7)
MCH RBC QN AUTO: 31.9 PG (ref 26.6–33)
MCHC RBC AUTO-ENTMCNC: 32.6 G/DL (ref 31.5–35.7)
MCV RBC AUTO: 97.9 FL (ref 79–97)
PLATELET # BLD AUTO: 171 10*3/MM3 (ref 140–450)
PMV BLD AUTO: 9.1 FL (ref 6–12)
POTASSIUM SERPL-SCNC: 4.2 MMOL/L (ref 3.5–5.2)
RBC # BLD AUTO: 2.35 10*6/MM3 (ref 4.14–5.8)
SODIUM SERPL-SCNC: 133 MMOL/L (ref 136–145)
WBC NRBC COR # BLD AUTO: 5.32 10*3/MM3 (ref 3.4–10.8)

## 2024-07-05 PROCEDURE — 25010000002 CEFTRIAXONE PER 250 MG: Performed by: INTERNAL MEDICINE

## 2024-07-05 PROCEDURE — 97605 NEG PRS WND THER DME<=50SQCM: CPT

## 2024-07-05 PROCEDURE — 85027 COMPLETE CBC AUTOMATED: CPT | Performed by: STUDENT IN AN ORGANIZED HEALTH CARE EDUCATION/TRAINING PROGRAM

## 2024-07-05 PROCEDURE — 63710000001 INSULIN GLARGINE PER 5 UNITS: Performed by: INTERNAL MEDICINE

## 2024-07-05 PROCEDURE — 80048 BASIC METABOLIC PNL TOTAL CA: CPT | Performed by: STUDENT IN AN ORGANIZED HEALTH CARE EDUCATION/TRAINING PROGRAM

## 2024-07-05 PROCEDURE — 97530 THERAPEUTIC ACTIVITIES: CPT

## 2024-07-05 PROCEDURE — 97535 SELF CARE MNGMENT TRAINING: CPT

## 2024-07-05 PROCEDURE — 82948 REAGENT STRIP/BLOOD GLUCOSE: CPT

## 2024-07-05 PROCEDURE — 99232 SBSQ HOSP IP/OBS MODERATE 35: CPT | Performed by: INTERNAL MEDICINE

## 2024-07-05 PROCEDURE — 97116 GAIT TRAINING THERAPY: CPT

## 2024-07-05 RX ADMIN — ACETAMINOPHEN 1000 MG: 500 TABLET, FILM COATED ORAL at 15:01

## 2024-07-05 RX ADMIN — PANTOPRAZOLE SODIUM 40 MG: 40 TABLET, DELAYED RELEASE ORAL at 09:00

## 2024-07-05 RX ADMIN — Medication 1 CAPSULE: at 09:00

## 2024-07-05 RX ADMIN — LISINOPRIL 10 MG: 10 TABLET ORAL at 09:00

## 2024-07-05 RX ADMIN — ASPIRIN 81 MG: 81 TABLET, COATED ORAL at 09:00

## 2024-07-05 RX ADMIN — Medication 10 ML: at 21:05

## 2024-07-05 RX ADMIN — OXYCODONE HYDROCHLORIDE 5 MG: 5 TABLET ORAL at 15:01

## 2024-07-05 RX ADMIN — ASPIRIN 81 MG: 81 TABLET, COATED ORAL at 21:04

## 2024-07-05 RX ADMIN — Medication 5 MG: at 21:04

## 2024-07-05 RX ADMIN — INSULIN GLARGINE 25 UNITS: 100 INJECTION, SOLUTION SUBCUTANEOUS at 21:03

## 2024-07-05 RX ADMIN — CEFTRIAXONE 2000 MG: 2 INJECTION, POWDER, FOR SOLUTION INTRAMUSCULAR; INTRAVENOUS at 08:59

## 2024-07-05 RX ADMIN — Medication 10 ML: at 09:04

## 2024-07-05 RX ADMIN — ACETAMINOPHEN 1000 MG: 500 TABLET, FILM COATED ORAL at 21:04

## 2024-07-05 RX ADMIN — Medication 10 ML: at 09:00

## 2024-07-05 RX ADMIN — PANTOPRAZOLE SODIUM 40 MG: 40 TABLET, DELAYED RELEASE ORAL at 18:51

## 2024-07-05 RX ADMIN — ACETAMINOPHEN 1000 MG: 500 TABLET, FILM COATED ORAL at 05:11

## 2024-07-05 RX ADMIN — OXYCODONE HYDROCHLORIDE 5 MG: 5 TABLET ORAL at 18:51

## 2024-07-05 NOTE — PLAN OF CARE
Goal Outcome Evaluation:  Plan of Care Reviewed With: (P) patient        Progress: (P) improving  Outcome Evaluation: (P) Pt with better quality hops this session in // bars with Mod A and WC follow. Pt able to complete two trials of 4' of hopping. Pt continues to present below baseline with deficits in strength, balance, and poor activity tolerance. IPPT continues to be appropriate during admission. PT recommended DC to IRF for best functional outcomes.      Anticipated Discharge Disposition (PT): (P) inpatient rehabilitation facility

## 2024-07-05 NOTE — PLAN OF CARE
Problem: Skin Injury Risk Increased  Goal: Skin Health and Integrity  Outcome: Ongoing, Progressing  Intervention: Optimize Skin Protection  Recent Flowsheet Documentation  Taken 7/5/2024 0410 by Lester Enciso RN  Head of Bed (HOB) Positioning: HOB elevated  Taken 7/5/2024 0210 by Lester Enciso RN  Head of Bed (HOB) Positioning: HOB elevated  Taken 7/5/2024 0019 by Lester Enciso RN  Head of Bed (HOB) Positioning: HOB elevated  Taken 7/4/2024 2010 by Lester Enciso RN  Head of Bed (HOB) Positioning: HOB elevated  Taken 7/4/2024 2002 by Lester Enciso RN  Pressure Reduction Techniques: frequent weight shift encouraged  Head of Bed (HOB) Positioning: HOB at 30 degrees  Pressure Reduction Devices: pressure-redistributing mattress utilized  Skin Protection:   adhesive use limited   tubing/devices free from skin contact     Problem: Fall Injury Risk  Goal: Absence of Fall and Fall-Related Injury  Outcome: Ongoing, Progressing  Intervention: Identify and Manage Contributors  Recent Flowsheet Documentation  Taken 7/4/2024 2002 by Lester Enciso RN  Medication Review/Management: medications reviewed  Intervention: Promote Injury-Free Environment  Recent Flowsheet Documentation  Taken 7/5/2024 0410 by Lester Enciso RN  Safety Promotion/Fall Prevention:   activity supervised   assistive device/personal items within reach   clutter free environment maintained   fall prevention program maintained   gait belt   mobility aid in reach   nonskid shoes/slippers when out of bed   room organization consistent   safety round/check completed  Taken 7/5/2024 0210 by Lester Enciso RN  Safety Promotion/Fall Prevention:   activity supervised   assistive device/personal items within reach   clutter free environment maintained   fall prevention program maintained   gait belt   mobility aid in reach   nonskid shoes/slippers when out of bed   room organization consistent   safety round/check completed  Taken 7/5/2024 0019 by  Enciso, Lester, RN  Safety Promotion/Fall Prevention:   activity supervised   assistive device/personal items within reach   clutter free environment maintained   fall prevention program maintained   gait belt   mobility aid in reach   nonskid shoes/slippers when out of bed   room organization consistent   toileting scheduled  Taken 7/4/2024 2002 by Lester Enciso RN  Safety Promotion/Fall Prevention:   assistive device/personal items within reach   clutter free environment maintained   fall prevention program maintained   gait belt   mobility aid in reach   nonskid shoes/slippers when out of bed   room organization consistent   safety round/check completed     Problem: Adjustment to Surgery (Extremity Amputation)  Goal: Optimal Coping with Amputation  Outcome: Ongoing, Progressing  Intervention: Support Psychsocial Response  Recent Flowsheet Documentation  Taken 7/5/2024 0019 by Lester Enciso RN  Family/Support System Care: support provided  Taken 7/4/2024 2010 by Lester Enciso RN  Family/Support System Care:   self-care encouraged   support provided  Taken 7/4/2024 2002 by Lester Enciso RN  Supportive Measures: self-care encouraged  Family/Support System Care:   self-care encouraged   support provided     Problem: Bleeding (Extremity Amputation)  Goal: Absence of Bleeding  Outcome: Ongoing, Progressing  Intervention: Monitor and Manage Bleeding  Recent Flowsheet Documentation  Taken 7/5/2024 0410 by Lester Enciso RN  Bleeding Management: dressing monitored  Taken 7/5/2024 0210 by Lester Enciso RN  Bleeding Management: dressing monitored  Taken 7/5/2024 0019 by Lester Enciso RN  Bleeding Management: dressing monitored  Taken 7/4/2024 2002 by Lester Enciso RN  Bleeding Management: dressing monitored     Problem: Bowel Motility Impaired (Extremity Amputation)  Goal: Effective Bowel Elimination  Outcome: Ongoing, Progressing  Intervention: Enhance Bowel Motility and Elimination  Recent Flowsheet  Documentation  Taken 7/4/2024 2002 by Lester Enciso RN  Bowel Motility Enhancement: fluid intake encouraged     Problem: Fluid and Electrolyte Imbalance (Extremity Amputation)  Goal: Fluid and Electrolyte Balance  Outcome: Ongoing, Progressing  Intervention: Monitor and Manage Fluid and Electrolyte Balance  Recent Flowsheet Documentation  Taken 7/4/2024 2002 by Lester Enciso RN  Fluid/Electrolyte Management: fluids provided     Problem: Functional Ability Impaired (Extremity Amputation)  Goal: Optimal Functional Ability  Outcome: Ongoing, Progressing  Intervention: Optimize Functional Ability  Recent Flowsheet Documentation  Taken 7/5/2024 0210 by Lester Enciso RN  Activity Management: activity minimized  Taken 7/4/2024 2010 by Lester Enciso RN  Activity Management: activity encouraged  Taken 7/4/2024 2002 by Lester Enciso RN  Activity Management: activity encouraged     Problem: Infection (Extremity Amputation)  Goal: Absence of Infection Signs and Symptoms  Outcome: Ongoing, Progressing  Intervention: Prevent or Manage Infection  Recent Flowsheet Documentation  Taken 7/5/2024 0410 by Lester Enciso RN  Infection Prevention:   environmental surveillance performed   single patient room provided   visitors restricted/screened  Taken 7/5/2024 0210 by Lester Enciso RN  Infection Prevention:   environmental surveillance performed   rest/sleep promoted   single patient room provided  Taken 7/5/2024 0019 by Lester Enciso RN  Infection Prevention:   environmental surveillance performed   rest/sleep promoted   single patient room provided  Taken 7/4/2024 2010 by Lester Enciso RN  Infection Prevention:   environmental surveillance performed   rest/sleep promoted   single patient room provided  Taken 7/4/2024 2002 by Lester Enciso RN  Infection Prevention: environmental surveillance performed     Problem: Ongoing Anesthesia Effects (Extremity Amputation)  Goal: Anesthesia/Sedation Recovery  Outcome:  Ongoing, Progressing  Intervention: Optimize Anesthesia Recovery  Recent Flowsheet Documentation  Taken 7/5/2024 0410 by Lester Enciso RN  Safety Promotion/Fall Prevention:   activity supervised   assistive device/personal items within reach   clutter free environment maintained   fall prevention program maintained   gait belt   mobility aid in reach   nonskid shoes/slippers when out of bed   room organization consistent   safety round/check completed  Taken 7/5/2024 0210 by Lester Enciso RN  Safety Promotion/Fall Prevention:   activity supervised   assistive device/personal items within reach   clutter free environment maintained   fall prevention program maintained   gait belt   mobility aid in reach   nonskid shoes/slippers when out of bed   room organization consistent   safety round/check completed  Taken 7/5/2024 0019 by Lester Enciso RN  Safety Promotion/Fall Prevention:   activity supervised   assistive device/personal items within reach   clutter free environment maintained   fall prevention program maintained   gait belt   mobility aid in reach   nonskid shoes/slippers when out of bed   room organization consistent   toileting scheduled  Taken 7/4/2024 2002 by Lester Enciso RN  Safety Promotion/Fall Prevention:   assistive device/personal items within reach   clutter free environment maintained   fall prevention program maintained   gait belt   mobility aid in reach   nonskid shoes/slippers when out of bed   room organization consistent   safety round/check completed  Taken 7/4/2024 2000 by Lester Enciso RN  Administration (IS): self-administered     Problem: Pain (Extremity Amputation)  Goal: Acceptable Pain Control  Outcome: Ongoing, Progressing  Intervention: Prevent or Manage Pain  Recent Flowsheet Documentation  Taken 7/5/2024 0410 by Lester Enciso RN  Pain Management Interventions: no interventions per patient request  Diversional Activities: television  Taken 7/5/2024 0210 by Fernie  OH Batista  Pain Management Interventions: no interventions per patient request  Taken 7/4/2024 2010 by Lester Enciso RN  Pain Management Interventions: see MAR  Diversional Activities:   smartphone   television  Taken 7/4/2024 2002 by Lester Enciso RN  Pain Management Interventions:   quiet environment facilitated   position adjusted  Diversional Activities:   smartphone   tablet     Problem: Postoperative Nausea and Vomiting (Extremity Amputation)  Goal: Nausea and Vomiting Relief  Outcome: Ongoing, Progressing     Problem: Postoperative Urinary Retention (Extremity Amputation)  Goal: Effective Urinary Elimination  Outcome: Ongoing, Progressing     Problem: Residual Limb Care (Extremity Amputation)  Goal: Optimal Residual Limb Healing  Outcome: Ongoing, Progressing     Problem: Mobility Impairment  Goal: Optimal Mobility  Outcome: Ongoing, Progressing  Intervention: Optimize Mobility  Recent Flowsheet Documentation  Taken 7/5/2024 0410 by Lester Enciso RN  Positioning/Transfer Devices:   pillows   in use  Taken 7/5/2024 0210 by Lester Enciso RN  Activity Management: activity minimized  Positioning/Transfer Devices:   pillows   in use  Taken 7/5/2024 0019 by Lester Enciso RN  Positioning/Transfer Devices:   pillows   in use  Taken 7/4/2024 2010 by Lester Enciso RN  Activity Management: activity encouraged  Positioning/Transfer Devices:   pillows   in use  Taken 7/4/2024 2002 by Lester Enciso RN  Activity Management: activity encouraged  Positioning/Transfer Devices:   pillows   in use     Problem: Adult Inpatient Plan of Care  Goal: Plan of Care Review  Outcome: Ongoing, Progressing  Goal: Patient-Specific Goal (Individualized)  Outcome: Ongoing, Progressing  Goal: Absence of Hospital-Acquired Illness or Injury  Outcome: Ongoing, Progressing  Intervention: Identify and Manage Fall Risk  Recent Flowsheet Documentation  Taken 7/5/2024 0410 by Lester Enciso RN  Safety Promotion/Fall Prevention:    activity supervised   assistive device/personal items within reach   clutter free environment maintained   fall prevention program maintained   gait belt   mobility aid in reach   nonskid shoes/slippers when out of bed   room organization consistent   safety round/check completed  Taken 7/5/2024 0210 by Lester Enciso RN  Safety Promotion/Fall Prevention:   activity supervised   assistive device/personal items within reach   clutter free environment maintained   fall prevention program maintained   gait belt   mobility aid in reach   nonskid shoes/slippers when out of bed   room organization consistent   safety round/check completed  Taken 7/5/2024 0019 by Lester Enciso RN  Safety Promotion/Fall Prevention:   activity supervised   assistive device/personal items within reach   clutter free environment maintained   fall prevention program maintained   gait belt   mobility aid in reach   nonskid shoes/slippers when out of bed   room organization consistent   toileting scheduled  Taken 7/4/2024 2002 by Lester Enciso RN  Safety Promotion/Fall Prevention:   assistive device/personal items within reach   clutter free environment maintained   fall prevention program maintained   gait belt   mobility aid in reach   nonskid shoes/slippers when out of bed   room organization consistent   safety round/check completed  Intervention: Prevent Skin Injury  Recent Flowsheet Documentation  Taken 7/5/2024 0410 by Lester Enciso RN  Body Position: position changed independently  Taken 7/5/2024 0210 by Lester Enciso RN  Body Position: position changed independently  Taken 7/5/2024 0019 by Lester Enciso RN  Body Position: position changed independently  Taken 7/4/2024 2010 by Lester Enciso RN  Body Position: position changed independently  Taken 7/4/2024 2002 by Lester Enciso RN  Body Position: position changed independently  Skin Protection:   adhesive use limited   tubing/devices free from skin contact  Intervention:  Prevent and Manage VTE (Venous Thromboembolism) Risk  Recent Flowsheet Documentation  Taken 7/5/2024 0410 by Lester Enciso RN  VTE Prevention/Management:   bilateral   sequential compression devices off  Taken 7/5/2024 0210 by Lester Enciso RN  Activity Management: activity minimized  VTE Prevention/Management:   bilateral   sequential compression devices on  Taken 7/5/2024 0019 by Lester Enciso RN  VTE Prevention/Management:   bilateral   sequential compression devices off  Taken 7/4/2024 2010 by Lester Enciso RN  Activity Management: activity encouraged  VTE Prevention/Management:   bilateral   sequential compression devices off  Taken 7/4/2024 2002 by Lester Enciso RN  Activity Management: activity encouraged  VTE Prevention/Management:   bilateral   sequential compression devices off  Range of Motion: active ROM (range of motion) encouraged  Intervention: Prevent Infection  Recent Flowsheet Documentation  Taken 7/5/2024 0410 by Lester Enciso RN  Infection Prevention:   environmental surveillance performed   single patient room provided   visitors restricted/screened  Taken 7/5/2024 0210 by Lester Enciso RN  Infection Prevention:   environmental surveillance performed   rest/sleep promoted   single patient room provided  Taken 7/5/2024 0019 by Lester Enciso RN  Infection Prevention:   environmental surveillance performed   rest/sleep promoted   single patient room provided  Taken 7/4/2024 2010 by Lester Enciso RN  Infection Prevention:   environmental surveillance performed   rest/sleep promoted   single patient room provided  Taken 7/4/2024 2002 by Lester Enciso RN  Infection Prevention: environmental surveillance performed  Goal: Optimal Comfort and Wellbeing  Outcome: Ongoing, Progressing  Intervention: Monitor Pain and Promote Comfort  Recent Flowsheet Documentation  Taken 7/5/2024 0410 by Lester Enciso RN  Pain Management Interventions: no interventions per patient request  Taken  7/5/2024 0210 by Lester Enciso, RN  Pain Management Interventions: no interventions per patient request  Taken 7/4/2024 2010 by Lester Enciso RN  Pain Management Interventions: see MAR  Taken 7/4/2024 2002 by Lester Enciso RN  Pain Management Interventions:   quiet environment facilitated   position adjusted  Intervention: Provide Person-Centered Care  Recent Flowsheet Documentation  Taken 7/4/2024 2010 by Lester Enciso RN  Trust Relationship/Rapport: care explained  Taken 7/4/2024 2002 by Lester Enciso RN  Trust Relationship/Rapport: care explained  Goal: Readiness for Transition of Care  Outcome: Ongoing, Progressing   Goal Outcome Evaluation:    Pt resting in bed. VSS. Wound vac to right hip patent with no leaks. Left BKA dressing dry and intact.

## 2024-07-05 NOTE — CASE MANAGEMENT/SOCIAL WORK
Case Management Discharge Note      Final Note: Pt to transfer to [place] for acute rehab on 7/6 via [facility] at 1000. PCS has been sent. Report can be called to [phone]. They can pull the dc summary from Epic. Pt is in agreement with plan         Selected Continued Care - Admitted Since 6/12/2024       Destination Coordination complete.      Service Provider Selected Services Address Phone Fax Patient Preferred    [facility] [address] [id number] [phone] --              Durable Medical Equipment    No services have been selected for the patient.                Dialysis/Infusion    No services have been selected for the patient.                Home Medical Care    No services have been selected for the patient.                Therapy    No services have been selected for the patient.                Community Resources    No services have been selected for the patient.                Community & DME    No services have been selected for the patient.                         Final Discharge Disposition Code: 62 - inpatient rehab facility

## 2024-07-05 NOTE — PLAN OF CARE
Goal Outcome Evaluation:  Plan of Care Reviewed With: patient    Patient A&Ox4 and VSS on RA. Wound vac in place. PICC in place. PRN PO pain medication administered and effective per patient. Call light in reach

## 2024-07-05 NOTE — PLAN OF CARE
Problem: Adult Inpatient Plan of Care  Goal: Plan of Care Review  Recent Flowsheet Documentation  Taken 7/5/2024 1420 by Jazmín Blakely OT  Progress: no change  Plan of Care Reviewed With: patient  Outcome Evaluation: Pt is A/Ox4 and participates in therapy with encouragement. Education reinforced for L residual limb NWB precautions. Max A to don scrub pants for OOB activity. Min A bed mobility. Min Ax1+1 sliding board transfers. Requires assist for w/c and slinding board Set-up. BUE HEP completed to support transfer training. OT will continue to follow IP. Plan is IRF when pt is medically ready.

## 2024-07-05 NOTE — PROGRESS NOTES
[facility] Medicine Services  PROGRESS NOTE    Patient Name: Easton Alvarez  : 1965  MRN: [id number]    Date of Admission: 2024  Primary Care Physician: Provider, No Known    Subjective   Subjective     CC: f/u hip infxn    HPI: Up in bed resting comfortably. No issues overnight.      Objective   Objective     Vital Signs:   Temp:  [98 °F (36.7 °C)-98.9 °F (37.2 °C)] 98.3 °F (36.8 °C)  Heart Rate:  [] 95  Resp:  [16-18] 16  BP: (108-146)/(67-83) 140/83     Physical Exam:  Constitutional: No acute distress, resting comfortably  HENT: NCAT, mucous membranes moist  Respiratory: Clear to auscultation bilaterally, respiratory effort normal   Cardiovascular: RRR, no murmurs, rubs, or gallops  Gastrointestinal: Positive bowel sounds, soft, nontender, nondistended  Musculoskeletal: Right hip with wound vac in place, no surrounding redness.  Psychiatric: Appropriate affect, cooperative  Neurologic: Oriented x 3, strength symmetric in all extremities, Cranial Nerves grossly intact to confrontation, speech clear  Skin: No rashes     Results Reviewed:  LAB RESULTS:      Lab 24  0508 24  0542 24  0825 24  0401 24  1114   WBC 5.32 5.61 6.15 7.76 7.81   HEMOGLOBIN 7.5* 8.0* 8.1* 8.0* 9.3*   HEMATOCRIT 23.0* 24.3* 24.9* 23.5* 27.8*   PLATELETS 171 160 148 117* 126*   MCV 97.9* 96.4 98.0* 94.0 96.9   PROTIME  --   --   --  16.6*  --          Lab 24  0508 24  0542 24  0825 24  1800 24  0401 24  1114   SODIUM 133* 137 138  --  137 138   POTASSIUM 4.2 4.3 5.3* 4.5 3.6 4.8   CHLORIDE 101 106 106  --  104 104   CO2 27.0 26.0 27.0  --  26.0 28.0   ANION GAP 5.0 5.0 5.0  --  7.0 6.0   BUN 13 14 16  --  18 17   CREATININE 0.60* 0.63* 0.58*  --  0.65* 0.80   EGFR 110.5 108.9 111.6  --  107.9 101.3   GLUCOSE 80 85 89  --  79 95   CALCIUM 7.1* 7.4* 7.6*  --  7.2* 8.0*         Lab 24  0401 24  1114   TOTAL PROTEIN 4.8*  6.1   ALBUMIN 1.7* 1.8*   GLOBULIN 3.1 4.3   ALT (SGPT) 19 21   AST (SGOT) 39 45*   BILIRUBIN 0.4 0.6   ALK PHOS 235* 233*         Lab 07/02/24  0401   PROTIME 16.6*   INR 1.33*                 Brief Urine Lab Results  (Last result in the past 365 days)        Color   Clarity   Blood   Leuk Est   Nitrite   Protein   CREAT   Urine HCG        06/14/24 1109 Yellow   Clear   Negative   Negative   Negative   Negative                   Microbiology Results Abnormal       Procedure Component Value - Date/Time    Fungus Culture - Synovium, Hip, Right [[id number]] Collected: 06/20/24 1700    Lab Status: Preliminary result Specimen: Synovium from Hip, Right Updated: 07/04/24 1831     Fungus Culture No fungus isolated at 2 weeks    Fungus Culture - Synovium, Hip, Right [[id number]] Collected: 06/20/24 1700    Lab Status: Preliminary result Specimen: Synovium from Hip, Right Updated: 07/04/24 1831     Fungus Culture No fungus isolated at 2 weeks    Fungus Culture - Synovium, Hip, Right [[id number]] Collected: 06/20/24 1700    Lab Status: Preliminary result Specimen: Synovium from Hip, Right Updated: 07/04/24 1831     Fungus Culture No fungus isolated at 2 weeks    AFB Culture - Synovium, Hip, Right [[id number]] Collected: 06/20/24 1700    Lab Status: Preliminary result Specimen: Synovium from Hip, Right Updated: 07/04/24 1831     AFB Culture No AFB isolated at 2 weeks     AFB Stain No acid fast bacilli seen on concentrated smear    AFB Culture - Synovium, Hip, Right [[id number]] Collected: 06/20/24 1700    Lab Status: Preliminary result Specimen: Synovium from Hip, Right Updated: 07/04/24 1831     AFB Culture No AFB isolated at 2 weeks     AFB Stain No acid fast bacilli seen on concentrated smear    AFB Culture - Synovium, Hip, Right [[id number]] Collected: 06/20/24 1700    Lab Status: Preliminary result Specimen: Synovium from Hip, Right Updated: 07/04/24 1831     AFB Culture No AFB isolated at 2 weeks     AFB Stain No acid fast  bacilli seen on concentrated smear    Fungus Culture - Surgical Site, Leg, Left [[id number]] Collected: 06/13/24 1031    Lab Status: Preliminary result Specimen: Surgical Site from Leg, Left Updated: 07/04/24 1201     Fungus Culture No fungus isolated at 3 weeks    AFB Culture - Surgical Site, Leg, Left [[id number]] Collected: 06/13/24 1031    Lab Status: Preliminary result Specimen: Surgical Site from Leg, Left Updated: 07/04/24 1201     AFB Culture No AFB isolated at 3 weeks     AFB Stain No acid fast bacilli seen on concentrated smear    Fungus Culture - Aspirate, Hip, Right [[id number]] Collected: 06/18/24 1052    Lab Status: Preliminary result Specimen: Aspirate from Hip, Right Updated: 07/02/24 1130     Fungus Culture No fungus isolated at 2 weeks    AFB Culture - Aspirate, Hip, Right [[id number]] Collected: 06/18/24 1052    Lab Status: Preliminary result Specimen: Aspirate from Hip, Right Updated: 07/02/24 1130     AFB Culture No AFB isolated at 2 weeks     AFB Stain No acid fast bacilli seen on concentrated smear    Anaerobic Culture 10 Day Incubation - Synovium, Hip, Right [[id number]]  (Normal) Collected: 06/20/24 1700    Lab Status: Final result Specimen: Synovium from Hip, Right Updated: 06/30/24 0719     Anaerobic Culture No anaerobes isolated at 10 days    Anaerobic Culture 10 Day Incubation - Synovium, Hip, Right [[id number]]  (Normal) Collected: 06/20/24 1700    Lab Status: Edited Result - FINAL Specimen: Synovium from Hip, Right Updated: 06/30/24 0719     Anaerobic Culture No anaerobes isolated at 10 days    Anaerobic Culture 10 Day Incubation - Synovium, Hip, Right [[id number]]  (Normal) Collected: 06/20/24 1700    Lab Status: Final result Specimen: Synovium from Hip, Right Updated: 06/30/24 0719     Anaerobic Culture No anaerobes isolated at 10 days    Fungus Culture - Wound, Leg, Left [[id number]] Collected: 06/15/24 1501    Lab Status: Preliminary result Specimen: Wound from Leg, Left Updated: 06/29/24  2215     Fungus Culture No fungus isolated at 2 weeks    AFB Culture - Wound, Leg, Left [[id number]] Collected: 06/15/24 1501    Lab Status: Preliminary result Specimen: Wound from Leg, Left Updated: 06/29/24 2215     AFB Culture No AFB isolated at 2 weeks     AFB Stain No acid fast bacilli seen on concentrated smear    Anaerobic Culture 10 Day Incubation - Wound, Leg, Left [[id number]]  (Normal) Collected: 06/15/24 1501    Lab Status: Final result Specimen: Wound from Leg, Left Updated: 06/26/24 0647     Anaerobic Culture No anaerobes isolated at 10 days    Tissue / Bone Culture - Synovium, Hip, Right [[id number]] Collected: 06/20/24 1700    Lab Status: Final result Specimen: Synovium from Hip, Right Updated: 06/23/24 1010     Tissue Culture No growth at 3 days     Gram Stain Many (4+) Red blood cells      Moderate (3+) WBCs seen      No organisms seen    Tissue / Bone Culture - Synovium, Hip, Right [[id number]] Collected: 06/20/24 1700    Lab Status: Final result Specimen: Synovium from Hip, Right Updated: 06/23/24 1009     Tissue Culture No growth at 3 days     Gram Stain Moderate (3+) WBCs seen      No organisms seen     Comment: Modified report. Previous result was Rare (1+) Gram positive cocci in pairs on 6/20/2024 at 1949 EDT.         Many (4+) Red blood cells    Tissue / Bone Culture - Synovium, Hip, Right [[id number]] Collected: 06/20/24 1700    Lab Status: Final result Specimen: Synovium from Hip, Right Updated: 06/23/24 1009     Tissue Culture No growth at 3 days     Gram Stain Many (4+) Red blood cells      Few (2+) WBCs seen      No organisms seen    Blood Culture - Blood, Hand, Left [[id number]]  (Normal) Collected: 06/15/24 1104    Lab Status: Final result Specimen: Blood from Hand, Left Updated: 06/20/24 1201     Blood Culture No growth at 5 days    Blood Culture - Blood, Hand, Right [[id number]]  (Normal) Collected: 06/15/24 1104    Lab Status: Final result Specimen: Blood from Hand, Right Updated:  06/20/24 1201     Blood Culture No growth at 5 days    Wound Culture - Wound, Leg, Left [[id number]] Collected: 06/15/24 1501    Lab Status: Final result Specimen: Wound from Leg, Left Updated: 06/19/24 0711     Wound Culture No growth at 3 days     Gram Stain Rare (1+) WBCs seen      No organisms seen    Anaerobic Culture - Surgical Site, Leg, Left [[id number]]  (Normal) Collected: 06/13/24 1031    Lab Status: Final result Specimen: Surgical Site from Leg, Left Updated: 06/18/24 0751     Anaerobic Culture No anaerobes isolated at 5 days    Blood Culture - Blood, Arm, Left [[id number]]  (Normal) Collected: 06/12/24 1832    Lab Status: Final result Specimen: Blood from Arm, Left Updated: 06/17/24 1900     Blood Culture No growth at 5 days    Blood Culture - Blood, Hand, Right [[id number]]  (Normal) Collected: 06/12/24 1837    Lab Status: Final result Specimen: Blood from Hand, Right Updated: 06/17/24 1900     Blood Culture No growth at 5 days    Fungus Smear - Surgical Site, Leg, Left [[id number]] Collected: 06/13/24 1031    Lab Status: Final result Specimen: Surgical Site from Leg, Left Updated: 06/14/24 1406     Fungal Stain No fungal elements seen            No radiology results from the last 24 hrs    Results for orders placed during the hospital encounter of 06/12/24    Adult Transthoracic Echo Complete w/ Color, Spectral and Contrast if Necessary Per Protocol    Interpretation Summary    Left ventricular systolic function is normal. Left ventricular ejection fraction appears to be 56 - 60%.    Left ventricular wall thickness is consistent with borderline concentric hypertrophy.    Left ventricular diastolic function is consistent with (grade Ia w/high LAP) impaired relaxation.      Current medications:  Scheduled Meds:acetaminophen, 1,000 mg, Oral, Q8H  aspirin, 81 mg, Oral, Q12H  cefTRIAXone, 2,000 mg, Intravenous, Q24H  insulin glargine, 25 Units, Subcutaneous, Nightly  insulin lispro, 2-9 Units, Subcutaneous, 4x  Daily AC & at Bedtime  Insulin Lispro, 8 Units, Subcutaneous, TID With Meals  lactobacillus acidophilus, 1 capsule, Oral, Daily  lisinopril, 10 mg, Oral, Q24H  melatonin, 5 mg, Oral, Nightly  pantoprazole, 40 mg, Oral, BID AC  senna-docusate sodium, 2 tablet, Oral, BID  sodium chloride, 10 mL, Intravenous, Q12H  sodium chloride, 10 mL, Intravenous, Q12H      Continuous Infusions:lactated ringers, 9 mL/hr, Last Rate: 9 mL/hr (06/15/24 1307)  lactated ringers, 100 mL/hr, Last Rate: 100 mL/hr (06/20/24 2122)  ropivacaine, , Last Rate: Stopped (06/17/24 2230)      PRN Meds:.  senna-docusate sodium **AND** polyethylene glycol **AND** bisacodyl **AND** bisacodyl    Calcium Replacement - Follow Nurse / BPA Driven Protocol    dextrose    dextrose    glucagon (human recombinant)    Magnesium Standard Dose Replacement - Follow Nurse / BPA Driven Protocol    nitroglycerin    oxyCODONE    Phosphorus Replacement - Follow Nurse / BPA Driven Protocol    Potassium Replacement - Follow Nurse / BPA Driven Protocol    sodium chloride    sodium chloride    sodium chloride    Assessment & Plan   Assessment & Plan     Active Hospital Problems    Diagnosis  POA    Severe malnutrition [E43]  Yes      Resolved Hospital Problems    Diagnosis Date Resolved POA    **Diabetic ulcer of left foot with necrosis of muscle [E11.621, L97.523] 07/04/2024 Yes    Severe malnutrition [E43] 07/04/2024 Yes    Infection of prosthetic total hip joint [T84.59XA, Z96.649] 07/04/2024 Not Applicable        Brief Hospital Course to date:  Easton Alvarez is a 58 y.o. male with past medical history significant for T2DM, diabetic neuropathy, right hip fracture/ORIF ([facility] 2/2024), and right transmetatarsal amputation 2019 at  who presented to OSH ED on 6/12/2024 with severe left foot cellulitis/necrosis after recent injury. Lactate at OSH was 8.8 and CT imaging of left leg showed gas gangrene. Orthopedic surgeon Dr. Em was consulted and performed a left  below-knee amputation on 6/13/2024 with further debridement and irrigation on 6/15. Infectious disease has followed and managed antimicrobial therapy.     Sepsis secondary to group B strep bacteremia  Left foot cellulitis/gangrene s/p left BKA 6/13  Leukocytosis  -MRI left foot showed extensive soft tissue infection with abscesses along the foot to the ankle and into the lower leg with soft tissue gas.    -Wound cultures 6/12 with heavy growth group B strep and Kebsiella, blood cultures x 2 with group B strep; wound culture 6/13 with group B strep  -TTE described as technically adequate by cardiology; no description of vegetation by cardiology per report   -s/p left BKA with wound VAC on 6/13/24    -s/p further debridement in OR on 6/15/24 by Dr. Em, drain removed 6/17  -Infectious disease, Dr. Bills following - d/w him today - would like ortho to come by to take another look at wound prior to d/c in am - will notify. Plan to continue IV ceftriaxone for at least 6 weeks then transition to PO Keflex indefinitely   -PT wound care following  -Pain control with scheduled Tylenol, PRN oxycodone.   -Daily dressing changes to surgical stump site: Xeroform, 4X4, Kerlix, ACE  -Follow up with Dr. Em 2 weeks for wound check, imaging     Right glenys-prosthetic hip infection  -6/18 fluid aspiration from the hip which was purulent, rare growth group B strep  -6/19 MRI femur with large gas containing fluid collection  -s/p I&D on 6/20, Dr. Setih. Wound vac placed, removed  -Fluid culture 6/18 with rare growth group B strep  -Supportive care, mobilize, pain control. Antibiotics as above  - increased bloody/purulent drainage from site since wound vac removed, saturating dressings.  Nursing discussed with Dr. Sethi who requested wound vac be replaced, CT was cancelled as he felt would not provide any new information. WOC consulted     Concern for GIB  Acute on chronic anemia  -with melena and fecal occult positive  overnight 6/14  -GI consulted, recommended medical management with PPI BID x 1 month.  -H&H however seems to be trending down, but is stable, plan remains as above     HFpEF   Elevated blood pressure  Anasarca   -No history of taking meds for BP per patient   -BP has been running high this admission, possibly secondary to pain  -Echo from 6/18 revealed EF 56-60% with diastolic dysfunction  -Started lisinopril 10 mg daily, resumed 6/30  -Lasix 40 mg IV x1   -Strict I&O, daily weights      Elevated LFTs  Thrombocytopenia, improved  Mild hyponatremia, clinically insignificant  -Continue to monitor intermittently.   -GI referral at discharge for elevated LFTs, thrombocytopenia and concern for early cirrhosis, plan to pursue liver US after recovery from acute illness.     Anion gap metabolic acidosis POA, resolved   -AG was 20.1, likely from lactate, serum acetone negative.     Poorly controlled type 2 diabetes with A1c 9.5%   -Patient was not taking meds or checking glucoses, though insulin had been prescribed in the past.  -Continue basal, prandial and SSI, adjust as warranted   -Diabetes educator has seen      To rehab tomorrow am as long as all in agreement.    Expected Discharge Location and Transportation:   Expected Discharge   Expected Discharge Date: 7/5/2024; Expected Discharge Time:  9:00 AM     VTE Prophylaxis:  Mechanical VTE prophylaxis orders are present.         AM-PAC 6 Clicks Score (PT): 15 (07/04/24 2002)    CODE STATUS:   Code Status and Medical Interventions:   Ordered at: 06/20/24 2026     Level Of Support Discussed With:    Patient     Code Status (Patient has no pulse and is not breathing):    CPR (Attempt to Resuscitate)     Medical Interventions (Patient has pulse or is breathing):    Full       Luz Green II, DO  07/05/24

## 2024-07-05 NOTE — PROGRESS NOTES
"Easton Alvarez  1965  [id number]          Chief Complaint: left foot infection    Reason for Consultation: left foot infection    History of present illness:     Patient is a 58 y.o.  Yr old male with history of diabetes with prior right TMA 2019 at , history strep septicemia 2016 ; he reports right total hip arthroplasty 2024 after fall. He reports a fall several weeks prior to his June admission with injury to the left foot, wound present with deterioration several days preceding admission with severe discoloration/redness and swelling.  CT scan showed concern for subcutaneous emphysema and necrotizing infection.  Transferred to [facility] with evaluation by Dr. Em and Dr. Lundberg and arrangements made for urgent surgery. Subsequently, blood cultures called with gram-positive cocci     6/13 left LE amp, Dr Em    6/15  \"PROCEDURE:            Left      35346: Secondary closure above-knee amputation\"      6/18/24  right hip aspiration, culture with GB strep; wbc  down some postop from revision; blood cultures 6/15 negative so far    6/19/24 Dr. Em helping facilitate right hip surgery with orthopedic partners/team    6/20/24 surgery Dr Sethi  \"Procedure(s):  HIP ANTERIOR INCISION AND DRAINAGE WITH HANA TABLE, POLY SWAP- RIGHT\"    6/26/24 urinary retention per nursing notes, Dr Goodwin aware.  Dr Goodwin reports later that patient voiding without  retention issues    7/2/24   Per discussion with medicine team, GI has seen him regarding abnormal LFT/thrombocytopenia with concern for early cirrhosis/splenomegaly and further workup at their discretion.     7/3/24 wound care team noted drainage at surgical site, wound vac applied    7/5/24 ortho team to assess hip incision ; no new pain or distress per nursing; case management looking into options;  postop pain controlled at present; He has right hip pain that is intermittent , blunted by neuropathy, worse with palpation, better with " "pain meds and 2-3 out of 10 in severity. No distress per nursing; no new focal symptoms otherwise; no fever; no left stump pain; He did not know about any prior issues of thrombocytopenia     No headache photophobia or neck stiffness.  No shortness of breath cough or hemoptysis.  No nausea vomiting  or abdominal pain.  No dysuria hematuria or pyuria.  No other new skin rash    Past Medical History:   Diagnosis Date    Diabetes mellitus     Diabetic foot infection 2018    left (partial amputation)       Past Surgical History:   Procedure Laterality Date    AMPUTATION FOOT / TOE Right     partial foot    BELOW KNEE AMPUTATION Left 6/13/2024    Procedure: AMPUTATION BELOW KNEE AND WOUND VAC ASSISTED CLOSURE LEFT;  Surgeon: Brijesh Em Jr., MD;  Location:  [facility];  Service: Orthopedics;  Laterality: Left;    INCISION AND DRAINAGE HIP Right 6/20/2024    Procedure: HIP ANTERIOR INCISION AND DRAINAGE WITH HANA TABLE, POLY SWAP RIGHT WITH LEFT LEG PIN TRACTION;  Surgeon: Lex Sethi MD;  Location:  [facility];  Service: Orthopedics;  Laterality: Right;    INCISION AND DRAINAGE LEG Left 6/15/2024    Procedure: SECONDARY CLOSURE LEFT BELOW KNEE AMPUTATION;  Surgeon: Brijesh Em Jr., MD;  Location:  [facility];  Service: Orthopedics;  Laterality: Left;    PLACEMENT OF WOUND VAC Left 6/13/2024    Procedure: PLACEMENT OF WOUND VAC LEFT;  Surgeon: Brijesh Em Jr., MD;  Location:  [facility];  Service: Orthopedics;  Laterality: Left;    TOTAL HIP ARTHROPLASTY Right 02/2024    From fall \"whole hip was shattered\"           No Known Allergies       Review of Systems    7/5/24 per nursing also    Constitutional-- No Fever, chills or sweats.  Appetite diminished with fatigue.  Heent-- No new vision, hearing or throat complaints.  No epistaxis or oral sores.  Denies odynophagia or dysphagia.  No flashers, floaters or eye pain. No odynophagia or dysphagia. No headache, photophobia or neck stiffness.  CV-- No chest " "pain, palpitation or syncope  Resp-- No SOB/cough/Hemoptysis  GI- No nausea, vomiting, or diarrhea.  No hematochezia, melena, or hematemesis.   -- No dysuria, hematuria, or flank pain.  Denies hesitancy, urgency or flank pain.  Lymph- no swollen lymph nodes in neck/axilla or groin.   Heme- No active bruising or bleeding; no Hx of DVT or PE.  MS-- aside from above, no swelling or pain in the bones or joints of arms/legs.  No new back pain.  Neuro-- No acute focal weakness or numbness in the arms or legs.  No seizures.    Full 12 point review of systems reviewed and negative otherwise for acute complaints, except for above    Physical Exam:   Vital Signs   /80 (BP Location: Left arm, Patient Position: Lying)   Pulse 96   Temp 98.3 °F (36.8 °C) (Oral)   Resp 16   Ht 172.7 cm (67.99\")   Wt 76.6 kg (168 lb 14 oz)   SpO2 96%   BMI 25.68 kg/m²     GENERAL: sleepy; in no acute distress.   HEENT: Normocephalic, atraumatic.   No conjunctival injection. No icterus. Oropharynx clear without evidence of thrush or exudate. No evidence of periodontal disease.    NECK: Supple without nuchal rigidity. No mass.   HEART: RRR; No murmur, rubs, gallops.   LUNGS: diminished at bases.  Trace crackles at the bases but no rhonchi or wheeze. Normal respiratory effort. Nonlabored. No dullness.  ABDOMEN: Soft, nontender, nondistended. Positive bowel sounds. No rebound or guarding. NO mass or HSM.  EXT:  see below.     MSK: right hip surgical site covered;  no new visible redness/purulence or fluctuance at my visit; see PT wound care notes  SKIN: Warm and dry without cutaneous eruptions on Inspection/palpation.      Left   BKA stump covered;  no new visible redness or purulence; no discrete fluctuance.  No crepitus    Laboratory Data    Results from last 7 days   Lab Units 07/05/24  0508 07/04/24  0542 07/03/24  0825   WBC 10*3/mm3 5.32 5.61 6.15   HEMOGLOBIN g/dL 7.5* 8.0* 8.1*   HEMATOCRIT % 23.0* 24.3* 24.9*   PLATELETS " 10*3/mm3 171 160 148     Results from last 7 days   Lab Units 07/05/24  0508   SODIUM mmol/L 133*   POTASSIUM mmol/L 4.2   CHLORIDE mmol/L 101   CO2 mmol/L 27.0   BUN mg/dL 13   CREATININE mg/dL 0.60*   GLUCOSE mg/dL 80   CALCIUM mg/dL 7.1*     Results from last 7 days   Lab Units 07/02/24  0401   ALK PHOS U/L 235*   BILIRUBIN mg/dL 0.4   ALT (SGPT) U/L 19   AST (SGOT) U/L 39                   Estimated Creatinine Clearance: 141.9 mL/min (A) (by C-G formula based on SCr of 0.6 mg/dL (L)).      Microbiology:      Radiology:  Imaging Results (Last 72 Hours)       ** No results found for the last 72 hours. **              Impression:     --acute sepsis as per admission notes, severe left foot infection with gangrene/cellulitis and concern for necrotizing soft tissue infection by imaging, urgent surgical arrangements made June 13; repeat surg 6/15  ;  subsequent right hip surg 6/20; He knows risk for persistent/recurrent or nonhealing wounds, persistet / progressive or recurrent infection and risk for further amputation/functional-limb loss and chronic pain.  Associated with GB strep bacteremia as below.    --Acute/severe left foot infection as above, urgent surgical arrangements  made 6/13    --Acute gb strep bacteremia,   source from foot.  High risk for further serious morbidity and other serious sequela including dire consequences and metastatic foci of involvement/endovascular sequela etc. No new metastatic focus of involvement. TTE 6/17      --thrombocytopenia.   Per GI team, concerned by imaging and laboratory data for early cirrhosis with splenomegaly and risk for splenic sequestration/thrombocytopenia.  Some variability and platelet counts.  in general, thrombocytopenia not common with ceftriaxone but if recurrent/persistent decline and no other specific etiology found , then you could consider empiric adjustment.    --Right hip fracture with repair February 2024.  +pain ; orthopedics with  aspiration June 18  and culture with GB strep likely hematogenous seeding, MRI imaging with concern for septic joint, surgery on June 20 as above.   Patient understands risk for persistent/recurrent or progressive infection and potential for further surgical intervention in the future that could include functional/limb loss and other serious sequela/morbidity; he knows antibiotics and surgery not a guarantee for care; postop with increased drainage noted by PT / wound care team 7/3, ortho to follow/assess for any additional changes/intervention    --diabetes.  Described as uncontrolled by medicine team at admission.  Glucose control per medicine team    --urinary retention per nursing staff.  Per their notes patient refused in/out catheterization.  Dr. Goodwin aware and he will discuss options with patient      PLAN:      --IV  rocephin likely at least 6 weeks from hip surgery and anticipate step down to oral agents after with oral keflex    **wound culture at left foot mixed with  MSSA Klebsiella and GB strep  **blood cultures with group B strep  ** right hip cultures with group B strep    --orthopedics  making surgical plans with concern for right hipprosthetic/periprosthetic infection     --Check/review labs cultures and scans    --TTE described as technically adequate by cardiology; no description of vegetation by cardiology per report    --Partial history per nursing staff    --encouraged incentive spirometry.  Nursing aware to instruct and encourage    --Discussed with microbiology    --d/w Dr Green    --Highly complex at of issues with high risk for further serious morbidity and other serious sequela     **follow-up with me one week.  Every Monday CBC/CMP and sed rate/CRP.  I anticipate 6 weeks IV antibiotic from hip surgery and probable stepdown after that to oral Keflex with no specific end date so far    Copied text in this note has been reviewed and is accurate as of 07/05/24.        Cleve Bills,  MD  7/5/2024

## 2024-07-05 NOTE — THERAPY WOUND CARE TREATMENT
"Acute Care - Wound/Debridement Treatment Note   Reginaldo     Patient Name: Easton Alvarez  : 1965  MRN: [id number]  Today's Date: 2024                Admit Date: 2024    Visit Dx:    ICD-10-CM ICD-9-CM   1. S/P BKA (below knee amputation), left  Z89.512 V49.75   2. Diabetic ulcer of left midfoot associated with type 2 diabetes mellitus, with necrosis of muscle  E11.621 250.80    L97.423 707.14     728.89   3. Elevated LFTs  R79.89 790.6   4. Below knee amputation  S88.119A 897.0   5. Impaired functional mobility, balance, gait, and endurance  Z74.09 V49.89   6. Infection of prosthetic total hip joint, initial encounter  T84.59XA 996.66    Z96.649 V43.64       Patient Active Problem List   Diagnosis    Severe malnutrition        Past Medical History:   Diagnosis Date    Diabetes mellitus     Diabetic foot infection 2018    left (partial amputation)        Past Surgical History:   Procedure Laterality Date    AMPUTATION FOOT / TOE Right     partial foot    BELOW KNEE AMPUTATION Left 2024    Procedure: AMPUTATION BELOW KNEE AND WOUND VAC ASSISTED CLOSURE LEFT;  Surgeon: Brijesh Em Jr., MD;  Location:  [facility];  Service: Orthopedics;  Laterality: Left;    INCISION AND DRAINAGE HIP Right 2024    Procedure: HIP ANTERIOR INCISION AND DRAINAGE WITH HANA TABLE, POLY SWAP RIGHT WITH LEFT LEG PIN TRACTION;  Surgeon: Lex Sethi MD;  Location:  [facility];  Service: Orthopedics;  Laterality: Right;    INCISION AND DRAINAGE LEG Left 6/15/2024    Procedure: SECONDARY CLOSURE LEFT BELOW KNEE AMPUTATION;  Surgeon: Brijesh Em Jr., MD;  Location:  [facility];  Service: Orthopedics;  Laterality: Left;    PLACEMENT OF WOUND VAC Left 2024    Procedure: PLACEMENT OF WOUND VAC LEFT;  Surgeon: Brijesh Em Jr., MD;  Location:  [facility];  Service: Orthopedics;  Laterality: Left;    TOTAL HIP ARTHROPLASTY Right 2024    From fall \"whole hip was shattered\"           Wound 24 " 0921 Left lower leg Incision (Active)   Dressing Appearance dry;intact 07/04/24 2002   Drainage Amount none 07/04/24 2002   Dressing Care multi-layer wrap 07/04/24 2002       Wound 06/13/24 1045 Left medial coccyx Pressure Injury (Active)   Dressing Appearance open to air 07/04/24 2002   Base blanchable;red 07/04/24 2002   Periwound blanchable 07/04/24 2002   Periwound Temperature warm 07/04/24 2002   Periwound Skin Turgor soft 07/04/24 2002   Edges irregular 07/04/24 2002   Periwound Care dry periwound area maintained 07/04/24 2002       Wound 06/15/24 1416 Left lower leg Incision (Active)   Dressing Appearance dry;intact 07/04/24 2002   Closure Unable to assess 07/04/24 2002   Periwound intact;dry 07/04/24 2002   Periwound Care dry periwound area maintained 07/04/24 2002       Wound 06/20/24 Right anterior hip Incision (Active)   Dressing Appearance intact 07/05/24 1200   Base dressing in place, unable to visualize 07/05/24 1200   Periwound Care dry periwound area maintained 07/04/24 2002       Wound 06/20/24 2100 Right lower leg Abrasion (Active)   Dressing Appearance dry;intact 07/04/24 2002   Periwound Care dry periwound area maintained 07/04/24 2002       NPWT (Negative Pressure Wound Therapy) 07/03/24 1330 R hip incision (Active)   Therapy Setting continuous therapy 07/05/24 1200   Pressure Setting 125 mmHg 07/05/24 1200         WOUND DEBRIDEMENT                     PT Assessment (Last 12 Hours)       PT Evaluation and Treatment       Row Name 07/05/24 1200          Physical Therapy Time and Intention    Subjective Information no complaints  -MF     Document Type therapy note (daily note);wound care  -MF     Mode of Treatment individual therapy;physical therapy  -MF       Row Name 07/05/24 1200          Pain Scale: FACES Pre/Post-Treatment    Pain: FACES Scale, Pretreatment 0-->no hurt  -MF     Posttreatment Pain Rating 0-->no hurt  -MF       Row Name             Wound 06/13/24 0921 Left lower leg Incision     Wound - Properties Group Placement Date: 06/13/24  -ALPHONSO Placement Time: 0921 -ALPHONSO Side: Left  -ALPHONSO Orientation: lower  -ALPHONSO Location: leg  -ALPHONSO Primary Wound Type: Incision  -ALPHONSO    Retired Wound - Properties Group Placement Date: 06/13/24  -ALPHONSO Placement Time: 0921 -ALPHONSO Side: Left  -ALPHONSO Orientation: lower  -ALPHONSO Location: leg  -ALPHONSO Primary Wound Type: Incision  -ALPHONSO    Retired Wound - Properties Group Date first assessed: 06/13/24  -ALPHONSO Time first assessed: 0921 -ALPHONSO Side: Left  -ALPHONSO Location: leg  -ALPHONSO Primary Wound Type: Incision  -ALPHONSO      Row Name             Wound 06/13/24 1045 Left medial coccyx Pressure Injury    Wound - Properties Group Placement Date: 06/13/24  -ML Placement Time: 1045  -ML Side: Left  -ML Orientation: medial  -ML Location: coccyx  -ML Primary Wound Type: Pressure inj  -ML    Retired Wound - Properties Group Placement Date: 06/13/24  -ML Placement Time: 1045  -ML Side: Left  -ML Orientation: medial  -ML Location: coccyx  -ML Primary Wound Type: Pressure inj  -ML    Retired Wound - Properties Group Date first assessed: 06/13/24  -ML Time first assessed: 1045  -ML Side: Left  -ML Location: coccyx  -ML Primary Wound Type: Pressure inj  -ML      Row Name             Wound 06/15/24 1416 Left lower leg Incision    Wound - Properties Group Placement Date: 06/15/24  -TB Placement Time: 1416  -TB Side: Left  -TB Orientation: lower  -TB Location: leg  -TB Primary Wound Type: Incision  -TB    Retired Wound - Properties Group Placement Date: 06/15/24  -TB Placement Time: 1416  -TB Side: Left  -TB Orientation: lower  -TB Location: leg  -TB Primary Wound Type: Incision  -TB    Retired Wound - Properties Group Date first assessed: 06/15/24  -TB Time first assessed: 1416  -TB Side: Left  -TB Location: leg  -TB Primary Wound Type: Incision  -TB      Row Name 07/05/24 1200          Wound 06/20/24 Right anterior hip Incision    Wound - Properties Group Placement Date: 06/20/24  -SS Side: Right  -SS Orientation: anterior   -SS Location: hip  -SS Primary Wound Type: Incision  -SS Additional Comments: PREVENA  -SS    Dressing Appearance intact  -MF     Base dressing in place, unable to visualize  -MF     Retired Wound - Properties Group Placement Date: 06/20/24  -SS Side: Right  -SS Orientation: anterior  -SS Location: hip  -SS Primary Wound Type: Incision  -SS Additional Comments: PREVENA  -SS    Retired Wound - Properties Group Date first assessed: 06/20/24  -SS Side: Right  -SS Location: hip  -SS Primary Wound Type: Incision  -SS Additional Comments: PREVENA  -SS      Row Name             Wound 06/20/24 Left proximal leg Incision    Wound - Properties Group Placement Date: 06/20/24  -SS Side: Left  -SS Orientation: proximal  -SS Location: leg  -SS Primary Wound Type: Incision  -SS Additional Comments: Pin traction area- xerofrom, 4x4, ace  -SS    Retired Wound - Properties Group Placement Date: 06/20/24  -SS Side: Left  -SS Orientation: proximal  -SS Location: leg  -SS Primary Wound Type: Incision  -SS Additional Comments: Pin traction area- xerofrom, 4x4, ace  -SS    Retired Wound - Properties Group Date first assessed: 06/20/24  -SS Side: Left  -SS Location: leg  -SS Primary Wound Type: Incision  -SS Additional Comments: Pin traction area- xerofrom, 4x4, ace  -SS      Row Name             Wound 06/20/24 2100 Right lower leg Abrasion    Wound - Properties Group Placement Date: 06/20/24  -TS Placement Time: 2100  -TS Present on Original Admission: --  -TS, unknown  Side: Right  -TS Orientation: lower  -TS Location: leg  -TS Primary Wound Type: Abrasion  -TS    Retired Wound - Properties Group Placement Date: 06/20/24  -TS Placement Time: 2100  -TS Present on Original Admission: --  -TS, unknown  Side: Right  -TS Orientation: lower  -TS Location: leg  -TS Primary Wound Type: Abrasion  -TS    Retired Wound - Properties Group Date first assessed: 06/20/24  -TS Time first assessed: 2100  -TS Present on Original Admission: --  -TS,  unknown  Side: Right  -TS Location: leg  -TS Primary Wound Type: Abrasion  -TS      Row Name             Wound 06/21/24 2200 Left distal thigh Blisters    Wound - Properties Group Placement Date: 06/21/24  -TS Placement Time: 2200  -TS Side: Left  -TS Orientation: distal  -TS Location: thigh  -TS Primary Wound Type: Blisters  -TS    Retired Wound - Properties Group Placement Date: 06/21/24  -TS Placement Time: 2200  -TS Side: Left  -TS Orientation: distal  -TS Location: thigh  -TS Primary Wound Type: Blisters  -TS    Retired Wound - Properties Group Date first assessed: 06/21/24  -TS Time first assessed: 2200  -TS Side: Left  -TS Location: thigh  -TS Primary Wound Type: Blisters  -TS      Row Name 07/05/24 1200          NPWT (Negative Pressure Wound Therapy) 07/03/24 1330 R hip incision    NPWT (Negative Pressure Wound Therapy) - Properties Group Placement Date: 07/03/24  -MF Placement Time: 1330  -MF Location: R hip incision  -MF    Therapy Setting continuous therapy  -     Pressure Setting 125 mmHg  -     Retired NPWT (Negative Pressure Wound Therapy) - Properties Group Placement Date: 07/03/24  -MF Placement Time: 1330  -MF Location: R hip incision  -MF    Retired NPWT (Negative Pressure Wound Therapy) - Properties Group Placement Date: 07/03/24  -MF Placement Time: 1330  -MF Location: R hip incision  -MF      Row Name 07/05/24 1200          Coping    Observed Emotional State calm;cooperative  -     Verbalized Emotional State acceptance  -     Trust Relationship/Rapport care explained  -       Row Name 07/05/24 1200          Plan of Care Review    Plan of Care Reviewed With patient  -MF     Outcome Evaluation wound vac intact with no issues noted. PT will transition pt to portable prevena tomorrow prior to d/c.  -       Row Name 07/05/24 1200          Positioning and Restraints    Pre-Treatment Position in bed  -     Post Treatment Position bed  -MF     In Bed supine;call light within reach  -                User Key  (r) = Recorded By, (t) = Taken By, (c) = Cosigned By      Initials Name Provider Type    Cleve Fraser, PT Physical Therapist    Tawny Sosa, RN Registered Nurse    Helene Hanson RN Registered Nurse    Tana Mesa RN Registered Nurse    Lynn Sr RN Registered Nurse    Daina Alfonso RN Registered Nurse                  Physical Therapy Education       Title: PT OT SLP Therapies (In Progress)       Topic: Physical Therapy (Done)       Point: Mobility training (Done)       Learning Progress Summary             Patient Acceptance, E, VU by  at 7/5/2024 1405    Comment: Pt educated on expected soreness following session with importance of increased mobility    Eager, E, VU by  at 7/4/2024 1417    Comment: Educated on the importance of mobility during admission    Acceptance, E, VU by  at 7/3/2024 1527    Comment: Pt educated on the importance of mobility during admission    Eager, E, VU by  at 7/2/2024 1031    Comment: Educated on the importance of continued mobility and strengthening on rehab process    Acceptance, E,TB, VU by  at 7/2/2024 0800    Acceptance, E, VU by  at 7/1/2024 1633    Comment: Educated on the importance of standing and increasing strength and endurance during admission    Acceptance, E, VU by  at 7/1/2024 0845    Eager, E, VU by SC at 6/29/2024 1450    Comment: reviewed safety with sliding board    Acceptance, E, VU,DU,NR by  at 6/28/2024 1404    Eager, E, VU,DU by  at 6/27/2024 1539    Comment: Pt eudcated regarding WC management, transfers and symptom managment    Acceptance, E,D, VU by  at 6/26/2024 1608    Comment: Pt educated regarding transfers, weight bearing status and HEP.    Acceptance, E, VU,DU,NR by  at 6/25/2024 1107    Acceptance, E,H, VU by  at 6/24/2024 1105    Comment: Pt educated on bed positioning with L BKA and importance of exercise on rehab.    Eager, E, VU,DU by SC  at 6/21/2024 1349    Comment: reviewed HEP    Acceptance, E,D, VU,NR by SD at 6/19/2024 1203    Comment: PT ed for expected outcomes, risks, and benefits of IPPT services and progression of activity. Pt given visual demo of unilateral gait with RW. Discussion regarding dispo planning and IRF.                         Point: Home exercise program (Done)       Learning Progress Summary             Patient Eager, E, VU by  at 7/4/2024 1417    Comment: Educated on the importance of mobility during admission    Acceptance, E, VU by  at 7/3/2024 1527    Comment: Pt educated on the importance of mobility during admission    Acceptance, E,TB, VU by  at 7/2/2024 0800    Acceptance, E, VU by  at 7/1/2024 0845    Eager, E, VU by SC at 6/29/2024 1450    Comment: reviewed safety with sliding board    Acceptance, E,D, VU by EM at 6/26/2024 1608    Comment: Pt educated regarding transfers, weight bearing status and HEP.    Acceptance, E, VU,DU,NR by  at 6/25/2024 1107    Acceptance, E,H, VU by  at 6/24/2024 1105    Comment: Pt educated on bed positioning with L BKA and importance of exercise on rehab.    Acceptance, E, VU,NR,DU by GEORGINA at 6/23/2024 1420    Eager, E, VU,DU by SC at 6/21/2024 1349    Comment: reviewed HEP    Acceptance, E,D, VU,NR by SD at 6/19/2024 1203    Comment: PT ed for expected outcomes, risks, and benefits of IPPT services and progression of activity. Pt given visual demo of unilateral gait with RW. Discussion regarding dispo planning and IRF.                         Point: Body mechanics (Done)       Learning Progress Summary             Patient Acceptance, E, VU by  at 7/5/2024 1405    Comment: Pt educated on expected soreness following session with importance of increased mobility    Eager, E, VU by  at 7/4/2024 1417    Comment: Educated on the importance of mobility during admission    Acceptance, E, VU by  at 7/3/2024 1527    Comment: Pt educated on the importance of mobility during  admission    Eager, E, VU by  at 7/2/2024 1031    Comment: Educated on the importance of continued mobility and strengthening on rehab process    Acceptance, E,TB, VU by  at 7/2/2024 0800    Acceptance, E, VU by  at 7/1/2024 1633    Comment: Educated on the importance of standing and increasing strength and endurance during admission    Acceptance, E, VU by  at 7/1/2024 0845    Eager, E, VU by SC at 6/29/2024 1450    Comment: reviewed safety with sliding board    Acceptance, E, VU,DU,NR by  at 6/28/2024 1404    Eager, E, VU,DU by EM at 6/27/2024 1539    Comment: Pt eudcated regarding WC management, transfers and symptom managment    Acceptance, E,D, VU by  at 6/26/2024 1608    Comment: Pt educated regarding transfers, weight bearing status and HEP.    Acceptance, E, VU,DU,NR by  at 6/25/2024 1107    Acceptance, E,H, VU by  at 6/24/2024 1105    Comment: Pt educated on bed positioning with L BKA and importance of exercise on rehab.    Eager, E, VU,DU by SC at 6/21/2024 1349    Comment: reviewed HEP    Acceptance, E,D, VU,NR by SD at 6/19/2024 1203    Comment: PT ed for expected outcomes, risks, and benefits of IPPT services and progression of activity. Pt given visual demo of unilateral gait with RW. Discussion regarding dispo planning and IRF.                         Point: Precautions (Done)       Learning Progress Summary             Patient Acceptance, E, VU by  at 7/5/2024 1405    Comment: Pt educated on expected soreness following session with importance of increased mobility    Eager, E, VU by  at 7/4/2024 1417    Comment: Educated on the importance of mobility during admission    Acceptance, E, VU by  at 7/3/2024 1527    Comment: Pt educated on the importance of mobility during admission    Eager, E, VU by  at 7/2/2024 1031    Comment: Educated on the importance of continued mobility and strengthening on rehab process    Acceptance, E,TB, VU by  at 7/2/2024 0800    Acceptance, E, VU  by  at 7/1/2024 1633    Comment: Educated on the importance of standing and increasing strength and endurance during admission    Acceptance, E, VU by  at 7/1/2024 0845    Eager, E, VU by SC at 6/29/2024 1450    Comment: reviewed safety with sliding board    Acceptance, E, VU,DU,NR by  at 6/28/2024 1404    Eager, E, VU,DU by EM at 6/27/2024 1539    Comment: Pt eudcated regarding WC management, transfers and symptom managment    Acceptance, E,D, VU by EM at 6/26/2024 1608    Comment: Pt educated regarding transfers, weight bearing status and HEP.    Acceptance, E, VU,DU,NR by  at 6/25/2024 1107    Acceptance, E,H, VU by  at 6/24/2024 1105    Comment: Pt educated on bed positioning with L BKA and importance of exercise on rehab.    Eager, E, VU,DU by SC at 6/21/2024 1349    Comment: reviewed HEP    Acceptance, E,D, VU,NR by SD at 6/19/2024 1203    Comment: PT ed for expected outcomes, risks, and benefits of IPPT services and progression of activity. Pt given visual demo of unilateral gait with RW. Discussion regarding dispo planning and IRF.                                         User Key       Initials Effective Dates Name Provider Type Discipline    SC 02/03/23 -  Jorge Luis Childress, PT Physical Therapist PT    SD 03/13/23 -  Charity Dela Cruz, PT Physical Therapist PT    GEORGINA 06/16/21 -  Radha Miguel, PT Physical Therapist PT     09/21/23 -  Rachna Cruz, PT Physical Therapist PT     04/08/24 -  Marina Soto, RN Registered Nurse Nurse     05/07/24 -  Rancho Schneider, PT Student PT Student PT     05/07/24 -  Quincy Elizabeth, PT Student PT Student PT                    Recommendation and Plan  Anticipated Equipment Needs at Discharge (PT): gait belt  Anticipated Discharge Disposition (PT): inpatient rehabilitation facility  Planned Therapy Interventions (PT): wound care  Therapy Frequency (PT): daily  Plan of Care Reviewed With: patient           Outcome Evaluation: wound vac intact  with no issues noted. PT will transition pt to portable prevena tomorrow prior to d/c.  Plan of Care Reviewed With: patient            Time Calculation   PT Charges       Row Name 07/05/24 1310 07/05/24 1200          Time Calculation    Start Time 1310  -SC (r) HM (t) SC (c) 1200  -MF     PT Received On 07/05/24  -SC (r) HM (t) SC (c) --     PT Goal Re-Cert Due Date 07/11/24  -SC (r) HM (t) SC (c) 07/11/24  -MF        Timed Charges    28469 - Gait Training Minutes  --  -SC (r) HM (t) SC (c) --     52994 - PT Therapeutic Activity Minutes 14  -SC (r) HM (t) SC (c) --        Untimed Charges    17255-Quo Pressure wound to 50 sqcm -- 10  -MF        Total Minutes    Timed Charges Total Minutes 14  -SC (r) HM (t) --     Untimed Charges Total Minutes -- 10  -MF      Total Minutes 14  -SC (r) HM (t) 10  -MF               User Key  (r) = Recorded By, (t) = Taken By, (c) = Cosigned By      Initials Name Provider Type    SC Jorge Luis Childress, PT Physical Therapist    MF Cleve Flores, PT Physical Therapist    HM Quincy Elizabeth, PT Student PT Student                      Therapy Charges for Today       Code Description Service Date Service Provider Modifiers Qty    [id number] HC PT NEG PRESS WOUND TO 50SQCM DME1 7/4/2024 Cleve Flores, PT  1              PT G-Codes  Outcome Measure Options: AM-PAC 6 Clicks Daily Activity (OT)  AM-PAC 6 Clicks Score (PT): 15  AM-PAC 6 Clicks Score (OT): 16       Cleve Flores, PT  7/5/2024

## 2024-07-05 NOTE — PLAN OF CARE
Goal Outcome Evaluation:  Plan of Care Reviewed With: patient           Outcome Evaluation: wound vac intact with no issues noted. PT will transition pt to portable prevena tomorrow prior to d/c.

## 2024-07-05 NOTE — THERAPY TREATMENT NOTE
"Patient Name: Easton Alvarez  : 1965    MRN: [id number]                              Today's Date: 2024       Admit Date: 2024    Visit Dx:     ICD-10-CM ICD-9-CM   1. S/P BKA (below knee amputation), left  Z89.512 V49.75   2. Diabetic ulcer of left midfoot associated with type 2 diabetes mellitus, with necrosis of muscle  E11.621 250.80    L97.423 707.14     728.89   3. Elevated LFTs  R79.89 790.6   4. Below knee amputation  S88.119A 897.0   5. Impaired functional mobility, balance, gait, and endurance  Z74.09 V49.89   6. Infection of prosthetic total hip joint, initial encounter  T84.59XA 996.66    Z96.649 V43.64     Patient Active Problem List   Diagnosis    Severe malnutrition     Past Medical History:   Diagnosis Date    Diabetes mellitus     Diabetic foot infection 2018    left (partial amputation)     Past Surgical History:   Procedure Laterality Date    AMPUTATION FOOT / TOE Right     partial foot    BELOW KNEE AMPUTATION Left 2024    Procedure: AMPUTATION BELOW KNEE AND WOUND VAC ASSISTED CLOSURE LEFT;  Surgeon: Brijesh Em Jr., MD;  Location:  [facility];  Service: Orthopedics;  Laterality: Left;    INCISION AND DRAINAGE HIP Right 2024    Procedure: HIP ANTERIOR INCISION AND DRAINAGE WITH HANA TABLE, POLY SWAP RIGHT WITH LEFT LEG PIN TRACTION;  Surgeon: Lex Sethi MD;  Location:  [facility];  Service: Orthopedics;  Laterality: Right;    INCISION AND DRAINAGE LEG Left 6/15/2024    Procedure: SECONDARY CLOSURE LEFT BELOW KNEE AMPUTATION;  Surgeon: Brijesh Em Jr., MD;  Location:  [facility];  Service: Orthopedics;  Laterality: Left;    PLACEMENT OF WOUND VAC Left 2024    Procedure: PLACEMENT OF WOUND VAC LEFT;  Surgeon: Brijesh Em Jr., MD;  Location:  [facility];  Service: Orthopedics;  Laterality: Left;    TOTAL HIP ARTHROPLASTY Right 2024    From fall \"whole hip was shattered\"      General Information       Row Name 24 1345          Physical " Therapy Time and Intention    Document Type therapy note (daily note) (P)   -HM     Mode of Treatment physical therapy;co-treatment (P)   -       Row Name 07/05/24 1345          General Information    Patient Profile Reviewed yes (P)   -HM     Existing Precautions/Restrictions fall;left;non-weight bearing;right;hip, anterior;other (see comments) (P)   recent L BKA, R hip MARIA LUISA s/p I@D, R foot TMA. R wound vac on hip  -HM     Barriers to Rehab medically complex;previous functional deficit (P)   -HM       Row Name 07/05/24 1345          Cognition    Orientation Status (Cognition) oriented to;person (P)   -HM       Row Name 07/05/24 1345          Safety Issues, Functional Mobility    Safety Issues Affecting Function (Mobility) awareness of need for assistance;insight into deficits/self-awareness;safety precaution awareness;safety precautions follow-through/compliance;sequencing abilities;problem-solving;positioning of assistive device (P)   -HM     Impairments Affecting Function (Mobility) balance;endurance/activity tolerance;range of motion (ROM);strength;pain (P)   -HM     Comment, Safety Issues/Impairments (Mobility) Alert and following commmands (P)   -HM               User Key  (r) = Recorded By, (t) = Taken By, (c) = Cosigned By      Initials Name Provider Type     Quincy Elizabeth, PT Student PT Student                   Mobility       Row Name 07/05/24 6361          Bed Mobility    Bed Mobility supine-sit;sit-supine;scooting/bridging;rolling left;rolling right (P)   -     Rolling Left [place] (Bed Mobility) standby assist;verbal cues (P)   -     Rolling Right [place] (Bed Mobility) standby assist;verbal cues (P)   -     Scooting/Bridging [place] (Bed Mobility) moderate assist (50% patient effort);2 person assist;verbal cues (P)   -     Supine-Sit [place] (Bed Mobility) minimum assist (75% patient effort);1 person assist;verbal cues (P)   -HM     Sit-Supine [place] (Bed  Mobility) minimum assist (75% patient effort);1 person assist;verbal cues (P)   -HM     Assistive Device (Bed Mobility) head of bed elevated;bed rails (P)   -HM     Comment, (Bed Mobility) Pt able to roll R and L this session in order to have pants put on him. Pt required Min A this session with supine to sit and sit to supine. Pt with Mod A x 2 needed to be scooted up in bed with pt assistance by grabbing bars above HOB. (P)   -HM       Row Name 07/05/24 1346          Transfers    Comment, (Transfers) Sliding board x 2 from wc<>bed. STS x 2 from wc in // bars (P)   -HM       Row Name 07/05/24 1346          Bed-Chair Transfer    Bed-Chair [place] (Transfers) minimum assist (75% patient effort);verbal cues;1 person to manage equipment (P)   -HM     Assistive Device (Bed-Chair Transfers) transfer board (P)   -HM     Comment, (Bed-Chair Transfer) Pt able to offload hip for assistance with board placement under hip. Pt with min A needed for scooting during sliding board transfer. VC for hand placement and tucking RLE to push with during transfers. (P)   -HM       Row Name 07/05/24 1346          Sit-Stand Transfer    Sit-Stand [place] (Transfers) moderate assist (50% patient effort);2 person assist;verbal cues (P)   -HM     Assistive Device (Sit-Stand Transfers) parallel bars (P)   -HM     Comment, (Sit-Stand Transfer) Pt with less assistance needed this session to stand, however, pt with poor eccentric control with sitting back to  and required Mod A x 2 for controlled descent. VC given for hand placement on rails and foot placement. (P)   -HM       Row Name 07/05/24 1346          Gait/Stairs (Locomotion)    [place] Level (Gait) moderate assist (50% patient effort);verbal cues (P)   -HM     Assistive Device (Gait) parallel bars (P)   -HM     Distance in Feet (Gait) 4 (P)   4+4  -HM     Deviations/Abnormal Patterns (Gait) right sided deviations;trevin decreased;gait speed decreased;stride length  decreased (P)   -     Bilateral Gait Deviations forward flexed posture (P)   -     Right Sided Gait Deviations decreased knee extension;forward flexed posture;heel strike decreased;knee buckling, right side (P)   -     Comment, (Gait/Stairs) Pt with more controlled hopping in // bars this session with no buckling throughout. Pt still requires knee blocking in case of blocking as patient is still unsteady throughout. (P)   -       Row Name 07/05/24 1286          Mobility    Extremity Weight-bearing Status left lower extremity;right lower extremity (P)   -HM     Left Lower Extremity (Weight-bearing Status) non weight-bearing (NWB) (P)   -     Right Lower Extremity (Weight-bearing Status) weight-bearing as tolerated (WBAT) (P)   -               User Key  (r) = Recorded By, (t) = Taken By, (c) = Cosigned By      Initials Name Provider Type     Quincy Elizabeth, PT Student PT Student                   Obj/Interventions       Row Name 07/05/24 6389          Balance    Balance Assessment sitting static balance;sitting dynamic balance;sit to stand dynamic balance;standing static balance;standing dynamic balance (P)   -     Static Sitting Balance standby assist (P)   -     Dynamic Sitting Balance contact guard (P)   -     Position, Sitting Balance unsupported;sitting edge of bed (P)   -     Sit to Stand Dynamic Balance verbal cues;moderate assist (P)   -     Static Standing Balance minimal assist;verbal cues (P)   -     Dynamic Standing Balance moderate assist;verbal cues;1 person to manage equipment (P)   -     Position/Device Used, Standing Balance supported;parallel bars (P)   -     Balance Interventions sitting;standing;sit to stand;occupation based/functional task (P)   -     Comment, Balance Pt with unsteadiness in standing with heavy reliance on // bars for stability. Pt with no knee buckling or lob. (P)   -               User Key  (r) = Recorded By, (t) = Taken By, (c) = Cosigned By       Initials Name Provider Type     Quincy Elizabeth, PT Student PT Student                   Goals/Plan    No documentation.                  Clinical Impression       Row Name 07/05/24 1401          Pain    Pretreatment Pain Rating 6/10 (P)   -     Posttreatment Pain Rating 6/10 (P)   -     Pain Location - Side/Orientation Right (P)   -     Pain Location generalized (P)   -     Pain Location - hip (P)   -     Pain Intervention(s) Repositioned;Elevated;Ambulation/increased activity (P)   -       Row Name 07/05/24 1401          Plan of Care Review    Plan of Care Reviewed With patient (P)   -     Progress improving (P)   -     Outcome Evaluation Pt with better quality hops this session in // bars with Mod A and WC follow. Pt able to complete two trials of 4' of hopping. Pt continues to present below baseline with deficits in strength, balance, and poor activity tolerance. IPPT continues to be appropriate during admission. PT recommended DC to IRF for best functional outcomes. (P)   -       Row Name 07/05/24 1401          Therapy Assessment/Plan (PT)    Patient/Family Therapy Goals Statement (PT) Keep getting stronger (P)   -       Row Name 07/05/24 1401          Vital Signs    Pre Systolic BP Rehab 132 (P)   -     Pre Treatment Diastolic BP 77 (P)   -     Pretreatment Heart Rate (beats/min) 98 (P)   -     Posttreatment Heart Rate (beats/min) 102 (P)   -       Row Name 07/05/24 1401          Positioning and Restraints    Pre-Treatment Position in bed (P)   -     Post Treatment Position bed (P)   -     In Bed notified nsg;supine;call light within reach;encouraged to call for assist;exit alarm on;legs elevated (P)   -               User Key  (r) = Recorded By, (t) = Taken By, (c) = Cosigned By      Initials Name Provider Type     Quincy Elizabeth, PT Student PT Student                   Outcome Measures       Row Name 07/05/24 1405          How much help from another person do  you currently need...    Turning from your back to your side while in flat bed without using bedrails? 4 (P)   -HM     Moving from lying on back to sitting on the side of a flat bed without bedrails? 3 (P)   -HM     Moving to and from a bed to a chair (including a wheelchair)? 3 (P)   -HM     Standing up from a chair using your arms (e.g., wheelchair, bedside chair)? 2 (P)   -HM     Climbing 3-5 steps with a railing? 1 (P)   -HM     To walk in hospital room? 2 (P)   -HM     AM-PAC 6 Clicks Score (PT) 15 (P)   -     Highest Level of Mobility Goal 4 --> Transfer to chair/commode (P)   -       Row Name 07/05/24 1405          Functional Assessment    Outcome Measure Options AM-PAC 6 Clicks Basic Mobility (PT) (P)   -               User Key  (r) = Recorded By, (t) = Taken By, (c) = Cosigned By      Initials Name Provider Type     Quincy Elizabeth, PT Student PT Student                                 Physical Therapy Education       Title: PT OT SLP Therapies (In Progress)       Topic: Physical Therapy (Done)       Point: Mobility training (Done)       Learning Progress Summary             Patient Acceptance, E, VU by  at 7/5/2024 1405    Comment: Pt educated on expected soreness following session with importance of increased mobility    Eager, E, VU by  at 7/4/2024 1417    Comment: Educated on the importance of mobility during admission    Acceptance, E, VU by  at 7/3/2024 1527    Comment: Pt educated on the importance of mobility during admission    EagLEONELA arevalo, VU by  at 7/2/2024 1031    Comment: Educated on the importance of continued mobility and strengthening on rehab process    Acceptance, E,TB, VU by  at 7/2/2024 0800    Acceptance, E, VU by  at 7/1/2024 1633    Comment: Educated on the importance of standing and increasing strength and endurance during admission    Acceptance, E, VU by  at 7/1/2024 0845    Eagirina, E, VU by SC at 6/29/2024 1450    Comment: reviewed safety with sliding board     Acceptance, E, VU,DU,NR by  at 6/28/2024 1404    Eager, E, VU,DU by  at 6/27/2024 1539    Comment: Pt eudcated regarding WC management, transfers and symptom managment    Acceptance, E,D, VU by EM at 6/26/2024 1608    Comment: Pt educated regarding transfers, weight bearing status and HEP.    Acceptance, E, VU,DU,NR by  at 6/25/2024 1107    Acceptance, E,H, VU by  at 6/24/2024 1105    Comment: Pt educated on bed positioning with L BKA and importance of exercise on rehab.    Eager, E, VU,DU by SC at 6/21/2024 1349    Comment: reviewed HEP    Acceptance, E,D, VU,NR by SD at 6/19/2024 1203    Comment: PT ed for expected outcomes, risks, and benefits of IPPT services and progression of activity. Pt given visual demo of unilateral gait with RW. Discussion regarding dispo planning and IRF.                         Point: Home exercise program (Done)       Learning Progress Summary             Patient Eager, E, VU by  at 7/4/2024 1417    Comment: Educated on the importance of mobility during admission    Acceptance, E, VU by  at 7/3/2024 1527    Comment: Pt educated on the importance of mobility during admission    Acceptance, E,TB, VU by  at 7/2/2024 0800    Acceptance, E, VU by  at 7/1/2024 0845    Eager, E, VU by SC at 6/29/2024 1450    Comment: reviewed safety with sliding board    Acceptance, E,D, VU by EM at 6/26/2024 1608    Comment: Pt educated regarding transfers, weight bearing status and HEP.    Acceptance, E, VU,DU,NR by  at 6/25/2024 1107    Acceptance, E,H, VU by  at 6/24/2024 1105    Comment: Pt educated on bed positioning with L BKA and importance of exercise on rehab.    Acceptance, E, VU,NR,DU by  at 6/23/2024 1420    Eager, E, VU,DU by SC at 6/21/2024 1349    Comment: reviewed HEP    Acceptance, E,D, VU,NR by SD at 6/19/2024 1203    Comment: PT ed for expected outcomes, risks, and benefits of IPPT services and progression of activity. Pt given visual demo of unilateral gait with RW.  Discussion regarding dispo planning and IRF.                         Point: Body mechanics (Done)       Learning Progress Summary             Patient Acceptance, E, VU by  at 7/5/2024 1405    Comment: Pt educated on expected soreness following session with importance of increased mobility    Eager, E, VU by  at 7/4/2024 1417    Comment: Educated on the importance of mobility during admission    Acceptance, E, VU by  at 7/3/2024 1527    Comment: Pt educated on the importance of mobility during admission    Eager, E, VU by  at 7/2/2024 1031    Comment: Educated on the importance of continued mobility and strengthening on rehab process    Acceptance, E,TB, VU by  at 7/2/2024 0800    Acceptance, E, VU by  at 7/1/2024 1633    Comment: Educated on the importance of standing and increasing strength and endurance during admission    Acceptance, E, VU by  at 7/1/2024 0845    Eager, E, VU by SC at 6/29/2024 1450    Comment: reviewed safety with sliding board    Acceptance, E, VU,DU,NR by  at 6/28/2024 1404    Eager, E, VU,DU by  at 6/27/2024 1539    Comment: Pt eudcated regarding WC management, transfers and symptom managment    Acceptance, E,D, VU by  at 6/26/2024 1608    Comment: Pt educated regarding transfers, weight bearing status and HEP.    Acceptance, E, VU,DU,NR by  at 6/25/2024 1107    Acceptance, E,H, VU by  at 6/24/2024 1105    Comment: Pt educated on bed positioning with L BKA and importance of exercise on rehab.    Eager, E, VU,DU by SC at 6/21/2024 1349    Comment: reviewed HEP    Acceptance, E,D, VU,NR by SD at 6/19/2024 1203    Comment: PT ed for expected outcomes, risks, and benefits of IPPT services and progression of activity. Pt given visual demo of unilateral gait with RW. Discussion regarding dispo planning and IRF.                         Point: Precautions (Done)       Learning Progress Summary             Patient Acceptance, E, VU by  at 7/5/2024 1405    Comment: Pt educated  on expected soreness following session with importance of increased mobility    Eager, E, VU by  at 7/4/2024 1417    Comment: Educated on the importance of mobility during admission    Acceptance, E, VU by  at 7/3/2024 1527    Comment: Pt educated on the importance of mobility during admission    Eager, E, VU by  at 7/2/2024 1031    Comment: Educated on the importance of continued mobility and strengthening on rehab process    Acceptance, E,TB, VU by  at 7/2/2024 0800    Acceptance, E, VU by  at 7/1/2024 1633    Comment: Educated on the importance of standing and increasing strength and endurance during admission    Acceptance, E, VU by  at 7/1/2024 0845    Eager, E, VU by SC at 6/29/2024 1450    Comment: reviewed safety with sliding board    Acceptance, E, VU,DU,NR by  at 6/28/2024 1404    Eager, E, VU,DU by  at 6/27/2024 1539    Comment: Pt eudcated regarding WC management, transfers and symptom managment    Acceptance, E,D, VU by  at 6/26/2024 1608    Comment: Pt educated regarding transfers, weight bearing status and HEP.    Acceptance, E, VU,DU,NR by  at 6/25/2024 1107    Acceptance, E,H, VU by  at 6/24/2024 1105    Comment: Pt educated on bed positioning with L BKA and importance of exercise on rehab.    Eager, E, VU,DU by SC at 6/21/2024 1349    Comment: reviewed HEP    Acceptance, E,D, VU,NR by SD at 6/19/2024 1203    Comment: PT ed for expected outcomes, risks, and benefits of IPPT services and progression of activity. Pt given visual demo of unilateral gait with RW. Discussion regarding dispo planning and IRF.                                         User Key       Initials Effective Dates Name Provider Type Discipline    SC 02/03/23 -  Jorge Luis Childress, PT Physical Therapist PT    SD 03/13/23 -  Charity Dela Cruz, PT Physical Therapist PT    GEORGINA 06/16/21 -  Radha Miguel, PT Physical Therapist PT     09/21/23 -  Rachna Cruz, PT Physical Therapist PT     04/08/24 -   Marina Soto, RN Registered Nurse Nurse     05/07/24 -  Rancho Schneider, PT Student PT Student PT     05/07/24 -  Quincy Elizabeth, PT Student PT Student PT                  PT Recommendation and Plan  Planned Therapy Interventions (PT): balance training, bed mobility training, gait training, home exercise program, joint mobilization, manual therapy techniques, stretching, strengthening, ROM (range of motion), prosthetic fitting/training, postural re-education, patient/family education, orthotic fitting/training, neuromuscular re-education, transfer training, wheelchair management/propulsion training  Plan of Care Reviewed With: (P) patient  Progress: (P) improving  Outcome Evaluation: (P) Pt with better quality hops this session in // bars with Mod A and WC follow. Pt able to complete two trials of 4' of hopping. Pt continues to present below baseline with deficits in strength, balance, and poor activity tolerance. IPPT continues to be appropriate during admission. PT recommended DC to IRF for best functional outcomes.     Time Calculation:         PT Charges       Row Name 07/05/24 1310             Time Calculation    Start Time 1310 (P)   -      PT Received On 07/05/24 (P)   -      PT Goal Re-Cert Due Date 07/11/24 (P)   -         Timed Charges    00694 - Gait Training Minutes  -- (P)   -      19428 - PT Therapeutic Activity Minutes 14 (P)   -         Total Minutes    Timed Charges Total Minutes 14 (P)   -       Total Minutes 14 (P)   -                User Key  (r) = Recorded By, (t) = Taken By, (c) = Cosigned By      Initials Name Provider Type     Quincy Elizabeth, PT Student PT Student                  Therapy Charges for Today       Code Description Service Date Service Provider Modifiers Qty    [id number]  PT THERAPEUTIC ACT EA 15 MIN 7/4/2024 Quincy Elizabeth, PT Student GP 2    [id number] HC PT THER SUPP EA 15 MIN 7/4/2024 Quincy Elizabeth, PT Student GP 2    [id number]  HC PT THERAPEUTIC ACT EA 15 MIN 7/5/2024 Quincy Elizabeth, PT Student GP 1            PT G-Codes  Outcome Measure Options: (P) AM-PAC 6 Clicks Basic Mobility (PT)  AM-PAC 6 Clicks Score (PT): (P) 15  AM-PAC 6 Clicks Score (OT): 16  PT Discharge Summary  Anticipated Discharge Disposition (PT): (P) inpatient rehabilitation facility    Quincy Elizabeth, PT Student  7/5/2024

## 2024-07-05 NOTE — THERAPY TREATMENT NOTE
"Patient Name: Easton Alvarez  : 1965    MRN: [id number]                              Today's Date: 2024       Admit Date: 2024    Visit Dx:     ICD-10-CM ICD-9-CM   1. S/P BKA (below knee amputation), left  Z89.512 V49.75   2. Diabetic ulcer of left midfoot associated with type 2 diabetes mellitus, with necrosis of muscle  E11.621 250.80    L97.423 707.14     728.89   3. Elevated LFTs  R79.89 790.6   4. Below knee amputation  S88.119A 897.0   5. Impaired functional mobility, balance, gait, and endurance  Z74.09 V49.89   6. Infection of prosthetic total hip joint, initial encounter  T84.59XA 996.66    Z96.649 V43.64     Patient Active Problem List   Diagnosis    Severe malnutrition     Past Medical History:   Diagnosis Date    Diabetes mellitus     Diabetic foot infection 2018    left (partial amputation)     Past Surgical History:   Procedure Laterality Date    AMPUTATION FOOT / TOE Right     partial foot    BELOW KNEE AMPUTATION Left 2024    Procedure: AMPUTATION BELOW KNEE AND WOUND VAC ASSISTED CLOSURE LEFT;  Surgeon: Brijesh Em Jr., MD;  Location:  [facility];  Service: Orthopedics;  Laterality: Left;    INCISION AND DRAINAGE HIP Right 2024    Procedure: HIP ANTERIOR INCISION AND DRAINAGE WITH HANA TABLE, POLY SWAP RIGHT WITH LEFT LEG PIN TRACTION;  Surgeon: Lex Sethi MD;  Location:  [facility];  Service: Orthopedics;  Laterality: Right;    INCISION AND DRAINAGE LEG Left 6/15/2024    Procedure: SECONDARY CLOSURE LEFT BELOW KNEE AMPUTATION;  Surgeon: Brijesh Em Jr., MD;  Location:  [facility];  Service: Orthopedics;  Laterality: Left;    PLACEMENT OF WOUND VAC Left 2024    Procedure: PLACEMENT OF WOUND VAC LEFT;  Surgeon: Brijesh Em Jr., MD;  Location:  [facility];  Service: Orthopedics;  Laterality: Left;    TOTAL HIP ARTHROPLASTY Right 2024    From fall \"whole hip was shattered\"      General Information       Row Name 24 1413          OT Time and " Intention    Document Type therapy note (daily note)  -TB     Mode of Treatment occupational therapy  -TB       Row Name 07/05/24 1413          General Information    Patient Profile Reviewed yes  -TB     Existing Precautions/Restrictions fall;left;non-weight bearing;right;hip, anterior;other (see comments)  Recent L BKA, R hip MARIA LUISA s/p I@D, R foot TMA, R Hip wound vac  -TB     Barriers to Rehab medically complex;previous functional deficit  -TB       Row Name 07/05/24 1413          Occupational Profile    Reason for Services/Referral (Occupational Profile) Occupational decline  -TB       Row Name 07/05/24 1413          Cognition    Orientation Status (Cognition) oriented x 4  -TB       Row Name 07/05/24 1413          Safety Issues, Functional Mobility    Safety Issues Affecting Function (Mobility) awareness of need for assistance;insight into deficits/self-awareness;safety precaution awareness;safety precautions follow-through/compliance;sequencing abilities;problem-solving;positioning of assistive device  -TB     Impairments Affecting Function (Mobility) balance;endurance/activity tolerance;pain;strength;range of motion (ROM)  -TB               User Key  (r) = Recorded By, (t) = Taken By, (c) = Cosigned By      Initials Name Provider Type    TB Jazmín Blakely OT Occupational Therapist                     Mobility/ADL's       Row Name 07/05/24 1414          Bed Mobility    Bed Mobility supine-sit;scooting/bridging;sit-supine  -TB     Scooting/Bridging [place] (Bed Mobility) contact guard;verbal cues  -TB     Supine-Sit [place] (Bed Mobility) minimum assist (75% patient effort);verbal cues  -TB     Sit-Supine [place] (Bed Mobility) minimum assist (75% patient effort);verbal cues  -TB     Bed Mobility, Safety Issues decreased use of arms for pushing/pulling;decreased use of legs for bridging/pushing;impaired trunk control for bed mobility  -TB     Assistive Device (Bed Mobility) head of bed  elevated;bed rails;draw sheet  -TB     Comment, (Bed Mobility) Education and cues to maintain NWB precautions L residual limb  -TB       Row Name 07/05/24 1414          Transfers    Transfers sit-stand transfer;stand-sit transfer;bed-chair transfer  -TB     Comment, (Transfers) Education, cues, and assist to Set-up w/c, place sliding board and sequence transfer EOB<>w/c. Able to stand x2 reps.  -TB       Row Name 07/05/24 1414          Bed-Chair Transfer    Bed-Chair [place] (Transfers) minimum assist (75% patient effort);1 person assist;1 person to manage equipment;verbal cues  -TB     Assistive Device (Bed-Chair Transfers) transfer board  -TB       Row Name 07/05/24 1414          Sit-Stand Transfer    Sit-Stand [place] (Transfers) minimum assist (75% patient effort);2 person assist;verbal cues  -TB     Assistive Device (Sit-Stand Transfers) parallel bars  -TB       Row Name 07/05/24 1414          Stand-Sit Transfer    Stand-Sit [place] (Transfers) minimum assist (75% patient effort);2 person assist;verbal cues  -TB     Comment, (Stand-Sit Transfer) Assist to lower with eccentric control  -TB       Row Name 07/05/24 1414          Activities of Daily Living    BADL Assessment/Intervention lower body dressing  -TB       Row Name 07/05/24 1414          Mobility    Extremity Weight-bearing Status left lower extremity;right lower extremity  -TB     Left Lower Extremity (Weight-bearing Status) non weight-bearing (NWB)   -TB     Right Lower Extremity (Weight-bearing Status) weight-bearing as tolerated (WBAT)  -TB       Row Name 07/05/24 1414          Lower Body Dressing Assessment/Training    [place] Level (Lower Body Dressing) maximum assist (25% patient effort);verbal cues;don;doff;pants/bottoms  -TB     Position (Lower Body Dressing) supine  -TB               User Key  (r) = Recorded By, (t) = Taken By, (c) = Cosigned By      Initials Name Provider Type    Jazmín Pearson OT Occupational  Therapist                   Obj/Interventions       Row Name 07/05/24 1418          Elbow/Forearm (Therapeutic Exercise)    Elbow/Forearm (Therapeutic Exercise) strengthening exercise  -TB     Elbow/Forearm Strengthening (Therapeutic Exercise) bilateral;flexion;extension;blue;resistance band;10 repetitions  -TB       Row Name 07/05/24 1418          Motor Skills    Therapeutic Exercise elbow/forearm  -TB       Row Name 07/05/24 1418          Balance    Balance Assessment sitting static balance;sitting dynamic balance;sit to stand dynamic balance;standing static balance;standing dynamic balance  -TB     Static Sitting Balance supervision;verbal cues  -TB     Dynamic Sitting Balance standby assist;verbal cues  -TB     Position, Sitting Balance sitting in chair;sitting edge of bed  -TB     Sit to Stand Dynamic Balance minimal assist;verbal cues  -TB     Static Standing Balance minimal assist  -TB     Dynamic Standing Balance minimal assist;2-person assist  -TB     Position/Device Used, Standing Balance supported  -TB     Balance Interventions sitting;standing;sit to stand;supported;static;dynamic;dynamic reaching;occupation based/functional task;UE activity with balance activity  -TB     Comment, Balance Min Ax2 to steady  -TB               User Key  (r) = Recorded By, (t) = Taken By, (c) = Cosigned By      Initials Name Provider Type    TB Jazmín Blakely OT Occupational Therapist                   Goals/Plan    No documentation.                  Clinical Impression       Row Name 07/05/24 1420          Pain Assessment    Pain Intervention(s) Ambulation/increased activity;Repositioned;Elevated  -TB     Additional Documentation Pain Scale: FACES Pre/Post-Treatment (Group)  -TB       Row Name 07/05/24 1420          Pain Scale: FACES Pre/Post-Treatment    Pain: FACES Scale, Pretreatment 2-->hurts little bit  -TB     Posttreatment Pain Rating 2-->hurts little bit  -TB     Pain Location - hip;residual limb  -TB      Pre/Posttreatment Pain Comment Pt tolerates OOB activity  -TB       Row Name 07/05/24 1420          Plan of Care Review    Plan of Care Reviewed With patient  -TB     Progress no change  -TB     Outcome Evaluation Pt is A/Ox4 and participates in therapy with encouragement. Education reinforced for L residual limb NWB precautions. Max A to don scrub pants for OOB activity. Min A bed mobility. Min Ax1+1 sliding board transfers. Requires assist for w/c and slinding board Set-up. BUE HEP completed to support transfer training. OT will continue to follow IP. Plan is IRF when pt is medically ready.  -TB       Row Name 07/05/24 1420          Therapy Plan Review/Discharge Plan (OT)    Anticipated Discharge Disposition (OT) inpatient rehabilitation facility  -TB       Row Name 07/05/24 1420          Vital Signs    Pre Systolic BP Rehab --  RN cleared OT  -TB     Pre SpO2 (%) 96  -TB     O2 Delivery Pre Treatment room air  -TB     Pre Patient Position Supine  -TB     Intra Patient Position Standing  -TB     Post Patient Position Supine  -TB       Row Name 07/05/24 1420          Positioning and Restraints    Pre-Treatment Position in bed  -TB     Post Treatment Position bed  -TB     In Bed notified nsg;supine;call light within reach;encouraged to call for assist;exit alarm on;LLE elevated  -TB               User Key  (r) = Recorded By, (t) = Taken By, (c) = Cosigned By      Initials Name Provider Type    TB Jazmín Blakely, OT Occupational Therapist                   Outcome Measures       Row Name 07/05/24 1425          How much help from another is currently needed...    Putting on and taking off regular lower body clothing? 2  -TB     Bathing (including washing, rinsing, and drying) 2  -TB     Toileting (which includes using toilet bed pan or urinal) 2  -TB     Putting on and taking off regular upper body clothing 3  -TB     Taking care of personal grooming (such as brushing teeth) 3  -TB     Eating meals 4  -TB      AM-PAC 6 Clicks Score (OT) 16  -TB       Row Name 07/05/24 1405          How much help from another person do you currently need...    Turning from your back to your side while in flat bed without using bedrails? 4  -SC (r) HM (t) SC (c)     Moving from lying on back to sitting on the side of a flat bed without bedrails? 3  -SC (r) HM (t) SC (c)     Moving to and from a bed to a chair (including a wheelchair)? 3  -SC (r) HM (t) SC (c)     Standing up from a chair using your arms (e.g., wheelchair, bedside chair)? 2  -SC (r) HM (t) SC (c)     Climbing 3-5 steps with a railing? 1  -SC (r) HM (t) SC (c)     To walk in hospital room? 2  -SC (r) HM (t) SC (c)     AM-PAC 6 Clicks Score (PT) 15  -SC (r) HM (t)     Highest Level of Mobility Goal 4 --> Transfer to chair/commode  -SC (r) HM (t)       Row Name 07/05/24 1424 07/05/24 1405       Functional Assessment    Outcome Measure Options AM-PAC 6 Clicks Daily Activity (OT)  -TB AM-PAC 6 Clicks Basic Mobility (PT)  -SC (r) HM (t) SC (c)              User Key  (r) = Recorded By, (t) = Taken By, (c) = Cosigned By      Initials Name Provider Type    SC Jorge Luis Childress, PT Physical Therapist    Jazmín Pearson, OT Occupational Therapist     Quincy Elizabeth, PT Student PT Student                    Occupational Therapy Education       Title: PT OT SLP Therapies (In Progress)       Topic: Occupational Therapy (In Progress)       Point: ADL training (Done)       Description:   Instruct learner(s) on proper safety adaptation and remediation techniques during self care or transfers.   Instruct in proper use of assistive devices.                  Learning Progress Summary             Patient Acceptance, E,D,TB, DU,VU,NR by TB at 7/5/2024 1424    Acceptance, E, VU by  at 7/3/2024 1527    Comment: Pt educated on the importance of mobility during admission    Acceptance, E,TB, VU by  at 7/2/2024 0800    Acceptance, E, VU by  at 7/1/2024 0845    LEONELA Varela VU by SC at  6/29/2024 1450    Comment: reviewed safety with sliding board    Acceptance, E, VU by AN at 6/26/2024 1548    Acceptance, E, NR by  at 6/22/2024 1315    Acceptance, E,D, VU,DU,NR by  at 6/19/2024 1343    Comment: reason for consult, noted deficits, chair push ups                         Point: Home exercise program (Done)       Description:   Instruct learner(s) on appropriate technique for monitoring, assisting and/or progressing therapeutic exercises/activities.                  Learning Progress Summary             Patient Acceptance, E,D,TB, DU,VU,NR by  at 7/5/2024 1424    Acceptance, E, VU by  at 7/3/2024 1527    Comment: Pt educated on the importance of mobility during admission    Acceptance, E, NR by  at 7/3/2024 1422    Acceptance, E,TB, VU by  at 7/2/2024 0800    Acceptance, E, VU by  at 7/1/2024 0845    Eager, E, VU by SC at 6/29/2024 1450    Comment: reviewed safety with sliding board    Acceptance, E, VU by AN at 6/26/2024 1548    Acceptance, E,D, VU,DU,NR by  at 6/19/2024 1343    Comment: reason for consult, noted deficits, chair push ups                         Point: Precautions (Done)       Description:   Instruct learner(s) on prescribed precautions during self-care and functional transfers.                  Learning Progress Summary             Patient Acceptance, E,D,TB, DU,VU,NR by  at 7/5/2024 1424    Acceptance, E, VU by  at 7/3/2024 1527    Comment: Pt educated on the importance of mobility during admission    Acceptance, E, NR by  at 7/3/2024 1422    Acceptance, E,TB, VU by  at 7/2/2024 0800    Acceptance, E, VU by  at 7/1/2024 0845    Eager, E, VU by SC at 6/29/2024 1450    Comment: reviewed safety with sliding board    Acceptance, E, VU by AN at 6/26/2024 1548    Acceptance, E, NR by  at 6/22/2024 1315                         Point: Body mechanics (In Progress)       Description:   Instruct learner(s) on proper positioning and spine alignment during self-care,  functional mobility activities and/or exercises.                  Learning Progress Summary             Patient Acceptance, E, VU by  at 7/3/2024 1527    Comment: Pt educated on the importance of mobility during admission    Acceptance, E, NR by  at 7/3/2024 1422    Acceptance, E,TB, VU by BM at 7/2/2024 0800    Acceptance, E, VU by BM at 7/1/2024 0845    Eager, E, VU by SC at 6/29/2024 1450    Comment: reviewed safety with sliding board    Acceptance, E, VU by AN at 6/26/2024 1548    Acceptance, E, NR by  at 6/22/2024 1315                                         User Key       Initials Effective Dates Name Provider Type Discipline    SC 02/03/23 -  Jorge Luis Childress, PT Physical Therapist PT    TB 07/11/23 -  Jazmín Blakely, OT Occupational Therapist OT    GEORGINA 07/11/23 -  Ana Carvajal, OT Occupational Therapist OT     06/16/21 -  Laura Lyons, OT Occupational Therapist OT    AN 09/21/21 -  Johana Vargas, OT Occupational Therapist OT     04/08/24 -  Marina Soto, RN Registered Nurse Nurse     05/07/24 -  Quincy Elizabeth PT Student PT Student PT                  OT Recommendation and Plan     Plan of Care Review  Plan of Care Reviewed With: patient  Progress: no change  Outcome Evaluation: Pt is A/Ox4 and participates in therapy with encouragement. Education reinforced for L residual limb NWB precautions. Max A to don scrub pants for OOB activity. Min A bed mobility. Min Ax1+1 sliding board transfers. Requires assist for w/c and slinding board Set-up. BUE HEP completed to support transfer training. OT will continue to follow IP. Plan is IRF when pt is medically ready.     Time Calculation:         Time Calculation- OT       Row Name 07/05/24 1310 07/05/24 1306          Time Calculation- OT    OT Start Time -- 1306  -TB     OT Received On -- 07/05/24  -TB     OT Goal Re-Cert Due Date -- 07/13/24  -TB        Timed Charges    57398 - Gait Training Minutes  --  -SC (r) HM (t) SC (c) --      83423 - OT Self Care/Mgmt Minutes -- 15  -TB        Total Minutes    Timed Charges Total Minutes -- (P)   - 15  -TB      Total Minutes -- (P)   - 15  -TB               User Key  (r) = Recorded By, (t) = Taken By, (c) = Cosigned By      Initials Name Provider Type    SC Jorge Luis Childress, PT Physical Therapist    TB Jazmín Blakely, OT Occupational Therapist    HM Quincy Elizabeth, PT Student PT Student                  Therapy Charges for Today       Code Description Service Date Service Provider Modifiers Qty    [id number]  OT SELF CARE/MGMT/TRAIN EA 15 MIN 7/5/2024 Jazmín Blakely OT GO 1                 Jazmín Blakely OT  7/5/2024

## 2024-07-06 VITALS
RESPIRATION RATE: 18 BRPM | SYSTOLIC BLOOD PRESSURE: 141 MMHG | WEIGHT: 176.59 LBS | BODY MASS INDEX: 26.76 KG/M2 | HEIGHT: 68 IN | OXYGEN SATURATION: 97 % | DIASTOLIC BLOOD PRESSURE: 75 MMHG | HEART RATE: 91 BPM | TEMPERATURE: 98.3 F

## 2024-07-06 LAB
FUNGUS WND CULT: NORMAL
GLUCOSE BLDC GLUCOMTR-MCNC: 68 MG/DL (ref 70–130)
MYCOBACTERIUM SPEC CULT: NORMAL
NIGHT BLUE STAIN TISS: NORMAL

## 2024-07-06 PROCEDURE — 99239 HOSP IP/OBS DSCHRG MGMT >30: CPT | Performed by: INTERNAL MEDICINE

## 2024-07-06 PROCEDURE — 25010000002 CEFTRIAXONE PER 250 MG: Performed by: INTERNAL MEDICINE

## 2024-07-06 PROCEDURE — 97607 NEG PRS WND THR NDME<=50SQCM: CPT

## 2024-07-06 PROCEDURE — 82948 REAGENT STRIP/BLOOD GLUCOSE: CPT

## 2024-07-06 RX ADMIN — PANTOPRAZOLE SODIUM 40 MG: 40 TABLET, DELAYED RELEASE ORAL at 08:26

## 2024-07-06 RX ADMIN — ACETAMINOPHEN 1000 MG: 500 TABLET, FILM COATED ORAL at 05:17

## 2024-07-06 RX ADMIN — Medication 10 ML: at 08:29

## 2024-07-06 RX ADMIN — CEFTRIAXONE 2000 MG: 2 INJECTION, POWDER, FOR SOLUTION INTRAMUSCULAR; INTRAVENOUS at 08:27

## 2024-07-06 RX ADMIN — LISINOPRIL 10 MG: 10 TABLET ORAL at 08:26

## 2024-07-06 RX ADMIN — OXYCODONE HYDROCHLORIDE 5 MG: 5 TABLET ORAL at 08:26

## 2024-07-06 RX ADMIN — Medication 1 CAPSULE: at 08:26

## 2024-07-06 RX ADMIN — ASPIRIN 81 MG: 81 TABLET, COATED ORAL at 08:26

## 2024-07-06 NOTE — THERAPY WOUND CARE TREATMENT
"Acute Care - Wound/Debridement Treatment Note   Reginaldo     Patient Name: Easton Alvarez  : 1965  MRN: [id number]  Today's Date: 2024                Admit Date: 2024    Visit Dx:    ICD-10-CM ICD-9-CM   1. Infection of prosthetic total hip joint, initial encounter  T84.59XA 996.66    Z96.649 V43.64   2. Diabetic ulcer of left midfoot associated with type 2 diabetes mellitus, with necrosis of muscle  E11.621 250.80    L97.423 707.14     728.89   3. S/P BKA (below knee amputation), left  Z89.512 V49.75   4. Elevated LFTs  R79.89 790.6   5. Below knee amputation  S88.119A 897.0   6. Impaired functional mobility, balance, gait, and endurance  Z74.09 V49.89       Patient Active Problem List   Diagnosis    Severe malnutrition        Past Medical History:   Diagnosis Date    Diabetes mellitus     Diabetic foot infection 2018    left (partial amputation)        Past Surgical History:   Procedure Laterality Date    AMPUTATION FOOT / TOE Right     partial foot    BELOW KNEE AMPUTATION Left 2024    Procedure: AMPUTATION BELOW KNEE AND WOUND VAC ASSISTED CLOSURE LEFT;  Surgeon: Brijesh Em Jr., MD;  Location:  [facility];  Service: Orthopedics;  Laterality: Left;    INCISION AND DRAINAGE HIP Right 2024    Procedure: HIP ANTERIOR INCISION AND DRAINAGE WITH HANA TABLE, POLY SWAP RIGHT WITH LEFT LEG PIN TRACTION;  Surgeon: Lex Sethi MD;  Location:  [facility];  Service: Orthopedics;  Laterality: Right;    INCISION AND DRAINAGE LEG Left 6/15/2024    Procedure: SECONDARY CLOSURE LEFT BELOW KNEE AMPUTATION;  Surgeon: Brijesh Em Jr., MD;  Location:  [facility];  Service: Orthopedics;  Laterality: Left;    PLACEMENT OF WOUND VAC Left 2024    Procedure: PLACEMENT OF WOUND VAC LEFT;  Surgeon: Brijesh Em Jr., MD;  Location:  [facility];  Service: Orthopedics;  Laterality: Left;    TOTAL HIP ARTHROPLASTY Right 2024    From fall \"whole hip was shattered\"           Wound 24 " 0921 Left lower leg Incision (Active)   Dressing Appearance dry;intact 07/05/24 2010   Closure Unable to assess 07/05/24 2010   Base dressing in place, unable to visualize 07/05/24 2010   Periwound intact;pink 07/05/24 2010   Dressing Care dressing changed 07/05/24 2300   Periwound Care dry periwound area maintained 07/05/24 2010       Wound 06/13/24 1045 Left medial coccyx Pressure Injury (Active)   Periwound blanchable 07/05/24 2010   Periwound Temperature warm 07/05/24 2010   Periwound Care dry periwound area maintained 07/05/24 2010       Wound 06/15/24 1416 Left lower leg Incision (Active)   Dressing Appearance dry;intact 07/05/24 2010   Closure Unable to assess 07/05/24 2010   Base dressing in place, unable to visualize 07/05/24 2010   Periwound Care dry periwound area maintained 07/05/24 2010       Wound 06/20/24 Right anterior hip Incision (Active)   Wound Image   07/06/24 0830   Dressing Appearance dry;intact 07/06/24 0830   Closure Sutures 07/06/24 0830   Base moist;pink 07/06/24 0830   Periwound intact;pink;swelling 07/06/24 0830   Periwound Temperature warm 07/06/24 0830   Periwound Skin Turgor firm 07/06/24 0830   Drainage Characteristics/Odor serosanguineous 07/06/24 0830   Drainage Amount moderate;other (see comments) 07/06/24 0830   Care, Wound cleansed with;wound cleanser;negative pressure wound therapy 07/06/24 0830   Dressing Care dressing changed 07/06/24 0830   Periwound Care barrier film applied;cleansed with pH balanced cleanser;dry periwound area maintained 07/06/24 0830   Sutures Removed Intact Yes 07/06/24 0830   Number of Sutures Removed 4 07/06/24 0830   Wound Output (mL) 300 07/06/24 0830       Wound 06/20/24 Left proximal leg Incision (Active)   Dressing Appearance dry;intact 07/05/24 2010   Closure Unable to assess 07/05/24 2010   Base dressing in place, unable to visualize 07/05/24 2010   Periwound intact 07/05/24 2010   Periwound Temperature warm 07/05/24 2010       Wound 06/20/24  2100 Right lower leg Abrasion (Active)   Dressing Appearance dry;intact 07/05/24 2010   Closure None 07/05/24 2010   Base pink;blanchable;dry 07/05/24 2010   Drainage Amount none 07/05/24 2010       NPWT (Negative Pressure Wound Therapy) 07/03/24 1330 R hip incision (Active)   Therapy Setting continuous therapy 07/06/24 0830   Dressing other (see comments) 07/06/24 0830   Pressure Setting 125 mmHg 07/06/24 0830   Sponges Inserted 1 07/06/24 0830   Sponges Removed 1 07/06/24 0830   Finger sweep complete Other (see somments0 07/06/24 0830         WOUND DEBRIDEMENT                     PT Assessment (Last 12 Hours)       PT Evaluation and Treatment       Row Name 07/06/24 0830          Physical Therapy Time and Intention    Subjective Information no complaints  -     Document Type wound care;therapy note (daily note)  -     Patient Effort good  -     Symptoms Noted During/After Treatment none  -       Row Name 07/06/24 0830          General Information    Patient Observations alert;cooperative;agree to therapy  -     Existing Precautions/Restrictions fall;left;non-weight bearing;right;hip, anterior;other (see comments)  Recent L BKA, R hip MARIA LUISA s/p I@D, R foot TMA, R Hip wound vac  -     Risks Reviewed patient:;increased discomfort;increased drainage  -     Benefits Reviewed patient:;improve skin integrity;improve function  -     Barriers to Rehab medically complex;previous functional deficit;physical barrier  -       Row Name 07/06/24 0830          Pain    Pretreatment Pain Rating 0/10 - no pain  -     Posttreatment Pain Rating 0/10 - no pain  -       Row Name 07/06/24 0830          Cognition    Affect/Mental Status (Cognition) WNL  -     Orientation Status (Cognition) oriented x 4  -JM       Row Name             Wound 06/13/24 0921 Left lower leg Incision    Wound - Properties Group Placement Date: 06/13/24  -ALPHONSO Placement Time: 0921  -ALPHONSO Side: Left  -ALPHONSO Orientation: lower  -ALPHONSO Location: leg  -ALPHONSO  Primary Wound Type: Incision  -ALPHONSO    Retired Wound - Properties Group Placement Date: 06/13/24  -ALPHONSO Placement Time: 0921 -ALPHONSO Side: Left  -ALPHONSO Orientation: lower  -ALPHONSO Location: leg  -ALPHONSO Primary Wound Type: Incision  -ALPHONSO    Retired Wound - Properties Group Date first assessed: 06/13/24  -ALPHONSO Time first assessed: 0921  -ALPHONSO Side: Left  -ALPHONSO Location: leg  -ALPHONSO Primary Wound Type: Incision  -ALPHONSO      Row Name             Wound 06/13/24 1045 Left medial coccyx Pressure Injury    Wound - Properties Group Placement Date: 06/13/24  -ML Placement Time: 1045  -ML Side: Left  -ML Orientation: medial  -ML Location: coccyx  -ML Primary Wound Type: Pressure inj  -ML    Retired Wound - Properties Group Placement Date: 06/13/24  -ML Placement Time: 1045  -ML Side: Left  -ML Orientation: medial  -ML Location: coccyx  -ML Primary Wound Type: Pressure inj  -ML    Retired Wound - Properties Group Date first assessed: 06/13/24  -ML Time first assessed: 1045  -ML Side: Left  -ML Location: coccyx  -ML Primary Wound Type: Pressure inj  -ML      Row Name             Wound 06/15/24 1416 Left lower leg Incision    Wound - Properties Group Placement Date: 06/15/24  -TB Placement Time: 1416  -TB Side: Left  -TB Orientation: lower  -TB Location: leg  -TB Primary Wound Type: Incision  -TB    Retired Wound - Properties Group Placement Date: 06/15/24  -TB Placement Time: 1416  -TB Side: Left  -TB Orientation: lower  -TB Location: leg  -TB Primary Wound Type: Incision  -TB    Retired Wound - Properties Group Date first assessed: 06/15/24  -TB Time first assessed: 1416  -TB Side: Left  -TB Location: leg  -TB Primary Wound Type: Incision  -TB      Row Name 07/06/24 0830          Wound 06/20/24 Right anterior hip Incision    Wound - Properties Group Placement Date: 06/20/24  -SS Side: Right  -SS Orientation: anterior  -SS Location: hip  -SS Primary Wound Type: Incision  -SS Additional Comments: PREVENA  -SS    Wound Image Images linked: 1  -     Dressing  Appearance dry;intact  prevena dressing  -     Closure Sutures  -     Base moist;pink  approximated except for two distal areas weeping  -     Periwound intact;pink;swelling  -     Periwound Temperature warm  -     Periwound Skin Turgor firm  firm edema  -     Drainage Characteristics/Odor serosanguineous  -     Drainage Amount moderate;other (see comments)  active weeping  -     Care, Wound cleansed with;wound cleanser;negative pressure wound therapy  -     Dressing Care dressing changed  prevena plus dressing  -     Periwound Care barrier film applied;cleansed with pH balanced cleanser;dry periwound area maintained  -     Sutures Removed Intact Yes  -     Number of Sutures Removed 4  superior 4 sutures  -JM     Wound Output (mL) 300  -JM     Retired Wound - Properties Group Placement Date: 06/20/24  - Side: Right  -SS Orientation: anterior  -SS Location: hip  -SS Primary Wound Type: Incision  -SS Additional Comments: PREVENA  -    Retired Wound - Properties Group Date first assessed: 06/20/24  - Side: Right  -SS Location: hip  -SS Primary Wound Type: Incision  -SS Additional Comments: PREVENA  -SS      Row Name             Wound 06/20/24 Left proximal leg Incision    Wound - Properties Group Placement Date: 06/20/24  - Side: Left  -SS Orientation: proximal  -SS Location: leg  -SS Primary Wound Type: Incision  -SS Additional Comments: Pin traction area- xerofrom, 4x4, ace  -SS    Retired Wound - Properties Group Placement Date: 06/20/24  - Side: Left  -SS Orientation: proximal  -SS Location: leg  -SS Primary Wound Type: Incision  -SS Additional Comments: Pin traction area- xerofrom, 4x4, ace  -SS    Retired Wound - Properties Group Date first assessed: 06/20/24  -SS Side: Left  -SS Location: leg  -SS Primary Wound Type: Incision  -SS Additional Comments: Pin traction area- xerofrom, 4x4, ace  -SS      Row Name             Wound 06/20/24 2100 Right lower leg Abrasion    Wound -  Properties Group Placement Date: 06/20/24  -TS Placement Time: 2100 -TS Present on Original Admission: --  -TS, unknown  Side: Right  -TS Orientation: lower  -TS Location: leg  -TS Primary Wound Type: Abrasion  -TS    Retired Wound - Properties Group Placement Date: 06/20/24  -TS Placement Time: 2100 -TS Present on Original Admission: --  -TS, unknown  Side: Right  -TS Orientation: lower  -TS Location: leg  -TS Primary Wound Type: Abrasion  -TS    Retired Wound - Properties Group Date first assessed: 06/20/24  -TS Time first assessed: 2100 -TS Present on Original Admission: --  -TS, unknown  Side: Right  -TS Location: leg  -TS Primary Wound Type: Abrasion  -TS      Row Name             Wound 06/21/24 2200 Left distal thigh Blisters    Wound - Properties Group Placement Date: 06/21/24  -TS Placement Time: 2200  -TS Side: Left  -TS Orientation: distal  -TS Location: thigh  -TS Primary Wound Type: Blisters  -TS    Retired Wound - Properties Group Placement Date: 06/21/24  -TS Placement Time: 2200  -TS Side: Left  -TS Orientation: distal  -TS Location: thigh  -TS Primary Wound Type: Blisters  -TS    Retired Wound - Properties Group Date first assessed: 06/21/24  -TS Time first assessed: 2200  -TS Side: Left  -TS Location: thigh  -TS Primary Wound Type: Blisters  -TS      Row Name 07/06/24 0830          NPWT (Negative Pressure Wound Therapy) 07/03/24 1330 R hip incision    NPWT (Negative Pressure Wound Therapy) - Properties Group Placement Date: 07/03/24  -MF Placement Time: 1330  -MF Location: R hip incision  -MF    Therapy Setting continuous therapy  -JM     Dressing other (see comments)  prevena customizable dressing  -JM     Pressure Setting 125 mmHg  -JM     Sponges Inserted 1  1 prevena dressing  -JM     Sponges Removed 1  prevena incisional dressing  -JM     Finger sweep complete Other (see somments0  no packing to wound  -JM     Retired NPWT (Negative Pressure Wound Therapy) - Properties Group Placement Date:  07/03/24  - Placement Time: 1330 -MF Location: R hip incision  -MF    Retired NPWT (Negative Pressure Wound Therapy) - Properties Group Placement Date: 07/03/24  - Placement Time: 1330 -MF Location: R hip incision  -MF      Row Name 07/06/24 0830          Plan of Care Review    Plan of Care Reviewed With patient  -JM     Progress improving  -     Outcome Evaluation R anterior hip incision approximated except for distal incision with two small weeping areas noted.  Superior 4 sutures were removed this tx, and new prevena incisional dressing placed.  Prevena dressing can stay in place for 7 days, at which time the unit will shut off and can be removed and discarded.  Pt will need surgeon follow-up at that time to address remaining sutures.  -       Row Name 07/06/24 0830          Positioning and Restraints    Pre-Treatment Position in bed  -     Post Treatment Position bed  -JM     In Bed notified nsg;fowlers;call light within reach;encouraged to call for assist  -               User Key  (r) = Recorded By, (t) = Taken By, (c) = Cosigned By      Initials Name Provider Type    Cleve Fraser, PT Physical Therapist    TB Tawny Ocampo RN Registered Nurse    Helene Hanson RN Registered Nurse    Kristy Rosenthla, PT Physical Therapist    SS Tana Chatterjee RN Registered Nurse    Lynn Sr, RN Registered Nurse    Daina Alfonso RN Registered Nurse                  Physical Therapy Education       Title: PT OT SLP Therapies (In Progress)       Topic: Physical Therapy (Done)       Point: Mobility training (Done)       Learning Progress Summary             Patient Acceptance, E, VU by  at 7/5/2024 1405    Comment: Pt educated on expected soreness following session with importance of increased mobility    Eager, E, VU by  at 7/4/2024 1417    Comment: Educated on the importance of mobility during admission    Acceptance, E, VU by  at 7/3/2024 1521     Comment: Pt educated on the importance of mobility during admission    Eager, E, VU by  at 7/2/2024 1031    Comment: Educated on the importance of continued mobility and strengthening on rehab process    Acceptance, E,TB, VU by  at 7/2/2024 0800    Acceptance, E, VU by  at 7/1/2024 1633    Comment: Educated on the importance of standing and increasing strength and endurance during admission    Acceptance, E, VU by  at 7/1/2024 0845    Eager, E, VU by SC at 6/29/2024 1450    Comment: reviewed safety with sliding board    Acceptance, E, VU,DU,NR by  at 6/28/2024 1404    Eager, E, VU,DU by EM at 6/27/2024 1539    Comment: Pt eudcated regarding WC management, transfers and symptom managment    Acceptance, E,D, VU by  at 6/26/2024 1608    Comment: Pt educated regarding transfers, weight bearing status and HEP.    Acceptance, E, VU,DU,NR by  at 6/25/2024 1107    Acceptance, E,H, VU by  at 6/24/2024 1105    Comment: Pt educated on bed positioning with L BKA and importance of exercise on rehab.    Eager, E, VU,DU by SC at 6/21/2024 1349    Comment: reviewed HEP    Acceptance, E,D, VU,NR by SD at 6/19/2024 1203    Comment: PT ed for expected outcomes, risks, and benefits of IPPT services and progression of activity. Pt given visual demo of unilateral gait with RW. Discussion regarding dispo planning and IRF.                         Point: Home exercise program (Done)       Learning Progress Summary             Patient Eager, E, VU by  at 7/4/2024 1417    Comment: Educated on the importance of mobility during admission    Acceptance, E, VU by  at 7/3/2024 1527    Comment: Pt educated on the importance of mobility during admission    Acceptance, E,TB, VU by  at 7/2/2024 0800    Acceptance, E, VU by  at 7/1/2024 0845    Eager, E, VU by SC at 6/29/2024 1450    Comment: reviewed safety with sliding board    Acceptance, E,D, VU by EM at 6/26/2024 1608    Comment: Pt educated regarding transfers, weight  bearing status and HEP.    Acceptance, E, VU,DU,NR by  at 6/25/2024 1107    Acceptance, E,H, VU by  at 6/24/2024 1105    Comment: Pt educated on bed positioning with L BKA and importance of exercise on rehab.    Acceptance, E, VU,NR,DU by GEORGINA at 6/23/2024 1420    Eager, E, VU,DU by SC at 6/21/2024 1349    Comment: reviewed HEP    Acceptance, E,D, VU,NR by SD at 6/19/2024 1203    Comment: PT ed for expected outcomes, risks, and benefits of IPPT services and progression of activity. Pt given visual demo of unilateral gait with RW. Discussion regarding dispo planning and IRF.                         Point: Body mechanics (Done)       Learning Progress Summary             Patient Acceptance, E, VU by  at 7/5/2024 1405    Comment: Pt educated on expected soreness following session with importance of increased mobility    Eager, E, VU by  at 7/4/2024 1417    Comment: Educated on the importance of mobility during admission    Acceptance, E, VU by  at 7/3/2024 1527    Comment: Pt educated on the importance of mobility during admission    Eager, E, VU by  at 7/2/2024 1031    Comment: Educated on the importance of continued mobility and strengthening on rehab process    Acceptance, E,TB, VU by  at 7/2/2024 0800    Acceptance, E, VU by  at 7/1/2024 1633    Comment: Educated on the importance of standing and increasing strength and endurance during admission    Acceptance, E, VU by  at 7/1/2024 0845    Eager, E, VU by SC at 6/29/2024 1450    Comment: reviewed safety with sliding board    Acceptance, E, VU,DU,NR by  at 6/28/2024 1404    Eager, E, VU,DU by EM at 6/27/2024 1539    Comment: Pt eudcated regarding WC management, transfers and symptom managment    Acceptance, E,D, VU by EM at 6/26/2024 1608    Comment: Pt educated regarding transfers, weight bearing status and HEP.    Acceptance, E, VU,DU,NR by  at 6/25/2024 1107    Acceptance, E,H, VU by  at 6/24/2024 1105    Comment: Pt educated on bed  positioning with L BKA and importance of exercise on rehab.    Eager, E, VU,DU by SC at 6/21/2024 1349    Comment: reviewed HEP    Acceptance, E,D, VU,NR by SD at 6/19/2024 1203    Comment: PT ed for expected outcomes, risks, and benefits of IPPT services and progression of activity. Pt given visual demo of unilateral gait with RW. Discussion regarding dispo planning and IRF.                         Point: Precautions (Done)       Learning Progress Summary             Patient Acceptance, E, VU by  at 7/5/2024 1405    Comment: Pt educated on expected soreness following session with importance of increased mobility    Eager, E, VU by  at 7/4/2024 1417    Comment: Educated on the importance of mobility during admission    Acceptance, E, VU by  at 7/3/2024 1527    Comment: Pt educated on the importance of mobility during admission    Eager, E, VU by  at 7/2/2024 1031    Comment: Educated on the importance of continued mobility and strengthening on rehab process    Acceptance, E,TB, VU by  at 7/2/2024 0800    Acceptance, E, VU by  at 7/1/2024 1633    Comment: Educated on the importance of standing and increasing strength and endurance during admission    Acceptance, E, VU by  at 7/1/2024 0845    Eager, E, VU by SC at 6/29/2024 1450    Comment: reviewed safety with sliding board    Acceptance, E, VU,DU,NR by  at 6/28/2024 1404    Eager, E, VU,DU by  at 6/27/2024 1539    Comment: Pt eudcated regarding WC management, transfers and symptom managment    Acceptance, E,D, VU by  at 6/26/2024 1608    Comment: Pt educated regarding transfers, weight bearing status and HEP.    Acceptance, E, VU,DU,NR by  at 6/25/2024 1107    Acceptance, E,H, VU by  at 6/24/2024 1105    Comment: Pt educated on bed positioning with L BKA and importance of exercise on rehab.    Eager, E, VU,DU by SC at 6/21/2024 1349    Comment: reviewed HEP    Acceptance, E,D, VU,NR by SD at 6/19/2024 1203    Comment: PT ed for expected  outcomes, risks, and benefits of IPPT services and progression of activity. Pt given visual demo of unilateral gait with RW. Discussion regarding dispo planning and IRF.                                         User Key       Initials Effective Dates Name Provider Type Discipline    SC 02/03/23 -  Jorge Luis Childress, PT Physical Therapist PT    SD 03/13/23 -  Charity Dela Cruz, PT Physical Therapist PT    GEORGINA 06/16/21 -  Radha Miguel, PT Physical Therapist PT    LH 09/21/23 -  Rachna Cruz, PT Physical Therapist PT    BM 04/08/24 -  Marina Soto, RN Registered Nurse Nurse    EM 05/07/24 -  Rancho Schneider, PT Student PT Student PT     05/07/24 -  Quincy Elizabeth, PT Student PT Student PT                    Recommendation and Plan  Anticipated Equipment Needs at Discharge (PT): gait belt  Anticipated Discharge Disposition (PT): inpatient rehabilitation facility  Planned Therapy Interventions (PT): wound care  Therapy Frequency (PT): daily  Plan of Care Reviewed With: patient   Progress: improving       Progress: improving  Outcome Evaluation: R anterior hip incision approximated except for distal incision with two small weeping areas noted.  Superior 4 sutures were removed this tx, and new prevena incisional dressing placed.  Prevena dressing can stay in place for 7 days, at which time the unit will shut off and can be removed and discarded.  Pt will need surgeon follow-up at that time to address remaining sutures.  Plan of Care Reviewed With: patient            Time Calculation   PT Charges       Row Name 07/06/24 0830             Time Calculation    Start Time 0830  -JM      PT Goal Re-Cert Due Date 07/11/24  -JM         Untimed Charges    54031-Kbt Pressure wound to 50sqcm Non-DME 40  -JM         Total Minutes    Untimed Charges Total Minutes 40  -JM       Total Minutes 40  -JM                User Key  (r) = Recorded By, (t) = Taken By, (c) = Cosigned By      Initials Name Provider Type      Kristy Paulino, PT Physical Therapist                      Therapy Charges for Today       Code Description Service Date Service Provider Modifiers Qty    08299869533 HC PT NEG PRES WOUND TO 50SQCM NONDME 3 7/6/2024 Kristy Paulino, PT  1              PT G-Codes  Outcome Measure Options: AM-PAC 6 Clicks Daily Activity (OT)  AM-PAC 6 Clicks Score (PT): 15  AM-PAC 6 Clicks Score (OT): 16       Kristy Paulino, PT  7/6/2024

## 2024-07-06 NOTE — PLAN OF CARE
Goal Outcome Evaluation:  Plan of Care Reviewed With: patient        Progress: improving  Outcome Evaluation: R anterior hip incision approximated except for distal incision with two small weeping areas noted.  Superior 4 sutures were removed this tx, and new prevena incisional dressing placed.  Prevena dressing can stay in place for 7 days, at which time the unit will shut off and can be removed and discarded.  Pt will need surgeon follow-up at that time to address remaining sutures.

## 2024-07-06 NOTE — PROGRESS NOTES
"Easton Alvarez  1965  [id number]          Chief Complaint: left foot infection    Reason for Consultation: left foot infection    History of present illness:     Patient is a 58 y.o.  Yr old male with history of diabetes with prior right TMA 2019 at , history strep septicemia 2016 ; he reports right total hip arthroplasty 2024 after fall. He reports a fall several weeks prior to his June admission with injury to the left foot, wound present with deterioration several days preceding admission with severe discoloration/redness and swelling.  CT scan showed concern for subcutaneous emphysema and necrotizing infection.  Transferred to [facility] with evaluation by Dr. Em and Dr. Lundberg and arrangements made for urgent surgery. Subsequently, blood cultures called with gram-positive cocci     6/13 left LE amp, Dr Em    6/15  \"PROCEDURE:            Left      19225: Secondary closure above-knee amputation\"      6/18/24  right hip aspiration, culture with GB strep; wbc  down some postop from revision; blood cultures 6/15 negative so far    6/19/24 Dr. Em helping facilitate right hip surgery with orthopedic partners/team    6/20/24 surgery Dr Sethi  \"Procedure(s):  HIP ANTERIOR INCISION AND DRAINAGE WITH HANA TABLE, POLY SWAP- RIGHT\"    6/26/24 urinary retention per nursing notes, Dr Goodwin aware.  Dr Goodwin reports later that patient voiding without  retention issues    7/2/24   Per discussion with medicine team, GI has seen him regarding abnormal LFT/thrombocytopenia with concern for early cirrhosis/splenomegaly and further workup at their discretion.     7/3/24 wound care team noted drainage at surgical site, wound vac applied    7/5/24 ortho team has assesed hip incision     7/6/24 seen early and sleepy; d/c plans ongoing; no new pain or distress per nursing; case management looking into options;  postop pain controlled at present; He has right hip pain that is intermittent , " "blunted by neuropathy, worse with palpation, better with pain meds and 2-3 out of 10 in severity. No distress per nursing; no new focal symptoms otherwise; no fever; no left stump pain; He did not know about any prior issues of thrombocytopenia     No headache photophobia or neck stiffness.  No shortness of breath cough or hemoptysis.  No nausea vomiting  or abdominal pain.  No dysuria hematuria or pyuria.  No other new skin rash    Past Medical History:   Diagnosis Date    Diabetes mellitus     Diabetic foot infection 2018    left (partial amputation)       Past Surgical History:   Procedure Laterality Date    AMPUTATION FOOT / TOE Right     partial foot    BELOW KNEE AMPUTATION Left 6/13/2024    Procedure: AMPUTATION BELOW KNEE AND WOUND VAC ASSISTED CLOSURE LEFT;  Surgeon: Brijesh Em Jr., MD;  Location:  [facility];  Service: Orthopedics;  Laterality: Left;    INCISION AND DRAINAGE HIP Right 6/20/2024    Procedure: HIP ANTERIOR INCISION AND DRAINAGE WITH HANA TABLE, POLY SWAP RIGHT WITH LEFT LEG PIN TRACTION;  Surgeon: Lex Sethi MD;  Location:  [facility];  Service: Orthopedics;  Laterality: Right;    INCISION AND DRAINAGE LEG Left 6/15/2024    Procedure: SECONDARY CLOSURE LEFT BELOW KNEE AMPUTATION;  Surgeon: Brijesh Em Jr., MD;  Location:  [facility];  Service: Orthopedics;  Laterality: Left;    PLACEMENT OF WOUND VAC Left 6/13/2024    Procedure: PLACEMENT OF WOUND VAC LEFT;  Surgeon: Brijesh Em Jr., MD;  Location:  [facility];  Service: Orthopedics;  Laterality: Left;    TOTAL HIP ARTHROPLASTY Right 02/2024    From fall \"whole hip was shattered\"           No Known Allergies       Review of Systems    7/6/24 per nursing also    Constitutional-- No Fever, chills or sweats.  Appetite diminished with fatigue.  Heent-- No new vision, hearing or throat complaints.  No epistaxis or oral sores.  Denies odynophagia or dysphagia.  No flashers, floaters or eye pain. No odynophagia or dysphagia. No " "headache, photophobia or neck stiffness.  CV-- No chest pain, palpitation or syncope  Resp-- No SOB/cough/Hemoptysis  GI- No nausea, vomiting, or diarrhea.  No hematochezia, melena, or hematemesis.   -- No dysuria, hematuria, or flank pain.  Denies hesitancy, urgency or flank pain.  Lymph- no swollen lymph nodes in neck/axilla or groin.   Heme- No active bruising or bleeding; no Hx of DVT or PE.  MS-- aside from above, no swelling or pain in the bones or joints of arms/legs.  No new back pain.  Neuro-- No acute focal weakness or numbness in the arms or legs.  No seizures.    Full 12 point review of systems reviewed and negative otherwise for acute complaints, except for above    Physical Exam:   Vital Signs   /75   Pulse 91   Temp 98.3 °F (36.8 °C) (Oral)   Resp 18   Ht 172.7 cm (67.99\")   Wt 80.1 kg (176 lb 9.4 oz)   SpO2 97%   BMI 26.86 kg/m²     GENERAL: sleepy; in no acute distress.   HEENT: Normocephalic, atraumatic.   No conjunctival injection. No icterus. Oropharynx clear without evidence of thrush or exudate. No evidence of periodontal disease.    NECK: Supple without nuchal rigidity. No mass.   HEART: RRR; No murmur, rubs, gallops.   LUNGS: diminished at bases.  Trace crackles at the bases but no rhonchi or wheeze. Normal respiratory effort. Nonlabored. No dullness.  ABDOMEN: Soft, nontender, nondistended. Positive bowel sounds. No rebound or guarding. NO mass or HSM.  EXT:  see below.     MSK: right hip surgical site covered;  no new visible redness/purulence or fluctuance at my visit; see PT wound care notes  SKIN: Warm and dry without cutaneous eruptions on Inspection/palpation.      Left   BKA stump covered;  no new visible redness or purulence; no discrete fluctuance.  No crepitus    Laboratory Data    Results from last 7 days   Lab Units 07/05/24  0508 07/04/24  0542 07/03/24  0825   WBC 10*3/mm3 5.32 5.61 6.15   HEMOGLOBIN g/dL 7.5* 8.0* 8.1*   HEMATOCRIT % 23.0* 24.3* 24.9* "   PLATELETS 10*3/mm3 171 160 148     Results from last 7 days   Lab Units 07/05/24  0508   SODIUM mmol/L 133*   POTASSIUM mmol/L 4.2   CHLORIDE mmol/L 101   CO2 mmol/L 27.0   BUN mg/dL 13   CREATININE mg/dL 0.60*   GLUCOSE mg/dL 80   CALCIUM mg/dL 7.1*     Results from last 7 days   Lab Units 07/02/24  0401   ALK PHOS U/L 235*   BILIRUBIN mg/dL 0.4   ALT (SGPT) U/L 19   AST (SGOT) U/L 39                   Estimated Creatinine Clearance: 148.3 mL/min (A) (by C-G formula based on SCr of 0.6 mg/dL (L)).      Microbiology:      Radiology:  Imaging Results (Last 72 Hours)       ** No results found for the last 72 hours. **              Impression:     --acute sepsis as per admission notes, severe left foot infection with gangrene/cellulitis and concern for necrotizing soft tissue infection by imaging, urgent surgical arrangements made June 13; repeat surg 6/15  ;  subsequent right hip surg 6/20; He knows risk for persistent/recurrent or nonhealing wounds, persistet / progressive or recurrent infection and risk for further amputation/functional-limb loss and chronic pain.  Associated with GB strep bacteremia as below.    --Acute/severe left foot infection as above, urgent surgical arrangements  made 6/13    --Acute gb strep bacteremia,   source from foot.  High risk for further serious morbidity and other serious sequela including dire consequences and metastatic foci of involvement/endovascular sequela etc. No new metastatic focus of involvement. TTE 6/17      --thrombocytopenia.   Per GI team, concerned by imaging and laboratory data for early cirrhosis with splenomegaly and risk for splenic sequestration/thrombocytopenia.  Some variability and platelet counts.  in general, thrombocytopenia not common with ceftriaxone but if recurrent/persistent decline and no other specific etiology found , then you could consider empiric adjustment.    --Right hip fracture with repair February 2024.  +pain ; orthopedics with   aspiration June 18 and culture with GB strep likely hematogenous seeding, MRI imaging with concern for septic joint, surgery on June 20 as above.   Patient understands risk for persistent/recurrent or progressive infection and potential for further surgical intervention in the future that could include functional/limb loss and other serious sequela/morbidity; he knows antibiotics and surgery not a guarantee for care; postop with increased drainage noted by PT / wound care team 7/3, ortho to follow/assess for any additional changes/intervention    --diabetes.  Described as uncontrolled by medicine team at admission.  Glucose control per medicine team    --urinary retention per nursing staff.  Per their notes patient refused in/out catheterization.  Dr. Goodwin aware and he will discuss options with patient      PLAN:      --IV  rocephin likely at least 6 weeks from hip surgery and anticipate step down to oral agents after with oral keflex    **wound culture at left foot mixed with  MSSA Klebsiella and GB strep  **blood cultures with group B strep  ** right hip cultures with group B strep    --orthopedics  making surgical plans with concern for right hipprosthetic/periprosthetic infection     --Check/review labs cultures and scans    --TTE described as technically adequate by cardiology; no description of vegetation by cardiology per report    --Partial history per nursing staff    --encouraged incentive spirometry.  Nursing aware to instruct and encourage    --Discussed with microbiology    --d/w Dr Green    --Highly complex at of issues with high risk for further serious morbidity and other serious sequela     **follow-up with me one week.  Every Monday CBC/CMP and sed rate/CRP.  I anticipate 6 weeks IV antibiotic from hip surgery and probable stepdown after that to oral Keflex with no specific end date so far    Copied text in this note has been reviewed and is accurate as of 07/06/24.        Cleve Bills,  MD  7/6/2024

## 2024-07-06 NOTE — PLAN OF CARE
Problem: Skin Injury Risk Increased  Goal: Skin Health and Integrity  Outcome: Ongoing, Progressing  Intervention: Optimize Skin Protection  Recent Flowsheet Documentation  Taken 7/6/2024 0410 by Lester Enciso RN  Head of Bed (HOB) Positioning: HOB at 20-30 degrees  Taken 7/6/2024 0206 by Lester Enciso RN  Head of Bed (HOB) Positioning: HOB elevated  Taken 7/5/2024 2356 by Lester Enciso RN  Head of Bed (HOB) Positioning: HOB elevated  Taken 7/5/2024 2208 by Lester Enciso RN  Head of Bed (HOB) Positioning: HOB elevated  Taken 7/5/2024 2010 by Lester Enciso RN  Pressure Reduction Techniques:   frequent weight shift encouraged   weight shift assistance provided  Head of Bed (HOB) Positioning: HOB elevated  Pressure Reduction Devices:   positioning supports utilized   pressure-redistributing mattress utilized  Skin Protection:   adhesive use limited   transparent dressing maintained   tubing/devices free from skin contact     Problem: Fall Injury Risk  Goal: Absence of Fall and Fall-Related Injury  Outcome: Ongoing, Progressing  Intervention: Identify and Manage Contributors  Recent Flowsheet Documentation  Taken 7/6/2024 0410 by Lester Enciso RN  Self-Care Promotion: independence encouraged  Taken 7/5/2024 2010 by Lester Enciso RN  Medication Review/Management: medications reviewed  Intervention: Promote Injury-Free Environment  Recent Flowsheet Documentation  Taken 7/6/2024 0410 by Lester Enciso RN  Safety Promotion/Fall Prevention:   activity supervised   assistive device/personal items within reach   clutter free environment maintained   fall prevention program maintained   gait belt   mobility aid in reach   nonskid shoes/slippers when out of bed   room organization consistent   safety round/check completed  Taken 7/5/2024 2356 by Lester Enciso RN  Safety Promotion/Fall Prevention:   activity supervised   assistive device/personal items within reach   clutter free environment maintained   fall  prevention program maintained   gait belt   mobility aid in reach   nonskid shoes/slippers when out of bed   room organization consistent   safety round/check completed  Taken 7/5/2024 2208 by Lester Enciso RN  Safety Promotion/Fall Prevention:   activity supervised   assistive device/personal items within reach   clutter free environment maintained   fall prevention program maintained   gait belt   mobility aid in reach   nonskid shoes/slippers when out of bed   room organization consistent   safety round/check completed  Taken 7/5/2024 2010 by Lester Enciso RN  Safety Promotion/Fall Prevention:   activity supervised   assistive device/personal items within reach   clutter free environment maintained   fall prevention program maintained   gait belt   mobility aid in reach   nonskid shoes/slippers when out of bed   room organization consistent   safety round/check completed     Problem: Adjustment to Surgery (Extremity Amputation)  Goal: Optimal Coping with Amputation  Outcome: Ongoing, Progressing  Intervention: Support Psychsocial Response  Recent Flowsheet Documentation  Taken 7/6/2024 0410 by Lester Enciso RN  Family/Support System Care:   self-care encouraged   support provided  Taken 7/5/2024 2356 by Lester Enciso RN  Family/Support System Care:   self-care encouraged   support provided  Taken 7/5/2024 2208 by Lester Enciso RN  Family/Support System Care:   self-care encouraged   support provided  Taken 7/5/2024 2010 by Lester Enciso RN  Family/Support System Care:   self-care encouraged   support provided     Problem: Bleeding (Extremity Amputation)  Goal: Absence of Bleeding  Outcome: Ongoing, Progressing  Intervention: Monitor and Manage Bleeding  Recent Flowsheet Documentation  Taken 7/6/2024 0410 by Lester Enciso RN  Bleeding Management: dressing monitored  Taken 7/5/2024 2356 by Lester Enciso RN  Bleeding Management: dressing monitored  Taken 7/5/2024 2208 by Lester Enciso RN  Bleeding  Management: dressing monitored  Taken 7/5/2024 2010 by Lester Enciso RN  Bleeding Management: dressing monitored     Problem: Bowel Motility Impaired (Extremity Amputation)  Goal: Effective Bowel Elimination  Outcome: Ongoing, Progressing     Problem: Fluid and Electrolyte Imbalance (Extremity Amputation)  Goal: Fluid and Electrolyte Balance  Outcome: Ongoing, Progressing     Problem: Functional Ability Impaired (Extremity Amputation)  Goal: Optimal Functional Ability  Outcome: Ongoing, Progressing  Intervention: Optimize Functional Ability  Recent Flowsheet Documentation  Taken 7/6/2024 0410 by Lester Enciso RN  Self-Care Promotion: independence encouraged  Taken 7/5/2024 2010 by Lester Enciso RN  Activity Management: activity encouraged     Problem: Infection (Extremity Amputation)  Goal: Absence of Infection Signs and Symptoms  Outcome: Ongoing, Progressing  Intervention: Prevent or Manage Infection  Recent Flowsheet Documentation  Taken 7/6/2024 0410 by Lester Enciso RN  Infection Prevention:   environmental surveillance performed   rest/sleep promoted   single patient room provided  Taken 7/5/2024 2356 by Lester Enciso RN  Infection Prevention:   environmental surveillance performed   rest/sleep promoted   single patient room provided  Taken 7/5/2024 2208 by Lester Enciso RN  Infection Prevention:   environmental surveillance performed   rest/sleep promoted   single patient room provided     Problem: Ongoing Anesthesia Effects (Extremity Amputation)  Goal: Anesthesia/Sedation Recovery  Outcome: Ongoing, Progressing  Intervention: Optimize Anesthesia Recovery  Recent Flowsheet Documentation  Taken 7/6/2024 0410 by Lester Enciso RN  Safety Promotion/Fall Prevention:   activity supervised   assistive device/personal items within reach   clutter free environment maintained   fall prevention program maintained   gait belt   mobility aid in reach   nonskid shoes/slippers when out of bed   room  organization consistent   safety round/check completed  Taken 7/5/2024 2356 by Lester Enciso RN  Safety Promotion/Fall Prevention:   activity supervised   assistive device/personal items within reach   clutter free environment maintained   fall prevention program maintained   gait belt   mobility aid in reach   nonskid shoes/slippers when out of bed   room organization consistent   safety round/check completed  Taken 7/5/2024 2208 by Lester Enciso RN  Safety Promotion/Fall Prevention:   activity supervised   assistive device/personal items within reach   clutter free environment maintained   fall prevention program maintained   gait belt   mobility aid in reach   nonskid shoes/slippers when out of bed   room organization consistent   safety round/check completed  Taken 7/5/2024 2010 by Lester Enciso RN  Safety Promotion/Fall Prevention:   activity supervised   assistive device/personal items within reach   clutter free environment maintained   fall prevention program maintained   gait belt   mobility aid in reach   nonskid shoes/slippers when out of bed   room organization consistent   safety round/check completed  Taken 7/5/2024 2000 by Lester Enciso RN  Patient Tolerance (IS):   good   no adverse signs/symptoms present  Administration (IS): self-administered     Problem: Pain (Extremity Amputation)  Goal: Acceptable Pain Control  Outcome: Ongoing, Progressing  Intervention: Prevent or Manage Pain  Recent Flowsheet Documentation  Taken 7/6/2024 0410 by Lester Enciso RN  Pain Management Interventions:   quiet environment facilitated   no interventions per patient request  Diversional Activities:   smartphone   television  Taken 7/6/2024 0206 by Lester Enciso RN  Pain Management Interventions: no interventions per patient request  Taken 7/5/2024 2356 by Lester Enciso RN  Pain Management Interventions:   no interventions per patient request   medication offered but refused  Diversional Activities:    smartphone   television  Taken 7/5/2024 2208 by Lester Enciso RN  Pain Management Interventions: no interventions per patient request  Diversional Activities:   television   smartphone  Taken 7/5/2024 2010 by Lester Enciso RN  Pain Management Interventions:   see MAR   quiet environment facilitated   pillow support provided  Diversional Activities:   television   smartphone     Problem: Postoperative Nausea and Vomiting (Extremity Amputation)  Goal: Nausea and Vomiting Relief  Outcome: Ongoing, Progressing     Problem: Postoperative Urinary Retention (Extremity Amputation)  Goal: Effective Urinary Elimination  Outcome: Ongoing, Progressing     Problem: Residual Limb Care (Extremity Amputation)  Goal: Optimal Residual Limb Healing  Outcome: Ongoing, Progressing     Problem: Mobility Impairment  Goal: Optimal Mobility  Outcome: Ongoing, Progressing  Intervention: Optimize Mobility  Recent Flowsheet Documentation  Taken 7/6/2024 0410 by Lester Enciso RN  Positioning/Transfer Devices:   pillows   in use  Taken 7/6/2024 0206 by Lester Enciso RN  Positioning/Transfer Devices:   pillows   in use  Taken 7/5/2024 2356 by Lester Enciso RN  Positioning/Transfer Devices:   pillows   in use  Taken 7/5/2024 2208 by Lester Enciso RN  Positioning/Transfer Devices:   pillows   in use  Taken 7/5/2024 2010 by Lester Enciso RN  Activity Management: activity encouraged  Positioning/Transfer Devices:   pillows   in use     Problem: Adult Inpatient Plan of Care  Goal: Plan of Care Review  Outcome: Ongoing, Progressing  Goal: Patient-Specific Goal (Individualized)  Outcome: Ongoing, Progressing  Goal: Absence of Hospital-Acquired Illness or Injury  Outcome: Ongoing, Progressing  Intervention: Identify and Manage Fall Risk  Recent Flowsheet Documentation  Taken 7/6/2024 0410 by Lester Enciso RN  Safety Promotion/Fall Prevention:   activity supervised   assistive device/personal items within reach   clutter free environment  maintained   fall prevention program maintained   gait belt   mobility aid in reach   nonskid shoes/slippers when out of bed   room organization consistent   safety round/check completed  Taken 7/5/2024 2356 by Lester Enciso RN  Safety Promotion/Fall Prevention:   activity supervised   assistive device/personal items within reach   clutter free environment maintained   fall prevention program maintained   gait belt   mobility aid in reach   nonskid shoes/slippers when out of bed   room organization consistent   safety round/check completed  Taken 7/5/2024 2208 by Lester Enciso RN  Safety Promotion/Fall Prevention:   activity supervised   assistive device/personal items within reach   clutter free environment maintained   fall prevention program maintained   gait belt   mobility aid in reach   nonskid shoes/slippers when out of bed   room organization consistent   safety round/check completed  Taken 7/5/2024 2010 by Lester Enciso RN  Safety Promotion/Fall Prevention:   activity supervised   assistive device/personal items within reach   clutter free environment maintained   fall prevention program maintained   gait belt   mobility aid in reach   nonskid shoes/slippers when out of bed   room organization consistent   safety round/check completed  Intervention: Prevent Skin Injury  Recent Flowsheet Documentation  Taken 7/6/2024 0410 by Lester Enciso RN  Body Position: position changed independently  Taken 7/6/2024 0206 by Lester Enciso RN  Body Position: position changed independently  Taken 7/5/2024 2356 by Lester Enciso RN  Body Position: position changed independently  Taken 7/5/2024 2208 by Lester Enciso RN  Body Position: position changed independently  Taken 7/5/2024 2010 by Lester Enciso RN  Body Position: position changed independently  Skin Protection:   adhesive use limited   transparent dressing maintained   tubing/devices free from skin contact  Intervention: Prevent and Manage VTE (Venous  Thromboembolism) Risk  Recent Flowsheet Documentation  Taken 7/6/2024 0410 by Lester Enciso RN  VTE Prevention/Management: sequential compression devices off  Taken 7/6/2024 0206 by Lester Enciso RN  VTE Prevention/Management: sequential compression devices off  Taken 7/5/2024 2356 by Lester Enciso RN  VTE Prevention/Management: sequential compression devices off  Taken 7/5/2024 2208 by Lester Enciso RN  VTE Prevention/Management: sequential compression devices off  Taken 7/5/2024 2010 by Lester Enciso RN  Activity Management: activity encouraged  VTE Prevention/Management: sequential compression devices off  Range of Motion: active ROM (range of motion) encouraged  Intervention: Prevent Infection  Recent Flowsheet Documentation  Taken 7/6/2024 0410 by Lester Enciso RN  Infection Prevention:   environmental surveillance performed   rest/sleep promoted   single patient room provided  Taken 7/5/2024 2356 by Lester Enciso RN  Infection Prevention:   environmental surveillance performed   rest/sleep promoted   single patient room provided  Taken 7/5/2024 2208 by Lester Enciso RN  Infection Prevention:   environmental surveillance performed   rest/sleep promoted   single patient room provided  Goal: Optimal Comfort and Wellbeing  Outcome: Ongoing, Progressing  Intervention: Monitor Pain and Promote Comfort  Recent Flowsheet Documentation  Taken 7/6/2024 0410 by Lester Enciso RN  Pain Management Interventions:   quiet environment facilitated   no interventions per patient request  Taken 7/6/2024 0206 by Lester Enciso RN  Pain Management Interventions: no interventions per patient request  Taken 7/5/2024 2356 by Lester Enciso RN  Pain Management Interventions:   no interventions per patient request   medication offered but refused  Taken 7/5/2024 2208 by Lester Enciso RN  Pain Management Interventions: no interventions per patient request  Taken 7/5/2024 2010 by Lester Enciso RN  Pain Management  Interventions:   see MAR   quiet environment facilitated   pillow support provided  Intervention: Provide Person-Centered Care  Recent Flowsheet Documentation  Taken 7/6/2024 0410 by Lester Enciso RN  Trust Relationship/Rapport: care explained  Taken 7/5/2024 2356 by Lester Enciso, RN  Trust Relationship/Rapport:   care explained   questions answered   questions encouraged  Taken 7/5/2024 2208 by Lester Enciso, RN  Trust Relationship/Rapport: care explained  Taken 7/5/2024 2010 by Lester Enciso RN  Trust Relationship/Rapport:   care explained   questions encouraged   reassurance provided  Goal: Readiness for Transition of Care  Outcome: Ongoing, Progressing   Goal Outcome Evaluation:         Pt A@OX4. VSS. Wound vac in place to right hip. 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 Dressing changed per order. Pt states pain well controlled with PRN medications.

## 2024-07-06 NOTE — PLAN OF CARE
Goal Outcome Evaluation:  Plan of Care Reviewed With: patient      Patient discharged to [facility] via  ambulance. Report called [facility]. Prevena wound vac changed by PT wound care prior to discharge. Dr. Green contacted Dr. Sethi regarding sutures in right hip incision area. Discharged with PICC line in place for abx infusion.

## 2024-07-09 LAB
FUNGUS WND CULT: NORMAL
MYCOBACTERIUM SPEC CULT: NORMAL
NIGHT BLUE STAIN TISS: NORMAL

## 2024-07-10 ENCOUNTER — TELEPHONE (OUTPATIENT)
Dept: GASTROENTEROLOGY | Facility: CLINIC | Age: 59
End: 2024-07-10
Payer: MEDICAID

## 2024-07-11 LAB
FUNGUS WND CULT: NORMAL
MYCOBACTERIUM SPEC CULT: NORMAL
NIGHT BLUE STAIN TISS: NORMAL

## 2024-07-13 LAB
FUNGUS WND CULT: NORMAL
MYCOBACTERIUM SPEC CULT: NORMAL
NIGHT BLUE STAIN TISS: NORMAL

## 2024-07-16 LAB
FUNGUS WND CULT: NORMAL
MYCOBACTERIUM SPEC CULT: NORMAL
NIGHT BLUE STAIN TISS: NORMAL

## 2024-07-17 ENCOUNTER — HOME HEALTH ADMISSION (OUTPATIENT)
Dept: HOME HEALTH SERVICES | Facility: HOME HEALTHCARE | Age: 59
End: 2024-07-17
Payer: MEDICAID

## 2024-07-17 ENCOUNTER — TRANSCRIBE ORDERS (OUTPATIENT)
Dept: HOME HEALTH SERVICES | Facility: HOME HEALTHCARE | Age: 59
End: 2024-07-17
Payer: MEDICAID

## 2024-07-17 DIAGNOSIS — Z47.89 UNSPECIFIED ORTHOPEDIC AFTERCARE: Primary | ICD-10-CM

## 2024-07-18 LAB
FUNGUS WND CULT: NORMAL
MYCOBACTERIUM SPEC CULT: NORMAL
NIGHT BLUE STAIN TISS: NORMAL

## 2024-07-20 LAB
FUNGUS WND CULT: NORMAL
MYCOBACTERIUM SPEC CULT: NORMAL
NIGHT BLUE STAIN TISS: NORMAL

## 2024-07-23 LAB
FUNGUS WND CULT: NORMAL
MYCOBACTERIUM SPEC CULT: NORMAL
NIGHT BLUE STAIN TISS: NORMAL

## 2024-07-25 LAB
FUNGUS WND CULT: NORMAL
MYCOBACTERIUM SPEC CULT: NORMAL
NIGHT BLUE STAIN TISS: NORMAL

## 2024-07-27 LAB
FUNGUS WND CULT: NORMAL
MYCOBACTERIUM SPEC CULT: NORMAL
NIGHT BLUE STAIN TISS: NORMAL

## 2024-07-30 LAB
FUNGUS WND CULT: NORMAL
MYCOBACTERIUM SPEC CULT: NORMAL
NIGHT BLUE STAIN TISS: NORMAL

## 2024-08-01 LAB
FUNGUS WND CULT: NORMAL
MYCOBACTERIUM SPEC CULT: NORMAL
NIGHT BLUE STAIN TISS: NORMAL

## 2024-09-16 ENCOUNTER — TRANSCRIBE ORDERS (OUTPATIENT)
Dept: LAB | Facility: HOSPITAL | Age: 59
End: 2024-09-16
Payer: MEDICAID

## 2024-09-16 ENCOUNTER — LAB (OUTPATIENT)
Dept: LAB | Facility: HOSPITAL | Age: 59
End: 2024-09-16
Payer: MEDICAID

## 2024-09-16 DIAGNOSIS — A40.1 SEPTICEMIA DUE TO GROUP B STREPTOCOCCUS: ICD-10-CM

## 2024-09-16 DIAGNOSIS — E11.52 DIABETIC GANGRENE: Primary | ICD-10-CM

## 2024-09-16 DIAGNOSIS — E11.52 DIABETIC GANGRENE: ICD-10-CM

## 2024-09-16 LAB
ALBUMIN SERPL-MCNC: 3.4 G/DL (ref 3.5–5.2)
ALBUMIN/GLOB SERPL: 0.9 G/DL
ALP SERPL-CCNC: 255 U/L (ref 39–117)
ALT SERPL W P-5'-P-CCNC: 24 U/L (ref 1–41)
ANION GAP SERPL CALCULATED.3IONS-SCNC: 12 MMOL/L (ref 5–15)
AST SERPL-CCNC: 42 U/L (ref 1–40)
BILIRUB SERPL-MCNC: 0.9 MG/DL (ref 0–1.2)
BUN SERPL-MCNC: 15 MG/DL (ref 8–23)
BUN/CREAT SERPL: 24.6 (ref 7–25)
CALCIUM SPEC-SCNC: 9.1 MG/DL (ref 8.6–10.5)
CHLORIDE SERPL-SCNC: 105 MMOL/L (ref 98–107)
CO2 SERPL-SCNC: 24 MMOL/L (ref 22–29)
CREAT SERPL-MCNC: 0.61 MG/DL (ref 0.76–1.27)
CRP SERPL-MCNC: 3.66 MG/DL (ref 0–0.5)
DEPRECATED RDW RBC AUTO: 43.2 FL (ref 37–54)
EGFRCR SERPLBLD CKD-EPI 2021: 110 ML/MIN/1.73
ERYTHROCYTE [DISTWIDTH] IN BLOOD BY AUTOMATED COUNT: 13.8 % (ref 12.3–15.4)
ERYTHROCYTE [SEDIMENTATION RATE] IN BLOOD: 28 MM/HR (ref 0–20)
GLOBULIN UR ELPH-MCNC: 3.9 GM/DL
GLUCOSE SERPL-MCNC: 229 MG/DL (ref 65–99)
HCT VFR BLD AUTO: 34.9 % (ref 37.5–51)
HGB BLD-MCNC: 11.4 G/DL (ref 13–17.7)
MCH RBC QN AUTO: 27.9 PG (ref 26.6–33)
MCHC RBC AUTO-ENTMCNC: 32.7 G/DL (ref 31.5–35.7)
MCV RBC AUTO: 85.3 FL (ref 79–97)
PLATELET # BLD AUTO: 149 10*3/MM3 (ref 140–450)
PMV BLD AUTO: 9.7 FL (ref 6–12)
POTASSIUM SERPL-SCNC: 4.1 MMOL/L (ref 3.5–5.2)
PROT SERPL-MCNC: 7.3 G/DL (ref 6–8.5)
RBC # BLD AUTO: 4.09 10*6/MM3 (ref 4.14–5.8)
SODIUM SERPL-SCNC: 141 MMOL/L (ref 136–145)
WBC NRBC COR # BLD AUTO: 5.25 10*3/MM3 (ref 3.4–10.8)

## 2024-09-16 PROCEDURE — 85652 RBC SED RATE AUTOMATED: CPT

## 2024-09-16 PROCEDURE — 85027 COMPLETE CBC AUTOMATED: CPT

## 2024-09-16 PROCEDURE — 36415 COLL VENOUS BLD VENIPUNCTURE: CPT

## 2024-09-16 PROCEDURE — 80053 COMPREHEN METABOLIC PANEL: CPT

## 2024-09-16 PROCEDURE — 86140 C-REACTIVE PROTEIN: CPT

## (undated) DEVICE — CULT AERO SGL CA/50

## (undated) DEVICE — GLV SURG SENSICARE PI MIC PF SZ9 LF STRL

## (undated) DEVICE — KT DRN EVAC WND PVC PCH WTROC RND 10F400

## (undated) DEVICE — CULT ANAERB

## (undated) DEVICE — KT PUMP INFUBLOCK MDL 2100 PMKITSOLIS

## (undated) DEVICE — SPNG GZ WOVN 4X4IN 12PLY 10/BX STRL

## (undated) DEVICE — JACKSON-PRATT 100CC BULB RESERVOIR: Brand: CARDINAL HEALTH

## (undated) DEVICE — SUT ETHLN 3/0 FS1 30IN 669H

## (undated) DEVICE — BNDG ELAS W/CLIP 6IN 10YD LF STRL

## (undated) DEVICE — STRYKER PERFORMANCE SERIES SAGITTAL BLADE: Brand: STRYKER PERFORMANCE SERIES

## (undated) DEVICE — ADHS LIQ MASTISOL 2/3ML

## (undated) DEVICE — SUT ETHLN 3/0 PC5 18IN 1893G

## (undated) DEVICE — SPNG LAP PREWSH SFTPK 18X18IN STRL PK/5

## (undated) DEVICE — SKN PREP SPNG STKS PVP PNT STR: Brand: MEDLINE INDUSTRIES, INC.

## (undated) DEVICE — DRSNG PAD ABD 8X10IN STRL

## (undated) DEVICE — BANDAGE,GAUZE,BULKEE II,4.5"X4.1YD,STRL: Brand: MEDLINE

## (undated) DEVICE — 450 ML BOTTLE OF 0.05% CHLORHEXIDINE GLUCONATE IN 99.95% STERILE WATER FOR IRRIGATION, USP AND APPLICATOR.: Brand: IRRISEPT ANTIMICROBIAL WOUND LAVAGE

## (undated) DEVICE — SCRB SURG BACTOSHIELD CHG 4PCT 4OZ

## (undated) DEVICE — PK MAJ SHLDR SPLT 10

## (undated) DEVICE — DRSNG WND VAC GRANUFOAM SENSATRAC LG

## (undated) DEVICE — SUT SILK 0 TIES 30IN A306H

## (undated) DEVICE — GLV SURG SENSICARE PI LF PF 7.5

## (undated) DEVICE — BNDG ELAS CO-FLEX SLF ADHR 4IN5YD LF STRL

## (undated) DEVICE — GOWN,SIRUS,POLYRNF,XLN/3XL,18/CS: Brand: MEDLINE

## (undated) DEVICE — HYPODERMIC SAFETY NEEDLE: Brand: MONOJECT

## (undated) DEVICE — C-ARM DRAPE: Brand: DEROYAL

## (undated) DEVICE — PATIENT RETURN ELECTRODE, SINGLE-USE, CONTACT QUALITY MONITORING, ADULT, WITH 9FT CORD, FOR PATIENTS WEIGING OVER 33LBS. (15KG): Brand: MEGADYNE

## (undated) DEVICE — SHEET,DRAPE,53X77,STERILE: Brand: MEDLINE

## (undated) DEVICE — GLV SURG SENSICARE PI LF PF 7.0

## (undated) DEVICE — BLANKT WARM UPPR/BDY ARM/OUT 57X196CM

## (undated) DEVICE — PREVENA PEEL & PLACE SYSTEM KIT- 13 CM: Brand: PREVENA™ PEEL & PLACE™

## (undated) DEVICE — UNDERCAST PADDING: Brand: DEROYAL

## (undated) DEVICE — GLV SURG PREMIERPRO MIC LTX PF SZ7.5 BRN

## (undated) DEVICE — JP PERF DRN SIL FLT 10MM FULL: Brand: CARDINAL HEALTH

## (undated) DEVICE — PENCL SMOKE/EVAC MEGADYNE TELESCP 10FT

## (undated) DEVICE — BNDG,ELSTC,MATRIX,STRL,6"X5YD,LF,HOOK&LP: Brand: MEDLINE

## (undated) DEVICE — SPK10295 ORTHOPEDIC FRACTURE KIT: Brand: SPK10295 ORTHOPEDIC FRACTURE KIT

## (undated) DEVICE — 6617 IOBAN II PATIENT ISOLATION DRAPE 5/BX,4BX/CS: Brand: STERI-DRAPE™ IOBAN™ 2

## (undated) DEVICE — DRSNG SURESITE WNDW 4X4.5

## (undated) DEVICE — PROXIMATE RH ROTATING HEAD SKIN STAPLERS (35 WIDE) CONTAINS 35 STAINLESS STEEL STAPLES: Brand: PROXIMATE

## (undated) DEVICE — CYSTO/BLADDER IRRIGATION SET, REGULATING CLAMP

## (undated) DEVICE — INTENDED FOR TISSUE SEPARATION, AND OTHER PROCEDURES THAT REQUIRE A SHARP SURGICAL BLADE TO PUNCTURE OR CUT.: Brand: BARD-PARKER ® STAINLESS STEEL BLADES

## (undated) DEVICE — BNDG,ELSTC,MATRIX,STRL,4"X5YD,LF,HOOK&LP: Brand: MEDLINE

## (undated) DEVICE — SYR LUERLOK 20CC BX/50

## (undated) DEVICE — TRAP FLD MINIVAC MEGADYNE 100ML

## (undated) DEVICE — SUT PDS MONO 0 36 CT1 VIL PDP346H

## (undated) DEVICE — CONTAINER,SPECIMEN,OR STERILE,4OZ: Brand: MEDLINE

## (undated) DEVICE — DRAPE,TOWEL,LARGE,INVISISHIELD: Brand: MEDLINE

## (undated) DEVICE — PREMIUM DRY TRAY LF: Brand: MEDLINE INDUSTRIES, INC.

## (undated) DEVICE — DRSNG GZ CURAD XEROFORM NONADHR OVERWRAP 5X9IN

## (undated) DEVICE — MARKER,SKIN,W/RULER,DUAL,STOP: Brand: MEDLINE

## (undated) DEVICE — X-LARGE COTTON GLOVE: Brand: DEROYAL

## (undated) DEVICE — DRESSING,GAUZE,XEROFORM,CURAD,1"X8",ST: Brand: CURAD

## (undated) DEVICE — KT CANSTR VAC WND W/ISOLYSER SENSATRAC 500CC 10CS

## (undated) DEVICE — GLV SURG PREMIERPRO MIC LTX PF SZ7 BRN

## (undated) DEVICE — STERILE, DISPOSABLE SURGICAL INSTRUMENT WITH 8MM CUTTING TIP FOR LARGE VOLUME, CANCELLOUS BONE AND MARROW HARVESTING WITH MARROW SEPARATION INSERT.: Brand: AVITUS BONE HARVESTER

## (undated) DEVICE — SUT SILK 0 CT1 18IN 424H

## (undated) DEVICE — SUT PDS 2 0 CT1 27IN CLR Z259H

## (undated) DEVICE — UNDERGLV SURG BIOGEL INDICAT PI SZ8.5 BLU

## (undated) DEVICE — VAC WHITEFOAM DRESSING LARGE: Brand: V.A.C. WHITEFOAM™

## (undated) DEVICE — PK EXTREM LOWR 10

## (undated) DEVICE — GLV SURG SENSICARE PI ORTHO SZ8.5 LF STRL